# Patient Record
Sex: MALE | Race: WHITE | Employment: UNEMPLOYED | URBAN - METROPOLITAN AREA
[De-identification: names, ages, dates, MRNs, and addresses within clinical notes are randomized per-mention and may not be internally consistent; named-entity substitution may affect disease eponyms.]

---

## 2017-01-18 ENCOUNTER — HOSPITAL ENCOUNTER (OUTPATIENT)
Dept: LAB | Age: 57
Discharge: HOME | End: 2017-01-18
Payer: MEDICARE

## 2017-01-18 DIAGNOSIS — I82.90 THROMBUS: ICD-10-CM

## 2017-01-18 LAB
INR PPP: 2.6 (ref 0.9–1.2)
PROTHROMBIN TIME: 28.1 SEC (ref 9.6–12)

## 2017-01-18 PROCEDURE — 85610 PROTHROMBIN TIME: CPT | Performed by: INTERNAL MEDICINE

## 2017-01-18 PROCEDURE — 36415 COLL VENOUS BLD VENIPUNCTURE: CPT | Performed by: INTERNAL MEDICINE

## 2017-03-22 ENCOUNTER — HOSPITAL ENCOUNTER (OUTPATIENT)
Dept: LAB | Age: 57
Discharge: HOME | End: 2017-03-22
Payer: MEDICARE

## 2017-03-22 DIAGNOSIS — I82.90 THROMBUS: ICD-10-CM

## 2017-03-22 LAB
INR PPP: 7.6 (ref 0.9–1.2)
PROTHROMBIN TIME: >82 SEC (ref 9.6–12)

## 2017-03-22 PROCEDURE — 85610 PROTHROMBIN TIME: CPT | Performed by: INTERNAL MEDICINE

## 2017-03-22 PROCEDURE — 36415 COLL VENOUS BLD VENIPUNCTURE: CPT | Performed by: INTERNAL MEDICINE

## 2017-03-28 LAB
INR PPP: 1.9 (ref 0.9–1.2)
PROTHROMBIN TIME: 20.5 SEC (ref 9.6–12)

## 2017-03-28 PROCEDURE — 85610 PROTHROMBIN TIME: CPT | Performed by: INTERNAL MEDICINE

## 2017-03-28 PROCEDURE — 36415 COLL VENOUS BLD VENIPUNCTURE: CPT | Performed by: INTERNAL MEDICINE

## 2017-04-20 ENCOUNTER — HOSPITAL ENCOUNTER (OUTPATIENT)
Dept: LAB | Age: 57
Discharge: HOME | End: 2017-04-20
Payer: MEDICARE

## 2017-04-20 DIAGNOSIS — E78.2 MIXED HYPERLIPIDEMIA: ICD-10-CM

## 2017-04-20 LAB
CHOLEST SERPL-MCNC: 300 MG/DL
HDLC SERPL-MCNC: 27 MG/DL (ref 40–60)
HDLC SERPL: 11.1 {RATIO}
INR PPP: 4.9 (ref 0.9–1.2)
LDLC SERPL CALC-MCNC: 208.4 MG/DL
LIPID PROFILE,FLP: ABNORMAL
PROTHROMBIN TIME: 54.3 SEC (ref 9.6–12)
TRIGL SERPL-MCNC: 323 MG/DL (ref 35–150)
VLDLC SERPL CALC-MCNC: 64.6 MG/DL (ref 6–23)

## 2017-04-20 PROCEDURE — 85610 PROTHROMBIN TIME: CPT | Performed by: INTERNAL MEDICINE

## 2017-04-20 PROCEDURE — 80061 LIPID PANEL: CPT | Performed by: INTERNAL MEDICINE

## 2017-04-20 PROCEDURE — 36415 COLL VENOUS BLD VENIPUNCTURE: CPT | Performed by: INTERNAL MEDICINE

## 2017-04-26 ENCOUNTER — HOSPITAL ENCOUNTER (OUTPATIENT)
Dept: LAB | Age: 57
Discharge: HOME | End: 2017-04-26
Payer: MEDICARE

## 2017-05-15 LAB
INR PPP: 4.1 (ref 0.9–1.2)
PROTHROMBIN TIME: 45.2 SEC (ref 9.6–12)

## 2017-05-15 PROCEDURE — 85610 PROTHROMBIN TIME: CPT | Performed by: INTERNAL MEDICINE

## 2017-05-15 PROCEDURE — 36415 COLL VENOUS BLD VENIPUNCTURE: CPT | Performed by: INTERNAL MEDICINE

## 2017-05-24 ENCOUNTER — HOSPITAL ENCOUNTER (OUTPATIENT)
Dept: LAB | Age: 57
Discharge: HOME | End: 2017-05-24
Payer: MEDICARE

## 2017-06-07 PROBLEM — F51.01 PRIMARY INSOMNIA: Status: ACTIVE | Noted: 2017-06-07

## 2017-06-07 PROBLEM — J30.1 SEASONAL ALLERGIC RHINITIS DUE TO POLLEN: Status: ACTIVE | Noted: 2017-06-07

## 2017-06-08 PROBLEM — E66.9 OBESITY (BMI 30.0-34.9): Status: ACTIVE | Noted: 2017-06-08

## 2017-06-08 PROBLEM — Z12.5 SCREENING PSA (PROSTATE SPECIFIC ANTIGEN): Status: ACTIVE | Noted: 2017-06-08

## 2017-06-13 LAB
INR PPP: 2.4 (ref 0.9–1.2)
PROTHROMBIN TIME: 26 SEC (ref 9.6–12)

## 2017-06-13 PROCEDURE — 36415 COLL VENOUS BLD VENIPUNCTURE: CPT | Performed by: INTERNAL MEDICINE

## 2017-06-13 PROCEDURE — 85610 PROTHROMBIN TIME: CPT | Performed by: INTERNAL MEDICINE

## 2017-06-28 ENCOUNTER — HOSPITAL ENCOUNTER (OUTPATIENT)
Dept: LAB | Age: 57
Discharge: HOME | End: 2017-06-28
Payer: MEDICARE

## 2017-06-28 DIAGNOSIS — I82.90 THROMBUS: ICD-10-CM

## 2017-08-18 LAB
INR PPP: 3.1 (ref 0.9–1.2)
PROTHROMBIN TIME: 33.5 SEC (ref 9.6–12)

## 2017-08-18 PROCEDURE — 36415 COLL VENOUS BLD VENIPUNCTURE: CPT | Performed by: INTERNAL MEDICINE

## 2017-08-18 PROCEDURE — 85610 PROTHROMBIN TIME: CPT | Performed by: INTERNAL MEDICINE

## 2017-08-23 ENCOUNTER — HOSPITAL ENCOUNTER (OUTPATIENT)
Dept: LAB | Age: 57
Discharge: HOME | End: 2017-08-23
Payer: MEDICARE

## 2017-10-17 ENCOUNTER — HOSPITAL ENCOUNTER (OUTPATIENT)
Dept: LAB | Age: 57
Discharge: HOME | End: 2017-10-17
Payer: MEDICARE

## 2017-10-17 DIAGNOSIS — I82.90 THROMBUS: ICD-10-CM

## 2017-10-17 LAB
INR PPP: 3.1 (ref 0.9–1.2)
PROTHROMBIN TIME: 34.1 SEC (ref 9.6–12)

## 2017-10-17 PROCEDURE — 85610 PROTHROMBIN TIME: CPT | Performed by: INTERNAL MEDICINE

## 2017-10-17 PROCEDURE — 36415 COLL VENOUS BLD VENIPUNCTURE: CPT | Performed by: INTERNAL MEDICINE

## 2017-12-01 LAB
INR PPP: 2 (ref 0.9–1.2)
PROTHROMBIN TIME: 21.4 SEC (ref 9.6–12)

## 2017-12-01 PROCEDURE — 36415 COLL VENOUS BLD VENIPUNCTURE: CPT | Performed by: INTERNAL MEDICINE

## 2017-12-01 PROCEDURE — 85610 PROTHROMBIN TIME: CPT | Performed by: INTERNAL MEDICINE

## 2017-12-03 PROBLEM — R73.02 IGT (IMPAIRED GLUCOSE TOLERANCE): Status: ACTIVE | Noted: 2017-12-03

## 2017-12-05 ENCOUNTER — HOSPITAL ENCOUNTER (OUTPATIENT)
Dept: LAB | Age: 57
Discharge: HOME | End: 2017-12-05
Payer: MEDICARE

## 2017-12-05 DIAGNOSIS — R73.02 IGT (IMPAIRED GLUCOSE TOLERANCE): ICD-10-CM

## 2017-12-05 DIAGNOSIS — I50.22 CHRONIC SYSTOLIC CONGESTIVE HEART FAILURE (HCC): ICD-10-CM

## 2017-12-05 LAB
ANION GAP SERPL CALC-SCNC: 10 MMOL/L (ref 7–16)
BUN SERPL-MCNC: 14 MG/DL (ref 6–23)
CALCIUM SERPL-MCNC: 8.5 MG/DL (ref 8.3–10.4)
CHLORIDE SERPL-SCNC: 108 MMOL/L (ref 98–107)
CO2 SERPL-SCNC: 27 MMOL/L (ref 21–32)
CREAT SERPL-MCNC: 1.07 MG/DL (ref 0.8–1.5)
EST. AVERAGE GLUCOSE BLD GHB EST-MCNC: 111 MG/DL
GLUCOSE SERPL-MCNC: 138 MG/DL (ref 65–100)
HBA1C MFR BLD: 5.5 % (ref 4.8–6)
POTASSIUM SERPL-SCNC: 3.9 MMOL/L (ref 3.5–5.1)
SODIUM SERPL-SCNC: 145 MMOL/L (ref 136–145)

## 2017-12-05 PROCEDURE — 80048 BASIC METABOLIC PNL TOTAL CA: CPT | Performed by: FAMILY MEDICINE

## 2017-12-05 PROCEDURE — 83036 HEMOGLOBIN GLYCOSYLATED A1C: CPT | Performed by: FAMILY MEDICINE

## 2017-12-05 PROCEDURE — 36415 COLL VENOUS BLD VENIPUNCTURE: CPT | Performed by: FAMILY MEDICINE

## 2017-12-10 PROBLEM — Z78.9 STATIN INTOLERANCE: Status: ACTIVE | Noted: 2017-12-10

## 2017-12-12 PROBLEM — R73.02 IGT (IMPAIRED GLUCOSE TOLERANCE): Status: RESOLVED | Noted: 2017-12-03 | Resolved: 2017-12-12

## 2017-12-19 ENCOUNTER — HOSPITAL ENCOUNTER (OUTPATIENT)
Dept: LAB | Age: 57
Discharge: HOME | End: 2017-12-19
Payer: MEDICARE

## 2017-12-19 DIAGNOSIS — I82.90 THROMBUS: ICD-10-CM

## 2018-01-26 ENCOUNTER — HOSPITAL ENCOUNTER (OUTPATIENT)
Dept: LAB | Age: 58
Discharge: HOME | End: 2018-01-26
Payer: MEDICARE

## 2018-01-26 LAB
INR PPP: 2.5
PROTHROMBIN TIME: 26.1 SEC (ref 11.5–14.5)

## 2018-01-26 PROCEDURE — 36415 COLL VENOUS BLD VENIPUNCTURE: CPT | Performed by: INTERNAL MEDICINE

## 2018-01-26 PROCEDURE — 85610 PROTHROMBIN TIME: CPT | Performed by: INTERNAL MEDICINE

## 2018-03-07 ENCOUNTER — HOSPITAL ENCOUNTER (OUTPATIENT)
Dept: LAB | Age: 58
Discharge: HOME | End: 2018-03-07
Payer: MEDICARE

## 2018-03-07 DIAGNOSIS — I82.90 THROMBUS: ICD-10-CM

## 2018-03-07 LAB
INR PPP: 3.3
PROTHROMBIN TIME: 33.1 SEC (ref 11.5–14.5)

## 2018-03-07 PROCEDURE — 85610 PROTHROMBIN TIME: CPT | Performed by: INTERNAL MEDICINE

## 2018-03-07 PROCEDURE — 36415 COLL VENOUS BLD VENIPUNCTURE: CPT | Performed by: INTERNAL MEDICINE

## 2018-04-06 ENCOUNTER — HOSPITAL ENCOUNTER (OUTPATIENT)
Dept: LAB | Age: 58
Discharge: HOME | End: 2018-04-06
Payer: MEDICARE

## 2018-04-06 DIAGNOSIS — I82.90 THROMBUS: ICD-10-CM

## 2018-04-06 LAB
INR PPP: 3.1
PROTHROMBIN TIME: 31.2 SEC (ref 11.5–14.5)

## 2018-04-06 PROCEDURE — 36415 COLL VENOUS BLD VENIPUNCTURE: CPT | Performed by: INTERNAL MEDICINE

## 2018-04-06 PROCEDURE — 85610 PROTHROMBIN TIME: CPT | Performed by: INTERNAL MEDICINE

## 2018-05-18 ENCOUNTER — HOSPITAL ENCOUNTER (OUTPATIENT)
Dept: LAB | Age: 58
Discharge: HOME | End: 2018-05-18
Payer: MEDICARE

## 2018-05-18 DIAGNOSIS — I82.90 THROMBUS: ICD-10-CM

## 2018-05-18 LAB
INR PPP: 2.5
PROTHROMBIN TIME: 26.2 SEC (ref 11.5–14.5)

## 2018-05-18 PROCEDURE — 85610 PROTHROMBIN TIME: CPT | Performed by: INTERNAL MEDICINE

## 2018-05-18 PROCEDURE — 36415 COLL VENOUS BLD VENIPUNCTURE: CPT | Performed by: INTERNAL MEDICINE

## 2018-06-12 PROBLEM — R20.0 NUMBNESS OF LEFT FOOT: Status: ACTIVE | Noted: 2018-06-12

## 2018-07-09 ENCOUNTER — HOSPITAL ENCOUNTER (OUTPATIENT)
Dept: LAB | Age: 58
Discharge: HOME | End: 2018-07-09
Payer: MEDICARE

## 2018-07-09 DIAGNOSIS — I82.90 THROMBUS: ICD-10-CM

## 2018-07-09 LAB
INR PPP: 2.8
PROTHROMBIN TIME: 29.2 SEC (ref 11.5–14.5)

## 2018-07-09 PROCEDURE — 85610 PROTHROMBIN TIME: CPT | Performed by: INTERNAL MEDICINE

## 2018-07-09 PROCEDURE — 36415 COLL VENOUS BLD VENIPUNCTURE: CPT | Performed by: INTERNAL MEDICINE

## 2018-08-24 ENCOUNTER — HOSPITAL ENCOUNTER (OUTPATIENT)
Dept: LAB | Age: 58
Discharge: HOME | End: 2018-08-24
Payer: MEDICARE

## 2018-08-24 DIAGNOSIS — I82.90 THROMBUS: ICD-10-CM

## 2018-08-24 LAB
INR PPP: 2.7
PROTHROMBIN TIME: 28.2 SEC (ref 11.5–14.5)

## 2018-08-24 PROCEDURE — 36415 COLL VENOUS BLD VENIPUNCTURE: CPT

## 2018-08-24 PROCEDURE — 85610 PROTHROMBIN TIME: CPT

## 2018-10-05 ENCOUNTER — HOSPITAL ENCOUNTER (OUTPATIENT)
Dept: LAB | Age: 58
Discharge: HOME | End: 2018-10-05
Payer: MEDICARE

## 2018-10-05 DIAGNOSIS — I82.90 THROMBUS: ICD-10-CM

## 2018-10-05 LAB
INR PPP: 3.1
PROTHROMBIN TIME: 30.1 SEC (ref 11.5–14.5)

## 2018-10-05 PROCEDURE — 36415 COLL VENOUS BLD VENIPUNCTURE: CPT

## 2018-10-05 PROCEDURE — 85610 PROTHROMBIN TIME: CPT

## 2018-11-27 PROBLEM — Z79.01 LONG TERM (CURRENT) USE OF ANTICOAGULANTS: Status: ACTIVE | Noted: 2018-11-27

## 2018-12-11 PROBLEM — Z23 NEED FOR SHINGLES VACCINE: Status: ACTIVE | Noted: 2018-12-11

## 2019-01-09 ENCOUNTER — HOSPITAL ENCOUNTER (OUTPATIENT)
Dept: LAB | Age: 59
Discharge: HOME | End: 2019-01-09
Payer: MEDICARE

## 2019-01-09 DIAGNOSIS — I25.10 ATHEROSCLEROSIS OF NATIVE CORONARY ARTERY OF NATIVE HEART WITHOUT ANGINA PECTORIS: ICD-10-CM

## 2019-01-09 DIAGNOSIS — I50.22 CHRONIC SYSTOLIC CONGESTIVE HEART FAILURE (HCC): ICD-10-CM

## 2019-01-09 LAB
ANION GAP SERPL CALC-SCNC: 5 MMOL/L
BASOPHILS # BLD: 0.1 K/UL (ref 0–0.2)
BASOPHILS NFR BLD: 1 % (ref 0–2)
BUN SERPL-MCNC: 14 MG/DL (ref 6–23)
CALCIUM SERPL-MCNC: 8.1 MG/DL (ref 8.3–10.4)
CHLORIDE SERPL-SCNC: 111 MMOL/L (ref 98–107)
CO2 SERPL-SCNC: 29 MMOL/L (ref 21–32)
CREAT SERPL-MCNC: 1 MG/DL (ref 0.8–1.5)
DIFFERENTIAL METHOD BLD: ABNORMAL
EOSINOPHIL # BLD: 0.5 K/UL (ref 0–0.8)
EOSINOPHIL NFR BLD: 5 % (ref 0.5–7.8)
ERYTHROCYTE [DISTWIDTH] IN BLOOD BY AUTOMATED COUNT: 14.7 % (ref 11.9–14.6)
GLUCOSE SERPL-MCNC: 90 MG/DL (ref 65–100)
HCT VFR BLD AUTO: 43.1 % (ref 41.1–50.3)
HGB BLD-MCNC: 14.3 G/DL (ref 13.6–17.2)
IMM GRANULOCYTES # BLD AUTO: 0 K/UL (ref 0–0.5)
IMM GRANULOCYTES NFR BLD AUTO: 0 % (ref 0–5)
LYMPHOCYTES # BLD: 2.9 K/UL (ref 0.5–4.6)
LYMPHOCYTES NFR BLD: 26 % (ref 13–44)
MCH RBC QN AUTO: 30.6 PG (ref 26.1–32.9)
MCHC RBC AUTO-ENTMCNC: 33.2 G/DL (ref 31.4–35)
MCV RBC AUTO: 92.3 FL (ref 79.6–97.8)
MONOCYTES # BLD: 1.2 K/UL (ref 0.1–1.3)
MONOCYTES NFR BLD: 11 % (ref 4–12)
NEUTS SEG # BLD: 6.3 K/UL (ref 1.7–8.2)
NEUTS SEG NFR BLD: 57 % (ref 43–78)
NRBC # BLD: 0 K/UL (ref 0–0.2)
PLATELET # BLD AUTO: 242 K/UL (ref 150–450)
PMV BLD AUTO: 10.6 FL (ref 9.4–12.3)
POTASSIUM SERPL-SCNC: 4.3 MMOL/L (ref 3.5–5.1)
RBC # BLD AUTO: 4.67 M/UL (ref 4.23–5.6)
SODIUM SERPL-SCNC: 145 MMOL/L (ref 136–145)
WBC # BLD AUTO: 10.9 K/UL (ref 4.3–11.1)

## 2019-01-09 PROCEDURE — 80048 BASIC METABOLIC PNL TOTAL CA: CPT

## 2019-01-09 PROCEDURE — 36415 COLL VENOUS BLD VENIPUNCTURE: CPT

## 2019-01-09 PROCEDURE — 85025 COMPLETE CBC W/AUTO DIFF WBC: CPT

## 2019-06-24 PROBLEM — Z72.0 TOBACCO ABUSE: Status: ACTIVE | Noted: 2019-06-24

## 2019-06-24 PROBLEM — Z12.5 SCREENING PSA (PROSTATE SPECIFIC ANTIGEN): Status: ACTIVE | Noted: 2019-06-24

## 2019-09-23 PROBLEM — Z23 NEED FOR SHINGLES VACCINE: Status: RESOLVED | Noted: 2018-12-11 | Resolved: 2019-09-23

## 2019-12-29 PROBLEM — R53.82 CHRONIC FATIGUE: Status: ACTIVE | Noted: 2019-12-29

## 2019-12-29 PROBLEM — B35.6 TINEA CRURIS: Status: ACTIVE | Noted: 2019-12-29

## 2019-12-29 PROBLEM — N40.1 BENIGN PROSTATIC HYPERPLASIA WITH WEAK URINARY STREAM: Status: ACTIVE | Noted: 2019-12-29

## 2019-12-29 PROBLEM — R39.12 BENIGN PROSTATIC HYPERPLASIA WITH WEAK URINARY STREAM: Status: ACTIVE | Noted: 2019-12-29

## 2020-05-16 ENCOUNTER — TELEPHONE (OUTPATIENT)
Dept: CARDIOLOGY | Age: 60
End: 2020-05-16

## 2020-05-16 RX ORDER — WARFARIN SODIUM 5 MG/1
TABLET ORAL
Qty: 100 TAB | Refills: 3 | Status: SHIPPED | OUTPATIENT
Start: 2020-05-16 | End: 2021-01-13 | Stop reason: SDUPTHER

## 2020-05-16 RX ORDER — CARVEDILOL 12.5 MG/1
12.5 TABLET ORAL DAILY
Qty: 90 TAB | Refills: 3 | Status: SHIPPED | OUTPATIENT
Start: 2020-05-16 | End: 2021-05-10 | Stop reason: SDUPTHER

## 2020-06-18 PROBLEM — I87.2 EDEMA OF BOTH LOWER EXTREMITIES DUE TO PERIPHERAL VENOUS INSUFFICIENCY: Status: ACTIVE | Noted: 2020-06-18

## 2020-06-18 PROBLEM — E66.09 CLASS 1 OBESITY DUE TO EXCESS CALORIES WITH SERIOUS COMORBIDITY AND BODY MASS INDEX (BMI) OF 31.0 TO 31.9 IN ADULT: Status: ACTIVE | Noted: 2017-06-08

## 2020-06-18 PROBLEM — H60.8X3 CHRONIC ECZEMATOUS OTITIS EXTERNA OF BOTH EARS: Status: ACTIVE | Noted: 2020-06-18

## 2020-06-19 PROBLEM — Z79.899 HIGH RISK MEDICATION USE: Status: ACTIVE | Noted: 2020-06-19

## 2020-06-30 PROBLEM — I47.20 VENTRICULAR TACHYCARDIA: Status: ACTIVE | Noted: 2020-06-30

## 2020-07-18 PROBLEM — Z23 NEED FOR SHINGLES VACCINE: Status: RESOLVED | Noted: 2018-12-11 | Resolved: 2020-07-18

## 2020-08-05 ENCOUNTER — HOSPITAL ENCOUNTER (OUTPATIENT)
Dept: LAB | Age: 60
Discharge: HOME | End: 2020-08-05
Payer: MEDICARE

## 2020-08-05 DIAGNOSIS — I47.20 VENTRICULAR TACHYCARDIA: ICD-10-CM

## 2020-08-05 DIAGNOSIS — I50.22 CHRONIC SYSTOLIC CONGESTIVE HEART FAILURE (HCC): ICD-10-CM

## 2020-08-05 LAB
ANION GAP SERPL CALC-SCNC: 4 MMOL/L (ref 7–16)
BUN SERPL-MCNC: 12 MG/DL (ref 8–23)
CALCIUM SERPL-MCNC: 8.5 MG/DL (ref 8.3–10.4)
CHLORIDE SERPL-SCNC: 112 MMOL/L (ref 98–107)
CO2 SERPL-SCNC: 27 MMOL/L (ref 21–32)
CREAT SERPL-MCNC: 1.17 MG/DL (ref 0.8–1.5)
GLUCOSE SERPL-MCNC: 107 MG/DL (ref 65–100)
MAGNESIUM SERPL-MCNC: 2.5 MG/DL (ref 1.8–2.4)
POTASSIUM SERPL-SCNC: 4.1 MMOL/L (ref 3.5–5.1)
SODIUM SERPL-SCNC: 143 MMOL/L (ref 136–145)

## 2020-08-05 PROCEDURE — 36415 COLL VENOUS BLD VENIPUNCTURE: CPT

## 2020-08-05 PROCEDURE — 80048 BASIC METABOLIC PNL TOTAL CA: CPT

## 2020-08-05 PROCEDURE — 83735 ASSAY OF MAGNESIUM: CPT

## 2021-10-03 PROBLEM — Z28.21 COVID-19 VACCINATION REFUSED: Status: ACTIVE | Noted: 2021-06-18

## 2021-10-03 PROBLEM — Z28.21 COVID-19 VACCINATION REFUSED: Status: ACTIVE | Noted: 2021-10-03

## 2021-10-09 ENCOUNTER — APPOINTMENT (OUTPATIENT)
Dept: GENERAL RADIOLOGY | Age: 61
DRG: 177 | End: 2021-10-09
Attending: EMERGENCY MEDICINE
Payer: MEDICARE

## 2021-10-09 ENCOUNTER — HOSPITAL ENCOUNTER (INPATIENT)
Age: 61
LOS: 6 days | Discharge: HOME | DRG: 177 | End: 2021-10-15
Attending: EMERGENCY MEDICINE | Admitting: FAMILY MEDICINE
Payer: MEDICARE

## 2021-10-09 DIAGNOSIS — U07.1 COVID-19: Primary | ICD-10-CM

## 2021-10-09 PROBLEM — R09.02 HYPOXEMIA REQUIRING SUPPLEMENTAL OXYGEN: Status: ACTIVE | Noted: 2021-10-06

## 2021-10-09 PROBLEM — Z86.16 HISTORY OF 2019 NOVEL CORONAVIRUS DISEASE (COVID-19): Status: ACTIVE | Noted: 2021-10-03

## 2021-10-09 PROBLEM — Z99.81 HYPOXEMIA REQUIRING SUPPLEMENTAL OXYGEN: Status: ACTIVE | Noted: 2021-10-06

## 2021-10-09 PROBLEM — J96.01 ACUTE HYPOXEMIC RESPIRATORY FAILURE DUE TO COVID-19 (HCC): Status: ACTIVE | Noted: 2021-10-09

## 2021-10-09 LAB
ALBUMIN SERPL-MCNC: 2.4 G/DL (ref 3.2–4.6)
ALBUMIN/GLOB SERPL: 0.6 {RATIO} (ref 1.2–3.5)
ALP SERPL-CCNC: 58 U/L (ref 50–136)
ALT SERPL-CCNC: 50 U/L (ref 12–65)
ANION GAP SERPL CALC-SCNC: 6 MMOL/L (ref 7–16)
APTT PPP: 104.4 SEC (ref 24.1–35.1)
APTT PPP: 115.3 SEC (ref 24.1–35.1)
APTT PPP: NORMAL SEC (ref 24.1–35.1)
AST SERPL-CCNC: 87 U/L (ref 15–37)
ATRIAL RATE: 85 BPM
BASOPHILS # BLD: 0 K/UL (ref 0–0.2)
BASOPHILS NFR BLD: 0 % (ref 0–2)
BILIRUB SERPL-MCNC: 0.9 MG/DL (ref 0.2–1.1)
BUN SERPL-MCNC: 21 MG/DL (ref 8–23)
CALCIUM SERPL-MCNC: 8.3 MG/DL (ref 8.3–10.4)
CALCULATED P AXIS, ECG09: 41 DEGREES
CALCULATED R AXIS, ECG10: 51 DEGREES
CALCULATED T AXIS, ECG11: 93 DEGREES
CHLORIDE SERPL-SCNC: 105 MMOL/L (ref 98–107)
CO2 SERPL-SCNC: 27 MMOL/L (ref 21–32)
CREAT SERPL-MCNC: 1.42 MG/DL (ref 0.8–1.5)
CRP SERPL-MCNC: 21.5 MG/DL (ref 0–0.9)
DIAGNOSIS, 93000: NORMAL
DIFFERENTIAL METHOD BLD: ABNORMAL
EOSINOPHIL # BLD: 0 K/UL (ref 0–0.8)
EOSINOPHIL NFR BLD: 0 % (ref 0.5–7.8)
ERYTHROCYTE [DISTWIDTH] IN BLOOD BY AUTOMATED COUNT: 15.1 % (ref 11.9–14.6)
GLOBULIN SER CALC-MCNC: 4.1 G/DL (ref 2.3–3.5)
GLUCOSE SERPL-MCNC: 140 MG/DL (ref 65–100)
HCT VFR BLD AUTO: 40.9 % (ref 41.1–50.3)
HGB BLD-MCNC: 13.1 G/DL (ref 13.6–17.2)
IMM GRANULOCYTES # BLD AUTO: 0.2 K/UL (ref 0–0.5)
IMM GRANULOCYTES NFR BLD AUTO: 1 % (ref 0–5)
INR PPP: >9
INR PPP: >9
LYMPHOCYTES # BLD: 1.3 K/UL (ref 0.5–4.6)
LYMPHOCYTES NFR BLD: 12 % (ref 13–44)
MCH RBC QN AUTO: 29.2 PG (ref 26.1–32.9)
MCHC RBC AUTO-ENTMCNC: 32 G/DL (ref 31.4–35)
MCV RBC AUTO: 91.1 FL (ref 79.6–97.8)
MONOCYTES # BLD: 0.6 K/UL (ref 0.1–1.3)
MONOCYTES NFR BLD: 5 % (ref 4–12)
NEUTS SEG # BLD: 9.4 K/UL (ref 1.7–8.2)
NEUTS SEG NFR BLD: 82 % (ref 43–78)
NRBC # BLD: 0 K/UL (ref 0–0.2)
P-R INTERVAL, ECG05: 154 MS
PLATELET # BLD AUTO: 301 K/UL (ref 150–450)
PMV BLD AUTO: 10 FL (ref 9.4–12.3)
POTASSIUM SERPL-SCNC: 3.9 MMOL/L (ref 3.5–5.1)
PROT SERPL-MCNC: 6.5 G/DL (ref 6.3–8.2)
PROTHROMBIN TIME: 77.7 SEC (ref 12.6–14.5)
PROTHROMBIN TIME: 82.6 SEC (ref 12.6–14.5)
Q-T INTERVAL, ECG07: 344 MS
QRS DURATION, ECG06: 90 MS
QTC CALCULATION (BEZET), ECG08: 409 MS
RBC # BLD AUTO: 4.49 M/UL (ref 4.23–5.6)
SODIUM SERPL-SCNC: 138 MMOL/L (ref 138–145)
TROPONIN-HIGH SENSITIVITY: 18.2 PG/ML (ref 0–14)
VENTRICULAR RATE, ECG03: 85 BPM
WBC # BLD AUTO: 11.5 K/UL (ref 4.3–11.1)

## 2021-10-09 PROCEDURE — 93005 ELECTROCARDIOGRAM TRACING: CPT | Performed by: EMERGENCY MEDICINE

## 2021-10-09 PROCEDURE — 96374 THER/PROPH/DIAG INJ IV PUSH: CPT

## 2021-10-09 PROCEDURE — 80053 COMPREHEN METABOLIC PANEL: CPT

## 2021-10-09 PROCEDURE — 99285 EMERGENCY DEPT VISIT HI MDM: CPT

## 2021-10-09 PROCEDURE — 84484 ASSAY OF TROPONIN QUANT: CPT

## 2021-10-09 PROCEDURE — 86140 C-REACTIVE PROTEIN: CPT

## 2021-10-09 PROCEDURE — 85610 PROTHROMBIN TIME: CPT

## 2021-10-09 PROCEDURE — 74011250636 HC RX REV CODE- 250/636: Performed by: FAMILY MEDICINE

## 2021-10-09 PROCEDURE — 77010033711 HC HIGH FLOW OXYGEN

## 2021-10-09 PROCEDURE — 74011250637 HC RX REV CODE- 250/637: Performed by: FAMILY MEDICINE

## 2021-10-09 PROCEDURE — 74011250636 HC RX REV CODE- 250/636: Performed by: EMERGENCY MEDICINE

## 2021-10-09 PROCEDURE — 65660000000 HC RM CCU STEPDOWN

## 2021-10-09 PROCEDURE — 85025 COMPLETE CBC W/AUTO DIFF WBC: CPT

## 2021-10-09 PROCEDURE — 94640 AIRWAY INHALATION TREATMENT: CPT

## 2021-10-09 PROCEDURE — 71045 X-RAY EXAM CHEST 1 VIEW: CPT

## 2021-10-09 PROCEDURE — 94664 DEMO&/EVAL PT USE INHALER: CPT

## 2021-10-09 PROCEDURE — 36415 COLL VENOUS BLD VENIPUNCTURE: CPT

## 2021-10-09 PROCEDURE — 85730 THROMBOPLASTIN TIME PARTIAL: CPT

## 2021-10-09 PROCEDURE — 94762 N-INVAS EAR/PLS OXIMTRY CONT: CPT

## 2021-10-09 RX ORDER — ACETAMINOPHEN 650 MG/1
650 SUPPOSITORY RECTAL
Status: DISCONTINUED | OUTPATIENT
Start: 2021-10-09 | End: 2021-10-15 | Stop reason: HOSPADM

## 2021-10-09 RX ORDER — CARVEDILOL 12.5 MG/1
12.5 TABLET ORAL DAILY
Status: DISCONTINUED | OUTPATIENT
Start: 2021-10-10 | End: 2021-10-15 | Stop reason: HOSPADM

## 2021-10-09 RX ORDER — ONDANSETRON 4 MG/1
4 TABLET, ORALLY DISINTEGRATING ORAL
Status: DISCONTINUED | OUTPATIENT
Start: 2021-10-09 | End: 2021-10-15 | Stop reason: HOSPADM

## 2021-10-09 RX ORDER — DEXAMETHASONE SODIUM PHOSPHATE 100 MG/10ML
10 INJECTION INTRAMUSCULAR; INTRAVENOUS EVERY 12 HOURS
Status: COMPLETED | OUTPATIENT
Start: 2021-10-09 | End: 2021-10-14

## 2021-10-09 RX ORDER — PANTOPRAZOLE SODIUM 40 MG/1
40 TABLET, DELAYED RELEASE ORAL
Status: DISCONTINUED | OUTPATIENT
Start: 2021-10-09 | End: 2021-10-15 | Stop reason: HOSPADM

## 2021-10-09 RX ORDER — ONDANSETRON 2 MG/ML
4 INJECTION INTRAMUSCULAR; INTRAVENOUS
Status: DISCONTINUED | OUTPATIENT
Start: 2021-10-09 | End: 2021-10-15 | Stop reason: HOSPADM

## 2021-10-09 RX ORDER — BENZONATATE 100 MG/1
100 CAPSULE ORAL
Status: DISCONTINUED | OUTPATIENT
Start: 2021-10-09 | End: 2021-10-15 | Stop reason: HOSPADM

## 2021-10-09 RX ORDER — POLYETHYLENE GLYCOL 3350 17 G/17G
17 POWDER, FOR SOLUTION ORAL DAILY PRN
Status: DISCONTINUED | OUTPATIENT
Start: 2021-10-09 | End: 2021-10-15 | Stop reason: HOSPADM

## 2021-10-09 RX ORDER — PHYTONADIONE 5 MG/1
2.5 TABLET ORAL
Status: COMPLETED | OUTPATIENT
Start: 2021-10-09 | End: 2021-10-09

## 2021-10-09 RX ORDER — DEXAMETHASONE SODIUM PHOSPHATE 100 MG/10ML
5 INJECTION INTRAMUSCULAR; INTRAVENOUS EVERY 12 HOURS
Status: DISCONTINUED | OUTPATIENT
Start: 2021-10-14 | End: 2021-10-14

## 2021-10-09 RX ORDER — SODIUM CHLORIDE 0.9 % (FLUSH) 0.9 %
5-40 SYRINGE (ML) INJECTION EVERY 8 HOURS
Status: DISCONTINUED | OUTPATIENT
Start: 2021-10-09 | End: 2021-10-15 | Stop reason: HOSPADM

## 2021-10-09 RX ORDER — DOXYCYCLINE 100 MG/1
100 CAPSULE ORAL EVERY 12 HOURS
Status: DISCONTINUED | OUTPATIENT
Start: 2021-10-09 | End: 2021-10-15 | Stop reason: HOSPADM

## 2021-10-09 RX ORDER — QUETIAPINE FUMARATE 100 MG/1
100 TABLET, FILM COATED ORAL
Status: DISCONTINUED | OUTPATIENT
Start: 2021-10-09 | End: 2021-10-15 | Stop reason: HOSPADM

## 2021-10-09 RX ORDER — BUDESONIDE AND FORMOTEROL FUMARATE DIHYDRATE 160; 4.5 UG/1; UG/1
2 AEROSOL RESPIRATORY (INHALATION)
Status: DISCONTINUED | OUTPATIENT
Start: 2021-10-09 | End: 2021-10-15 | Stop reason: HOSPADM

## 2021-10-09 RX ORDER — DEXAMETHASONE SODIUM PHOSPHATE 100 MG/10ML
6 INJECTION INTRAMUSCULAR; INTRAVENOUS
Status: COMPLETED | OUTPATIENT
Start: 2021-10-09 | End: 2021-10-09

## 2021-10-09 RX ORDER — SODIUM CHLORIDE 0.9 % (FLUSH) 0.9 %
5-40 SYRINGE (ML) INJECTION AS NEEDED
Status: DISCONTINUED | OUTPATIENT
Start: 2021-10-09 | End: 2021-10-15 | Stop reason: HOSPADM

## 2021-10-09 RX ORDER — SODIUM CHLORIDE 9 MG/ML
75 INJECTION, SOLUTION INTRAVENOUS CONTINUOUS
Status: DISPENSED | OUTPATIENT
Start: 2021-10-09 | End: 2021-10-09

## 2021-10-09 RX ORDER — DEXAMETHASONE SODIUM PHOSPHATE 4 MG/ML
4 INJECTION, SOLUTION INTRA-ARTICULAR; INTRALESIONAL; INTRAMUSCULAR; INTRAVENOUS; SOFT TISSUE ONCE
Status: COMPLETED | OUTPATIENT
Start: 2021-10-09 | End: 2021-10-09

## 2021-10-09 RX ORDER — GUAIFENESIN 600 MG/1
1200 TABLET, EXTENDED RELEASE ORAL 2 TIMES DAILY
Status: DISCONTINUED | OUTPATIENT
Start: 2021-10-09 | End: 2021-10-15 | Stop reason: HOSPADM

## 2021-10-09 RX ORDER — ALBUTEROL SULFATE 90 UG/1
2 AEROSOL, METERED RESPIRATORY (INHALATION)
Status: DISCONTINUED | OUTPATIENT
Start: 2021-10-09 | End: 2021-10-15 | Stop reason: HOSPADM

## 2021-10-09 RX ORDER — ACETAMINOPHEN 325 MG/1
650 TABLET ORAL
Status: DISCONTINUED | OUTPATIENT
Start: 2021-10-09 | End: 2021-10-15 | Stop reason: HOSPADM

## 2021-10-09 RX ADMIN — DOXYCYCLINE HYCLATE 100 MG: 100 CAPSULE ORAL at 13:34

## 2021-10-09 RX ADMIN — DEXAMETHASONE SODIUM PHOSPHATE 6 MG: 10 INJECTION INTRAMUSCULAR; INTRAVENOUS at 10:08

## 2021-10-09 RX ADMIN — Medication 10 ML: at 13:34

## 2021-10-09 RX ADMIN — BARICITINIB 4 MG: 2 TABLET, FILM COATED ORAL at 13:33

## 2021-10-09 RX ADMIN — QUETIAPINE FUMARATE 100 MG: 100 TABLET ORAL at 21:57

## 2021-10-09 RX ADMIN — PHYTONADIONE 2.5 MG: 5 TABLET ORAL at 14:37

## 2021-10-09 RX ADMIN — GUAIFENESIN 1200 MG: 600 TABLET ORAL at 21:57

## 2021-10-09 RX ADMIN — DOXYCYCLINE HYCLATE 100 MG: 100 CAPSULE ORAL at 21:57

## 2021-10-09 RX ADMIN — Medication 10 ML: at 21:57

## 2021-10-09 RX ADMIN — PANTOPRAZOLE SODIUM 40 MG: 40 TABLET, DELAYED RELEASE ORAL at 12:37

## 2021-10-09 RX ADMIN — DEXAMETHASONE SODIUM PHOSPHATE 4 MG: 4 INJECTION, SOLUTION INTRAMUSCULAR; INTRAVENOUS at 13:33

## 2021-10-09 RX ADMIN — DEXAMETHASONE SODIUM PHOSPHATE 10 MG: 10 INJECTION INTRAMUSCULAR; INTRAVENOUS at 21:57

## 2021-10-09 RX ADMIN — BUDESONIDE AND FORMOTEROL FUMARATE DIHYDRATE 2 PUFF: 160; 4.5 AEROSOL RESPIRATORY (INHALATION) at 20:00

## 2021-10-09 RX ADMIN — SODIUM CHLORIDE 75 ML/HR: 900 INJECTION, SOLUTION INTRAVENOUS at 13:37

## 2021-10-09 RX ADMIN — GUAIFENESIN 1200 MG: 600 TABLET ORAL at 13:34

## 2021-10-09 NOTE — PROGRESS NOTES
INITIAL SPIRITUAL ASSESSMENT     NOTED:      PATIENT IS BEING ADMITTED TO FLOOR FROM ER      GEORGE    SON -  [address]    DAUGHTER -  Munira Lyman [address]        LIVES LOCALLY    CHAPLAINS HAVE VISITED SINCE 2011      WILL ASSESS HOW WE CAN BEST SERVE THIS FAMILY

## 2021-10-09 NOTE — PROGRESS NOTES
TRANSFER - IN REPORT:    Verbal report received from Matthew Brown on Blaine Riedel  being received from ED for COVID 19      Report consisted of patients Situation, Background, Assessment and   Recommendations(SBAR). Information from the following reportwas reviewed with the receiving nurse. Opportunity for questions and clarification was provided. Assessment completed upon patients arrival to unit and care assumed. Patient arrived on unit with two [address] IV access.

## 2021-10-09 NOTE — ED NOTES
TRANSFER - OUT REPORT:    Verbal report given to Linda Reyna RN on Tonia Blood  being transferred to  for routine progression of care       Report consisted of patients Situation, Background, Assessment and   Recommendations(SBAR). Information from the following report(s) SBAR was reviewed with the receiving nurse. Lines:   Peripheral IV 10/09/21 Left Hand (Active)       Peripheral IV 10/09/21 Right Antecubital (Active)        Opportunity for questions and clarification was provided.       Patient transported with:   [employer]

## 2021-10-09 NOTE — ED PROVIDER NOTES
Patient is a 43-year-old male with history of heart disease and prior pacemaker who comes to the emergency department today from home via EMS. Patient was diagnosed Covid positive at [facility] on October 3. He was not vaccinated this year. He was discharged home on home oxygen. He states he has been feeling more short of breath and fatigue for several days. Paramedics found him with a sat of 81% on nasal cannula oxygen. He denies any fever. States he just feels weak. The history is provided by the patient and the EMS personnel.    Positive For Covid-19  Associated symptoms: cough and myalgias    Associated symptoms: no chest pain, no chills, no congestion, no diarrhea, no dysuria, no nausea, no rash, no rhinorrhea, no sore throat and no vomiting         Past Medical History:   Diagnosis Date    CAD (coronary artery disease)     heart attack 2005 stentS HEART    CHF (congestive heart failure) ([facility] 9/27/2011    Chronic systolic heart failure ([facility] 10/2/2015    Coronary atherosclerosis of native coronary vessel 10/2/2015    Hyperlipidemia other unsp dyslipidemia 10/2/2015    IGT (impaired glucose tolerance) 12/3/2017    Other ill-defined conditions(799.89)      blind left eye    Other ill-defined conditions(799.89)     cardiomyopathy    Primary insomnia 6/7/2017    Psychiatric disorder     depression     Sepsis 4/5/2011    Thromboembolus ([facility]     thrombus in heart     Thrombus 10/2/2015       Past Surgical History:   Procedure Laterality Date    HX COLONOSCOPY  12/2010    HX HEART CATHETERIZATION      5 stents total    HX HEENT      oral surgery    HX PACEMAKER      RI CARDIAC SURG PROCEDURE UNLIST       1 stent 2005         Family History:   Problem Relation Age of Onset    Heart Disease Mother     Heart Attack Mother 67        mi    Diabetes Mother     Bleeding Prob Father     Diabetes Maternal Grandmother     Heart Disease Brother     Cancer Paternal Grandmother     Heart Disease Paternal Grandfather     Diabetes Paternal Grandfather        Social History     Socioeconomic History    Marital status:      Spouse name: Not on file    Number of children: Not on file    Years of education: Not on file    Highest education level: Not on file   Occupational History    Not on file   Tobacco Use    Smoking status: Current Every Day Smoker     Packs/day: 1.00     Years: 22.00     Pack years: 22.00     Types: Cigarettes     Start date: 6/7/1995    Smokeless tobacco: Never Used   Substance and Sexual Activity    Alcohol use: No    Drug use: No     Types: Prescription    Sexual activity: Not on file   Other Topics Concern    Not on file   Social History Narrative    Not on file     Social Determinants of Health     Financial Resource Strain:     Difficulty of Paying Living Expenses:    Food Insecurity:     Worried About Running Out of Food in the Last Year:     Ran Out of Food in the Last Year:    Transportation Needs:     Lack of Transportation (Medical):  Lack of Transportation (Non-Medical):    Physical Activity:     Days of Exercise per Week:     Minutes of Exercise per Session:    Stress:     Feeling of Stress :    Social Connections:     Frequency of Communication with Friends and Family:     Frequency of Social Gatherings with Friends and Family:     Attends Buddhist Services:     Active Member of Clubs or Organizations:     Attends Club or Organization Meetings:     Marital Status:    Intimate Partner Violence:     Fear of Current or Ex-Partner:     Emotionally Abused:     Physically Abused:     Sexually Abused: ALLERGIES: Pcn [penicillins], Crestor [rosuvastatin], Lipitor [atorvastatin], Lisinopril, and Repatha pushtronex [evolocumab]    Review of Systems   Constitutional: Positive for fatigue. Negative for chills and fever. HENT: Negative for congestion, rhinorrhea and sore throat.     Eyes: Negative for pain, discharge and visual disturbance. Respiratory: Positive for cough and shortness of breath. Cardiovascular: Negative for chest pain and palpitations. Gastrointestinal: Negative for abdominal pain, diarrhea, nausea and vomiting. Endocrine: Negative for polydipsia and polyuria. Genitourinary: Negative for dysuria, frequency and urgency. Musculoskeletal: Positive for myalgias. Negative for back pain and neck pain. Skin: Negative for rash. Neurological: Negative for seizures, syncope and weakness. Hematological: Negative. Vitals:    10/09/21 0953 10/09/21 0957 10/09/21 1007 10/09/21 1013   BP: (!) 105/59      Pulse: 80      Resp: 16      Temp: 98.6 °F (37 °C)      SpO2: 95% (!) 86% 91% 90%   Weight: 98.9 kg (218 lb)      Height: 5' 9\" (1.753 m)               Physical Exam  Vitals and nursing note reviewed. Constitutional:       Appearance: Normal appearance. He is well-developed. He is ill-appearing. HENT:      Head: Normocephalic and atraumatic. Nose: Nose normal.   Eyes:      Extraocular Movements: Extraocular movements intact. Conjunctiva/sclera: Conjunctivae normal.      Pupils: Pupils are equal, round, and reactive to light. Cardiovascular:      Rate and Rhythm: Normal rate and regular rhythm. Heart sounds: Normal heart sounds. Pulmonary:      Breath sounds: Decreased air movement present. Rhonchi present. Abdominal:      Palpations: Abdomen is soft. Tenderness: There is no abdominal tenderness. There is no guarding or rebound. Musculoskeletal:         General: No tenderness. Normal range of motion. Cervical back: Normal range of motion and neck supple. Right lower leg: No edema. Left lower leg: No edema. Lymphadenopathy:      Cervical: No cervical adenopathy. Skin:     General: Skin is warm and dry. Findings: No rash. Neurological:      General: No focal deficit present. Mental Status: He is alert and oriented to person, place, and time.  Mental status is at baseline. GCS: GCS eye subscore is 4. GCS verbal subscore is 5. GCS motor subscore is 6. Cranial Nerves: No cranial nerve deficit. Sensory: No sensory deficit. Motor: Motor function is intact. No weakness. MDM  Number of Diagnoses or Management Options  Diagnosis management comments: I wore appropriate PPE throughout this patient's ED visit. Ralph Burgess MD, 10:20 AM    Patient arrived on a nonrebreather oxygen by EMS. When we turned oxygen off he immediately desaturated to 85. Back on 6 L nasal cannula patient got up to about 88%. Prior records were reviewed through care everywhere and he was positive for COVID-19 at [facility] on October 3. IV Decadron is ordered. Basic labs and x-ray ordered. Patient will need admission to the hospitalist service. CRITICAL CARE Documentation: This patient is critically ill and there is a high probability of of imminent or life threatening deterioration in the patient's condition without immediate management. The nature of the patient's clinical problem is: Hypoxia and COVID-19    I have spent 30 minutes in direct patient care, documentation, review of labs/xrays/old records, discussion with Staff, Colleague, Nursing . The time involved in the performance of separately reportable procedures was not counted toward critical care time. Ralph Burgess MD; 10/9/2021 @10:22 AM    ===============================================  ED EKG Interpretation  EKG was interpreted in the absence of a cardiologist.    EKG rhythm: normal sinus rhythm with PVCs  Rate: 85  ST Segments: Nonspecific ST segments - NO STEMI      Ralph Burgess MD; 10/9/2021 @10:29 AM================    XR CHEST PORT   Final Result    1. Bilateral infiltrates compatible with viral pneumonia. 2. ICD in place. No pneumothorax. Voice dictation software was used during the making of this note.   This software is not perfect and grammatical and other typographical errors may be present. This note has been proofread, but may still contain errors.   Lauren Stanford MD; 10/9/2021 @11:02 AM   ===================================================================                   Amount and/or Complexity of Data Reviewed  Clinical lab tests: ordered and reviewed  Tests in the radiology section of CPT®: ordered and reviewed  Tests in the medicine section of CPT®: ordered and reviewed  Review and summarize past medical records: yes  Discuss the patient with other providers: yes  Independent visualization of images, tracings, or specimens: yes    Risk of Complications, Morbidity, and/or Mortality  Presenting problems: moderate  Diagnostic procedures: moderate  Management options: low    Patient Progress  Patient progress: improved         Procedures

## 2021-10-09 NOTE — ED TRIAGE NOTES
Pt arrived via EMS from home with c/o hypoxia. Pt was dx with COVID last Sunday. Initial SpO 81% on 4L Pt has no complaints but fatigue and decreased appetite.  /68 HR 90 SpO 98% NRB

## 2021-10-09 NOTE — PROGRESS NOTES
Patient has slept since arriving on unit. Alert and oriented x 4. Desaturation with turning on his side to use urinal, despite oxygen at 10LPM via NC. Dyspnea at rest.     Nonproductive cough. IV fluids in [address] at 75ml/hr until 2100 tonight. Pharmacy and home meds confirmed with patient. Patient reports taking QDay medications at bedtime. Patient lives alone with no close caregiver at this time, as children are out of state. Patient does not use any assistive device at home. INR Continues to be greater than 9, as per ED labs. Patient is bedrest with [facility] privileges at this time. Patient denies any s/s of distress or discomfort at this time.

## 2021-10-09 NOTE — H&P
[facility] Hospitalist   History and Physical       Name:  Blaine Riedel  Age:61 y.o. Sex:male   :  1960    MRN:  [id number]   PCP:  Ruthann Calderon DO      Admit Date:  10/9/2021  9:50 AM   Chief Complaint: Cough, shortness of breath, generalized weakness, hypoxia    Reason for Admission:   Acute hypoxemic respiratory failure due to COVID[facility]19 [facility] [U07.1, J96.01]    Assessment & Plan:   -Acute hypoxic respiratory failure secondary to Covid pneumonia  Not a candidate for remdesivir as symptoms are greater than a week  Recently seen at [facility] on October 3, 2021, guarded patient was given a short course of steroid, Z-Xiang and Tessalon Perles  Patient presently on 10 L of oxygen nasal cannula. C-reactive protein 21.5. We will start on high-dose of dexamethasone with 10 mg twice daily for 5 days and then 5 mg twice daily for 5 more days. Discussed about tocilizumab and the baricitinib. Explained pros and cons, other side effect. Patient is willing to have those medications. We will start patient on baricitinib 4 mg p.o. daily for 14 days. Mucinex twice daily, incentive spirometry every 2 hourly, prone positioning 6/24 hrs  Also start on doxycycline.    -Mild MARBELLA creatinine of 1.42, GFR of 54  Gentle hydration normal saline at 75 cc/h, give a total of 500 cc. Held losartan    -History of apical thrombus on Coumadin  Presently supratherapeutic INR greater than 9  Gave a dose of vitamin K 2.5 mg p.o. x1    -Coronary disease  Continue aspirin    -Hypertension  Blood pressure low normal in the 83U systolic  Placed parameters for blood pressure medication    -Chronic systolic congestive heart failure with a EF of 30 to 35%  -History of status post pacemaker/defibrillator    -Psychiatric disorder  Continue Seroquel 100 mg at bedtime.         Diet: DIET ADULT  VTE ppx: Presently supratherapeutic INR  GI ppx: Protonix  CODE STATUS: Full Code        History of Presenting Illness:     Blaine Riedel is a 64 y.o. male with medical history of chronic systolic congestive heart failure with a EF of 30 to 35% follows with Dr. Vela Holstein, history of s/p ICD, coronary disease, hypertension, history of apical thrombus on Coumadin, statin intolerant, based on echo done in 2018 also had ascending aortic diameter of 3.68 cm. Patient was complaining of generalized weakness and fatigue 2 weeks ago, was seen at [facility] on October 3, 2021, found to be Covid positive hypoxic, was sent home on oxygen 2 L/min, short course of steroid, Z-Xiang and as needed Tessalon Perles. Patient says he has been noting at times his oxygen saturation dropping to 88 past 4 to 5 days, since past 2 days started having cough harsher with shortness of breath-mild to moderate, this morning noticed his oxygen saturation was low of 80%. Brought to emergency room for further evaluation. Patient otherwise does not complain of any headache or dizziness or chest pain or nausea vomiting abdominal pain or any unilateral weakness. GFR of 54, C-reactive protein of 21.5, ALT of 50, AST of 87, INR greater than 9. Chest x-ray-  IMPRESSION  1. Bilateral infiltrates compatible with viral pneumonia. 2. ICD in place. No pneumothorax. Blood pressure is in 24X systolic. Presently on 10 L of oxygen nasal cannula. Patient will be admitted for acute hypoxic respiratory failure secondary to Covid pneumonia, mild MARBELLA and low normal blood pressures. Review of Systems:  A 14 point review of systems was taken and pertinent positive as per HPI.       Past Medical History:   Diagnosis Date    CAD (coronary artery disease)     heart attack 2005 stentS HEART    CHF (congestive heart failure) ([facility] 9/27/2011    Chronic systolic heart failure ([facility] 10/2/2015    Coronary atherosclerosis of native coronary vessel 10/2/2015    Hyperlipidemia other unsp dyslipidemia 10/2/2015    IGT (impaired glucose tolerance) 12/3/2017    Other ill-defined conditions(799.89) blind left eye    Other ill-defined conditions(799.89)     cardiomyopathy    Primary insomnia 6/7/2017    Psychiatric disorder     depression     Sepsis 4/5/2011    Thromboembolus ([address]     thrombus in heart     Thrombus 10/2/2015       Past Surgical History:   Procedure Laterality Date    HX COLONOSCOPY  12/2010    HX HEART CATHETERIZATION      5 stents total    HX HEENT      oral surgery    HX PACEMAKER      ND CARDIAC SURG PROCEDURE UNLIST       1 stent 2005       Family History : reviewed  Family History   Problem Relation Age of Onset    Heart Disease Mother     Heart Attack Mother 67        mi    Diabetes Mother     Bleeding Prob Father     Diabetes Maternal Grandmother     Heart Disease Brother     Cancer Paternal Grandmother     Heart Disease Paternal Grandfather     Diabetes Paternal Grandfather         Social History     Tobacco Use    Smoking status: Current Every Day Smoker     Packs/day: 1.00     Years: 22.00     Pack years: 22.00     Types: Cigarettes     Start date: 6/7/1995    Smokeless tobacco: Never Used   Substance Use Topics    Alcohol use: No       Allergies   Allergen Reactions    Pcn [Penicillins] Swelling     Face swelling    Crestor [Rosuvastatin] Myalgia     itching    Lipitor [Atorvastatin] Myalgia     itching    Lisinopril Unknown (comments)    Repatha Pushtronex [Evolocumab] Myalgia     Itching       Immunization History   Administered Date(s) Administered    Pneumococcal Polysaccharide (PPSV-23) 06/12/2018    Tdap 06/07/2017         PTA Medications:  Current Outpatient Medications   Medication Instructions    albuterol (PROVENTIL HFA, VENTOLIN HFA, PROAIR HFA) 90 mcg/actuation inhaler 2 Puffs, Inhalation, EVERY 4 HOURS AS NEEDED    aspirin 81 mg tablet Take  by mouth.  azithromycin (ZITHROMAX) 250 mg tablet No dose, route, or frequency recorded.     benzonatate (TESSALON) 100 mg, Oral, 3 TIMES DAILY AS NEEDED    carvediloL (COREG) 12.5 mg, Oral, DAILY  Cetirizine (ZYRTEC) 10 mg cap Oral, AS NEEDED    cyanocobalamin 1,000 mcg, Oral, AS NEEDED    losartan (COZAAR) 25 mg tablet TAKE 1 TABLET BY MOUTH DAILY    multivitamin (ONE A DAY) tablet 1 Tablet, Oral, DAILY    Oxygen Use as instructed. Use as instructed. 2L via NC. Dx code U5.11    QUEtiapine (SEROQUEL) 100 mg, Oral, EVERY BEDTIME    warfarin (COUMADIN) 5 mg tablet Take 1 to 1 1/2 tablet daily or as directed       Objective:     Patient Vitals for the past 24 hrs:   Temp Pulse Resp BP SpO2   10/09/21 1202  76 25 (!) 95/53 91 %   10/09/21 1116  73 26 (!) 94/59 95 %   10/09/21 1102  79 30 (!) 87/57 92 %   10/09/21 1046  78 29 (!) 94/54 (!) 89 %   10/09/21 1031  74 29 (!) 97/54 93 %   10/09/21 1016  81  (!) 95/54 90 %   10/09/21 1013     90 %   10/09/21 1007     91 %   10/09/21 1002  84 27 (!) 102/56 (!) 88 %   10/09/21 0957     (!) 86 %   10/09/21 0953 98.6 °F (37 °C) 80 16 (!) 105/59 95 %   10/09/21 0952  81  (!) 105/59 94 %       Oxygen Therapy  O2 Sat (%): 91 % (10/09/21 1202)  Pulse via Oximetry: 76 beats per minute (10/09/21 1202)  O2 Device: Hi flow nasal cannula (10/09/21 1013)  O2 Flow Rate (L/min): 10 l/min (10/09/21 1013)    Body mass index is 32.19 kg/m². Physical Exam:    General: Awake alert oriented x3, mild respiratory distress, presently on 10 L of oxygen nasal cannula  HEENT: Pupils equal and reactive to light and accommodation, oropharynx is clear   Neck: Supple, no lymphadenopathy, no JVD   Lungs: Bilateral coarse breath sounds   cardiovascular: Regular rate and rhythm with normal S1 and S2   Abdomen: Soft, nontender, nondistended, normoactive bowel sounds   Extremities: No cyanosis clubbing or edema   Neuro: Nonfocal, A&O x3   Psych: Normal mood and affect       Data Reviewed: I have reviewed all labs, meds, and studies.       Recent Results (from the past 24 hour(s))   CBC WITH AUTOMATED DIFF    Collection Time: 10/09/21 10:00 AM   Result Value Ref Range WBC 11.5 (H) 4.3 - 11.1 K/uL    RBC 4.49 4.23 - 5.6 M/uL    HGB 13.1 (L) 13.6 - 17.2 g/dL    HCT 40.9 (L) 41.1 - 50.3 %    MCV 91.1 79.6 - 97.8 FL    MCH 29.2 26.1 - 32.9 PG    MCHC 32.0 31.4 - 35.0 g/dL    RDW 15.1 (H) 11.9 - 14.6 %    PLATELET 772 939 - 443 K/uL    MPV 10.0 9.4 - 12.3 FL    ABSOLUTE NRBC 0.00 0.0 - 0.2 K/uL    DF AUTOMATED      NEUTROPHILS 82 (H) 43 - 78 %    LYMPHOCYTES 12 (L) 13 - 44 %    MONOCYTES 5 4.0 - 12.0 %    EOSINOPHILS 0 (L) 0.5 - 7.8 %    BASOPHILS 0 0.0 - 2.0 %    IMMATURE GRANULOCYTES 1 0.0 - 5.0 %    ABS. NEUTROPHILS 9.4 (H) 1.7 - 8.2 K/UL    ABS. LYMPHOCYTES 1.3 0.5 - 4.6 K/UL    ABS. MONOCYTES 0.6 0.1 - 1.3 K/UL    ABS. EOSINOPHILS 0.0 0.0 - 0.8 K/UL    ABS. BASOPHILS 0.0 0.0 - 0.2 K/UL    ABS. IMM. GRANS. 0.2 0.0 - 0.5 K/UL   METABOLIC PANEL, COMPREHENSIVE    Collection Time: 10/09/21 10:00 AM   Result Value Ref Range    Sodium 138 138 - 145 mmol/L    Potassium 3.9 3.5 - 5.1 mmol/L    Chloride 105 98 - 107 mmol/L    CO2 27 21 - 32 mmol/L    Anion gap 6 (L) 7 - 16 mmol/L    Glucose 140 (H) 65 - 100 mg/dL    BUN 21 8 - 23 MG/DL    Creatinine 1.42 0.8 - 1.5 MG/DL    GFR est AA >60 >60 ml/min/1.73m2    GFR est non-AA 54 (L) >60 ml/min/1.73m2    Calcium 8.3 8.3 - 10.4 MG/DL    Bilirubin, total 0.9 0.2 - 1.1 MG/DL    ALT (SGPT) 50 12 - 65 U/L    AST (SGOT) 87 (H) 15 - 37 U/L    Alk.  phosphatase 58 50 - 136 U/L    Protein, total 6.5 6.3 - 8.2 g/dL    Albumin 2.4 (L) 3.2 - 4.6 g/dL    Globulin 4.1 (H) 2.3 - 3.5 g/dL    A-G Ratio 0.6 (L) 1.2 - 3.5     PROTHROMBIN TIME + INR    Collection Time: 10/09/21 10:00 AM   Result Value Ref Range    Prothrombin time 77.7 (H) 12.6 - 14.5 sec    INR >9.0 (HH)    PTT    Collection Time: 10/09/21 10:00 AM   Result Value Ref Range    aPTT 104.4 (H) 24.1 - 35.1 SEC   C REACTIVE PROTEIN, QT    Collection Time: 10/09/21 10:00 AM   Result Value Ref Range    C-Reactive protein 21.5 (H) 0.0 - 0.9 mg/dL   TROPONIN-HIGH SENSITIVITY    Collection Time: 10/09/21 10:00 AM   Result Value Ref Range    Troponin-High Sensitivity 18.2 (H) 0 - 14 pg/mL   EKG, 12 LEAD, INITIAL    Collection Time: 10/09/21 10:05 AM   Result Value Ref Range    Ventricular Rate 85 BPM    Atrial Rate 85 BPM    P-R Interval 154 ms    QRS Duration 90 ms    Q-T Interval 344 ms    QTC Calculation (Bezet) 409 ms    Calculated P Axis 41 degrees    Calculated R Axis 51 degrees    Calculated T Axis 93 degrees    Diagnosis       !! AGE AND GENDER SPECIFIC ECG ANALYSIS !! Sinus rhythm with sinus arrhythmia with occasional and consecutive Premature   ventricular complexes  Low voltage QRS  Anteroseptal infarct (cited on or before 04-APR-2011)  Abnormal ECG  When compared with ECG of 09-OCT-2021 10:00,  No significant change was found  Confirmed by [facility]  MD ()SONYA ([id number]) on 10/9/2021 10:33:44 AM         EKG Results     Procedure 720 Value Units Date/Time    EKG, 12 LEAD, INITIAL [[id number]] Collected: 10/09/21 1005    Order Status: Completed Updated: 10/09/21 1033     Ventricular Rate 85 BPM      Atrial Rate 85 BPM      P-R Interval 154 ms      QRS Duration 90 ms      Q-T Interval 344 ms      QTC Calculation (Bezet) 409 ms      Calculated P Axis 41 degrees      Calculated R Axis 51 degrees      Calculated T Axis 93 degrees      Diagnosis --     !! AGE AND GENDER SPECIFIC ECG ANALYSIS !!   Sinus rhythm with sinus arrhythmia with occasional and consecutive Premature   ventricular complexes  Low voltage QRS  Anteroseptal infarct (cited on or before 04-APR-2011)  Abnormal ECG  When compared with ECG of 09-OCT-2021 10:00,  No significant change was found  Confirmed by [facility]  MD ()SONYA ([id number]) on 10/9/2021 10:33:44 AM            All Micro Results     None          Other Studies:  XR CHEST PORT    Result Date: 10/9/2021  Chest portable CLINICAL INDICATION: Acute hypoxia, shortness of breath, Covid-19 positive COMPARISON: 9/29/2011 TECHNIQUE: single AP portable view chest at 10:20 AM upright FINDINGS: Stable mediastinal and hilar contours given technique, positioning. Left side pacemaker/ICD remains in place. Some artifact due to external monitoring wires. Shallow lung volumes leading to some crowding. No pneumothorax, definite pleural effusion, or dense consolidation. Mild to moderate reticular nodular infiltrates bilaterally, mostly in the perihilar to lower lungs, left worse than right. 1. Bilateral infiltrates compatible with viral pneumonia. 2. ICD in place. No pneumothorax.          Medications:  Medications Administered     dexamethasone (DECADRON) 10 mg/mL injection 6 mg     Admin Date  10/09/2021 Action  Given Dose  6 mg Route  IntraVENous Administered By  Emanuel Guerrero RN                    Problem List:     Hospital Problems as of 10/9/2021 Date Reviewed: 7/22/2021        Codes Class Noted - Resolved POA    Acute hypoxemic respiratory failure due to COVID-19 [facility]) ICD-10-CM: U07.1, J96.01  ICD-9-CM: 518.81, 079.89, 799.02  10/9/2021 - Present Unknown                   Signed By: Joe Cottrell MD   [employer] [facility]    October 9, 2021  12:40 PM

## 2021-10-10 LAB
ALBUMIN SERPL-MCNC: 2.3 G/DL (ref 3.2–4.6)
ALBUMIN/GLOB SERPL: 0.5 {RATIO} (ref 1.2–3.5)
ALP SERPL-CCNC: 53 U/L (ref 50–136)
ALT SERPL-CCNC: 38 U/L (ref 12–65)
ANION GAP SERPL CALC-SCNC: 9 MMOL/L (ref 7–16)
AST SERPL-CCNC: 57 U/L (ref 15–37)
BASOPHILS # BLD: 0 K/UL (ref 0–0.2)
BASOPHILS NFR BLD: 0 % (ref 0–2)
BILIRUB SERPL-MCNC: 0.6 MG/DL (ref 0.2–1.1)
BUN SERPL-MCNC: 21 MG/DL (ref 8–23)
CALCIUM SERPL-MCNC: 8.6 MG/DL (ref 8.3–10.4)
CHLORIDE SERPL-SCNC: 109 MMOL/L (ref 98–107)
CO2 SERPL-SCNC: 21 MMOL/L (ref 21–32)
CREAT SERPL-MCNC: 1.12 MG/DL (ref 0.8–1.5)
DIFFERENTIAL METHOD BLD: ABNORMAL
EOSINOPHIL # BLD: 0 K/UL (ref 0–0.8)
EOSINOPHIL NFR BLD: 0 % (ref 0.5–7.8)
ERYTHROCYTE [DISTWIDTH] IN BLOOD BY AUTOMATED COUNT: 15.1 % (ref 11.9–14.6)
GLOBULIN SER CALC-MCNC: 4.2 G/DL (ref 2.3–3.5)
GLUCOSE SERPL-MCNC: 134 MG/DL (ref 65–100)
HCT VFR BLD AUTO: 41 % (ref 41.1–50.3)
HGB BLD-MCNC: 12.9 G/DL (ref 13.6–17.2)
IMM GRANULOCYTES # BLD AUTO: 0.2 K/UL (ref 0–0.5)
IMM GRANULOCYTES NFR BLD AUTO: 1 % (ref 0–5)
INR PPP: 8.2
LYMPHOCYTES # BLD: 1.7 K/UL (ref 0.5–4.6)
LYMPHOCYTES NFR BLD: 14 % (ref 13–44)
MCH RBC QN AUTO: 29 PG (ref 26.1–32.9)
MCHC RBC AUTO-ENTMCNC: 31.5 G/DL (ref 31.4–35)
MCV RBC AUTO: 92.1 FL (ref 79.6–97.8)
MONOCYTES # BLD: 0.6 K/UL (ref 0.1–1.3)
MONOCYTES NFR BLD: 6 % (ref 4–12)
NEUTS SEG # BLD: 9.2 K/UL (ref 1.7–8.2)
NEUTS SEG NFR BLD: 79 % (ref 43–78)
NRBC # BLD: 0 K/UL (ref 0–0.2)
PLATELET # BLD AUTO: 346 K/UL (ref 150–450)
PMV BLD AUTO: 10.2 FL (ref 9.4–12.3)
POTASSIUM SERPL-SCNC: 4 MMOL/L (ref 3.5–5.1)
PROT SERPL-MCNC: 6.5 G/DL (ref 6.3–8.2)
PROTHROMBIN TIME: 66.9 SEC (ref 12.6–14.5)
RBC # BLD AUTO: 4.45 M/UL (ref 4.23–5.6)
SODIUM SERPL-SCNC: 139 MMOL/L (ref 136–145)
WBC # BLD AUTO: 11.6 K/UL (ref 4.3–11.1)

## 2021-10-10 PROCEDURE — 94760 N-INVAS EAR/PLS OXIMETRY 1: CPT

## 2021-10-10 PROCEDURE — 77010033711 HC HIGH FLOW OXYGEN

## 2021-10-10 PROCEDURE — 94640 AIRWAY INHALATION TREATMENT: CPT

## 2021-10-10 PROCEDURE — 85610 PROTHROMBIN TIME: CPT

## 2021-10-10 PROCEDURE — 94762 N-INVAS EAR/PLS OXIMTRY CONT: CPT

## 2021-10-10 PROCEDURE — 74011250636 HC RX REV CODE- 250/636: Performed by: FAMILY MEDICINE

## 2021-10-10 PROCEDURE — 85025 COMPLETE CBC W/AUTO DIFF WBC: CPT

## 2021-10-10 PROCEDURE — 65660000000 HC RM CCU STEPDOWN

## 2021-10-10 PROCEDURE — 80053 COMPREHEN METABOLIC PANEL: CPT

## 2021-10-10 PROCEDURE — 36415 COLL VENOUS BLD VENIPUNCTURE: CPT

## 2021-10-10 PROCEDURE — 77030021668 HC NEB PREFIL KT VYRM -A

## 2021-10-10 PROCEDURE — 74011250637 HC RX REV CODE- 250/637: Performed by: FAMILY MEDICINE

## 2021-10-10 RX ORDER — PHYTONADIONE 5 MG/1
2.5 TABLET ORAL
Status: COMPLETED | OUTPATIENT
Start: 2021-10-10 | End: 2021-10-10

## 2021-10-10 RX ADMIN — GUAIFENESIN 1200 MG: 600 TABLET ORAL at 17:08

## 2021-10-10 RX ADMIN — Medication 10 ML: at 21:18

## 2021-10-10 RX ADMIN — DOXYCYCLINE HYCLATE 100 MG: 100 CAPSULE ORAL at 21:17

## 2021-10-10 RX ADMIN — CARVEDILOL 12.5 MG: 12.5 TABLET, FILM COATED ORAL at 08:52

## 2021-10-10 RX ADMIN — Medication 10 ML: at 22:48

## 2021-10-10 RX ADMIN — Medication 10 ML: at 08:52

## 2021-10-10 RX ADMIN — GUAIFENESIN 1200 MG: 600 TABLET ORAL at 08:52

## 2021-10-10 RX ADMIN — Medication 10 ML: at 06:30

## 2021-10-10 RX ADMIN — PANTOPRAZOLE SODIUM 40 MG: 40 TABLET, DELAYED RELEASE ORAL at 06:29

## 2021-10-10 RX ADMIN — BUDESONIDE AND FORMOTEROL FUMARATE DIHYDRATE 2 PUFF: 160; 4.5 AEROSOL RESPIRATORY (INHALATION) at 20:20

## 2021-10-10 RX ADMIN — DEXAMETHASONE SODIUM PHOSPHATE 10 MG: 10 INJECTION INTRAMUSCULAR; INTRAVENOUS at 22:48

## 2021-10-10 RX ADMIN — QUETIAPINE FUMARATE 100 MG: 100 TABLET ORAL at 21:17

## 2021-10-10 RX ADMIN — DEXAMETHASONE SODIUM PHOSPHATE 10 MG: 10 INJECTION INTRAMUSCULAR; INTRAVENOUS at 08:52

## 2021-10-10 RX ADMIN — BUDESONIDE AND FORMOTEROL FUMARATE DIHYDRATE 2 PUFF: 160; 4.5 AEROSOL RESPIRATORY (INHALATION) at 07:20

## 2021-10-10 RX ADMIN — BARICITINIB 4 MG: 2 TABLET, FILM COATED ORAL at 08:52

## 2021-10-10 RX ADMIN — PHYTONADIONE 2.5 MG: 5 TABLET ORAL at 10:47

## 2021-10-10 RX ADMIN — DOXYCYCLINE HYCLATE 100 MG: 100 CAPSULE ORAL at 08:52

## 2021-10-10 NOTE — PROGRESS NOTES
Monitor room called to say patient had a 4 beat run of vtach. Dr Randal Sandoval informed no new orders.

## 2021-10-10 NOTE — PROGRESS NOTES
Patient on 15LNCHF, continuous oximetry 88-90% BODØ with RT notified for Shorty Amaro. Patient placed on Airvo 60L/100% O2 93-94%. Dr Mark Schuler notified in regards. Will continue to monitor.

## 2021-10-10 NOTE — PROGRESS NOTES
Assumed care of patient. Assessment completed and documented, see docflow. Patient awake in bed. Alert and oriented x 4. NAD noted at present. Respirations present on 10LNCHF. Encouraged to call for needs. Call light within reach. Will continue to monitor.

## 2021-10-10 NOTE — PROGRESS NOTES
[facility] Hospitalist Progress Note     Name:  Gume Ho  Age:61 y.o. Sex:male   :  1960    MRN:  [id number]     Admit Date:  10/9/2021    Reason for Admission:  Acute hypoxemic respiratory failure due to COVID-19 [facility]) [U07.1, J96.01]    Assessment & Plan     -Acute hypoxic respiratory failure secondary to Covid pneumonia  Not a candidate for remdesivir as symptoms are greater than a week  Recently seen at [facility] on October 3, 2021, guarded patient was given a short course of steroid, Z-Xiang and Tessalon Perles  Patient presently on 10 L of oxygen nasal cannula. C-reactive protein 21.5. We will start on high-dose of dexamethasone with 10 mg twice daily for 5 days and then 5 mg twice daily for 5 more days. Discussed about tocilizumab and the baricitinib. Explained pros and cons, other side effect. Patient is willing to have those medications. We will start patient on baricitinib 4 mg p.o. daily for 14 days. Mucinex twice daily, incentive spirometry every 2 hourly, prone positioning 6/24 hrs  Also start on doxycycline. 10/10/2021  Initially on 15 L, later on changed to Airvo at 100% FiO2, flow at 60 L      -Mild MARBELLA creatinine of 1.42, GFR of 54  Gentle hydration normal saline at 75 cc/h, give a total of 500 cc. Held losartan  10/10/2021  MARBELLA resolved     -History of apical thrombus on Coumadin  Presently supratherapeutic INR greater than 9  Gave a dose of vitamin K 2.5 mg p.o. x1  10/10/2021  INR still supratherapeutic at 8.2.   No signs of bleeding  We will give a dose of vitamin K 2.5 mg p.o. x1.     -Coronary disease  Continue aspirin     -Hypertension  Blood pressure low normal in the 43O systolic  Placed parameters for blood pressure medication  10/10/2021  Improving blood pressure     -Chronic systolic congestive heart failure with a EF of 30 to 35%  -History of status post pacemaker/defibrillator     -Psychiatric disorder  Continue Seroquel 100 mg at bedtime.           Diet: DIET ADULT  VTE ppx: Presently supratherapeutic INR  GI ppx: Protonix  CODE STATUS: Full Code        Diet:  DIET ADULT  DIET ADULT ORAL NUTRITION SUPPLEMENT  DVT PPx: Coumadin  GI Ppx: Protonix  Code: Full Code        Hospital Course/Subjective:   Edy Hernández is a 64 y.o. male with medical history of chronic systolic congestive heart failure with a EF of 30 to 35% follows with Dr. Darrin Galeazzi, history of s/p ICD, coronary disease, hypertension, history of apical thrombus on Coumadin, statin intolerant, based on echo done in 2018 also had ascending aortic diameter of 3.68 cm  Patient will be admitted for acute hypoxic respiratory failure secondary to Covid pneumonia, mild MARBELLA and low normal blood pressures. Subjective/24 hr Events (10/10/21):  10/10/2021  Patient awake alert, says breathing okay, presently on 15 L of oxygen nasal cannula, later on today RT changed patient to Airvo at a flow at 60 L and FiO2 of 100%. Review of Systems: 14 point review of systems is otherwise negative with the exception of the elements mentioned above. Objective:     Patient Vitals for the past 24 hrs:   Temp Pulse Resp BP SpO2   10/10/21 1145 98.3 °F (36.8 °C) 68 18 104/68 98 %   10/10/21 0957     93 %   10/10/21 0851 97.7 °F (36.5 °C) 70 18 96/60 92 %   10/10/21 0750     90 %   10/10/21 0427 97.6 °F (36.4 °C) 65 18 98/61 91 %   10/10/21 0400  66      10/10/21 0000  62      10/09/21 2333 97.6 °F (36.4 °C) 62 22 (!) 91/57 91 %   10/09/21 2028  60      10/09/21 2025 97.6 °F (36.4 °C) 63 20 94/66 92 %   10/09/21 2000  60      10/09/21 1935     94 %   10/09/21 1604  63      10/09/21 1554     92 %   10/09/21 1515 98.6 °F (37 °C) 71 22 (!) 94/58 91 %   10/09/21 1359 99.1 °F (37.3 °C) 75 22 91/60 (!) 89 %     Oxygen Therapy  O2 Sat (%): 98 % (10/10/21 1145)  Pulse via Oximetry: 72 beats per minute (10/10/21 0957)  O2 Device: Heated; Hi flow nasal cannula (10/10/21 0957)  O2 Flow Rate (L/min): 60 l/min (10/10/21 0957)  O2 Temperature: 87.8 °F (31 °C) (10/10/21 0957)  FIO2 (%): 100 % (10/10/21 0957)    Body mass index is 32.19 kg/m². Physical Exam:   General:     alert, awake, mild respite distress, on 15 L, later on changed to Airvo at 100% FiO2   HENT:   normocephalic, atraumatic. Oropharynx clear with moist mucous membranes and normal hard/soft palate  Eyes:   anicteric sclerae, moist conjunctiva, PERRL, EOMI  Neck:    supple, non-tender. Trachea midline. Lungs:   Bilateral coarse breath sounds   cardiac:   RRR, Normal S1 and S2. No S3, S4 or murmurs. Abdomen:   Soft, non distended, nontender, +BS, no guarding/rebound  Extremities:   Warm, dry. No edema , pedal pulses present, no digital cyanosis  Skin:   Warn, dry, normnal turgor and texture; no rash, ulcers or nodules appreciated  Neuro:   AAOx3. No gross focal neurological deficit,  Cranial nerves II-XII grossly intact  Psychiatric:  No anxiety, calm, cooperative      Data Review:  I have reviewed all labs, meds, and studies from the last 24 hours:    Labs:  Recent Results (from the past 24 hour(s))   PROTHROMBIN TIME + INR    Collection Time: 10/09/21  1:55 PM   Result Value Ref Range    Prothrombin time 82.6 (H) 12.6 - 14.5 sec    INR >9.0 (HH)    PTT    Collection Time: 10/09/21  1:55 PM   Result Value Ref Range    aPTT 115.3 (H) 24.1 - 56.8 SEC   METABOLIC PANEL, COMPREHENSIVE    Collection Time: 10/10/21  7:48 AM   Result Value Ref Range    Sodium 139 136 - 145 mmol/L    Potassium 4.0 3.5 - 5.1 mmol/L    Chloride 109 (H) 98 - 107 mmol/L    CO2 21 21 - 32 mmol/L    Anion gap 9 7 - 16 mmol/L    Glucose 134 (H) 65 - 100 mg/dL    BUN 21 8 - 23 MG/DL    Creatinine 1.12 0.8 - 1.5 MG/DL    GFR est AA >60 >60 ml/min/1.73m2    GFR est non-AA >60 >60 ml/min/1.73m2    Calcium 8.6 8.3 - 10.4 MG/DL    Bilirubin, total 0.6 0.2 - 1.1 MG/DL    ALT (SGPT) 38 12 - 65 U/L    AST (SGOT) 57 (H) 15 - 37 U/L    Alk.  phosphatase 53 50 - 136 U/L    Protein, total 6.5 6.3 - 8.2 g/dL    Albumin 2.3 (L) 3.2 - 4.6 g/dL    Globulin 4.2 (H) 2.3 - 3.5 g/dL    A-G Ratio 0.5 (L) 1.2 - 3.5     CBC WITH AUTOMATED DIFF    Collection Time: 10/10/21  7:48 AM   Result Value Ref Range    WBC 11.6 (H) 4.3 - 11.1 K/uL    RBC 4.45 4.23 - 5.6 M/uL    HGB 12.9 (L) 13.6 - 17.2 g/dL    HCT 41.0 (L) 41.1 - 50.3 %    MCV 92.1 79.6 - 97.8 FL    MCH 29.0 26.1 - 32.9 PG    MCHC 31.5 31.4 - 35.0 g/dL    RDW 15.1 (H) 11.9 - 14.6 %    PLATELET 813 693 - 291 K/uL    MPV 10.2 9.4 - 12.3 FL    ABSOLUTE NRBC 0.00 0.0 - 0.2 K/uL    DF AUTOMATED      NEUTROPHILS 79 (H) 43 - 78 %    LYMPHOCYTES 14 13 - 44 %    MONOCYTES 6 4.0 - 12.0 %    EOSINOPHILS 0 (L) 0.5 - 7.8 %    BASOPHILS 0 0.0 - 2.0 %    IMMATURE GRANULOCYTES 1 0.0 - 5.0 %    ABS. NEUTROPHILS 9.2 (H) 1.7 - 8.2 K/UL    ABS. LYMPHOCYTES 1.7 0.5 - 4.6 K/UL    ABS. MONOCYTES 0.6 0.1 - 1.3 K/UL    ABS. EOSINOPHILS 0.0 0.0 - 0.8 K/UL    ABS. BASOPHILS 0.0 0.0 - 0.2 K/UL    ABS. IMM. GRANS. 0.2 0.0 - 0.5 K/UL   PROTHROMBIN TIME + INR    Collection Time: 10/10/21  7:48 AM   Result Value Ref Range    Prothrombin time 66.9 (H) 12.6 - 14.5 sec    INR 8.2 (HH)         All Micro Results     None          EKG Results     Procedure 720 Value Units Date/Time    EKG, 12 LEAD, INITIAL [[id number]] Collected: 10/09/21 1005    Order Status: Completed Updated: 10/09/21 1033     Ventricular Rate 85 BPM      Atrial Rate 85 BPM      P-R Interval 154 ms      QRS Duration 90 ms      Q-T Interval 344 ms      QTC Calculation (Bezet) 409 ms      Calculated P Axis 41 degrees      Calculated R Axis 51 degrees      Calculated T Axis 93 degrees      Diagnosis --     !! AGE AND GENDER SPECIFIC ECG ANALYSIS !!   Sinus rhythm with sinus arrhythmia with occasional and consecutive Premature   ventricular complexes  Low voltage QRS  Anteroseptal infarct (cited on or before 04-APR-2011)  Abnormal ECG  When compared with ECG of 09-OCT-2021 10:00,  No significant change was found  Confirmed by Kahlil Duarte MD (), Eduarda Hudson ([id number]) on 10/9/2021 10:33:44 AM            Other Studies:  No results found. Current Meds:   Current Facility-Administered Medications   Medication Dose Route Frequency    albuterol (PROVENTIL HFA, VENTOLIN HFA, PROAIR HFA) inhaler 2 Puff  2 Puff Inhalation Q4H PRN    benzonatate (TESSALON) capsule 100 mg  100 mg Oral TID PRN    carvediloL (COREG) tablet 12.5 mg  12.5 mg Oral DAILY    QUEtiapine (SEROquel) tablet 100 mg  100 mg Oral QHS    sodium chloride (NS) flush 5-40 mL  5-40 mL IntraVENous Q8H    sodium chloride (NS) flush 5-40 mL  5-40 mL IntraVENous PRN    acetaminophen (TYLENOL) tablet 650 mg  650 mg Oral Q6H PRN    Or    acetaminophen (TYLENOL) suppository 650 mg  650 mg Rectal Q6H PRN    polyethylene glycol (MIRALAX) packet 17 g  17 g Oral DAILY PRN    ondansetron (ZOFRAN ODT) tablet 4 mg  4 mg Oral Q8H PRN    Or    ondansetron (ZOFRAN) injection 4 mg  4 mg IntraVENous Q6H PRN    pantoprazole (PROTONIX) tablet 40 mg  40 mg Oral ACB    dexamethasone (DECADRON) 10 mg/mL injection 10 mg  10 mg IntraVENous Q12H    [START ON 10/14/2021] dexamethasone (DECADRON) 10 mg/mL injection 5 mg  5 mg IntraVENous Q12H    baricitinib (OLUMIANT) tablet 4 mg  4 mg Oral DAILY    doxycycline (VIBRAMYCIN) capsule 100 mg  100 mg Oral Q12H    guaiFENesin ER (MUCINEX) tablet 1,200 mg  1,200 mg Oral BID    budesonide-formoteroL (SYMBICORT) 160-4.5 mcg/actuation HFA inhaler 2 Puff  2 Puff Inhalation BID RT       Problem List:  Hospital Problems as of 10/10/2021 Date Reviewed: 7/22/2021        Codes Class Noted - Resolved POA    S/P ICD (internal cardiac defibrillator) procedure ICD-10-CM: Z95.810  ICD-9-CM: V45.02  9/27/2011 - Present Yes    Overview Signed 8/2/2016  2:43 PM by Elena York     1. Dual chamber BSC ICD 9/26/11  2. ICD discharges 9/7/12 for sinus tachycardia. ICD adjusted.                 Chronic systolic congestive heart failure ([address] ICD-10-CM: I50.22  ICD-9-CM: 428.22, 428.0  9/27/2011 - Present Yes    Overview Addendum 4/1/2018  9:40 PM by Zully Hassan MD     Unable to tolerate lisinopril due to hypotension. 1.  Echo (1/26/05):  EF 40% with anterior and apical severe hypokinesis to akinesis. 2.  Echo (4/5/11):  EF 25-30%. Mid anterior/anteroseptal/apical akinesis. Large protruding apical thrombus. Mild LA dilatation. No valvular regurgitation/stenosis. 3.  Echo (8/2/11):  EF 30-35%. Anterior/apical/anteroseptal akinesis. Resolution of apical thrombus. 4.  Echo (3/28/18):  EF 30-35%. Anteroapical akinesis. No significant valve disease. * (Principal) Acute hypoxemic respiratory failure due to COVID-19 [facility]) ICD-10-CM: U07.1, J96.01  ICD-9-CM: 518.81, 079.89, 799.02  10/9/2021 - Present Unknown        Class 1 obesity due to excess calories without serious comorbidity with body mass index (BMI) of 32.0 to 32.9 in adult ICD-10-CM: E66.09, Z68.32  ICD-9-CM: 278.00, V85.32  6/8/2017 - Present Yes    Overview Signed 6/8/2017  8:33 AM by Letitia Warner, DO     Low carb diet discussed             Intracardiac thrombus ICD-10-CM: I51.3  ICD-9-CM: 410.90  10/2/2015 - Present Yes    Overview Addendum 10/12/2016  9:27 PM by Zully Hassan MD     Echo 4/5/11:  Large protruding apical thrombus. Resolved with anticoagulation. Indefinite anticoagulation planned. Coronary atherosclerosis of native coronary vessel ICD-10-CM: I25.10  ICD-9-CM: 414.01  10/2/2015 - Present Yes    Overview Addendum 8/19/2020  8:42 PM by Zully Hassan MD     1. Anterior STEMI 1/25/05 (Late presentation)  a. Cath: LAD: 50% prox. 100% mid. D1: 95% prox. Ramus: 95% prox. Circ: Luminal irregularity. RCA: Luminal irregularity. b.  PCI:  T-stent of LAD/D1 with 2.25 X 18 Minivision in D1 and 2.25 X 23 Cypher in LAD. PCI of ramus with 3.0 X 18 mm Cypher.   2.  Exercise Cardiolite (8/18/11):  Large fixed anterior, septal and apical defect. No ischemia. EF 33%. 3.  Lexiscan Cardiolite (8/17/20):  Large fixed defect in mid/distal anterior and distal inferior wall. EF 27%. No reversible ischemia. Dyslipidemia ICD-10-CM: E78.5  ICD-9-CM: 272.4  10/2/2015 - Present Yes    Overview Addendum 10/18/2017  3:47 PM by Adair Lemus MD     STATIN INTOLERANT. Unable to tolerate Zetia or Repatha.                        Signed By: Joann Lester MD   [facility]    October 10, 2021  12:19 PM

## 2021-10-10 NOTE — PROGRESS NOTES
Patient received from day shift breathing with ease on 02 via highflo nasal canula at 10L no acute distress noted. Iv canula intact no redness or swelling noted at site. Patient a&o x3. Patient denies any pain or n/v. Bed locked on lowest position and call bell within reach.

## 2021-10-10 NOTE — PROGRESS NOTES
[address], PCT to remove patient's clothes and put gown on. PCT returned stating patient informed her of loaded concealed firearm. Security notified in regards. This RN and Benny Townsend, with security itemized patient's belongings. Patient had firearm loaded with 4 bullets, pocket knife and lighter. Patient did want wallet locked up with firearm.

## 2021-10-10 NOTE — PROGRESS NOTES
Comprehensive Nutrition Assessment  This assessment was completed remotely from within the hospital. Patient consent was obtained for remote assessment. Type and Reason for Visit: Initial, Positive nutrition screen  Best Practice Alert for Malnutrition Screening Tool: Recently Lost Weight Without Trying: Yes, If Yes, How Much Weight Loss: Unsure, Eating Poorly Due to Decreased Appetite: Yes    Nutrition Recommendations/Plan:    Continue with current diet   Add Magic cup with all meals (pt request)     Nutrition Assessment:   Nutrition History: Spoke with patient who reports he has had no appetite for several years, but feels like he has been eating even less more recently. He thinks he has lost weight due to fact his clothes fit different. Nutrition Background: PMH: CHF, HTN. Patient with acute respiratory failure due to covid. Daily Update:  Spoke with patient over the phone. He reports having poor intake for a long time as it has been several years since he ate large meals. He reports he noticed further decline recently. Patient also reports normal weight at 218lbs but thinks he has lost weight since clothes are too big know. However patient has not weighed self and no new weight taken at this time. Patient reports he still does well with liquids to remain hydrated but does not like most nutritional supplement drinks. Patient willing to try magic cup at this time. Nutrition Related Findings:   NFPE deferred due to covid isolation.       Current Nutrition Therapies:  ADULT DIET Easy to Chew; Low Fat/Low Chol/High Fiber/ARLEN    Current Intake:   Average Meal Intake: Unable to assess Average Supplement Intake: None ordered      Anthropometric Measures:  Height: 5' 9\" (175.3 cm)  Current Body Wt: 98.9 kg (218 lb), Weight source: Stated  BMI: 32.2, Obese class 1 (BMI 30.0-34.9)     Ideal Body Weight (lbs) (Calculated): 160 lbs (73 kg), 136.3 %  Usual Body Wt: 98.9 kg (218 lb) (per EMR), Percent weight change: 0          Edema: No data recorded   Estimated Daily Nutrient Needs:  Energy (kcal/day): 5025-6433 (Kcal/kg (18-22), Weight Used: Current (98.9 kg))  Protein (g/day): 89-109g (20% kcals) Weight Used: ( )  Fluid (ml/day):   (1 ml/kcal)    Nutrition Diagnosis:   · Predicted inadequate energy intake related to impaired respiratory function (lack of appetite) as evidenced by poor intake prior to admission, weight loss    Nutrition Interventions:   Food and/or Nutrient Delivery: Continue current diet, Start oral nutrition supplement     Coordination of Nutrition Care: Continue to monitor while inpatient    Goals: Active Goal: Intake >75% of nutritional needs by follow up    Nutrition Monitoring and Evaluation:      Food/Nutrient Intake Outcomes: Food and nutrient intake, Supplement intake       Discharge Planning:     Too soon to determine    Electronically signed by Bashir Frey MS, RD, LD on 10/10/2021 at 11:18 AM.    Disaster Mode Active

## 2021-10-11 LAB
ALBUMIN SERPL-MCNC: 2.2 G/DL (ref 3.2–4.6)
ALBUMIN/GLOB SERPL: 0.6 {RATIO} (ref 1.2–3.5)
ALP SERPL-CCNC: 51 U/L (ref 50–136)
ALT SERPL-CCNC: 31 U/L (ref 12–65)
ANION GAP SERPL CALC-SCNC: 5 MMOL/L (ref 7–16)
AST SERPL-CCNC: 40 U/L (ref 15–37)
BASOPHILS # BLD: 0 K/UL (ref 0–0.2)
BASOPHILS NFR BLD: 0 % (ref 0–2)
BILIRUB SERPL-MCNC: 0.6 MG/DL (ref 0.2–1.1)
BUN SERPL-MCNC: 24 MG/DL (ref 8–23)
CALCIUM SERPL-MCNC: 8.2 MG/DL (ref 8.3–10.4)
CHLORIDE SERPL-SCNC: 108 MMOL/L (ref 98–107)
CO2 SERPL-SCNC: 24 MMOL/L (ref 21–32)
CREAT SERPL-MCNC: 0.97 MG/DL (ref 0.8–1.5)
DIFFERENTIAL METHOD BLD: ABNORMAL
EOSINOPHIL # BLD: 0 K/UL (ref 0–0.8)
EOSINOPHIL NFR BLD: 0 % (ref 0.5–7.8)
ERYTHROCYTE [DISTWIDTH] IN BLOOD BY AUTOMATED COUNT: 14.7 % (ref 11.9–14.6)
GLOBULIN SER CALC-MCNC: 3.7 G/DL (ref 2.3–3.5)
GLUCOSE SERPL-MCNC: 130 MG/DL (ref 65–100)
HCT VFR BLD AUTO: 37 % (ref 41.1–50.3)
HGB BLD-MCNC: 12.1 G/DL (ref 13.6–17.2)
IMM GRANULOCYTES # BLD AUTO: 0.3 K/UL (ref 0–0.5)
IMM GRANULOCYTES NFR BLD AUTO: 2 % (ref 0–5)
INR PPP: 3.9
LYMPHOCYTES # BLD: 1.4 K/UL (ref 0.5–4.6)
LYMPHOCYTES NFR BLD: 8 % (ref 13–44)
MCH RBC QN AUTO: 28.7 PG (ref 26.1–32.9)
MCHC RBC AUTO-ENTMCNC: 32.7 G/DL (ref 31.4–35)
MCV RBC AUTO: 87.7 FL (ref 79.6–97.8)
MONOCYTES # BLD: 1 K/UL (ref 0.1–1.3)
MONOCYTES NFR BLD: 6 % (ref 4–12)
NEUTS SEG # BLD: 14.4 K/UL (ref 1.7–8.2)
NEUTS SEG NFR BLD: 84 % (ref 43–78)
NRBC # BLD: 0 K/UL (ref 0–0.2)
PLATELET # BLD AUTO: 374 K/UL (ref 150–450)
PMV BLD AUTO: 9.8 FL (ref 9.4–12.3)
POTASSIUM SERPL-SCNC: 4.2 MMOL/L (ref 3.5–5.1)
PROT SERPL-MCNC: 5.9 G/DL (ref 6.3–8.2)
PROTHROMBIN TIME: 38.2 SEC (ref 12.6–14.5)
RBC # BLD AUTO: 4.22 M/UL (ref 4.23–5.6)
SODIUM SERPL-SCNC: 137 MMOL/L (ref 136–145)
WBC # BLD AUTO: 17.1 K/UL (ref 4.3–11.1)

## 2021-10-11 PROCEDURE — 36415 COLL VENOUS BLD VENIPUNCTURE: CPT

## 2021-10-11 PROCEDURE — 77010033711 HC HIGH FLOW OXYGEN

## 2021-10-11 PROCEDURE — 97161 PT EVAL LOW COMPLEX 20 MIN: CPT

## 2021-10-11 PROCEDURE — 74011250636 HC RX REV CODE- 250/636: Performed by: FAMILY MEDICINE

## 2021-10-11 PROCEDURE — 74011250637 HC RX REV CODE- 250/637: Performed by: FAMILY MEDICINE

## 2021-10-11 PROCEDURE — 85025 COMPLETE CBC W/AUTO DIFF WBC: CPT

## 2021-10-11 PROCEDURE — 97535 SELF CARE MNGMENT TRAINING: CPT

## 2021-10-11 PROCEDURE — 80053 COMPREHEN METABOLIC PANEL: CPT

## 2021-10-11 PROCEDURE — 85610 PROTHROMBIN TIME: CPT

## 2021-10-11 PROCEDURE — 94640 AIRWAY INHALATION TREATMENT: CPT

## 2021-10-11 PROCEDURE — 97165 OT EVAL LOW COMPLEX 30 MIN: CPT

## 2021-10-11 PROCEDURE — 94762 N-INVAS EAR/PLS OXIMTRY CONT: CPT

## 2021-10-11 PROCEDURE — 97530 THERAPEUTIC ACTIVITIES: CPT

## 2021-10-11 PROCEDURE — 65660000000 HC RM CCU STEPDOWN

## 2021-10-11 RX ADMIN — DOXYCYCLINE HYCLATE 100 MG: 100 CAPSULE ORAL at 08:10

## 2021-10-11 RX ADMIN — Medication 10 ML: at 05:21

## 2021-10-11 RX ADMIN — GUAIFENESIN 1200 MG: 600 TABLET ORAL at 17:11

## 2021-10-11 RX ADMIN — BUDESONIDE AND FORMOTEROL FUMARATE DIHYDRATE 2 PUFF: 160; 4.5 AEROSOL RESPIRATORY (INHALATION) at 20:46

## 2021-10-11 RX ADMIN — QUETIAPINE FUMARATE 100 MG: 100 TABLET ORAL at 21:12

## 2021-10-11 RX ADMIN — BARICITINIB 4 MG: 2 TABLET, FILM COATED ORAL at 08:11

## 2021-10-11 RX ADMIN — PANTOPRAZOLE SODIUM 40 MG: 40 TABLET, DELAYED RELEASE ORAL at 05:21

## 2021-10-11 RX ADMIN — DEXAMETHASONE SODIUM PHOSPHATE 10 MG: 10 INJECTION INTRAMUSCULAR; INTRAVENOUS at 21:12

## 2021-10-11 RX ADMIN — Medication 10 ML: at 12:25

## 2021-10-11 RX ADMIN — Medication 10 ML: at 21:13

## 2021-10-11 RX ADMIN — DOXYCYCLINE HYCLATE 100 MG: 100 CAPSULE ORAL at 21:12

## 2021-10-11 RX ADMIN — GUAIFENESIN 1200 MG: 600 TABLET ORAL at 08:11

## 2021-10-11 RX ADMIN — DEXAMETHASONE SODIUM PHOSPHATE 10 MG: 10 INJECTION INTRAMUSCULAR; INTRAVENOUS at 08:11

## 2021-10-11 RX ADMIN — BUDESONIDE AND FORMOTEROL FUMARATE DIHYDRATE 2 PUFF: 160; 4.5 AEROSOL RESPIRATORY (INHALATION) at 08:00

## 2021-10-11 NOTE — PROGRESS NOTES
ACUTE PHYSICAL THERAPY GOALS:  (Developed with and agreed upon by patient and/or caregiver. )      LTG:  (1.)Mr. Charles Grewal will move from supine to sit and sit to supine , scoot up and down and roll side to side in bed with INDEPENDENCE within 7 treatment day(s). (2.)Mr. Charles Grewal will transfer from bed to chair and chair to bed with MODIFIED INDEPENDENCE using the least restrictive device within 7 treatment day(s). (3.)Mr. Charles Grewal will ambulate with MODIFIED INDEPENDENCE for 100 feet with the least restrictive device within 7 treatment day(s). ________________________________________________________________________________________________      PHYSICAL THERAPY ASSESSMENT: Initial Assessment and PM PT Treatment Day # 1      Bobby Krause is a 64 y.o. male   PRIMARY DIAGNOSIS: Acute hypoxemic respiratory failure due to COVID-19 [facility]  Acute hypoxemic respiratory failure due to COVID-19 ([facility] [U07.1, J96.01]       Reason for Referral:    ICD-10: Treatment Diagnosis: Difficulty in walking, Not elsewhere classified (R26.2)  INPATIENT: Payor: [facility] MEDICARE / Plan: [address] / Product Type: Managed Care Medicare /     ASSESSMENT:     REHAB RECOMMENDATIONS:   Recommendation to date pending progress:  Setting:   No further skilled therapy   Equipment:    None     PRIOR LEVEL OF FUNCTION:  (Prior to Hospitalization) INITIAL/CURRENT LEVEL OF FUNCTION:  (Most Recently Demonstrated)   Bed Mobility:   Independent  Sit to Stand:   Independent  Transfers:   Independent  Gait/Mobility:   Independent Bed Mobility:   Supervision  Sit to Stand:  [place] Assistance  Transfers:   Standby Assistance  Gait/Mobility:   Standby Assistance     ASSESSMENT:  Mr. Charles Grewal presents to PT with [facility] AROM and WFL strength in B LEs. Pt performed transfers and ambulation today with SBA-supervision. Pt on Airvo today with no signs of desaturation and good standing balance.   Will see one more time and then likely DC with no needs. Mr. Alejandra Xiong could benefit from skilled PT as he is currently functioning below his baseline. SUBJECTIVE:   Mr. Alejandra Xiong states, \"I went to [facility]. \"    SOCIAL HISTORY/LIVING ENVIRONMENT: Lives with wife and independent with ambulation PTA.   Home Environment: Private residence  # Steps to Enter: 3  One/Two Story Residence: One story  Living Alone: Yes  Support Systems: Parent(s), Other Family Member(s)  OBJECTIVE:     PAIN: VITAL SIGNS: LINES/DRAINS:   Pre Treatment: Pain Screen  Pain Scale 1: Numeric (0 - 10)  Pain Intensity 1: 0  Post Treatment: 0   none  O2 Device: Heated, Hi flow nasal cannula     GROSS EVALUATION:  B LE Within Functional Limits Abnormal/ Functional Abnormal/ Non-Functional (see comments) Not Tested Comments:   AROM [x] [] [] []    PROM [] [] [] []    Strength [x] [] [] []    Balance [x] [] [] []    Posture [] [] [] []    Sensation [] [] [] []    Coordination [] [] [] []    Tone [] [] [] []    Edema [] [] [] []    Activity Tolerance [] [x] [] [] On Airvo    [] [] [] []      COGNITION/  PERCEPTION: Intact Impaired   (see comments) Comments:   Orientation [x] []    Vision [] []    Hearing [] []    Command Following [x] []    Safety Awareness [x] []     [] []      MOBILITY: I Mod I S SBA CGA Min Mod Max Total  NT x2 Comments:   Bed Mobility    Rolling [] [] [] [] [] [] [] [] [] [] []    Supine to Sit [] [] [] [] [] [] [] [] [] [] []    Scooting [] [] [] [] [] [] [] [] [] [] []    Sit to Supine [] [] [] [] [] [] [] [] [] [] []    Transfers    Sit to Stand [] [] [] [x] [] [] [] [] [] [] []    Bed to Chair [] [] [] [x] [] [] [] [] [] [] []    Stand to Sit [] [] [] [x] [] [] [] [] [] [] []    I=Independent, Mod I=Modified Independent, S=Supervision, SBA=Standby Assistance, CGA=Contact Guard Assistance,   Min=Minimal Assistance, Mod=Moderate Assistance, Max=Maximal Assistance, Total=Total Assistance, NT=Not Tested  GAIT: I Mod I S SBA CGA Min Mod Max Total  NT x2 Comments:   Level of Assistance [] [] [x] [x] [] [] [] [] [] [] []    Distance 3 ft    DME None    Gait Quality WFL    Weightbearing Status N/A     I=Independent, Mod I=Modified Independent, S=Supervision, SBA=Standby Assistance, CGA=Contact Guard Assistance,   Min=Minimal Assistance, Mod=Moderate Assistance, Max=Maximal Assistance, Total=Total Assistance, NT=Not Tested    [facility] [employer] AM-PAC [facility] Form       How much difficulty does the patient currently have. .. Unable A Lot A Little None   1. Turning over in bed (including adjusting bedclothes, sheets and blankets)? [] 1   [] 2   [] 3   [x] 4   2. Sitting down on and standing up from a chair with arms ( e.g., wheelchair, bedside commode, etc.)   [] 1   [] 2   [] 3   [x] 4   3. Moving from lying on back to sitting on the side of the bed? [] 1   [] 2   [] 3   [x] 4   How much help from another person does the patient currently need. .. Total A Lot A Little None   4. Moving to and from a bed to a chair (including a wheelchair)? [] 1   [] 2   [] 3   [x] 4   5. Need to walk in hospital room? [] 1   [] 2   [] 3   [x] 4   6. Climbing 3-5 steps with a railing? [] 1   [] 2   [x] 3   [] 4   © 2007, Trustees of [facility] [employer], under license to [employer]. All rights reserved     Score:  Initial: 23 Most Recent: X (Date: -- )    Interpretation of Tool:  Represents activities that are increasingly more difficult (i.e. Bed mobility, Transfers, Gait). PLAN:   FREQUENCY/DURATION: PT Plan of Care: 3 times/week for duration of hospital stay or until stated goals are met, whichever comes first.    PROBLEM LIST:   (Skilled intervention is medically necessary to address:)  1. Decreased Activity Tolerance   INTERVENTIONS PLANNED:   (Benefits and precautions of physical therapy have been discussed with the patient.)  1. Therapeutic Activity  2.  Therapeutic Exercise/HEP  3. Education     TREATMENT: EVALUATION: Low Complexity : (Untimed Charge)    TREATMENT:   ($$ Therapeutic Activity: 8-22 mins    )  Therapeutic Activity (10 Minutes): Therapeutic activity included Ambulation on level ground and Standing balance to improve functional Mobility and Activity tolerance.     TREATMENT GRID:  N/A    AFTER TREATMENT POSITION/PRECAUTIONS:  Chair, Needs within reach and RN notified    INTERDISCIPLINARY COLLABORATION:  RN/PCT, PT/PTA and OT/WYATT    TOTAL TREATMENT DURATION:  PT Patient Time In/Time Out  Time In: 1334  Time Out: 1515 Wilfrid Payne, PT, DPT

## 2021-10-11 NOTE — ACP (ADVANCE CARE PLANNING)
Advance Care Planning     General Advance Care Planning (ACP) Conversation      Date of Conversation: 10/9/2021  Conducted with: Patient with Decision Making Capacity    Healthcare Decision Maker:   No healthcare decision makers have been documented. Click here to complete [address] including selection of the Healthcare Decision Maker Relationship (ie \"Primary\")    Today we documented Decision Maker(s) consistent with Legal Next of Kin hierarchy.     Content/Action Overview:   Has NO ACP documents/care preferences - requested patient complete ACP documents  Reviewed DNR/DNI and patient elects Full Code (Attempt Resuscitation)  Topics discussed: ventilation preferences and resuscitation preferences       Length of Voluntary ACP Conversation in minutes:  <16 minutes (Non-Billable)    David Owusu RN

## 2021-10-11 NOTE — PROGRESS NOTES
[facility] Hospitalist Progress Note     Name:  Mandie Whiteside  Age:61 y.o. Sex:male   :  1960    MRN:  [id number]     Admit Date:  10/9/2021    Reason for Admission:  Acute hypoxemic respiratory failure due to COVID-19 [facility]) [U07.1, J96.01]    Assessment & Plan     -Acute hypoxic respiratory failure secondary to Covid pneumonia  Not a candidate for remdesivir as symptoms are greater than a week  Recently seen at [facility] on October 3, 2021, guarded patient was given a short course of steroid, Z-Xiang and Tessalon Perles  Patient presently on 10 L of oxygen nasal cannula. C-reactive protein 21.5. We will start on high-dose of dexamethasone with 10 mg twice daily for 5 days and then 5 mg twice daily for 5 more days. Discussed about tocilizumab and the baricitinib. Explained pros and cons, other side effect. Patient is willing to have those medications. We will start patient on baricitinib 4 mg p.o. daily for 14 days. Mucinex twice daily, incentive spirometry every 2 hourly, prone positioning 6/24 hrs  Also start on doxycycline. 10/10/2021  Initially on 15 L, later on changed to Airvo at 100% FiO2, flow at 60 L  10/11/2021  On 85% FiO2 Airvo      -Mild MARBELLA creatinine of 1.42, GFR of 54  Gentle hydration normal saline at 75 cc/h, give a total of 500 cc. Held losartan  10/10/2021  MARBELLA resolved     -History of apical thrombus on Coumadin  Presently supratherapeutic INR greater than 9  Gave a dose of vitamin K 2.5 mg p.o. x1  10/10/2021  INR still supratherapeutic at 8.2.   No signs of bleeding  We will give a dose of vitamin K 2.5 mg p.o. x1.  10/11/2021  INR 3.9.     -Coronary disease  Continue aspirin     -Hypertension  Blood pressure low normal in the 08M systolic  Placed parameters for blood pressure medication  10/10/2021  Improving blood pressure     -Chronic systolic congestive heart failure with a EF of 30 to 35%  -History of status post pacemaker/defibrillator     -Psychiatric disorder  Continue Seroquel 100 mg at bedtime.           Diet: DIET ADULT  VTE ppx: Presently supratherapeutic INR  GI ppx: Protonix  CODE STATUS: Full Code        Diet:  DIET ADULT  DIET ADULT ORAL NUTRITION SUPPLEMENT  DVT PPx: Coumadin  GI Ppx: Protonix  Code: Full Code        Hospital Course/Subjective:   Sheyla Stafford is a 64 y.o. male with medical history of chronic systolic congestive heart failure with a EF of 30 to 35% follows with Dr. Demetrice Garcia, history of s/p ICD, coronary disease, hypertension, history of apical thrombus on Coumadin, statin intolerant, based on echo done in 2018 also had ascending aortic diameter of 3.68 cm  Patient will be admitted for acute hypoxic respiratory failure secondary to Covid pneumonia, mild MARBELLA and low normal blood pressures. Subjective/24 hr Events (10/11/21):  10/10/2021  Patient awake alert, says breathing okay, presently on 15 L of oxygen nasal cannula, later on today RT changed patient to Airvo at a flow at 60 L and FiO2 of 100%. 10/11/2021  Complaining of fatigue  Says breathing okay  Presently on Airvo 85% FiO2      Review of Systems: 14 point review of systems is otherwise negative with the exception of the elements mentioned above. Objective:     Patient Vitals for the past 24 hrs:   Temp Pulse Resp BP SpO2   10/11/21 0800  67      10/11/21 0733 98.8 °F (37.1 °C) 67 18 103/67 99 %   10/11/21 0313 97.7 °F (36.5 °C) 61 20 111/65 100 %   10/10/21 2311 97.8 °F (36.6 °C) 66 20 (!) 106/56 100 %   10/10/21 2020     99 %   10/10/21 1941 98.3 °F (36.8 °C) 64 20 (!) 101/56 98 %   10/10/21 1537 97.9 °F (36.6 °C) 71 22 (!) 100/59 96 %   10/10/21 1145 98.3 °F (36.8 °C) 68 18 104/68 98 %     Oxygen Therapy  O2 Sat (%): 99 % (10/11/21 0733)  Pulse via Oximetry: 79 beats per minute (10/10/21 2020)  O2 Device: Heated; Hi flow nasal cannula (10/11/21 0751)  O2 Flow Rate (L/min): 60 l/min (10/11/21 0751)  O2 Temperature: 87.8 °F (31 °C) (10/10/21 2020)  FIO2 (%): 100 % (10/11/21 5120)    Body mass index is 30.6 kg/m². Physical Exam:   General:     alert, awake, mild respite distress, 85% FiO2 Airvo   HENT:   normocephalic, atraumatic. Oropharynx clear with moist mucous membranes and normal hard/soft palate  Eyes:   anicteric sclerae, moist conjunctiva, PERRL, EOMI  Neck:    supple, non-tender. Trachea midline. Lungs:   Bilateral coarse breath sounds   cardiac:   RRR, Normal S1 and S2. No S3, S4 or murmurs. Abdomen:   Soft, non distended, nontender, +BS, no guarding/rebound  Extremities:   Warm, dry. No edema , pedal pulses present, no digital cyanosis  Skin:   Warn, dry, normnal turgor and texture; no rash, ulcers or nodules appreciated  Neuro:   AAOx3. No gross focal neurological deficit,  Cranial nerves II-XII grossly intact  Psychiatric:  No anxiety, calm, cooperative      Data Review:  I have reviewed all labs, meds, and studies from the last 24 hours:    Labs:  Recent Results (from the past 24 hour(s))   CBC WITH AUTOMATED DIFF    Collection Time: 10/11/21  6:33 AM   Result Value Ref Range    WBC 17.1 (H) 4.3 - 11.1 K/uL    RBC 4.22 (L) 4.23 - 5.6 M/uL    HGB 12.1 (L) 13.6 - 17.2 g/dL    HCT 37.0 (L) 41.1 - 50.3 %    MCV 87.7 79.6 - 97.8 FL    MCH 28.7 26.1 - 32.9 PG    MCHC 32.7 31.4 - 35.0 g/dL    RDW 14.7 (H) 11.9 - 14.6 %    PLATELET 487 931 - 835 K/uL    MPV 9.8 9.4 - 12.3 FL    ABSOLUTE NRBC 0.00 0.0 - 0.2 K/uL    DF AUTOMATED      NEUTROPHILS 84 (H) 43 - 78 %    LYMPHOCYTES 8 (L) 13 - 44 %    MONOCYTES 6 4.0 - 12.0 %    EOSINOPHILS 0 (L) 0.5 - 7.8 %    BASOPHILS 0 0.0 - 2.0 %    IMMATURE GRANULOCYTES 2 0.0 - 5.0 %    ABS. NEUTROPHILS 14.4 (H) 1.7 - 8.2 K/UL    ABS. LYMPHOCYTES 1.4 0.5 - 4.6 K/UL    ABS. MONOCYTES 1.0 0.1 - 1.3 K/UL    ABS. EOSINOPHILS 0.0 0.0 - 0.8 K/UL    ABS. BASOPHILS 0.0 0.0 - 0.2 K/UL    ABS. IMM.  GRANS. 0.3 0.0 - 0.5 K/UL   METABOLIC PANEL, COMPREHENSIVE    Collection Time: 10/11/21  6:33 AM   Result Value Ref Range    Sodium 137 136 - 145 mmol/L Potassium 4.2 3.5 - 5.1 mmol/L    Chloride 108 (H) 98 - 107 mmol/L    CO2 24 21 - 32 mmol/L    Anion gap 5 (L) 7 - 16 mmol/L    Glucose 130 (H) 65 - 100 mg/dL    BUN 24 (H) 8 - 23 MG/DL    Creatinine 0.97 0.8 - 1.5 MG/DL    GFR est AA >60 >60 ml/min/1.73m2    GFR est non-AA >60 >60 ml/min/1.73m2    Calcium 8.2 (L) 8.3 - 10.4 MG/DL    Bilirubin, total 0.6 0.2 - 1.1 MG/DL    ALT (SGPT) 31 12 - 65 U/L    AST (SGOT) 40 (H) 15 - 37 U/L    Alk. phosphatase 51 50 - 136 U/L    Protein, total 5.9 (L) 6.3 - 8.2 g/dL    Albumin 2.2 (L) 3.2 - 4.6 g/dL    Globulin 3.7 (H) 2.3 - 3.5 g/dL    A-G Ratio 0.6 (L) 1.2 - 3.5         All Micro Results     None          EKG Results     Procedure 720 Value Units Date/Time    EKG, 12 LEAD, INITIAL [[id number]] Collected: 10/09/21 1005    Order Status: Completed Updated: 10/09/21 1033     Ventricular Rate 85 BPM      Atrial Rate 85 BPM      P-R Interval 154 ms      QRS Duration 90 ms      Q-T Interval 344 ms      QTC Calculation (Bezet) 409 ms      Calculated P Axis 41 degrees      Calculated R Axis 51 degrees      Calculated T Axis 93 degrees      Diagnosis --     !! AGE AND GENDER SPECIFIC ECG ANALYSIS !! Sinus rhythm with sinus arrhythmia with occasional and consecutive Premature   ventricular complexes  Low voltage QRS  Anteroseptal infarct (cited on or before 04-APR-2011)  Abnormal ECG  When compared with ECG of 09-OCT-2021 10:00,  No significant change was found  Confirmed by Olivia Ramsey MD (), SONYA AGUILAR ([id number]) on 10/9/2021 10:33:44 AM            Other Studies:  No results found.     Current Meds:   Current Facility-Administered Medications   Medication Dose Route Frequency    albuterol (PROVENTIL HFA, VENTOLIN HFA, PROAIR HFA) inhaler 2 Puff  2 Puff Inhalation Q4H PRN    benzonatate (TESSALON) capsule 100 mg  100 mg Oral TID PRN    carvediloL (COREG) tablet 12.5 mg  12.5 mg Oral DAILY    QUEtiapine (SEROquel) tablet 100 mg  100 mg Oral QHS    sodium chloride (NS) flush 5-40 mL 5-40 mL IntraVENous Q8H    sodium chloride (NS) flush 5-40 mL  5-40 mL IntraVENous PRN    acetaminophen (TYLENOL) tablet 650 mg  650 mg Oral Q6H PRN    Or    acetaminophen (TYLENOL) suppository 650 mg  650 mg Rectal Q6H PRN    polyethylene glycol (MIRALAX) packet 17 g  17 g Oral DAILY PRN    ondansetron (ZOFRAN ODT) tablet 4 mg  4 mg Oral Q8H PRN    Or    ondansetron (ZOFRAN) injection 4 mg  4 mg IntraVENous Q6H PRN    pantoprazole (PROTONIX) tablet 40 mg  40 mg Oral ACB    dexamethasone (DECADRON) 10 mg/mL injection 10 mg  10 mg IntraVENous Q12H    [START ON 10/14/2021] dexamethasone (DECADRON) 10 mg/mL injection 5 mg  5 mg IntraVENous Q12H    baricitinib (OLUMIANT) tablet 4 mg  4 mg Oral DAILY    doxycycline (VIBRAMYCIN) capsule 100 mg  100 mg Oral Q12H    guaiFENesin ER (MUCINEX) tablet 1,200 mg  1,200 mg Oral BID    budesonide-formoteroL (SYMBICORT) 160-4.5 mcg/actuation HFA inhaler 2 Puff  2 Puff Inhalation BID RT       Problem List:  Hospital Problems as of 10/11/2021 Date Reviewed: 7/22/2021        Codes Class Noted - Resolved POA    S/P ICD (internal cardiac defibrillator) procedure ICD-10-CM: Z95.810  ICD-9-CM: V45.02  9/27/2011 - Present Yes    Overview Signed 8/2/2016  2:43 PM by Rinku Castañeda     1. Dual chamber C ICD 9/26/11  2. ICD discharges 9/7/12 for sinus tachycardia. ICD adjusted. Chronic systolic congestive heart failure (HCC) ICD-10-CM: I50.22  ICD-9-CM: 428.22, 428.0  9/27/2011 - Present Yes    Overview Addendum 4/1/2018  9:40 PM by Tiago Henderson MD     Unable to tolerate lisinopril due to hypotension. 1.  Echo (1/26/05):  EF 40% with anterior and apical severe hypokinesis to akinesis. 2.  Echo (4/5/11):  EF 25-30%. Mid anterior/anteroseptal/apical akinesis. Large protruding apical thrombus. Mild LA dilatation. No valvular regurgitation/stenosis. 3.  Echo (8/2/11):  EF 30-35%. Anterior/apical/anteroseptal akinesis.   Resolution of apical thrombus. 4.  Echo (3/28/18):  EF 30-35%. Anteroapical akinesis. No significant valve disease. * (Principal) Acute hypoxemic respiratory failure due to COVID-19 [facility]) ICD-10-CM: U07.1, J96.01  ICD-9-CM: 518.81, 079.89, 799.02  10/9/2021 - Present Unknown        Class 1 obesity due to excess calories without serious comorbidity with body mass index (BMI) of 32.0 to 32.9 in adult ICD-10-CM: E66.09, Z68.32  ICD-9-CM: 278.00, V85.32  6/8/2017 - Present Yes    Overview Signed 6/8/2017  8:33 AM by Evelina Drew DO     Low carb diet discussed             Intracardiac thrombus ICD-10-CM: I51.3  ICD-9-CM: 410.90  10/2/2015 - Present Yes    Overview Addendum 10/12/2016  9:27 PM by French Izaguirre MD     Echo 4/5/11:  Large protruding apical thrombus. Resolved with anticoagulation. Indefinite anticoagulation planned. Coronary atherosclerosis of native coronary vessel ICD-10-CM: I25.10  ICD-9-CM: 414.01  10/2/2015 - Present Yes    Overview Addendum 8/19/2020  8:42 PM by French Izaguirre MD     1. Anterior STEMI 1/25/05 (Late presentation)  a. Cath: LAD: 50% prox. 100% mid. D1: 95% prox. Ramus: 95% prox. Circ: Luminal irregularity. RCA: Luminal irregularity. b.  PCI:  T-stent of LAD/D1 with 2.25 X 18 Minivision in D1 and 2.25 X 23 Cypher in LAD. PCI of ramus with 3.0 X 18 mm Cypher. 2.  Exercise Cardiolite (8/18/11):  Large fixed anterior, septal and apical defect. No ischemia. EF 33%. 3.  Lexiscan Cardiolite (8/17/20):  Large fixed defect in mid/distal anterior and distal inferior wall. EF 27%. No reversible ischemia. Dyslipidemia ICD-10-CM: E78.5  ICD-9-CM: 272.4  10/2/2015 - Present Yes    Overview Addendum 10/18/2017  3:47 PM by Dearl MD Dmitriy     STATIN INTOLERANT. Unable to tolerate Zetia or Repatha.                        Signed By: Ita Enriquez MD   [address] [place], [place] [address]ist Service    October 11, 2021

## 2021-10-11 NOTE — PROGRESS NOTES
Patient has been observed during hourly rounding and he has slept at short intervals. He has been watching television during night and denies needs to this writer. He will continue to be assisted and monitored as needed, and will prepare to give oncoming nurse report.

## 2021-10-11 NOTE — PROGRESS NOTES
MSN, CM:  Spoke with patient this AM about discharge planning. Patient lives alone in own house with 3 steps for entrance. Patient's brother \"Cale\" and dad \"Doug\" live locally. Patient has a daughter \"Belen\" which lives in [place] and a son \"Kyle\" which lives in [place]. Patient is independent with all ADL's and requires no equipment for ambulation. Patient denies any home oxygen, HH ore rehab in the past.  Patient confirms PCP is Dr. Berna Rivers and his friend or dad drives him to all appointments. PT consulted for evaluation and recommendations. Case Management will continue to follow for any discharge needs. Care Management Interventions  PCP Verified by CM: Yes (Dr. Berna Rivers)  Mode of Transport at Discharge:  Other (see comment) (family to transport)  Health Maintenance Reviewed: Yes  Physical Therapy Consult: Yes  Support Systems: Parent(s), Other Family Member(s)  Freedom of Choice List was Provided with Basic Dialogue that Supports the Patient's Individualized Plan of Care/Goals, Treatment Preferences and Shares the Quality Data Associated with the Providers?: Yes  Discharge Location  Discharge Placement: Home

## 2021-10-11 NOTE — PROGRESS NOTES
ACUTE OT GOALS:  (Developed with and agreed upon by patient and/or caregiver.)  1) Patient will complete total body bathing and dressing with MOD I and adaptive equipment as needed. 2) Patient will complete toileting with MOD I.   3) Patient will complete functional transfers INDEPENDENTLY  4) Patient will tolerate at least 25 minutes of OT activity with 1-2 rest breaks while maintaining O2 sats >90%. 5) Patient will verbalize at least 3 energy conservation technique to utilize during ADL/IADL. 6) Patient will complete grooming ADL in standing INDEPENDENTLY. Timeframe: 7 visits     OCCUPATIONAL THERAPY ASSESSMENT: Initial Assessment and Daily Note OT Treatment Day # 1    Rafaela Brooks is a 64 y.o. male   PRIMARY DIAGNOSIS: Acute hypoxemic respiratory failure due to COVID-19 [facility])  Acute hypoxemic respiratory failure due to COVID-19 ([facility] [address] [U07.1, J96.01]       Reason for Referral:    ICD-10: Treatment Diagnosis: Generalized Muscle Weakness (M62.81)  INPATIENT: Payor: [facility] MEDICARE / Plan: [address] / Product Type: [employer] Medicare /   ASSESSMENT:     REHAB RECOMMENDATIONS:   Recommendation to date pending progress:  Setting:   No further skilled therapy   Equipment:    None     PRIOR LEVEL OF FUNCTION:  (Prior to Hospitalization)  INITIAL/CURRENT LEVEL OF FUNCTION:  (Based on today's evaluation)   Bathing:   Independent  Dressing:   Independent  Feeding/Grooming:   Independent  Toileting:   Independent  Functional Mobility:   Independent Bathing:   Standby Assistance  Dressing:   Standby Assistance  Feeding/Grooming:   Standby Assistance  Toileting:   Standby Assistance  Functional Mobility:   Standby Assistance     ASSESSMENT:  Mr. Candice Lyle is a 63 y/o male presents with acute respiratory failure due to COVID 19. Today pt presents with decreased activity tolerance and increased O2 needs impacting ADLs.  Pt is currently on 60L 80% and sats 98% at rest. Pt completed functional transfers in prep for ADLs and standing grooming ADLs in grid below. Pt able to tolerate standing ADLs for ~8 minutes with good standing balance and no SOB or fatigue. Pt educated on pursed lip breathing and energy conservation techniques in prep for ADLs. Pt O2 sats ranged 89-99% throughout. Pt is currently functioning close to baseline and is currently limited by O2 needs. Will follow for acute care needs. SUBJECTIVE:   Mr. Landry Nichols states, \"I miss travelling. \"    SOCIAL HISTORY/LIVING ENVIRONMENT: lives alone, one level, 3STE, walk in shower, no DME, drives only short distances, no falls  Home Environment: Private residence  # Steps to Enter: 3  One/Two Story Residence: One story  Living Alone: Yes  Support Systems: Parent(s), Other Family Member(s)    OBJECTIVE:     PAIN: VITAL SIGNS: LINES/DRAINS:   Pre Treatment: Pain Screen  Pain Scale 1: Numeric (0 - 10)  Pain Intensity 1: 0  Post Treatment: 0 Vital Signs  O2 Device: Heated; Hi flow nasal cannula  O2 Flow Rate (L/min): 60 l/min  FIO2 (%): 80 % Continuous Pulse Oximetry  O2 Device: Heated, Hi flow nasal cannula     GROSS EVALUATION:  BUE Within Functional Limits Abnormal/ Functional Abnormal/ Non-Functional (see comments) Not Tested Comments:   AROM [x] [] [] []    PROM [] [] [] [x]    Strength [x] [] [] []    Balance [x] [] [] []    Posture [x] [] [] []    Sensation [x] [] [] []    Coordination [x] [] [] []    Tone [] [] [] [x]    Edema [] [] [] [x]    Activity Tolerance [] [x] [] [] 60L 80% airvo    [] [] [] []      COGNITION/  PERCEPTION: Intact Impaired   (see comments) Comments:   Orientation [x] []    Vision [] [x] Wears glasses   Hearing [x] []    Judgment/ Insight [x] []    Attention [x] []    Memory [x] []    Command Following [x] []    Emotional Regulation [x] []     [] []      ACTIVITIES OF DAILY LIVING: I Mod I S SBA CGA Min Mod Max Total NT Comments   BASIC ADLs:              Bathing/ Showering [] [] [] [] [] [] [] [] [] [x]    Toileting [] [] [] [] [] [] [] [] [] [x]    Dressing [] [] [] [] [] [] [] [] [] [x]    Feeding [] [] [] [] [] [] [] [] [] [x]    Grooming [] [] [] [x] [] [] [] [] [] [] Brushing teeth, washing face and combing hair standing at edge of chair   Personal Device Care [] [] [] [] [] [] [] [] [] [x]    Functional Mobility [] [] [] [x] [] [] [] [] [] [] No AD   I=Independent, Mod I=Modified Independent, S=Supervision, SBA=Standby Assistance, CGA=Contact Guard Assistance,   Min=Minimal Assistance, Mod=Moderate Assistance, Max=Maximal Assistance, Total=Total Assistance, NT=Not Tested    MOBILITY: I Mod I S SBA CGA Min Mod Max Total  NT x2 Comments:   Supine to sit [] [] [x] [] [] [] [] [] [] [] []    Sit to supine [] [] [] [] [] [] [] [] [] [x] []    Sit to stand [] [] [] [x] [] [] [] [] [] [] [] No AD   Bed to chair [] [] [] [x] [] [] [] [] [] [] [] No AD   I=Independent, Mod I=Modified Independent, S=Supervision, SBA=Standby Assistance, CGA=Contact Guard Assistance,   Min=Minimal Assistance, Mod=Moderate Assistance, Max=Maximal Assistance, Total=Total Assistance, NT=Not Tested    [facility] [address] [address] AM-PAC 6 Clicks   Daily Activity Inpatient Short Form        How much help from another person does the patient currently need. .. Total A Lot A Little None   1. Putting on and taking off regular lower body clothing? [] 1   [] 2   [] 3   [x] 4   2. Bathing (including washing, rinsing, drying)? [] 1   [] 2   [x] 3   [] 4   3. Toileting, which includes using toilet, bedpan or urinal?   [] 1   [] 2   [x] 3   [] 4   4. Putting on and taking off regular upper body clothing? [] 1   [] 2   [] 3   [x] 4   5. Taking care of personal grooming such as brushing teeth? [] 1   [] 2   [] 3   [x] 4   6. Eating meals? [] 1   [] 2   [] 3   [x] 4   © 2007, Trustees of [address] [place] [address] [address] [id number], under license to [employer].  All rights reserved     Score:  Initial: 22 Most Recent: X (Date: -- )   Interpretation of Tool: Represents activities that are increasingly more difficult (i.e. Bed mobility, Transfers, Gait). PLAN:   FREQUENCY/DURATION: OT Plan of Care: 3 times/week for duration of hospital stay or until stated goals are met, whichever comes first.    PROBLEM LIST:   (Skilled intervention is medically necessary to address:)  1. Decreased ADL/Functional Activities  2. Decreased Activity Tolerance   INTERVENTIONS PLANNED:   (Benefits and precautions of occupational therapy have been discussed with the patient.)  1. Self Care Training  2. Therapeutic Activity  3. Therapeutic Exercise/HEP  4. Neuromuscular Re-education  5. Education     TREATMENT:     EVALUATION: Low Complexity : (Untimed Charge)    TREATMENT:   ($$ Self Care/Home Management: 8-22 mins    )  Self Care (12 Minutes): Self care including Grooming, Energy Conservation Training and functional transfers in prep for ADLs to increase independence and decrease level of assistance required.     TREATMENT GRID:  N/A    AFTER TREATMENT POSITION/PRECAUTIONS:  Chair, Needs within reach and RN notified    INTERDISCIPLINARY COLLABORATION:  RN/PCT, PT/PTA and OT/WYATT    TOTAL TREATMENT DURATION:  OT Patient Time In/Time Out  Time In: 4684  Time Out: [address] [address], OT

## 2021-10-11 NOTE — PROGRESS NOTES
Pt sitting up in bed. Pt alert oriented times 3 at this time. Pt on airvo at 60L 90% with sat 100% at this time. Continuous sat monitor in place. Pt denies pain or distress at this time. Pt encouraged to call for assistance if needed call light in reach, will monitor.

## 2021-10-11 NOTE — PROGRESS NOTES
Pt sitting up in bed. Pt on airvo at 60L 80% with sat 100% at this time. Call light in reach, will monitor.

## 2021-10-12 LAB
ALBUMIN SERPL-MCNC: 2.2 G/DL (ref 3.2–4.6)
ALBUMIN/GLOB SERPL: 0.6 {RATIO} (ref 1.2–3.5)
ALP SERPL-CCNC: 50 U/L (ref 50–136)
ALT SERPL-CCNC: 27 U/L (ref 12–65)
ANION GAP SERPL CALC-SCNC: 5 MMOL/L (ref 7–16)
AST SERPL-CCNC: 27 U/L (ref 15–37)
BASOPHILS # BLD: 0 K/UL (ref 0–0.2)
BASOPHILS NFR BLD: 0 % (ref 0–2)
BILIRUB SERPL-MCNC: 0.5 MG/DL (ref 0.2–1.1)
BUN SERPL-MCNC: 22 MG/DL (ref 8–23)
CALCIUM SERPL-MCNC: 8.5 MG/DL (ref 8.3–10.4)
CHLORIDE SERPL-SCNC: 109 MMOL/L (ref 98–107)
CO2 SERPL-SCNC: 27 MMOL/L (ref 21–32)
CREAT SERPL-MCNC: 0.9 MG/DL (ref 0.8–1.5)
DIFFERENTIAL METHOD BLD: ABNORMAL
EOSINOPHIL # BLD: 0 K/UL (ref 0–0.8)
EOSINOPHIL NFR BLD: 0 % (ref 0.5–7.8)
ERYTHROCYTE [DISTWIDTH] IN BLOOD BY AUTOMATED COUNT: 14.8 % (ref 11.9–14.6)
GLOBULIN SER CALC-MCNC: 3.9 G/DL (ref 2.3–3.5)
GLUCOSE SERPL-MCNC: 113 MG/DL (ref 65–100)
HCT VFR BLD AUTO: 38.1 % (ref 41.1–50.3)
HGB BLD-MCNC: 12.1 G/DL (ref 13.6–17.2)
IMM GRANULOCYTES # BLD AUTO: 0.3 K/UL (ref 0–0.5)
IMM GRANULOCYTES NFR BLD AUTO: 2 % (ref 0–5)
INR PPP: 4.5
LYMPHOCYTES # BLD: 1.3 K/UL (ref 0.5–4.6)
LYMPHOCYTES NFR BLD: 8 % (ref 13–44)
MAGNESIUM SERPL-MCNC: 2.6 MG/DL (ref 1.8–2.4)
MCH RBC QN AUTO: 29.2 PG (ref 26.1–32.9)
MCHC RBC AUTO-ENTMCNC: 31.8 G/DL (ref 31.4–35)
MCV RBC AUTO: 91.8 FL (ref 79.6–97.8)
MONOCYTES # BLD: 1 K/UL (ref 0.1–1.3)
MONOCYTES NFR BLD: 6 % (ref 4–12)
NEUTS SEG # BLD: 14.8 K/UL (ref 1.7–8.2)
NEUTS SEG NFR BLD: 85 % (ref 43–78)
NRBC # BLD: 0 K/UL (ref 0–0.2)
PLATELET # BLD AUTO: 382 K/UL (ref 150–450)
PMV BLD AUTO: 10.5 FL (ref 9.4–12.3)
POTASSIUM SERPL-SCNC: 4.5 MMOL/L (ref 3.5–5.1)
PROT SERPL-MCNC: 6.1 G/DL (ref 6.3–8.2)
PROTHROMBIN TIME: 42.5 SEC (ref 12.6–14.5)
RBC # BLD AUTO: 4.15 M/UL (ref 4.23–5.6)
SODIUM SERPL-SCNC: 141 MMOL/L (ref 136–145)
WBC # BLD AUTO: 17.5 K/UL (ref 4.3–11.1)

## 2021-10-12 PROCEDURE — 85610 PROTHROMBIN TIME: CPT

## 2021-10-12 PROCEDURE — 74011250636 HC RX REV CODE- 250/636: Performed by: FAMILY MEDICINE

## 2021-10-12 PROCEDURE — 94762 N-INVAS EAR/PLS OXIMTRY CONT: CPT

## 2021-10-12 PROCEDURE — 83735 ASSAY OF MAGNESIUM: CPT

## 2021-10-12 PROCEDURE — 65660000000 HC RM CCU STEPDOWN

## 2021-10-12 PROCEDURE — 85025 COMPLETE CBC W/AUTO DIFF WBC: CPT

## 2021-10-12 PROCEDURE — 77010033711 HC HIGH FLOW OXYGEN

## 2021-10-12 PROCEDURE — 94640 AIRWAY INHALATION TREATMENT: CPT

## 2021-10-12 PROCEDURE — 80053 COMPREHEN METABOLIC PANEL: CPT

## 2021-10-12 PROCEDURE — 74011250637 HC RX REV CODE- 250/637: Performed by: FAMILY MEDICINE

## 2021-10-12 PROCEDURE — 36415 COLL VENOUS BLD VENIPUNCTURE: CPT

## 2021-10-12 RX ADMIN — BARICITINIB 4 MG: 2 TABLET, FILM COATED ORAL at 08:34

## 2021-10-12 RX ADMIN — GUAIFENESIN 1200 MG: 600 TABLET ORAL at 17:19

## 2021-10-12 RX ADMIN — DEXAMETHASONE SODIUM PHOSPHATE 10 MG: 10 INJECTION INTRAMUSCULAR; INTRAVENOUS at 08:34

## 2021-10-12 RX ADMIN — BUDESONIDE AND FORMOTEROL FUMARATE DIHYDRATE 2 PUFF: 160; 4.5 AEROSOL RESPIRATORY (INHALATION) at 20:00

## 2021-10-12 RX ADMIN — BUDESONIDE AND FORMOTEROL FUMARATE DIHYDRATE 2 PUFF: 160; 4.5 AEROSOL RESPIRATORY (INHALATION) at 07:53

## 2021-10-12 RX ADMIN — DOXYCYCLINE HYCLATE 100 MG: 100 CAPSULE ORAL at 21:23

## 2021-10-12 RX ADMIN — GUAIFENESIN 1200 MG: 600 TABLET ORAL at 08:34

## 2021-10-12 RX ADMIN — DOXYCYCLINE HYCLATE 100 MG: 100 CAPSULE ORAL at 08:34

## 2021-10-12 RX ADMIN — QUETIAPINE FUMARATE 100 MG: 100 TABLET ORAL at 21:22

## 2021-10-12 RX ADMIN — PANTOPRAZOLE SODIUM 40 MG: 40 TABLET, DELAYED RELEASE ORAL at 06:25

## 2021-10-12 RX ADMIN — Medication 10 ML: at 11:34

## 2021-10-12 RX ADMIN — Medication 10 ML: at 06:25

## 2021-10-12 RX ADMIN — DEXAMETHASONE SODIUM PHOSPHATE 10 MG: 10 INJECTION INTRAMUSCULAR; INTRAVENOUS at 21:22

## 2021-10-12 RX ADMIN — Medication 10 ML: at 21:23

## 2021-10-12 NOTE — PROGRESS NOTES
Pt had 9 beat run of a-tach. Pt sitting up in chair. No distress noted at this time. Dr Juan White made aware via perfect serve. New orders obtained.

## 2021-10-12 NOTE — PROGRESS NOTES
Pt in bed resting on 55L 80%, in no apparent distress, call light in reach, SBAR given to [facility], [facility].

## 2021-10-12 NOTE — PROGRESS NOTES
Pt sitting up in bed. Pt on airvo at 55L 70% with sat 97% at this time. Call light in reach, will monitor.

## 2021-10-12 NOTE — PROGRESS NOTES
Pt sitting up in bed. Pt alert oriented times 3 at this time. Pt on airvo at 55L 80% with sat 98% at this time. Continuous sat monitor in place. Pt denies pain or distress at this time. Pt encouraged to call for assistance if needed call light in reach, will monitor.

## 2021-10-12 NOTE — PROGRESS NOTES
[facility] Hospitalist Progress Note     Name:  Susana Harrison  Age:61 y.o. Sex:male   :  1960    MRN:  [id number]     Admit Date:  10/9/2021    Reason for Admission:  Acute hypoxemic respiratory failure due to COVID-19 [facility]) [U07.1, J96.01]    Assessment & Plan     -Acute hypoxic respiratory failure secondary to Covid pneumonia  Not a candidate for remdesivir as symptoms are greater than a week  Recently seen at [facility] on October 3, 2021, guarded patient was given a short course of steroid, Z-Xiang and Tessalon Perles  Patient presently on 10 L of oxygen nasal cannula. C-reactive protein 21.5. We will start on high-dose of dexamethasone with 10 mg twice daily for 5 days and then 5 mg twice daily for 5 more days. Discussed about tocilizumab and the baricitinib. Explained pros and cons, other side effect. Patient is willing to have those medications. We will start patient on baricitinib 4 mg p.o. daily for 14 days. Mucinex twice daily, incentive spirometry every 2 hourly, prone positioning 6/24 hrs  Also start on doxycycline. 10/10/2021  Initially on 15 L, later on changed to Airvo at 100% FiO2, flow at 60 L  10/11/2021  On 85% FiO2 Airvo  10/12/2021  On 80% FiO2      -Mild MARBELLA creatinine of 1.42, GFR of 54  Gentle hydration normal saline at 75 cc/h, give a total of 500 cc. Held losartan  10/10/2021  MARBELLA resolved     -History of apical thrombus on Coumadin  Presently supratherapeutic INR greater than 9  Gave a dose of vitamin K 2.5 mg p.o. x1  10/10/2021  INR still supratherapeutic at 8.2.   No signs of bleeding  We will give a dose of vitamin K 2.5 mg p.o. x1.  10/11/2021  INR 3.9.     -Coronary disease  Continue aspirin     -Hypertension  Blood pressure low normal in the 25F systolic  Placed parameters for blood pressure medication  10/10/2021  Improving blood pressure     -Chronic systolic congestive heart failure with a EF of 30 to 35%  -History of status post pacemaker/defibrillator     -Psychiatric disorder  Continue Seroquel 100 mg at bedtime.           Diet: DIET ADULT  VTE ppx: Presently supratherapeutic INR  GI ppx: Protonix  CODE STATUS: Full Code        Diet:  DIET ADULT  DIET ADULT ORAL NUTRITION SUPPLEMENT  DVT PPx: Coumadin  GI Ppx: Protonix  Code: Full Code        Hospital Course/Subjective:   Mandie Whiteside is a 64 y.o. male with medical history of chronic systolic congestive heart failure with a EF of 30 to 35% follows with Dr. Asha Owusu, history of s/p ICD, coronary disease, hypertension, history of apical thrombus on Coumadin, statin intolerant, based on echo done in 2018 also had ascending aortic diameter of 3.68 cm  Patient will be admitted for acute hypoxic respiratory failure secondary to Covid pneumonia, mild MARBELLA and low normal blood pressures. Subjective/24 hr Events (10/12/21):  10/10/2021  Patient awake alert, says breathing okay, presently on 15 L of oxygen nasal cannula, later on today RT changed patient to Airvo at a flow at 60 L and FiO2 of 100%. 10/11/2021  Complaining of fatigue  Says breathing okay  Presently on Airvo 85% FiO2    10/12/2021  Says doing okay, breathing okay, presently on Airvo at 80%. Later on today patient as per nursing staff had a 9 beat of V. tach as per telemetry. Asymptomatic  Potassium and mag levels normal.      Review of Systems: 14 point review of systems is otherwise negative with the exception of the elements mentioned above.     Objective:     Patient Vitals for the past 24 hrs:   Temp Pulse Resp BP SpO2   10/12/21 0753     99 %   10/12/21 0748 98.3 °F (36.8 °C) 60 20 105/74 98 %   10/12/21 0400  60      10/12/21 0322 97.3 °F (36.3 °C) 64 20 111/67 97 %   10/12/21 0000  63      10/11/21 2329 98 °F (36.7 °C) 60 20 105/67 98 %   10/11/21 2046     97 %   10/11/21 2000  60      10/11/21 1953 98.6 °F (37 °C) 61 22 114/67 99 %   10/11/21 1600  64      10/11/21 1526 98.8 °F (37.1 °C) 64 22 107/62 100 %   10/11/21 1200  61      10/11/21 1138 98.7 °F (37.1 °C) 61 18 (!) 101/55 100 %     Oxygen Therapy  O2 Sat (%): 99 % (10/12/21 0753)  Pulse via Oximetry: 81 beats per minute (10/12/21 0753)  O2 Device: Heated; Hi flow nasal cannula (cap #62) (10/12/21 0753)  O2 Flow Rate (L/min): 55 l/min (10/12/21 0753)  O2 Temperature: 87.8 °F (31 °C) (10/12/21 0753)  FIO2 (%): 70 % (10/12/21 0753)    Body mass index is 30.64 kg/m². Physical Exam:   General:     alert, awake, mild respite distress, 80% FiO2 Airvo   HENT:   normocephalic, atraumatic. Oropharynx clear with moist mucous membranes and normal hard/soft palate  Eyes:   anicteric sclerae, moist conjunctiva, PERRL, EOMI  Neck:    supple, non-tender. Trachea midline. Lungs:   Bilateral coarse breath sounds   cardiac:   RRR, Normal S1 and S2. No S3, S4 or murmurs. Abdomen:   Soft, non distended, nontender, +BS, no guarding/rebound  Extremities:   Warm, dry. No edema , pedal pulses present, no digital cyanosis  Skin:   Warn, dry, normnal turgor and texture; no rash, ulcers or nodules appreciated  Neuro:   AAOx3.  No gross focal neurological deficit,  Cranial nerves II-XII grossly intact  Psychiatric:  No anxiety, calm, cooperative      Data Review:  I have reviewed all labs, meds, and studies from the last 24 hours:    Labs:  Recent Results (from the past 24 hour(s))   PROTHROMBIN TIME + INR    Collection Time: 10/11/21 12:36 PM   Result Value Ref Range    Prothrombin time 38.2 (H) 12.6 - 14.5 sec    INR 3.9     CBC WITH AUTOMATED DIFF    Collection Time: 10/12/21  6:59 AM   Result Value Ref Range    WBC 17.5 (H) 4.3 - 11.1 K/uL    RBC 4.15 (L) 4.23 - 5.6 M/uL    HGB 12.1 (L) 13.6 - 17.2 g/dL    HCT 38.1 (L) 41.1 - 50.3 %    MCV 91.8 79.6 - 97.8 FL    MCH 29.2 26.1 - 32.9 PG    MCHC 31.8 31.4 - 35.0 g/dL    RDW 14.8 (H) 11.9 - 14.6 %    PLATELET 985 265 - 878 K/uL    MPV 10.5 9.4 - 12.3 FL    ABSOLUTE NRBC 0.00 0.0 - 0.2 K/uL    DF AUTOMATED NEUTROPHILS 85 (H) 43 - 78 %    LYMPHOCYTES 8 (L) 13 - 44 %    MONOCYTES 6 4.0 - 12.0 %    EOSINOPHILS 0 (L) 0.5 - 7.8 %    BASOPHILS 0 0.0 - 2.0 %    IMMATURE GRANULOCYTES 2 0.0 - 5.0 %    ABS. NEUTROPHILS 14.8 (H) 1.7 - 8.2 K/UL    ABS. LYMPHOCYTES 1.3 0.5 - 4.6 K/UL    ABS. MONOCYTES 1.0 0.1 - 1.3 K/UL    ABS. EOSINOPHILS 0.0 0.0 - 0.8 K/UL    ABS. BASOPHILS 0.0 0.0 - 0.2 K/UL    ABS. IMM. GRANS. 0.3 0.0 - 0.5 K/UL   METABOLIC PANEL, COMPREHENSIVE    Collection Time: 10/12/21  6:59 AM   Result Value Ref Range    Sodium 141 136 - 145 mmol/L    Potassium 4.5 3.5 - 5.1 mmol/L    Chloride 109 (H) 98 - 107 mmol/L    CO2 27 21 - 32 mmol/L    Anion gap 5 (L) 7 - 16 mmol/L    Glucose 113 (H) 65 - 100 mg/dL    BUN 22 8 - 23 MG/DL    Creatinine 0.90 0.8 - 1.5 MG/DL    GFR est AA >60 >60 ml/min/1.73m2    GFR est non-AA >60 >60 ml/min/1.73m2    Calcium 8.5 8.3 - 10.4 MG/DL    Bilirubin, total 0.5 0.2 - 1.1 MG/DL    ALT (SGPT) 27 12 - 65 U/L    AST (SGOT) 27 15 - 37 U/L    Alk. phosphatase 50 50 - 136 U/L    Protein, total 6.1 (L) 6.3 - 8.2 g/dL    Albumin 2.2 (L) 3.2 - 4.6 g/dL    Globulin 3.9 (H) 2.3 - 3.5 g/dL    A-G Ratio 0.6 (L) 1.2 - 3.5     PROTHROMBIN TIME + INR    Collection Time: 10/12/21  6:59 AM   Result Value Ref Range    Prothrombin time 42.5 (H) 12.6 - 14.5 sec    INR 4.5         All Micro Results     None          EKG Results     Procedure 720 Value Units Date/Time    EKG, 12 LEAD, INITIAL [[id number]] Collected: 10/09/21 1005    Order Status: Completed Updated: 10/09/21 1033     Ventricular Rate 85 BPM      Atrial Rate 85 BPM      P-R Interval 154 ms      QRS Duration 90 ms      Q-T Interval 344 ms      QTC Calculation (Bezet) 409 ms      Calculated P Axis 41 degrees      Calculated R Axis 51 degrees      Calculated T Axis 93 degrees      Diagnosis --     !! AGE AND GENDER SPECIFIC ECG ANALYSIS !!   Sinus rhythm with sinus arrhythmia with occasional and consecutive Premature   ventricular complexes  Low voltage QRS  Anteroseptal infarct (cited on or before 04-APR-2011)  Abnormal ECG  When compared with ECG of 09-OCT-2021 10:00,  No significant change was found  Confirmed by Kahlil Duarte MD (), SONYA AGUILAR ([id number]) on 10/9/2021 10:33:44 AM            Other Studies:  No results found. Current Meds:   Current Facility-Administered Medications   Medication Dose Route Frequency    albuterol (PROVENTIL HFA, VENTOLIN HFA, PROAIR HFA) inhaler 2 Puff  2 Puff Inhalation Q4H PRN    benzonatate (TESSALON) capsule 100 mg  100 mg Oral TID PRN    carvediloL (COREG) tablet 12.5 mg  12.5 mg Oral DAILY    QUEtiapine (SEROquel) tablet 100 mg  100 mg Oral QHS    sodium chloride (NS) flush 5-40 mL  5-40 mL IntraVENous Q8H    sodium chloride (NS) flush 5-40 mL  5-40 mL IntraVENous PRN    acetaminophen (TYLENOL) tablet 650 mg  650 mg Oral Q6H PRN    Or    acetaminophen (TYLENOL) suppository 650 mg  650 mg Rectal Q6H PRN    polyethylene glycol (MIRALAX) packet 17 g  17 g Oral DAILY PRN    ondansetron (ZOFRAN ODT) tablet 4 mg  4 mg Oral Q8H PRN    Or    ondansetron (ZOFRAN) injection 4 mg  4 mg IntraVENous Q6H PRN    pantoprazole (PROTONIX) tablet 40 mg  40 mg Oral ACB    dexamethasone (DECADRON) 10 mg/mL injection 10 mg  10 mg IntraVENous Q12H    [START ON 10/14/2021] dexamethasone (DECADRON) 10 mg/mL injection 5 mg  5 mg IntraVENous Q12H    baricitinib (OLUMIANT) tablet 4 mg  4 mg Oral DAILY    doxycycline (VIBRAMYCIN) capsule 100 mg  100 mg Oral Q12H    guaiFENesin ER (MUCINEX) tablet 1,200 mg  1,200 mg Oral BID    budesonide-formoteroL (SYMBICORT) 160-4.5 mcg/actuation HFA inhaler 2 Puff  2 Puff Inhalation BID RT       Problem List:  Hospital Problems as of 10/12/2021 Date Reviewed: 7/22/2021        Codes Class Noted - Resolved POA    S/P ICD (internal cardiac defibrillator) procedure ICD-10-CM: Z95.810  ICD-9-CM: V45.02  9/27/2011 - Present Yes    Overview Signed 8/2/2016  2:43 PM by Elena York     1.   Dual chamber [facility] ICD 9/26/11  2. ICD discharges 9/7/12 for sinus tachycardia. ICD adjusted. Chronic systolic congestive heart failure (HCC) ICD-10-CM: I50.22  ICD-9-CM: 428.22, 428.0  9/27/2011 - Present Yes    Overview Addendum 4/1/2018  9:40 PM by Linda Joseph MD     Unable to tolerate lisinopril due to hypotension. 1.  Echo (1/26/05):  EF 40% with anterior and apical severe hypokinesis to akinesis. 2.  Echo (4/5/11):  EF 25-30%. Mid anterior/anteroseptal/apical akinesis. Large protruding apical thrombus. Mild LA dilatation. No valvular regurgitation/stenosis. 3.  Echo (8/2/11):  EF 30-35%. Anterior/apical/anteroseptal akinesis. Resolution of apical thrombus. 4.  Echo (3/28/18):  EF 30-35%. Anteroapical akinesis. No significant valve disease. * (Principal) Acute hypoxemic respiratory failure due to COVID-19 [facility]) ICD-10-CM: U07.1, J96.01  ICD-9-CM: 518.81, 079.89, 799.02  10/9/2021 - Present Unknown        Class 1 obesity due to excess calories without serious comorbidity with body mass index (BMI) of 32.0 to 32.9 in adult ICD-10-CM: E66.09, Z68.32  ICD-9-CM: 278.00, V85.32  6/8/2017 - Present Yes    Overview Signed 6/8/2017  8:33 AM by Scott Ambrocio DO     Low carb diet discussed             Intracardiac thrombus ICD-10-CM: I51.3  ICD-9-CM: 410.90  10/2/2015 - Present Yes    Overview Addendum 10/12/2016  9:27 PM by Linda Joseph MD     Echo 4/5/11:  Large protruding apical thrombus. Resolved with anticoagulation. Indefinite anticoagulation planned. Coronary atherosclerosis of native coronary vessel ICD-10-CM: I25.10  ICD-9-CM: 414.01  10/2/2015 - Present Yes    Overview Addendum 8/19/2020  8:42 PM by Linda Joseph MD     1. Anterior STEMI 1/25/05 (Late presentation)  a. Cath: LAD: 50% prox. 100% mid. D1: 95% prox. Ramus: 95% prox. Circ: Luminal irregularity. RCA: Luminal irregularity.   b.  PCI:  T-stent of LAD/D1 with 2.25 X 18 Minivision in D1 and 2.25 X 23 Cypher in LAD. PCI of ramus with 3.0 X 18 mm Cypher. 2.  Exercise Cardiolite (8/18/11):  Large fixed anterior, septal and apical defect. No ischemia. EF 33%. 3.  Lexiscan Cardiolite (8/17/20):  Large fixed defect in mid/distal anterior and distal inferior wall. EF 27%. No reversible ischemia. Dyslipidemia ICD-10-CM: E78.5  ICD-9-CM: 272.4  10/2/2015 - Present Yes    Overview Addendum 10/18/2017  3:47 PM by Chris Barron MD     STATIN INTOLERANT. Unable to tolerate Zetia or Repatha.                        Signed By: Garcia Pelaez MD   [facility] Hospitalist Service    October 12, 2021

## 2021-10-13 LAB
ALBUMIN SERPL-MCNC: 2.4 G/DL (ref 3.2–4.6)
ALBUMIN/GLOB SERPL: 0.6 {RATIO} (ref 1.2–3.5)
ALP SERPL-CCNC: 60 U/L (ref 50–136)
ALT SERPL-CCNC: 27 U/L (ref 12–65)
ANION GAP SERPL CALC-SCNC: 6 MMOL/L (ref 7–16)
AST SERPL-CCNC: 28 U/L (ref 15–37)
BASOPHILS # BLD: 0.1 K/UL (ref 0–0.2)
BASOPHILS NFR BLD: 0 % (ref 0–2)
BILIRUB SERPL-MCNC: 0.5 MG/DL (ref 0.2–1.1)
BUN SERPL-MCNC: 21 MG/DL (ref 8–23)
CALCIUM SERPL-MCNC: 8.7 MG/DL (ref 8.3–10.4)
CHLORIDE SERPL-SCNC: 107 MMOL/L (ref 98–107)
CO2 SERPL-SCNC: 26 MMOL/L (ref 21–32)
CREAT SERPL-MCNC: 0.82 MG/DL (ref 0.8–1.5)
DIFFERENTIAL METHOD BLD: ABNORMAL
EOSINOPHIL # BLD: 0 K/UL (ref 0–0.8)
EOSINOPHIL NFR BLD: 0 % (ref 0.5–7.8)
ERYTHROCYTE [DISTWIDTH] IN BLOOD BY AUTOMATED COUNT: 14.6 % (ref 11.9–14.6)
GLOBULIN SER CALC-MCNC: 3.9 G/DL (ref 2.3–3.5)
GLUCOSE SERPL-MCNC: 90 MG/DL (ref 65–100)
HCT VFR BLD AUTO: 38.7 % (ref 41.1–50.3)
HGB BLD-MCNC: 12.6 G/DL (ref 13.6–17.2)
IMM GRANULOCYTES # BLD AUTO: 0.7 K/UL (ref 0–0.5)
IMM GRANULOCYTES NFR BLD AUTO: 3 % (ref 0–5)
INR PPP: 3.8
LYMPHOCYTES # BLD: 1.3 K/UL (ref 0.5–4.6)
LYMPHOCYTES NFR BLD: 6 % (ref 13–44)
MCH RBC QN AUTO: 28.8 PG (ref 26.1–32.9)
MCHC RBC AUTO-ENTMCNC: 32.6 G/DL (ref 31.4–35)
MCV RBC AUTO: 88.4 FL (ref 79.6–97.8)
MONOCYTES # BLD: 1.2 K/UL (ref 0.1–1.3)
MONOCYTES NFR BLD: 6 % (ref 4–12)
NEUTS SEG # BLD: 17.5 K/UL (ref 1.7–8.2)
NEUTS SEG NFR BLD: 84 % (ref 43–78)
NRBC # BLD: 0 K/UL (ref 0–0.2)
PLATELET # BLD AUTO: 405 K/UL (ref 150–450)
PMV BLD AUTO: 10.3 FL (ref 9.4–12.3)
POTASSIUM SERPL-SCNC: 4.5 MMOL/L (ref 3.5–5.1)
PROT SERPL-MCNC: 6.3 G/DL (ref 6.3–8.2)
PROTHROMBIN TIME: 37.2 SEC (ref 12.6–14.5)
RBC # BLD AUTO: 4.38 M/UL (ref 4.23–5.6)
SODIUM SERPL-SCNC: 139 MMOL/L (ref 138–145)
WBC # BLD AUTO: 20.7 K/UL (ref 4.3–11.1)

## 2021-10-13 PROCEDURE — 74011250637 HC RX REV CODE- 250/637: Performed by: FAMILY MEDICINE

## 2021-10-13 PROCEDURE — 74011250636 HC RX REV CODE- 250/636: Performed by: FAMILY MEDICINE

## 2021-10-13 PROCEDURE — 94640 AIRWAY INHALATION TREATMENT: CPT

## 2021-10-13 PROCEDURE — 77010033711 HC HIGH FLOW OXYGEN

## 2021-10-13 PROCEDURE — 97535 SELF CARE MNGMENT TRAINING: CPT

## 2021-10-13 PROCEDURE — 97530 THERAPEUTIC ACTIVITIES: CPT

## 2021-10-13 PROCEDURE — 74011000258 HC RX REV CODE- 258: Performed by: FAMILY MEDICINE

## 2021-10-13 PROCEDURE — 65660000000 HC RM CCU STEPDOWN

## 2021-10-13 PROCEDURE — 85610 PROTHROMBIN TIME: CPT

## 2021-10-13 PROCEDURE — 80053 COMPREHEN METABOLIC PANEL: CPT

## 2021-10-13 PROCEDURE — 36415 COLL VENOUS BLD VENIPUNCTURE: CPT

## 2021-10-13 PROCEDURE — 94762 N-INVAS EAR/PLS OXIMTRY CONT: CPT

## 2021-10-13 PROCEDURE — 85025 COMPLETE CBC W/AUTO DIFF WBC: CPT

## 2021-10-13 RX ORDER — SAME BUTANEDISULFONATE/BETAINE 400-600 MG
250 POWDER IN PACKET (EA) ORAL 2 TIMES DAILY
Status: DISCONTINUED | OUTPATIENT
Start: 2021-10-13 | End: 2021-10-15 | Stop reason: HOSPADM

## 2021-10-13 RX ADMIN — GUAIFENESIN 1200 MG: 600 TABLET ORAL at 08:03

## 2021-10-13 RX ADMIN — QUETIAPINE FUMARATE 100 MG: 100 TABLET ORAL at 22:06

## 2021-10-13 RX ADMIN — CEFTRIAXONE 1 G: 1 INJECTION, POWDER, FOR SOLUTION INTRAMUSCULAR; INTRAVENOUS at 17:00

## 2021-10-13 RX ADMIN — BARICITINIB 4 MG: 2 TABLET, FILM COATED ORAL at 08:03

## 2021-10-13 RX ADMIN — DEXAMETHASONE SODIUM PHOSPHATE 10 MG: 10 INJECTION INTRAMUSCULAR; INTRAVENOUS at 22:06

## 2021-10-13 RX ADMIN — Medication 250 MG: at 17:00

## 2021-10-13 RX ADMIN — BUDESONIDE AND FORMOTEROL FUMARATE DIHYDRATE 2 PUFF: 160; 4.5 AEROSOL RESPIRATORY (INHALATION) at 20:00

## 2021-10-13 RX ADMIN — DEXAMETHASONE SODIUM PHOSPHATE 10 MG: 10 INJECTION INTRAMUSCULAR; INTRAVENOUS at 08:03

## 2021-10-13 RX ADMIN — Medication 10 ML: at 22:06

## 2021-10-13 RX ADMIN — DOXYCYCLINE HYCLATE 100 MG: 100 CAPSULE ORAL at 22:06

## 2021-10-13 RX ADMIN — Medication 10 ML: at 14:36

## 2021-10-13 RX ADMIN — DOXYCYCLINE HYCLATE 100 MG: 100 CAPSULE ORAL at 08:03

## 2021-10-13 RX ADMIN — PANTOPRAZOLE SODIUM 40 MG: 40 TABLET, DELAYED RELEASE ORAL at 06:03

## 2021-10-13 RX ADMIN — BUDESONIDE AND FORMOTEROL FUMARATE DIHYDRATE 2 PUFF: 160; 4.5 AEROSOL RESPIRATORY (INHALATION) at 08:00

## 2021-10-13 RX ADMIN — GUAIFENESIN 1200 MG: 600 TABLET ORAL at 17:00

## 2021-10-13 RX ADMIN — Medication 10 ML: at 06:04

## 2021-10-13 NOTE — PROGRESS NOTES
Pt sitting up in chair. Pt on airvo at 45 L 45% no distress noted at this time. Sat 96% at this time. Call light in reach, will monitor.

## 2021-10-13 NOTE — ACP (ADVANCE CARE PLANNING)
Advance Care Planning   Advance Care Planning Inpatient Note  [facility] Department    Today's Date: 10/13/2021  Unit: SFD 8 MED SURG    Received request from Health Care provider. Upon review of chart and communication with care team, patient's decision making abilities are not in question. Patient was/were present in the room during visit. ( did not enter room, as pt is in [facility].)    Goals of ACP Conversation:  Provide ACP form for pt to review & assist as needed with completing form. Health Care Decision Makers:      Primary Decision Maker: TimazachBelen - Daughter - [phone]    Secondary Decision Maker: Karla Ontiveros - Child - [phone]      Summary:  No Decision Maker named by patient at this time (Pt has 2 children listed on chart, and the names above were pulled from pt's chart.)       Advance Care Planning Documents (Patient Wishes) on file:  None     Assessment:     reviewed pt's chart after receiving consult. Pt has 2 children listed as next of kin. Interventions:  Provided ACP form with assistance of RN. Care Preferences Communicated:  No    Outcomes/Plan:  Follow-up, as needed, to assist pt with questions and/or completing form.     [facility] on 10/13/2021 at 4:23 PM

## 2021-10-13 NOTE — PROGRESS NOTES
Pt in bed resting on 55/70, in no apparent distress, call light in reach, SBAR given to Ramin Pickens, [facility] hospital[facility].

## 2021-10-13 NOTE — PROGRESS NOTES
Advance Care Planning   Advance Care Planning Inpatient Note  [facility]    Today's Date: 10/13/2021  Unit: SFD 8 MED SURG    Received request from Health Care provider. Upon review of chart and communication with care team, patient's decision making abilities are not in question. Patient was/were present in the room during visit. ( did not enter room, as pt is in [facility].)    Goals of ACP Conversation:  Provide ACP form for pt to review & assist as needed with completing form. Health Care Decision Makers:      Primary Decision Maker: KarenBelen - Daughter - [phone]    Secondary Decision Maker: Eliseo Tawnya - Child - [phone]      Summary:  No Decision Maker named by patient at this time (Pt has 2 children listed on chart, and the names above were pulled from pt's chart.)       Advance Care Planning Documents (Patient Wishes) on file:  None     Assessment:     reviewed pt's chart after receiving consult. Pt has 2 children listed as next of kin. Interventions:  Provided ACP form with assistance of RN. Care Preferences Communicated:  No    Outcomes/Plan:  Follow-up, as needed, to assist pt with questions and/or completing form.     [facility] on 10/13/2021 at 4:23 PM

## 2021-10-13 NOTE — PROGRESS NOTES
MSN, CM:  Patient receiving 55/70 Airvo. Patient has received Baricitinib and Decadron. Patient has no discharge needs at this time per therapy. Case Management will continue to follow.

## 2021-10-13 NOTE — PROGRESS NOTES
Pt sitting up in bed. Pt alert oriented times 3 at this time. Pt on airvo at 55L 70% with sat 97% at this time. Continuous sat monitor in place. Pt denies pain or distress at this time. Pt encouraged to call for assistance if needed call light in reach, will monitor.

## 2021-10-13 NOTE — PROGRESS NOTES
ACUTE OT GOALS:  (Developed with and agreed upon by patient and/or caregiver.)  1) Patient will complete total body bathing and dressing with MOD I and adaptive equipment as needed. 2) Patient will complete toileting with MOD I.   3) Patient will complete functional transfers INDEPENDENTLY  4) Patient will tolerate at least 25 minutes of OT activity with 1-2 rest breaks while maintaining O2 sats >90%. GOAL MET 10/13/21  5) Patient will verbalize at least 3 energy conservation technique to utilize during ADL/IADL. 6) Patient will complete grooming ADL in standing INDEPENDENTLY. Timeframe: 7 visits     OCCUPATIONAL THERAPY: Daily Note OT Treatment Day # 2    Susana Harrison is a 64 y.o. male   PRIMARY DIAGNOSIS: Acute hypoxemic respiratory failure due to COVID-19 [facility])  Acute hypoxemic respiratory failure due to COVID-19 ([facility] [place] [address] [U07.1, J96.01]       Payor: [employer] MEDICARE / Plan: Smartbill - [address] / Product Type: [employer] Care Medicare /   ASSESSMENT:     REHAB RECOMMENDATIONS: CURRENT LEVEL OF FUNCTION:  (Most Recently Demonstrated)   Recommendation to date pending progress:  Setting:   No further skilled therapy   Equipment:    None Bathing:   Not tested  Dressing:   Not tested  Feeding/Grooming:   Supervision  Toileting:   Not tested  Functional Mobility:   Supervision     ASSESSMENT:  Mr. Eduardo Harris  is a 61 y/o male presents with acute respiratory failure due to COVID 19. Today pt presents with decreased activity tolerance and increased O2 needs impacting ADLs. Pt is currently on 50L 70% and sats 98% at rest. Pt completed functional mobility of household distances, grooming ADLs and BUE exercises in grid below. Pt completed entire treatment session in standing and did not require any seated RB. Pt O2 sats never went below 95%, RN notified. Pt is currently limited by amount of O2 needed, but is otherwise moving very well. Pt is progressing toward goals, goals updated. Continue POC. SUBJECTIVE:   Mr. Dov Roque states, \"I feel good. \"    SOCIAL HISTORY/LIVING ENVIRONMENT: lives alone, one level, 3STE, walk in shower, no DME, drives only short distances, no falls  Home Environment: Private residence  # Steps to Enter: 3  One/Two Story Residence: One story  Living Alone: Yes  Support Systems: Parent(s), Other Family Member(s)    OBJECTIVE:     PAIN: VITAL SIGNS: LINES/DRAINS:   Pre Treatment: Pain Screen  Pain Scale 1: Numeric (0 - 10)  Pain Intensity 1: 0  Post Treatment: 0 Vital Signs  O2 Sat (%): 99 %  O2 Device: Heated; Hi flow nasal cannula  O2 Flow Rate (L/min): 50 l/min  FIO2 (%): 70 % Continuous Pulse Oximetry  O2 Device: Heated, Hi flow nasal cannula     ACTIVITIES OF DAILY LIVING: I Mod I S SBA CGA Min Mod Max Total NT Comments   BASIC ADLs:              Bathing/ Showering [] [] [] [] [] [] [] [] [] [x]    Toileting [] [] [] [] [] [] [] [] [] [x]    Dressing [] [] [] [] [] [] [] [] [] [x]    Feeding [] [] [] [] [] [] [] [] [] [x]    Grooming [] [] [x] [] [] [] [] [] [] [] Supervision brushing teeth and combing hair standing at sink   Personal Device Care [] [] [] [] [] [] [] [] [] [x]    Functional Mobility [] [] [x] [] [] [] [] [] [] [] Monitoring O2 sats   I=Independent, Mod I=Modified Independent, S=Supervision, SBA=Standby Assistance, CGA=Contact Guard Assistance,   Min=Minimal Assistance, Mod=Moderate Assistance, Max=Maximal Assistance, Total=Total Assistance, NT=Not Tested    MOBILITY: I Mod I S SBA CGA Min Mod Max Total  NT x2 Comments: pt received sitting in chair   Supine to sit [] [] [] [] [] [] [] [] [] [x] []    Sit to supine [] [] [] [] [] [] [] [] [] [x] []    Sit to stand [] [] [x] [] [] [] [] [] [] [] [] Monitoring O2 sats   Bed to chair [] [] [x] [] [] [] [] [] [] [] [] Monitoring O2 sats   I=Independent, Mod I=Modified Independent, S=Supervision, SBA=Standby Assistance, CGA=Contact Guard Assistance,   Min=Minimal Assistance, Mod=Moderate Assistance, Max=Maximal Assistance, Total=Total Assistance, NT=Not Tested    BALANCE: Good Fair+ Fair Fair- Poor NT Comments   Sitting Static [x] [] [] [] [] [] In chair   Sitting Dynamic [] [] [] [] [] [x]              Standing Static [x] [] [] [] [] [] At edge of chair   Standing Dynamic [x] [] [] [] [] [] Completing exercises     PLAN:   FREQUENCY/DURATION: OT Plan of Care: 3 times/week for duration of hospital stay or until stated goals are met, whichever comes first.    TREATMENT:   TREATMENT:   ($$ Self Care/Home Management: 8-22 mins    )  Self Care (20 Minutes): Self care including Grooming, Energy Conservation Training and functional transfers in prep for ADLs and ambulating household distances to increase independence and decrease level of assistance required.     TREATMENT GRID:   Date:  10/13/21 Date:   Date:     Activity/Exercise Parameters Parameters Parameters   Straight punches 20 reps     Cross body punches 20 reps     Shoulder Flexion 20 reps     BLE marches 20 reps     Wall push ups 10 reps                   AFTER TREATMENT POSITION/PRECAUTIONS:  Chair, Needs within reach and RN notified    INTERDISCIPLINARY COLLABORATION:  RN/PCT and OT/WYATT    TOTAL TREATMENT DURATION:  OT Patient Time In/Time Out  Time In: 1123  Time Out: [address] OT

## 2021-10-13 NOTE — PROGRESS NOTES
[facility]ist Progress Note     Name:  Gume Ho  Age:61 y.o. Sex:male   :  1960    MRN:  [id number]     Admit Date:  10/9/2021    Reason for Admission:  Acute hypoxemic respiratory failure due to COVID-19 [facility]) [U07.1, J96.01]    Assessment & Plan     -Acute hypoxic respiratory failure secondary to Covid pneumonia  Not a candidate for remdesivir as symptoms are greater than a week  Recently seen at [facility] on October 3, 2021, guarded patient was given a short course of steroid, Z-Xiang and Tessalon Perles  Patient presently on 10 L of oxygen nasal cannula. C-reactive protein 21.5. We will start on high-dose of dexamethasone with 10 mg twice daily for 5 days and then 5 mg twice daily for 5 more days. Discussed about tocilizumab and the baricitinib. Explained pros and cons, other side effect. Patient is willing to have those medications. We will start patient on baricitinib 4 mg p.o. daily for 14 days. Mucinex twice daily, incentive spirometry every 2 hourly, prone positioning 6/24 hrs  Also start on doxycycline. 10/10/2021  Initially on 15 L, later on changed to Airvo at 100% FiO2, flow at 60 L  10/11/2021  On 85% FiO2 Airvo  10/12/2021  On 80% FiO2  10/13/2021  On 70% FiO2      -Mild MARBELLA creatinine of 1.42, GFR of 54  Gentle hydration normal saline at 75 cc/h, give a total of 500 cc. Held losartan  10/10/2021  MARBELLA resolved     -History of apical thrombus on Coumadin  Presently supratherapeutic INR greater than 9  Gave a dose of vitamin K 2.5 mg p.o. x1  10/10/2021  INR still supratherapeutic at 8.2.   No signs of bleeding  We will give a dose of vitamin K 2.5 mg p.o. x1.  10/11/2021  INR 3.9.     -Coronary disease  Continue aspirin     -Hypertension  Blood pressure low normal in the 30L systolic  Placed parameters for blood pressure medication  10/10/2021  Improving blood pressure     -Chronic systolic congestive heart failure with a EF of 30 to 35%  -History of status post pacemaker/defibrillator     -Psychiatric disorder  Continue Seroquel 100 mg at bedtime.           Diet: DIET ADULT  VTE ppx: Presently supratherapeutic INR  GI ppx: Protonix  CODE STATUS: Full Code        Diet:  DIET ADULT  DIET ADULT ORAL NUTRITION SUPPLEMENT  DVT PPx: Coumadin  GI Ppx: Protonix  Code: Full Code        Hospital Course/Subjective:   Danay Humphreys is a 64 y.o. male with medical history of chronic systolic congestive heart failure with a EF of 30 to 35% follows with Dr. Mary Swift, history of s/p ICD, coronary disease, hypertension, history of apical thrombus on Coumadin, statin intolerant, based on echo done in 2018 also had ascending aortic diameter of 3.68 cm  Patient will be admitted for acute hypoxic respiratory failure secondary to Covid pneumonia, mild MARBELLA and low normal blood pressures. Subjective/24 hr Events (10/13/21):  10/10/2021  Patient awake alert, says breathing okay, presently on 15 L of oxygen nasal cannula, later on today RT changed patient to Airvo at a flow at 60 L and FiO2 of 100%. 10/11/2021  Complaining of fatigue  Says breathing okay  Presently on Airvo 85% FiO2    10/12/2021  Says doing okay, breathing okay, presently on Airvo at 80%. Later on today patient as per nursing staff had a 9 beat of V. tach as per telemetry. Asymptomatic  Potassium and mag levels normal.    10/13/2021  Generalized weakness improving  On airvo 70% FiO2   Mild elevated WBC  Discussed about patient penicillin allergy, says he only had itching, no swelling. Review of Systems: 14 point review of systems is otherwise negative with the exception of the elements mentioned above.     Objective:     Patient Vitals for the past 24 hrs:   Temp Pulse Resp BP SpO2   10/13/21 1516 98.3 °F (36.8 °C) 67 20 108/69 99 %   10/13/21 1200  65      10/13/21 1123     99 %   10/13/21 1109 98 °F (36.7 °C) 65 20 117/71 99 %   10/13/21 0800  62   97 %   10/13/21 0742 98.2 °F (36.8 °C) 60 20 121/80 97 % 10/13/21 0400  69      10/13/21 0327 98.1 °F (36.7 °C) 68 20 120/75 99 %   10/13/21 0102     99 %   10/13/21 0000  60      10/12/21 2228 98.3 °F (36.8 °C) 64 20 117/74 98 %   10/12/21 2000  65      10/12/21 1958     95 %   10/12/21 1954 98.4 °F (36.9 °C) (!) 55 19 125/77 99 %   10/12/21 1600  75        Oxygen Therapy  O2 Sat (%): 99 % (10/13/21 1516)  Pulse via Oximetry: 84 beats per minute (10/13/21 0800)  O2 Device: Heated; Hi flow nasal cannula (10/13/21 1123)  O2 Flow Rate (L/min): 50 l/min (10/13/21 1123)  O2 Temperature: 87.8 °F (31 °C) (10/13/21 0800)  FIO2 (%): 70 % (10/13/21 1123)    Body mass index is 30.52 kg/m². Physical Exam:   General:     alert, awake, mild respite distress, 70% FiO2 Airvo   HENT:   normocephalic, atraumatic. Oropharynx clear with moist mucous membranes and normal hard/soft palate  Eyes:   anicteric sclerae, moist conjunctiva, PERRL, EOMI  Neck:    supple, non-tender. Trachea midline. Lungs:   Bilateral coarse breath sounds   cardiac:   RRR, Normal S1 and S2. No S3, S4 or murmurs. Abdomen:   Soft, non distended, nontender, +BS, no guarding/rebound  Extremities:   Warm, dry. No edema , pedal pulses present, no digital cyanosis  Skin:   Warn, dry, normnal turgor and texture; no rash, ulcers or nodules appreciated  Neuro:   AAOx3.  No gross focal neurological deficit,  Cranial nerves II-XII grossly intact  Psychiatric:  No anxiety, calm, cooperative      Data Review:  I have reviewed all labs, meds, and studies from the last 24 hours:    Labs:  Recent Results (from the past 24 hour(s))   CBC WITH AUTOMATED DIFF    Collection Time: 10/13/21  8:07 AM   Result Value Ref Range    WBC 20.7 (H) 4.3 - 11.1 K/uL    RBC 4.38 4.23 - 5.6 M/uL    HGB 12.6 (L) 13.6 - 17.2 g/dL    HCT 38.7 (L) 41.1 - 50.3 %    MCV 88.4 79.6 - 97.8 FL    MCH 28.8 26.1 - 32.9 PG    MCHC 32.6 31.4 - 35.0 g/dL    RDW 14.6 11.9 - 14.6 %    PLATELET 902 728 - 796 K/uL    MPV 10.3 9.4 - 12.3 FL ABSOLUTE NRBC 0.00 0.0 - 0.2 K/uL    DF AUTOMATED      NEUTROPHILS 84 (H) 43 - 78 %    LYMPHOCYTES 6 (L) 13 - 44 %    MONOCYTES 6 4.0 - 12.0 %    EOSINOPHILS 0 (L) 0.5 - 7.8 %    BASOPHILS 0 0.0 - 2.0 %    IMMATURE GRANULOCYTES 3 0.0 - 5.0 %    ABS. NEUTROPHILS 17.5 (H) 1.7 - 8.2 K/UL    ABS. LYMPHOCYTES 1.3 0.5 - 4.6 K/UL    ABS. MONOCYTES 1.2 0.1 - 1.3 K/UL    ABS. EOSINOPHILS 0.0 0.0 - 0.8 K/UL    ABS. BASOPHILS 0.1 0.0 - 0.2 K/UL    ABS. IMM. GRANS. 0.7 (H) 0.0 - 0.5 K/UL   METABOLIC PANEL, COMPREHENSIVE    Collection Time: 10/13/21  8:07 AM   Result Value Ref Range    Sodium 139 138 - 145 mmol/L    Potassium 4.5 3.5 - 5.1 mmol/L    Chloride 107 98 - 107 mmol/L    CO2 26 21 - 32 mmol/L    Anion gap 6 (L) 7 - 16 mmol/L    Glucose 90 65 - 100 mg/dL    BUN 21 8 - 23 MG/DL    Creatinine 0.82 0.8 - 1.5 MG/DL    GFR est AA >60 >60 ml/min/1.73m2    GFR est non-AA >60 >60 ml/min/1.73m2    Calcium 8.7 8.3 - 10.4 MG/DL    Bilirubin, total 0.5 0.2 - 1.1 MG/DL    ALT (SGPT) 27 12 - 65 U/L    AST (SGOT) 28 15 - 37 U/L    Alk. phosphatase 60 50 - 136 U/L    Protein, total 6.3 6.3 - 8.2 g/dL    Albumin 2.4 (L) 3.2 - 4.6 g/dL    Globulin 3.9 (H) 2.3 - 3.5 g/dL    A-G Ratio 0.6 (L) 1.2 - 3.5     PROTHROMBIN TIME + INR    Collection Time: 10/13/21  8:07 AM   Result Value Ref Range    Prothrombin time 37.2 (H) 12.6 - 14.5 sec    INR 3.8         All Micro Results     None          EKG Results     Procedure 720 Value Units Date/Time    EKG, 12 LEAD, INITIAL [[id number]] Collected: 10/09/21 1005    Order Status: Completed Updated: 10/09/21 1033     Ventricular Rate 85 BPM      Atrial Rate 85 BPM      P-R Interval 154 ms      QRS Duration 90 ms      Q-T Interval 344 ms      QTC Calculation (Bezet) 409 ms      Calculated P Axis 41 degrees      Calculated R Axis 51 degrees      Calculated T Axis 93 degrees      Diagnosis --     !! AGE AND GENDER SPECIFIC ECG ANALYSIS !!   Sinus rhythm with sinus arrhythmia with occasional and consecutive Premature   ventricular complexes  Low voltage QRS  Anteroseptal infarct (cited on or before 04-APR-2011)  Abnormal ECG  When compared with ECG of 09-OCT-2021 10:00,  No significant change was found  Confirmed by Rajni Villar MD (), SONYA AGUILAR ([id number]) on 10/9/2021 10:33:44 AM            Other Studies:  No results found.     Current Meds:   Current Facility-Administered Medications   Medication Dose Route Frequency    cefTRIAXone (ROCEPHIN) 1 g in 0.9% sodium chloride (MBP/ADV) 50 mL MBP  1 g IntraVENous Q24H    Saccharomyces boulardii (FLORASTOR) capsule 250 mg  250 mg Oral BID    albuterol (PROVENTIL HFA, VENTOLIN HFA, PROAIR HFA) inhaler 2 Puff  2 Puff Inhalation Q4H PRN    benzonatate (TESSALON) capsule 100 mg  100 mg Oral TID PRN    carvediloL (COREG) tablet 12.5 mg  12.5 mg Oral DAILY    QUEtiapine (SEROquel) tablet 100 mg  100 mg Oral QHS    sodium chloride (NS) flush 5-40 mL  5-40 mL IntraVENous Q8H    sodium chloride (NS) flush 5-40 mL  5-40 mL IntraVENous PRN    acetaminophen (TYLENOL) tablet 650 mg  650 mg Oral Q6H PRN    Or    acetaminophen (TYLENOL) suppository 650 mg  650 mg Rectal Q6H PRN    polyethylene glycol (MIRALAX) packet 17 g  17 g Oral DAILY PRN    ondansetron (ZOFRAN ODT) tablet 4 mg  4 mg Oral Q8H PRN    Or    ondansetron (ZOFRAN) injection 4 mg  4 mg IntraVENous Q6H PRN    pantoprazole (PROTONIX) tablet 40 mg  40 mg Oral ACB    dexamethasone (DECADRON) 10 mg/mL injection 10 mg  10 mg IntraVENous Q12H    [START ON 10/14/2021] dexamethasone (DECADRON) 10 mg/mL injection 5 mg  5 mg IntraVENous Q12H    baricitinib (OLUMIANT) tablet 4 mg  4 mg Oral DAILY    doxycycline (VIBRAMYCIN) capsule 100 mg  100 mg Oral Q12H    guaiFENesin ER (MUCINEX) tablet 1,200 mg  1,200 mg Oral BID    budesonide-formoteroL (SYMBICORT) 160-4.5 mcg/actuation HFA inhaler 2 Puff  2 Puff Inhalation BID RT       Problem List:  Hospital Problems as of 10/13/2021 Date Reviewed: 7/22/2021 Codes Class Noted - Resolved POA    S/P ICD (internal cardiac defibrillator) procedure ICD-10-CM: Z95.810  ICD-9-CM: V45.02  9/27/2011 - Present Yes    Overview Signed 8/2/2016  2:43 PM by Ezra Chambers     1. Dual chamber BSC ICD 9/26/11  2. ICD discharges 9/7/12 for sinus tachycardia. ICD adjusted. Chronic systolic congestive heart failure (HCC) ICD-10-CM: I50.22  ICD-9-CM: 428.22, 428.0  9/27/2011 - Present Yes    Overview Addendum 4/1/2018  9:40 PM by Melissa Baez MD     Unable to tolerate lisinopril due to hypotension. 1.  Echo (1/26/05):  EF 40% with anterior and apical severe hypokinesis to akinesis. 2.  Echo (4/5/11):  EF 25-30%. Mid anterior/anteroseptal/apical akinesis. Large protruding apical thrombus. Mild LA dilatation. No valvular regurgitation/stenosis. 3.  Echo (8/2/11):  EF 30-35%. Anterior/apical/anteroseptal akinesis. Resolution of apical thrombus. 4.  Echo (3/28/18):  EF 30-35%. Anteroapical akinesis. No significant valve disease. * (Principal) Acute hypoxemic respiratory failure due to COVID-19 [facility]) ICD-10-CM: U07.1, J96.01  ICD-9-CM: 518.81, 079.89, 799.02  10/9/2021 - Present Unknown        Class 1 obesity due to excess calories without serious comorbidity with body mass index (BMI) of 32.0 to 32.9 in adult ICD-10-CM: E66.09, Z68.32  ICD-9-CM: 278.00, V85.32  6/8/2017 - Present Yes    Overview Signed 6/8/2017  8:33 AM by Yani Presley, DO     Low carb diet discussed             Intracardiac thrombus ICD-10-CM: I51.3  ICD-9-CM: 410.90  10/2/2015 - Present Yes    Overview Addendum 10/12/2016  9:27 PM by Melissa Baez MD     Echo 4/5/11:  Large protruding apical thrombus. Resolved with anticoagulation. Indefinite anticoagulation planned.               Coronary atherosclerosis of native coronary vessel ICD-10-CM: I25.10  ICD-9-CM: 414.01  10/2/2015 - Present Yes    Overview Addendum 8/19/2020  8:42 PM by Melissa Baez, MD     1.   Anterior STEMI 1/25/05 (Late presentation)  a. Cath: LAD: 50% prox. 100% mid. D1: 95% prox. Ramus: 95% prox. Circ: Luminal irregularity. RCA: Luminal irregularity. b.  PCI:  T-stent of LAD/D1 with 2.25 X 18 Minivision in D1 and 2.25 X 23 Cypher in LAD. PCI of ramus with 3.0 X 18 mm Cypher. 2.  Exercise Cardiolite (8/18/11):  Large fixed anterior, septal and apical defect. No ischemia. EF 33%. 3.  Lexiscan Cardiolite (8/17/20):  Large fixed defect in mid/distal anterior and distal inferior wall. EF 27%. No reversible ischemia. Dyslipidemia ICD-10-CM: E78.5  ICD-9-CM: 272.4  10/2/2015 - Present Yes    Overview Addendum 10/18/2017  3:47 PM by Lawson Betancur MD     STATIN INTOLERANT. Unable to tolerate Zetia or Repatha.                        Signed By: Zay Armendariz MD   [facility]    October 13, 2021

## 2021-10-13 NOTE — PROGRESS NOTES
ACUTE PHYSICAL THERAPY GOALS:  (Developed with and agreed upon by patient and/or caregiver. )  LTG:  (1.)Mr. No Louie will move from supine to sit and sit to supine , scoot up and down and roll side to side in bed with INDEPENDENCE within 7 treatment day(s). (2.)Mr. No Louie will transfer from bed to chair and chair to bed with MODIFIED INDEPENDENCE using the least restrictive device within 7 treatment day(s). (3.)Mr. No Louie will ambulate with MODIFIED INDEPENDENCE for 100 feet with the least restrictive device within 7 treatment day(s).       PHYSICAL THERAPY: Daily Note and PM Treatment Day # 2    Tonia Lopez is a 64 y.o. male   PRIMARY DIAGNOSIS: Acute hypoxemic respiratory failure due to COVID-19 [facility])  Acute hypoxemic respiratory failure due to COVID-19 ([facility] [U07.1, J96.01]         ASSESSMENT:     REHAB RECOMMENDATIONS: CURRENT LEVEL OF FUNCTION:  (Most Recently Demonstrated)   Recommendation to date pending progress:  Setting:   No further skilled therapy   Equipment:    None Bed Mobility:   Not tested  Sit to Stand:   Supervision  Transfers:   Supervision  Gait/Mobility:   Supervision     ASSESSMENT:  Mr. No Louie is making great progress towards PT goals as noted by increased gait distance, activity tolerance and overall mobility. Patient is on O2 sats with sats maintaining above 90% throughout treatment. Will continue efforts. SUBJECTIVE:   Mr. No Louie states, \"I've lived there for 16 years. \"    SOCIAL HISTORY/ LIVING ENVIRONMENT: See initial evaluation  Home Environment: Private residence  # Steps to Enter: 3  One/Two Story Residence: One story  Living Alone: Yes  Support Systems: Parent(s), Other Family Member(s)  OBJECTIVE:     PAIN: VITAL SIGNS: LINES/DRAINS:   Pre Treatment: Pain Screen  Pain Scale 1: FLACC  Pain Intensity 1: 0  Post Treatment: 0   Continuous Pulse Oximetry  O2 Device: Heated, Hi flow nasal cannula     MOBILITY: I Mod I S SBA CGA Min Mod Max Total  NT x2 Comments: Bed Mobility    Rolling [] [] [] [] [] [] [] [] [] [] []    Supine to Sit [] [] [] [] [] [] [] [] [] [] []    Scooting [] [] [] [] [] [] [] [] [] [] []    Sit to Supine [] [] [] [] [] [] [] [] [] [] []    Transfers    Sit to Stand [] [] [x] [] [] [] [] [] [] [] []    Bed to Chair [] [] [x] [] [] [] [] [] [] [] []    Stand to Sit [] [] [x] [] [] [] [] [] [] [] []    I=Independent, Mod I=Modified Independent, S=Supervision, SBA=Standby Assistance, CGA=Contact Guard Assistance,   Min=Minimal Assistance, Mod=Moderate Assistance, Max=Maximal Assistance, Total=Total Assistance, NT=Not Tested    BALANCE: Good Fair+ Fair Fair- Poor NT Comments   Sitting Static [x] [] [] [] [] []    Sitting Dynamic [x] [] [] [] [] []              Standing Static [x] [] [] [] [] []    Standing Dynamic [x] [] [] [] [] []      GAIT: I Mod I S SBA CGA Min Mod Max Total  NT x2 Comments:   Level of Assistance [] [] [x] [] [] [] [] [] [] [] []    Distance Functional mobility around the room x 2 bouts    DME None    Gait Quality     Weightbearing  Status N/A     I=Independent, Mod I=Modified Independent, S=Supervision, SBA=Standby Assistance, CGA=Contact Guard Assistance,   Min=Minimal Assistance, Mod=Moderate Assistance, Max=Maximal Assistance, Total=Total Assistance, NT=Not Tested    PLAN:   FREQUENCY/DURATION: PT Plan of Care: 3 times/week for duration of hospital stay or until stated goals are met, whichever comes first.  TREATMENT:     TREATMENT:   ($$ Therapeutic Activity: 23-37 mins    )  Therapeutic Activity (23 Minutes): Therapeutic activity included Transfer Training, Ambulation on level ground, Sitting balance , Standing balance and LE exercises to improve functional Mobility, Strength and Activity tolerance.     TREATMENT GRID:  N/A    AFTER TREATMENT POSITION/PRECAUTIONS:  Chair, Needs within reach and RN notified    INTERDISCIPLINARY COLLABORATION:  RN/PCT and PT/PTA    TOTAL TREATMENT DURATION:  PT Patient Time In/Time Out  Time In: 1323  [facility]: [facility] [place], [place]

## 2021-10-14 LAB
ALBUMIN SERPL-MCNC: 2.5 G/DL (ref 3.2–4.6)
ALBUMIN/GLOB SERPL: 0.7 {RATIO} (ref 1.2–3.5)
ALP SERPL-CCNC: 56 U/L (ref 50–136)
ALT SERPL-CCNC: 32 U/L (ref 12–65)
ANION GAP SERPL CALC-SCNC: 7 MMOL/L (ref 7–16)
AST SERPL-CCNC: 31 U/L (ref 15–37)
BASOPHILS # BLD: 0.1 K/UL (ref 0–0.2)
BASOPHILS NFR BLD: 0 % (ref 0–2)
BILIRUB SERPL-MCNC: 0.5 MG/DL (ref 0.2–1.1)
BUN SERPL-MCNC: 23 MG/DL (ref 8–23)
CALCIUM SERPL-MCNC: 8.7 MG/DL (ref 8.3–10.4)
CHLORIDE SERPL-SCNC: 106 MMOL/L (ref 98–107)
CO2 SERPL-SCNC: 26 MMOL/L (ref 21–32)
CREAT SERPL-MCNC: 0.95 MG/DL (ref 0.8–1.5)
DIFFERENTIAL METHOD BLD: ABNORMAL
EOSINOPHIL # BLD: 0 K/UL (ref 0–0.8)
EOSINOPHIL NFR BLD: 0 % (ref 0.5–7.8)
ERYTHROCYTE [DISTWIDTH] IN BLOOD BY AUTOMATED COUNT: 14.7 % (ref 11.9–14.6)
GLOBULIN SER CALC-MCNC: 3.8 G/DL (ref 2.3–3.5)
GLUCOSE SERPL-MCNC: 94 MG/DL (ref 65–100)
HCT VFR BLD AUTO: 39.8 % (ref 41.1–50.3)
HGB BLD-MCNC: 12.7 G/DL (ref 13.6–17.2)
IMM GRANULOCYTES # BLD AUTO: 0.5 K/UL (ref 0–0.5)
IMM GRANULOCYTES NFR BLD AUTO: 2 % (ref 0–5)
INR PPP: 2.7
LYMPHOCYTES # BLD: 1.4 K/UL (ref 0.5–4.6)
LYMPHOCYTES NFR BLD: 6 % (ref 13–44)
MCH RBC QN AUTO: 28.9 PG (ref 26.1–32.9)
MCHC RBC AUTO-ENTMCNC: 31.9 G/DL (ref 31.4–35)
MCV RBC AUTO: 90.7 FL (ref 79.6–97.8)
MONOCYTES # BLD: 1.1 K/UL (ref 0.1–1.3)
MONOCYTES NFR BLD: 5 % (ref 4–12)
NEUTS SEG # BLD: 19.2 K/UL (ref 1.7–8.2)
NEUTS SEG NFR BLD: 86 % (ref 43–78)
NRBC # BLD: 0 K/UL (ref 0–0.2)
PLATELET # BLD AUTO: 382 K/UL (ref 150–450)
PMV BLD AUTO: 10.4 FL (ref 9.4–12.3)
POTASSIUM SERPL-SCNC: 4.5 MMOL/L (ref 3.5–5.1)
PROT SERPL-MCNC: 6.3 G/DL (ref 6.3–8.2)
PROTHROMBIN TIME: 28.9 SEC (ref 12.6–14.5)
RBC # BLD AUTO: 4.39 M/UL (ref 4.23–5.6)
SODIUM SERPL-SCNC: 139 MMOL/L (ref 136–145)
WBC # BLD AUTO: 22.3 K/UL (ref 4.3–11.1)

## 2021-10-14 PROCEDURE — 74011250636 HC RX REV CODE- 250/636: Performed by: FAMILY MEDICINE

## 2021-10-14 PROCEDURE — 36415 COLL VENOUS BLD VENIPUNCTURE: CPT

## 2021-10-14 PROCEDURE — 94762 N-INVAS EAR/PLS OXIMTRY CONT: CPT

## 2021-10-14 PROCEDURE — 74011250637 HC RX REV CODE- 250/637: Performed by: FAMILY MEDICINE

## 2021-10-14 PROCEDURE — 85610 PROTHROMBIN TIME: CPT

## 2021-10-14 PROCEDURE — 85025 COMPLETE CBC W/AUTO DIFF WBC: CPT

## 2021-10-14 PROCEDURE — 80053 COMPREHEN METABOLIC PANEL: CPT

## 2021-10-14 PROCEDURE — 77010033678 HC OXYGEN DAILY

## 2021-10-14 PROCEDURE — 74011000258 HC RX REV CODE- 258: Performed by: FAMILY MEDICINE

## 2021-10-14 PROCEDURE — 94640 AIRWAY INHALATION TREATMENT: CPT

## 2021-10-14 PROCEDURE — 65660000000 HC RM CCU STEPDOWN

## 2021-10-14 PROCEDURE — 97530 THERAPEUTIC ACTIVITIES: CPT

## 2021-10-14 RX ORDER — DEXAMETHASONE SODIUM PHOSPHATE 100 MG/10ML
6 INJECTION INTRAMUSCULAR; INTRAVENOUS EVERY 24 HOURS
Status: DISCONTINUED | OUTPATIENT
Start: 2021-10-15 | End: 2021-10-15 | Stop reason: HOSPADM

## 2021-10-14 RX ADMIN — BUDESONIDE AND FORMOTEROL FUMARATE DIHYDRATE 2 PUFF: 160; 4.5 AEROSOL RESPIRATORY (INHALATION) at 20:00

## 2021-10-14 RX ADMIN — Medication 10 ML: at 06:18

## 2021-10-14 RX ADMIN — BUDESONIDE AND FORMOTEROL FUMARATE DIHYDRATE 2 PUFF: 160; 4.5 AEROSOL RESPIRATORY (INHALATION) at 08:00

## 2021-10-14 RX ADMIN — DOXYCYCLINE HYCLATE 100 MG: 100 CAPSULE ORAL at 08:58

## 2021-10-14 RX ADMIN — QUETIAPINE FUMARATE 100 MG: 100 TABLET ORAL at 21:47

## 2021-10-14 RX ADMIN — GUAIFENESIN 1200 MG: 600 TABLET ORAL at 08:58

## 2021-10-14 RX ADMIN — DOXYCYCLINE HYCLATE 100 MG: 100 CAPSULE ORAL at 21:47

## 2021-10-14 RX ADMIN — Medication 10 ML: at 21:47

## 2021-10-14 RX ADMIN — Medication 10 ML: at 13:29

## 2021-10-14 RX ADMIN — BARICITINIB 4 MG: 2 TABLET, FILM COATED ORAL at 08:58

## 2021-10-14 RX ADMIN — GUAIFENESIN 1200 MG: 600 TABLET ORAL at 16:51

## 2021-10-14 RX ADMIN — Medication 250 MG: at 08:58

## 2021-10-14 RX ADMIN — PANTOPRAZOLE SODIUM 40 MG: 40 TABLET, DELAYED RELEASE ORAL at 06:18

## 2021-10-14 RX ADMIN — DEXAMETHASONE SODIUM PHOSPHATE 10 MG: 10 INJECTION INTRAMUSCULAR; INTRAVENOUS at 08:59

## 2021-10-14 RX ADMIN — CEFTRIAXONE 1 G: 1 INJECTION, POWDER, FOR SOLUTION INTRAMUSCULAR; INTRAVENOUS at 16:52

## 2021-10-14 RX ADMIN — CARVEDILOL 12.5 MG: 12.5 TABLET, FILM COATED ORAL at 08:58

## 2021-10-14 RX ADMIN — Medication 250 MG: at 16:51

## 2021-10-14 NOTE — PROGRESS NOTES
[facility] Hospitalist Progress Note     Name:  Andre Jaimes  Age:61 y.o. Sex:male   :  1960    MRN:  [id number]     Admit Date:  10/9/2021    Reason for Admission:  Acute hypoxemic respiratory failure due to COVID-19 [facility]) [U07.1, J96.01]    Assessment & Plan     -Acute hypoxic respiratory failure secondary to Covid pneumonia  Not a candidate for remdesivir as symptoms are greater than a week  Recently seen at [facility] on October 3, 2021, guarded patient was given a short course of steroid, Z-Xiang and Tessalon Perles  Patient presently on 10 L of oxygen nasal cannula. C-reactive protein 21.5. We will start on high-dose of dexamethasone with 10 mg twice daily for 5 days and then 5 mg twice daily for 5 more days. Discussed about tocilizumab and the baricitinib. Explained pros and cons, other side effect. Patient is willing to have those medications. We will start patient on baricitinib 4 mg p.o. daily for 14 days. Mucinex twice daily, incentive spirometry every 2 hourly, prone positioning 6/24 hrs  Also start on doxycycline. 10/10/2021  Initially on 15 L, later on changed to Airvo at 100% FiO2, flow at 60 L  10/11/2021  On 85% FiO2 Airvo  10/12/2021  On 80% FiO2  10/13/2021  On 70% FiO2  10/14/2021  Was on 45% FiO2 Airvo, later on today was weaned up to 3 L/min nasal cannula    -Mild MARBELLA creatinine of 1.42, GFR of 54  Gentle hydration normal saline at 75 cc/h, give a total of 500 cc. Held losartan  10/10/2021  MARBELLA resolved     -History of apical thrombus on Coumadin  Presently supratherapeutic INR greater than 9  Gave a dose of vitamin K 2.5 mg p.o. x1  10/10/2021  INR still supratherapeutic at 8.2.   No signs of bleeding  We will give a dose of vitamin K 2.5 mg p.o. x1.  10/11/2021  INR 3.9.     -Coronary disease  Continue aspirin     -Hypertension  Blood pressure low normal in the 79E systolic  Placed parameters for blood pressure medication  10/10/2021  Improving blood pressure     -Chronic systolic congestive heart failure with a EF of 30 to 35%  -History of status post pacemaker/defibrillator     -Psychiatric disorder  Continue Seroquel 100 mg at bedtime.           Diet: DIET ADULT  VTE ppx: Presently supratherapeutic INR  GI ppx: Protonix  CODE STATUS: Full Code        Diet:  DIET ADULT  DIET ADULT ORAL NUTRITION SUPPLEMENT  DVT PPx: Coumadin  GI Ppx: Protonix  Code: Full Code        Hospital Course/Subjective:   Marquis Ochoa is a 64 y.o. male with medical history of chronic systolic congestive heart failure with a EF of 30 to 35% follows with Dr. Ivelisse German, history of s/p ICD, coronary disease, hypertension, history of apical thrombus on Coumadin, statin intolerant, based on echo done in 2018 also had ascending aortic diameter of 3.68 cm  Patient will be admitted for acute hypoxic respiratory failure secondary to Covid pneumonia, mild MARBELLA and low normal blood pressures. Subjective/24 hr Events (10/14/21):  10/10/2021  Patient awake alert, says breathing okay, presently on 15 L of oxygen nasal cannula, later on today RT changed patient to Airvo at a flow at 60 L and FiO2 of 100%. 10/11/2021  Complaining of fatigue  Says breathing okay  Presently on Airvo 85% FiO2    10/12/2021  Says doing okay, breathing okay, presently on Airvo at 80%. Later on today patient as per nursing staff had a 9 beat of V. tach as per telemetry. Asymptomatic  Potassium and mag levels normal.    10/13/2021  Generalized weakness improving  On airvo 70% FiO2   Mild elevated WBC  Discussed about patient penicillin allergy, says he only had itching, no swelling. 10/14/2021  Says doing okay, improving generalized weakness  Breathing better  Was on 45% FiO2 Airvo, later on today was weaned up to 3 L/min nasal cannula    Review of Systems: 14 point review of systems is otherwise negative with the exception of the elements mentioned above.     Objective:     Patient Vitals for the past 24 hrs:   Temp Pulse Resp BP SpO2 10/14/21 1220     98 %   10/14/21 1159 97.6 °F (36.4 °C) 60 20 106/71 96 %   10/14/21 0800     94 %   10/14/21 0759 98 °F (36.7 °C) 69 18 111/76 93 %   10/14/21 0400  60      10/14/21 0308 98.1 °F (36.7 °C) 64 20 117/71 94 %   10/14/21 0000  67      10/13/21 2232 98.1 °F (36.7 °C) 67 20 117/78 95 %   10/13/21 2025     97 %   10/13/21 2011 98 °F (36.7 °C) 71 20 125/76 97 %   10/13/21 2000  60      10/13/21 1739     95 %   10/13/21 1600  67      10/13/21 1516 98.3 °F (36.8 °C) 67 20 108/69 99 %     Oxygen Therapy  O2 Sat (%): 98 % (10/14/21 1220)  Pulse via Oximetry: 71 beats per minute (10/14/21 1220)  O2 Device: Hi flow nasal cannula (10/14/21 1220)  O2 Flow Rate (L/min): 3 l/min (weaned 02) (10/14/21 1220)  O2 Temperature: 87.8 °F (31 °C) (10/13/21 1739)  FIO2 (%): 45 % (10/14/21 0634)    Body mass index is 30.63 kg/m². Physical Exam:   General:     alert, awake, mild respite distress, 45% FiO2 Airvo, later on today was weaned off to 3 L/min nasal cannula  HENT:   normocephalic, atraumatic. Oropharynx clear with moist mucous membranes and normal hard/soft palate  Eyes:   anicteric sclerae, moist conjunctiva, PERRL, EOMI  Neck:    supple, non-tender. Trachea midline. Lungs:   Bilateral coarse breath sounds   cardiac:   RRR, Normal S1 and S2. No S3, S4 or murmurs. Abdomen:   Soft, non distended, nontender, +BS, no guarding/rebound  Extremities:   Warm, dry. No edema , pedal pulses present, no digital cyanosis  Skin:   Warn, dry, normnal turgor and texture; no rash, ulcers or nodules appreciated  Neuro:   AAOx3.  No gross focal neurological deficit,  Cranial nerves II-XII grossly intact  Psychiatric:  No anxiety, calm, cooperative      Data Review:  I have reviewed all labs, meds, and studies from the last 24 hours:    Labs:  Recent Results (from the past 24 hour(s))   PROTHROMBIN TIME + INR    Collection Time: 10/14/21  7:47 AM   Result Value Ref Range    Prothrombin time 28.9 (H) 12.6 - 14.5 sec    INR 2.7     CBC WITH AUTOMATED DIFF    Collection Time: 10/14/21  7:47 AM   Result Value Ref Range    WBC 22.3 (H) 4.3 - 11.1 K/uL    RBC 4.39 4.23 - 5.6 M/uL    HGB 12.7 (L) 13.6 - 17.2 g/dL    HCT 39.8 (L) 41.1 - 50.3 %    MCV 90.7 79.6 - 97.8 FL    MCH 28.9 26.1 - 32.9 PG    MCHC 31.9 31.4 - 35.0 g/dL    RDW 14.7 (H) 11.9 - 14.6 %    PLATELET 917 029 - 192 K/uL    MPV 10.4 9.4 - 12.3 FL    ABSOLUTE NRBC 0.00 0.0 - 0.2 K/uL    DF AUTOMATED      NEUTROPHILS 86 (H) 43 - 78 %    LYMPHOCYTES 6 (L) 13 - 44 %    MONOCYTES 5 4.0 - 12.0 %    EOSINOPHILS 0 (L) 0.5 - 7.8 %    BASOPHILS 0 0.0 - 2.0 %    IMMATURE GRANULOCYTES 2 0.0 - 5.0 %    ABS. NEUTROPHILS 19.2 (H) 1.7 - 8.2 K/UL    ABS. LYMPHOCYTES 1.4 0.5 - 4.6 K/UL    ABS. MONOCYTES 1.1 0.1 - 1.3 K/UL    ABS. EOSINOPHILS 0.0 0.0 - 0.8 K/UL    ABS. BASOPHILS 0.1 0.0 - 0.2 K/UL    ABS. IMM. GRANS. 0.5 0.0 - 0.5 K/UL   METABOLIC PANEL, COMPREHENSIVE    Collection Time: 10/14/21  7:47 AM   Result Value Ref Range    Sodium 139 136 - 145 mmol/L    Potassium 4.5 3.5 - 5.1 mmol/L    Chloride 106 98 - 107 mmol/L    CO2 26 21 - 32 mmol/L    Anion gap 7 7 - 16 mmol/L    Glucose 94 65 - 100 mg/dL    BUN 23 8 - 23 MG/DL    Creatinine 0.95 0.8 - 1.5 MG/DL    GFR est AA >60 >60 ml/min/1.73m2    GFR est non-AA >60 >60 ml/min/1.73m2    Calcium 8.7 8.3 - 10.4 MG/DL    Bilirubin, total 0.5 0.2 - 1.1 MG/DL    ALT (SGPT) 32 12 - 65 U/L    AST (SGOT) 31 15 - 37 U/L    Alk.  phosphatase 56 50 - 136 U/L    Protein, total 6.3 6.3 - 8.2 g/dL    Albumin 2.5 (L) 3.2 - 4.6 g/dL    Globulin 3.8 (H) 2.3 - 3.5 g/dL    A-G Ratio 0.7 (L) 1.2 - 3.5         All Micro Results     None          EKG Results     Procedure 720 Value Units Date/Time    EKG, 12 LEAD, INITIAL [[id number]] Collected: 10/09/21 1005    Order Status: Completed Updated: 10/09/21 1033     Ventricular Rate 85 BPM      Atrial Rate 85 BPM      P-R Interval 154 ms      QRS Duration 90 ms      Q-T Interval 344 ms QTC Calculation (Bezet) 409 ms      Calculated P Axis 41 degrees      Calculated R Axis 51 degrees      Calculated T Axis 93 degrees      Diagnosis --     !! AGE AND GENDER SPECIFIC ECG ANALYSIS !! Sinus rhythm with sinus arrhythmia with occasional and consecutive Premature   ventricular complexes  Low voltage QRS  Anteroseptal infarct (cited on or before 04-APR-2011)  Abnormal ECG  When compared with ECG of 09-OCT-2021 10:00,  No significant change was found  Confirmed by Daniel Anders MD (), SONYA AGUILAR ([id number]) on 10/9/2021 10:33:44 AM            Other Studies:  No results found.     Current Meds:   Current Facility-Administered Medications   Medication Dose Route Frequency    cefTRIAXone (ROCEPHIN) 1 g in 0.9% sodium chloride (MBP/ADV) 50 mL MBP  1 g IntraVENous Q24H    Saccharomyces boulardii (FLORASTOR) capsule 250 mg  250 mg Oral BID    albuterol (PROVENTIL HFA, VENTOLIN HFA, PROAIR HFA) inhaler 2 Puff  2 Puff Inhalation Q4H PRN    benzonatate (TESSALON) capsule 100 mg  100 mg Oral TID PRN    carvediloL (COREG) tablet 12.5 mg  12.5 mg Oral DAILY    QUEtiapine (SEROquel) tablet 100 mg  100 mg Oral QHS    sodium chloride (NS) flush 5-40 mL  5-40 mL IntraVENous Q8H    sodium chloride (NS) flush 5-40 mL  5-40 mL IntraVENous PRN    acetaminophen (TYLENOL) tablet 650 mg  650 mg Oral Q6H PRN    Or    acetaminophen (TYLENOL) suppository 650 mg  650 mg Rectal Q6H PRN    polyethylene glycol (MIRALAX) packet 17 g  17 g Oral DAILY PRN    ondansetron (ZOFRAN ODT) tablet 4 mg  4 mg Oral Q8H PRN    Or    ondansetron (ZOFRAN) injection 4 mg  4 mg IntraVENous Q6H PRN    pantoprazole (PROTONIX) tablet 40 mg  40 mg Oral ACB    dexamethasone (DECADRON) 10 mg/mL injection 5 mg  5 mg IntraVENous Q12H    baricitinib (OLUMIANT) tablet 4 mg  4 mg Oral DAILY    doxycycline (VIBRAMYCIN) capsule 100 mg  100 mg Oral Q12H    guaiFENesin ER (MUCINEX) tablet 1,200 mg  1,200 mg Oral BID    budesonide-formoteroL (SYMBICORT) 160-4.5 mcg/actuation HFA inhaler 2 Puff  2 Puff Inhalation BID RT       Problem List:  Hospital Problems as of 10/14/2021 Date Reviewed: 7/22/2021        Codes Class Noted - Resolved POA    S/P ICD (internal cardiac defibrillator) procedure ICD-10-CM: Z95.810  ICD-9-CM: V45.02  9/27/2011 - Present Yes    Overview Signed 8/2/2016  2:43 PM by Mar Mendez     1. Dual chamber BSC ICD 9/26/11  2. ICD discharges 9/7/12 for sinus tachycardia. ICD adjusted. Chronic systolic congestive heart failure (HCC) ICD-10-CM: I50.22  ICD-9-CM: 428.22, 428.0  9/27/2011 - Present Yes    Overview Addendum 4/1/2018  9:40 PM by Clinton Goodman MD     Unable to tolerate lisinopril due to hypotension. 1.  Echo (1/26/05):  EF 40% with anterior and apical severe hypokinesis to akinesis. 2.  Echo (4/5/11):  EF 25-30%. Mid anterior/anteroseptal/apical akinesis. Large protruding apical thrombus. Mild LA dilatation. No valvular regurgitation/stenosis. 3.  Echo (8/2/11):  EF 30-35%. Anterior/apical/anteroseptal akinesis. Resolution of apical thrombus. 4.  Echo (3/28/18):  EF 30-35%. Anteroapical akinesis. No significant valve disease. * (Principal) Acute hypoxemic respiratory failure due to COVID-19 [facility]) ICD-10-CM: U07.1, J96.01  ICD-9-CM: 518.81, 079.89, 799.02  10/9/2021 - Present Unknown        Class 1 obesity due to excess calories without serious comorbidity with body mass index (BMI) of 32.0 to 32.9 in adult ICD-10-CM: E66.09, Z68.32  ICD-9-CM: 278.00, V85.32  6/8/2017 - Present Yes    Overview Signed 6/8/2017  8:33 AM by Arielle Gregorio, DO     Low carb diet discussed             Intracardiac thrombus ICD-10-CM: I51.3  ICD-9-CM: 410.90  10/2/2015 - Present Yes    Overview Addendum 10/12/2016  9:27 PM by Clinton Goodman MD     Echo 4/5/11:  Large protruding apical thrombus. Resolved with anticoagulation. Indefinite anticoagulation planned.               Coronary atherosclerosis of native coronary vessel ICD-10-CM: I25.10  ICD-9-CM: 414.01  10/2/2015 - Present Yes    Overview Addendum 8/19/2020  8:42 PM by Gucci Reeves MD     1. Anterior STEMI 1/25/05 (Late presentation)  a. Cath: LAD: 50% prox. 100% mid. D1: 95% prox. Ramus: 95% prox. Circ: Luminal irregularity. RCA: Luminal irregularity. b.  PCI:  T-stent of LAD/D1 with 2.25 X 18 Minivision in D1 and 2.25 X 23 Cypher in LAD. PCI of ramus with 3.0 X 18 mm Cypher. 2.  Exercise Cardiolite (8/18/11):  Large fixed anterior, septal and apical defect. No ischemia. EF 33%. 3.  Lexiscan Cardiolite (8/17/20):  Large fixed defect in mid/distal anterior and distal inferior wall. EF 27%. No reversible ischemia. Dyslipidemia ICD-10-CM: E78.5  ICD-9-CM: 272.4  10/2/2015 - Present Yes    Overview Addendum 10/18/2017  3:47 PM by Gucci Reeves MD     STATIN INTOLERANT. Unable to tolerate Zetia or Repatha.                        Signed By: Jair López MD   [facility] Hospitalist Service    October 14, 2021

## 2021-10-14 NOTE — ACP (ADVANCE CARE PLANNING)
Advance Care Planning   Advance Care Planning Inpatient Note  [facility] Care Department    Today's Date: 10/14/2021  Unit: SFD 8 MED SURG    Received request from IDT member. Upon review of chart and communication with care team, patient's decision making abilities are not in question. Patient was present in the room while I spoke to him on the phone to assist him with completing the [address]. Goals of ACP Conversation:  Discuss Advance Care planning documents    Health Care Decision Makers:      Primary Decision Maker: Stephen Huntley - Daughter - [phone]    Secondary Decision Maker: Bill Buck - Child - [phone]      Summary:  Completed New Documents    Advance Care Planning Documents (Patient Wishes) on file:  Healthcare Power of /Advance Directive appointment of Health care agent     Assessment:    Mr. Maire Harada named his daughter as the primary agent and his son as the first alternate.      Interventions:  Provided education on documents for clarity and greater understanding  Assisted in the completion of documents according to patient's wishes at this time    Care Preferences Communicated:  No    Outcomes/Plan:  New Advance Directive completed  Returned original document(s) to patient, as well as copies for distribution to appointed agents  Copy of Advance Directive given to staff to scan into medical record    Ana María Webster, [address] on 10/14/2021 at 11:56 AM

## 2021-10-14 NOTE — PROGRESS NOTES
Pt in bed resting on Airvo 45/45, in no apparent distress, call light  In reach, SBAR given to [facility].

## 2021-10-14 NOTE — PROGRESS NOTES
Patient alert and oriented, lying in bed with HFNC. No s/s of distress noted. No questions or concerns at this time.

## 2021-10-14 NOTE — PROGRESS NOTES
ACUTE PHYSICAL THERAPY GOALS:  (Developed with and agreed upon by patient and/or caregiver. )  LTG:  (1.)Mr. Dov Roque will move from supine to sit and sit to supine , scoot up and down and roll side to side in bed with INDEPENDENCE within 7 treatment day(s). (2.)Mr. Dov Roque will transfer from bed to chair and chair to bed with MODIFIED INDEPENDENCE using the least restrictive device within 7 treatment day(s). (3.)Mr. Dov Roque will ambulate with MODIFIED INDEPENDENCE for 100 feet with the least restrictive device within 7 treatment day(s).       PHYSICAL THERAPY: Daily Note and PM Treatment Day # 3    Edgard Ramires is a 64 y.o. male   PRIMARY DIAGNOSIS: Acute hypoxemic respiratory failure due to COVID-19 [facility])  Acute hypoxemic respiratory failure due to COVID-19 [facility] [U07.1, J96.01]         ASSESSMENT:     REHAB RECOMMENDATIONS: CURRENT LEVEL OF FUNCTION:  (Most Recently Demonstrated)   Recommendation to date pending progress:  Setting:   No further skilled therapy   Equipment:    None Bed Mobility:   Not tested  Sit to Stand:   Independent  Transfers:   Independent  Gait/Mobility:   Independent     ASSESSMENT:  Mr. Dov Roque is making great progress towards PT goals as noted by increased gait distance, activity tolerance and overall mobility. Patient is on 3L O2 with sats maintaining above 90% throughout treatment. Patient is up in his room ad miguel without difficulties. Will discharge patient from PT at this time as he is independent. Patient agrees to plan. Please reorder PT in the future if patient would benefit from our services.      SUBJECTIVE:   Mr. Dov Roque states, \"I have a side by side\"    SOCIAL HISTORY/ LIVING ENVIRONMENT: See initial evaluation  Home Environment: Private residence  # Steps to Enter: 3  One/Two Story Residence: One story  Living Alone: Yes  Support Systems: Parent(s), Other Family Member(s)  OBJECTIVE:     PAIN: VITAL SIGNS: LINES/DRAINS:   Pre Treatment: Pain Screen  Pain Scale 1: FLACC  Pain Intensity 1: 0  Post Treatment: 0   Continuous Pulse Oximetry  O2 Device: Hi flow nasal cannula     MOBILITY: I Mod I S SBA CGA Min Mod Max Total  NT x2 Comments:   Bed Mobility    Rolling [] [] [] [] [] [] [] [] [] [] []    Supine to Sit [] [] [] [] [] [] [] [] [] [] []    Scooting [] [] [] [] [] [] [] [] [] [] []    Sit to Supine [] [] [] [] [] [] [] [] [] [] []    Transfers    Sit to Stand [x] [] [] [] [] [] [] [] [] [] []    Bed to Chair [] [] [] [] [] [] [] [] [] [] []    Stand to Sit [x] [] [] [] [] [] [] [] [] [] []    I=Independent, Mod I=Modified Independent, S=Supervision, SBA=Standby Assistance, CGA=Contact Guard Assistance,   Min=Minimal Assistance, Mod=Moderate Assistance, Max=Maximal Assistance, Total=Total Assistance, NT=Not Tested    BALANCE: Good Fair+ Fair Fair- Poor NT Comments   Sitting Static [x] [] [] [] [] []    Sitting Dynamic [x] [] [] [] [] []              Standing Static [x] [] [] [] [] []    Standing Dynamic [x] [] [] [] [] []      GAIT: I Mod I S SBA CGA Min Mod Max Total  NT x2 Comments:   Level of Assistance [x] [] [] [] [] [] [] [] [] [] []    Distance Amb in room x 20 minutes    DME None    Gait Quality     Weightbearing  Status N/A     I=Independent, Mod I=Modified Independent, S=Supervision, SBA=Standby Assistance, CGA=Contact Guard Assistance,   Min=Minimal Assistance, Mod=Moderate Assistance, Max=Maximal Assistance, Total=Total Assistance, NT=Not Tested    PLAN:   FREQUENCY/DURATION: PT Plan of Care: 3 times/week for duration of hospital stay or until stated goals are met, whichever comes first.  TREATMENT:     TREATMENT:   ($$ Therapeutic Activity: 23-37 mins    )  Therapeutic Activity (24 Minutes):  Therapeutic activity included Transfer Training, Ambulation on level ground, Sitting balance , Standing balance and review of pursed lipped breathing, activity pacing, energy conservation techniques to improve functional Mobility, Strength and Activity tolerance.     TREATMENT GRID:  N/A    AFTER TREATMENT POSITION/PRECAUTIONS:  Chair, Needs within reach and RN notified    INTERDISCIPLINARY COLLABORATION:  RN/PCT and PT/PTA    TOTAL TREATMENT DURATION:  PT Patient Time In/Time Out  Time In: 1311  Time Out: 178 Norma Mejia PTA

## 2021-10-14 NOTE — PROGRESS NOTES
Advance Care Planning   Advance Care Planning Inpatient Note  [facility] Care Department    Today's Date: 10/14/2021  Unit: SFD 8 MED SURG    Received request from IDT member. Upon review of chart and communication with care team, patient's decision making abilities are not in question. Patient was present in the room while I spoke to him on the phone to assist him with completing the [address]. Goals of ACP Conversation:  Discuss Advance Care planning documents    Health Care Decision Makers:      Primary Decision Maker: Maryann Rouse - Daughter - [phone]    Secondary Decision Maker: Salud Hicks - Child - [phone]      Summary:  Completed New Documents    Advance Care Planning Documents (Patient Wishes) on file:  Healthcare Power of /Advance Directive appointment of Health care agent     Assessment:    Mr. Candice Lyle named his daughter as the primary agent and his son as the first alternate.      Interventions:  Provided education on documents for clarity and greater understanding  Assisted in the completion of documents according to patient's wishes at this time    Care Preferences Communicated:  No    Outcomes/Plan:  New Advance Directive completed  Returned original document(s) to patient, as well as copies for distribution to appointed agents  Copy of Advance Directive given to staff to scan into medical record    [facility] on 10/14/2021 at 11:56 AM

## 2021-10-15 VITALS
OXYGEN SATURATION: 94 % | HEART RATE: 70 BPM | WEIGHT: 209.44 LBS | TEMPERATURE: 98.1 F | HEIGHT: 69 IN | BODY MASS INDEX: 31.02 KG/M2 | SYSTOLIC BLOOD PRESSURE: 115 MMHG | RESPIRATION RATE: 20 BRPM | DIASTOLIC BLOOD PRESSURE: 81 MMHG

## 2021-10-15 LAB
ALBUMIN SERPL-MCNC: 2.3 G/DL (ref 3.2–4.6)
ALBUMIN/GLOB SERPL: 0.6 {RATIO} (ref 1.2–3.5)
ALP SERPL-CCNC: 52 U/L (ref 50–136)
ALT SERPL-CCNC: 35 U/L (ref 12–65)
ANION GAP SERPL CALC-SCNC: 7 MMOL/L (ref 7–16)
AST SERPL-CCNC: 28 U/L (ref 15–37)
BASOPHILS # BLD: 0 K/UL (ref 0–0.2)
BASOPHILS NFR BLD: 0 % (ref 0–2)
BILIRUB SERPL-MCNC: 0.6 MG/DL (ref 0.2–1.1)
BUN SERPL-MCNC: 25 MG/DL (ref 8–23)
CALCIUM SERPL-MCNC: 8.8 MG/DL (ref 8.3–10.4)
CHLORIDE SERPL-SCNC: 108 MMOL/L (ref 98–107)
CO2 SERPL-SCNC: 25 MMOL/L (ref 21–32)
CREAT SERPL-MCNC: 0.9 MG/DL (ref 0.8–1.5)
DIFFERENTIAL METHOD BLD: ABNORMAL
EOSINOPHIL # BLD: 0 K/UL (ref 0–0.8)
EOSINOPHIL NFR BLD: 0 % (ref 0.5–7.8)
ERYTHROCYTE [DISTWIDTH] IN BLOOD BY AUTOMATED COUNT: 14.6 % (ref 11.9–14.6)
GLOBULIN SER CALC-MCNC: 3.6 G/DL (ref 2.3–3.5)
GLUCOSE SERPL-MCNC: 82 MG/DL (ref 65–100)
HCT VFR BLD AUTO: 38.4 % (ref 41.1–50.3)
HGB BLD-MCNC: 12.5 G/DL (ref 13.6–17.2)
IMM GRANULOCYTES # BLD AUTO: 0.6 K/UL (ref 0–0.5)
IMM GRANULOCYTES NFR BLD AUTO: 3 % (ref 0–5)
INR PPP: 2
LYMPHOCYTES # BLD: 1.7 K/UL (ref 0.5–4.6)
LYMPHOCYTES NFR BLD: 8 % (ref 13–44)
MCH RBC QN AUTO: 28.7 PG (ref 26.1–32.9)
MCHC RBC AUTO-ENTMCNC: 32.6 G/DL (ref 31.4–35)
MCV RBC AUTO: 88.3 FL (ref 79.6–97.8)
MONOCYTES # BLD: 1.2 K/UL (ref 0.1–1.3)
MONOCYTES NFR BLD: 6 % (ref 4–12)
NEUTS SEG # BLD: 16.6 K/UL (ref 1.7–8.2)
NEUTS SEG NFR BLD: 83 % (ref 43–78)
NRBC # BLD: 0 K/UL (ref 0–0.2)
PLATELET # BLD AUTO: 377 K/UL (ref 150–450)
PMV BLD AUTO: 10.2 FL (ref 9.4–12.3)
POTASSIUM SERPL-SCNC: 4.2 MMOL/L (ref 3.5–5.1)
PROT SERPL-MCNC: 5.9 G/DL (ref 6.3–8.2)
PROTHROMBIN TIME: 23.2 SEC (ref 12.6–14.5)
RBC # BLD AUTO: 4.35 M/UL (ref 4.23–5.6)
SODIUM SERPL-SCNC: 140 MMOL/L (ref 136–145)
WBC # BLD AUTO: 20.1 K/UL (ref 4.3–11.1)

## 2021-10-15 PROCEDURE — 94762 N-INVAS EAR/PLS OXIMTRY CONT: CPT

## 2021-10-15 PROCEDURE — 77010033678 HC OXYGEN DAILY

## 2021-10-15 PROCEDURE — 94761 N-INVAS EAR/PLS OXIMETRY MLT: CPT

## 2021-10-15 PROCEDURE — 74011250637 HC RX REV CODE- 250/637: Performed by: FAMILY MEDICINE

## 2021-10-15 PROCEDURE — 36415 COLL VENOUS BLD VENIPUNCTURE: CPT

## 2021-10-15 PROCEDURE — 97535 SELF CARE MNGMENT TRAINING: CPT

## 2021-10-15 PROCEDURE — 74011250636 HC RX REV CODE- 250/636: Performed by: FAMILY MEDICINE

## 2021-10-15 PROCEDURE — 85025 COMPLETE CBC W/AUTO DIFF WBC: CPT

## 2021-10-15 PROCEDURE — 80053 COMPREHEN METABOLIC PANEL: CPT

## 2021-10-15 PROCEDURE — 94640 AIRWAY INHALATION TREATMENT: CPT

## 2021-10-15 PROCEDURE — 85610 PROTHROMBIN TIME: CPT

## 2021-10-15 RX ORDER — PANTOPRAZOLE SODIUM 40 MG/1
40 TABLET, DELAYED RELEASE ORAL
Qty: 4 TABLET | Refills: 0 | Status: SHIPPED | OUTPATIENT
Start: 2021-10-16 | End: 2021-10-20

## 2021-10-15 RX ORDER — ALBUTEROL SULFATE 90 UG/1
2 AEROSOL, METERED RESPIRATORY (INHALATION)
Qty: 18 G | Refills: 0 | Status: SHIPPED | OUTPATIENT
Start: 2021-10-15

## 2021-10-15 RX ORDER — DEXAMETHASONE 2 MG/1
6 TABLET ORAL
Qty: 12 TABLET | Refills: 0 | Status: SHIPPED | OUTPATIENT
Start: 2021-10-15 | End: 2021-10-19

## 2021-10-15 RX ADMIN — BUDESONIDE AND FORMOTEROL FUMARATE DIHYDRATE 2 PUFF: 160; 4.5 AEROSOL RESPIRATORY (INHALATION) at 07:26

## 2021-10-15 RX ADMIN — DEXAMETHASONE SODIUM PHOSPHATE 6 MG: 10 INJECTION INTRAMUSCULAR; INTRAVENOUS at 06:00

## 2021-10-15 RX ADMIN — CARVEDILOL 12.5 MG: 12.5 TABLET, FILM COATED ORAL at 09:51

## 2021-10-15 RX ADMIN — Medication 10 ML: at 05:59

## 2021-10-15 RX ADMIN — PANTOPRAZOLE SODIUM 40 MG: 40 TABLET, DELAYED RELEASE ORAL at 06:00

## 2021-10-15 RX ADMIN — DOXYCYCLINE HYCLATE 100 MG: 100 CAPSULE ORAL at 09:51

## 2021-10-15 RX ADMIN — Medication 250 MG: at 09:51

## 2021-10-15 RX ADMIN — GUAIFENESIN 1200 MG: 600 TABLET ORAL at 09:51

## 2021-10-15 RX ADMIN — BARICITINIB 4 MG: 2 TABLET, FILM COATED ORAL at 09:51

## 2021-10-15 NOTE — PROGRESS NOTES
Oxygen Qualifier       Room air: SpO2 with O2 and liter flow   Resting SpO2  94%     Ambulating SpO2  93%        At rest 92% on room air. Completed by:     Royce Nieto

## 2021-10-15 NOTE — PROGRESS NOTES
Removed peripheral IV. Removed heart monitor and continuous pulse oximetry. Reviewed discharge summary. Provided opportunity for questions and discussed answers. Instructed to call the nurses station when his ride arrived.

## 2021-10-15 NOTE — DISCHARGE SUMMARY
Hospitalist Discharge Summary     Admit Date:  10/9/2021  9:50 AM   Name:  Danay Humphreys   Age:  64 y.o.  :  1960   MRN:  [id number]   PCP:  Isha Clement DO  Treatment Team: Attending Provider: Cedric Mcgregor MD; Utilization Review: Jana Barreto; Care Manager: Marilyn Sears RN; Occupational Therapist: Columba Mendes OT    Problem List for this Hospitalization:  Hospital Problems as of 10/15/2021 Date Reviewed: 2021        Codes Class Noted - Resolved POA    S/P ICD (internal cardiac defibrillator) procedure ICD-10-CM: Z95.810  ICD-9-CM: V45.02  2011 - Present Yes    Overview Signed 2016  2:43 PM by Mary Mcnally     1. Dual chamber BSC ICD 11  2. ICD discharges 12 for sinus tachycardia. ICD adjusted. Chronic systolic congestive heart failure (HCC) ICD-10-CM: I50.22  ICD-9-CM: 428.22, 428.0  2011 - Present Yes    Overview Addendum 2018  9:40 PM by Lennox Morse MD     Unable to tolerate lisinopril due to hypotension. 1.  Echo (05):  EF 40% with anterior and apical severe hypokinesis to akinesis. 2.  Echo (11):  EF 25-30%. Mid anterior/anteroseptal/apical akinesis. Large protruding apical thrombus. Mild LA dilatation. No valvular regurgitation/stenosis. 3.  Echo (11):  EF 30-35%. Anterior/apical/anteroseptal akinesis. Resolution of apical thrombus. 4.  Echo (3/28/18):  EF 30-35%. Anteroapical akinesis. No significant valve disease.               * (Principal) Acute hypoxemic respiratory failure due to COVID-19 [facility]) ICD-10-CM: U07.1, J96.01  ICD-9-CM: 518.81, 079.89, 799.02  10/9/2021 - Present Unknown        Class 1 obesity due to excess calories without serious comorbidity with body mass index (BMI) of 32.0 to 32.9 in adult ICD-10-CM: E66.09, Z68.32  ICD-9-CM: 278.00, V85.32  2017 - Present Yes    Overview Signed 2017  8:33 AM by Isha Clement DO     Low carb diet discussed             Intracardiac thrombus ICD-10-CM: I51.3  ICD-9-CM: 410.90  10/2/2015 - Present Yes    Overview Addendum 10/12/2016  9:27 PM by Belvie Goldberg, MD     Echo 4/5/11:  Large protruding apical thrombus. Resolved with anticoagulation. Indefinite anticoagulation planned. Coronary atherosclerosis of native coronary vessel ICD-10-CM: I25.10  ICD-9-CM: 414.01  10/2/2015 - Present Yes    Overview Addendum 8/19/2020  8:42 PM by Belvie Goldberg, MD     1. Anterior STEMI 1/25/05 (Late presentation)  a. Cath: LAD: 50% prox. 100% mid. D1: 95% prox. Ramus: 95% prox. Circ: Luminal irregularity. RCA: Luminal irregularity. b.  PCI:  T-stent of LAD/D1 with 2.25 X 18 Minivision in D1 and 2.25 X 23 Cypher in LAD. PCI of ramus with 3.0 X 18 mm Cypher. 2.  Exercise Cardiolite (8/18/11):  Large fixed anterior, septal and apical defect. No ischemia. EF 33%. 3.  Lexiscan Cardiolite (8/17/20):  Large fixed defect in mid/distal anterior and distal inferior wall. EF 27%. No reversible ischemia. Dyslipidemia ICD-10-CM: E78.5  ICD-9-CM: 272.4  10/2/2015 - Present Yes    Overview Addendum 10/18/2017  3:47 PM by Belvie Goldberg, MD     STATIN INTOLERANT. Unable to tolerate Zetia or Repatha.                       Admission HPI from 10/9/2021:    Sarah Vazquez is a 64 y.o. male with medical history of chronic systolic congestive heart failure with a EF of 30 to 35% follows with Dr. Mckenzie Coppola, history of s/p ICD, coronary disease, hypertension, history of apical thrombus on Coumadin, statin intolerant, based on echo done in 2018 also had ascending aortic diameter of 3.68 cm.     Patient was complaining of generalized weakness and fatigue 2 weeks ago, was seen at [facility] on October 3, 2021, found to be Covid positive hypoxic, was sent home on oxygen 2 L/min, short course of steroid, Z-Xiang and as needed Tessalon Perles.     Patient says he has been noting at times his oxygen saturation dropping to 88 past 4 to 5 days, since past 2 days started having cough harsher with shortness of breath-mild to moderate, this morning noticed his oxygen saturation was low of 80%. Brought to emergency room for further evaluation.     Patient otherwise does not complain of any headache or dizziness or chest pain or nausea vomiting abdominal pain or any unilateral weakness.     GFR of 54, C-reactive protein of 21.5, ALT of 50, AST of 87, INR greater than 9. Chest x-ray-  IMPRESSION  1. Bilateral infiltrates compatible with viral pneumonia. 2. ICD in place. No pneumothorax.     Blood pressure is in 89R systolic. Presently on 10 L of oxygen nasal cannula.        Patient will be admitted for acute hypoxic respiratory failure secondary to Covid pneumonia, mild MARBELLA and low normal blood pressures. Hospital Course:    Patient admitted for acute hypoxic respiratory failure secondary to Covid pneumonia. Initial presentation required 10 L/min in ER. Patient was continued on high-dose steroids initially started on Decadron 10 mg twice daily, discussed about baricitinib, was okay to be on baricitinib, continued on baricitinib. During the stay pt at one point required Airvo FiO2 of 100%. On 10/15/2021 patient was weaned off of oxygen. Oxygen Qualifier          Room air: SpO2 with O2 and liter flow   Resting SpO2  94%     Ambulating SpO2  93%       Patient MARBELLA resolved. Blood pressure stable, patient losartan held during the stay. Patient does have leukocytosis, discharge WBC was 20.1. Leukocytosis improving on decreasing steroid dose,  leukocytosis secondary to high-dose steroids. Patient did get Rocephin and doxycycline during the stay. Patient also came with supratherapeutic INR, during the stay did get in total 5 mg of vitamin K. Discharge INR of 2. Patient is clinically stable for discharge . Discharge instructions was discussed with the patient. Follow up instructions below.   Plan was discussed with pt and daughter. All questions answered. Patient was stable at time of discharge and was instructed to call or return if there are any concerns or recurrence of symptoms. Diagnostic Imaging/Tests:   XR CHEST PORT    Result Date: 10/9/2021  Chest portable CLINICAL INDICATION: Acute hypoxia, shortness of breath, Covid-19 positive COMPARISON: 9/29/2011 TECHNIQUE: single AP portable view chest at 10:20 AM upright FINDINGS: Stable mediastinal and hilar contours given technique, positioning. Left side pacemaker/ICD remains in place. Some artifact due to external monitoring wires. Shallow lung volumes leading to some crowding. No pneumothorax, definite pleural effusion, or dense consolidation. Mild to moderate reticular nodular infiltrates bilaterally, mostly in the perihilar to lower lungs, left worse than right. 1. Bilateral infiltrates compatible with viral pneumonia. 2. ICD in place. No pneumothorax. Echocardiogram results:  No results found for this visit on 10/09/21.       All Micro Results     None          Labs: Results:       BMP, Mg, Phos Recent Labs     10/15/21  0737 10/14/21  0747 10/13/21  0807    139 139   K 4.2 4.5 4.5   * 106 107   CO2 25 26 26   AGAP 7 7 6*   BUN 25* 23 21   CREA 0.90 0.95 0.82   CA 8.8 8.7 8.7   GLU 82 94 90      CBC Recent Labs     10/15/21  0737 10/14/21  0747 10/13/21  0807   WBC 20.1* 22.3* 20.7*   RBC 4.35 4.39 4.38   HGB 12.5* 12.7* 12.6*   HCT 38.4* 39.8* 38.7*    382 405   GRANS 83* 86* 84*   LYMPH 8* 6* 6*   EOS 0* 0* 0*   MONOS 6 5 6   BASOS 0 0 0   IG 3 2 3   ANEU 16.6* 19.2* 17.5*   ABL 1.7 1.4 1.3   DAVID 0.0 0.0 0.0   ABM 1.2 1.1 1.2   ABB 0.0 0.1 0.1   AIG 0.6* 0.5 0.7*      LFT Recent Labs     10/15/21  0737 10/14/21  0747 10/13/21  0807   ALT 35 32 27   AP 52 56 60   TP 5.9* 6.3 6.3   ALB 2.3* 2.5* 2.4*   GLOB 3.6* 3.8* 3.9*   AGRAT 0.6* 0.7* 0.6*      Cardiac Testing No results found for: BNPP, BNP, CPK, RCK1, RCK2, RCK3, RCK4, CKMB, CKNDX, CKND1, TROPT, TROIQ   Coagulation Tests Lab Results   Component Value Date/Time    Prothrombin time 23.2 (H) 10/15/2021 07:37 AM    Prothrombin time 28.9 (H) 10/14/2021 07:47 AM    Prothrombin time 37.2 (H) 10/13/2021 08:07 AM    INR 2.0 10/15/2021 07:37 AM    INR 2.7 10/14/2021 07:47 AM    INR 3.8 10/13/2021 08:07 AM    aPTT 115.3 (H) 10/09/2021 01:55 PM    aPTT  10/09/2021 11:51 AM     CORRECTION TO MEDICAL RECORD-DISREGARD THESE TEST RESULTS    aPTT 104.4 (H) 10/09/2021 10:00 AM      A1c Lab Results   Component Value Date/Time    Hemoglobin A1c 5.8 (H) 06/14/2021 10:23 AM    Hemoglobin A1c 5.7 (H) 06/18/2020 12:42 PM    Hemoglobin A1c 5.7 (H) 06/12/2018 12:29 PM      Lipid Panel Lab Results   Component Value Date/Time    Cholesterol, total 241 (H) 06/14/2021 10:23 AM    HDL Cholesterol 27 (L) 06/14/2021 10:23 AM    LDL, calculated 169 (H) 06/14/2021 10:23 AM    LDL, calculated 151 (H) 06/18/2020 12:42 PM    VLDL, calculated 45 (H) 06/14/2021 10:23 AM    VLDL, calculated 47 (H) 06/18/2020 12:42 PM    Triglyceride 239 (H) 06/14/2021 10:23 AM    CHOL/HDL Ratio 11.1 04/20/2017 10:15 AM      Thyroid Panel Lab Results   Component Value Date/Time    TSH 2.820 06/18/2020 12:42 PM    TSH 1.840 08/14/2014 10:45 AM        Most Recent UA No results found for: COLOR, APPRN, REFSG, JAMILAH, PROTU, GLUCU, KETU, BILU, BLDU, UROU, KEYON, LEUKU     Allergies   Allergen Reactions    Pcn [Penicillins] Swelling     Face swelling    Crestor [Rosuvastatin] Myalgia     itching    Lipitor [Atorvastatin] Myalgia     itching    Lisinopril Unknown (comments)    Repatha Pushtronex [Evolocumab] Myalgia     Itching     Immunization History   Administered Date(s) Administered    Pneumococcal Polysaccharide (PPSV-23) 06/12/2018    Tdap 06/07/2017       All Labs from Last 24 Hrs:  Recent Results (from the past 24 hour(s))   PROTHROMBIN TIME + INR    Collection Time: 10/15/21  7:37 AM   Result Value Ref Range    Prothrombin time 23.2 (H) 12.6 - 14.5 sec    INR 2.0     CBC WITH AUTOMATED DIFF    Collection Time: 10/15/21  7:37 AM   Result Value Ref Range    WBC 20.1 (H) 4.3 - 11.1 K/uL    RBC 4.35 4.23 - 5.6 M/uL    HGB 12.5 (L) 13.6 - 17.2 g/dL    HCT 38.4 (L) 41.1 - 50.3 %    MCV 88.3 79.6 - 97.8 FL    MCH 28.7 26.1 - 32.9 PG    MCHC 32.6 31.4 - 35.0 g/dL    RDW 14.6 11.9 - 14.6 %    PLATELET 061 452 - 248 K/uL    MPV 10.2 9.4 - 12.3 FL    ABSOLUTE NRBC 0.00 0.0 - 0.2 K/uL    DF AUTOMATED      NEUTROPHILS 83 (H) 43 - 78 %    LYMPHOCYTES 8 (L) 13 - 44 %    MONOCYTES 6 4.0 - 12.0 %    EOSINOPHILS 0 (L) 0.5 - 7.8 %    BASOPHILS 0 0.0 - 2.0 %    IMMATURE GRANULOCYTES 3 0.0 - 5.0 %    ABS. NEUTROPHILS 16.6 (H) 1.7 - 8.2 K/UL    ABS. LYMPHOCYTES 1.7 0.5 - 4.6 K/UL    ABS. MONOCYTES 1.2 0.1 - 1.3 K/UL    ABS. EOSINOPHILS 0.0 0.0 - 0.8 K/UL    ABS. BASOPHILS 0.0 0.0 - 0.2 K/UL    ABS. IMM. GRANS. 0.6 (H) 0.0 - 0.5 K/UL   METABOLIC PANEL, COMPREHENSIVE    Collection Time: 10/15/21  7:37 AM   Result Value Ref Range    Sodium 140 136 - 145 mmol/L    Potassium 4.2 3.5 - 5.1 mmol/L    Chloride 108 (H) 98 - 107 mmol/L    CO2 25 21 - 32 mmol/L    Anion gap 7 7 - 16 mmol/L    Glucose 82 65 - 100 mg/dL    BUN 25 (H) 8 - 23 MG/DL    Creatinine 0.90 0.8 - 1.5 MG/DL    GFR est AA >60 >60 ml/min/1.73m2    GFR est non-AA >60 >60 ml/min/1.73m2    Calcium 8.8 8.3 - 10.4 MG/DL    Bilirubin, total 0.6 0.2 - 1.1 MG/DL    ALT (SGPT) 35 12 - 65 U/L    AST (SGOT) 28 15 - 37 U/L    Alk.  phosphatase 52 50 - 136 U/L    Protein, total 5.9 (L) 6.3 - 8.2 g/dL    Albumin 2.3 (L) 3.2 - 4.6 g/dL    Globulin 3.6 (H) 2.3 - 3.5 g/dL    A-G Ratio 0.6 (L) 1.2 - 3.5         Discharge Exam:  Patient Vitals for the past 24 hrs:   Temp Pulse Resp BP SpO2   10/15/21 1026     94 %   10/15/21 0726 97.3 °F (36.3 °C) 64 18 107/68 91 %   10/15/21 0316 98.4 °F (36.9 °C) 68 18 127/72 91 %   10/14/21 2342 97.7 °F (36.5 °C) 65 18 127/73 91 %   10/14/21 1839 98.2 °F (36.8 °C) 65 16 120/75 98 %   10/14/21 1600  75      10/14/21 1514 97.5 °F (36.4 °C) 66 18 110/65 96 %     Oxygen Therapy  O2 Sat (%): 94 % (10/15/21 1026)  Pulse via Oximetry: 67 beats per minute (10/15/21 0726)  O2 Device: Hi flow nasal cannula (10/15/21 1026)  O2 Flow Rate (L/min): 2 l/min (10/15/21 1026)  O2 Temperature: 87.8 °F (31 °C) (10/13/21 1739)  FIO2 (%): 45 % (10/14/21 0634)    Intake/Output Summary (Last 24 hours) at 10/15/2021 1220  Last data filed at 10/15/2021 0949  Gross per 24 hour   Intake 840 ml   Output    Net 840 ml       General:    Well nourished. Alert. No distress. off oxygen  Eyes:   Normal sclera. Extraocular movements intact. ENT:  Normocephalic, atraumatic. Moist mucous membranes  CV:   Regular rate and rhythm. No murmur, rub, or gallop. Lungs:  Bilateral coarse breath sounds  Abdomen: Soft, nontender, nondistended. Bowel sounds normal.   Extremities: Warm and dry. No cyanosis or edema. Neurologic: CN II-XII grossly intact. Sensation intact. Skin:     No rashes or jaundice. Psych:  Normal mood and affect. Discharge Info:   Current Discharge Medication List      START taking these medications    Details   pantoprazole (PROTONIX) 40 mg tablet Take 1 Tablet by mouth Daily (before breakfast) for 4 days. Qty: 4 Tablet, Refills: 0  Start date: 10/16/2021, End date: 10/20/2021      dexAMETHasone (DECADRON) 2 mg tablet Take 3 Tablets by mouth Daily (before breakfast) for 4 days. Qty: 12 Tablet, Refills: 0  Start date: 10/15/2021, End date: 10/19/2021         CONTINUE these medications which have CHANGED    Details   albuterol (PROVENTIL HFA, VENTOLIN HFA, PROAIR HFA) 90 mcg/actuation inhaler Take 2 Puffs by inhalation every four (4) hours as needed for Shortness of Breath. Qty: 18 g, Refills: 0  Start date: 10/15/2021         CONTINUE these medications which have NOT CHANGED    Details   QUEtiapine (SEROquel) 100 mg tablet Take 1 Tablet by mouth nightly.   Qty: 90 Tablet, Refills: 3 Associated Diagnoses: Primary insomnia      carvediloL (COREG) 12.5 mg tablet Take 1 Tab by mouth daily. Qty: 90 Tab, Refills: 3      warfarin (COUMADIN) 5 mg tablet Take 1 to 1 1/2 tablet daily or as directed  Qty: 135 Tab, Refills: 2      Cetirizine (ZYRTEC) 10 mg cap Take  by mouth as needed. cyanocobalamin 1,000 mcg tablet Take 1,000 mcg by mouth as needed. multivitamin (ONE A DAY) tablet Take 1 Tab by mouth daily. aspirin 81 mg tablet Take  by mouth. STOP taking these medications       benzonatate (TESSALON) 100 mg capsule Comments:   Reason for Stopping:         azithromycin (ZITHROMAX) 250 mg tablet Comments:   Reason for Stopping:         Oxygen Comments:   Reason for Stopping:         losartan (COZAAR) 25 mg tablet Comments:   Reason for Stopping:                 Disposition: Home  Activity: No strenuous activity. Diet: ADULT DIET Easy to Chew; Low Fat/Low Chol/High Fiber/ARLEN  ADULT ORAL NUTRITION SUPPLEMENT Dinner, Lunch, Breakfast; Frozen Supplement    Follow-up Appointments   Procedures    FOLLOW UP VISIT Appointment in: 3 - 5 Days Follow with primary care physician within 3 days with CBC ,CMP,INR. No isolation required. Advised to still follow Covid precautions -mass, 6 feet apart and frequent handwashing. Advised to buy pulse oxi. .. Follow with primary care physician within 3 days with CBC ,CMP,INR. No isolation required. Advised to still follow Covid precautions -mass, 6 feet apart and frequent handwashing. Advised to buy pulse oximetry,keep checking oxygen saturation, come back to the hospital if oxygen saturation below 90%. Also Come back to ER if any increasing shortness of breath or chest pain or persistent fever. No strenuous activity.      Standing Status:   Standing     Number of Occurrences:   1     Order Specific Question:   Appointment in     Answer:   3 - 5 Days         Follow-up Information     Follow up With Specialties Details Why Contact Info Robert Youssef DO Family Medicine   [address] [facility] [id number]  [phone]            Time spent in patient discharge planning and coordination 35 minutes.     Signed:  Zhang Guzmán MD

## 2021-10-15 NOTE — PROGRESS NOTES
MSN, CM:  Patient to be discharged home today with no services ordered or requested. Patient agrees with this discharge plan and has met all milestones for this admission. Family to transport patient home. Care Management Interventions  PCP Verified by CM: Yes (Dr. Mary Rose)  Mode of Transport at Discharge:  Other (see comment) (family to transport)  Health Maintenance Reviewed: Yes  Physical Therapy Consult: Yes  Support Systems: Parent(s), Other Family Member(s)  Freedom of Choice List was Provided with Basic Dialogue that Supports the Patient's Individualized Plan of Care/Goals, Treatment Preferences and Shares the Quality Data Associated with the Providers?: Yes  Discharge Location  Discharge Placement: Home

## 2021-10-15 NOTE — PROGRESS NOTES
Patient had a run of 7 beats Vtach. Md paged and notified. Md ordered to check morning labs bmp and mag. Charge nurse aware.

## 2021-10-15 NOTE — PROGRESS NOTES
MSN, cM:  Patient to be discharged home today with no services ordered or requested. Patient agrees with this discharge plan and has met all milestones for this admission. Family to transport patient home. Care Management Interventions  PCP Verified by CM: Yes (Dr. Stephanie Crum)  Mode of Transport at Discharge:  Other (see comment) (family to transport)  Health Maintenance Reviewed: Yes  Physical Therapy Consult: Yes  Support Systems: Parent(s), Other Family Member(s)  Freedom of Choice List was Provided with Basic Dialogue that Supports the Patient's Individualized Plan of Care/Goals, Treatment Preferences and Shares the Quality Data Associated with the Providers?: Yes  Discharge Location  Discharge Placement: Home

## 2021-10-15 NOTE — DISCHARGE INSTRUCTIONS
Patient Education        Learning About How Hospitals and Clinics Keep You Safe From COVID-19  Overview     Many hospitals and clinics now are treating people who are infected with COVID-19. So if you're in the hospital or clinic for any other reason, this may be an unsettling time. It's common to be concerned about becoming infected with the virus. But hospitals and clinics have policies to prevent the spread of infections. For example, doctors and nurses are trained to wash their hands before they treat you. Health care centers have stepped up these policies now. They are taking further steps to protect their patients. As long as [id number] remains a public health problem, things are going to be different when you go to a health care facility. They may have new rules for your safety. These could include having you wear a cloth face cover, meeting you outside the clinic, and having you sit away from others in the waiting room. What are hospitals and clinics doing to keep you safe? Your care team is very aware of the threat of COVID-19. They are doing everything they can to keep you safe. Hospitals and clinics may have different policies. But in general, you may expect many of these measures:  · You will be screened for COVID-19. When you come to the hospital, you may have your temperature taken. You'll be asked about any symptoms, such as a fever, a cough, or shortness of breath. You may be asked if you've had contact with anyone who's been diagnosed with the virus. And you may be asked if you've traveled to any place that has had an outbreak. · The staff may try to do as much as possible outside the facility. For example, you may be asked to fill out paperwork online or in your car before you come inside. The person giving you a ride home may be asked to wait outside. These new rules to help protect you may make routine tasks take longer than usual.  · People who have COVID-19 are treated in a separate area. Many hospitals and clinics have staff members who treat only these patients. This helps limit the spread of the infection. · Visitors may be limited. In some cases, no visitors are allowed. In others, only one healthy visitor is allowed. Children may be limited to having only one adult with them. You can connect with family and friends using your phone or computer. If you need something brought from home, such as glasses or a phone , find out where the item can be dropped off. · The hospital or clinic follows guidelines to prevent infection. These include:  ? Washing hands often. ? Disinfecting high-touch surfaces. ? Wearing face masks or other protective equipment. ? Making extra space for social distancing. What can you do to stay safe? We all have a role to play in keeping ourselves safe and preventing the spread of COVID-19. Here are some things you can do while you're receiving care. If you're in a hospital, stay in your room. This will limit your exposure to the virus. It may be boring, but it's the safest place for you. Wash your hands often. Use soap and water, and scrub for 20 seconds. Then rinse and dry them well. Always wash them after you use the bathroom, before you eat, and after you cough, sneeze, or blow your nose. If you can't wash your hands, use hand . Speak up if you have safety concerns. Don't be shy to correct health care workers if they aren't washing their hands properly, wearing face masks, or taking other precautions. These actions are vital to prevent the spread of infection. Try to be understanding. This is a stressful time for everyone, including your care team. They are doing their best to keep you safe and provide you with good care. Where can you learn more? Go to http://www.gray.com/  Enter A134 in the search box to learn more about \"Learning About How Hospitals and [facility] Safe From COVID-19. \"  Current as of: March 26, 2021               Content Version: 13.0  © 2006-2021 [employer]. Care instructions adapted under license by "[employer] (which disclaims liability or warranty for this information). If you have questions about a medical condition or this instruction, always ask your healthcare professional. [address] any warranty or liability for your use of this information. Patient Education        Learning About Coronary Artery Disease (CAD)  What is coronary artery disease? Coronary artery disease is a condition that occurs when plaque builds up in the arteries that bring oxygen-rich blood to your heart. Plaque is a fatty substance made of cholesterol, calcium, and other substances in the blood. This process is called hardening of the arteries, or atherosclerosis. What happens when you have coronary artery disease? · Plaque may narrow the coronary arteries. Narrowed arteries cause poor blood flow. This can lead to angina symptoms such as chest pain or discomfort. If blood flow is completely blocked, you could have a heart attack. · You can slow and reduce the risk of future problems by making changes in your lifestyle. These include quitting smoking and eating heart-healthy foods. · Treatment, along with changes in your lifestyle, can help you live a longer and healthier life. How can you prevent coronary artery disease? · Do not smoke. It may be the best thing you can do to prevent coronary artery disease. If you need help quitting, talk to your doctor about stop-smoking programs and medicines. These can increase your chances of quitting for good. · Be active. Try to do moderate activity at least 2½ hours a week. Or try to do vigorous activity at least 1¼ hours a week. You may want to walk or try other activities, such as running, swimming, cycling, or playing tennis or team sports. · Eat heart-healthy foods.  Eat more fruits and vegetables and less food that contains saturated and trans fats. Limit alcohol, sodium, and sweets. · Stay at a healthy weight. Lose weight if you need to. · Manage other health problems such as diabetes, high blood pressure, and high cholesterol. How is coronary artery disease treated? · Your doctor will suggest that you make lifestyle changes. For example, your doctor may ask you to eat healthy foods, quit smoking, lose extra weight, and be more active. · You will take medicines that help prevent a heart attack. · Your doctor may suggest a procedure to open narrowed or blocked arteries. This is called angioplasty. Or your doctor may suggest using healthy blood vessels to create detours around narrowed or blocked arteries. This is called bypass surgery. Follow-up care is a key part of your treatment and safety. Be sure to make and go to all appointments, and call your doctor if you are having problems. It's also a good idea to know your test results and keep a list of the medicines you take. Where can you learn more? Go to http://www.gray.com/  Enter C643 in the search box to learn more about \"Learning About Coronary Artery Disease (CAD). \"  Current as of: April 29, 2021               Content Version: 13.0  © 1673-3708 [employer]. Care instructions adapted under license by [employer] (which disclaims liability or warranty for this information). If you have questions about a medical condition or this instruction, always ask your healthcare professional. [place] [address] any warranty or liability for your use of this information. Patient Education        10 Things to Do When You Have COVID-19    Stay home. Don't go to school, work, or public areas. And don't use public transportation, ride-shares, or taxis unless you have no choice. Leave your home only if you need to get medical care.  But call the doctor's office first so they know you're coming. And wear a mask. Ask before leaving isolation. Follow your doctor's advice about when it is safe for you to leave isolation. Wear a mask when you are around other people. It can help stop the spread of the virus. Limit contact with people in your home. If possible, stay in a separate bedroom and use a separate bathroom. Avoid contact with pets and other animals. If possible, have a friend or family member care for them while you're sick. Cover your mouth and nose with a tissue when you cough or sneeze. Then throw the tissue in the trash right away. Wash your hands often, especially after you cough or sneeze. Use soap and water, and scrub for at least 20 seconds. If soap and water aren't available, use an alcohol-based hand . Don't share personal household items. These include bedding, towels, cups and glasses, and eating utensils. Clean and disinfect your home every day. Use household  or disinfectant wipes or sprays. If needed, take acetaminophen (Tylenol) or ibuprofen (Advil, Motrin) to relieve fever and body aches. Read and follow all instructions on the label. Current as of: March 26, 2021               Content Version: 13.0  © 2006-2021 [employer]. Care instructions adapted under license by [employer] (which disclaims liability or warranty for this information). If you have questions about a medical condition or this instruction, always ask your healthcare professional. [place] [address] any warranty or liability for your use of this information. Patient Education        [address] After Treatment for COVID-19: Care Instructions  Overview     You are being sent home from the hospital after being treated for COVID-19. Being in the hospital can be hard, especially if you've been in the intensive care unit (ICU). Even though you're going home, you probably don't feel well yet. Healing from COVID-19 takes time. You may feel very tired for weeks or months afterward, especially if you were on a ventilator. It will take time to get back to your old level of activity. Some people may have long-lasting health problems. But most people can look forward to feeling a little better every day. If you were on a ventilator, your throat may be sore and your voice hoarse or raspy for a while. After leaving the hospital, some people have feelings of anxiety and depression. They may have nightmares. Or in their mind they may relive events that happened in the hospital (flashbacks). Reach out to your doctor if you're having trouble with these symptoms. Your doctor will tell you if you need to isolate yourself at home, and when you can end isolation. How can you self-isolate when you have COVID-19? If you have COVID-19, there are things you can do to help avoid spreading the virus to others. · Limit contact with people in your home. If possible, stay in a separate bedroom and use a separate bathroom. · Wear a mask when you are around other people. · If you have to leave home, avoid crowds and try to stay at least 6 feet away from other people. · Avoid contact with pets and other animals. · Cover your mouth and nose with a tissue when you cough or sneeze. Then throw it in the trash right away. · Wash your hands often, especially after you cough or sneeze. Use soap and water, and scrub for at least 20 seconds. If soap and water aren't available, use an alcohol-based hand . · Don't share personal household items. These include bedding, towels, cups and glasses, and eating utensils. · [address] [facility] in the warmest water allowed for the fabric type, and dry it completely. It's okay to wash other people's laundry with yours. · Clean and disinfect your home. Use household  and disinfectant wipes or sprays. Follow-up care is a key part of your treatment and safety.  Be sure to make and go to all appointments, and call your doctor if you are having problems. It's also a good idea to know your test results and keep a list of the medicines you take. How can you care for yourself at home? · Get plenty of rest. It can help you feel better. · Be kind to yourself if it's taking longer than you expected to feel better. You've been through a stressful time. · Get up and walk around every hour or two while you're resting. Slowly increase your activity as you start to feel better. · Eat healthy foods. · Drink plenty of fluids. If you have kidney, heart, or liver disease and have to limit fluids, talk with your doctor before you increase the amount of fluids you drink. · If needed, take acetaminophen (Tylenol) or ibuprofen (Advil, Motrin) to reduce a fever. It may also help with muscle aches. Read and follow all instructions on the label. When should you call for help? Call 911 anytime you think you may need emergency care. For example, call if you have life-threatening symptoms, such as:    · You have severe trouble breathing. (You can't talk at all.)     · You have constant chest pain or pressure.     · You are severely dizzy or lightheaded.     · You are confused or can't think clearly.     · You have pale, gray, or blue-colored skin or lips.     · You pass out (lose consciousness) or are very hard to wake up. Call your doctor now or seek immediate medical care if:    · You have moderate trouble breathing. (You can't speak a full sentence.)     · You are coughing up blood (more than about 1 teaspoon).     · You have signs of low blood pressure. These include feeling lightheaded; being too weak to stand; and having cold, pale, clammy skin. Watch closely for changes in your health, and be sure to contact your doctor if:    · Your symptoms get worse.     · You are not getting better as expected.     · You have new or worse symptoms of anxiety, depression, nightmares, or flashbacks.    Call before you go to the doctor's office. Follow their instructions. And wear a mask. Current as of: July 1, 2021               Content Version: 13.0  © 2006-2021 [employer]. Care instructions adapted under license by [employer] (which disclaims liability or warranty for this information). If you have questions about a medical condition or this instruction, always ask your healthcare professional. [place] [address] any warranty or liability for your use of this information.

## 2021-10-15 NOTE — PROGRESS NOTES
ACUTE OT GOALS:  (Developed with and agreed upon by patient and/or caregiver.)  1) Patient will complete total body bathing and dressing with MOD I and adaptive equipment as needed. 2) Patient will complete toileting with MOD I.   3) Patient will complete functional transfers INDEPENDENTLY  4) Patient will tolerate at least 25 minutes of OT activity with 1-2 rest breaks while maintaining O2 sats >90%. 5) Patient will verbalize at least 3 energy conservation technique to utilize during ADL/IADL. 6) Patient will complete grooming ADL in standing INDEPENDENTLY. Timeframe: 7 visits     ALL GOALS MET 10/15/21    OCCUPATIONAL THERAPY: Daily Note and Discharge OT Treatment Day # 3    Courtney Ibanez is a 64 y.o. male   PRIMARY DIAGNOSIS: Acute hypoxemic respiratory failure due to COVID-19 [facility]  Acute hypoxemic respiratory [facility] [U07.1, J96.01]       Payor: [employer] MEDICARE / Plan: Loop Survey / Product Type: [employer] Care Medicare /   ASSESSMENT:     REHAB RECOMMENDATIONS: CURRENT LEVEL OF FUNCTION:  (Most Recently Demonstrated)   Recommendation to date pending progress:  Setting:   No further skilled therapy   Equipment:    None Bathing:   Not tested  Dressing:   Not tested  Feeding/Grooming:   Independent  Toileting:   Not tested  Functional Mobility:   Independent     ASSESSMENT:  Mr. Danni Hannah  is a 61 y/o male presents with acute respiratory failure due to COVID 19. Pt is currently on 2L HF NC with sats >94% throughout treatment. Pt able to ambulate household distances and perform grooming ADLs independently today. Ptreported independence with bathing sitting at sink and toileting in bathroom prior to treatment. Pt educated on energy conservation techniques for ADLs in prep for home. Pt has met all goals and no longer needs skilled OT services during acute care stay. Will d/c from caseload.  No needs at d/c.      SUBJECTIVE:   Mr. Danni Hannah states, \"I just got washed up at the sink. \"    SOCIAL HISTORY/LIVING ENVIRONMENT: lives alone, one level, 3STE, walk in shower, no DME, drives only short distances, no falls  Home Environment: Private residence  # Steps to Enter: 3  One/Two Story Residence: One story  Living Alone: Yes  Support Systems: Parent(s), Other Family Member(s)    OBJECTIVE:     PAIN: VITAL SIGNS: LINES/DRAINS:   Pre Treatment: Pain Screen  Pain Scale 1: Numeric (0 - 10)  Pain Intensity 1: 0  Post Treatment: 0 Vital Signs  O2 Sat (%): 94 %  O2 Device: Hi flow nasal cannula  O2 Flow Rate (L/min): 2 l/min Continuous Pulse Oximetry  O2 Device: Hi flow nasal cannula     ACTIVITIES OF DAILY LIVING: I Mod I S SBA CGA Min Mod Max Total NT Comments   BASIC ADLs:              Bathing/ Showering [x] [] [] [] [] [] [] [] [] [] Reported independence prior to tx   Toileting [x] [] [] [] [] [] [] [] [] [] Reported independence prior to tx   Dressing [] [] [] [] [] [] [] [] [] [x]    Feeding [] [] [] [] [] [] [] [] [] [x]    Grooming [x] [] [] [] [] [] [] [] [] [] Brushing teeth and combing hair standing at sink   Personal Device Care [] [] [] [] [] [] [] [] [] [x]    Functional Mobility [x] [] [] [] [] [] [] [] [] [] No AD   I=Independent, Mod I=Modified Independent, S=Supervision, SBA=Standby Assistance, CGA=Contact Guard Assistance,   Min=Minimal Assistance, Mod=Moderate Assistance, Max=Maximal Assistance, Total=Total Assistance, NT=Not Tested    MOBILITY: I Mod I S SBA CGA Min Mod Max Total  NT x2 Comments: pt received sitting in chair   Supine to sit [] [] [] [] [] [] [] [] [] [x] []    Sit to supine [] [] [] [] [] [] [] [] [] [x] []    Sit to stand [x] [] [] [] [] [] [] [] [] [] [] No AD   Bed to chair [x] [] [] [] [] [] [] [] [] [] [] No AD   I=Independent, Mod I=Modified Independent, S=Supervision, SBA=Standby Assistance, CGA=Contact Guard Assistance,   Min=Minimal Assistance, Mod=Moderate Assistance, Max=Maximal Assistance, Total=Total Assistance, NT=Not Tested    BALANCE: Good Fair+ Fair Fair- Poor NT Comments   Sitting Static [x] [] [] [] [] [] In chair   Sitting Dynamic [] [] [] [] [] [x]              Standing Static [x] [] [] [] [] [] At edge of chair   Standing Dynamic [x] [] [] [] [] [] Grooming ADLs at sink     PLAN:   FREQUENCY/DURATION: OT Plan of Care: 3 times/week for duration of hospital stay or until stated goals are met, whichever comes first.    TREATMENT:   TREATMENT:   ($$ Self Care/Home Management: 8-22 mins    )  Self Care (12 Minutes): Self care including Grooming, Energy Conservation Training and functional transfers in prep for ADLs and ambulating household distances to increase independence and decrease level of assistance required.     TREATMENT GRID:   Date:  10/13/21 Date:   Date:     Activity/Exercise Parameters Parameters Parameters   Straight punches 20 reps     Cross body punches 20 reps     Shoulder Flexion 20 reps     BLE marches 20 reps     Wall push ups 10 reps                   AFTER TREATMENT POSITION/PRECAUTIONS:  Chair, Needs within reach and RN notified    INTERDISCIPLINARY COLLABORATION:  RN/PCT and OT/WYATT    TOTAL TREATMENT DURATION:  OT Patient Time In/Time Out  Time In: 1026  Time Out: [address], OT

## 2021-10-18 ENCOUNTER — PATIENT OUTREACH (OUTPATIENT)
Dept: CASE MANAGEMENT | Age: 61
End: 2021-10-18

## 2021-10-18 NOTE — PROGRESS NOTES
Care Transitions Initial Call    Call within 2 business days of discharge: Yes     Patient: Bobby Krause Patient : 1960 MRN: [id number]    Last Discharge [address]       Complaint Diagnosis Description Type Department Provider    10/9/21 Positive For Covid-19 COVID-19 ED to Hosp-Admission (Discharged) (ADMIT) SUGAR Quinonez MD; [place], [place]... Was this an external facility discharge? No     Challenges to be reviewed by the provider   Additional needs identified to be addressed with provider: no  none         Method of communication with provider : none    Discussed COVID-19 related testing which was available at this time. Test results were positive. Patient informed of results, if available? yes     Advance Care Planning:   Does patient have an Advance Directive:  yes; reviewed and current  and health care decision makers updated    Inpatient Readmission Risk score: Unplanned Readmit Risk Score: 16    Was this a readmission? no       Patients top risk factors for readmission: s/p covid pneumonia   Interventions to address risk factors: Obtained and reviewed discharge summary and/or continuity of care documents    Care Transition Nurse (CTN) contacted the patient by telephone to perform post hospital discharge assessment. Verified name and  with patient as identifiers. Provided introduction to self, and explanation of the CTN role. CTN reviewed discharge instructions, medical action plan and red flags with patient who verbalized understanding. Were discharge instructions available to patient? yes. Reviewed appropriate site of care based on symptoms and resources available to patient including: When to call 911. Patient given an opportunity to ask questions and does not have any further questions or concerns at this time. The patient agrees to contact the PCP office for questions related to their healthcare.      Medication reconciliation was performed with patient, who verbalizes understanding of administration of home medications. Advised obtaining a 90-day supply of all daily and as-needed medications. Referral to Pharm D needed: no     Home Health/Outpatient orders at discharge: [address]:   Date of initial visit:     [address] ordered at discharge: None  [address] [facility] [address]:   Durable Medical Equipment received:     Covid Risk Education    Educated patient about risk for severe COVID-19 due to risk factors according to CDC guidelines. LPN CC reviewed discharge instructions, medical action plan and red flag symptoms with the patient who verbalized understanding. Discussed COVID vaccination status: yes. Education provided on COVID-19 vaccination as appropriate. Discussed exposure protocols and quarantine with CDC Guidelines. Patient was given an opportunity to verbalize any questions and concerns and agrees to contact LPN CC or health care provider for questions related to their healthcare. Was patient discharged with a pulse oximeter? No, however, patient reports purchasing pulse ox for home use. Discussed and confirmed pulse oximeter discharge instructions and when to notify provider or seek emergency care. Discussed follow-up appointments. If no appointment was previously scheduled, appointment scheduling offered: Patient states he will contact PCP office to schedule f/u today. Is follow up appointment scheduled within 7 days of discharge? no.   [facility] follow up appointment(s):   Future Appointments   Date Time Provider Linda Snow   11/11/2021  3:00 PM DEVICE 39 [facility]   2/16/2022  1:30 PM Calli Donis MD [facility]   6/21/2022 10:00 AM G[facility] LAB SALMA [facility]   6/28/2022 10:20 AM DO SALMA Espino [facility] GFM         Plan for follow-up call in 10-14 days based on severity of symptoms and risk factors.   Plan for next call: f/u appointments, current needs  CTN provided contact information for future needs. Goals Addressed                 This Visit's Progress     Attends follow-up appointments as directed.    On track

## 2021-11-02 ENCOUNTER — PATIENT OUTREACH (OUTPATIENT)
Dept: CASE MANAGEMENT | Age: 61
End: 2021-11-02

## 2021-11-02 PROBLEM — E66.3 OVERWEIGHT (BMI 25.0-29.9): Status: ACTIVE | Noted: 2017-06-08

## 2021-11-02 PROBLEM — Z86.16 HISTORY OF 2019 NOVEL CORONAVIRUS DISEASE (COVID-19): Status: ACTIVE | Noted: 2021-09-21

## 2021-11-02 NOTE — PROGRESS NOTES
Care Transitions Follow Up Call    Challenges to be reviewed by the provider   Additional needs identified to be addressed with provider: no  none           Method of communication with provider : none    Care Transition Nurse (CTN) contacted the patient by telephone to follow up after admission on 10/9/21. Verified name and  with patient as identifiers. Addressed changes since last contact: none. Follow up appointment completed? Patient reports feeling much better and is currently at PCP f/u appointment. Was follow up appointment scheduled within 7 days of discharge? no.     Advance Care Planning:   Does patient have an Advance Directive:  yes; reviewed and current     CTN reviewed discharge instructions, medical action plan and red flags with patient and discussed any barriers to care and/or understanding of plan of care after discharge. Discussed appropriate site of care based on symptoms and resources available to patient including: When to call 911. The patient agrees to contact the PCP office for questions related to their healthcare. Patients top risk factors for readmission: Covid 19   Interventions to address risk factors: Obtained and reviewed discharge summary and/or continuity of care documents    [facility] follow up appointment(s):   Future Appointments   Date Time Provider Linda Snow   2021  3:00 PM DEVICE 39 GVL [facility] UCD   20[facility]30 [facility]   2022 10:00 AM GFM LAB [facility] GF GFM   2022 10:20 AM Dahiana Valladares DO SSA GFM GFM         CTN provided contact information for future needs. Plan for follow-up call in 10-14 days based on severity of symptoms and risk factors. Plan for next call: current needs, f/u 21     Goals Addressed                 This Visit's Progress     Attends follow-up appointments as directed.    On track

## 2021-11-13 PROBLEM — U07.1 PNEUMONIA DUE TO COVID-19 VIRUS: Status: ACTIVE | Noted: 2021-11-13

## 2021-11-13 PROBLEM — F17.210 SMOKING GREATER THAN 20 PACK YEARS: Status: ACTIVE | Noted: 2021-11-13

## 2021-11-13 PROBLEM — B35.6 TINEA CRURIS: Status: RESOLVED | Noted: 2019-12-29 | Resolved: 2021-11-13

## 2021-11-13 PROBLEM — D64.9 ANEMIA: Status: ACTIVE | Noted: 2021-11-13

## 2021-11-13 PROBLEM — Z28.21 REFUSED INFLUENZA VACCINE: Status: ACTIVE | Noted: 2021-11-13

## 2021-11-13 PROBLEM — Z99.81 HYPOXEMIA REQUIRING SUPPLEMENTAL OXYGEN: Status: RESOLVED | Noted: 2021-10-06 | Resolved: 2021-11-13

## 2021-11-13 PROBLEM — J12.82 PNEUMONIA DUE TO COVID-19 VIRUS: Status: RESOLVED | Noted: 2021-11-13 | Resolved: 2021-11-13

## 2021-11-13 PROBLEM — U07.1 PNEUMONIA DUE TO COVID-19 VIRUS: Status: RESOLVED | Noted: 2021-11-13 | Resolved: 2021-11-13

## 2021-11-13 PROBLEM — U07.1 ACUTE HYPOXEMIC RESPIRATORY FAILURE DUE TO COVID-19 (HCC): Status: RESOLVED | Noted: 2021-10-09 | Resolved: 2021-11-13

## 2021-11-13 PROBLEM — J96.01 ACUTE HYPOXEMIC RESPIRATORY FAILURE DUE TO COVID-19 (HCC): Status: RESOLVED | Noted: 2021-10-09 | Resolved: 2021-11-13

## 2021-11-13 PROBLEM — R09.02 HYPOXEMIA REQUIRING SUPPLEMENTAL OXYGEN: Status: RESOLVED | Noted: 2021-10-06 | Resolved: 2021-11-13

## 2021-11-13 PROBLEM — J12.82 PNEUMONIA DUE TO COVID-19 VIRUS: Status: ACTIVE | Noted: 2021-11-13

## 2021-11-18 ENCOUNTER — PATIENT OUTREACH (OUTPATIENT)
Dept: CASE MANAGEMENT | Age: 61
End: 2021-11-18

## 2021-11-18 NOTE — PROGRESS NOTES
Care Transitions Follow Up Call    Challenges to be reviewed by the provider   Additional needs identified to be addressed with provider: no  none           Method of communication with provider : none    Care Transition Nurse (CTN) contacted the patient by telephone to follow up after admission on 10/9/21. Verified name and  with patient as identifiers. Addressed changes since last contact: none  Follow up appointment completed? yes. Was follow up appointment scheduled within 7 days of discharge? yes. Advance Care Planning:   Does patient have an Advance Directive:  yes; reviewed and current     CTN reviewed discharge instructions, medical action plan and red flags with patient and discussed any barriers to care and/or understanding of plan of care after discharge. Discussed appropriate site of care based on symptoms and resources available to patient including: When to call 911. The patient agrees to contact the PCP office for questions related to their healthcare. Patients top risk factors for readmission: Acute hypoxemic respiratory failure due to Covid 19-resolved   Interventions to address risk factors: Obtained and reviewed discharge summary and/or continuity of care documents    [address] follow up appointment(s):   Future Appointments   Date Time Provider Linda Snow   2021  2:30 PM DEVICE 39 GVL [facility]   2022  1:30 PM Trey Donis MD [facility]   2022 10:00 AM GFM LAB [facility] GF GF   2022 10:20 AM Ruthann Calderon DO [facility] GFM GFM         CTN provided contact information for future needs. No further follow-up call indicated based on severity of symptoms and risk factors. Goals Addressed                 This Visit's Progress     Attends follow-up appointments as directed.    On track

## 2022-03-18 PROBLEM — E66.3 OVERWEIGHT (BMI 25.0-29.9): Status: ACTIVE | Noted: 2017-06-08

## 2022-03-18 PROBLEM — R20.0 NUMBNESS OF LEFT FOOT: Status: ACTIVE | Noted: 2018-06-12

## 2022-03-18 PROBLEM — N40.1 BENIGN PROSTATIC HYPERPLASIA WITH WEAK URINARY STREAM: Status: ACTIVE | Noted: 2019-12-29

## 2022-03-18 PROBLEM — Z79.01 LONG TERM (CURRENT) USE OF ANTICOAGULANTS: Status: ACTIVE | Noted: 2018-11-27

## 2022-03-18 PROBLEM — R39.12 BENIGN PROSTATIC HYPERPLASIA WITH WEAK URINARY STREAM: Status: ACTIVE | Noted: 2019-12-29

## 2022-03-18 PROBLEM — Z86.16 HISTORY OF 2019 NOVEL CORONAVIRUS DISEASE (COVID-19): Status: ACTIVE | Noted: 2021-10-03

## 2022-03-19 PROBLEM — Z28.21 REFUSED INFLUENZA VACCINE: Status: ACTIVE | Noted: 2021-11-13

## 2022-03-19 PROBLEM — F17.210 SMOKING GREATER THAN 20 PACK YEARS: Status: ACTIVE | Noted: 2021-11-13

## 2022-03-19 PROBLEM — I87.2 EDEMA OF BOTH LOWER EXTREMITIES DUE TO PERIPHERAL VENOUS INSUFFICIENCY: Status: ACTIVE | Noted: 2020-06-18

## 2022-03-19 PROBLEM — Z79.899 HIGH RISK MEDICATION USE: Status: ACTIVE | Noted: 2020-06-19

## 2022-03-19 PROBLEM — F51.01 PRIMARY INSOMNIA: Status: ACTIVE | Noted: 2017-06-07

## 2022-03-19 PROBLEM — Z28.21 COVID-19 VACCINATION REFUSED: Status: ACTIVE | Noted: 2021-06-18

## 2022-03-19 PROBLEM — R73.02 IGT (IMPAIRED GLUCOSE TOLERANCE): Status: ACTIVE | Noted: 2017-12-03

## 2022-03-19 PROBLEM — I47.20 VENTRICULAR TACHYCARDIA: Status: ACTIVE | Noted: 2020-06-30

## 2022-03-19 PROBLEM — H60.8X3 CHRONIC ECZEMATOUS OTITIS EXTERNA OF BOTH EARS: Status: ACTIVE | Noted: 2020-06-18

## 2022-03-19 PROBLEM — R53.82 CHRONIC FATIGUE: Status: ACTIVE | Noted: 2019-12-29

## 2022-03-19 PROBLEM — D64.9 ANEMIA: Status: ACTIVE | Noted: 2021-11-13

## 2022-03-19 PROBLEM — J30.1 SEASONAL ALLERGIC RHINITIS DUE TO POLLEN: Status: ACTIVE | Noted: 2017-06-07

## 2022-03-19 PROBLEM — Z72.0 TOBACCO ABUSE: Status: ACTIVE | Noted: 2019-06-24

## 2022-03-20 PROBLEM — Z78.9 STATIN INTOLERANCE: Status: ACTIVE | Noted: 2017-12-10

## 2022-05-24 ENCOUNTER — ANTI-COAG VISIT (OUTPATIENT)
Dept: CARDIOLOGY CLINIC | Age: 62
End: 2022-05-24

## 2022-05-24 DIAGNOSIS — I51.3 INTRACARDIAC THROMBUS: ICD-10-CM

## 2022-05-24 DIAGNOSIS — Z79.01 LONG TERM (CURRENT) USE OF ANTICOAGULANTS: Primary | ICD-10-CM

## 2022-05-24 LAB — INR BLD: 2.6

## 2022-05-24 NOTE — PROGRESS NOTES
Anticoagulation Summary  As of 2022    INR goal:  2.0-3.0   TTR:  --   INR used for dosin.60 (2022)   Warfarin maintenance plan:  7.5 mg (5 mg x 1.5) every Mon, Wed, Fri; 5 mg (5 mg x 1) all other days   Weekly warfarin total:  42.5 mg   Plan last modified:  [address] [facility] [place] (2022)   Next INR check:  2022   Target end date: Indefinite    Indications    Long term (current) use of anticoagulants [Z79.01]  Intracardiac thrombus [I51.3]             Anticoagulation Episode Summary     INR check location:  Home Draw    Preferred lab:      Send INR reminders to:  [id number]    Comments:  [facility]      Anticoagulation Care Providers     Provider Role Specialty Phone number    Winston Dowell MD Responsible Cardiology [phone]      Home INR monitor result reported via fax, no dose adjustments made.

## 2022-05-26 ENCOUNTER — HOSPITAL ENCOUNTER (INPATIENT)
Age: 62
LOS: 9 days | Discharge: HOME HEALTH | DRG: 234 | End: 2022-06-06
Attending: EMERGENCY MEDICINE | Admitting: INTERNAL MEDICINE
Payer: MEDICARE

## 2022-05-26 ENCOUNTER — HOSPITAL ENCOUNTER (EMERGENCY)
Dept: GENERAL RADIOLOGY | Age: 62
Discharge: HOME | DRG: 234 | End: 2022-05-29
Payer: MEDICARE

## 2022-05-26 ENCOUNTER — APPOINTMENT (OUTPATIENT)
Dept: NON INVASIVE DIAGNOSTICS | Age: 62
DRG: 234 | End: 2022-05-26
Payer: MEDICARE

## 2022-05-26 DIAGNOSIS — Z95.1 S/P CABG X 3: ICD-10-CM

## 2022-05-26 DIAGNOSIS — Z45.02 ICD (IMPLANTABLE CARDIOVERTER-DEFIBRILLATOR) DISCHARGE: Primary | ICD-10-CM

## 2022-05-26 DIAGNOSIS — Z72.0 TOBACCO ABUSE: ICD-10-CM

## 2022-05-26 DIAGNOSIS — I25.10 CAD, MULTIPLE VESSEL: ICD-10-CM

## 2022-05-26 DIAGNOSIS — R07.9 CHEST PAIN: ICD-10-CM

## 2022-05-26 PROBLEM — I47.20 VENTRICULAR TACHYCARDIA: Status: ACTIVE | Noted: 2020-06-30

## 2022-05-26 PROBLEM — Z79.01 LONG TERM (CURRENT) USE OF ANTICOAGULANTS: Status: ACTIVE | Noted: 2018-11-27

## 2022-05-26 PROBLEM — I87.2 EDEMA OF BOTH LOWER EXTREMITIES DUE TO PERIPHERAL VENOUS INSUFFICIENCY: Status: RESOLVED | Noted: 2020-06-18 | Resolved: 2022-05-26

## 2022-05-26 PROBLEM — N40.1 BENIGN PROSTATIC HYPERPLASIA WITH WEAK URINARY STREAM: Status: ACTIVE | Noted: 2019-12-29

## 2022-05-26 PROBLEM — H60.8X3 CHRONIC ECZEMATOUS OTITIS EXTERNA OF BOTH EARS: Status: ACTIVE | Noted: 2020-06-18

## 2022-05-26 PROBLEM — R39.12 BENIGN PROSTATIC HYPERPLASIA WITH WEAK URINARY STREAM: Status: ACTIVE | Noted: 2019-12-29

## 2022-05-26 PROBLEM — U07.1 COVID-19: Status: ACTIVE | Noted: 2021-10-06

## 2022-05-26 LAB
ALBUMIN SERPL-MCNC: 3.6 G/DL (ref 3.2–4.6)
ALBUMIN/GLOB SERPL: 1 {RATIO} (ref 1.2–3.5)
ALP SERPL-CCNC: 77 U/L (ref 50–136)
ALT SERPL-CCNC: 16 U/L (ref 12–65)
ANION GAP SERPL CALC-SCNC: 6 MMOL/L (ref 7–16)
AST SERPL-CCNC: 34 U/L (ref 15–37)
BILIRUB SERPL-MCNC: 0.4 MG/DL (ref 0.2–1.1)
BUN SERPL-MCNC: 14 MG/DL (ref 8–23)
CALCIUM SERPL-MCNC: 9 MG/DL (ref 8.3–10.4)
CHLORIDE SERPL-SCNC: 107 MMOL/L (ref 98–107)
CO2 SERPL-SCNC: 27 MMOL/L (ref 21–32)
CREAT SERPL-MCNC: 1.1 MG/DL (ref 0.8–1.5)
ECHO AO ASC DIAM: 3.4 CM
ECHO AO ASCENDING AORTA INDEX: 1.58 CM/M2
ECHO AO ROOT DIAM: 4.1 CM
ECHO AO ROOT INDEX: 1.91 CM/M2
ECHO AV AREA PEAK VELOCITY: 3.8 CM2
ECHO AV AREA VTI: 3.9 CM2
ECHO AV AREA/BSA PEAK VELOCITY: 1.8 CM2/M2
ECHO AV AREA/BSA VTI: 1.8 CM2/M2
ECHO AV MEAN GRADIENT: 2 MMHG
ECHO AV MEAN VELOCITY: 0.7 M/S
ECHO AV PEAK GRADIENT: 4 MMHG
ECHO AV PEAK VELOCITY: 1 M/S
ECHO AV VELOCITY RATIO: 0.9
ECHO AV VTI: 19 CM
ECHO BSA: 2.2 M2
ECHO EST RA PRESSURE: 3 MMHG
ECHO IVC PROX: 1.7 CM
ECHO LA AREA 2C: 14.3 CM2
ECHO LA AREA 4C: 16.4 CM2
ECHO LA DIAMETER INDEX: 1.67 CM/M2
ECHO LA DIAMETER: 3.6 CM
ECHO LA MAJOR AXIS: 5.1 CM
ECHO LA MINOR AXIS: 4.7 CM
ECHO LA TO AORTIC ROOT RATIO: 0.88
ECHO LA VOL BP: 39 ML (ref 18–58)
ECHO LA VOL/BSA BIPLANE: 18 ML/M2 (ref 16–34)
ECHO LV E' LATERAL VELOCITY: 8 CM/S
ECHO LV E' SEPTAL VELOCITY: 6 CM/S
ECHO LV EDV A2C: 119 ML
ECHO LV EDV A4C: 210 ML
ECHO LV EDV INDEX A4C: 98 ML/M2
ECHO LV EDV NDEX A2C: 55 ML/M2
ECHO LV EJECTION FRACTION A2C: 33 %
ECHO LV EJECTION FRACTION A4C: 27 %
ECHO LV EJECTION FRACTION BIPLANE: 29 % (ref 55–100)
ECHO LV ESV A2C: 80 ML
ECHO LV ESV A4C: 155 ML
ECHO LV ESV INDEX A2C: 37 ML/M2
ECHO LV ESV INDEX A4C: 72 ML/M2
ECHO LV FRACTIONAL SHORTENING: 18 % (ref 28–44)
ECHO LV INTERNAL DIMENSION DIASTOLE INDEX: 2.6 CM/M2
ECHO LV INTERNAL DIMENSION DIASTOLIC: 5.6 CM (ref 4.2–5.9)
ECHO LV INTERNAL DIMENSION SYSTOLIC INDEX: 2.14 CM/M2
ECHO LV INTERNAL DIMENSION SYSTOLIC: 4.6 CM
ECHO LV IVSD: 1 CM (ref 0.6–1)
ECHO LV MASS 2D: 219.7 G (ref 88–224)
ECHO LV MASS INDEX 2D: 102.2 G/M2 (ref 49–115)
ECHO LV POSTERIOR WALL DIASTOLIC: 1 CM (ref 0.6–1)
ECHO LV RELATIVE WALL THICKNESS RATIO: 0.36
ECHO LVOT AREA: 4.2 CM2
ECHO LVOT AV VTI INDEX: 0.95
ECHO LVOT DIAM: 2.3 CM
ECHO LVOT MEAN GRADIENT: 1 MMHG
ECHO LVOT PEAK GRADIENT: 3 MMHG
ECHO LVOT PEAK VELOCITY: 0.9 M/S
ECHO LVOT STROKE VOLUME INDEX: 34.8 ML/M2
ECHO LVOT SV: 74.7 ML
ECHO LVOT VTI: 18 CM
ECHO MV A VELOCITY: 0.76 M/S
ECHO MV AREA VTI: 4.1 CM2
ECHO MV E DECELERATION TIME (DT): 269 MS
ECHO MV E VELOCITY: 0.45 M/S
ECHO MV E/A RATIO: 0.59
ECHO MV E/E' LATERAL: 5.63
ECHO MV E/E' RATIO (AVERAGED): 6.56
ECHO MV E/E' SEPTAL: 7.5
ECHO MV LVOT VTI INDEX: 1.01
ECHO MV MAX VELOCITY: 0.8 M/S
ECHO MV MEAN GRADIENT: 1 MMHG
ECHO MV MEAN VELOCITY: 0.5 M/S
ECHO MV PEAK GRADIENT: 3 MMHG
ECHO MV VTI: 18.2 CM
ECHO PV ACCELERATION TIME (AT): 135 MS
ECHO PV MAX VELOCITY: 1 M/S
ECHO PV PEAK GRADIENT: 4 MMHG
ECHO RIGHT VENTRICULAR SYSTOLIC PRESSURE (RVSP): 19 MMHG
ECHO RV BASAL DIMENSION: 4 CM
ECHO RV INTERNAL DIMENSION: 2.8 CM
ECHO RV TAPSE: 2.2 CM (ref 1.7–?)
ECHO TV REGURGITANT MAX VELOCITY: 1.99 M/S
ECHO TV REGURGITANT PEAK GRADIENT: 16 MMHG
ERYTHROCYTE [DISTWIDTH] IN BLOOD BY AUTOMATED COUNT: 15.1 % (ref 11.9–14.6)
GLOBULIN SER CALC-MCNC: 3.5 G/DL (ref 2.3–3.5)
GLUCOSE SERPL-MCNC: 125 MG/DL (ref 65–100)
HCT VFR BLD AUTO: 45.5 % (ref 41.1–50.3)
HGB BLD-MCNC: 15 G/DL (ref 13.6–17.2)
INR PPP: 2.5
MAGNESIUM SERPL-MCNC: 2.5 MG/DL (ref 1.8–2.4)
MCH RBC QN AUTO: 30.1 PG (ref 26.1–32.9)
MCHC RBC AUTO-ENTMCNC: 33 G/DL (ref 31.4–35)
MCV RBC AUTO: 91.4 FL (ref 79.6–97.8)
NRBC # BLD: 0 K/UL (ref 0–0.2)
PLATELET # BLD AUTO: 263 K/UL (ref 150–450)
PMV BLD AUTO: 10.5 FL (ref 9.4–12.3)
POTASSIUM SERPL-SCNC: 4.2 MMOL/L (ref 3.5–5.1)
PROT SERPL-MCNC: 7.1 G/DL (ref 6.3–8.2)
PROTHROMBIN TIME: 27.6 SEC (ref 12.6–14.5)
RBC # BLD AUTO: 4.98 M/UL (ref 4.23–5.6)
SODIUM SERPL-SCNC: 140 MMOL/L (ref 138–145)
TROPONIN I SERPL HS-MCNC: 41.2 PG/ML (ref 0–14)
TROPONIN I SERPL HS-MCNC: 43.7 PG/ML (ref 0–14)
TSH, 3RD GENERATION: 3.41 UIU/ML (ref 0.36–3.74)
WBC # BLD AUTO: 11.8 K/UL (ref 4.3–11.1)

## 2022-05-26 PROCEDURE — 71046 X-RAY EXAM CHEST 2 VIEWS: CPT

## 2022-05-26 PROCEDURE — 2580000003 HC RX 258: Performed by: NURSE PRACTITIONER

## 2022-05-26 PROCEDURE — 80053 COMPREHEN METABOLIC PANEL: CPT

## 2022-05-26 PROCEDURE — 6370000000 HC RX 637 (ALT 250 FOR IP): Performed by: INTERNAL MEDICINE

## 2022-05-26 PROCEDURE — 84443 ASSAY THYROID STIM HORMONE: CPT

## 2022-05-26 PROCEDURE — G0378 HOSPITAL OBSERVATION PER HR: HCPCS

## 2022-05-26 PROCEDURE — 6370000000 HC RX 637 (ALT 250 FOR IP): Performed by: NURSE PRACTITIONER

## 2022-05-26 PROCEDURE — 6360000004 HC RX CONTRAST MEDICATION: Performed by: INTERNAL MEDICINE

## 2022-05-26 PROCEDURE — 99223 1ST HOSP IP/OBS HIGH 75: CPT | Performed by: INTERNAL MEDICINE

## 2022-05-26 PROCEDURE — 85610 PROTHROMBIN TIME: CPT

## 2022-05-26 PROCEDURE — 93306 TTE W/DOPPLER COMPLETE: CPT | Performed by: INTERNAL MEDICINE

## 2022-05-26 PROCEDURE — 2580000003 HC RX 258: Performed by: EMERGENCY MEDICINE

## 2022-05-26 PROCEDURE — C8929 TTE W OR WO FOL WCON,DOPPLER: HCPCS

## 2022-05-26 PROCEDURE — 84484 ASSAY OF TROPONIN QUANT: CPT

## 2022-05-26 PROCEDURE — 99285 EMERGENCY DEPT VISIT HI MDM: CPT

## 2022-05-26 PROCEDURE — 85027 COMPLETE CBC AUTOMATED: CPT

## 2022-05-26 PROCEDURE — 83735 ASSAY OF MAGNESIUM: CPT

## 2022-05-26 PROCEDURE — 93005 ELECTROCARDIOGRAM TRACING: CPT | Performed by: EMERGENCY MEDICINE

## 2022-05-26 RX ORDER — WARFARIN SODIUM 5 MG/1
5 TABLET ORAL
Status: DISCONTINUED | OUTPATIENT
Start: 2022-05-26 | End: 2022-05-27

## 2022-05-26 RX ORDER — POTASSIUM CHLORIDE 20 MEQ/1
40 TABLET, EXTENDED RELEASE ORAL PRN
Status: DISCONTINUED | OUTPATIENT
Start: 2022-05-26 | End: 2022-05-31

## 2022-05-26 RX ORDER — ACETAMINOPHEN 650 MG/1
650 SUPPOSITORY RECTAL EVERY 6 HOURS PRN
Status: DISCONTINUED | OUTPATIENT
Start: 2022-05-26 | End: 2022-06-01

## 2022-05-26 RX ORDER — LANOLIN ALCOHOL/MO/W.PET/CERES
1000 CREAM (GRAM) TOPICAL DAILY
Status: DISCONTINUED | OUTPATIENT
Start: 2022-05-26 | End: 2022-05-31

## 2022-05-26 RX ORDER — ACETAMINOPHEN 325 MG/1
650 TABLET ORAL EVERY 6 HOURS PRN
Status: DISCONTINUED | OUTPATIENT
Start: 2022-05-26 | End: 2022-06-01 | Stop reason: SDUPTHER

## 2022-05-26 RX ORDER — SODIUM CHLORIDE 9 MG/ML
INJECTION, SOLUTION INTRAVENOUS PRN
Status: DISCONTINUED | OUTPATIENT
Start: 2022-05-26 | End: 2022-05-31

## 2022-05-26 RX ORDER — MAGNESIUM SULFATE IN WATER 40 MG/ML
2000 INJECTION, SOLUTION INTRAVENOUS PRN
Status: DISCONTINUED | OUTPATIENT
Start: 2022-05-26 | End: 2022-05-31

## 2022-05-26 RX ORDER — SODIUM CHLORIDE 0.9 % (FLUSH) 0.9 %
3 SYRINGE (ML) INJECTION EVERY 8 HOURS
Status: DISCONTINUED | OUTPATIENT
Start: 2022-05-26 | End: 2022-05-31

## 2022-05-26 RX ORDER — ASPIRIN 81 MG/1
81 TABLET, CHEWABLE ORAL DAILY
Status: DISCONTINUED | OUTPATIENT
Start: 2022-05-27 | End: 2022-06-06 | Stop reason: HOSPADM

## 2022-05-26 RX ORDER — SODIUM CHLORIDE 0.9 % (FLUSH) 0.9 %
5-40 SYRINGE (ML) INJECTION EVERY 12 HOURS SCHEDULED
Status: DISCONTINUED | OUTPATIENT
Start: 2022-05-26 | End: 2022-05-31

## 2022-05-26 RX ORDER — ALBUTEROL SULFATE 90 UG/1
2 AEROSOL, METERED RESPIRATORY (INHALATION) EVERY 6 HOURS PRN
Status: DISCONTINUED | OUTPATIENT
Start: 2022-05-26 | End: 2022-05-31

## 2022-05-26 RX ORDER — SODIUM CHLORIDE 0.9 % (FLUSH) 0.9 %
5-40 SYRINGE (ML) INJECTION PRN
Status: DISCONTINUED | OUTPATIENT
Start: 2022-05-26 | End: 2022-05-31

## 2022-05-26 RX ORDER — QUETIAPINE FUMARATE 25 MG/1
100 TABLET, FILM COATED ORAL NIGHTLY
Status: DISCONTINUED | OUTPATIENT
Start: 2022-05-26 | End: 2022-05-31

## 2022-05-26 RX ORDER — NITROGLYCERIN 0.4 MG/1
0.4 TABLET SUBLINGUAL EVERY 5 MIN PRN
Status: DISCONTINUED | OUTPATIENT
Start: 2022-05-26 | End: 2022-05-31

## 2022-05-26 RX ORDER — CARVEDILOL 12.5 MG/1
12.5 TABLET ORAL DAILY
Status: DISCONTINUED | OUTPATIENT
Start: 2022-05-26 | End: 2022-05-27

## 2022-05-26 RX ORDER — POLYETHYLENE GLYCOL 3350 17 G/17G
17 POWDER, FOR SOLUTION ORAL DAILY PRN
Status: DISCONTINUED | OUTPATIENT
Start: 2022-05-26 | End: 2022-05-31

## 2022-05-26 RX ORDER — AMIODARONE HYDROCHLORIDE 200 MG/1
400 TABLET ORAL 2 TIMES DAILY
Status: DISCONTINUED | OUTPATIENT
Start: 2022-05-26 | End: 2022-05-31

## 2022-05-26 RX ORDER — POTASSIUM CHLORIDE 7.45 MG/ML
10 INJECTION INTRAVENOUS PRN
Status: DISCONTINUED | OUTPATIENT
Start: 2022-05-26 | End: 2022-05-31

## 2022-05-26 RX ORDER — LOSARTAN POTASSIUM 50 MG/1
25 TABLET ORAL DAILY
Status: DISCONTINUED | OUTPATIENT
Start: 2022-05-26 | End: 2022-05-27

## 2022-05-26 RX ADMIN — LOSARTAN POTASSIUM 25 MG: 50 TABLET, FILM COATED ORAL at 20:15

## 2022-05-26 RX ADMIN — QUETIAPINE FUMARATE 100 MG: 25 TABLET ORAL at 20:14

## 2022-05-26 RX ADMIN — SODIUM CHLORIDE, PRESERVATIVE FREE 3 ML: 5 INJECTION INTRAVENOUS at 09:00

## 2022-05-26 RX ADMIN — SODIUM CHLORIDE, PRESERVATIVE FREE 3 ML: 5 INJECTION INTRAVENOUS at 19:48

## 2022-05-26 RX ADMIN — CARVEDILOL 12.5 MG: 12.5 TABLET, FILM COATED ORAL at 20:20

## 2022-05-26 RX ADMIN — PERFLUTREN 5 ML: 6.52 INJECTION, SUSPENSION INTRAVENOUS at 14:31

## 2022-05-26 RX ADMIN — SODIUM CHLORIDE, PRESERVATIVE FREE 5 ML: 5 INJECTION INTRAVENOUS at 20:20

## 2022-05-26 RX ADMIN — AMIODARONE HYDROCHLORIDE 400 MG: 200 TABLET ORAL at 20:14

## 2022-05-26 RX ADMIN — SODIUM CHLORIDE, PRESERVATIVE FREE 10 ML: 5 INJECTION INTRAVENOUS at 13:10

## 2022-05-26 ASSESSMENT — PAIN - FUNCTIONAL ASSESSMENT
PAIN_FUNCTIONAL_ASSESSMENT: NONE - DENIES PAIN
PAIN_FUNCTIONAL_ASSESSMENT: NONE - DENIES PAIN

## 2022-05-26 ASSESSMENT — ENCOUNTER SYMPTOMS
ORTHOPNEA: 0
HEARTBURN: 0
SHORTNESS OF BREATH: 1
RESPIRATORY NEGATIVE: 1
NAUSEA: 0
GASTROINTESTINAL NEGATIVE: 1
EYES NEGATIVE: 1
VOMITING: 0

## 2022-05-26 NOTE — ED NOTES
TRANSFER - OUT REPORT:    Verbal report given to 3rd floor RN on Krystina Coughlin  being transferred to 3rd floor telemetry for routine progression of patient care       Report consisted of patient's Situation, Background, Assessment and   Recommendations(SBAR). Information from the following report(s) Nurse Handoff Report was reviewed with the receiving nurse. Lines:   Peripheral IV 05/26/22 Right Antecubital (Active)   Site Assessment Clean, dry & intact 05/26/22 0353   Phlebitis Assessment No symptoms 05/26/22 0353   Infiltration Assessment 0 05/26/22 0353        Opportunity for questions and clarification was provided.       Patient transported with:  Monitor and Registered Nurse       Raul Larose RN  05/26/22 [phone]

## 2022-05-26 NOTE — PROGRESS NOTES
TRANSFER - IN REPORT:    Verbal report received from CYNTHIA Matute on Josefa Lao being received from ER for routine progression of patient care      Report consisted of patients Situation, Background, Assessment and Recommendations(SBAR). Information from the following report(s) SBAR, ED Summary, MAR, Recent Results and Cardiac Rhythm NSR @78 was reviewed with the receiving nurse. Opportunity for questions and clarification was provided. Assessment completed upon patients arrival to unit and care assumed.

## 2022-05-26 NOTE — ED TRIAGE NOTES
Pt presents to ED for c/o ACID discharge x 4 PTA. Pt reports feelings of dizziness prior to discharge. Pt has [facility] placed in 2011. Pt denies CP, SOB, N/V, fever, chills.

## 2022-05-26 NOTE — ED PROVIDER NOTES
[employer] Emergency Department Provider Note                   PCP:                Robinson Barton MD               Age: 58 y.o. Sex: male       ICD-10-CM    1. ICD (implantable cardioverter-defibrillator) discharge  Z45.02 Transthoracic echocardiogram (TTE) complete with contrast, bubble, strain, and 3D PRN     Transthoracic echocardiogram (TTE) complete with contrast, bubble, strain, and 3D PRN       DISPOSITION Admitted 2022 10:18:39 AM       Current Discharge Medication List          Orders Placed This Encounter   Procedures    XR CHEST (2 VW)    Troponin    CBC    Comprehensive Metabolic Panel    TSH    Magnesium    Basic Metabolic Panel w/ Reflex to MG    CBC    Diet NPO    Cardiac Monitor    Pulse Oximetry    Vital signs per unit routine    Telemetry monitoring - 72 hour duration    Notify physician    Notify physician    Strict Bedrest    Daily weights    Intake and output    Place intermittent pneumatic compression device    Full code    Pharmacy to Dose: Warfarin; 2.0 - 3.0 Anticoagulation Summary As of 2022 INR goal: 2.0-3.0 TTR: 99.2 % (3.4 y) INR used for dosin.5 (2022) Warfarin maintenance plan: 7.5 mg (5 mg x 1.5) every Mon, Wed, Fri; 5 mg (5 mg x 1) all other . ..     Initiate Oxygen Therapy Protocol    EKG 12 Lead    EKG 12 lead    Saline lock IV    Place in Observation Service        MDM  Number of Diagnoses or Management Options  Diagnosis management comments: Automatic defibrillator firing  [employer] interrogation - V Fib - 5 episodes of shock and multiple episodes of pacing out  Now in sinus rhythm rate 75 on monitor in room  No chest pain  CXR clear lungs, pacemaker / defib in place - I viewed images  Consult Cardiology for admission       Amount and/or Complexity of Data Reviewed  Clinical lab tests: ordered and reviewed  Tests in the radiology section of CPT®: ordered and reviewed  Discuss the patient with other providers: yes (Cardiology)  Independent visualization of images, tracings, or specimens: yes         Rosmery Noriega is a 58 y.o. male who presents to the Emergency Department with chief complaint of    Chief Complaint   Patient presents with    AICD Problem    Dizziness      Patient complains of his defibrillator firing 4 times during the night. No chest pain. He just feels tired from no sleep. No SOB. No URI symptoms. All other systems reviewed and are negative. Review of Systems   Constitutional: Negative. HENT: Negative. Respiratory: Negative. Cardiovascular: Negative. Gastrointestinal: Negative. Genitourinary: Negative. Musculoskeletal: Negative. Skin: Negative. Neurological: Negative. Hematological: Negative. Psychiatric/Behavioral: Negative.         Past Medical History:   Diagnosis Date    Acute hypoxemic respiratory failure due to COVID-19 [facility]) 10/9/2021    CAD (coronary artery disease)     heart attack 2005 stentS HEART    CHF (congestive heart failure) ([facility] [address] 9/27/2011    Chronic systolic heart failure ([facility] [address] 10/2/2015    Coronary atherosclerosis of native coronary vessel 10/2/2015    Hyperlipidemia 10/2/2015    Hypoxemia requiring supplemental oxygen 10/6/2021    IGT (impaired glucose tolerance) 12/3/2017    Other ill-defined conditions(799.89)      blind left eye    Other ill-defined conditions(799.89)     cardiomyopathy    Pneumonia due to COVID-19 virus 11/13/2021    Resolved    Primary insomnia 6/7/2017    Psychiatric disorder     depression     Sepsis, unspecified 4/5/2011    Thromboembolus ([facility] [address]     thrombus in heart     Thrombus 10/2/2015        Past Surgical History:   Procedure Laterality Date    CARDIAC CATHETERIZATION      5 stents total    COLONOSCOPY  12/2010    HEENT      oral surgery    PACEMAKER      SC CARDIAC SURG PROCEDURE UNLIST       1 stent 2005        Family History   Problem Relation Age of Onset    Heart Disease Mother    Sandro Outaaron Diabetes Paternal Grandfather     Cancer Paternal Grandmother     Heart Disease Brother     Diabetes Maternal Grandmother     Bleeding Prob Father     Diabetes Mother     Heart Disease Paternal Grandfather     Heart Attack Mother 67        mi           Social Connections:     Frequency of Communication with Friends and Family: Not on file    Frequency of Social Gatherings with Friends and Family: Not on file    Attends Anabaptism Services: Not on file    Active Member of Clubs or Organizations: Not on file    Attends Club or Organization Meetings: Not on file    Marital Status: Not on file        Allergies   Allergen Reactions    Penicillins Swelling     Face swelling    Atorvastatin Other (See Comments)     itching    Evolocumab Other (See Comments)     Itching    Lisinopril Other (See Comments)    Rosuvastatin Other (See Comments)     itching        Vitals signs and nursing note reviewed. Patient Vitals for the past 4 hrs:   Temp Pulse Resp BP SpO2   05/26/22 1125 98.9 °F (37.2 °C) 75 19 125/81 97 %   05/26/22 1115 -- 76 -- -- --   05/26/22 1015 -- 74 19 (!) 145/81 96 %   05/26/22 1000 -- 77 17 108/76 96 %   05/26/22 0945 -- 78 20 116/81 97 %   05/26/22 0930 -- 79 18 109/77 95 %   05/26/22 0900 -- 73 20 107/71 94 %   05/26/22 0845 -- 82 20 112/75 95 %   05/26/22 0830 -- 75 18 115/75 93 %          Physical Exam  Vitals and nursing note reviewed. Constitutional:       Appearance: Normal appearance. HENT:      Head: Normocephalic and atraumatic. Nose: Nose normal.      Mouth/Throat:      Mouth: Mucous membranes are moist.   Eyes:      Extraocular Movements: Extraocular movements intact. Pupils: Pupils are equal, round, and reactive to light. Cardiovascular:      Rate and Rhythm: Normal rate and regular rhythm. Pulses: Normal pulses. Heart sounds: Normal heart sounds. Pulmonary:      Effort: Pulmonary effort is normal.      Breath sounds: Normal breath sounds. Musculoskeletal:      Right lower leg: No edema. Left lower leg: No edema. Skin:     General: Skin is warm and dry. Neurological:      General: No focal deficit present. Mental Status: He is alert and oriented to person, place, and time. Psychiatric:         Mood and Affect: Mood normal.          EKG 12 Lead    Date/Time: 5/26/2022 11:30 AM  Performed by: Earnestine Castaneda MD  Authorized by: Earnestine Castaneda MD     ECG reviewed by ED Physician in the absence of a cardiologist: yes    Interpretation:     Interpretation: non-specific    Rate:     ECG rate:  110    ECG rate assessment: tachycardic    Rhythm:     Rhythm: sinus rhythm    Ectopy:     Ectopy: none          Labs Reviewed   TROPONIN - Abnormal; Notable for the following components:       Result Value    Troponin, High Sensitivity 41.2 (*)     All other components within normal limits   TROPONIN - Abnormal; Notable for the following components:    Troponin, High Sensitivity 43.7 (*)     All other components within normal limits   CBC - Abnormal; Notable for the following components:    WBC 11.8 (*)     RDW 15.1 (*)     All other components within normal limits   COMPREHENSIVE METABOLIC PANEL - Abnormal; Notable for the following components:    Anion Gap 6 (*)     Glucose 125 (*)     Albumin/Globulin Ratio 1.0 (*)     All other components within normal limits   MAGNESIUM - Abnormal; Notable for the following components:    Magnesium 2.5 (*)     All other components within normal limits   TSH        XR CHEST (2 VW)   Final Result      1. No evidence of acute pulmonary disease. [place] Coma Scale  Eye Opening: Spontaneous  Best Verbal Response: Oriented  Best Motor Response: Obeys commands  [place] Coma Scale Score: 15                     Voice dictation software was used during the making of this note. This software is not perfect and grammatical and other typographical errors may be present.   This note has not been completely proofread for errors.      Emmy Nevarez MD  05/26/22 1132

## 2022-05-26 NOTE — H&P
[facility] CARDIOLOGY History &Physical                 Primary Cardiologist: Dr Ashanti Avilez    Primary Care Physician: Theresa Ortega MD    Admitting Physician: Dr Baca Roots:     Patient is a 58 y.o. male with a hx of CAD, HFrEF, IGT, HLD, hx of cardiac wall mural thrombus, and depression. The patient has been doing well in the last defibrillator interrogation showed episodes of ATP but no ICD shocks with no medications changes at home. Patient about 1 AM experienced for episodes of ICD discharge which confirmed by permanent ICD interrogation with Babak Bender sent to Dr. Juvenal Salinas. Per the patient he did feel palpitations at that time and very fast heart rate with some diaphoresis. He denied any chest pain, weakness, dizziness, headache, falls, or any other new symptoms. Patient was seen in the emergency department with grossly normal labs except for leukocytosis seen and no signs symptoms of infection. He has had no other complaints and denies any recent intake in caffeine or stimulant medications. At home the patient has been compliant with his home medication regiment and review of EKG shows QTC of 409 while on QT prolonging agents. Review of telemetry in the emergency department shows no ectopy at this time. Earlier patient feels normal and would like to go home. Recent Cardiac Synopsis w/ Labs  NST: 8/2020 CONCLUSION:   1. Stress EKG: Non diagnostic due to pharmacologic infusion. 2. SPECT Perfusion Imaging: large anterior infarction extending into mid   to distal anterior wall and distal inferior wall   3. LV Systolic Function is severely abnormal.   4. Risk Assessment: no reversible ischemia, large infarction with known    ischemic cardiomyopathy. Echo: 3/28/2018 EF 30-35%  EKG: NSR with out elevation  PPM Interrogation:  Preliminary Impression: Normal dc icd function. VT episode(s) back on   09/30/21 successfully ATP terminated. Several NSVT since then but no   further therapies. Pt asymptomatic to these events. PVC burden:  354K   single pvc's in past 5 months. AP 2%   3%.   No programming changes   warranted.        Past Medical History:   Diagnosis Date    Acute hypoxemic respiratory failure due to COVID-19 [facility]) 10/9/2021    CAD (coronary artery disease)     heart attack 2005 stentS HEART    CH[facility]congestive [facility] failure) [facility] 9/27/2011    Chronic systolic heart failure ([facility] [address] 10/2/2015    Coronary atherosclerosis of native coronary vessel 10/2/2015    Hyperlipidemia 10/2/2015    Hypoxemia requiring supplemental oxygen 10/6/2021    IGT (impaired glucose tolerance) 12/3/2017    Other ill-defined conditions(799.89)      blind left eye    Other ill-defined conditions(799.89)     cardiomyopathy    Pneumonia due to COVID-19 virus 11/13/2021    Resolved    Primary insomnia 6/7/2017    Psychiatric disorder     depression     Sepsis, unspecified 4/5/2011    Thromboembolus ([facility] [address]     thrombus in heart     Thrombus 10/2/2015      Past Surgical History:   Procedure Laterality Date    CARDIAC CATHETERIZATION      5 stents total    COLONOSCOPY  12/2010    HEENT      oral surgery    PACEMAKER      [place] CARDIAC SURG PROCEDURE UNLIST       1 stent 2005      Allergies   Allergen Reactions    Penicillins Swelling     Face swelling    Atorvastatin Other (See Comments)     itching    Evolocumab Other (See Comments)     Itching    Lisinopril Other (See Comments)    Rosuvastatin Other (See Comments)     itching     Social History     Tobacco History     Smoking Status  Current Every Day Smoker Smoking Start Date  6/7/1995 Smoking Frequency  1 pack/day Smoking Tobacco Type  Cigarettes    Smokeless Tobacco Use  Never Used          Alcohol History     Alcohol Use Status  No          Drug Use     Drug Use Status  No          Sexual Activity     Sexually Active  Not Currently              FH:   Family History   Problem Relation Age of Onset    Heart Disease Mother     Diabetes Paternal Grandfather     Cancer Paternal Grandmother     Heart Disease Brother     Diabetes Maternal Grandmother     Bleeding Prob Father     Diabetes Mother     Heart Disease Paternal Grandfather     Heart Attack Mother 67        mi        Review of Systems   Constitutional: Positive for diaphoresis. Negative for chills, fever, malaise/fatigue, night sweats, weight gain and weight loss. HENT: Negative. Eyes: Negative. Cardiovascular: Positive for chest pain, dyspnea on exertion and palpitations. Negative for leg swelling and orthopnea. Respiratory: Positive for shortness of breath. Endocrine: Negative. Hematologic/Lymphatic: Negative. Skin: Negative. Musculoskeletal: Negative. Gastrointestinal: Negative for heartburn, nausea and vomiting. Genitourinary: Negative. Neurological: Positive for dizziness. Negative for weakness. Psychiatric/Behavioral: Negative. Objective:       /74   Pulse 89   Temp 99.5 °F (37.5 °C) (Oral)   Resp 18   Ht 5' 9\" (1.753 m)   Wt 220 lb (99.8 kg)   SpO2 97%   BMI 32.49 kg/m²     Patient Vitals for the past 96 hrs (Last 3 readings):   Weight   05/26/22 0329 220 lb (99.8 kg)       No intake/output data recorded. No intake/output data recorded.       Physical Exam:  General: Well Developed, overwight, No Acute Distress  HEENT: pupils equal and round, no abnormalities noted  Neck: supple, no JVD, no carotid bruits  Heart: S1S2 with RRR without murmurs or gallops  Lungs: Clear throughout auscultation bilaterally without adventitious sounds  Abd: soft, nontender, nondistended, with good bowel sounds  Ext: warm, no edema, calves supple/nontender, pulses 2+ bilaterally  Skin: warm and dry  Psychiatric: Normal mood and affect  Neurologic: Alert and oriented X 3        Data Review:     Recent Labs     05/26/22  0350   WBC 11.8*   HGB 15.0   HCT 45.5   MCV 91.4          Recent Labs     05/26/22  0350      K 4.2      CO2 27   BUN 14 CREATININE 1.10   GLUCOSE 125*   CALCIUM 9.0   PROT 7.1   LABALBU 3.6   BILITOT 0.4   ALKPHOS 77   AST 34   ALT 16   LABGLOM >60   GFRAA >60   GLOB 3.5       No results for input(s): CKTOTAL, CKMB, CKMBINDEX, DDIMER, TROPONINI in the last 720 hours. Assessment/Plan:     Principal Problem:      ICD (implantable cardioverter-defibrillator) discharge: Patient had episodes of ATP and ICD discharge x4. Results were obtained by [employer]. Patient's labs are grossly normal except for leukocytosis. We will check magnesium level, TSH level, and observe overnight for possible further arrhythmias. Can consider left heart cath with history of ischemic cardiomyopathy. Currently no signs of infection. Further recommendation pending clinical course and team recommendations. Active Problems:       Long term (current) use of anticoagulants: We will have pharmacy manage oral anticoagulation this hospital admission with a history of LV thrombus with goal INR 2-3      Coronary atherosclerosis of native coronary vessel: Patient is intolerant to statin uses, continue beta-blocker, start 81 mg aspirin daily. We will keep patient n.p.o. for possible left heart cath if deemed necessary by attending. Further recommendations pending clinical course and team recommendations. Ventricular tachycardia ([address] we will admit for overnight observation, replace electrolytes as needed, start amiodarone 400 mg twice daily, consider left heart cath this hospital admission, update echocardiogram, check TSH. Further recommendation pending clinical course and team recommendations. Above. LORENZA Miller - CNP  5/26/2022  9:20 AM    [facility] CARDIOLOGY     5/26/2022     10:36 AM    I have personally seen and examined [place] Ivonne .     am  5/26/2022 10:36 AM    Admit Date: 5/26/2022    Admit Diagnosis: ICD (implantable cardioverter-defibrillator) discharge [Z45.02]      Subjective:    Patient patient presents with multiple ICD shocks from ventricular fibrillation. He has a history of an ischemic cardiomyopathy. He endorses that his last heart cath was in 2011.   He denies having CABG but has had prior history of stents    Objective:    BP (!) 145/81   Pulse 74   Temp 99.5 °F (37.5 °C) (Oral)   Resp 19   Ht 5' 9\" (1.753 m)   Wt 220 lb (99.8 kg)   SpO2 96%   BMI 32.49 kg/m²     ROS:  reviewed and verified  Physical Exam:    Physical Examination: General appearance - alert, well appearing, and in no distress  Chest/CV - clear to auscultation, no wheezes, rales or rhonchi, symmetric air entry  Heart - normal rate, regular rhythm, normal S1, S2, no murmurs, rubs, clicks or gallops  Abdomen/GI - soft, nontender, nondistended, no masses or organomegaly  Extremities - peripheral pulses normal, no pedal edema, no clubbing or cyanosis      Current Facility-Administered Medications   Medication Dose Route Frequency    sodium chloride flush 0.9 % injection 3 mL  3 mL IntraVENous Q8H     Current Outpatient Medications   Medication Sig    albuterol sulfate  (90 Base) MCG/ACT inhaler Inhale 2 puffs into the lungs every 4 hours as needed    carvedilol (COREG) 12.5 MG tablet Take 12.5 mg by mouth daily    Cetirizine HCl 10 MG CAPS Take by mouth as needed    cyanocobalamin 1000 MCG tablet Take 1,000 mcg by mouth as needed    QUEtiapine (SEROQUEL) 100 MG tablet Take 100 mg by mouth    warfarin (COUMADIN) 5 MG tablet Take 1 to 1 1/2 tablet daily or as directed       Data Review:   @LABRCNT(Na,K,BUN,CREA,WBC,HGB,HCT,PLT,INR,TRP,TCHOL*,Triglyceride*,LDL*,LDLCPOC HDL*,HDL])@    TELEMETRY: Normal sinus rhythm    Assessment/Plan:     Principal Problem:    ICD (implantable cardioverter-defibrillator) discharge  Active Problems:    S/P ICD (internal cardiac defibrillator) procedure    Long term (current) use of anticoagulants    Coronary atherosclerosis of native coronary vessel    Ventricular tachycardia (HCC)  Resolved Problems:    * No resolved hospital problems. *    With history of ischemic cardiomyopathy and escalating episodes of VF with ICD shocks patient needs to be admitted electrolytes monitored started on amiodarone and undergo heart cath for ischemic work-up    Pt set up for procedure. Risks benefits and alternatives discussed. Pt agrees to proceed. Risks of bleeding infection emergent surgical procedure loss of life or limb renal failure and other known risks discussed. Pt agrees to proceed and agrees to sign consent form. Current consent form has been signed and visualized and is completed in its entirety by all involved parties. Actual scanning of the paper consent into the chart takes place at a later date and is a process that I am not involved in nor can be held responsible for. Oz Sauer MD    I have reviewed this note in its entirety and edited where appropriate. I have performed an independent exam . All portions of the E/M have been edited and reviewed prior to signing note.       Oz Sauer MD

## 2022-05-26 NOTE — PROGRESS NOTES
Warfarin dosing per pharmacist    Marianne Smith is a 58 y.o. male. Height: 5' 9\" (175.3 cm)  Weight: 220 lb (99.8 kg)    Indication:  Hx cardiac wall mural thrombus    Goal INR:  2-3    Home dose:  7.5 mg (5 mg x 1.5) every Mon, Wed, Fri; 5 mg (5 mg x 1) all other days. Risk factors or significant drug interactions:  amiodarone    Other anticoagulants:  none    Daily Monitoring  Date  INR     Warfarin dose HGB              Notes  5/26  2.5  5 mg  15        Pharmacy consulted to manage warfarin while Mr. Aggie Ocampo is inpatient. INR is therapeutic today at 2.5. Will give 5mg this evening to reflect home regimen. Daily INR. Pharmacy will continue to monitor.      Thank you,  Oleg Sifuentes, PharmD  PGY1 Pharmacy Resident

## 2022-05-27 ENCOUNTER — APPOINTMENT (OUTPATIENT)
Dept: ULTRASOUND IMAGING | Age: 62
DRG: 234 | End: 2022-05-27
Payer: MEDICARE

## 2022-05-27 ENCOUNTER — PREP FOR PROCEDURE (OUTPATIENT)
Dept: CARDIOTHORACIC SURGERY | Age: 62
End: 2022-05-27

## 2022-05-27 LAB
ANION GAP SERPL CALC-SCNC: 6 MMOL/L (ref 7–16)
APPEARANCE UR: CLEAR
BACTERIA SPEC CULT: NORMAL
BACTERIA URNS QL MICRO: 0 /HPF
BILIRUB UR QL: NEGATIVE
BUN SERPL-MCNC: 15 MG/DL (ref 8–23)
CALCIUM SERPL-MCNC: 8.9 MG/DL (ref 8.3–10.4)
CHLORIDE SERPL-SCNC: 109 MMOL/L (ref 98–107)
CO2 SERPL-SCNC: 26 MMOL/L (ref 21–32)
COLOR UR: YELLOW
CREAT SERPL-MCNC: 0.9 MG/DL (ref 0.8–1.5)
ECHO BSA: 2.2 M2
EPI CELLS #/AREA URNS HPF: ABNORMAL /HPF
ERYTHROCYTE [DISTWIDTH] IN BLOOD BY AUTOMATED COUNT: 15.3 % (ref 11.9–14.6)
GLUCOSE SERPL-MCNC: 122 MG/DL (ref 65–100)
GLUCOSE UR STRIP.AUTO-MCNC: NEGATIVE MG/DL
HCT VFR BLD AUTO: 45.1 % (ref 41.1–50.3)
HGB BLD-MCNC: 14.5 G/DL (ref 13.6–17.2)
HGB UR QL STRIP: ABNORMAL
INR PPP: 2.5
KETONES UR QL STRIP.AUTO: NEGATIVE MG/DL
LEUKOCYTE ESTERASE UR QL STRIP.AUTO: NEGATIVE
MCH RBC QN AUTO: 29.6 PG (ref 26.1–32.9)
MCHC RBC AUTO-ENTMCNC: 32.2 G/DL (ref 31.4–35)
MCV RBC AUTO: 92 FL (ref 79.6–97.8)
MUCOUS THREADS URNS QL MICRO: ABNORMAL /LPF
NITRITE UR QL STRIP.AUTO: NEGATIVE
NRBC # BLD: 0 K/UL (ref 0–0.2)
OTHER OBSERVATIONS: ABNORMAL
PH UR STRIP: 6.5 [PH] (ref 5–9)
PLATELET # BLD AUTO: 255 K/UL (ref 150–450)
PMV BLD AUTO: 10.9 FL (ref 9.4–12.3)
POTASSIUM SERPL-SCNC: 4.3 MMOL/L (ref 3.5–5.1)
PROT UR STRIP-MCNC: NEGATIVE MG/DL
PROTHROMBIN TIME: 27.6 SEC (ref 12.6–14.5)
RBC # BLD AUTO: 4.9 M/UL (ref 4.23–5.6)
RBC #/AREA URNS HPF: ABNORMAL /HPF
SERVICE CMNT-IMP: NORMAL
SODIUM SERPL-SCNC: 141 MMOL/L (ref 138–145)
SP GR UR REFRACTOMETRY: 1.02 (ref 1–1.02)
UROBILINOGEN UR QL STRIP.AUTO: 0.2 EU/DL (ref 0.2–1)
WBC # BLD AUTO: 10.2 K/UL (ref 4.3–11.1)
WBC URNS QL MICRO: ABNORMAL /HPF

## 2022-05-27 PROCEDURE — C1894 INTRO/SHEATH, NON-LASER: HCPCS | Performed by: INTERNAL MEDICINE

## 2022-05-27 PROCEDURE — C1769 GUIDE WIRE: HCPCS | Performed by: INTERNAL MEDICINE

## 2022-05-27 PROCEDURE — 99152 MOD SED SAME PHYS/QHP 5/>YRS: CPT | Performed by: INTERNAL MEDICINE

## 2022-05-27 PROCEDURE — 6370000000 HC RX 637 (ALT 250 FOR IP): Performed by: INTERNAL MEDICINE

## 2022-05-27 PROCEDURE — 93880 EXTRACRANIAL BILAT STUDY: CPT

## 2022-05-27 PROCEDURE — G0378 HOSPITAL OBSERVATION PER HR: HCPCS

## 2022-05-27 PROCEDURE — 93458 L HRT ARTERY/VENTRICLE ANGIO: CPT | Performed by: INTERNAL MEDICINE

## 2022-05-27 PROCEDURE — 85610 PROTHROMBIN TIME: CPT

## 2022-05-27 PROCEDURE — 36415 COLL VENOUS BLD VENIPUNCTURE: CPT

## 2022-05-27 PROCEDURE — 4A023N7 MEASUREMENT OF CARDIAC SAMPLING AND PRESSURE, LEFT HEART, PERCUTANEOUS APPROACH: ICD-10-PCS | Performed by: INTERNAL MEDICINE

## 2022-05-27 PROCEDURE — 93005 ELECTROCARDIOGRAM TRACING: CPT | Performed by: NURSE PRACTITIONER

## 2022-05-27 PROCEDURE — 6360000004 HC RX CONTRAST MEDICATION: Performed by: INTERNAL MEDICINE

## 2022-05-27 PROCEDURE — 2709999900 HC NON-CHARGEABLE SUPPLY: Performed by: INTERNAL MEDICINE

## 2022-05-27 PROCEDURE — 96372 THER/PROPH/DIAG INJ SC/IM: CPT

## 2022-05-27 PROCEDURE — 80048 BASIC METABOLIC PNL TOTAL CA: CPT

## 2022-05-27 PROCEDURE — 6360000002 HC RX W HCPCS: Performed by: INTERNAL MEDICINE

## 2022-05-27 PROCEDURE — 96375 TX/PRO/DX INJ NEW DRUG ADDON: CPT

## 2022-05-27 PROCEDURE — 6360000002 HC RX W HCPCS: Performed by: PHYSICIAN ASSISTANT

## 2022-05-27 PROCEDURE — 87641 MR-STAPH DNA AMP PROBE: CPT

## 2022-05-27 PROCEDURE — 81003 URINALYSIS AUTO W/O SCOPE: CPT

## 2022-05-27 PROCEDURE — 85027 COMPLETE CBC AUTOMATED: CPT

## 2022-05-27 PROCEDURE — 96374 THER/PROPH/DIAG INJ IV PUSH: CPT

## 2022-05-27 PROCEDURE — 2500000003 HC RX 250 WO HCPCS: Performed by: INTERNAL MEDICINE

## 2022-05-27 PROCEDURE — B2111ZZ FLUOROSCOPY OF MULTIPLE CORONARY ARTERIES USING LOW OSMOLAR CONTRAST: ICD-10-PCS | Performed by: INTERNAL MEDICINE

## 2022-05-27 PROCEDURE — B2151ZZ FLUOROSCOPY OF LEFT HEART USING LOW OSMOLAR CONTRAST: ICD-10-PCS | Performed by: INTERNAL MEDICINE

## 2022-05-27 PROCEDURE — 2580000003 HC RX 258: Performed by: NURSE PRACTITIONER

## 2022-05-27 PROCEDURE — 6370000000 HC RX 637 (ALT 250 FOR IP): Performed by: NURSE PRACTITIONER

## 2022-05-27 PROCEDURE — 99232 SBSQ HOSP IP/OBS MODERATE 35: CPT | Performed by: INTERNAL MEDICINE

## 2022-05-27 PROCEDURE — 6360000002 HC RX W HCPCS

## 2022-05-27 PROCEDURE — C1887 CATHETER, GUIDING: HCPCS | Performed by: INTERNAL MEDICINE

## 2022-05-27 RX ORDER — ENOXAPARIN SODIUM 100 MG/ML
1 INJECTION SUBCUTANEOUS 2 TIMES DAILY
Status: DISCONTINUED | OUTPATIENT
Start: 2022-05-27 | End: 2022-05-31

## 2022-05-27 RX ORDER — FAMOTIDINE 20 MG/1
20 TABLET, FILM COATED ORAL 2 TIMES DAILY
Status: DISCONTINUED | OUTPATIENT
Start: 2022-05-28 | End: 2022-05-31 | Stop reason: HOSPADM

## 2022-05-27 RX ORDER — CARVEDILOL 12.5 MG/1
12.5 TABLET ORAL NIGHTLY
Status: DISCONTINUED | OUTPATIENT
Start: 2022-05-27 | End: 2022-05-31

## 2022-05-27 RX ORDER — LOSARTAN POTASSIUM 25 MG/1
25 TABLET ORAL NIGHTLY
Status: DISCONTINUED | OUTPATIENT
Start: 2022-05-27 | End: 2022-05-31

## 2022-05-27 RX ORDER — NITROGLYCERIN 20 MG/100ML
INJECTION INTRAVENOUS CONTINUOUS PRN
Status: COMPLETED | OUTPATIENT
Start: 2022-05-27 | End: 2022-05-27

## 2022-05-27 RX ORDER — HEPARIN SODIUM 200 [USP'U]/100ML
INJECTION, SOLUTION INTRAVENOUS CONTINUOUS PRN
Status: DISCONTINUED | OUTPATIENT
Start: 2022-05-27 | End: 2022-05-27 | Stop reason: HOSPADM

## 2022-05-27 RX ORDER — LIDOCAINE HYDROCHLORIDE 10 MG/ML
INJECTION, SOLUTION INFILTRATION; PERINEURAL PRN
Status: DISCONTINUED | OUTPATIENT
Start: 2022-05-27 | End: 2022-05-27 | Stop reason: HOSPADM

## 2022-05-27 RX ORDER — AMIODARONE HYDROCHLORIDE 200 MG/1
600 TABLET ORAL 2 TIMES DAILY
Status: DISCONTINUED | OUTPATIENT
Start: 2022-05-28 | End: 2022-05-27

## 2022-05-27 RX ADMIN — QUETIAPINE FUMARATE 100 MG: 25 TABLET ORAL at 20:16

## 2022-05-27 RX ADMIN — AMIODARONE HYDROCHLORIDE 400 MG: 200 TABLET ORAL at 08:01

## 2022-05-27 RX ADMIN — SODIUM CHLORIDE, PRESERVATIVE FREE 5 ML: 5 INJECTION INTRAVENOUS at 20:20

## 2022-05-27 RX ADMIN — LOSARTAN POTASSIUM 25 MG: 25 TABLET, FILM COATED ORAL at 20:16

## 2022-05-27 RX ADMIN — SODIUM CHLORIDE, PRESERVATIVE FREE 3 ML: 5 INJECTION INTRAVENOUS at 20:19

## 2022-05-27 RX ADMIN — ENOXAPARIN SODIUM 100 MG: 100 INJECTION SUBCUTANEOUS at 18:06

## 2022-05-27 RX ADMIN — ASPIRIN 81 MG: 81 TABLET, CHEWABLE ORAL at 08:01

## 2022-05-27 RX ADMIN — AMIODARONE HYDROCHLORIDE 400 MG: 200 TABLET ORAL at 20:14

## 2022-05-27 RX ADMIN — CYANOCOBALAMIN TAB 1000 MCG 1000 MCG: 1000 TAB at 08:01

## 2022-05-27 RX ADMIN — SODIUM CHLORIDE, PRESERVATIVE FREE 3 ML: 5 INJECTION INTRAVENOUS at 02:49

## 2022-05-27 RX ADMIN — CARVEDILOL 12.5 MG: 12.5 TABLET, FILM COATED ORAL at 20:15

## 2022-05-27 RX ADMIN — SODIUM CHLORIDE, PRESERVATIVE FREE 10 ML: 5 INJECTION INTRAVENOUS at 08:03

## 2022-05-27 ASSESSMENT — PAIN SCALES - GENERAL
PAINLEVEL_OUTOF10: 0

## 2022-05-27 NOTE — PROGRESS NOTES
TRANSFER - OUT REPORT:    Verbal report given to RN on Starlyn Mon  being transferred to [address] [place] [address] for routine progression of patient care       Report consisted of patient's Situation, Background, Assessment and   Recommendations(SBAR). Information from the following report(s) Nurse Handoff Report was reviewed with the receiving nurse.     [facility] w Rhina  CV surgery consult  RRA - 12 mls    2 mg Versed  5000 units Heparin

## 2022-05-27 NOTE — PROGRESS NOTES
TRANSFER - IN REPORT:    Verbal report received from CYNTHIA Arshad on Tan Urdu being received from [facility] for routine post-op      Report consisted of patients Situation, Background, Assessment and Recommendations(SBAR). Information from the following report(s) Procedure Summary was reviewed with the receiving nurse. Opportunity for questions and clarification was provided. Assessment completed upon patients arrival to unit and care assumed.

## 2022-05-27 NOTE — CONSULTS
[address] [facility]  Orval Sutter. MD Myrna Hartman. MD Rich Vargas, S, PA-C        Nahomi Ross       [id number]  5/26/2022 1960        DATE OF CONSULTATION:  5/27/2022    Chief Complaint   Patient presents with    AICD Problem    Dizziness   CAD  HISTORY OF PRESENT ILLNESS:  The patient is a 58 y.o. male who is seen for evaluation of CAD. Pt had anterior wall MI 2005 and received AICD soon after. Placed on coumadin for akinetic anterior wall. In last several days the AICD has delivered several shocks. Pt then admitted for cath showing severe triple vessel CAD. Risk factors for CAD include age, previous smoking, dyslipidemia, and family history.   n    Past Medical History:   Diagnosis Date    Acute hypoxemic respiratory failure due to COVID-19 [facility]) 10/9/2021    CAD (coronary artery disease)     heart attack 2005 stentS HEART    [facility]congest[facility] failure) ([facility]) 9/27/2011    Chronic systolic heart failure ([facility] [address] 10/2/2015    Coronary atherosclerosis of native coronary vessel 10/2/2015    Hyperlipidemia 10/2/2015    Hypoxemia requiring supplemental oxygen 10/6/2021    IGT (impaired glucose tolerance) 12/3/2017    Other ill-defined conditions(799.89)      blind left eye    Other ill-defined conditions(799.89)     cardiomyopathy    Pneumonia due to COVID-19 virus 11/13/2021    Resolved    Primary insomnia 6/7/2017    Psychiatric disorder     depression     Sepsis, unspecified 4/5/2011    Thromboembolus ([facility] [address]     thrombus in heart     Thrombus 10/2/2015       Past Surgical History:   Procedure Laterality Date    CARDIAC CATHETERIZATION      5 stents total    COLONOSCOPY  12/2010    HEENT      oral surgery    PACEMAKER      [place] CARDIAC SURG PROCEDURE UNLIST       1 stent 2005       Family History   Problem Relation Age of Onset    Heart Disease Mother     Diabetes Paternal Grandfather     Cancer Paternal Grandmother     Heart Disease Brother     Diabetes Maternal Grandmother     Bleeding Prob Father     Diabetes Mother     Heart Disease Paternal Grandfather     Heart Attack Mother 67        mi       Social History     Socioeconomic History    Marital status:      Spouse name: Not on file    Number of children: Not on file    Years of education: Not on file    Highest education level: Not on file   Occupational History    Not on file   Tobacco Use    Smoking status: Current Every Day Smoker     Packs/day: 1.00     Types: Cigarettes     Start date: 6/7/1995    Smokeless tobacco: Never Used   Vaping Use    Vaping Use: Never used   Substance and Sexual Activity    Alcohol use: No    Drug use: No     Types: Prescription    Sexual activity: Not Currently   Other Topics Concern    Not on file   Social History Narrative    Not on file     Social Determinants of Health     Financial Resource Strain:     Difficulty of Paying Living Expenses: Not on file   Food Insecurity:     Worried About Running Out of Food in the Last Year: Not on file    Sadaf of Food in the Last Year: Not on file   Transportation Needs:     Lack of Transportation (Medical): Not on file    Lack of Transportation (Non-Medical):  Not on file   Physical Activity:     Days of Exercise per Week: Not on file    Minutes of Exercise per Session: Not on file   Stress:     Feeling of Stress : Not on file   Social Connections:     Frequency of Communication with Friends and Family: Not on file    Frequency of Social Gatherings with Friends and Family: Not on file    Attends [employer] Services: Not on file    Active Member of Clubs or Organizations: Not on file    Attends Club or Organization Meetings: Not on file    Marital Status: Not on file   Intimate Partner Violence:     Fear of Current or Ex-Partner: Not on file    Emotionally Abused: Not on file    Physically Abused: Not on file    Sexually Abused: Not on file   Housing Stability:  Unable to Pay for Housing in the Last Year: Not on file    Number of Places Lived in the Last Year: Not on file    Unstable Housing in the Last Year: Not on file       Allergies   Allergen Reactions    Penicillins Swelling     Face swelling    Atorvastatin Other (See Comments)     itching    Evolocumab Other (See Comments)     Itching    Lisinopril Other (See Comments)    Rosuvastatin Other (See Comments)     itching       No current facility-administered medications on file prior to encounter. Current Outpatient Medications on File Prior to Encounter   Medication Sig Dispense Refill    albuterol sulfate  (90 Base) MCG/ACT inhaler Inhale 2 puffs into the lungs every 4 hours as needed      carvedilol (COREG) 12.5 MG tablet Take 12.5 mg by mouth daily      Cetirizine HCl 10 MG CAPS Take by mouth as needed      cyanocobalamin 1000 MCG tablet Take 1,000 mcg by mouth as needed      QUEtiapine (SEROQUEL) 100 MG tablet Take 100 mg by mouth      warfarin (COUMADIN) 5 MG tablet Take 1 to 1 1/2 tablet daily or as directed         REVIEW OF SYSTEMS:  GENERAL:  No weight loss. No fever. EYES:  No diplopia. No vision loss. ENTM:  No hearing loss. No trouble swallowing. No hoarseness. CARDIAC:  See present illness. PULMONARY:  Denies asthma or chronic pulmonary disease. GI:  No change in bowel habits. No bleeding. :  No dysuria. No history of renal stones or renal insufficiency. MS:  Denies osteoarthritis. NEURO:  No stroke or seizure disorder  HEME/LYMPHATIC:  No history of bleeding tendencies. PSYCHIATRIC:  No history of depression. Full review of organ systems shows negative findings except for those described above. PHYSICAL EXAMINATION:  GENERAL APPEARANCE:  Well developed. Well nourished. Alert, cooperative and oriented times three. HEENT:  Normocephalic. Extraocular muscles are intact. No scleral icterus. NECK/LYMPHATIC:  Supple with no carotid bruits.   No jugular venous distention. LUNGS: Lungs are clear to auscultation. HEART:  Heart is regular rate and rhythm without a murmur. ABDOMEN:  Soft and non-tender. SKIN:  No rashes, cyanosis, jaundice, ecchymoses or evidence of skin breakdown present. EXTREMITIES:  Full range of motion. No deformity. No peripheral edema. VASCULAR:  Pulses are full and equal at the radial arteries and the popliteal arteries bilaterally. There is no venous stasis disease. NEURO:  Grossly intact. IMPRESSION:  @DX@    PLAN:  Cardiac catheterization has shown CAD. I have recommended CABG. The risk of surgery should be 2%. Possible complications might include a stroke, myocardial infarction, bleeding, or infection. There could also be renal or respiratory insufficiency. Any complication may require return to the operating room. Pt wants to proceed with CABG. No name on file.

## 2022-05-27 NOTE — ACP (ADVANCE CARE PLANNING)
Advance Care Planning     General Advance Care Planning (ACP) Conversation    Date of Conversation: 5/27/2022      Healthcare Decision Maker:    Primary Decision Maker: Artemio Rosales - Child - [phone]    Secondary Decision Maker: Sharon Garcia - Child - [phone]  Click here to complete Healthcare Decision Makers including selection of the Healthcare Decision Maker Relationship (ie \"Primary\"). Today we documented Decision Maker(s) consistent with ACP documents on file. Content/Action Overview:  LW/HCPOA on file. Listed agents correct. Full code per MD orders.    Length of Voluntary ACP Conversation in minutes:  <16 minutes (Non-Billable)    NIDA MORALES RN

## 2022-05-27 NOTE — PROGRESS NOTES
TRANSFER - IN REPORT:    Verbal report received from Lupe Reddy RN on Amarilis Mitchell being received from [address] [place] [address] for routine progression of patient care. Report consisted of patients Situation, Background, Assessment and Recommendations(SBAR). Information from the following report(s) SBAR, Kardex, Procedure Summary, Intake/Output, MAR and Recent Results was reviewed with the receiving nurse. Opportunity for questions and clarification was provided. Assessment completed upon patients arrival to unit and care assumed.

## 2022-05-27 NOTE — PROGRESS NOTES
Bedside and Verbal shift change report given to Melisa Jacobs RN (oncoming nurse) by self (offgoing nurse). Report included the following information: Nurse Handoff Report.

## 2022-05-27 NOTE — PROGRESS NOTES
am  5/27/2022 7:16 AM    Admit Date: 5/26/2022    Admit Diagnosis: ICD (implantable cardioverter-defibrillator) discharge [Z45.02]      Subjective:    Patient : Patient presented with multiple ICD shocks for true VF. He has a remote history of coronary disease and a stent. Upon admission he was started on amiodarone and has had no further VT or VF events. He does need a heart cath today    Objective:    /69   Pulse 67   Temp 97.7 °F (36.5 °C) (Oral)   Resp 16   Ht 5' 9\" (1.753 m)   Wt 209 lb 14.4 oz (95.2 kg)   SpO2 97%   BMI 31.00 kg/m²     ROS:  General ROS: negative for - chills  Hematological and Lymphatic ROS: negative for - blood clots or jaundice  Respiratory ROS: no cough, shortness of breath, or wheezing  Cardiovascular ROS: no chest pain or dyspnea on exertion  Gastrointestinal ROS: no abdominal pain, change in bowel habits, or black or bloody stools  Neurological ROS: no TIA or stroke symptoms    Physical Exam:      Physical Examination: General appearance - Appearance: alert, well appearing, and in no distress.    Neck/lymph - supple, no significant adenopathy  Chest/CV - clear to auscultation, no wheezes, rales or rhonchi, symmetric air entry  Heart - normal rate, regular rhythm, normal S1, S2, no murmurs, rubs, clicks or gallops  Abdomen/GI - soft, nontender, nondistended, no masses or organomegaly   Musculoskeletal - no joint tenderness, deformity or swelling  Extremities - peripheral pulses normal, no pedal edema, no clubbing or cyanosis  Skin - normal coloration and turgor, no rashes, no suspicious skin lesions noted    Current Facility-Administered Medications   Medication Dose Route Frequency    carvedilol (COREG) tablet 12.5 mg  12.5 mg Oral Nightly    losartan (COZAAR) tablet 25 mg  25 mg Oral Nightly    sodium chloride flush 0.9 % injection 3 mL  3 mL IntraVENous Q8H    QUEtiapine (SEROQUEL) tablet 100 mg  100 mg Oral Nightly    albuterol sulfate  (90 Base) MCG/ACT inhaler 2 puff  2 puff Inhalation Q6H PRN    vitamin B-12 (CYANOCOBALAMIN) tablet 1,000 mcg  1,000 mcg Oral Daily    sodium chloride flush 0.9 % injection 5-40 mL  5-40 mL IntraVENous 2 times per day    sodium chloride flush 0.9 % injection 5-40 mL  5-40 mL IntraVENous PRN    0.9 % sodium chloride infusion   IntraVENous PRN    acetaminophen (TYLENOL) tablet 650 mg  650 mg Oral Q6H PRN    Or    acetaminophen (TYLENOL) suppository 650 mg  650 mg Rectal Q6H PRN    polyethylene glycol (GLYCOLAX) packet 17 g  17 g Oral Daily PRN    aspirin chewable tablet 81 mg  81 mg Oral Daily    nitroGLYCERIN (NITROSTAT) SL tablet 0.4 mg  0.4 mg SubLINGual Q5 Min PRN    magnesium sulfate 2000 mg in 50 mL IVPB premix  2,000 mg IntraVENous PRN    potassium chloride (KLOR-CON M) extended release tablet 40 mEq  40 mEq Oral PRN    Or    potassium bicarb-citric acid (EFFER-K) effervescent tablet 40 mEq  40 mEq Oral PRN    Or    potassium chloride 10 mEq/100 mL IVPB (Peripheral Line)  10 mEq IntraVENous PRN    amiodarone (CORDARONE) tablet 400 mg  400 mg Oral BID    warfarin (COUMADIN) tablet 7.5 mg  7.5 mg Oral Once per day on Mon Wed Fri    warfarin (COUMADIN) tablet 5 mg  5 mg Oral Once per day on Sun Tue Thu Sat       Data Review: data included in this note has been independently reviewed by the author     TELEMETRY: Normal sinus rhythm    Assessment/Plan:     Patient Active Problem List   Diagnosis    Long term (current) use of anticoagulants    Overweight (BMI 25.0-29. 9)    Coronary atherosclerosis of native coronary vessel    Benign prostatic hyperplasia with weak urinary stream    History of 2019 novel coronavirus disease (COVID-19)    Numbness of left foot    Primary insomnia    Chronic systolic congestive heart failure (HCC)    S/P ICD (internal cardiac defibrillator) procedure    Seasonal allergic rhinitis due to pollen    Smoking greater than 20 pack years    Refused influenza vaccine    Chronic eczematous otitis externa of both ears    High risk medication use    Dyslipidemia    Ventricular tachycardia ([address]    COVID-19 vaccination refused    Anemia    Chronic fatigue    IGT (impaired glucose tolerance)    Tobacco abuse    Statin intolerance    ICD (implantable cardioverter-defibrillator) discharge    COVID-19     PLAN  Continue p.o. amiodarone in addition to his other medications. Plan for heart cath today to rule out ischemic etiology of his VF.   Inform the patient of [place] DMV guidelines concerning driving and the restrictions on driving after ICD shocks        Domenic Gagnon MD

## 2022-05-27 NOTE — PROGRESS NOTES
TRANSFER - OUT REPORT:    Verbal report given to CYNTHIA Sanchez on Renee Byrne  being transferred to [phone] for routine post-op       Report consisted of patients Situation, Background, Assessment and Recommendations(SBAR). Information from the following report(s) Procedure Summary was reviewed with the receiving nurse. Opportunity for questions and clarification was provided.

## 2022-05-27 NOTE — PROGRESS NOTES
RN showed patient CABG video and provided CABG booklet to patient after MD explained CABG surgery. RN, MD, and patient signed the consent and placed it in the chart.

## 2022-05-27 NOTE — PROGRESS NOTES
Patient is awake and alert, vital signs cycling every 15 minutes. Meal tray and beverage provided. No family present at bedside. Daughter phone number provided to [address] [place] [address]. No other needs at this time.

## 2022-05-27 NOTE — PROGRESS NOTES
TRANSFER - IN REPORT:    Verbal report received from rosita cedeño rn on Nimo Pagan being received from cath lab recovery  for routine progression of care. Report consisted of patients Situation, Background, Assessment and Recommendations(SBAR). Information from the following report(s) Procedure Summary was reviewed. Opportunity for questions and clarification was provided. Assessment completed upon patients arrival to unit and care assumed. Patient received to room 335. Patient connected to monitor and assessment completed. Plan of care reviewed. Patient oriented to room and call light. Patient aware to use call light to communicate any chest pain or needs. R radial site with TR band placed. It is c/d/i. Will continue to monitor patient.

## 2022-05-27 NOTE — PROGRESS NOTES
LOS 1D  LOS 1D    Patient admitted for ICD discharge. Patient underwent LHC which revealed severe MV disease. CTS consulted. CABG scheduled for Tue, May 31st according to patient. Assessment completed. CM will follow patient's status to assist with discharge needs.

## 2022-05-27 NOTE — PROGRESS NOTES
Bedside and verbal shift change report received from [facility].

## 2022-05-28 PROBLEM — I25.10 CAD, MULTIPLE VESSEL: Status: ACTIVE | Noted: 2022-05-28

## 2022-05-28 LAB
ANION GAP SERPL CALC-SCNC: 5 MMOL/L (ref 7–16)
BUN SERPL-MCNC: 17 MG/DL (ref 8–23)
CALCIUM SERPL-MCNC: 9.1 MG/DL (ref 8.3–10.4)
CHLORIDE SERPL-SCNC: 109 MMOL/L (ref 98–107)
CO2 SERPL-SCNC: 26 MMOL/L (ref 21–32)
CREAT SERPL-MCNC: 1 MG/DL (ref 0.8–1.5)
ERYTHROCYTE [DISTWIDTH] IN BLOOD BY AUTOMATED COUNT: 15.1 % (ref 11.9–14.6)
EST. AVERAGE GLUCOSE BLD GHB EST-MCNC: 111 MG/DL
GLUCOSE SERPL-MCNC: 101 MG/DL (ref 65–100)
HBA1C MFR BLD: 5.5 % (ref 4.2–6.3)
HCT VFR BLD AUTO: 43.7 % (ref 41.1–50.3)
HGB BLD-MCNC: 14.2 G/DL (ref 13.6–17.2)
INR PPP: 2.2
MAGNESIUM SERPL-MCNC: 2.4 MG/DL (ref 1.8–2.4)
MCH RBC QN AUTO: 30 PG (ref 26.1–32.9)
MCHC RBC AUTO-ENTMCNC: 32.5 G/DL (ref 31.4–35)
MCV RBC AUTO: 92.4 FL (ref 79.6–97.8)
NRBC # BLD: 0 K/UL (ref 0–0.2)
PLATELET # BLD AUTO: 251 K/UL (ref 150–450)
PMV BLD AUTO: 10.8 FL (ref 9.4–12.3)
POTASSIUM SERPL-SCNC: 3.8 MMOL/L (ref 3.5–5.1)
PROTHROMBIN TIME: 24.9 SEC (ref 12.6–14.5)
RBC # BLD AUTO: 4.73 M/UL (ref 4.23–5.6)
SODIUM SERPL-SCNC: 140 MMOL/L (ref 136–145)
WBC # BLD AUTO: 10.2 K/UL (ref 4.3–11.1)

## 2022-05-28 PROCEDURE — 85027 COMPLETE CBC AUTOMATED: CPT

## 2022-05-28 PROCEDURE — G0378 HOSPITAL OBSERVATION PER HR: HCPCS

## 2022-05-28 PROCEDURE — 2580000003 HC RX 258: Performed by: NURSE PRACTITIONER

## 2022-05-28 PROCEDURE — 6370000000 HC RX 637 (ALT 250 FOR IP): Performed by: PHYSICIAN ASSISTANT

## 2022-05-28 PROCEDURE — 99232 SBSQ HOSP IP/OBS MODERATE 35: CPT | Performed by: INTERNAL MEDICINE

## 2022-05-28 PROCEDURE — 83735 ASSAY OF MAGNESIUM: CPT

## 2022-05-28 PROCEDURE — 1100000000 HC RM PRIVATE

## 2022-05-28 PROCEDURE — 6360000002 HC RX W HCPCS: Performed by: PHYSICIAN ASSISTANT

## 2022-05-28 PROCEDURE — 83036 HEMOGLOBIN GLYCOSYLATED A1C: CPT

## 2022-05-28 PROCEDURE — 36415 COLL VENOUS BLD VENIPUNCTURE: CPT

## 2022-05-28 PROCEDURE — 80048 BASIC METABOLIC PNL TOTAL CA: CPT

## 2022-05-28 PROCEDURE — 6370000000 HC RX 637 (ALT 250 FOR IP): Performed by: INTERNAL MEDICINE

## 2022-05-28 PROCEDURE — 85610 PROTHROMBIN TIME: CPT

## 2022-05-28 PROCEDURE — 96372 THER/PROPH/DIAG INJ SC/IM: CPT

## 2022-05-28 PROCEDURE — 6370000000 HC RX 637 (ALT 250 FOR IP): Performed by: NURSE PRACTITIONER

## 2022-05-28 RX ADMIN — ASPIRIN 81 MG: 81 TABLET, CHEWABLE ORAL at 08:30

## 2022-05-28 RX ADMIN — AMIODARONE HYDROCHLORIDE 400 MG: 200 TABLET ORAL at 21:04

## 2022-05-28 RX ADMIN — LOSARTAN POTASSIUM 25 MG: 25 TABLET, FILM COATED ORAL at 21:04

## 2022-05-28 RX ADMIN — FAMOTIDINE 20 MG: 20 TABLET, FILM COATED ORAL at 08:30

## 2022-05-28 RX ADMIN — ENOXAPARIN SODIUM 100 MG: 100 INJECTION SUBCUTANEOUS at 08:30

## 2022-05-28 RX ADMIN — SODIUM CHLORIDE, PRESERVATIVE FREE 3 ML: 5 INJECTION INTRAVENOUS at 05:27

## 2022-05-28 RX ADMIN — ENOXAPARIN SODIUM 100 MG: 100 INJECTION SUBCUTANEOUS at 21:11

## 2022-05-28 RX ADMIN — SODIUM CHLORIDE, PRESERVATIVE FREE 10 ML: 5 INJECTION INTRAVENOUS at 21:12

## 2022-05-28 RX ADMIN — SODIUM CHLORIDE, PRESERVATIVE FREE 3 ML: 5 INJECTION INTRAVENOUS at 21:12

## 2022-05-28 RX ADMIN — QUETIAPINE FUMARATE 100 MG: 25 TABLET ORAL at 21:04

## 2022-05-28 RX ADMIN — CYANOCOBALAMIN TAB 1000 MCG 1000 MCG: 1000 TAB at 08:29

## 2022-05-28 RX ADMIN — CARVEDILOL 12.5 MG: 12.5 TABLET, FILM COATED ORAL at 21:04

## 2022-05-28 RX ADMIN — SODIUM CHLORIDE, PRESERVATIVE FREE 5 ML: 5 INJECTION INTRAVENOUS at 08:33

## 2022-05-28 RX ADMIN — SODIUM CHLORIDE, PRESERVATIVE FREE 3 ML: 5 INJECTION INTRAVENOUS at 12:29

## 2022-05-28 RX ADMIN — FAMOTIDINE 20 MG: 20 TABLET, FILM COATED ORAL at 21:04

## 2022-05-28 RX ADMIN — AMIODARONE HYDROCHLORIDE 400 MG: 200 TABLET ORAL at 08:30

## 2022-05-28 ASSESSMENT — PAIN SCALES - GENERAL
PAINLEVEL_OUTOF10: 0

## 2022-05-28 NOTE — PROGRESS NOTES
am  5/28/2022 9:15 AM    Admit Date: 5/26/2022    Admit Diagnosis: ICD (implantable cardioverter-defibrillator) discharge [Z45.02]      Subjective:   Mr. Tomi Nichole is a 58year old  male with a past history of CAD, anterior wall MI 2005 s/p 5 stent, chronic systolic congestive HF and ICD who presented after receiving several shocks. Patient had an echo that revealed EF of 25-30%, Catheterization showed severe multivessel coronary artery, anterior apical wall motion abnormality consistent with severe CAD. Patient is being prepared for CABG 5.31.22. Patient denies chest pain, shortness of breath, and swelling in lower extremity     Objective:    /68   Pulse 67   Temp 98.1 °F (36.7 °C) (Oral)   Resp 18   Ht 5' 9\" (1.753 m)   Wt 218 lb 14.4 oz (99.3 kg)   SpO2 97%   BMI 32.33 kg/m²     ROS:  General ROS: negative for - chills  Hematological and Lymphatic ROS: negative for - blood clots or jaundice  Respiratory ROS: positive for - shortness of breath  Cardiovascular ROS: no chest pain or dyspnea on exertion  Gastrointestinal ROS: no abdominal pain, change in bowel habits, or black or bloody stools  Neurological ROS: no TIA or stroke symptoms    Physical Exam:      Physical Examination: General appearance - Appearance: alert, well appearing, and in no distress.    Neck/lymph - supple, no significant adenopathy  Chest/CV - clear to auscultation, no wheezes, rales or rhonchi, symmetric air entry  Heart - S1 and S2 normal, no murmurs noted  Abdomen/GI - soft, nontender, nondistended, no masses or organomegaly   Musculoskeletal - no joint tenderness, deformity or swelling  Extremities - peripheral pulses normal, no pedal edema, no clubbing or cyanosis  Skin - normal coloration and turgor, no rashes, no suspicious skin lesions noted    Current Facility-Administered Medications   Medication Dose Route Frequency    carvedilol (COREG) tablet 12.5 mg  12.5 mg Oral Nightly    losartan (COZAAR) tablet 25 mg  25 mg Oral Nightly    famotidine (PEPCID) tablet 20 mg  20 mg Oral BID    enoxaparin (LOVENOX) injection 100 mg  1 mg/kg SubCUTAneous BID    sodium chloride flush 0.9 % injection 3 mL  3 mL IntraVENous Q8H    QUEtiapine (SEROQUEL) tablet 100 mg  100 mg Oral Nightly    albuterol sulfate  (90 Base) MCG/ACT inhaler 2 puff  2 puff Inhalation Q6H PRN    vitamin B-12 (CYANOCOBALAMIN) tablet 1,000 mcg  1,000 mcg Oral Daily    sodium chloride flush 0.9 % injection 5-40 mL  5-40 mL IntraVENous 2 times per day    sodium chloride flush 0.9 % injection 5-40 mL  5-40 mL IntraVENous PRN    0.9 % sodium chloride infusion   IntraVENous PRN    acetaminophen (TYLENOL) tablet 650 mg  650 mg Oral Q6H PRN    Or    acetaminophen (TYLENOL) suppository 650 mg  650 mg Rectal Q6H PRN    polyethylene glycol (GLYCOLAX) packet 17 g  17 g Oral Daily PRN    aspirin chewable tablet 81 mg  81 mg Oral Daily    nitroGLYCERIN (NITROSTAT) SL tablet 0.4 mg  0.4 mg SubLINGual Q5 Min PRN    magnesium sulfate 2000 mg in 50 mL IVPB premix  2,000 mg IntraVENous PRN    potassium chloride (KLOR-CON M) extended release tablet 40 mEq  40 mEq Oral PRN    Or    potassium bicarb-citric acid (EFFER-K) effervescent tablet 40 mEq  40 mEq Oral PRN    Or    potassium chloride 10 mEq/100 mL IVPB (Peripheral Line)  10 mEq IntraVENous PRN    amiodarone (CORDARONE) tablet 400 mg  400 mg Oral BID       Data Review: data included in this note has been independently reviewed by the author     TELEMETRY: sinus rhythm    Assessment/Plan:     Patient Active Problem List   Diagnosis    Long term (current) use of anticoagulants    Overweight (BMI 25.0-29. 9)    Coronary atherosclerosis of native coronary vessel    Benign prostatic hyperplasia with weak urinary stream    History of 2019 novel coronavirus disease (COVID-19)    Numbness of left foot    Primary insomnia    Chronic systolic congestive heart failure (HCC)    S/P ICD (internal cardiac defibrillator) procedure    Seasonal allergic rhinitis due to pollen    Smoking greater than 20 pack years    Refused influenza vaccine    Chronic eczematous otitis externa of both ears    High risk medication use    Dyslipidemia    Ventricular tachycardia ([address]    COVID-19 vaccination refused    Anemia    Chronic fatigue    IGT (impaired glucose tolerance)    Tobacco abuse    Statin intolerance    ICD (implantable cardioverter-defibrillator) discharge    COVID-19     PLAN   Chronic systolic congestive HF  - Patient will remain at [facility] for continual monitoring of symptoms  - Continue on medical therapy  - Discontinue warfarin and begin Lovenox treatment dose of 1mg/ mg subcutaneous  q 12 hrs           Karol Barnes -S

## 2022-05-28 NOTE — PLAN OF CARE
Problem: Discharge Planning  Goal: Discharge to home or other facility with appropriate resources  Outcome: Progressing  Flowsheets (Taken 5/28/2022 6312)  Discharge to home or other facility with appropriate resources: Identify barriers to discharge with patient and caregiver     Problem: Pain  Goal: Verbalizes/displays adequate comfort level or baseline comfort level  Outcome: Progressing     Problem: Safety - Adult  Goal: Free from fall injury  Outcome: Progressing     Problem: Respiratory - Adult  Goal: Achieves optimal ventilation and oxygenation  Outcome: Progressing  Flowsheets (Taken 5/28/2022 0833)  Achieves optimal ventilation and oxygenation:   Assess for changes in respiratory status   Assess for changes in mentation and behavior   Assess and instruct to report shortness of breath or any respiratory difficulty     Problem: Cardiovascular - Adult  Goal: Maintains optimal cardiac output and hemodynamic stability  Outcome: Progressing  Flowsheets (Taken 5/28/2022 0833)  Maintains optimal cardiac output and hemodynamic stability:   Monitor blood pressure and heart rate   Monitor urine output and notify Licensed Independent Practitioner for values outside of normal range   Assess for signs of decreased cardiac output  Goal: Absence of cardiac dysrhythmias or at baseline  Outcome: Progressing  Flowsheets (Taken 5/28/2022 0833)  Absence of cardiac dysrhythmias or at baseline:   Monitor cardiac rate and rhythm   Assess for signs of decreased cardiac output     Problem: Infection - Adult  Goal: Absence of infection at discharge  Outcome: Progressing  Flowsheets (Taken 5/28/2022 4239)  Absence of infection at discharge:   Assess and monitor for signs and symptoms of infection   Administer medications as ordered  Goal: Absence of infection during hospitalization  Outcome: Progressing  Flowsheets (Taken 5/28/2022 9669)  Absence of infection during hospitalization:   Assess and monitor for signs and symptoms of infection   Administer medications as ordered  Goal: Absence of fever/infection during anticipated neutropenic period  Outcome: Progressing  Flowsheets (Taken 5/28/2022 0833)  Absence of fever/infection during anticipated neutropenic period: Monitor white blood cell count     Problem: Hematologic - Adult  Goal: Maintains hematologic stability  Outcome: Progressing  Flowsheets (Taken 5/28/2022 9675)  Maintains hematologic stability: Assess for signs and symptoms of bleeding or hemorrhage     Problem: Chronic Conditions and Co-morbidities  Goal: Patient's chronic conditions and co-morbidity symptoms are monitored and maintained or improved  Outcome: Progressing  Flowsheets (Taken 5/28/2022 0833)  Care Plan - Patient's Chronic Conditions and Co-Morbidity Symptoms are Monitored and Maintained or Improved:   Monitor and assess patient's chronic conditions and comorbid symptoms for stability, deterioration, or improvement   Collaborate with multidisciplinary team to address chronic and comorbid conditions and prevent exacerbation or deterioration

## 2022-05-29 LAB
ANION GAP SERPL CALC-SCNC: 5 MMOL/L (ref 7–16)
BUN SERPL-MCNC: 15 MG/DL (ref 8–23)
CALCIUM SERPL-MCNC: 9.2 MG/DL (ref 8.3–10.4)
CHLORIDE SERPL-SCNC: 110 MMOL/L (ref 98–107)
CO2 SERPL-SCNC: 26 MMOL/L (ref 21–32)
CREAT SERPL-MCNC: 1 MG/DL (ref 0.8–1.5)
ERYTHROCYTE [DISTWIDTH] IN BLOOD BY AUTOMATED COUNT: 14.8 % (ref 11.9–14.6)
GLUCOSE SERPL-MCNC: 88 MG/DL (ref 65–100)
HCT VFR BLD AUTO: 45.7 % (ref 41.1–50.3)
HGB BLD-MCNC: 15 G/DL (ref 13.6–17.2)
INR PPP: 1.6
MCH RBC QN AUTO: 30.1 PG (ref 26.1–32.9)
MCHC RBC AUTO-ENTMCNC: 32.8 G/DL (ref 31.4–35)
MCV RBC AUTO: 91.6 FL (ref 79.6–97.8)
NRBC # BLD: 0 K/UL (ref 0–0.2)
PLATELET # BLD AUTO: 231 K/UL (ref 150–450)
PMV BLD AUTO: 10.9 FL (ref 9.4–12.3)
POTASSIUM SERPL-SCNC: 3.9 MMOL/L (ref 3.5–5.1)
PROTHROMBIN TIME: 19.4 SEC (ref 12.6–14.5)
RBC # BLD AUTO: 4.99 M/UL (ref 4.23–5.6)
SODIUM SERPL-SCNC: 141 MMOL/L (ref 136–145)
WBC # BLD AUTO: 9.3 K/UL (ref 4.3–11.1)

## 2022-05-29 PROCEDURE — 99233 SBSQ HOSP IP/OBS HIGH 50: CPT | Performed by: INTERNAL MEDICINE

## 2022-05-29 PROCEDURE — 6370000000 HC RX 637 (ALT 250 FOR IP): Performed by: INTERNAL MEDICINE

## 2022-05-29 PROCEDURE — 36415 COLL VENOUS BLD VENIPUNCTURE: CPT

## 2022-05-29 PROCEDURE — 6360000002 HC RX W HCPCS: Performed by: PHYSICIAN ASSISTANT

## 2022-05-29 PROCEDURE — 2580000003 HC RX 258: Performed by: NURSE PRACTITIONER

## 2022-05-29 PROCEDURE — 6370000000 HC RX 637 (ALT 250 FOR IP): Performed by: PHYSICIAN ASSISTANT

## 2022-05-29 PROCEDURE — 85610 PROTHROMBIN TIME: CPT

## 2022-05-29 PROCEDURE — 6370000000 HC RX 637 (ALT 250 FOR IP): Performed by: NURSE PRACTITIONER

## 2022-05-29 PROCEDURE — 85027 COMPLETE CBC AUTOMATED: CPT

## 2022-05-29 PROCEDURE — 1100000000 HC RM PRIVATE

## 2022-05-29 PROCEDURE — 80048 BASIC METABOLIC PNL TOTAL CA: CPT

## 2022-05-29 RX ADMIN — ENOXAPARIN SODIUM 100 MG: 100 INJECTION SUBCUTANEOUS at 09:21

## 2022-05-29 RX ADMIN — ASPIRIN 81 MG: 81 TABLET, CHEWABLE ORAL at 09:21

## 2022-05-29 RX ADMIN — SODIUM CHLORIDE, PRESERVATIVE FREE 3 ML: 5 INJECTION INTRAVENOUS at 20:59

## 2022-05-29 RX ADMIN — FAMOTIDINE 20 MG: 20 TABLET, FILM COATED ORAL at 20:52

## 2022-05-29 RX ADMIN — CYANOCOBALAMIN TAB 1000 MCG 1000 MCG: 1000 TAB at 09:21

## 2022-05-29 RX ADMIN — SODIUM CHLORIDE, PRESERVATIVE FREE 5 ML: 5 INJECTION INTRAVENOUS at 09:23

## 2022-05-29 RX ADMIN — CARVEDILOL 12.5 MG: 12.5 TABLET, FILM COATED ORAL at 20:51

## 2022-05-29 RX ADMIN — SODIUM CHLORIDE, PRESERVATIVE FREE 10 ML: 5 INJECTION INTRAVENOUS at 20:59

## 2022-05-29 RX ADMIN — FAMOTIDINE 20 MG: 20 TABLET, FILM COATED ORAL at 09:21

## 2022-05-29 RX ADMIN — SODIUM CHLORIDE, PRESERVATIVE FREE 3 ML: 5 INJECTION INTRAVENOUS at 03:08

## 2022-05-29 RX ADMIN — LOSARTAN POTASSIUM 25 MG: 25 TABLET, FILM COATED ORAL at 20:51

## 2022-05-29 RX ADMIN — QUETIAPINE FUMARATE 100 MG: 25 TABLET ORAL at 20:51

## 2022-05-29 RX ADMIN — AMIODARONE HYDROCHLORIDE 400 MG: 200 TABLET ORAL at 09:21

## 2022-05-29 RX ADMIN — ENOXAPARIN SODIUM 100 MG: 100 INJECTION SUBCUTANEOUS at 20:49

## 2022-05-29 RX ADMIN — AMIODARONE HYDROCHLORIDE 400 MG: 200 TABLET ORAL at 20:50

## 2022-05-29 ASSESSMENT — PAIN SCALES - GENERAL
PAINLEVEL_OUTOF10: 0

## 2022-05-29 NOTE — PROGRESS NOTES
am  5/29/2022 11:53 AM    Admit Date: 5/26/2022    Admit Diagnosis: ICD (implantable cardioverter-defibrillator) discharge [Z45.02]  CAD, multiple vessel [I25.10]      Subjective:   Mr. Sanjay Mcleod is a 58year old  male with a past history of CAD, anterior wall MI 2005 s/p 5 stent, chronic systolic congestive HF and ICD who presented after receiving several shocks. Patient had an echo that revealed EF of 25-30%, Catheterization showed severe multivessel coronary artery, anterior apical wall motion abnormality consistent with severe CAD. Patient is being prepared for CABG 5.31.22.     05/29/22     Well appearing male, in no acute distress sitting in bed. Patient denies chest pain, shortness of breath, and swelling in lower extremity. Objective:    /70   Pulse 62   Temp 97.8 °F (36.6 °C) (Axillary)   Resp 18   Ht 5' 9\" (1.753 m)   Wt 208 lb 6.4 oz (94.5 kg)   SpO2 97%   BMI 30.78 kg/m²     ROS:  General ROS: negative for - chills  Hematological and Lymphatic ROS: negative for - blood clots or jaundice  Respiratory ROS: positive for - shortness of breath  Cardiovascular ROS: no chest pain or dyspnea on exertion  Gastrointestinal ROS: no abdominal pain, change in bowel habits, or black or bloody stools  Neurological ROS: no TIA or stroke symptoms    Physical Exam:  Physical Examination: General appearance - Appearance: alert, well appearing, and in no distress.    Neck/lymph - supple, no significant adenopathy  Chest/CV - clear to auscultation, no wheezes, rales or rhonchi, symmetric air entry  Heart - S1 and S2 normal, no murmurs noted  Abdomen/GI - soft, nontender, nondistended, no masses or organomegaly   Musculoskeletal - no joint tenderness, deformity or swelling  Extremities - peripheral pulses normal, no pedal edema, no clubbing or cyanosis  Skin - normal coloration and turgor, no rashes, no suspicious skin lesions noted    Current Facility-Administered Medications   Medication Dose Route Frequency    carvedilol (COREG) tablet 12.5 mg  12.5 mg Oral Nightly    losartan (COZAAR) tablet 25 mg  25 mg Oral Nightly    famotidine (PEPCID) tablet 20 mg  20 mg Oral BID    enoxaparin (LOVENOX) injection 100 mg  1 mg/kg SubCUTAneous BID    sodium chloride flush 0.9 % injection 3 mL  3 mL IntraVENous Q8H    QUEtiapine (SEROQUEL) tablet 100 mg  100 mg Oral Nightly    albuterol sulfate  (90 Base) MCG/ACT inhaler 2 puff  2 puff Inhalation Q6H PRN    vitamin B-12 (CYANOCOBALAMIN) tablet 1,000 mcg  1,000 mcg Oral Daily    sodium chloride flush 0.9 % injection 5-40 mL  5-40 mL IntraVENous 2 times per day    sodium chloride flush 0.9 % injection 5-40 mL  5-40 mL IntraVENous PRN    0.9 % sodium chloride infusion   IntraVENous PRN    acetaminophen (TYLENOL) tablet 650 mg  650 mg Oral Q6H PRN    Or    acetaminophen (TYLENOL) suppository 650 mg  650 mg Rectal Q6H PRN    polyethylene glycol (GLYCOLAX) packet 17 g  17 g Oral Daily PRN    aspirin chewable tablet 81 mg  81 mg Oral Daily    nitroGLYCERIN (NITROSTAT) SL tablet 0.4 mg  0.4 mg SubLINGual Q5 Min PRN    magnesium sulfate 2000 mg in 50 mL IVPB premix  2,000 mg IntraVENous PRN    potassium chloride (KLOR-CON M) extended release tablet 40 mEq  40 mEq Oral PRN    Or    potassium bicarb-citric acid (EFFER-K) effervescent tablet 40 mEq  40 mEq Oral PRN    Or    potassium chloride 10 mEq/100 mL IVPB (Peripheral Line)  10 mEq IntraVENous PRN    amiodarone (CORDARONE) tablet 400 mg  400 mg Oral BID       Data Review: data included in this note has been independently reviewed by the author     TELEMETRY: sinus rhythm    Assessment/Plan:     Patient Active Problem List   Diagnosis    Long term (current) use of anticoagulants    Overweight (BMI 25.0-29. 9)    Coronary atherosclerosis of native coronary vessel    Benign prostatic hyperplasia with weak urinary stream    History of 2019 novel coronavirus disease (COVID-19)    Numbness of left foot    Primary insomnia    Chronic systolic congestive heart failure (HCC)    S/P ICD (internal cardiac defibrillator) procedure    Seasonal allergic rhinitis due to pollen    Smoking greater than 20 pack years    Refused influenza vaccine    Chronic eczematous otitis externa of both ears    High risk medication use    Dyslipidemia    Ventricular tachycardia ([address]    COVID-19 vaccination refused    Anemia    Chronic fatigue    IGT (impaired glucose tolerance)    Tobacco abuse    Statin intolerance    ICD (implantable cardioverter-defibrillator) discharge    COVID-19    CAD, multiple vessel     PLAN   Chronic systolic congestive HF  - Patient will remain at [facility] for continual monitoring of symptoms  - Continue on medical therapy  - Continue on Lovenox treatment dose of 1mg/ mg subcutaneous  q 12 hrs     Karol Lopez -S

## 2022-05-29 NOTE — PLAN OF CARE
Problem: Safety - Adult  Goal: Free from fall injury  5/29/2022 0104 by Rosales Gómez RN  Outcome: Progressing  5/28/2022 1524 by Leland Gray RN  Outcome: Progressing     Problem: Cardiovascular - Adult  Goal: Maintains optimal cardiac output and hemodynamic stability  5/29/2022 0104 by Rosales Gómez RN  Outcome: Progressing  5/28/2022 1524 by Leland Gray RN  Outcome: Progressing  Flowsheets (Taken 5/28/2022 9578)  Maintains optimal cardiac output and hemodynamic stability:   Monitor blood pressure and heart rate   Monitor urine output and notify Licensed Independent Practitioner for values outside of normal range   Assess for signs of decreased cardiac output

## 2022-05-29 NOTE — PLAN OF CARE
Problem: Discharge Planning  Goal: Discharge to home or other facility with appropriate resources  Outcome: Progressing  Flowsheets (Taken 5/29/2022 0830)  Discharge to home or other facility with appropriate resources: Identify barriers to discharge with patient and caregiver     Problem: Pain  Goal: Verbalizes/displays adequate comfort level or baseline comfort level  Outcome: Progressing     Problem: Safety - Adult  Goal: Free from fall injury  Outcome: Progressing     Problem: Respiratory - Adult  Goal: Achieves optimal ventilation and oxygenation  Outcome: Progressing  Flowsheets (Taken 5/29/2022 0830)  Achieves optimal ventilation and oxygenation:   Assess for changes in respiratory status   Assess for changes in mentation and behavior     Problem: Cardiovascular - Adult  Goal: Maintains optimal cardiac output and hemodynamic stability  Outcome: Progressing  Flowsheets (Taken 5/29/2022 0830)  Maintains optimal cardiac output and hemodynamic stability:   Monitor blood pressure and heart rate   Monitor urine output and notify Licensed Independent Practitioner for values outside of normal range   Assess for signs of decreased cardiac output  Goal: Absence of cardiac dysrhythmias or at baseline  Outcome: Progressing  Flowsheets (Taken 5/29/2022 0830)  Absence of cardiac dysrhythmias or at baseline:   Monitor cardiac rate and rhythm   Assess for signs of decreased cardiac output   Administer antiarrhythmia medication and electrolyte replacement as ordered     Problem: Infection - Adult  Goal: Absence of infection at discharge  Outcome: Progressing  Flowsheets (Taken 5/29/2022 0830)  Absence of infection at discharge:   Assess and monitor for signs and symptoms of infection   Monitor lab/diagnostic results   Monitor all insertion sites i.e., indwelling lines, tubes and drains  Goal: Absence of infection during hospitalization  Outcome: Progressing  Flowsheets (Taken 5/29/2022 0830)  Absence of infection during hospitalization:   Assess and monitor for signs and symptoms of infection   Monitor all insertion sites i.e., indwelling lines, tubes and drains   Monitor lab/diagnostic results  Goal: Absence of fever/infection during anticipated neutropenic period  Outcome: Progressing  Flowsheets (Taken 5/29/2022 0830)  Absence of fever/infection during anticipated neutropenic period: Monitor white blood cell count     Problem: Hematologic - Adult  Goal: Maintains hematologic stability  Outcome: Progressing  Flowsheets (Taken 5/29/2022 0830)  Maintains hematologic stability: Assess for signs and symptoms of bleeding or hemorrhage     Problem: Chronic Conditions and Co-morbidities  Goal: Patient's chronic conditions and co-morbidity symptoms are monitored and maintained or improved  Outcome: Progressing  Flowsheets (Taken 5/29/2022 0830)  Care Plan - Patient's Chronic Conditions and Co-Morbidity Symptoms are Monitored and Maintained or Improved:   Monitor and assess patient's chronic conditions and comorbid symptoms for stability, deterioration, or improvement   Collaborate with multidisciplinary team to address chronic and comorbid conditions and prevent exacerbation or deterioration     Problem: ABCDS Injury Assessment  Goal: Absence of physical injury  Outcome: Progressing  Flowsheets (Taken 5/29/2022 0830)  Absence of Physical Injury: Implement safety measures based on patient assessment

## 2022-05-30 ENCOUNTER — ANESTHESIA EVENT (OUTPATIENT)
Dept: SURGERY | Age: 62
DRG: 234 | End: 2022-05-30
Payer: MEDICARE

## 2022-05-30 LAB
ANION GAP SERPL CALC-SCNC: 6 MMOL/L (ref 7–16)
BUN SERPL-MCNC: 19 MG/DL (ref 8–23)
CALCIUM SERPL-MCNC: 8.7 MG/DL (ref 8.3–10.4)
CHLORIDE SERPL-SCNC: 108 MMOL/L (ref 98–107)
CO2 SERPL-SCNC: 26 MMOL/L (ref 21–32)
CREAT SERPL-MCNC: 1.1 MG/DL (ref 0.8–1.5)
ERYTHROCYTE [DISTWIDTH] IN BLOOD BY AUTOMATED COUNT: 14.6 % (ref 11.9–14.6)
GLUCOSE SERPL-MCNC: 94 MG/DL (ref 65–100)
HCT VFR BLD AUTO: 44.9 % (ref 41.1–50.3)
HGB BLD-MCNC: 14.4 G/DL (ref 13.6–17.2)
HISTORY CHECK: NORMAL
INR PPP: 1.2
MCH RBC QN AUTO: 29.4 PG (ref 26.1–32.9)
MCHC RBC AUTO-ENTMCNC: 32.1 G/DL (ref 31.4–35)
MCV RBC AUTO: 91.6 FL (ref 79.6–97.8)
NRBC # BLD: 0 K/UL (ref 0–0.2)
PLATELET # BLD AUTO: 236 K/UL (ref 150–450)
PMV BLD AUTO: 10.8 FL (ref 9.4–12.3)
POTASSIUM SERPL-SCNC: 3.8 MMOL/L (ref 3.5–5.1)
PROTHROMBIN TIME: 15.7 SEC (ref 12.6–14.5)
RBC # BLD AUTO: 4.9 M/UL (ref 4.23–5.6)
SODIUM SERPL-SCNC: 140 MMOL/L (ref 138–145)
WBC # BLD AUTO: 9.2 K/UL (ref 4.3–11.1)

## 2022-05-30 PROCEDURE — 85610 PROTHROMBIN TIME: CPT

## 2022-05-30 PROCEDURE — 2580000003 HC RX 258: Performed by: NURSE PRACTITIONER

## 2022-05-30 PROCEDURE — 1100000000 HC RM PRIVATE

## 2022-05-30 PROCEDURE — 85027 COMPLETE CBC AUTOMATED: CPT

## 2022-05-30 PROCEDURE — 86923 COMPATIBILITY TEST ELECTRIC: CPT

## 2022-05-30 PROCEDURE — 6360000002 HC RX W HCPCS: Performed by: PHYSICIAN ASSISTANT

## 2022-05-30 PROCEDURE — 86901 BLOOD TYPING SEROLOGIC RH(D): CPT

## 2022-05-30 PROCEDURE — 6370000000 HC RX 637 (ALT 250 FOR IP): Performed by: PHYSICIAN ASSISTANT

## 2022-05-30 PROCEDURE — 80048 BASIC METABOLIC PNL TOTAL CA: CPT

## 2022-05-30 PROCEDURE — 6370000000 HC RX 637 (ALT 250 FOR IP): Performed by: NURSE PRACTITIONER

## 2022-05-30 PROCEDURE — 36415 COLL VENOUS BLD VENIPUNCTURE: CPT

## 2022-05-30 PROCEDURE — 99233 SBSQ HOSP IP/OBS HIGH 50: CPT | Performed by: INTERNAL MEDICINE

## 2022-05-30 PROCEDURE — 6370000000 HC RX 637 (ALT 250 FOR IP): Performed by: INTERNAL MEDICINE

## 2022-05-30 RX ADMIN — FAMOTIDINE 20 MG: 20 TABLET, FILM COATED ORAL at 20:50

## 2022-05-30 RX ADMIN — ASPIRIN 81 MG: 81 TABLET, CHEWABLE ORAL at 09:21

## 2022-05-30 RX ADMIN — CARVEDILOL 12.5 MG: 12.5 TABLET, FILM COATED ORAL at 20:51

## 2022-05-30 RX ADMIN — LOSARTAN POTASSIUM 25 MG: 25 TABLET, FILM COATED ORAL at 20:52

## 2022-05-30 RX ADMIN — AMIODARONE HYDROCHLORIDE 400 MG: 200 TABLET ORAL at 20:51

## 2022-05-30 RX ADMIN — SODIUM CHLORIDE, PRESERVATIVE FREE 3 ML: 5 INJECTION INTRAVENOUS at 19:45

## 2022-05-30 RX ADMIN — SODIUM CHLORIDE, PRESERVATIVE FREE 5 ML: 5 INJECTION INTRAVENOUS at 09:22

## 2022-05-30 RX ADMIN — QUETIAPINE FUMARATE 100 MG: 25 TABLET ORAL at 22:59

## 2022-05-30 RX ADMIN — CYANOCOBALAMIN TAB 1000 MCG 1000 MCG: 1000 TAB at 09:21

## 2022-05-30 RX ADMIN — SODIUM CHLORIDE, PRESERVATIVE FREE 3 ML: 5 INJECTION INTRAVENOUS at 06:01

## 2022-05-30 RX ADMIN — ENOXAPARIN SODIUM 100 MG: 100 INJECTION SUBCUTANEOUS at 09:25

## 2022-05-30 RX ADMIN — FAMOTIDINE 20 MG: 20 TABLET, FILM COATED ORAL at 09:21

## 2022-05-30 RX ADMIN — AMIODARONE HYDROCHLORIDE 400 MG: 200 TABLET ORAL at 09:21

## 2022-05-30 RX ADMIN — SODIUM CHLORIDE, PRESERVATIVE FREE 10 ML: 5 INJECTION INTRAVENOUS at 20:53

## 2022-05-30 ASSESSMENT — PAIN SCALES - WONG BAKER
WONGBAKER_NUMERICALRESPONSE: 0

## 2022-05-30 ASSESSMENT — PAIN SCALES - GENERAL
PAINLEVEL_OUTOF10: 0

## 2022-05-30 ASSESSMENT — LIFESTYLE VARIABLES: SMOKING_STATUS: 1

## 2022-05-30 NOTE — ANESTHESIA PRE PROCEDURE
Department of Anesthesiology  Preprocedure Note       Name:  Flakita Gutierrez   Age:  58 y.o.  :  1960                                          MRN:  [id number]         Date:  2022      Surgeon: Alice Field):  Geovany Rodriguez MD    Procedure: Procedure(s):  CABG CORONARY ARTERY BYPASS ROOM 335    Medications prior to admission:   Prior to Admission medications    Medication Sig Start Date End Date Taking?  Authorizing Provider   albuterol sulfate  (90 Base) MCG/ACT inhaler Inhale 2 puffs into the lungs every 4 hours as needed 10/15/21   Ar Automatic Reconciliation   carvedilol (COREG) 12.5 MG tablet Take 12.5 mg by mouth daily 5/10/21   Ar Automatic Reconciliation   Cetirizine HCl 10 MG CAPS Take by mouth as needed    Ar Automatic Reconciliation   cyanocobalamin 1000 MCG tablet Take 1,000 mcg by mouth as needed    Ar Automatic Reconciliation   QUEtiapine (SEROQUEL) 100 MG tablet Take 100 mg by mouth 21   Ar Automatic Reconciliation   warfarin (COUMADIN) 5 MG tablet Take 1 to 1 1/2 tablet daily or as directed 12/3/21   Ar Automatic Reconciliation       Current medications:    Current Facility-Administered Medications   Medication Dose Route Frequency Provider Last Rate Last Admin    [START ON 2022] alcohol 62% (NOZIN) nasal  3 ampule  3 ampule Topical Once Lorimor GiveSurance, PA        carvedilol (COREG) tablet 12.5 mg  12.5 mg Oral Nightly Betty Xavier MD   12.5 mg at 22    losartan (COZAAR) tablet 25 mg  25 mg Oral Nightly Betty Xavier MD   25 mg at 22    famotidine (PEPCID) tablet 20 mg  20 mg Oral BID LEISA Coley   20 mg at 22 4820    enoxaparin (LOVENOX) injection 100 mg  1 mg/kg SubCUTAneous BID LEISA Carranza   100 mg at 22 4755    sodium chloride flush 0.9 % injection 3 mL  3 mL IntraVENous Q8H LORENZA Alvarez - CNP   3 mL at 22 0601    QUEtiapine (SEROQUEL) tablet 100 mg  100 mg Oral Nightly Elvis Pickens Tommas Habermann, APRN - CNP   100 mg at 05/29/22 2051    albuterol sulfate  (90 Base) MCG/ACT inhaler 2 puff  2 puff Inhalation Q6H PRN LORENZA Leon - CNP        vitamin B-12 (CYANOCOBALAMIN) tablet 1,000 mcg  1,000 mcg Oral Daily Og Rob APRN - CNP   1,000 mcg at 05/30/22 3917    sodium chloride flush 0.9 % injection 5-40 mL  5-40 mL IntraVENous 2 times per day Og Rob APRN - CNP   5 mL at 05/30/22 2216    sodium chloride flush 0.9 % injection 5-40 mL  5-40 mL IntraVENous PRN Og Rob APRN - CNP   5 mL at 05/27/22 2020    0.9 % sodium chloride infusion   IntraVENous PRN Og Rob APRN - CNP        acetaminophen (TYLENOL) tablet 650 mg  650 mg Oral Q6H PRN Og Rob APRN - CNP        Or    acetaminophen (TYLENOL) suppository 650 mg  650 mg Rectal Q6H PRN Og Rob APRN - CNP        polyethylene glycol (GLYCOLAX) packet 17 g  17 g Oral Daily PRN LORENZA Leon - CNP        aspirin chewable tablet 81 mg  81 mg Oral Daily Og Rob APRN - CNP   81 mg at 05/30/22 5852    nitroGLYCERIN (NITROSTAT) SL tablet 0.4 mg  0.4 mg SubLINGual Q5 Min PRN LORENZA Leon - CNP        magnesium sulfate 2000 mg in 50 mL IVPB premix  2,000 mg IntraVENous PRN LORENZA eLon - CNP        potassium chloride (KLOR-CON M) extended release tablet 40 mEq  40 mEq Oral PRN Og Rob APRN - CNP        Or    potassium bicarb-citric acid (EFFER-K) effervescent tablet 40 mEq  40 mEq Oral PRN LORENZA Leon - CNP        Or    potassium chloride 10 mEq/100 mL IVPB (Peripheral Line)  10 mEq IntraVENous PRN LORENZA Leon - CNP        amiodarone (CORDARONE) tablet 400 mg  400 mg Oral BID Og Rob APRN - CNP   400 mg at 05/30/22 4060       Allergies:     Allergies   Allergen Reactions    Penicillins Swelling     Face swelling    Atorvastatin Other (See Comments)     itching    Evolocumab Other (See Comments)     Itching    Lisinopril Other (See Comments)  Rosuvastatin Other (See Comments)     itching       Problem List:    Patient Active Problem List   Diagnosis Code    Long term (current) use of anticoagulants Z79.01    Overweight (BMI 25.0-29. 9) E66.3    Coronary atherosclerosis of native coronary vessel I25.10    Benign prostatic hyperplasia with weak urinary stream N40.1, R39.12    History of 2019 novel coronavirus disease (COVID-19) Z86.16    Numbness of left foot R20.8    Primary insomnia F51.01    Chronic systolic congestive heart failure (HCC) I50.22    S/P ICD (internal cardiac defibrillator) procedure Z95.810    Seasonal allergic rhinitis due to pollen J30.1    Smoking greater than 20 pack years F17.210    Refused influenza vaccine Z28.21    Chronic eczematous otitis externa of both ears H60.8X3    High risk medication use Z79.899    Dyslipidemia E78.5    Ventricular tachycardia (HCC) I47.2    COVID-19 vaccination refused Z28.21    Anemia D64.9    Chronic fatigue R53.82    IGT (impaired glucose tolerance) R73.02    Tobacco abuse Z72.0    Statin intolerance Z78.9    ICD (implantable cardioverter-defibrillator) discharge Z45.02    COVID-19 U07.1    CAD, multiple vessel I25.10       Past Medical History:        Diagnosis Date    Acute hypoxemic respiratory failure due to COVID-19 ([facility] [address] 10/9/2021    CAD (coronary artery disease)     heart attack 2005 stentS HEART    CHF (congestive heart failure) ([facility] [address] 9/27/2011    Chronic systolic heart failure ([facility] [address] 10/2/2015    Coronary atherosclerosis of native coronary vessel 10/2/2015    Hyperlipidemia 10/2/2015    Hypoxemia requiring supplemental oxygen 10/6/2021    IGT (impaired glucose tolerance) 12/3/2017    Other ill-defined conditions(799.89)      blind left eye    Other ill-defined conditions(799.89)     cardiomyopathy    Pneumonia due to COVID-19 virus 11/13/2021    Resolved    Primary insomnia 6/7/2017    Psychiatric disorder     depression     Sepsis, unspecified 05/30/2022    PROT 7.1 05/26/2022    CALCIUM 8.7 05/30/2022    BILITOT 0.4 05/26/2022    ALKPHOS 77 05/26/2022    ALKPHOS 52 10/15/2021    AST 34 05/26/2022    ALT 16 05/26/2022       POC Tests: No results for input(s): POCGLU, POCNA, POCK, POCCL, POCBUN, POCHEMO, POCHCT in the last 72 hours. Coags:   Lab Results   Component Value Date    PROTIME 15.7 05/30/2022    INR 1.2 05/30/2022    APTT 115.3 10/09/2021       HCG (If Applicable): No results found for: PREGTESTUR, PREGSERUM, HCG, HCGQUANT     ABGs: No results found for: PHART, PO2ART, IUJ4CTH, XZC4ZIW, BEART, M4HEVJWQ     Type & Screen (If Applicable):  No results found for: LABABO, LABRH    Drug/Infectious Status (If Applicable):  No results found for: HIV, HEPCAB    COVID-19 Screening (If Applicable): No results found for: COVID19        Anesthesia Evaluation  Patient summary reviewed  Airway: Mallampati: II  TM distance: >3 FB   Neck ROM: full  Mouth opening: > = 3 FB   Dental: normal exam   (+) edentulous      Pulmonary:normal exam    (+) decreased breath sounds: bilateral current smoker    Pneumonia: H/o Covid PNA Oct/Nov 2021. Hospitalized for 1 week. Patient denies residual respiratory problems. Cardiovascular:  Exercise tolerance: poor (<4 METS),   (+) pacemaker (Presented to hospital this admission after receiving multiple shocks from his ICD.):, past MI (MI in 2005):, CAD:, CABG/stent (Has had 5 stents):, dysrhythmias: SVT and ventricular tachycardia, CHF (HFrEF): systolic,         Rhythm: regular  Rate: normal                 ROS comment: Admitted with ischemic cardiomyopathy and escalating episodes of VF with ICD shocks. Echo showed LVEF 25-30%. Cath showed multivessel CAD and LV gram showed EF 35-40%. Is on Coumadin for h/o LV thrombus. Bridged with Lovenox perioperatively. Neuro/Psych:   (+) depression/anxiety             GI/Hepatic/Renal:            ROS comment: Obesity.    Endo/Other:        Diabetes: Impaired fasting glucose. Abdominal:              PE comment: Deferred   Vascular: Other Findings:           Anesthesia Plan      general     ASA 4       Induction: intravenous. arterial line, central line, PA catheter and VALERY    Anesthetic plan and risks discussed with patient. Use of blood products discussed with patient whom consented to blood products.                      Laura Urrutia MD   5/30/2022

## 2022-05-30 NOTE — PLAN OF CARE
Problem: Discharge Planning  Goal: Discharge to home or other facility with appropriate resources  5/29/2022 2256 by Umberto Evans RN  Outcome: Progressing  Flowsheets (Taken 5/29/2022 2000)  Discharge to home or other facility with appropriate resources:   Identify barriers to discharge with patient and caregiver   Arrange for needed discharge resources and transportation as appropriate   Identify discharge learning needs (meds, wound care, etc)  5/29/2022 1724 by Griselda Turcios RN  Outcome: Progressing  Flowsheets (Taken 5/29/2022 0830)  Discharge to home or other facility with appropriate resources: Identify barriers to discharge with patient and caregiver     Problem: Pain  Goal: Verbalizes/displays adequate comfort level or baseline comfort level  5/29/2022 2256 by Umberto Evans RN  Outcome: Progressing  Flowsheets (Taken 5/29/2022 2000)  Verbalizes/displays adequate comfort level or baseline comfort level: Encourage patient to monitor pain and request assistance  5/29/2022 1724 by Griselda Turcios RN  Outcome: Progressing     Problem: Safety - Adult  Goal: Free from fall injury  5/29/2022 2256 by Umberto Evans RN  Outcome: Progressing  5/29/2022 1724 by Griselda Turcios RN  Outcome: Progressing     Problem: Respiratory - Adult  Goal: Achieves optimal ventilation and oxygenation  5/29/2022 2256 by Umberto Evans RN  Outcome: Progressing  Flowsheets (Taken 5/29/2022 2000)  Achieves optimal ventilation and oxygenation:   Assess for changes in respiratory status   Assess for changes in mentation and behavior   Oxygen supplementation based on oxygen saturation or arterial blood gases  5/29/2022 1724 by Griselda Turcios RN  Outcome: Progressing  Flowsheets (Taken 5/29/2022 0830)  Achieves optimal ventilation and oxygenation:   Assess for changes in respiratory status   Assess for changes in mentation and behavior     Problem: Cardiovascular - Adult  Goal: Maintains optimal cardiac output and hemodynamic stability  5/29/2022 2256 by Ivon Hunt RN  Outcome: Progressing  Flowsheets (Taken 5/29/2022 2000)  Maintains optimal cardiac output and hemodynamic stability:   Monitor blood pressure and heart rate   Monitor urine output and notify Licensed Independent Practitioner for values outside of normal range  5/29/2022 1724 by Krystle Rausch RN  Outcome: Progressing  Flowsheets (Taken 5/29/2022 0830)  Maintains optimal cardiac output and hemodynamic stability:   Monitor blood pressure and heart rate   Monitor urine output and notify Licensed Independent Practitioner for values outside of normal range   Assess for signs of decreased cardiac output  Goal: Absence of cardiac dysrhythmias or at baseline  5/29/2022 2256 by Ivon Hunt RN  Outcome: Progressing  [address] (Taken 5/29/2022 2000)  Absence of cardiac dysrhythmias or at baseline:   Monitor cardiac rate and rhythm   Administer antiarrhythmia medication and electrolyte replacement as ordered  5/29/2022 1724 by Krystle Rausch RN  Outcome: Progressing  Flowsheets (Taken 5/29/2022 0830)  Absence of cardiac dysrhythmias or at baseline:   Monitor cardiac rate and rhythm   Assess for signs of decreased cardiac output   Administer antiarrhythmia medication and electrolyte replacement as ordered     Problem: Infection - Adult  Goal: Absence of infection at discharge  5/29/2022 2256 by Ivon Hunt RN  Outcome: Progressing  Flowsheets (Taken 5/29/2022 2000)  Absence of infection at discharge: Assess and monitor for signs and symptoms of infection  5/29/2022 1724 by Krystle Rausch RN  Outcome: Progressing  Flowsheets (Taken 5/29/2022 0830)  Absence of infection at discharge:   Assess and monitor for signs and symptoms of infection   Monitor lab/diagnostic results   Monitor all insertion sites i.e., indwelling lines, tubes and drains  Goal: Absence of infection during hospitalization  5/29/2022 2256 by Ivon Hunt RN  Outcome: Progressing  Flowsheets (Taken 5/29/2022 2000)  Absence of infection during hospitalization:   Assess and monitor for signs and symptoms of infection   Monitor lab/diagnostic results  5/29/2022 1724 by Cheko Maradiaga RN  Outcome: Progressing  Flowsheets (Taken 5/29/2022 0830)  Absence of infection during hospitalization:   Assess and monitor for signs and symptoms of infection   Monitor all insertion sites i.e., indwelling lines, tubes and drains   Monitor lab/diagnostic results  Goal: Absence of fever/infection during anticipated neutropenic period  5/29/2022 2256 by Lucero Weaver RN  Outcome: Progressing  5/29/2022 1724 by Cheko Maradiaga RN  Outcome: Progressing  Flowsheets (Taken 5/29/2022 0830)  Absence of fever/infection during anticipated neutropenic period: Monitor white blood cell count     Problem: Hematologic - Adult  Goal: Maintains hematologic stability  5/29/2022 2256 by Lucero Weaver RN  Outcome: Progressing  Flowsheets (Taken 5/29/2022 2000)  Maintains hematologic stability:   Assess for signs and symptoms of bleeding or hemorrhage   Monitor labs for bleeding or clotting disorders  5/29/2022 1724 by Cheko Maradiaga RN  Outcome: Progressing  Flowsheets (Taken 5/29/2022 0830)  Maintains hematologic stability: Assess for signs and symptoms of bleeding or hemorrhage     Problem: Chronic Conditions and Co-morbidities  Goal: Patient's chronic conditions and co-morbidity symptoms are monitored and maintained or improved  5/29/2022 2256 by Lucero Weaver RN  Outcome: Progressing  Flowsheets (Taken 5/29/2022 2000)  Care Plan - Patient's Chronic Conditions and Co-Morbidity Symptoms are Monitored and Maintained or Improved: Monitor and assess patient's chronic conditions and comorbid symptoms for stability, deterioration, or improvement  5/29/2022 1724 by Cheko Maradiaga RN  Outcome: Progressing  Flowsheets (Taken 5/29/2022 0830)  Care Plan - Patient's Chronic Conditions and Co-Morbidity Symptoms are Monitored and Maintained or Improved:   Monitor and assess patient's chronic conditions and comorbid symptoms for stability, deterioration, or improvement   Collaborate with multidisciplinary team to address chronic and comorbid conditions and prevent exacerbation or deterioration     Problem: ABCDS Injury Assessment  Goal: Absence of physical injury  5/29/2022 6256 by Lucero Weaver RN  Outcome: Progressing  5/29/2022 1724 by Cheko Maradiaga RN  Outcome: Progressing  Flowsheets (Taken 5/29/2022 0830)  Absence of Physical Injury: Implement safety measures based on patient assessment

## 2022-05-30 NOTE — PROGRESS NOTES
am  5/30/2022 10:00 AM    Admit Date: 5/26/2022    Admit Diagnosis: ICD (implantable cardioverter-defibrillator) discharge [Z45.02]  CAD, multiple vessel [I25.10]      Subjective:   Mr. Linette Henley is a 58year old  male with a past history of CAD, anterior wall MI 2005 s/p 5 stent, chronic systolic congestive HF and ICD who presented after receiving several shocks. Patient had an echo that revealed EF of 25-30%, Catheterization showed severe multivessel coronary artery, anterior apical wall motion abnormality consistent with severe CAD. Patient is being prepared for CABG 5.31.22.     05/30/22   Patient is scheduled for CABG 5.31.22. Well appearing male, in no acute distress sitting in bed. Patient denies chest pain, shortness of breath, and swelling in lower extremity. Objective:    /65   Pulse 68   Temp 97.6 °F (36.4 °C) (Temporal)   Resp 18   Ht 5' 9\" (1.753 m)   Wt 207 lb 14.4 oz (94.3 kg)   SpO2 96%   BMI 30.70 kg/m²     ROS:  General ROS: negative for - chills  Hematological and Lymphatic ROS: negative for - blood clots or jaundice  Respiratory ROS: positive for - shortness of breath  Cardiovascular ROS: no chest pain or dyspnea on exertion  Gastrointestinal ROS: no abdominal pain, change in bowel habits, or black or bloody stools  Neurological ROS: no TIA or stroke symptoms    Physical Exam:  Physical Examination: General appearance - Appearance: alert, well appearing, and in no distress.    Neck/lymph - supple, no significant adenopathy  Chest/CV - clear to auscultation, no wheezes, rales or rhonchi, symmetric air entry  Heart - S1 and S2 normal, no murmurs noted  Abdomen/GI - soft, nontender, nondistended, no masses or organomegaly   Musculoskeletal - no joint tenderness, deformity or swelling  Extremities - peripheral pulses normal, no pedal edema, no clubbing or cyanosis  Skin - normal coloration and turgor, no rashes, no suspicious skin lesions noted    Current Facility-Administered Medications   Medication Dose Route Frequency    carvedilol (COREG) tablet 12.5 mg  12.5 mg Oral Nightly    losartan (COZAAR) tablet 25 mg  25 mg Oral Nightly    famotidine (PEPCID) tablet 20 mg  20 mg Oral BID    enoxaparin (LOVENOX) injection 100 mg  1 mg/kg SubCUTAneous BID    sodium chloride flush 0.9 % injection 3 mL  3 mL IntraVENous Q8H    QUEtiapine (SEROQUEL) tablet 100 mg  100 mg Oral Nightly    albuterol sulfate  (90 Base) MCG/ACT inhaler 2 puff  2 puff Inhalation Q6H PRN    vitamin B-12 (CYANOCOBALAMIN) tablet 1,000 mcg  1,000 mcg Oral Daily    sodium chloride flush 0.9 % injection 5-40 mL  5-40 mL IntraVENous 2 times per day    sodium chloride flush 0.9 % injection 5-40 mL  5-40 mL IntraVENous PRN    0.9 % sodium chloride infusion   IntraVENous PRN    acetaminophen (TYLENOL) tablet 650 mg  650 mg Oral Q6H PRN    Or    acetaminophen (TYLENOL) suppository 650 mg  650 mg Rectal Q6H PRN    polyethylene glycol (GLYCOLAX) packet 17 g  17 g Oral Daily PRN    aspirin chewable tablet 81 mg  81 mg Oral Daily    nitroGLYCERIN (NITROSTAT) SL tablet 0.4 mg  0.4 mg SubLINGual Q5 Min PRN    magnesium sulfate 2000 mg in 50 mL IVPB premix  2,000 mg IntraVENous PRN    potassium chloride (KLOR-CON M) extended release tablet 40 mEq  40 mEq Oral PRN    Or    potassium bicarb-citric acid (EFFER-K) effervescent tablet 40 mEq  40 mEq Oral PRN    Or    potassium chloride 10 mEq/100 mL IVPB (Peripheral Line)  10 mEq IntraVENous PRN    amiodarone (CORDARONE) tablet 400 mg  400 mg Oral BID       Data Review: data included in this note has been independently reviewed by the author     TELEMETRY: sinus rhythm    Assessment/Plan:     Patient Active Problem List   Diagnosis    Long term (current) use of anticoagulants    Overweight (BMI 25.0-29. 9)    Coronary atherosclerosis of native coronary vessel    Benign prostatic hyperplasia with weak urinary stream    History of 2019 novel coronavirus disease (COVID-19)    Numbness of left foot    Primary insomnia    Chronic systolic congestive heart failure (HCC)    S/P ICD (internal cardiac defibrillator) procedure    Seasonal allergic rhinitis due to pollen    Smoking greater than 20 pack years    Refused influenza vaccine    Chronic eczematous otitis externa of both ears    High risk medication use    Dyslipidemia    Ventricular tachycardia ([facility] [address]    COVID-19 vaccination refused    Anemia    Chronic fatigue    IGT (impaired glucose tolerance)    Tobacco abuse    Statin intolerance    ICD (implantable cardioverter-defibrillator) discharge    COVID-19    CAD, multiple vessel     PLAN   Chronic systolic congestive HF  - Scheduled for CABG 5.31.22  - Patient will remain at [facility] for continual monitoring of symptoms  - Continue on medical therapy  - Continue on Lovenox treatment dose of 1mg/ mg subcutaneous  q 12 hrs       Gerhardt Jaffe, [place] -S

## 2022-05-31 ENCOUNTER — ANESTHESIA (OUTPATIENT)
Dept: SURGERY | Age: 62
DRG: 234 | End: 2022-05-31
Payer: MEDICARE

## 2022-05-31 ENCOUNTER — APPOINTMENT (OUTPATIENT)
Dept: GENERAL RADIOLOGY | Age: 62
DRG: 234 | End: 2022-05-31
Payer: MEDICARE

## 2022-05-31 PROBLEM — J98.11 ATELECTASIS: Status: ACTIVE | Noted: 2022-05-31

## 2022-05-31 PROBLEM — Z99.11 ENCOUNTER FOR WEANING FROM VENTILATOR (HCC): Status: ACTIVE | Noted: 2022-05-31

## 2022-05-31 PROBLEM — R09.02 HYPOXEMIA: Status: ACTIVE | Noted: 2022-05-31

## 2022-05-31 PROBLEM — Z95.1 S/P CABG X 3: Status: ACTIVE | Noted: 2022-05-31

## 2022-05-31 LAB
ACT AVERAGE - AAVG: 120 SEC
ACT AVERAGE - AAVG: 499 SEC
ACT AVERAGE - AAVG: 584 SEC
ACT AVERAGE - AAVG: 648 SEC
ANION GAP SERPL CALC-SCNC: 5 MMOL/L (ref 7–16)
ANION GAP SERPL CALC-SCNC: 6 MMOL/L (ref 7–16)
APTT PPP: 35.3 SEC (ref 24.1–35.1)
ARTERIAL PATENCY WRIST A: ABNORMAL
BASE DEFICIT BLD-SCNC: 1.9 MMOL/L
BASE DEFICIT BLD-SCNC: 2.2 MMOL/L
BASE DEFICIT BLD-SCNC: 2.3 MMOL/L
BASE DEFICIT BLD-SCNC: 2.3 MMOL/L
BASE DEFICIT BLD-SCNC: 2.5 MMOL/L
BASE DEFICIT BLD-SCNC: 3.8 MMOL/L
BASE DEFICIT BLD-SCNC: 4.3 MMOL/L
BASELINE ACT: 127 SEC
BDY SITE: ABNORMAL
BUN SERPL-MCNC: 15 MG/DL (ref 8–23)
BUN SERPL-MCNC: 17 MG/DL (ref 8–23)
CA-I BLD-MCNC: 1.04 MMOL/L (ref 1.12–1.32)
CA-I BLD-MCNC: 1.05 MMOL/L (ref 1.12–1.32)
CA-I BLD-MCNC: 1.05 MMOL/L (ref 1.12–1.32)
CA-I BLD-MCNC: 1.06 MMOL/L (ref 1.12–1.32)
CA-I BLD-MCNC: 1.07 MMOL/L (ref 1.12–1.32)
CA-I BLD-MCNC: 1.26 MMOL/L (ref 1.12–1.32)
CALCIUM SERPL-MCNC: 7.7 MG/DL (ref 8.3–10.4)
CALCIUM SERPL-MCNC: 8 MG/DL (ref 8.3–10.4)
CHLORIDE SERPL-SCNC: 113 MMOL/L (ref 98–107)
CHLORIDE SERPL-SCNC: 114 MMOL/L (ref 98–107)
CO2 BLD-SCNC: 21 MMOL/L (ref 13–23)
CO2 BLD-SCNC: 22 MMOL/L (ref 13–23)
CO2 BLD-SCNC: 23 MMOL/L (ref 13–23)
CO2 BLD-SCNC: 24 MMOL/L (ref 13–23)
CO2 SERPL-SCNC: 23 MMOL/L (ref 21–32)
CO2 SERPL-SCNC: 24 MMOL/L (ref 21–32)
CREAT SERPL-MCNC: 1 MG/DL (ref 0.8–1.5)
CREAT SERPL-MCNC: 1 MG/DL (ref 0.8–1.5)
EKG ATRIAL RATE: 66 BPM
EKG DIAGNOSIS: NORMAL
EKG P AXIS: 57 DEGREES
EKG P-R INTERVAL: 216 MS
EKG Q-T INTERVAL: 490 MS
EKG QRS DURATION: 96 MS
EKG QTC CALCULATION (BAZETT): 513 MS
EKG R AXIS: 32 DEGREES
EKG T AXIS: 96 DEGREES
EKG VENTRICULAR RATE: 66 BPM
ERYTHROCYTE [DISTWIDTH] IN BLOOD BY AUTOMATED COUNT: 14.6 % (ref 11.9–14.6)
ERYTHROCYTE [DISTWIDTH] IN BLOOD BY AUTOMATED COUNT: 14.6 % (ref 11.9–14.6)
FIBRINOGEN PPP-MCNC: 224 MG/DL (ref 190–501)
GAS FLOW.O2 O2 DELIVERY SYS: ABNORMAL L/MIN
GLUCOSE BLD STRIP.AUTO-MCNC: 103 MG/DL (ref 65–100)
GLUCOSE BLD STRIP.AUTO-MCNC: 104 MG/DL (ref 65–100)
GLUCOSE BLD STRIP.AUTO-MCNC: 104 MG/DL (ref 65–100)
GLUCOSE BLD STRIP.AUTO-MCNC: 106 MG/DL (ref 65–100)
GLUCOSE BLD STRIP.AUTO-MCNC: 106 MG/DL (ref 65–100)
GLUCOSE BLD STRIP.AUTO-MCNC: 107 MG/DL (ref 65–100)
GLUCOSE BLD STRIP.AUTO-MCNC: 108 MG/DL (ref 65–100)
GLUCOSE BLD STRIP.AUTO-MCNC: 109 MG/DL (ref 65–100)
GLUCOSE BLD STRIP.AUTO-MCNC: 111 MG/DL (ref 65–100)
GLUCOSE BLD STRIP.AUTO-MCNC: 111 MG/DL (ref 65–100)
GLUCOSE BLD STRIP.AUTO-MCNC: 112 MG/DL (ref 65–100)
GLUCOSE BLD STRIP.AUTO-MCNC: 117 MG/DL (ref 65–100)
GLUCOSE BLD STRIP.AUTO-MCNC: 120 MG/DL (ref 65–100)
GLUCOSE BLD STRIP.AUTO-MCNC: 124 MG/DL (ref 65–100)
GLUCOSE BLD STRIP.AUTO-MCNC: 89 MG/DL (ref 65–100)
GLUCOSE BLD STRIP.AUTO-MCNC: 91 MG/DL (ref 65–100)
GLUCOSE BLD STRIP.AUTO-MCNC: 92 MG/DL (ref 65–100)
GLUCOSE BLD STRIP.AUTO-MCNC: 95 MG/DL (ref 65–100)
GLUCOSE BLD STRIP.AUTO-MCNC: 95 MG/DL (ref 65–100)
GLUCOSE SERPL-MCNC: 109 MG/DL (ref 65–100)
GLUCOSE SERPL-MCNC: 98 MG/DL (ref 65–100)
HCO3 BLD-SCNC: 21.1 MMOL/L (ref 22–26)
HCO3 BLD-SCNC: 21.3 MMOL/L (ref 22–26)
HCO3 BLD-SCNC: 21.8 MMOL/L (ref 22–26)
HCO3 BLD-SCNC: 22.5 MMOL/L (ref 22–26)
HCO3 BLD-SCNC: 23.4 MMOL/L (ref 22–26)
HCO3 BLD-SCNC: 23.7 MMOL/L (ref 22–26)
HCO3 BLD-SCNC: 24.1 MMOL/L (ref 22–26)
HCT VFR BLD AUTO: 34.2 % (ref 41.1–50.3)
HCT VFR BLD AUTO: 35.2 % (ref 41.1–50.3)
HCT VFR BLD AUTO: 37.4 % (ref 41.1–50.3)
HEPARIN BOLUS-PATIENT: NORMAL UNITS
HEPARIN BOLUS-PUMP: 0 UNITS
HEPARIN BOLUS-TOTAL: NORMAL UNITS
HEPARIN REQUIRED-PATIENT: 5298
HEPARIN REQUIRED-PATIENT: 919
HEPARIN REQUIRED-TOTAL: 1062 UNITS
HEPARIN REQUIRED-TOTAL: 6121 UNITS
HEPARIN TEST CONCENTRATE: 3 MG/KG
HEPARIN TEST CONCENTRATE: 3.5 MG/KG
HGB BLD-MCNC: 10.9 G/DL (ref 13.6–17.2)
HGB BLD-MCNC: 11.5 G/DL (ref 13.6–17.2)
HGB BLD-MCNC: 12.2 G/DL (ref 13.6–17.2)
INR PPP: 1.4
MAGNESIUM SERPL-MCNC: 2.6 MG/DL (ref 1.8–2.4)
MAGNESIUM SERPL-MCNC: 2.9 MG/DL (ref 1.8–2.4)
MAGNESIUM SERPL-MCNC: 4 MG/DL (ref 1.8–2.4)
MCH RBC QN AUTO: 29.9 PG (ref 26.1–32.9)
MCH RBC QN AUTO: 30.4 PG (ref 26.1–32.9)
MCHC RBC AUTO-ENTMCNC: 31.9 G/DL (ref 31.4–35)
MCHC RBC AUTO-ENTMCNC: 32.6 G/DL (ref 31.4–35)
MCV RBC AUTO: 93.3 FL (ref 79.6–97.8)
MCV RBC AUTO: 93.7 FL (ref 79.6–97.8)
NRBC # BLD: 0 K/UL (ref 0–0.2)
NRBC # BLD: 0 K/UL (ref 0–0.2)
O2/TOTAL GAS SETTING VFR VENT: 60 %
PCO2 BLD: 31.9 MMHG (ref 35–45)
PCO2 BLD: 38 MMHG (ref 35–45)
PCO2 BLD: 40.3 MMHG (ref 35–45)
PCO2 BLD: 40.4 MMHG (ref 35–45)
PCO2 BLD: 43 MMHG (ref 35–45)
PCO2 BLD: 44.3 MMHG (ref 35–45)
PCO2 BLD: 46.5 MMHG (ref 35–45)
PEEP RESPIRATORY: 8 CMH2O
PH BLD: 7.32 [PH] (ref 7.35–7.45)
PH BLD: 7.33 [PH] (ref 7.35–7.45)
PH BLD: 7.33 [PH] (ref 7.35–7.45)
PH BLD: 7.34 [PH] (ref 7.35–7.45)
PH BLD: 7.35 [PH] (ref 7.35–7.45)
PH BLD: 7.38 [PH] (ref 7.35–7.45)
PH BLD: 7.43 [PH] (ref 7.35–7.45)
PLATELET # BLD AUTO: 158 K/UL (ref 150–450)
PLATELET # BLD AUTO: 165 K/UL (ref 150–450)
PMV BLD AUTO: 10.8 FL (ref 9.4–12.3)
PMV BLD AUTO: 11.1 FL (ref 9.4–12.3)
PO2 BLD: 128 MMHG (ref 75–100)
PO2 BLD: 166 MMHG (ref 75–100)
PO2 BLD: 203 MMHG (ref 75–100)
PO2 BLD: 257 MMHG (ref 75–100)
PO2 BLD: 328 MMHG (ref 75–100)
PO2 BLD: 90 MMHG (ref 75–100)
PO2 BLD: 92 MMHG (ref 75–100)
POTASSIUM BLD-SCNC: 4.1 MMOL/L (ref 3.5–5.1)
POTASSIUM BLD-SCNC: 4.5 MMOL/L (ref 3.5–5.1)
POTASSIUM BLD-SCNC: 4.5 MMOL/L (ref 3.5–5.1)
POTASSIUM BLD-SCNC: 4.6 MMOL/L (ref 3.5–5.1)
POTASSIUM BLD-SCNC: 5.2 MMOL/L (ref 3.5–5.1)
POTASSIUM BLD-SCNC: 5.4 MMOL/L (ref 3.5–5.1)
POTASSIUM SERPL-SCNC: 4.4 MMOL/L (ref 3.5–5.1)
POTASSIUM SERPL-SCNC: 4.4 MMOL/L (ref 3.5–5.1)
POTASSIUM SERPL-SCNC: 4.6 MMOL/L (ref 3.5–5.1)
PRESSURE SUPPORT SETTING VENT: 14 CMH2O
PROJECTED HEPARIN CONCENTATION: 3.6 MG/KG
PROTAMINE DOSE-PUMP: 41 MG
PROTAMINE DOSE-PUMP: 48 MG
PROTAMINE DOSE-TOTAL: 304 MG
PROTAMINE DOSE-TOTAL: 355 MG
PROTHROMBIN TIME: 17.5 SEC (ref 12.6–14.5)
RBC # BLD AUTO: 3.65 M/UL (ref 4.23–5.6)
RBC # BLD AUTO: 4.01 M/UL (ref 4.23–5.6)
RESPIRATORY RATE, POC: 18 (ref 5–40)
SAO2 % BLD: 100 %
SAO2 % BLD: 96.5 % (ref 95–98)
SAO2 % BLD: 97 %
SAO2 % BLD: 99 %
SERVICE CMNT-IMP: ABNORMAL
SERVICE CMNT-IMP: NORMAL
SLOPE: 72
SODIUM BLD-SCNC: 141 MMOL/L (ref 136–145)
SODIUM BLD-SCNC: 141 MMOL/L (ref 136–145)
SODIUM BLD-SCNC: 142 MMOL/L (ref 136–145)
SODIUM SERPL-SCNC: 142 MMOL/L (ref 138–145)
SODIUM SERPL-SCNC: 143 MMOL/L (ref 136–145)
SPECIMEN SITE: ABNORMAL
SPECIMEN TYPE: ABNORMAL
VENTILATION MODE VENT: ABNORMAL
VT SETTING VENT: 500 ML
WBC # BLD AUTO: 13.7 K/UL (ref 4.3–11.1)
WBC # BLD AUTO: 16.4 K/UL (ref 4.3–11.1)

## 2022-05-31 PROCEDURE — 2500000003 HC RX 250 WO HCPCS: Performed by: THORACIC SURGERY (CARDIOTHORACIC VASCULAR SURGERY)

## 2022-05-31 PROCEDURE — P9045 ALBUMIN (HUMAN), 5%, 250 ML: HCPCS | Performed by: NURSE ANESTHETIST, CERTIFIED REGISTERED

## 2022-05-31 PROCEDURE — A4217 STERILE WATER/SALINE, 500 ML: HCPCS | Performed by: THORACIC SURGERY (CARDIOTHORACIC VASCULAR SURGERY)

## 2022-05-31 PROCEDURE — 6370000000 HC RX 637 (ALT 250 FOR IP): Performed by: ANESTHESIOLOGY

## 2022-05-31 PROCEDURE — 85530 HEPARIN-PROTAMINE TOLERANCE: CPT

## 2022-05-31 PROCEDURE — 99223 1ST HOSP IP/OBS HIGH 75: CPT | Performed by: INTERNAL MEDICINE

## 2022-05-31 PROCEDURE — 3700000000 HC ANESTHESIA ATTENDED CARE: Performed by: THORACIC SURGERY (CARDIOTHORACIC VASCULAR SURGERY)

## 2022-05-31 PROCEDURE — 2100000000 HC CCU R&B

## 2022-05-31 PROCEDURE — 85027 COMPLETE CBC AUTOMATED: CPT

## 2022-05-31 PROCEDURE — 2720000010 HC SURG SUPPLY STERILE: Performed by: THORACIC SURGERY (CARDIOTHORACIC VASCULAR SURGERY)

## 2022-05-31 PROCEDURE — 2580000003 HC RX 258: Performed by: THORACIC SURGERY (CARDIOTHORACIC VASCULAR SURGERY)

## 2022-05-31 PROCEDURE — 6360000002 HC RX W HCPCS: Performed by: THORACIC SURGERY (CARDIOTHORACIC VASCULAR SURGERY)

## 2022-05-31 PROCEDURE — 2500000003 HC RX 250 WO HCPCS

## 2022-05-31 PROCEDURE — 2580000003 HC RX 258: Performed by: NURSE ANESTHETIST, CERTIFIED REGISTERED

## 2022-05-31 PROCEDURE — 5A1221Z PERFORMANCE OF CARDIAC OUTPUT, CONTINUOUS: ICD-10-PCS | Performed by: THORACIC SURGERY (CARDIOTHORACIC VASCULAR SURGERY)

## 2022-05-31 PROCEDURE — 93005 ELECTROCARDIOGRAM TRACING: CPT | Performed by: THORACIC SURGERY (CARDIOTHORACIC VASCULAR SURGERY)

## 2022-05-31 PROCEDURE — 6370000000 HC RX 637 (ALT 250 FOR IP): Performed by: INTERNAL MEDICINE

## 2022-05-31 PROCEDURE — 2580000003 HC RX 258: Performed by: ANESTHESIOLOGY

## 2022-05-31 PROCEDURE — 85610 PROTHROMBIN TIME: CPT

## 2022-05-31 PROCEDURE — 83735 ASSAY OF MAGNESIUM: CPT

## 2022-05-31 PROCEDURE — 2580000003 HC RX 258: Performed by: NURSE PRACTITIONER

## 2022-05-31 PROCEDURE — C1769 GUIDE WIRE: HCPCS | Performed by: THORACIC SURGERY (CARDIOTHORACIC VASCULAR SURGERY)

## 2022-05-31 PROCEDURE — 2500000003 HC RX 250 WO HCPCS: Performed by: PHYSICIAN ASSISTANT

## 2022-05-31 PROCEDURE — 021109W BYPASS CORONARY ARTERY, TWO ARTERIES FROM AORTA WITH AUTOLOGOUS VENOUS TISSUE, OPEN APPROACH: ICD-10-PCS | Performed by: THORACIC SURGERY (CARDIOTHORACIC VASCULAR SURGERY)

## 2022-05-31 PROCEDURE — 85384 FIBRINOGEN ACTIVITY: CPT

## 2022-05-31 PROCEDURE — 2580000003 HC RX 258

## 2022-05-31 PROCEDURE — 84295 ASSAY OF SERUM SODIUM: CPT

## 2022-05-31 PROCEDURE — 2500000003 HC RX 250 WO HCPCS: Performed by: NURSE ANESTHETIST, CERTIFIED REGISTERED

## 2022-05-31 PROCEDURE — 2709999900 HC NON-CHARGEABLE SUPPLY: Performed by: THORACIC SURGERY (CARDIOTHORACIC VASCULAR SURGERY)

## 2022-05-31 PROCEDURE — 85520 HEPARIN ASSAY: CPT

## 2022-05-31 PROCEDURE — 85347 COAGULATION TIME ACTIVATED: CPT

## 2022-05-31 PROCEDURE — 71045 X-RAY EXAM CHEST 1 VIEW: CPT

## 2022-05-31 PROCEDURE — 84132 ASSAY OF SERUM POTASSIUM: CPT

## 2022-05-31 PROCEDURE — 6370000000 HC RX 637 (ALT 250 FOR IP): Performed by: PHYSICIAN ASSISTANT

## 2022-05-31 PROCEDURE — 6360000002 HC RX W HCPCS: Performed by: PHYSICIAN ASSISTANT

## 2022-05-31 PROCEDURE — 02100A9 BYPASS CORONARY ARTERY, ONE ARTERY FROM LEFT INTERNAL MAMMARY WITH AUTOLOGOUS ARTERIAL TISSUE, OPEN APPROACH: ICD-10-PCS | Performed by: THORACIC SURGERY (CARDIOTHORACIC VASCULAR SURGERY)

## 2022-05-31 PROCEDURE — 94002 VENT MGMT INPAT INIT DAY: CPT

## 2022-05-31 PROCEDURE — 6370000000 HC RX 637 (ALT 250 FOR IP): Performed by: NURSE PRACTITIONER

## 2022-05-31 PROCEDURE — 3700000001 HC ADD 15 MINUTES (ANESTHESIA): Performed by: THORACIC SURGERY (CARDIOTHORACIC VASCULAR SURGERY)

## 2022-05-31 PROCEDURE — 6360000002 HC RX W HCPCS: Performed by: NURSE ANESTHETIST, CERTIFIED REGISTERED

## 2022-05-31 PROCEDURE — 3600000018 HC SURGERY OHS ADDTL 15MIN: Performed by: THORACIC SURGERY (CARDIOTHORACIC VASCULAR SURGERY)

## 2022-05-31 PROCEDURE — 06BQ4ZZ EXCISION OF LEFT SAPHENOUS VEIN, PERCUTANEOUS ENDOSCOPIC APPROACH: ICD-10-PCS | Performed by: THORACIC SURGERY (CARDIOTHORACIC VASCULAR SURGERY)

## 2022-05-31 PROCEDURE — 85018 HEMOGLOBIN: CPT

## 2022-05-31 PROCEDURE — 82962 GLUCOSE BLOOD TEST: CPT

## 2022-05-31 PROCEDURE — P9041 ALBUMIN (HUMAN),5%, 50ML: HCPCS | Performed by: THORACIC SURGERY (CARDIOTHORACIC VASCULAR SURGERY)

## 2022-05-31 PROCEDURE — 3600000008 HC SURGERY OHS BASE: Performed by: THORACIC SURGERY (CARDIOTHORACIC VASCULAR SURGERY)

## 2022-05-31 PROCEDURE — 80048 BASIC METABOLIC PNL TOTAL CA: CPT

## 2022-05-31 PROCEDURE — 6360000002 HC RX W HCPCS

## 2022-05-31 PROCEDURE — C1729 CATH, DRAINAGE: HCPCS | Performed by: THORACIC SURGERY (CARDIOTHORACIC VASCULAR SURGERY)

## 2022-05-31 PROCEDURE — 82803 BLOOD GASES ANY COMBINATION: CPT

## 2022-05-31 PROCEDURE — 37799 UNLISTED PX VASCULAR SURGERY: CPT

## 2022-05-31 PROCEDURE — 6370000000 HC RX 637 (ALT 250 FOR IP): Performed by: THORACIC SURGERY (CARDIOTHORACIC VASCULAR SURGERY)

## 2022-05-31 PROCEDURE — 85730 THROMBOPLASTIN TIME PARTIAL: CPT

## 2022-05-31 PROCEDURE — P9047 ALBUMIN (HUMAN), 25%, 50ML: HCPCS

## 2022-05-31 RX ORDER — PHENYLEPHRINE HCL IN 0.9% NACL 50MG/250ML
10-300 PLASTIC BAG, INJECTION (ML) INTRAVENOUS CONTINUOUS PRN
Status: DISCONTINUED | OUTPATIENT
Start: 2022-05-31 | End: 2022-06-01

## 2022-05-31 RX ORDER — MORPHINE SULFATE 2 MG/ML
2 INJECTION, SOLUTION INTRAMUSCULAR; INTRAVENOUS
Status: DISCONTINUED | OUTPATIENT
Start: 2022-05-31 | End: 2022-05-31

## 2022-05-31 RX ORDER — FAMOTIDINE 20 MG/1
20 TABLET, FILM COATED ORAL 2 TIMES DAILY
Status: DISCONTINUED | OUTPATIENT
Start: 2022-05-31 | End: 2022-06-06 | Stop reason: HOSPADM

## 2022-05-31 RX ORDER — SODIUM CHLORIDE 0.9 % (FLUSH) 0.9 %
5-40 SYRINGE (ML) INJECTION PRN
Status: DISCONTINUED | OUTPATIENT
Start: 2022-05-31 | End: 2022-06-01

## 2022-05-31 RX ORDER — INSULIN LISPRO 100 [IU]/ML
0-6 INJECTION, SOLUTION INTRAVENOUS; SUBCUTANEOUS NIGHTLY
Status: DISCONTINUED | OUTPATIENT
Start: 2022-05-31 | End: 2022-06-01

## 2022-05-31 RX ORDER — LIDOCAINE HYDROCHLORIDE 10 MG/ML
1 INJECTION, SOLUTION EPIDURAL; INFILTRATION; INTRACAUDAL; PERINEURAL
Status: DISCONTINUED | OUTPATIENT
Start: 2022-05-31 | End: 2022-05-31 | Stop reason: HOSPADM

## 2022-05-31 RX ORDER — MIDAZOLAM HYDROCHLORIDE 1 MG/ML
INJECTION INTRAMUSCULAR; INTRAVENOUS PRN
Status: DISCONTINUED | OUTPATIENT
Start: 2022-05-31 | End: 2022-05-31 | Stop reason: SDUPTHER

## 2022-05-31 RX ORDER — SODIUM CHLORIDE 0.9 % (FLUSH) 0.9 %
5-40 SYRINGE (ML) INJECTION EVERY 12 HOURS SCHEDULED
Status: DISCONTINUED | OUTPATIENT
Start: 2022-05-31 | End: 2022-05-31

## 2022-05-31 RX ORDER — SODIUM CHLORIDE 0.9 % (FLUSH) 0.9 %
5-40 SYRINGE (ML) INJECTION PRN
Status: DISCONTINUED | OUTPATIENT
Start: 2022-05-31 | End: 2022-05-31 | Stop reason: HOSPADM

## 2022-05-31 RX ORDER — DEXMEDETOMIDINE HYDROCHLORIDE 4 UG/ML
INJECTION, SOLUTION INTRAVENOUS CONTINUOUS PRN
Status: DISCONTINUED | OUTPATIENT
Start: 2022-05-31 | End: 2022-05-31 | Stop reason: SDUPTHER

## 2022-05-31 RX ORDER — GLYCOPYRROLATE 0.2 MG/ML
INJECTION INTRAMUSCULAR; INTRAVENOUS PRN
Status: DISCONTINUED | OUTPATIENT
Start: 2022-05-31 | End: 2022-05-31 | Stop reason: SDUPTHER

## 2022-05-31 RX ORDER — KETOROLAC TROMETHAMINE 15 MG/ML
15 INJECTION, SOLUTION INTRAMUSCULAR; INTRAVENOUS EVERY 6 HOURS PRN
Status: DISCONTINUED | OUTPATIENT
Start: 2022-05-31 | End: 2022-06-01

## 2022-05-31 RX ORDER — SODIUM CHLORIDE 9 MG/ML
INJECTION, SOLUTION INTRAVENOUS PRN
Status: DISCONTINUED | OUTPATIENT
Start: 2022-05-31 | End: 2022-06-01

## 2022-05-31 RX ORDER — PROTAMINE SULFATE 10 MG/ML
INJECTION, SOLUTION INTRAVENOUS PRN
Status: DISCONTINUED | OUTPATIENT
Start: 2022-05-31 | End: 2022-05-31 | Stop reason: SDUPTHER

## 2022-05-31 RX ORDER — ALBUMIN, HUMAN INJ 5% 5 %
25 SOLUTION INTRAVENOUS ONCE
Status: COMPLETED | OUTPATIENT
Start: 2022-05-31 | End: 2022-05-31

## 2022-05-31 RX ORDER — TRAMADOL HYDROCHLORIDE 50 MG/1
100 TABLET ORAL EVERY 6 HOURS PRN
Status: DISCONTINUED | OUTPATIENT
Start: 2022-05-31 | End: 2022-06-01

## 2022-05-31 RX ORDER — CARVEDILOL 6.25 MG/1
3.12 TABLET ORAL 2 TIMES DAILY
Status: DISCONTINUED | OUTPATIENT
Start: 2022-05-31 | End: 2022-06-02

## 2022-05-31 RX ORDER — CEFAZOLIN SODIUM 1 G/3ML
2000 INJECTION, POWDER, FOR SOLUTION INTRAMUSCULAR; INTRAVENOUS EVERY 8 HOURS
Status: DISCONTINUED | OUTPATIENT
Start: 2022-05-31 | End: 2022-05-31 | Stop reason: SDUPTHER

## 2022-05-31 RX ORDER — KETOROLAC TROMETHAMINE 15 MG/ML
15 INJECTION, SOLUTION INTRAMUSCULAR; INTRAVENOUS ONCE
Status: COMPLETED | OUTPATIENT
Start: 2022-05-31 | End: 2022-05-31

## 2022-05-31 RX ORDER — SODIUM CHLORIDE, SODIUM LACTATE, POTASSIUM CHLORIDE, CALCIUM CHLORIDE 600; 310; 30; 20 MG/100ML; MG/100ML; MG/100ML; MG/100ML
INJECTION, SOLUTION INTRAVENOUS CONTINUOUS
Status: DISCONTINUED | OUTPATIENT
Start: 2022-05-31 | End: 2022-05-31 | Stop reason: HOSPADM

## 2022-05-31 RX ORDER — DEXTROSE, SODIUM CHLORIDE, AND POTASSIUM CHLORIDE 5; .45; .15 G/100ML; G/100ML; G/100ML
INJECTION INTRAVENOUS CONTINUOUS
Status: DISCONTINUED | OUTPATIENT
Start: 2022-05-31 | End: 2022-06-01

## 2022-05-31 RX ORDER — OXYCODONE HYDROCHLORIDE AND ACETAMINOPHEN 5; 325 MG/1; MG/1
1 TABLET ORAL EVERY 6 HOURS PRN
Status: DISCONTINUED | OUTPATIENT
Start: 2022-05-31 | End: 2022-05-31

## 2022-05-31 RX ORDER — OXYCODONE AND ACETAMINOPHEN 10; 325 MG/1; MG/1
1 TABLET ORAL EVERY 4 HOURS PRN
Status: DISCONTINUED | OUTPATIENT
Start: 2022-05-31 | End: 2022-06-01

## 2022-05-31 RX ORDER — INSULIN LISPRO 100 [IU]/ML
0-12 INJECTION, SOLUTION INTRAVENOUS; SUBCUTANEOUS
Status: DISCONTINUED | OUTPATIENT
Start: 2022-05-31 | End: 2022-05-31

## 2022-05-31 RX ORDER — MIDAZOLAM HYDROCHLORIDE 2 MG/2ML
2 INJECTION, SOLUTION INTRAMUSCULAR; INTRAVENOUS
Status: DISCONTINUED | OUTPATIENT
Start: 2022-05-31 | End: 2022-05-31 | Stop reason: HOSPADM

## 2022-05-31 RX ORDER — EPHEDRINE SULFATE/0.9% NACL/PF 50 MG/5 ML
SYRINGE (ML) INTRAVENOUS PRN
Status: DISCONTINUED | OUTPATIENT
Start: 2022-05-31 | End: 2022-05-31 | Stop reason: SDUPTHER

## 2022-05-31 RX ORDER — MIDAZOLAM HYDROCHLORIDE 1 MG/ML
1 INJECTION, SOLUTION INTRAMUSCULAR; INTRAVENOUS
Status: DISCONTINUED | OUTPATIENT
Start: 2022-05-31 | End: 2022-05-31

## 2022-05-31 RX ORDER — DEXTROSE MONOHYDRATE 50 MG/ML
100 INJECTION, SOLUTION INTRAVENOUS PRN
Status: DISCONTINUED | OUTPATIENT
Start: 2022-05-31 | End: 2022-06-01

## 2022-05-31 RX ORDER — FUROSEMIDE 40 MG/1
20 TABLET ORAL DAILY
Status: DISCONTINUED | OUTPATIENT
Start: 2022-06-02 | End: 2022-05-31

## 2022-05-31 RX ORDER — ONDANSETRON 4 MG/1
4 TABLET, ORALLY DISINTEGRATING ORAL EVERY 8 HOURS PRN
Status: DISCONTINUED | OUTPATIENT
Start: 2022-05-31 | End: 2022-05-31 | Stop reason: SDUPTHER

## 2022-05-31 RX ORDER — ONDANSETRON 4 MG/1
4 TABLET, ORALLY DISINTEGRATING ORAL EVERY 8 HOURS PRN
Status: DISCONTINUED | OUTPATIENT
Start: 2022-05-31 | End: 2022-06-06 | Stop reason: HOSPADM

## 2022-05-31 RX ORDER — SODIUM CHLORIDE, SODIUM LACTATE, POTASSIUM CHLORIDE, CALCIUM CHLORIDE 600; 310; 30; 20 MG/100ML; MG/100ML; MG/100ML; MG/100ML
INJECTION, SOLUTION INTRAVENOUS CONTINUOUS PRN
Status: DISCONTINUED | OUTPATIENT
Start: 2022-05-31 | End: 2022-05-31 | Stop reason: SDUPTHER

## 2022-05-31 RX ORDER — PHENYLEPHRINE HCL IN 0.9% NACL 50MG/250ML
PLASTIC BAG, INJECTION (ML) INTRAVENOUS PRN
Status: DISCONTINUED | OUTPATIENT
Start: 2022-05-31 | End: 2022-05-31 | Stop reason: SDUPTHER

## 2022-05-31 RX ORDER — ESMOLOL HYDROCHLORIDE 10 MG/ML
INJECTION INTRAVENOUS PRN
Status: DISCONTINUED | OUTPATIENT
Start: 2022-05-31 | End: 2022-05-31 | Stop reason: SDUPTHER

## 2022-05-31 RX ORDER — VECURONIUM BROMIDE 1 MG/ML
INJECTION, POWDER, LYOPHILIZED, FOR SOLUTION INTRAVENOUS PRN
Status: DISCONTINUED | OUTPATIENT
Start: 2022-05-31 | End: 2022-05-31 | Stop reason: SDUPTHER

## 2022-05-31 RX ORDER — SODIUM CHLORIDE 0.9 % (FLUSH) 0.9 %
5-40 SYRINGE (ML) INJECTION EVERY 12 HOURS SCHEDULED
Status: DISCONTINUED | OUTPATIENT
Start: 2022-05-31 | End: 2022-06-06 | Stop reason: HOSPADM

## 2022-05-31 RX ORDER — MAGNESIUM SULFATE IN WATER 40 MG/ML
2000 INJECTION, SOLUTION INTRAVENOUS PRN
Status: DISCONTINUED | OUTPATIENT
Start: 2022-05-31 | End: 2022-06-01

## 2022-05-31 RX ORDER — SUFENTANIL CITRATE 50 UG/ML
INJECTION EPIDURAL; INTRAVENOUS PRN
Status: DISCONTINUED | OUTPATIENT
Start: 2022-05-31 | End: 2022-05-31 | Stop reason: SDUPTHER

## 2022-05-31 RX ORDER — PAPAVERINE HYDROCHLORIDE 30 MG/ML
INJECTION INTRAMUSCULAR; INTRAVENOUS PRN
Status: DISCONTINUED | OUTPATIENT
Start: 2022-05-31 | End: 2022-05-31 | Stop reason: ALTCHOICE

## 2022-05-31 RX ORDER — ROCURONIUM BROMIDE 10 MG/ML
INJECTION, SOLUTION INTRAVENOUS PRN
Status: DISCONTINUED | OUTPATIENT
Start: 2022-05-31 | End: 2022-05-31 | Stop reason: SDUPTHER

## 2022-05-31 RX ORDER — NOREPINEPHRINE BITARTRATE 1 MG/ML
INJECTION, SOLUTION INTRAVENOUS PRN
Status: DISCONTINUED | OUTPATIENT
Start: 2022-05-31 | End: 2022-05-31 | Stop reason: SDUPTHER

## 2022-05-31 RX ORDER — SODIUM CHLORIDE 9 MG/ML
INJECTION, SOLUTION INTRAVENOUS CONTINUOUS PRN
Status: DISCONTINUED | OUTPATIENT
Start: 2022-05-31 | End: 2022-05-31 | Stop reason: SDUPTHER

## 2022-05-31 RX ORDER — ACETAMINOPHEN 325 MG/1
650 TABLET ORAL EVERY 4 HOURS PRN
Status: DISCONTINUED | OUTPATIENT
Start: 2022-05-31 | End: 2022-06-06 | Stop reason: HOSPADM

## 2022-05-31 RX ORDER — HEPARIN SODIUM 1000 [USP'U]/ML
INJECTION, SOLUTION INTRAVENOUS; SUBCUTANEOUS PRN
Status: DISCONTINUED | OUTPATIENT
Start: 2022-05-31 | End: 2022-05-31 | Stop reason: SDUPTHER

## 2022-05-31 RX ORDER — PROTAMINE SULFATE 10 MG/ML
50 INJECTION, SOLUTION INTRAVENOUS
Status: DISPENSED | OUTPATIENT
Start: 2022-05-31 | End: 2022-05-31

## 2022-05-31 RX ORDER — NITROGLYCERIN 20 MG/100ML
INJECTION INTRAVENOUS CONTINUOUS PRN
Status: DISCONTINUED | OUTPATIENT
Start: 2022-05-31 | End: 2022-05-31 | Stop reason: SDUPTHER

## 2022-05-31 RX ORDER — NITROGLYCERIN 20 MG/100ML
5-300 INJECTION INTRAVENOUS CONTINUOUS PRN
Status: DISCONTINUED | OUTPATIENT
Start: 2022-05-31 | End: 2022-06-01

## 2022-05-31 RX ORDER — SODIUM CHLORIDE 0.9 % (FLUSH) 0.9 %
5-40 SYRINGE (ML) INJECTION EVERY 12 HOURS SCHEDULED
Status: DISCONTINUED | OUTPATIENT
Start: 2022-05-31 | End: 2022-05-31 | Stop reason: HOSPADM

## 2022-05-31 RX ORDER — FAMOTIDINE 20 MG/1
20 TABLET, FILM COATED ORAL 2 TIMES DAILY
Status: DISCONTINUED | OUTPATIENT
Start: 2022-05-31 | End: 2022-05-31

## 2022-05-31 RX ORDER — ONDANSETRON 2 MG/ML
4 INJECTION INTRAMUSCULAR; INTRAVENOUS EVERY 6 HOURS PRN
Status: DISCONTINUED | OUTPATIENT
Start: 2022-05-31 | End: 2022-05-31 | Stop reason: SDUPTHER

## 2022-05-31 RX ORDER — SODIUM CHLORIDE 9 MG/ML
INJECTION, SOLUTION INTRAVENOUS PRN
Status: DISCONTINUED | OUTPATIENT
Start: 2022-05-31 | End: 2022-05-31 | Stop reason: HOSPADM

## 2022-05-31 RX ORDER — INSULIN GLARGINE 100 [IU]/ML
0.15 INJECTION, SOLUTION SUBCUTANEOUS NIGHTLY
Status: DISCONTINUED | OUTPATIENT
Start: 2022-06-01 | End: 2022-06-02

## 2022-05-31 RX ORDER — AMIODARONE HYDROCHLORIDE 200 MG/1
200 TABLET ORAL 2 TIMES DAILY
Status: DISCONTINUED | OUTPATIENT
Start: 2022-05-31 | End: 2022-06-01

## 2022-05-31 RX ORDER — SODIUM CHLORIDE 9 MG/ML
INJECTION, SOLUTION INTRAVENOUS CONTINUOUS
Status: DISCONTINUED | OUTPATIENT
Start: 2022-05-31 | End: 2022-06-01

## 2022-05-31 RX ORDER — ACETAMINOPHEN 500 MG
1000 TABLET ORAL ONCE
Status: COMPLETED | OUTPATIENT
Start: 2022-05-31 | End: 2022-05-31

## 2022-05-31 RX ORDER — GLUCAGON 1 MG/ML
1 KIT INJECTION PRN
Status: DISCONTINUED | OUTPATIENT
Start: 2022-05-31 | End: 2022-06-01

## 2022-05-31 RX ORDER — ONDANSETRON 2 MG/ML
4 INJECTION INTRAMUSCULAR; INTRAVENOUS EVERY 6 HOURS PRN
Status: DISCONTINUED | OUTPATIENT
Start: 2022-05-31 | End: 2022-06-06 | Stop reason: HOSPADM

## 2022-05-31 RX ORDER — NOREPINEPHRINE BIT/0.9 % NACL 16MG/250ML
.01-3.3 INFUSION BOTTLE (ML) INTRAVENOUS CONTINUOUS PRN
Status: DISCONTINUED | OUTPATIENT
Start: 2022-05-31 | End: 2022-06-01

## 2022-05-31 RX ORDER — POTASSIUM CHLORIDE 29.8 MG/ML
20 INJECTION INTRAVENOUS PRN
Status: DISCONTINUED | OUTPATIENT
Start: 2022-05-31 | End: 2022-06-01

## 2022-05-31 RX ORDER — OXYCODONE HYDROCHLORIDE AND ACETAMINOPHEN 5; 325 MG/1; MG/1
1 TABLET ORAL EVERY 4 HOURS PRN
Status: DISCONTINUED | OUTPATIENT
Start: 2022-05-31 | End: 2022-05-31

## 2022-05-31 RX ORDER — MORPHINE SULFATE 4 MG/ML
4 INJECTION INTRAVENOUS
Status: DISCONTINUED | OUTPATIENT
Start: 2022-05-31 | End: 2022-05-31

## 2022-05-31 RX ORDER — ETOMIDATE 2 MG/ML
INJECTION INTRAVENOUS PRN
Status: DISCONTINUED | OUTPATIENT
Start: 2022-05-31 | End: 2022-05-31 | Stop reason: SDUPTHER

## 2022-05-31 RX ORDER — NOREPINEPHRINE BIT/0.9 % NACL 16MG/250ML
INFUSION BOTTLE (ML) INTRAVENOUS CONTINUOUS PRN
Status: DISCONTINUED | OUTPATIENT
Start: 2022-05-31 | End: 2022-05-31 | Stop reason: SDUPTHER

## 2022-05-31 RX ORDER — DIMETHICONE, CAMPHOR (SYNTHETIC), MENTHOL, AND PHENOL 1.1; .5; .625; .5 G/100G; G/100G; G/100G; G/100G
OINTMENT TOPICAL PRN
Status: DISCONTINUED | OUTPATIENT
Start: 2022-05-31 | End: 2022-06-06 | Stop reason: HOSPADM

## 2022-05-31 RX ORDER — ALBUMIN, HUMAN INJ 5% 5 %
SOLUTION INTRAVENOUS PRN
Status: DISCONTINUED | OUTPATIENT
Start: 2022-05-31 | End: 2022-05-31 | Stop reason: SDUPTHER

## 2022-05-31 RX ORDER — POTASSIUM CHLORIDE 750 MG/1
10 TABLET, EXTENDED RELEASE ORAL DAILY
Status: DISCONTINUED | OUTPATIENT
Start: 2022-06-01 | End: 2022-05-31

## 2022-05-31 RX ADMIN — SODIUM CHLORIDE, PRESERVATIVE FREE 3 ML: 5 INJECTION INTRAVENOUS at 05:13

## 2022-05-31 RX ADMIN — NOREPINEPHRINE BITARTRATE 16 MCG: 1 SOLUTION INTRAVENOUS at 09:49

## 2022-05-31 RX ADMIN — FAMOTIDINE 20 MG: 20 TABLET, FILM COATED ORAL at 05:18

## 2022-05-31 RX ADMIN — INSULIN GLARGINE 14 UNITS: 100 INJECTION, SOLUTION SUBCUTANEOUS at 19:56

## 2022-05-31 RX ADMIN — NOREPINEPHRINE BITARTRATE 16 MCG: 1 SOLUTION INTRAVENOUS at 11:26

## 2022-05-31 RX ADMIN — SODIUM CHLORIDE, POTASSIUM CHLORIDE, SODIUM LACTATE AND CALCIUM CHLORIDE: 600; 310; 30; 20 INJECTION, SOLUTION INTRAVENOUS at 07:09

## 2022-05-31 RX ADMIN — Medication 5 MCG: at 11:24

## 2022-05-31 RX ADMIN — SODIUM CHLORIDE: 9 INJECTION, SOLUTION INTRAVENOUS at 13:10

## 2022-05-31 RX ADMIN — SUFENTANIL CITRATE 50 MCG: 50 INJECTION EPIDURAL; INTRAVENOUS at 08:21

## 2022-05-31 RX ADMIN — PHENYLEPHRINE HYDROCHLORIDE 100 MCG: 10 INJECTION INTRAVENOUS at 07:33

## 2022-05-31 RX ADMIN — Medication 10 MG: at 07:36

## 2022-05-31 RX ADMIN — NOREPINEPHRINE BITARTRATE 8 MCG: 1 SOLUTION INTRAVENOUS at 09:43

## 2022-05-31 RX ADMIN — SODIUM CHLORIDE, PRESERVATIVE FREE 10 ML: 5 INJECTION INTRAVENOUS at 20:11

## 2022-05-31 RX ADMIN — ESMOLOL HYDROCHLORIDE 30 MG: 100 INJECTION, SOLUTION INTRAVENOUS at 11:15

## 2022-05-31 RX ADMIN — NITROGLYCERIN 10 MCG/MIN: 20 INJECTION INTRAVENOUS at 07:51

## 2022-05-31 RX ADMIN — PROTAMINE SULFATE 300 MG: 10 INJECTION, SOLUTION INTRAVENOUS at 11:22

## 2022-05-31 RX ADMIN — Medication 0.03 MCG/KG/MIN: at 07:57

## 2022-05-31 RX ADMIN — LIDOCAINE HYDROCHLORIDE 100 MG: 20 INJECTION INTRAVENOUS at 07:25

## 2022-05-31 RX ADMIN — CEFAZOLIN SODIUM 2000 MG: 100 INJECTION, POWDER, LYOPHILIZED, FOR SOLUTION INTRAVENOUS at 22:03

## 2022-05-31 RX ADMIN — Medication: at 20:18

## 2022-05-31 RX ADMIN — KETOROLAC TROMETHAMINE 15 MG: 15 INJECTION, SOLUTION INTRAMUSCULAR; INTRAVENOUS at 15:32

## 2022-05-31 RX ADMIN — VECURONIUM BROMIDE 3 MG: 1 INJECTION, POWDER, LYOPHILIZED, FOR SOLUTION INTRAVENOUS at 10:48

## 2022-05-31 RX ADMIN — AMINOCAPROIC ACID 1 G/HR: 250 INJECTION, SOLUTION INTRAVENOUS at 08:38

## 2022-05-31 RX ADMIN — GLYCOPYRROLATE 0.2 MG: 0.2 INJECTION INTRAMUSCULAR; INTRAVENOUS at 09:52

## 2022-05-31 RX ADMIN — NOREPINEPHRINE BITARTRATE 16 MCG: 1 SOLUTION INTRAVENOUS at 07:53

## 2022-05-31 RX ADMIN — Medication 0.02 MCG/KG/MIN: at 11:11

## 2022-05-31 RX ADMIN — HEPARIN SODIUM 40000 UNITS: 1000 INJECTION INTRAVENOUS; SUBCUTANEOUS at 09:21

## 2022-05-31 RX ADMIN — OXYCODONE AND ACETAMINOPHEN 1 TABLET: 5; 325 TABLET ORAL at 20:17

## 2022-05-31 RX ADMIN — POTASSIUM CHLORIDE, DEXTROSE MONOHYDRATE AND SODIUM CHLORIDE: 150; 5; 450 INJECTION, SOLUTION INTRAVENOUS at 14:04

## 2022-05-31 RX ADMIN — MIDAZOLAM 3 MG: 1 INJECTION INTRAMUSCULAR; INTRAVENOUS at 07:25

## 2022-05-31 RX ADMIN — ACETAMINOPHEN 1000 MG: 500 TABLET ORAL at 06:24

## 2022-05-31 RX ADMIN — VECURONIUM BROMIDE 3 MG: 1 INJECTION, POWDER, LYOPHILIZED, FOR SOLUTION INTRAVENOUS at 08:09

## 2022-05-31 RX ADMIN — NOREPINEPHRINE BITARTRATE 16 MCG: 1 SOLUTION INTRAVENOUS at 08:21

## 2022-05-31 RX ADMIN — VECURONIUM BROMIDE 3 MG: 1 INJECTION, POWDER, LYOPHILIZED, FOR SOLUTION INTRAVENOUS at 09:16

## 2022-05-31 RX ADMIN — CEFAZOLIN SODIUM 2000 MG: 100 INJECTION, POWDER, LYOPHILIZED, FOR SOLUTION INTRAVENOUS at 11:30

## 2022-05-31 RX ADMIN — ALBUMIN (HUMAN) 250 ML: 12.5 INJECTION, SOLUTION INTRAVENOUS at 11:40

## 2022-05-31 RX ADMIN — NOREPINEPHRINE BITARTRATE 16 MCG: 1 SOLUTION INTRAVENOUS at 07:56

## 2022-05-31 RX ADMIN — Medication 5 MCG: at 11:26

## 2022-05-31 RX ADMIN — TRAMADOL HYDROCHLORIDE 100 MG: 50 TABLET, COATED ORAL at 21:12

## 2022-05-31 RX ADMIN — ALBUMIN (HUMAN) 25 G: 12.5 INJECTION, SOLUTION INTRAVENOUS at 20:21

## 2022-05-31 RX ADMIN — SUFENTANIL CITRATE 50 MCG: 50 INJECTION EPIDURAL; INTRAVENOUS at 07:25

## 2022-05-31 RX ADMIN — SODIUM CHLORIDE, PRESERVATIVE FREE 5 ML: 5 INJECTION INTRAVENOUS at 05:14

## 2022-05-31 RX ADMIN — MIDAZOLAM 2 MG: 1 INJECTION INTRAMUSCULAR; INTRAVENOUS at 07:09

## 2022-05-31 RX ADMIN — ETOMIDATE 8 MG: 2 INJECTION, SOLUTION INTRAVENOUS at 07:25

## 2022-05-31 RX ADMIN — SODIUM CHLORIDE, SODIUM LACTATE, POTASSIUM CHLORIDE, AND CALCIUM CHLORIDE: 600; 310; 30; 20 INJECTION, SOLUTION INTRAVENOUS at 06:24

## 2022-05-31 RX ADMIN — SODIUM CHLORIDE: 9 INJECTION, SOLUTION INTRAVENOUS at 07:50

## 2022-05-31 RX ADMIN — AMIODARONE HYDROCHLORIDE 200 MG: 200 TABLET ORAL at 22:02

## 2022-05-31 RX ADMIN — SUFENTANIL CITRATE 25 MCG: 50 INJECTION EPIDURAL; INTRAVENOUS at 11:20

## 2022-05-31 RX ADMIN — DEXMEDETOMIDINE HYDROCHLORIDE 0.5 MCG/KG/HR: 100 INJECTION, SOLUTION INTRAVENOUS at 09:56

## 2022-05-31 RX ADMIN — AMIODARONE HYDROCHLORIDE 400 MG: 200 TABLET ORAL at 05:05

## 2022-05-31 RX ADMIN — CYANOCOBALAMIN TAB 1000 MCG 1000 MCG: 1000 TAB at 05:16

## 2022-05-31 RX ADMIN — NOREPINEPHRINE BITARTRATE 16 MCG: 1 SOLUTION INTRAVENOUS at 09:48

## 2022-05-31 RX ADMIN — ROCURONIUM BROMIDE 50 MG: 50 INJECTION, SOLUTION INTRAVENOUS at 07:25

## 2022-05-31 RX ADMIN — NOREPINEPHRINE BITARTRATE 16 MCG: 1 SOLUTION INTRAVENOUS at 07:47

## 2022-05-31 RX ADMIN — FAMOTIDINE 20 MG: 20 TABLET, FILM COATED ORAL at 20:18

## 2022-05-31 RX ADMIN — CEFAZOLIN SODIUM 2000 MG: 100 INJECTION, POWDER, LYOPHILIZED, FOR SOLUTION INTRAVENOUS at 08:05

## 2022-05-31 RX ADMIN — Medication 3 AMPULE: at 05:02

## 2022-05-31 RX ADMIN — LOSARTAN POTASSIUM 25 MG: 25 TABLET, FILM COATED ORAL at 05:04

## 2022-05-31 RX ADMIN — ALBUMIN (HUMAN) 25 G: 12.5 INJECTION, SOLUTION INTRAVENOUS at 15:03

## 2022-05-31 RX ADMIN — Medication 20 MG: at 07:25

## 2022-05-31 RX ADMIN — KETOROLAC TROMETHAMINE 15 MG: 15 INJECTION, SOLUTION INTRAMUSCULAR; INTRAVENOUS at 23:05

## 2022-05-31 RX ADMIN — NITROGLYCERIN 10 MCG/MIN: 20 INJECTION INTRAVENOUS at 12:20

## 2022-05-31 RX ADMIN — TUBERCULIN PURIFIED PROTEIN DERIVATIVE 5 UNITS: 5 INJECTION, SOLUTION INTRADERMAL at 20:00

## 2022-05-31 RX ADMIN — Medication 1 AMPULE: at 20:18

## 2022-05-31 RX ADMIN — ASPIRIN 81 MG: 81 TABLET, CHEWABLE ORAL at 05:04

## 2022-05-31 RX ADMIN — SODIUM CHLORIDE, PRESERVATIVE FREE 3 ML: 5 INJECTION INTRAVENOUS at 19:02

## 2022-05-31 ASSESSMENT — PAIN DESCRIPTION - DESCRIPTORS
DESCRIPTORS: ACHING

## 2022-05-31 ASSESSMENT — PULMONARY FUNCTION TESTS
PIF_VALUE: 18.3
PIF_VALUE: 24.5
PIF_VALUE: 24.7
PIF_VALUE: 24.7
PIF_VALUE: 24.6
PIF_VALUE: 24.6
PIF_VALUE: 24.2
PIF_VALUE: 25.2
PIF_VALUE: 24.1
PIF_VALUE: 23.7
PIF_VALUE: 18.4
PIF_VALUE: 22.6
PIF_VALUE: 24.4
PIF_VALUE: 18.5
PIF_VALUE: 23.3
PIF_VALUE: 24.5
PIF_VALUE: 24.7
PIF_VALUE: 25
PIF_VALUE: 22.4
PIF_VALUE: 24.9
PIF_VALUE: 24
PIF_VALUE: 24.2
PIF_VALUE: 24.7
PIF_VALUE: 24.2
PIF_VALUE: 24.9
PIF_VALUE: 24.3
PIF_VALUE: 22.5
PIF_VALUE: 25.2
PIF_VALUE: 18
PIF_VALUE: 23.7
PIF_VALUE: 24.3

## 2022-05-31 ASSESSMENT — PAIN SCALES - GENERAL
PAINLEVEL_OUTOF10: 0
PAINLEVEL_OUTOF10: 2
PAINLEVEL_OUTOF10: 0
PAINLEVEL_OUTOF10: 5
PAINLEVEL_OUTOF10: 8
PAINLEVEL_OUTOF10: 2
PAINLEVEL_OUTOF10: 0
PAINLEVEL_OUTOF10: 5
PAINLEVEL_OUTOF10: 0

## 2022-05-31 ASSESSMENT — PAIN DESCRIPTION - ORIENTATION
ORIENTATION: MID
ORIENTATION: ANTERIOR;MID
ORIENTATION: ANTERIOR

## 2022-05-31 ASSESSMENT — PAIN DESCRIPTION - LOCATION
LOCATION: CHEST;INCISION
LOCATION: STERNUM
LOCATION: CHEST;INCISION
LOCATION: INCISION;CHEST

## 2022-05-31 ASSESSMENT — PAIN DESCRIPTION - FREQUENCY: FREQUENCY: CONTINUOUS

## 2022-05-31 ASSESSMENT — PAIN - FUNCTIONAL ASSESSMENT
PAIN_FUNCTIONAL_ASSESSMENT: 0-10
PAIN_FUNCTIONAL_ASSESSMENT: PREVENTS OR INTERFERES WITH MANY ACTIVE NOT PASSIVE ACTIVITIES

## 2022-05-31 ASSESSMENT — PAIN DESCRIPTION - ONSET: ONSET: PROGRESSIVE

## 2022-05-31 ASSESSMENT — PAIN SCALES - WONG BAKER: WONGBAKER_NUMERICALRESPONSE: 0

## 2022-05-31 ASSESSMENT — PAIN DESCRIPTION - PAIN TYPE: TYPE: SURGICAL PAIN;ACUTE PAIN

## 2022-05-31 NOTE — ANESTHESIA PROCEDURE NOTES
Central Venous Line:    A central venous line was placed using ultrasound guidance, in the OR for the following indication(s): central venous access and CVP monitoring.5/31/2022 7:35 AM5/31/2022 7:49 AM    Sterility preparation included the following: hand hygiene performed prior to procedure, maximum sterile barriers used and sterile technique used to drape from head to toe. The patient was placed in Trendelenburg position. The right internal jugular vein was prepped. The site was prepped with Chloraprep. A 8.5 Fr (size), 12 (length), introducer double lumen (MAC introducer) was placed. During the procedure, the following specific steps were taken: target vein identified, needle advanced into vein and blood aspirated and guidewire advanced into vein. Intravenous verification was obtained by ultrasound and manometry. Post insertion care included: all ports aspirated, all ports flushed easily, guidewire removed intact, line sutured in place and dressing applied. During the procedure the patient experienced: patient tolerated procedure well with no complications and EBL < 5mL. Insertion site scrubbed per usage guidelines?: YesnoOutcomes: uncomplicated and patient tolerated procedure wellpermanent images obtained  Anesthesia type: general.. No    Additional notes: With ultrasound I identified the location and patency of the right IJ and cannulated it with an 18g angiocath using active ultrasound. The wire was placed but would only advance approximately 14cm. Because the line was only 12cm and because I did not plan to float a PAC, I placed the MAC introducer using seldinger technique and sutured the line in place. I placed a SLIC through the introducer port, and all ports draw and flush.   Staffing  Performed: Anesthesiologist   Anesthesiologist: Jarrett Modi MD  Preanesthetic Checklist  Completed: patient identified, IV checked, site marked, risks and benefits discussed, surgical/procedural consents, equipment checked, pre-op evaluation, timeout performed, anesthesia consent given, oxygen available, monitors applied/VS acknowledged, fire risk safety assessment completed and verbalized and blood product R/B/A discussed and consented

## 2022-05-31 NOTE — PROGRESS NOTES
All questions answered regarding surgery. Hibiclens bath completed last night and this AM.  Bactroban to nares. NPO p MN. Blood Consent signed and placed on chart. Procedure consent to be signed in Pre-op. Pt has viewed pre-op video. Blood verified with blood bank. Pre-op checklist completed. VSS with BP assessed in both arms. Height and weight updated on chart. AM SQBS 91.    Pt demonstrated proper use of Incentive Spirometry and encouraged to use hourly. Pre-op meds administered per MAR. Ancef sent to pre-op with chart. Allevyn placed on sacrum for prevention only per protocol    Pt to be transported on 3L NC to OR.     Personal belongings including Clothing, shoes, eye-glasses, Pocket knife, Cell phone, cell phone  and Wallet to be give to  Family, Father Willy upon arrival. .  .

## 2022-05-31 NOTE — ANESTHESIA POSTPROCEDURE EVALUATION
Department of Anesthesiology  Postprocedure Note    Patient: Rachelle Brock  MRN: [id number]  YOB: 1960  Date of evaluation: 5/31/2022  Time:  5:38 PM     Procedure Summary     Date: 05/31/22 Room / Location: [facility] MAIN OR  CARDIAC / [facility] MAIN OR    Anesthesia Start: 0709 Anesthesia Stop: 7836    Procedures:       CORONARY ARTERY BYPASS GRAFT (CABG X 3), LIMA ; ENDOSCOPIC VEIN HARVEST, LEFT GREATER SAPHENOUS VEIN (N/A Chest)      TRANSESOPHAGEAL ECHOCARDIOGRAM (N/A Esophagus) Diagnosis:       Atherosclerosis of coronary artery of native heart with unstable angina pectoris ([address]      (Atherosclerosis of coronary artery of native heart with unstable angina pectoris ([facility] [address] [I25.110])    Providers: Ofelia Garcia MD Responsible Provider: Alexis Howard MD    Anesthesia Type: general ASA Status: 4          Anesthesia Type: No value filed. Susie Phase I: Susie Score: 9    Susie Phase II:      Last vitals: Reviewed and per EMR flowsheets.        Anesthesia Post Evaluation    Patient location during evaluation: ICU  Patient participation: complete - patient participated  Level of consciousness: awake  Airway patency: patent  Nausea & Vomiting: no nausea and no vomiting  Complications: no  Cardiovascular status: vasoactive/inotropes and hemodynamically stable  Respiratory status: acceptable  Hydration status: euvolemic

## 2022-05-31 NOTE — ANESTHESIA PROCEDURE NOTES
Arterial Line:    An arterial line was placed using ultrasound guidance, in the OR for the following indication(s): continuous blood pressure monitoring and blood sampling needed. A 20 gauge (size), (length), Arrow (type) catheter was placed, Seldinger technique used, into the left radial artery, secured by Tegaderm and tape. Anesthesia type: Local  Local infiltration: Injection    Events:  patient tolerated procedure well with no complications and EBL 0mL. Additional notes:  Left arm prepped with ChloraPrep, 0.8ml of 1% lidocaine infiltrated at skin, ultrasound guided Seldinger technique, good blood return, good waveform. Potential access sites were examined with ultrasound and acceptable patent access site selected as noted above. Needle path and artery access visualized in real time using ultrasound, an image of wire in vessel recorded for permanent record.     5/31/2022 7:15 AM5/31/2022 7:17 AM  Resident/CRNA: LORENZA Johnson CRNA  Performed: Resident/CRNA   Preanesthetic Checklist  Completed: patient identified, IV checked, site marked, risks and benefits discussed, surgical/procedural consents, equipment checked, pre-op evaluation, timeout performed, anesthesia consent given, oxygen available, monitors applied/VS acknowledged, fire risk safety assessment completed and verbalized and blood product R/B/A discussed and consented

## 2022-05-31 NOTE — PROGRESS NOTES
Perfect served [facility]. [facility], Asha Sherman ([phone]) regarding interrogation of internal pacer and turning back on.  Will be here shortly

## 2022-05-31 NOTE — CONSULTS
Cardiovascular ICU Consult Note: 5/31/2022  Mariangel Batista                                                     Admission Date: 5/26/2022     The patient's chart is reviewed and the patient is discussed with the staff. Subjective:     Patient is seen at the request of Dr. Rolla Dakin for respiratory management status post cardiac surgery. Patient had CABG x 3. Currently is sedated in CV-ICU and orally intubated receiving  mechanical ventilation. He received This SmartLink must be used in the context of an imaging procedure during surgery. We have been asked to see in the CV-ICU for mechanical ventilation management and weaning.     Current Outpatient Medications   Medication Instructions    albuterol sulfate  (90 Base) MCG/ACT inhaler 2 puffs, Inhalation, EVERY 4 HOURS PRN    carvedilol (COREG) 12.5 mg, Oral, DAILY    Cetirizine HCl 10 MG CAPS Oral, PRN    cyanocobalamin 1,000 mcg, Oral, PRN    QUEtiapine (SEROQUEL) 100 mg, Oral    warfarin (COUMADIN) 5 MG tablet Take 1 to 1 1/2 tablet daily or as directed      Review of Systems: unable to determine due to intubated status  Past Medical History:   Diagnosis Date    Acute hypoxemic respiratory failure due to COVID-19 ([address] 10/9/2021    CAD (coronary artery disease)     heart attack 2005 stentS HEART    CHF [facility]congestive heart failure) ([facility]) 9/27/2011    Chronic systolic heart failure ([address] 10/2/2015    Coronary atherosclerosis of native coronary vessel 10/2/2015    Hyperlipidemia 10/2/2015    Hypoxemia requiring supplemental oxygen 10/6/2021    IGT (impaired glucose tolerance) 12/3/2017    Other ill-defined conditions(799.89)      blind left eye    Other ill-defined conditions(799.89)     cardiomyopathy    Pneumonia due to COVID-19 virus 11/13/2021    Resolved    Primary insomnia 6/7/2017    Psychiatric disorder     depression     Sepsis, unspecified 4/5/2011    Thromboembolus ([address]     thrombus in heart     Thrombus 10/2/2015     Past Surgical History:   Procedure Laterality Date    CARDIAC CATHETERIZATION      5 stents total    CARDIAC DEFIBRILLATOR PLACEMENT  2011    [facility]    CARDIAC PROCEDURE N/A 5/27/2022    LEFT HEART CATH / CORONARY ANGIOGRAPHY performed by Sarah Pineda MD at [facility] CATH LAB    COLONOSCOPY  12/2010    HEENT      oral surgery    PACEMAKER      NM CARDIAC SURG PROCEDURE UNLIST       1 stent 2005     Social History     Socioeconomic History    Marital status:      Spouse name: Not on file    Number of children: Not on file    Years of education: Not on file    Highest education level: Not on file   Occupational History    Not on file   Tobacco Use    Smoking status: Current Every Day Smoker     Packs/day: 1.00     Types: Cigarettes     Start date: 6/7/1995    Smokeless tobacco: Never Used   Vaping Use    Vaping Use: Never used   Substance and Sexual Activity    Alcohol use: No    Drug use: No     Types: Prescription    Sexual activity: Not Currently   Other Topics Concern    Not on file   Social History Narrative    Not on file     Social Determinants of Health     Financial Resource Strain:     Difficulty of Paying Living Expenses: Not on file   Food Insecurity:     Worried About Running Out of Food in the Last Year: Not on file    Sadaf of Food in the Last Year: Not on file   Transportation Needs:     Lack of Transportation (Medical): Not on file    Lack of Transportation (Non-Medical):  Not on file   Physical Activity:     Days of Exercise per Week: Not on file    Minutes of Exercise per Session: Not on file   Stress:     Feeling of Stress : Not on file   Social Connections:     Frequency of Communication with Friends and Family: Not on file    Frequency of Social Gatherings with Friends and Family: Not on file    Attends [employer] Services: Not on file    Active Member of Clubs or Organizations: Not on file    Attends Club or Organization Meetings: Not on file    Marital Status: Not on file   Intimate Partner Violence:     Fear of Current or Ex-Partner: Not on file    Emotionally Abused: Not on file    Physically Abused: Not on file    Sexually Abused: Not on file   Housing Stability:     Unable to Pay for Housing in the Last Year: Not on file    Number of Places Lived in the Last Year: Not on file    Unstable Housing in the Last Year: Not on file     Family History   Problem Relation Age of Onset    Heart Disease Mother     Diabetes Paternal Grandfather     Cancer Paternal Grandmother     Heart Disease Brother     Diabetes Maternal Grandmother     Bleeding Prob Father     Diabetes Mother     Heart Disease Paternal Grandfather     Heart Attack Mother 67        mi     Allergies   Allergen Reactions    Penicillins Swelling     Face swelling    Atorvastatin Other (See Comments)     itching    Evolocumab Other (See Comments)     Itching    Lisinopril Other (See Comments)    Rosuvastatin Other (See Comments)     itching     Objective:   Blood pressure 108/70, pulse 66, temperature 97.5 °F (36.4 °C), temperature source Bladder, resp. rate (!) 31, height 5' 9\" (1.753 m), weight 207 lb 8 oz (94.1 kg), SpO2 99 %. Intake/Output Summary (Last 24 hours) at 5/31/2022 1438  Last data filed at 5/31/2022 1400  Gross per 24 hour   Intake 2900 ml   Output 3356 ml   Net -456 ml     Physical Exam:          Constitutional:  Sedated, orally intubated and mechanically ventilated. EENMT:  Sclera clear, pupils equal, oral mucosa moist and orally intubated  Respiratory: clear  Cardiovascular:  RRR with no M,G,R;  Gastrointestinal:  soft; hypoactive bowel sounds present  Musculoskeletal:  warm with no cyanosis, no lower extremity edema. Wharton site L leg with ace wrap. Sedated with no movements.   SKIN:  no jaundice or ecchymosis   Neurologic:  sedated but no gross neuro deficits  Psychiatric:  sedated and unable to assess at this time    CXR: Appropriate post op appearance      LINES:  ETT, duque, swan phan, arterial line, chest tubes times 3 in epigastric area without air leak. DRIPS:   Dose (mcg/kg/hr) Dexmedetomidine: 0.5 mcg/kg/hr  Dose (mcg/min) Epinephrine: *5 mcg  Dose (mcg/min) Norepinephrine: 5.646 mcg/min  Dose (mcg/kg/min) Phenylephrine : *100 mcg  Dose (units/hr) Heparin: 6 Units/hr  Dose (units/hr) Insulin : 1.3 Units/hr     CI:  CO (l/min): 4.6 l/min  CI (l/min/m2): 2.2 l/min/m2    Ventilator Settings:  Ideal body weight: 70.7 kg (155 lb 13.8 oz)  Adjusted ideal body weight: 80.1 kg (176 lb 8.3 oz)  Mode FIO2 Rate Tidal Volume Pressure   PS/CPAP (pt weaned to Pressure Support)    40 %            8     Peak airway pressure:     Minute ventilation: Minute Ventilation (l/min): 8.1 l/min  Recent Labs     05/31/22  1102 05/31/22  1136 05/31/22  1233   PHAPOC 7.35 7.33* 7.34*   GVL6GHUR 43.0 40.4 40.3   TM5MGOV 257* 92 90   OPG9OGM 23.7 21.3* 21.8*     Recent Labs     05/29/22  0541 05/30/22  0542 05/31/22  1249   WBC 9.3 9.2 16.4*   HGB 15.0 14.4 12.2*   HCT 45.7 44.9 37.4*    236 158   INR 1.6 1.2 1.4     Recent Labs     05/29/22  0541 05/30/22  0542 05/31/22  1249    140 142   K 3.9 3.8 4.4   * 108* 114*   CO2 26 26 23   BUN 15 19 17   CREATININE 1.00 1.10 1.00   MG  --   --  4.0*     No results for input(s): PROCAL, LACTATE in the last 72 hours. 05/26/22    TRANSTHORACIC ECHOCARDIOGRAM (TTE) COMPLETE (CONTRAST/BUBBLE/3D PRN) 05/26/2022  3:20 PM (Final)    Interpretation Summary    Left Ventricle: Severely reduced left ventricular systolic function with a visually estimated EF of 25 - 30%. Left ventricle is mildly dilated. Normal wall thickness. Akinetic and aneurysmal apex with severe hypokinesis of the mid anteroseptal and mid anterolateral walls with best preserved function of the inferior and inferior -lateral walls. Grade I diastolic dysfunction with normal LAP.   Left Atrium: Left atrium is mildly dilated.    Contrast used: Definity. Signed by: Yvonne Lutz MD on 5/26/2022  3:20 PM    Assessment and Plan :  (Medical Decision Making)   Impression: 58 y.o. y/o male s/p CV surgery for S/P CABG x 3    Encounter for weaning from Ventilator:  Post op ABG and CXR reviewed. Continue with weaning per protocol. He appears ready for weaning now and will proceed with extubation. Hypoxemia:   Once weaned from ventilator will work on IS, mobility and weaning O2. Bronchodilators per protocol. Atelectasis:   IS, mobility after extubated. S/P Cardiovascular surgery:  Monitor chest tube output, removal per surgery. More than 50% of the time documented was spent in face-to-face contact with the patient and in the care of the patient on the floor/unit where the patient is located. Thank you for this referral.  We appreciate the opportunity to participate in this patient's care. Will follow along with you.     Hussain Edmondson MD

## 2022-05-31 NOTE — PROGRESS NOTES
TIMEOUT performed prior to extubation on Nimo Pagan with RT, primary RN, and charge RN present on 5/31/2022 at 2:40 PM.     Per anesthesia team on admission, patient's intubation was no acute fracture or dislocation    ABG results as follows:    Lab Results   Component Value Date    IBD 3.8 05/31/2022         The patient is hemodynamically stable and can demonstrate the following on a consistent basis:  follow commands , elevate head off the pillow, nods appropriately to questions. Dr. Bob Croft notified of weaning parameters and order to extubate obtained. Patient extubated by (RT name) Radha Philip to (Type of O2 Device) AirVo at (Amount of of O2) 80/19 without complication.

## 2022-05-31 NOTE — PROGRESS NOTES
Patient out from operating room and placed on the ventilator on documented settings. Patient is orally intubated with a # 8.0 ET Tube secured at the 24 cm singh at the lip. Breath sounds are coarse. Trachea is midline. negative for subcutaneous air, chest excursion is symmetric.  negative for pitting edema. Patient is also negative for cyanosis. Patient has a Left Radial arterial line. Patient has 3 Chest tubes. All alarms are set and audible. Resuscitation bag is at the head of the bed. Ventilator Settings  Mode FIO2 Rate Tidal Volume Pressure PEEP I:E Ratio                           Peak airway pressure:     Minute ventilation:       ABG: No results for input(s): PH, PCO2, PO2, HCO3 in the last 72 hours.       Wanda Ramirez RCP

## 2022-05-31 NOTE — PROGRESS NOTES
TRANSFER - OUT REPORT:    Verbal report given to Kaur Cohn RN on Madie Shirts  being transferred to pre-op for routine progression of patient care       Report consisted of patient's Situation, Background, Assessment and   Recommendations(SBAR). Information from the following report(s) Nurse Handoff Report, Intake/Output, MAR and Cardiac Rhythm NSR was reviewed with the receiving nurse. Lines:   Peripheral IV 05/26/22 Right Antecubital (Active)   Site Assessment Clean, dry & intact 05/31/22 0440   Line Status Blood return noted; Flushed;Normal saline locked 05/31/22 0440   Line Care Cap changed 05/31/22 0440   Phlebitis Assessment No symptoms 05/31/22 0440   Infiltration Assessment 0 05/31/22 0440   Alcohol Cap Used Yes 05/31/22 0440   Dressing Status Dry; Intact 05/31/22 0440   Dressing Type Transparent 05/31/22 0440   Dressing Intervention Dressing changed 05/31/22 0440        Opportunity for questions and clarification was provided.       Patient transported with:  Monitor and O2 @ 3lpm   RN and PCT  All personal belongings bag, which are to be given to FatherWilly per request.

## 2022-05-31 NOTE — PROGRESS NOTES
TRANSFER - IN REPORT:    Verbal report received from Zack Tripathi CRNA(name) on Brooke Lechuga  being received from OR(unit) for routine post-op      Report consisted of patients Situation, Background, Assessment and   Recommendations(SBAR). Information from the following report(s) SBAR, OR Summary, Intake/Output, MAR, Recent Results and Cardiac Rhythm SR was reviewed with the receiving nurse. Per anesthesia on admission patient intubation Normal    Collaborative team agrees of surgeon, anesthesia, RT, and RN agrees to proceed with CV weaning protocol        Opportunity for questions and clarification was provided. Assessment completed upon patients arrival to unit and care assumed.     Received to Mohsen Pollack RN and [employer]  LOFTY

## 2022-05-31 NOTE — PERIOP NOTE
TRANSFER - OUT REPORT:    Verbal report given to Caydenantonio Alvarenga on Marianne Smith  being transferred to CVICU for routine progression of patient care       Report consisted of patient's Situation, Background, Assessment and   Recommendations(SBAR). Information from the following report(s) Nurse Handoff Report was reviewed with the receiving nurse. Lines:   Peripheral IV 05/26/22 Right Antecubital (Active)   Site Assessment Clean, dry & intact 05/31/22 0440   Line Status Blood return noted; Flushed;Normal saline locked 05/31/22 0440   Line Care Cap changed 05/31/22 0440   Phlebitis Assessment No symptoms 05/31/22 0440   Infiltration Assessment 0 05/31/22 0440   Alcohol Cap Used Yes 05/31/22 0440   Dressing Status Dry; Intact 05/31/22 0440   Dressing Type Transparent 05/31/22 0440   Dressing Intervention Dressing changed 05/31/22 0440       Peripheral IV 05/31/22 Right Hand (Active)   Site Assessment Clean, dry & intact 05/31/22 0623   Line Status Infusing 05/31/22 0623   Phlebitis Assessment No symptoms 05/31/22 0623   Infiltration Assessment 0 05/31/22 0623   Dressing Status Clean, dry & intact; New dressing applied 05/31/22 0623   Dressing Type Transparent 05/31/22 0623   Dressing Intervention New 05/31/22 0623       Arterial Line 05/31/22 Radial (Active)        Opportunity for questions and clarification was provided.       Patient transported with:  Monitor, O2 @ 15lpm and Registered Nurse,CRNA, JOSE ANTONIO

## 2022-05-31 NOTE — BRIEF OP NOTE
Brief Postoperative Note      Patient: Jessica Pacheco  YOB: 1960  MRN: [id number]    Date of Procedure: 5/27/2022    Pre-Op Diagnosis: Atherosclerosis of coronary artery of native heart with unstable angina pectoris ([facility] [I25.110]    Post-Op Diagnosis: Same       Procedure(s):  LEFT HEART CATH / CORONARY ANGIOGRAPHY  SVG to the ramus, SVG to the PDA and LIMA to the LAD    Surgeon(s):  Ck Cao MD    Assistant:  * No surgical staff found *    Anesthesia: General    Estimated Blood Loss (mL): Minimal    Complications: None    Specimens:   * No specimens in log *    Implants:  * No implants in log *      Drains:   Chest Tube Left Pleural 1 (Active)       Chest Tube Right Pleural 2 (Active)       Chest Tube Other (Comment) Mediastinal 3 (Active)       Urinary Catheter 2 Way; Crain-Temperature (Active)       Findings:      Electronically signed by Christopher Jones MD on 5/31/2022 at 12:13 PM

## 2022-05-31 NOTE — ANESTHESIA PROCEDURE NOTES
Procedure Performed: VALERY       Start Time:  5/31/2022 7:56 AM       End Time:   5/31/2022 8:06 AM    Preanesthesia Checklist:  Patient identified, IV assessed, risks and benefits discussed, monitors and equipment assessed, procedure being performed at surgeon's request and anesthesia consent obtained. General Procedure Information  Diagnostic Indications for Echo:  hemodynamic monitoring  Physician Requesting Echo: Litzy Vazquez MD  Location performed:  OR  Intubated  Bite block not placed  Heart visualized  Probe Insertion:  Easy  Probe Type:  Biplane  Modalities:  2D only and color flow mapping    Echocardiographic and Doppler Measurements    Ventricles    Left Ventricle:  Cavity size dilated. Hypertrophy present. Global Function moderately impaired. Ejection Fraction 35%. 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  Stenosis not present. Regurgitation none. Leaflets normal.  Leaflet motions normal.      Mitral Valve: Annulus normal.  Stenosis not present. Regurgitation mild. Leaflets normal.  Leaflet motions normal.      Tricuspid Valve:  Regurgitation mild. Pulmonic Valve:  Stenosis not present. Regurgitation none. Aorta    Ascending Aorta:  Size normal.  Dissection not present. Aortic Arch:  Size normal.  Dissection not present. Descending Aorta:  Size normal.  Dissection not present. Atria      Left Atrium:  Spontaneous echo contrast not present. Thrombus not present.                   Anesthesia Information  Performed Personally    Post Intervention Follow-up Study  None

## 2022-05-31 NOTE — PROGRESS NOTES
CM continues to follow for discharge planning and/or CM needs. DCP pending clinical progress s/p CV surgery. No CM needs noted or voiced at this time. Will continue to monitor and update as needed. 05/27/22 1251   Service Assessment   Patient Orientation Person;Place;Situation;Self;Alert and Oriented   Cognition Alert   History Provided By Patient   Primary Caregiver Self   Support Systems Parent; Children  (2 children: Daughter lives in [place]. Son in [place]. Patient's father transports patient to appointments.)   Patient's [address] [place] [address] Rd is: Patient Declined (Legal Next of Kin Remains as Decision Maker)   PCP Verified by CM Yes  [facility]- needs referral at discharge to reestablish.)   Last Visit to PCP [facility] last year   Prior Functional Level Independent in ADLs/IADLs   Current Functional Level Assistance with the following:;Bathing;Dressing; Toileting  (Due to high oxygen demand)   Can patient return to prior living arrangement Yes   Ability to make needs known: Good   Family able to assist with home care needs: Yes   Financial Resources Medicare   Social/Functional History   Lives With Parent;Son;Daughter   Type of [address] One level   Home Access Stairs to enter without rails  (3 MILAN)   Receives Help From Family   Active  No   Patient's  Info Patient does not drive   Mode of Transportation Car   Discharge Planning   Living Arrangements Alone

## 2022-05-31 NOTE — ANESTHESIA PROCEDURE NOTES
Airway  Date/Time: 5/31/2022 7:29 AM  Urgency: elective    Airway not difficult    General Information and Staff    Patient location during procedure: OR  Resident/CRNA: LORENZA Torres - CRNA  Performed: resident/CRNA     Indications and Patient Condition  Indications for airway management: anesthesia, airway protection and cardio/pulmonary arrest  Sedation level: deep  Preoxygenated: yes  Patient position: sniffing  MILS not maintained throughout  Mask difficulty assessment: vent by bag mask + OA or adjuvant +/- NMBA    Final Airway Details  Final airway type: endotracheal airway      Successful airway: ETT  Cuffed: yes   Successful intubation technique: direct laryngoscopy  Endotracheal tube insertion site: oral  Blade: Rosa M  Blade size: #4  ETT size (mm): 8.0  Cormack-Lehane Classification: grade I - full view of glottis  Placement verified by: chest auscultation and capnometry   Measured from: lips  Number of attempts at approach: 1  Number of other approaches attempted: 0    no

## 2022-05-31 NOTE — PROGRESS NOTES
Fred Leal, pt's father cell phone number [phone] called, notified of patient already transferred to Pre-Op, and to obtain belongings bag per Patient's request.  Father stated \"should be there in about 15 minutes. \"   Also, informed father that we do have [address] parking.

## 2022-05-31 NOTE — PROGRESS NOTES
Respiratory Mechanics completed and are as follows:     Patient extubated to a 40l/40% AIRVO. Patient is able to communicate and is negative for stridor. Breath sounds are coarse   No complications with extubation.    RSBI-54/ NIF--40 cm H20  Wanda Ramirez RCP

## 2022-05-31 NOTE — PERIOP NOTE
TRANSFER - IN REPORT:    Verbal report received from 26 on Osman Norman  being received from [facility] for ordered procedure      Report consisted of patient's Situation, Background, Assessment and   Recommendations(SBAR). Information from the following report(s) Nurse Handoff Report and MAR was reviewed with the receiving nurse. Opportunity for questions and clarification was provided. Assessment will be   completed upon patient's arrival to unit and care assumed.

## 2022-06-01 ENCOUNTER — APPOINTMENT (OUTPATIENT)
Dept: GENERAL RADIOLOGY | Age: 62
DRG: 234 | End: 2022-06-01
Payer: MEDICARE

## 2022-06-01 LAB
ANION GAP SERPL CALC-SCNC: 5 MMOL/L (ref 7–16)
BASOPHILS # BLD: 0 K/UL (ref 0–0.2)
BASOPHILS NFR BLD: 0 % (ref 0–2)
BUN SERPL-MCNC: 13 MG/DL (ref 8–23)
CALCIUM SERPL-MCNC: 7.8 MG/DL (ref 8.3–10.4)
CHLORIDE SERPL-SCNC: 110 MMOL/L (ref 98–107)
CO2 SERPL-SCNC: 26 MMOL/L (ref 21–32)
CREAT SERPL-MCNC: 0.9 MG/DL (ref 0.8–1.5)
DIFFERENTIAL METHOD BLD: ABNORMAL
EKG ATRIAL RATE: 71 BPM
EKG DIAGNOSIS: NORMAL
EKG P AXIS: 40 DEGREES
EKG P-R INTERVAL: 170 MS
EKG Q-T INTERVAL: 404 MS
EKG QRS DURATION: 102 MS
EKG QTC CALCULATION (BAZETT): 439 MS
EKG R AXIS: 64 DEGREES
EKG T AXIS: 98 DEGREES
EKG VENTRICULAR RATE: 71 BPM
EOSINOPHIL # BLD: 0 K/UL (ref 0–0.8)
EOSINOPHIL NFR BLD: 0 % (ref 0.5–7.8)
ERYTHROCYTE [DISTWIDTH] IN BLOOD BY AUTOMATED COUNT: 14.6 % (ref 11.9–14.6)
GLUCOSE BLD STRIP.AUTO-MCNC: 101 MG/DL (ref 65–100)
GLUCOSE BLD STRIP.AUTO-MCNC: 111 MG/DL (ref 65–100)
GLUCOSE BLD STRIP.AUTO-MCNC: 120 MG/DL (ref 65–100)
GLUCOSE BLD STRIP.AUTO-MCNC: 80 MG/DL (ref 65–100)
GLUCOSE BLD STRIP.AUTO-MCNC: 87 MG/DL (ref 65–100)
GLUCOSE BLD STRIP.AUTO-MCNC: 94 MG/DL (ref 65–100)
GLUCOSE BLD STRIP.AUTO-MCNC: 94 MG/DL (ref 65–100)
GLUCOSE BLD STRIP.AUTO-MCNC: 97 MG/DL (ref 65–100)
GLUCOSE BLD STRIP.AUTO-MCNC: 98 MG/DL (ref 65–100)
GLUCOSE SERPL-MCNC: 82 MG/DL (ref 65–100)
HCT VFR BLD AUTO: 33.8 % (ref 41.1–50.3)
HGB BLD-MCNC: 10.8 G/DL (ref 13.6–17.2)
IMM GRANULOCYTES # BLD AUTO: 0.1 K/UL (ref 0–0.5)
IMM GRANULOCYTES NFR BLD AUTO: 1 % (ref 0–5)
LYMPHOCYTES # BLD: 1.6 K/UL (ref 0.5–4.6)
LYMPHOCYTES NFR BLD: 12 % (ref 13–44)
MAGNESIUM SERPL-MCNC: 2.5 MG/DL (ref 1.8–2.4)
MCH RBC QN AUTO: 29.7 PG (ref 26.1–32.9)
MCHC RBC AUTO-ENTMCNC: 32 G/DL (ref 31.4–35)
MCV RBC AUTO: 92.9 FL (ref 79.6–97.8)
MM INDURATION, POC: 0 MM (ref 0–5)
MONOCYTES # BLD: 1.6 K/UL (ref 0.1–1.3)
MONOCYTES NFR BLD: 12 % (ref 4–12)
NEUTS SEG # BLD: 10.3 K/UL (ref 1.7–8.2)
NEUTS SEG NFR BLD: 75 % (ref 43–78)
NRBC # BLD: 0 K/UL (ref 0–0.2)
PLATELET # BLD AUTO: 163 K/UL (ref 150–450)
PMV BLD AUTO: 11.1 FL (ref 9.4–12.3)
POTASSIUM SERPL-SCNC: 4.2 MMOL/L (ref 3.5–5.1)
PPD, POC: NEGATIVE
RBC # BLD AUTO: 3.64 M/UL (ref 4.23–5.6)
SERVICE CMNT-IMP: ABNORMAL
SERVICE CMNT-IMP: NORMAL
SODIUM SERPL-SCNC: 141 MMOL/L (ref 136–145)
WBC # BLD AUTO: 13.5 K/UL (ref 4.3–11.1)

## 2022-06-01 PROCEDURE — 97530 THERAPEUTIC ACTIVITIES: CPT

## 2022-06-01 PROCEDURE — 80048 BASIC METABOLIC PNL TOTAL CA: CPT

## 2022-06-01 PROCEDURE — 2140000001 HC CVICU INTERMEDIATE R&B

## 2022-06-01 PROCEDURE — 2580000003 HC RX 258: Performed by: THORACIC SURGERY (CARDIOTHORACIC VASCULAR SURGERY)

## 2022-06-01 PROCEDURE — 6370000000 HC RX 637 (ALT 250 FOR IP): Performed by: THORACIC SURGERY (CARDIOTHORACIC VASCULAR SURGERY)

## 2022-06-01 PROCEDURE — 99232 SBSQ HOSP IP/OBS MODERATE 35: CPT | Performed by: INTERNAL MEDICINE

## 2022-06-01 PROCEDURE — 6360000002 HC RX W HCPCS: Performed by: THORACIC SURGERY (CARDIOTHORACIC VASCULAR SURGERY)

## 2022-06-01 PROCEDURE — 71045 X-RAY EXAM CHEST 1 VIEW: CPT

## 2022-06-01 PROCEDURE — 83735 ASSAY OF MAGNESIUM: CPT

## 2022-06-01 PROCEDURE — 2580000003 HC RX 258: Performed by: PHYSICIAN ASSISTANT

## 2022-06-01 PROCEDURE — 6370000000 HC RX 637 (ALT 250 FOR IP): Performed by: INTERNAL MEDICINE

## 2022-06-01 PROCEDURE — 2500000003 HC RX 250 WO HCPCS: Performed by: THORACIC SURGERY (CARDIOTHORACIC VASCULAR SURGERY)

## 2022-06-01 PROCEDURE — 2700000000 HC OXYGEN THERAPY PER DAY

## 2022-06-01 PROCEDURE — 85025 COMPLETE CBC W/AUTO DIFF WBC: CPT

## 2022-06-01 PROCEDURE — 97161 PT EVAL LOW COMPLEX 20 MIN: CPT

## 2022-06-01 PROCEDURE — 6370000000 HC RX 637 (ALT 250 FOR IP): Performed by: PHYSICIAN ASSISTANT

## 2022-06-01 PROCEDURE — 82962 GLUCOSE BLOOD TEST: CPT

## 2022-06-01 RX ORDER — SODIUM CHLORIDE 0.9 % (FLUSH) 0.9 %
5-40 SYRINGE (ML) INJECTION PRN
Status: DISCONTINUED | OUTPATIENT
Start: 2022-06-01 | End: 2022-06-06 | Stop reason: HOSPADM

## 2022-06-01 RX ORDER — POLYETHYLENE GLYCOL 3350 17 G/17G
17 POWDER, FOR SOLUTION ORAL DAILY PRN
Status: DISCONTINUED | OUTPATIENT
Start: 2022-06-01 | End: 2022-06-06 | Stop reason: HOSPADM

## 2022-06-01 RX ORDER — POTASSIUM CHLORIDE 20 MEQ/1
40 TABLET, EXTENDED RELEASE ORAL 2 TIMES DAILY PRN
Status: DISCONTINUED | OUTPATIENT
Start: 2022-06-01 | End: 2022-06-06 | Stop reason: HOSPADM

## 2022-06-01 RX ORDER — SODIUM CHLORIDE 0.9 % (FLUSH) 0.9 %
5-40 SYRINGE (ML) INJECTION EVERY 12 HOURS SCHEDULED
Status: DISCONTINUED | OUTPATIENT
Start: 2022-06-01 | End: 2022-06-06 | Stop reason: HOSPADM

## 2022-06-01 RX ORDER — POTASSIUM CHLORIDE 750 MG/1
10 TABLET, EXTENDED RELEASE ORAL DAILY
Status: DISCONTINUED | OUTPATIENT
Start: 2022-06-02 | End: 2022-06-02

## 2022-06-01 RX ORDER — FUROSEMIDE 40 MG/1
40 TABLET ORAL DAILY
Status: COMPLETED | OUTPATIENT
Start: 2022-06-02 | End: 2022-06-04

## 2022-06-01 RX ORDER — TRAMADOL HYDROCHLORIDE 50 MG/1
100 TABLET ORAL EVERY 6 HOURS PRN
Status: DISCONTINUED | OUTPATIENT
Start: 2022-06-01 | End: 2022-06-04

## 2022-06-01 RX ORDER — OXYCODONE AND ACETAMINOPHEN 10; 325 MG/1; MG/1
1 TABLET ORAL EVERY 4 HOURS PRN
Status: DISCONTINUED | OUTPATIENT
Start: 2022-06-01 | End: 2022-06-06 | Stop reason: HOSPADM

## 2022-06-01 RX ORDER — INSULIN LISPRO 100 [IU]/ML
0-12 INJECTION, SOLUTION INTRAVENOUS; SUBCUTANEOUS
Status: DISCONTINUED | OUTPATIENT
Start: 2022-06-01 | End: 2022-06-03

## 2022-06-01 RX ORDER — LANOLIN ALCOHOL/MO/W.PET/CERES
400 CREAM (GRAM) TOPICAL 4 TIMES DAILY PRN
Status: DISCONTINUED | OUTPATIENT
Start: 2022-06-01 | End: 2022-06-06 | Stop reason: HOSPADM

## 2022-06-01 RX ORDER — INSULIN LISPRO 100 [IU]/ML
0-6 INJECTION, SOLUTION INTRAVENOUS; SUBCUTANEOUS NIGHTLY
Status: DISCONTINUED | OUTPATIENT
Start: 2022-06-01 | End: 2022-06-03

## 2022-06-01 RX ORDER — SENNA AND DOCUSATE SODIUM 50; 8.6 MG/1; MG/1
1 TABLET, FILM COATED ORAL 2 TIMES DAILY
Status: DISCONTINUED | OUTPATIENT
Start: 2022-06-01 | End: 2022-06-04

## 2022-06-01 RX ORDER — LANOLIN ALCOHOL/MO/W.PET/CERES
400 CREAM (GRAM) TOPICAL 3 TIMES DAILY PRN
Status: DISCONTINUED | OUTPATIENT
Start: 2022-06-01 | End: 2022-06-06 | Stop reason: HOSPADM

## 2022-06-01 RX ORDER — AMIODARONE HYDROCHLORIDE 200 MG/1
400 TABLET ORAL 2 TIMES DAILY
Status: DISCONTINUED | OUTPATIENT
Start: 2022-06-01 | End: 2022-06-05

## 2022-06-01 RX ORDER — POTASSIUM CHLORIDE 20 MEQ/1
20 TABLET, EXTENDED RELEASE ORAL 2 TIMES DAILY PRN
Status: DISCONTINUED | OUTPATIENT
Start: 2022-06-01 | End: 2022-06-06 | Stop reason: HOSPADM

## 2022-06-01 RX ADMIN — ASPIRIN 81 MG: 81 TABLET, CHEWABLE ORAL at 09:59

## 2022-06-01 RX ADMIN — SODIUM CHLORIDE, PRESERVATIVE FREE 10 ML: 5 INJECTION INTRAVENOUS at 22:03

## 2022-06-01 RX ADMIN — TRAMADOL HYDROCHLORIDE 100 MG: 50 TABLET, COATED ORAL at 17:41

## 2022-06-01 RX ADMIN — CEFAZOLIN SODIUM 2000 MG: 100 INJECTION, POWDER, LYOPHILIZED, FOR SOLUTION INTRAVENOUS at 06:37

## 2022-06-01 RX ADMIN — SODIUM CHLORIDE, PRESERVATIVE FREE 10 ML: 5 INJECTION INTRAVENOUS at 10:00

## 2022-06-01 RX ADMIN — CARVEDILOL 3.12 MG: 6.25 TABLET, FILM COATED ORAL at 09:59

## 2022-06-01 RX ADMIN — FAMOTIDINE 20 MG: 20 TABLET, FILM COATED ORAL at 20:52

## 2022-06-01 RX ADMIN — AMIODARONE HYDROCHLORIDE 1 MG/MIN: 50 INJECTION, SOLUTION INTRAVENOUS at 06:43

## 2022-06-01 RX ADMIN — AMIODARONE HYDROCHLORIDE 400 MG: 200 TABLET ORAL at 20:52

## 2022-06-01 RX ADMIN — Medication 1 AMPULE: at 20:52

## 2022-06-01 RX ADMIN — FAMOTIDINE 20 MG: 20 TABLET, FILM COATED ORAL at 09:59

## 2022-06-01 RX ADMIN — TRAMADOL HYDROCHLORIDE 100 MG: 50 TABLET, COATED ORAL at 03:17

## 2022-06-01 RX ADMIN — OXYCODONE AND ACETAMINOPHEN 1 TABLET: 10; 325 TABLET ORAL at 01:03

## 2022-06-01 RX ADMIN — SENNOSIDES AND DOCUSATE SODIUM 1 TABLET: 8.6; 5 TABLET ORAL at 20:52

## 2022-06-01 RX ADMIN — Medication 1 AMPULE: at 09:59

## 2022-06-01 RX ADMIN — OXYCODONE AND ACETAMINOPHEN 1 TABLET: 10; 325 TABLET ORAL at 05:02

## 2022-06-01 RX ADMIN — AMIODARONE HYDROCHLORIDE 400 MG: 200 TABLET ORAL at 10:03

## 2022-06-01 RX ADMIN — OXYCODONE AND ACETAMINOPHEN 1 TABLET: 10; 325 TABLET ORAL at 12:21

## 2022-06-01 RX ADMIN — CARVEDILOL 3.12 MG: 6.25 TABLET, FILM COATED ORAL at 20:52

## 2022-06-01 ASSESSMENT — PAIN SCALES - GENERAL
PAINLEVEL_OUTOF10: 0
PAINLEVEL_OUTOF10: 4
PAINLEVEL_OUTOF10: 1
PAINLEVEL_OUTOF10: 0
PAINLEVEL_OUTOF10: 0
PAINLEVEL_OUTOF10: 5
PAINLEVEL_OUTOF10: 3
PAINLEVEL_OUTOF10: 0
PAINLEVEL_OUTOF10: 0
PAINLEVEL_OUTOF10: 2
PAINLEVEL_OUTOF10: 0
PAINLEVEL_OUTOF10: 0

## 2022-06-01 ASSESSMENT — PAIN DESCRIPTION - LOCATION
LOCATION: CHEST
LOCATION: CHEST;INCISION
LOCATION: INCISION;CHEST
LOCATION: CHEST;INCISION
LOCATION: CHEST;INCISION

## 2022-06-01 ASSESSMENT — PAIN DESCRIPTION - ORIENTATION
ORIENTATION: ANTERIOR
ORIENTATION: MID
ORIENTATION: MID;ANTERIOR
ORIENTATION: ANTERIOR

## 2022-06-01 ASSESSMENT — PAIN DESCRIPTION - DESCRIPTORS
DESCRIPTORS: ACHING;SORE
DESCRIPTORS: ACHING

## 2022-06-01 ASSESSMENT — PAIN DESCRIPTION - FREQUENCY
FREQUENCY: CONTINUOUS
FREQUENCY: CONTINUOUS

## 2022-06-01 ASSESSMENT — PAIN DESCRIPTION - PAIN TYPE
TYPE: SURGICAL PAIN
TYPE: SURGICAL PAIN

## 2022-06-01 ASSESSMENT — PAIN DESCRIPTION - ONSET
ONSET: ON-GOING
ONSET: ON-GOING

## 2022-06-01 ASSESSMENT — PAIN - FUNCTIONAL ASSESSMENT
PAIN_FUNCTIONAL_ASSESSMENT: PREVENTS OR INTERFERES SOME ACTIVE ACTIVITIES AND ADLS
PAIN_FUNCTIONAL_ASSESSMENT: PREVENTS OR INTERFERES SOME ACTIVE ACTIVITIES AND ADLS

## 2022-06-01 NOTE — PROGRESS NOTES
PHYSICAL THERAPY Initial Assessment and AM  (Link to Caseload Tracking: PT Visit Days : 1  Acknowledge Orders  Time In/Out  PT Charge Capture  Rehab Caseload Tracker    Mariangel Batista is a 58 y.o. male   PRIMARY DIAGNOSIS: S/P CABG x 3  ICD (implantable cardioverter-defibrillator) discharge [Z45.02]  CAD, multiple vessel [I25.10]  Procedure(s) (LRB):  CORONARY ARTERY BYPASS GRAFT (CABG X 3), LIMA ; ENDOSCOPIC VEIN HARVEST, LEFT GREATER SAPHENOUS VEIN (N/A)  TRANSESOPHAGEAL ECHOCARDIOGRAM (N/A)  1 Day Post-Op  Reason for Referral: Generalized Muscle Weakness (M62.81)  Difficulty in walking, Not elsewhere classified (R26.2)  Inpatient: Payor: Lew Kemp / Plan: [address] DUAL COMPLETE / Product Type: *No Product type* /     ASSESSMENT:     REHAB RECOMMENDATIONS:   Recommendation to date pending progress:  Setting:   No further skilled therapy after discharge from hospital    Equipment:     To Be Determined     ASSESSMENT:  Mr. Bahman Bearden presents to PT with [facility] and decreased strength in B LEs. Pt educated on post op sternal precautions and verbalized understanding. He was able to stand with Adán and fair standing balance. Pt ambulated using cardiac walker with CGA and decreased gait speed. Pt returned to recliner with Adán and no signs of distress. Mr. Bahman Bearden could benefit from skilled PT as he is currently functioning below his baseline.         [facility] AM-PAC 6 Clicks Basic Mobility Inpatient Short Form  AM-PAC Mobility Inpatient   How much difficulty turning over in bed?: A Little  How much difficulty sitting down on / standing up from a chair with arms?: A Little  How much difficulty moving from lying on back to sitting on side of bed?: A Little  How much help from another person moving to and from a bed to a chair?: A Little  How much help from another person needed to walk in hospital room?: A Little  How much help from another person for climbing 3-5 steps with a railing?: A Cynthia  AM-PAC Inpatient Mobility Raw Score : 18  AM-PAC Inpatient T-Scale Score : 43.63  Mobility Inpatient CMS 0-100% Score: 46.58  Mobility Inpatient CMS G-Code Modifier : CK    SUBJECTIVE:   Mr. Claire Hagen states, \"I went to [employer]"     Social/Functional Lives With: Parent  Type of Home: House  Home Layout: One level  Home Access: Stairs to enter without rails (3 MILAN)  Receives Help From: Family  Ambulation Assistance: Independent  Transfer Assistance: Independent  Active : No  Patient's  Info: Patient does not drive  Mode of Transportation: Car    OBJECTIVE:     PAIN: Martinez Millers / O2: Curvin Calixto / Renetta Appl / Theadora Hook:   Pre Treatment:   Pain Assessment: 0-10  Pain Level: 0      Post Treatment: 0 Vitals        Oxygen      Chest Tube, Continuous Pulse Oximetry, Crain Catheter, IV and Telemetry     RESTRICTIONS/PRECAUTIONS:  Restrictions/Precautions: Cardiac  Required Braces or Orthoses?: No                 GROSS EVALUATION: Intact Impaired (Comments):   AROM [x]     PROM []    Strength []  generalized weakness observed   Balance []  fair standing   Posture [] N/A   Sensation []     Coordination []      Tone []     Edema []    Activity Tolerance []   decreased activity tolerance    []      COGNITION/  PERCEPTION: Intact Impaired (Comments):   Orientation [x]     Vision []     Hearing [x]     Cognition  [x]       MOBILITY: I Mod I S SBA CGA Min Mod Max Total  NT x2 Comments:   Bed Mobility    Rolling [] [] [] [] [] [] [] [] [] [] []    Supine to Sit [] [] [] [] [] [] [] [] [] [] []    Scooting [] [] [] [] [] [] [] [] [] [] []    Sit to Supine [] [] [] [] [] [] [] [] [] [] []    Transfers    Sit to Stand [] [] [] [] [] [x] [] [] [] [] []    Bed to Chair [] [] [] [] [] [] [] [] [] [] []    Stand to Sit [] [] [] [] [] [x] [] [] [] [] []     [] [] [] [] [] [] [] [] [] [] []    I=Independent, Mod I=Modified Independent, S=Supervision, SBA=Standby Assistance, CGA=Contact Guard Assistance,   Min=Minimal Assistance, Mod=Moderate Assistance, Max=Maximal Assistance, Total=Total Assistance, NT=Not Tested    GAIT: I Mod I S SBA CGA Min Mod Max Total  NT x2 Comments:   Level of Assistance [] [] [] [] [x] [] [] [] [] [] []    Distance 160 feet    DME cardiac walker    Gait Quality Decreased sapna     Weightbearing Status Restrictions/Precautions  Restrictions/Precautions: Cardiac  Required Braces or Orthoses?: No    Stairs      I=Independent, Mod I=Modified Independent, S=Supervision, SBA=Standby Assistance, CGA=Contact Guard Assistance,   Min=Minimal Assistance, Mod=Moderate Assistance, Max=Maximal Assistance, Total=Total Assistance, NT=Not Tested    PLAN:   ACUTE PHYSICAL THERAPY GOALS:   (Developed with and agreed upon by patient and/or caregiver. )  LTG:  (1.)Mr. Anca Carbajal will move from supine to sit and sit to supine , scoot up and down and roll side to side in bed with MODIFIED INDEPENDENCE within 7 treatment day(s). (2.)Mr. Anca Carbajal will transfer from bed to chair and chair to bed with MODIFIED INDEPENDENCE using the least restrictive device within 7 treatment day(s). (3.)Mr. Anca Carbajal will ambulate with MODIFIED INDEPENDENCE for 500 feet with the least restrictive device within 7 treatment day(s). (4.)Mr. Anca Carbajal will participate in therapeutic activity/exercises x 25 minutes for increased activity tolerance within 7 treatment days.     ________________________________________________________________________________________________        FREQUENCY AND DURATION: BID for duration of hospital stay or until stated goals are met, whichever comes first.    THERAPY PROGNOSIS: Good    PROBLEM LIST:   (Skilled intervention is medically necessary to address:)  Decreased Activity Tolerance  Decreased Balance  Decreased Gait Ability  Decreased Strength  Decreased Transfer Abilities INTERVENTIONS PLANNED:   (Benefits and precautions of physical therapy have been discussed with the patient.)  Self Care Training  Therapeutic Activity  Therapeutic Exercise/HEP  Neuromuscular Re-education  Gait Training  Education       TREATMENT:   EVALUATION: LOW COMPLEXITY: (Untimed Charge)    TREATMENT:   Therapeutic Activity (14 Minutes): Therapeutic activity included Scooting, Transfer Training, Ambulation on level ground, Sitting balance  and Standing balance to improve functional Activity tolerance, Balance, Mobility and Strength. TREATMENT GRID:  N/A    AFTER TREATMENT PRECAUTIONS: Call light within reach, Chair and RN at bedside    INTERDISCIPLINARY COLLABORATION:  RN/ PCT and PT/ PTA    EDUCATION: Education Given To: Patient  Education Provided: Role of Therapy    TIME IN/OUT:  Time In: 1003  Time Out: 925 Nigel   Minutes: Nigel Mendes.  Charlene Barreto

## 2022-06-01 NOTE — PROGRESS NOTES
CV Progress Note    Admit Date: 5/26/2022    POD 1    Subjective:     Patient present conditions: Awake, Alert and Cooperative. Review of Systems   b0  Cardiac: Vital signs stable. Lines out  Respiratory: Chest x-ray clear. Minimal chest tube drainage. extubated  Neuro: Moves all four extremities. Incision: Dry. GI: Taking liquids. Objective:     Vitals:  Blood pressure (!) 102/55, pulse 76, temperature 100.2 °F (37.9 °C), temperature source Bladder, resp. rate 16, height 5' 9\" (1.753 m), weight 231 lb (104.8 kg), SpO2 96 %. I/O:  No intake/output data recorded. 05/30 1901 - 06/01 0700  In: 8382 [P.O.:860; I.V.:2513]  Out: 5193 [Urine:4575]    Heart: No Murmur. Lung: Working with IS. Neuro: Cooperative. Incisions: Dry. arECG/Telemetry: Unchanged from Pre-Op    Labs:  Recent Results (from the past 12 hour(s))   POCT Glucose    Collection Time: 05/31/22 10:05 PM   Result Value Ref Range    POC Glucose 109 (H) 65 - 100 mg/dL    Performed by: Juan Clay. POCT Glucose    Collection Time: 05/31/22 10:57 PM   Result Value Ref Range    POC Glucose 92 65 - 100 mg/dL    Performed by: Juan Clay. POCT Glucose    Collection Time: 06/01/22 12:08 AM   Result Value Ref Range    POC Glucose 97 65 - 100 mg/dL    Performed by: Juan Clay. POCT Glucose    Collection Time: 06/01/22  1:05 AM   Result Value Ref Range    POC Glucose 87 65 - 100 mg/dL    Performed by: Laura. POCT Glucose    Collection Time: 06/01/22  2:00 AM   Result Value Ref Range    POC Glucose 80 65 - 100 mg/dL    Performed by: Juan Clay. POCT Glucose    Collection Time: 06/01/22  3:23 AM   Result Value Ref Range    POC Glucose 94 65 - 100 mg/dL    Performed by: Juan Clay.     CBC with Auto Differential    Collection Time: 06/01/22  3:30 AM   Result Value Ref Range    WBC 13.5 (H) 4.3 - 11.1 K/uL    RBC 3.64 (L) 4.23 - 5.6 M/uL    Hemoglobin 10.8 (L) 13.6 - 17.2 g/dL    Hematocrit 33.8 (L) 41.1 - 50.3 %    MCV 92.9 79.6 - 97.8 FL    MCH 29.7 26.1 - 32.9 PG    MCHC 32.0 31.4 - 35.0 g/dL    RDW 14.6 11.9 - 14.6 %    Platelets 232 855 - 090 K/uL    MPV 11.1 9.4 - 12.3 FL    nRBC 0.00 0.0 - 0.2 K/uL    Differential Type AUTOMATED      Seg Neutrophils 75 43 - 78 %    Lymphocytes 12 (L) 13 - 44 %    Monocytes 12 4.0 - 12.0 %    Eosinophils % 0 (L) 0.5 - 7.8 %    Basophils 0 0.0 - 2.0 %    Immature Granulocytes 1 0.0 - 5.0 %    Segs Absolute 10.3 (H) 1.7 - 8.2 K/UL    Absolute Lymph # 1.6 0.5 - 4.6 K/UL    Absolute Mono # 1.6 (H) 0.1 - 1.3 K/UL    Absolute Eos # 0.0 0.0 - 0.8 K/UL    Basophils Absolute 0.0 0.0 - 0.2 K/UL    Absolute Immature Granulocyte 0.1 0.0 - 0.5 K/UL   Basic Metabolic Panel    Collection Time: 06/01/22  3:30 AM   Result Value Ref Range    Sodium 141 136 - 145 mmol/L    Potassium 4.2 3.5 - 5.1 mmol/L    Chloride 110 (H) 98 - 107 mmol/L    CO2 26 21 - 32 mmol/L    Anion Gap 5 (L) 7 - 16 mmol/L    Glucose 82 65 - 100 mg/dL    BUN 13 8 - 23 MG/DL    CREATININE 0.90 0.8 - 1.5 MG/DL    GFR African American >60 >60 ml/min/1.73m2    GFR Non- >60 >60 ml/min/1.73m2    Calcium 7.8 (L) 8.3 - 10.4 MG/DL   Magnesium    Collection Time: 06/01/22  3:30 AM   Result Value Ref Range    Magnesium 2.5 (H) 1.8 - 2.4 mg/dL   POCT Glucose    Collection Time: 06/01/22  4:56 AM   Result Value Ref Range    POC Glucose 94 65 - 100 mg/dL    Performed by: Jacqueline POCT Glucose    Collection Time: 06/01/22  6:00 AM   Result Value Ref Range    POC Glucose 101 (H) 65 - 100 mg/dL    Performed by: Prashant Kan. POCT Glucose    Collection Time: 06/01/22  7:13 AM   Result Value Ref Range    POC Glucose 98 65 - 100 mg/dL    Performed by: Estephanie        Assessment:     Stable. Plan transfer tomorrow      Plan/Recommendations/Medical Decision Making:     Continue present treatment    Discontinue: Chest tubes today. See orders    Alvaro Cao.  Keira Vega MD

## 2022-06-01 NOTE — PROGRESS NOTES
Initial visit was made, emotional support and a spiritual presence were provided.  card was left with the patient.         Akua Velásquez, [facility], [facility]

## 2022-06-01 NOTE — PROGRESS NOTES
[facility] Critical Care Note[de-identified] 6/1/2022  Rachelle Brock  Admission Date: 5/26/2022     Length of Stay: 4 days    Background: 58 y.o. y/o male   Patient had CABG x 3. Notable PMH:  has a past medical history of Acute hypoxemic respiratory failure due to COVID-19 [facility]), CAD (coronary artery disease), CHF (congestive heart failure) ([facility] [address] Chronic systolic heart failure ([facility] [address] Coronary atherosclerosis of native coronary vessel, Hyperlipidemia, Hypoxemia requiring supplemental oxygen, IGT (impaired glucose tolerance), Other ill-defined conditions(799.89), Other ill-defined conditions(799.89), Pneumonia due to COVID-19 virus, Primary insomnia, Psychiatric disorder, Sepsis, unspecified, Thromboembolus ([facility] [address] and Thrombus. 24 Hour events: the pt is tolerating extubation, ambulating some in cvicu    ROS: unable to obtain/negative except as listed elsewhere. Lines: (insertion date)    Chest Tube Left Pleural 1 (Active)       Chest Tube Right Pleural 2 (Active)       Chest Tube Other (Comment) Mediastinal 3 (Active)       Urinary Catheter 2 Way; Crain-Temperature (Active)     Introducer 05/31/22 (Active)       CVC Double Lumen 05/31/22 Right Internal jugular (Active)     Drips: current dose (range)  Dose (mcg/kg/hr) Dexmedetomidine: 0 mcg/kg/hr (Anesthesia infusion automatically stopped by extension.)  Dose (mcg/min) Epinephrine: *5 mcg  Dose (mcg/min) Norepinephrine: 0 mcg/min  Dose (mcg/min) Phenylephrine : *100 mcg  Dose (units/hr) Heparin: 6 Units/hr  Dose (units/hr) Insulin : 1.14 Units/hr  Dose (mg/min) Amiodarone: 1 mg/min     Pertinent Exam:         Blood pressure (!) 102/55, pulse 76, temperature 100.2 °F (37.9 °C), temperature source Bladder, resp. rate 16, height 5' 9\" (1.753 m), weight 231 lb (104.8 kg), SpO2 96 %.      Intake/Output Summary (Last 24 hours) at 6/1/2022 1009  Last data filed at 6/1/2022 0700  Gross per 24 hour   Intake 2948.04 ml   Output 3393 ml   Net -444.96 ml Constitutional:  nad  EENMT:  Sclera clear, pupils equal, oral mucosa moist  Respiratory: few wheezes  Cardiovascular:  RRR  Gastrointestinal:  soft with no tenderness; positive bowel sounds present  Musculoskeletal:  warm with no cyanosis, no lower extremity edema  Skin:  no jaundice or ecchymosis  Neurologic:alert  Psychiatric: calm    CXR:     Recent Labs     05/30/22  0542 05/30/22  0542 05/31/22  1249 05/31/22  1249 05/31/22 1608 05/31/22 2058 06/01/22  0330   WBC 9.2   < > 16.4*  --  13.7*  --  13.5*   HGB 14.4   < > 12.2*   < > 10.9* 11.5* 10.8*   HCT 44.9   < > 37.4*   < > 34.2* 35.2* 33.8*      < > 158  --  165  --  163   INR 1.2  --  1.4  --   --   --   --     < > = values in this interval not displayed. Recent Labs     05/31/22 1249 05/31/22 1249 05/31/22 1608 05/31/22 2058 06/01/22  0330     --  143  --  141   K 4.4   < > 4.4 4.6 4.2   *  --  113*  --  110*   CO2 23  --  24  --  26   BUN 17  --  15  --  13   CREATININE 1.00  --  1.00  --  0.90   MG 4.0*   < > 2.9* 2.6* 2.5*    < > = values in this interval not displayed. No results for input(s): TROPHS, NTPROBNP, CRP, ESR in the last 72 hours. Recent Labs     05/31/22 1249 05/31/22 1608 06/01/22  0330   GLUCOSE 109* 98 82      ECHO: 05/26/22    TRANSTHORACIC ECHOCARDIOGRAM (TTE) COMPLETE (CONTRAST/BUBBLE/3D PRN) 05/26/2022  3:20 PM (Final)    Interpretation Summary    Left Ventricle: Severely reduced left ventricular systolic function with a visually estimated EF of 25 - 30%. Left ventricle is mildly dilated. Normal wall thickness. Akinetic and aneurysmal apex with severe hypokinesis of the mid anteroseptal and mid anterolateral walls with best preserved function of the inferior and inferior -lateral walls. Grade I diastolic dysfunction with normal LAP.   Left Atrium: Left atrium is mildly dilated.   Contrast used: Definity. Signed by:  Jacinta Navarro MD on 5/26/2022  3:20 PM    Microbiology:   No results for input(s): CULTURE in the last 72 hours. Ventilator Settings Ideal body weight: 70.7 kg (155 lb 13.8 oz)  Adjusted ideal body weight: 84.3 kg (185 lb 14.7 oz)  Mode FIO2 Rate Tidal Volume Pressure   PS/CPAP (pt weaned to Pressure Support)    40 %            8     Peak airway pressure:     Minute ventilation: Minute Ventilation (l/min): 8.1 l/min  ABG:  Recent Labs     05/31/22  1102 05/31/22  1136 05/31/22  1233   PHAPOC 7.35 7.33* 7.34*   QUI1ACAV 43.0 40.4 40.3   SV6LFOC 257* 92 90   [id number] 23.7 21.3* 21.8*     Assessment and Plan:  (Medical Decision Making)       Impression: 58 y.o. y/o male s/p CV surgery for S/P CABG x 3     Encounter for weaning from Ventilator: Tolerating extubation     Hypoxemia:   Once weaned from ventilator will work on IS, mobility and weaning O2. Bronchodilators per protocol.     Atelectasis:   IS, mobility after extubated.     S/P Cardiovascular surgery:  Monitor chest tube output, removal per surgery. Full Code    The patient is critically ill with respiratory failure, circulatory failure and requires high complexity decision making for assessment and support including frequent ventilator adjustment , frequent evaluation and titration of therapies , application of advanced monitoring technologies and extensive interpretation of multiple databases    Cumulative time devoted to patient care services by me for day of service is 26 mins.     Pacheco Grimaldo MD

## 2022-06-01 NOTE — PROGRESS NOTES
Another 7 beat run, vss. Dr. Helder Shrestha aware with orders received. Called Marisol Wade from [address] [place] [address] to confirm ICD was turned back on yesterday.

## 2022-06-01 NOTE — PROGRESS NOTES
TRANSFER - OUT REPORT:    Verbal report given to Dameon Trejo RN on Tawana Bush  being transferred to [facility] for routine progression of patient care       Report consisted of patient's Situation, Background, Assessment and   Recommendations(SBAR). Information from the following report(s) Nurse Handoff Report, Adult Overview, Intake/Output, MAR and Recent Results was reviewed with the receiving nurse. Lines:   Peripheral IV 05/26/22 Right Antecubital (Active)   Site Assessment Clean, dry & intact 06/01/22 1100   Line Status Capped 06/01/22 1100   Line Care Connections checked and tightened 06/01/22 1100   Phlebitis Assessment No symptoms 06/01/22 1100   Infiltration Assessment 0 06/01/22 1100   Alcohol Cap Used Yes 06/01/22 1100   Dressing Status Clean, dry & intact 06/01/22 1100   Dressing Type Transparent 06/01/22 1100   Dressing Intervention Dressing changed 05/31/22 0440       Peripheral IV 05/31/22 Right Hand (Active)   Site Assessment Clean, dry & intact 06/01/22 1100   Line Status Capped 06/01/22 1100   Line Care Connections checked and tightened 06/01/22 1100   Phlebitis Assessment No symptoms 06/01/22 1100   Infiltration Assessment 0 06/01/22 1100   Alcohol Cap Used Yes 06/01/22 1100   Dressing Status Clean, dry & intact 06/01/22 1100   Dressing Type Transparent 06/01/22 1100   Dressing Intervention New 05/31/22 0623       Introducer 05/31/22 (Active)   Specific Qualities Capped 06/01/22 1100   Dressing Status Clean, dry & intact 06/01/22 1100        Opportunity for questions and clarification was provided.       Patient transported with:  Monitor

## 2022-06-01 NOTE — PROGRESS NOTES
PHYSICAL THERAPY Initial Assessment, Daily Note and PM  (Link to Caseload Tracking: PT Visit Days : 1  Time In/Out PT Charge Capture  Rehab Caseload Tracker  Orders      Lali Aranda is a 58 y.o. male   PRIMARY DIAGNOSIS: S/P CABG x 3  ICD (implantable cardioverter-defibrillator) discharge [Z45.02]  CAD, multiple vessel [I25.10]  Procedure(s) (LRB):  CORONARY ARTERY BYPASS GRAFT (CABG X 3), LIMA ; ENDOSCOPIC VEIN HARVEST, LEFT GREATER SAPHENOUS VEIN (N/A)  TRANSESOPHAGEAL ECHOCARDIOGRAM (N/A)  1 Day Post-Op  Inpatient: Payor: Jin Tee / Plan: [place] COMPLETE / Product Type: *No Product type* /     ASSESSMENT:     REHAB RECOMMENDATIONS:   Recommendation to date pending progress:  [address] [place] [address] Therapy    Equipment:     To Be Determined     ASSESSMENT:  Mr. Monica Amin is sitting in the recliner and agreeable to therapy. Really nice man. Scooting to the edge of the chair with assistance. Sit to stand with min assist using the momentum method. Gait with the cardiac walker x 125 feet with good sapna. Patient is returned to the recliner and participates in therapeutic exercises. Transfers to the w/c for transport to stepdown. Good session and progress demonstrated. Continue PT efforts.       SUBJECTIVE:   Mr. Monica Amin states, \"Hello\"     Social/Functional Lives With: Parent  Type of Home: House  Home Layout: One level  Home Access: Stairs to enter without rails (3 MILAN)  Receives Help From: Family  Ambulation Assistance: Independent  Transfer Assistance: Independent  Active : No  Patient's  Info: Patient does not drive  Mode of Transportation: Car  OBJECTIVE:     PAIN: Cindy Bass / O2: [employer] / Coral Mantis / Michelle Timothy:   Pre Treatment: 0/10         Post Treatment: 0/10 Vitals        Oxygen    Continuous Pulse Oximetry and CVICU monitors    RESTRICTIONS/PRECAUTIONS:  Restrictions/Precautions  Restrictions/Precautions: Cardiac  Required Braces or Orthoses?: No  Restrictions/Precautions: Cardiac  Required Braces or Orthoses?: No     MOBILITY: I Mod I S SBA CGA Min Mod Max Total  NT x2 Comments:   Bed Mobility    Rolling [] [] [] [] [] [] [] [] [] [x] []    Supine to Sit [] [] [] [] [] [] [] [] [] [x] []    Scooting [] [] [] [] [] [] [] [] [] [x] []    Sit to Supine [] [] [] [] [] [] [] [] [] [x] []    Transfers    Sit to Stand [] [] [] [] [] [x] [] [] [] [] []    Bed to Chair [] [] [] [] [] [] [] [] [] [x] []    Stand to Sit [] [] [] [] [] [x] [] [] [] [] []     [] [] [] [] [] [] [] [] [] [] []    I=Independent, Mod I=Modified Independent, S=Supervision, SBA=Standby Assistance, CGA=Contact Guard Assistance,   Min=Minimal Assistance, Mod=Moderate Assistance, Max=Maximal Assistance, Total=Total Assistance, NT=Not Tested    BALANCE: Good Fair+ Fair Fair- Poor NT Comments   Sitting Static [x] [] [] [] [] []    Sitting Dynamic [x] [] [] [] [] []              Standing Static [x] [] [] [] [] []    Standing Dynamic [x] [] [] [] [] []      GAIT: I Mod I S SBA CGA Min Mod Max Total  NT x2 Comments:   Level of Assistance [] [] [] [] [] [] [] [] [] [] []    Distance 125 feet    DME cardiac walker    Gait Quality n/a    Weightbearing Status      Stairs      I=Independent, Mod I=Modified Independent, S=Supervision, SBA=Standby Assistance, CGA=Contact Guard Assistance,   Min=Minimal Assistance, Mod=Moderate Assistance, Max=Maximal Assistance, Total=Total Assistance, NT=Not Tested    PLAN:   ACUTE PHYSICAL THERAPY GOALS:   (Developed with and agreed upon by patient and/or caregiver. )  LTG:  (1.)[facility] will move from supine to sit and sit to supine , scoot up and down and roll side to side in bed with MODIFIED INDEPENDENCE within 7 treatment day(s). (2.)[facility] will transfer from bed to chair and chair to bed with MODIFIED INDEPENDENCE using the least restrictive device within 7 treatment day(s). (3.)[facility] will ambulate with MODIFIED INDEPENDENCE for 500 feet with the least restrictive device within 7 treatment day(s). (4.)Mr. El Manuel will participate in therapeutic activity/exercises x 25 minutes for increased activity tolerance within 7 treatment days.     ________________________________________________________________________________________________      FREQUENCY AND DURATION: BID for duration of hospital stay or until stated goals are met, whichever comes first.    TREATMENT:   TREATMENT:   Therapeutic Activity (24 Minutes): Therapeutic activity included Transfer Training, Ambulation on level ground, Sitting balance , Standing balance and exercises to improve functional Activity tolerance, Balance, Coordination, Mobility and Strength.     TREATMENT GRID:     Date:   Date:   Date:     ACTIVITY/EXERCISE AM PM AM PM AM PM   LAQ         Shoulder shrugs         Gluteal sets         marching         Ankle pumps         abduction                  B = bilateral; AA = active assistive; A = active; P = passive    AFTER TREATMENT PRECAUTIONS: RN at bedside and sitting in the w/c    INTERDISCIPLINARY COLLABORATION:  RN/ PCT and PT/ PTA    EDUCATION:      TIME IN/OUT:  Time In: 1401  Time Out: 1425  Minutes: 24    Willa Payne PTA

## 2022-06-01 NOTE — PROGRESS NOTES
vessel-history of anterior MI 2005 with chronic systolic heart failure and subsequent defibrillator implant. Hypoxemia-incentive spirometer encouraged. Stable and comfortable sitting up in chair      Atelectasis-as above      Encounter for weaning from ventilator (HCC)-doing well. Follow clinically      Long term (current) use of anticoagulants-on warfarin prior to admission. Resume when okay with cardiothoracic surgery. Postop day 1 status post bypass. Ventricular tachycardia (HCC)-continue to replete electrolytes, titrate beta-blockers as tolerated. Amiodarone drip for 24 hours given 3 bursts of VT this morning, short lived but symptomatic with dizziness and head fullness.   Follow-up today          Hollie Carmichael MD

## 2022-06-02 ENCOUNTER — APPOINTMENT (OUTPATIENT)
Dept: GENERAL RADIOLOGY | Age: 62
DRG: 234 | End: 2022-06-02
Payer: MEDICARE

## 2022-06-02 PROBLEM — Z99.11 ENCOUNTER FOR WEANING FROM VENTILATOR (HCC): Status: RESOLVED | Noted: 2022-05-31 | Resolved: 2022-06-02

## 2022-06-02 PROBLEM — Z79.01 LONG TERM (CURRENT) USE OF ANTICOAGULANTS: Status: RESOLVED | Noted: 2018-11-27 | Resolved: 2022-06-02

## 2022-06-02 LAB
ANION GAP SERPL CALC-SCNC: 4 MMOL/L (ref 7–16)
APPEARANCE UR: CLEAR
BACTERIA URNS QL MICRO: ABNORMAL /HPF
BILIRUB UR QL: ABNORMAL
BUN SERPL-MCNC: 13 MG/DL (ref 8–23)
CALCIUM SERPL-MCNC: 8.4 MG/DL (ref 8.3–10.4)
CASTS URNS QL MICRO: ABNORMAL /LPF
CHLORIDE SERPL-SCNC: 103 MMOL/L (ref 98–107)
CO2 SERPL-SCNC: 25 MMOL/L (ref 21–32)
COLOR UR: YELLOW
CREAT SERPL-MCNC: 1 MG/DL (ref 0.8–1.5)
EPI CELLS #/AREA URNS HPF: ABNORMAL /HPF
ERYTHROCYTE [DISTWIDTH] IN BLOOD BY AUTOMATED COUNT: 14.6 % (ref 11.9–14.6)
GLUCOSE BLD STRIP.AUTO-MCNC: 122 MG/DL (ref 65–100)
GLUCOSE BLD STRIP.AUTO-MCNC: 123 MG/DL (ref 65–100)
GLUCOSE BLD STRIP.AUTO-MCNC: 132 MG/DL (ref 65–100)
GLUCOSE BLD STRIP.AUTO-MCNC: 99 MG/DL (ref 65–100)
GLUCOSE SERPL-MCNC: 113 MG/DL (ref 65–100)
GLUCOSE UR STRIP.AUTO-MCNC: NEGATIVE MG/DL
HCT VFR BLD AUTO: 36.1 % (ref 41.1–50.3)
HGB BLD-MCNC: 11.6 G/DL (ref 13.6–17.2)
HGB UR QL STRIP: ABNORMAL
KETONES UR QL STRIP.AUTO: 15 MG/DL
LEUKOCYTE ESTERASE UR QL STRIP.AUTO: NEGATIVE
MAGNESIUM SERPL-MCNC: 2.4 MG/DL (ref 1.8–2.4)
MCH RBC QN AUTO: 29.9 PG (ref 26.1–32.9)
MCHC RBC AUTO-ENTMCNC: 32.1 G/DL (ref 31.4–35)
MCV RBC AUTO: 93 FL (ref 79.6–97.8)
MM INDURATION, POC: 0 MM (ref 0–5)
MUCOUS THREADS URNS QL MICRO: ABNORMAL /LPF
NITRITE UR QL STRIP.AUTO: NEGATIVE
NRBC # BLD: 0 K/UL (ref 0–0.2)
OTHER OBSERVATIONS: ABNORMAL
PH UR STRIP: 5.5 [PH] (ref 5–9)
PLATELET # BLD AUTO: 179 K/UL (ref 150–450)
PMV BLD AUTO: 11.4 FL (ref 9.4–12.3)
POTASSIUM SERPL-SCNC: 4.5 MMOL/L (ref 3.5–5.1)
PPD, POC: NEGATIVE
PROT UR STRIP-MCNC: ABNORMAL MG/DL
RBC # BLD AUTO: 3.88 M/UL (ref 4.23–5.6)
RBC #/AREA URNS HPF: ABNORMAL /HPF
SERVICE CMNT-IMP: ABNORMAL
SERVICE CMNT-IMP: NORMAL
SODIUM SERPL-SCNC: 132 MMOL/L (ref 138–145)
SP GR UR REFRACTOMETRY: >1.03 (ref 1–1.02)
UROBILINOGEN UR QL STRIP.AUTO: 1 EU/DL (ref 0.2–1)
WBC # BLD AUTO: 23.2 K/UL (ref 4.3–11.1)
WBC URNS QL MICRO: ABNORMAL /HPF

## 2022-06-02 PROCEDURE — 80048 BASIC METABOLIC PNL TOTAL CA: CPT

## 2022-06-02 PROCEDURE — 6370000000 HC RX 637 (ALT 250 FOR IP): Performed by: INTERNAL MEDICINE

## 2022-06-02 PROCEDURE — 81003 URINALYSIS AUTO W/O SCOPE: CPT

## 2022-06-02 PROCEDURE — 99232 SBSQ HOSP IP/OBS MODERATE 35: CPT | Performed by: INTERNAL MEDICINE

## 2022-06-02 PROCEDURE — 71046 X-RAY EXAM CHEST 2 VIEWS: CPT

## 2022-06-02 PROCEDURE — 2580000003 HC RX 258: Performed by: THORACIC SURGERY (CARDIOTHORACIC VASCULAR SURGERY)

## 2022-06-02 PROCEDURE — 36415 COLL VENOUS BLD VENIPUNCTURE: CPT

## 2022-06-02 PROCEDURE — 2140000001 HC CVICU INTERMEDIATE R&B

## 2022-06-02 PROCEDURE — 97110 THERAPEUTIC EXERCISES: CPT

## 2022-06-02 PROCEDURE — 2580000003 HC RX 258: Performed by: PHYSICIAN ASSISTANT

## 2022-06-02 PROCEDURE — 97530 THERAPEUTIC ACTIVITIES: CPT

## 2022-06-02 PROCEDURE — 6370000000 HC RX 637 (ALT 250 FOR IP): Performed by: THORACIC SURGERY (CARDIOTHORACIC VASCULAR SURGERY)

## 2022-06-02 PROCEDURE — 83735 ASSAY OF MAGNESIUM: CPT

## 2022-06-02 PROCEDURE — 82962 GLUCOSE BLOOD TEST: CPT

## 2022-06-02 PROCEDURE — 6370000000 HC RX 637 (ALT 250 FOR IP): Performed by: PHYSICIAN ASSISTANT

## 2022-06-02 PROCEDURE — 85027 COMPLETE CBC AUTOMATED: CPT

## 2022-06-02 RX ORDER — SPIRONOLACTONE 25 MG/1
12.5 TABLET ORAL DAILY
Status: DISCONTINUED | OUTPATIENT
Start: 2022-06-02 | End: 2022-06-02

## 2022-06-02 RX ORDER — GUAIFENESIN 600 MG/1
1200 TABLET, EXTENDED RELEASE ORAL 2 TIMES DAILY
Status: DISCONTINUED | OUTPATIENT
Start: 2022-06-02 | End: 2022-06-06 | Stop reason: HOSPADM

## 2022-06-02 RX ORDER — VALSARTAN 40 MG/1
20 TABLET ORAL DAILY
Status: DISCONTINUED | OUTPATIENT
Start: 2022-06-02 | End: 2022-06-06 | Stop reason: HOSPADM

## 2022-06-02 RX ORDER — CARVEDILOL 6.25 MG/1
6.25 TABLET ORAL 2 TIMES DAILY
Status: DISCONTINUED | OUTPATIENT
Start: 2022-06-02 | End: 2022-06-06 | Stop reason: HOSPADM

## 2022-06-02 RX ADMIN — SODIUM CHLORIDE, PRESERVATIVE FREE 10 ML: 5 INJECTION INTRAVENOUS at 09:51

## 2022-06-02 RX ADMIN — SODIUM CHLORIDE, PRESERVATIVE FREE 10 ML: 5 INJECTION INTRAVENOUS at 20:45

## 2022-06-02 RX ADMIN — GUAIFENESIN 1200 MG: 600 TABLET ORAL at 20:38

## 2022-06-02 RX ADMIN — Medication 1 AMPULE: at 20:38

## 2022-06-02 RX ADMIN — FAMOTIDINE 20 MG: 20 TABLET, FILM COATED ORAL at 20:38

## 2022-06-02 RX ADMIN — TRAMADOL HYDROCHLORIDE 100 MG: 50 TABLET, COATED ORAL at 20:38

## 2022-06-02 RX ADMIN — Medication 1 AMPULE: at 09:47

## 2022-06-02 RX ADMIN — GUAIFENESIN 1200 MG: 600 TABLET ORAL at 09:46

## 2022-06-02 RX ADMIN — AMIODARONE HYDROCHLORIDE 400 MG: 200 TABLET ORAL at 09:46

## 2022-06-02 RX ADMIN — FAMOTIDINE 20 MG: 20 TABLET, FILM COATED ORAL at 09:46

## 2022-06-02 RX ADMIN — AMIODARONE HYDROCHLORIDE 400 MG: 200 TABLET ORAL at 20:38

## 2022-06-02 RX ADMIN — FUROSEMIDE 40 MG: 40 TABLET ORAL at 09:46

## 2022-06-02 RX ADMIN — TRAMADOL HYDROCHLORIDE 50 MG: 50 TABLET, COATED ORAL at 11:59

## 2022-06-02 RX ADMIN — ASPIRIN 81 MG: 81 TABLET, CHEWABLE ORAL at 09:46

## 2022-06-02 RX ADMIN — CARVEDILOL 6.25 MG: 6.25 TABLET, FILM COATED ORAL at 20:38

## 2022-06-02 RX ADMIN — VALSARTAN 20 MG: 40 TABLET, FILM COATED ORAL at 09:48

## 2022-06-02 RX ADMIN — SENNOSIDES AND DOCUSATE SODIUM 1 TABLET: 8.6; 5 TABLET ORAL at 09:46

## 2022-06-02 RX ADMIN — CARVEDILOL 6.25 MG: 6.25 TABLET, FILM COATED ORAL at 09:46

## 2022-06-02 RX ADMIN — ACETAMINOPHEN 650 MG: 325 TABLET ORAL at 16:04

## 2022-06-02 RX ADMIN — SENNOSIDES AND DOCUSATE SODIUM 1 TABLET: 8.6; 5 TABLET ORAL at 20:38

## 2022-06-02 RX ADMIN — TRAMADOL HYDROCHLORIDE 100 MG: 50 TABLET, COATED ORAL at 06:35

## 2022-06-02 ASSESSMENT — PAIN - FUNCTIONAL ASSESSMENT
PAIN_FUNCTIONAL_ASSESSMENT: ACTIVITIES ARE NOT PREVENTED

## 2022-06-02 ASSESSMENT — PAIN SCALES - GENERAL
PAINLEVEL_OUTOF10: 0
PAINLEVEL_OUTOF10: 0
PAINLEVEL_OUTOF10: 6
PAINLEVEL_OUTOF10: 2
PAINLEVEL_OUTOF10: 4
PAINLEVEL_OUTOF10: 1
PAINLEVEL_OUTOF10: 6
PAINLEVEL_OUTOF10: 3

## 2022-06-02 ASSESSMENT — PAIN DESCRIPTION - ONSET
ONSET: ON-GOING

## 2022-06-02 ASSESSMENT — PAIN DESCRIPTION - PAIN TYPE
TYPE: SURGICAL PAIN

## 2022-06-02 ASSESSMENT — PAIN DESCRIPTION - DESCRIPTORS
DESCRIPTORS: ACHING;SORE
DESCRIPTORS: ACHING

## 2022-06-02 ASSESSMENT — PAIN DESCRIPTION - FREQUENCY
FREQUENCY: INTERMITTENT
FREQUENCY: CONTINUOUS
FREQUENCY: CONTINUOUS

## 2022-06-02 ASSESSMENT — PAIN DESCRIPTION - LOCATION
LOCATION: CHEST
LOCATION: INCISION;CHEST
LOCATION: CHEST

## 2022-06-02 ASSESSMENT — PAIN DESCRIPTION - ORIENTATION
ORIENTATION: ANTERIOR
ORIENTATION: MID;ANTERIOR

## 2022-06-02 NOTE — PROGRESS NOTES
Hawa Long  Admission Date: 5/26/2022         Daily Progress Note: 6/2/2022    The patient's chart is reviewed and the patient is discussed with the staff.     Background: 58 y.o. y/o male   Patient had CABG x 3    Subjective:     Pt  Moved to step down yesterday, up and walkin on 3L O2    Current Facility-Administered Medications   Medication Dose Route Frequency    carvedilol (COREG) tablet 6.25 mg  6.25 mg Oral BID    valsartan (DIOVAN) tablet 20 mg  20 mg Oral Daily    amiodarone (CORDARONE) tablet 400 mg  400 mg Oral BID    traMADol (ULTRAM) tablet 100 mg  100 mg Oral Q6H PRN    oxyCODONE-acetaminophen (PERCOCET)  MG per tablet 1 tablet  1 tablet Oral Q4H PRN    sodium chloride flush 0.9 % injection 5-40 mL  5-40 mL IntraVENous 2 times per day    sodium chloride flush 0.9 % injection 5-40 mL  5-40 mL IntraVENous PRN    furosemide (LASIX) tablet 40 mg  40 mg Oral Daily    sennosides-docusate sodium (SENOKOT-S) 8.6-50 MG tablet 1 tablet  1 tablet Oral BID    polyethylene glycol (GLYCOLAX) packet 17 g  17 g Oral Daily PRN    magnesium hydroxide (MILK OF MAGNESIA) 400 MG/5ML suspension 30 mL  30 mL Oral Daily PRN    insulin lispro (HUMALOG) injection vial 0-12 Units  0-12 Units SubCUTAneous TID WC    insulin lispro (HUMALOG) injection vial 0-6 Units  0-6 Units SubCUTAneous Nightly    glucose chewable tablet 16 g  4 tablet Oral PRN    dextrose bolus 10% 125 mL  125 mL IntraVENous PRN    Or    dextrose bolus 10% 250 mL  250 mL IntraVENous PRN    magnesium oxide (MAG-OX) tablet 400 mg  400 mg Oral TID PRN    magnesium oxide (MAG-OX) tablet 400 mg  400 mg Oral 4x Daily PRN    potassium chloride (KLOR-CON M) extended release tablet 20 mEq  20 mEq Oral BID PRN    potassium chloride (KLOR-CON M) extended release tablet 40 mEq  40 mEq Oral BID PRN    alcohol 62% (NOZIN) nasal  1 ampule  1 ampule Topical Q12H    acetaminophen (TYLENOL) tablet 650 mg  650 mg Oral Q4H PRN    famotidine (PEPCID) tablet 20 mg  20 mg Oral BID    Or    famotidine (PEPCID) 20 mg in sodium chloride (PF) 10 mL injection  20 mg IntraVENous BID    sodium chloride flush 0.9 % injection 5-40 mL  5-40 mL IntraVENous 2 times per day    ondansetron (ZOFRAN-ODT) disintegrating tablet 4 mg  4 mg Oral Q8H PRN    Or    ondansetron (ZOFRAN) injection 4 mg  4 mg IntraVENous Q6H PRN    medicated lip ointment (BLISTEX)   Topical PRN    aspirin chewable tablet 81 mg  81 mg Oral Daily     Review of Systems    Constitutional: negative for fever, chills, sweats  Cardiovascular: negative for chest pain, palpitations, syncope, edema  Gastrointestinal:  negative for dysphagia, reflux, vomiting, diarrhea, abdominal pain, or melena  Neurologic:  negative for focal weakness, numbness, headache  Objective:     Vitals:    06/02/22 0412 06/02/22 0501 06/02/22 0635 06/02/22 0704   BP: 107/68   127/65   Pulse: 82   87   Resp: 26 20 22   Temp: 99.1 °F (37.3 °C)   97.3 °F (36.3 °C)   TempSrc: Oral   Temporal   SpO2: 96%   94%   Weight:  216 lb 6.4 oz (98.2 kg)     Height:         [unfilled]  Physical Exam:   Constitution:  the patient is well developed and in no acute distress  HEENT:  Sclera clear, pupils equal, oral mucosa moist  Respiratory:  Some basilar crackles  Cardiovascular:  RRR without M,G,R  Gastrointestinal: soft and non-tender; with positive bowel sounds. Musculoskeletal: warm without cyanosis. There is no lower extremity edema.   Skin:  no jaundice or rashes, sternal wounds   Neurologic: no gross neuro deficits     Psychiatric:  alert and oriented x 3    CXR:       LAB:  Recent Labs     05/31/22  1249 05/31/22  1249 05/31/22  1608 05/31/22  1608 05/31/22  2058 06/01/22  0330 06/02/22  0657   WBC 16.4*   < > 13.7*  --   --  13.5* 23.2*   HGB 12.2*   < > 10.9*   < > 11.5* 10.8* 11.6*   HCT 37.4*   < > 34.2*   < > 35.2* 33.8* 36.1*      < > 165  --   --  163 179   INR 1.4  --   --   --   --   -- --     < > = values in this interval not displayed. Recent Labs     05/31/22  1608 05/31/22  1608 05/31/22 2058 06/01/22  0330 06/02/22  0657     --   --  141 132*   K 4.4   < > 4.6 4.2 4.5   *  --   --  110* 103   CO2 24  --   --  26 25   BUN 15  --   --  13 13   MG 2.9*   < > 2.6* 2.5* 2.4    < > = values in this interval not displayed. No results for input(s): TROPHS, BNPNT, CRP, ESR in the last 72 hours. Invalid input(s): LAC  No results for input(s): PH, PCO2, PO2, HCO3 in the last 72 hours. Invalid input(s): PHI, PCO2I, PO2I, HCO3I  No results for input(s): SDES in the last 72 hours. Invalid input(s): CULT  Assessment and Plan:  (Medical Decision Making)   Impression: 62 y. o. y/o male s/p CV surgery for S/P CABG x 3            Hypoxemia:   Once weaned from ventilator will work on IS, mobility and weaning O2. Bronchodilators per protocol.   O2 at 3 L-wean as tolerated     Atelectasis:   IS, mobility after extubated.     S/P Cardiovascular surgery:  Moved to step down    Danyelle Leyva MD

## 2022-06-02 NOTE — PROGRESS NOTES
organomegaly   Musculoskeletal - no joint tenderness, deformity or swelling  Extremities - No edema distally, dressing clean, no excess drainage or soaking of dressing      Current Facility-Administered Medications   Medication Dose Route Frequency    amiodarone (CORDARONE) tablet 400 mg  400 mg Oral BID    traMADol (ULTRAM) tablet 100 mg  100 mg Oral Q6H PRN    oxyCODONE-acetaminophen (PERCOCET)  MG per tablet 1 tablet  1 tablet Oral Q4H PRN    sodium chloride flush 0.9 % injection 5-40 mL  5-40 mL IntraVENous 2 times per day    sodium chloride flush 0.9 % injection 5-40 mL  5-40 mL IntraVENous PRN    furosemide (LASIX) tablet 40 mg  40 mg Oral Daily    potassium chloride (KLOR-CON M) extended release tablet 10 mEq  10 mEq Oral Daily    sennosides-docusate sodium (SENOKOT-S) 8.6-50 MG tablet 1 tablet  1 tablet Oral BID    polyethylene glycol (GLYCOLAX) packet 17 g  17 g Oral Daily PRN    magnesium hydroxide (MILK OF MAGNESIA) 400 MG/5ML suspension 30 mL  30 mL Oral Daily PRN    insulin lispro (HUMALOG) injection vial 0-12 Units  0-12 Units SubCUTAneous TID WC    insulin lispro (HUMALOG) injection vial 0-6 Units  0-6 Units SubCUTAneous Nightly    glucose chewable tablet 16 g  4 tablet Oral PRN    dextrose bolus 10% 125 mL  125 mL IntraVENous PRN    Or    dextrose bolus 10% 250 mL  250 mL IntraVENous PRN    magnesium oxide (MAG-OX) tablet 400 mg  400 mg Oral TID PRN    magnesium oxide (MAG-OX) tablet 400 mg  400 mg Oral 4x Daily PRN    potassium chloride (KLOR-CON M) extended release tablet 20 mEq  20 mEq Oral BID PRN    potassium chloride (KLOR-CON M) extended release tablet 40 mEq  40 mEq Oral BID PRN    alcohol 62% (NOZIN) nasal  1 ampule  1 ampule Topical Q12H    acetaminophen (TYLENOL) tablet 650 mg  650 mg Oral Q4H PRN    famotidine (PEPCID) tablet 20 mg  20 mg Oral BID    Or    famotidine (PEPCID) 20 mg in sodium chloride (PF) 10 mL injection  20 mg IntraVENous BID    carvedilol (COREG) tablet 3.125 mg  3.125 mg Oral BID    sodium chloride flush 0.9 % injection 5-40 mL  5-40 mL IntraVENous 2 times per day    ondansetron (ZOFRAN-ODT) disintegrating tablet 4 mg  4 mg Oral Q8H PRN    Or    ondansetron (ZOFRAN) injection 4 mg  4 mg IntraVENous Q6H PRN    insulin glargine (LANTUS) injection vial 14 Units  0.15 Units/kg SubCUTAneous Nightly    medicated lip ointment (BLISTEX)   Topical PRN    aspirin chewable tablet 81 mg  81 mg Oral Daily       Data Review: data included in this note has been independently reviewed by the author     TELEMETRY: sinus rhythm     Assessment/Plan:     Patient Active Problem List   Diagnosis    Long term (current) use of anticoagulants    Overweight (BMI 25.0-29. 9)    Coronary atherosclerosis of native coronary vessel    Benign prostatic hyperplasia with weak urinary stream    History of 2019 novel coronavirus disease (COVID-19)    Numbness of left foot    Primary insomnia    Chronic systolic congestive heart failure (HCC)    S/P ICD (internal cardiac defibrillator) procedure    Seasonal allergic rhinitis due to pollen    Smoking greater than 20 pack years    Refused influenza vaccine    Chronic eczematous otitis externa of both ears    High risk medication use    Dyslipidemia    Ventricular tachycardia ([address]    COVID-19 vaccination refused    Anemia    Chronic fatigue    IGT (impaired glucose tolerance)    Tobacco abuse    Statin intolerance    ICD (implantable cardioverter-defibrillator) discharge    COVID-19    CAD, multiple vessel    S/P CABG x 3    Hypoxemia    Atelectasis    Encounter for weaning from ventilator ([address]     PLAN    S/P CABG x 3  - POD 2   - continue to monitor K+, Mg  - Continue on Aspirin 81 mg PO daily, carvedilol 3.125 mg BID  - Patient will need to be considered for Chely Nguyen for lipid lowering therapy ( he is intolerant to statin therapies).   - Patient seems to be recovering well  - Cardiology will continue to follow this patient throughout their hospitalization. Active Problems:    S/P ICD (internal cardiac defibrillator)       CAD, multiple vessel-history of anterior MI 2005 with chronic systolic heart failure and subsequent defibrillator implant.       Long term (current) use of anticoagulants  - Resume once cardiothoracic surgery approves.       Plan  Patient has history of HFrEF he is now been revascularized but we should optimize his medical therapy for HFrEF I am going to do the following  1. Increase Coreg to 6.25 twice daily  2. Add Aldactone 12.5 mg p.o. daily  3. Stop potassium supplement  4. Start valsartan 20 mg p.o. daily this will allow for the transition to [facility] if needed as an outpatient  5.   Consider addition of Plavix at discharge for graft patency    Heydi DE LA FUENTE, S

## 2022-06-02 NOTE — PLAN OF CARE
Problem: Discharge Planning  Goal: Discharge to home or other facility with appropriate resources  Outcome: Progressing     Problem: Pain  Goal: Verbalizes/displays adequate comfort level or baseline comfort level  Outcome: Progressing     Problem: Safety - Adult  Goal: Free from fall injury  Outcome: Progressing     Problem: Respiratory - Adult  Goal: Achieves optimal ventilation and oxygenation  Outcome: Progressing     Problem: Cardiovascular - Adult  Goal: Maintains optimal cardiac output and hemodynamic stability  Outcome: Progressing  Goal: Absence of cardiac dysrhythmias or at baseline  Outcome: Progressing     Problem: Infection - Adult  Goal: Absence of infection during hospitalization  Outcome: Progressing     Problem: Hematologic - Adult  Goal: Maintains hematologic stability  Outcome: Progressing     Problem: Chronic Conditions and Co-morbidities  Goal: Patient's chronic conditions and co-morbidity symptoms are monitored and maintained or improved  Outcome: Progressing     Problem: ABCDS Injury Assessment  Goal: Absence of physical injury  Outcome: Progressing     Problem: Skin/Tissue Integrity  Goal: Absence of new skin breakdown  Outcome: Progressing

## 2022-06-02 NOTE — PROGRESS NOTES
PHYSICAL THERAPY Daily Note and PM  (Link to Caseload Tracking: PT Visit Days : 2  Time In/Out PT Charge Capture  Rehab Caseload Tracker  Orders      Patt Jaimes is a 58 y.o. male   PRIMARY DIAGNOSIS: S/P CABG x 3  ICD (implantable cardioverter-defibrillator) discharge [Z45.02]  CAD, multiple vessel [I25.10]  Procedure(s) (LRB):  CORONARY ARTERY BYPASS GRAFT (CABG X 3), LIMA ; ENDOSCOPIC VEIN HARVEST, LEFT GREATER SAPHENOUS VEIN (N/A)  TRANSESOPHAGEAL ECHOCARDIOGRAM (N/A)  2 Days Post-Op  Inpatient: Payor: Po Mitchell / Plan: Shashank Cardoza COMPLETE / Product Type: *No Product type* /     ASSESSMENT:     REHAB RECOMMENDATIONS:   Recommendation to date pending progress:  [address] [place] [address] Therapy    Equipment:     To Be Determined     ASSESSMENT:  Mr. Babita Gonsalez is sitting in the recliner and agreeable to therapy. Really nice man. Scooting to the edge of the chair with assistance. Sit to stand with min assist using the momentum method. Gait with the rolling walker x 200 feet with good sapna. Patient is returned to the recliner and participates in therapeutic exercises. Good session and progress demonstrated. Left in the recliner with needs within reach and alarm intact. Continue PT efforts. PM note:  Patient is agreeable. O2 intact. Patient was able to increase his gait distance this pm.  Exercises continued. Saturations 99%.   Doing well     SUBJECTIVE:   Mr. Babita Gonsalez states, \"Hello\"     Social/Functional Lives With: Parent  Type of Home: House  Home Layout: One level  Home Access: Stairs to enter without rails (3 MILAN)  Receives Help From: Family  Ambulation Assistance: Independent  Transfer Assistance: Independent  Active : No  Patient's  Info: Patient does not drive  Mode of Transportation: Car  OBJECTIVE:     PAIN: VITALS / O2: Rajwinder Malik / Bita Washingtonter / Ryan Feeler:   Pre Treatment: 0/10         Post Treatment: 0/10 Vitals        Oxygen O2    RESTRICTIONS/PRECAUTIONS:  Restrictions/Precautions  Restrictions/Precautions: Cardiac  Required Braces or Orthoses?: No  Restrictions/Precautions: Cardiac  Required Braces or Orthoses?: No     MOBILITY: I Mod I S SBA CGA Min Mod Max Total  NT x2 Comments:   Bed Mobility    Rolling [] [] [] [] [] [] [] [] [] [x] []    Supine to Sit [] [] [] [] [] [] [] [] [] [x] []    Scooting [] [] [] [] [] [] [] [] [] [x] []    Sit to Supine [] [] [] [] [] [] [] [] [] [x] []    Transfers    Sit to Stand [] [] [] [] [] [x] [] [] [] [] []    Bed to Chair [] [] [] [] [] [] [] [] [] [x] []    Stand to Sit [] [] [] [] [] [x] [] [] [] [] []     [] [] [] [] [] [] [] [] [] [] []    I=Independent, Mod I=Modified Independent, S=Supervision, SBA=Standby Assistance, CGA=Contact Guard Assistance,   Min=Minimal Assistance, Mod=Moderate Assistance, Max=Maximal Assistance, Total=Total Assistance, NT=Not Tested    BALANCE: Good Fair+ Fair Fair- Poor NT Comments   Sitting Static [x] [] [] [] [] []    Sitting Dynamic [x] [] [] [] [] []              Standing Static [x] [] [] [] [] []    Standing Dynamic [x] [] [] [] [] []      GAIT: I Mod I S SBA CGA Min Mod Max Total  NT x2 Comments:   Level of Assistance [] [] [] [] [] [] [] [] [] [] []    Distance 220 feet    DME Rolling Walker    Gait Quality n/a    Weightbearing Status      Stairs      I=Independent, Mod I=Modified Independent, S=Supervision, SBA=Standby Assistance, CGA=Contact Guard Assistance,   Min=Minimal Assistance, Mod=Moderate Assistance, Max=Maximal Assistance, Total=Total Assistance, NT=Not Tested    PLAN:   ACUTE PHYSICAL THERAPY GOALS:   (Developed with and agreed upon by patient and/or caregiver. )  LTG:  (1.)Mr. Ally Chisholm will move from supine to sit and sit to supine , scoot up and down and roll side to side in bed with MODIFIED INDEPENDENCE within 7 treatment day(s). (2.)Mr. Ally Chisholm will transfer from bed to chair and chair to bed with MODIFIED INDEPENDENCE using the least restrictive device within 7 treatment day(s). (3.)Mr. DEREK CHANEY will ambulate with MODIFIED INDEPENDENCE for 500 feet with the least restrictive device within 7 treatment day(s). (4.)Mr. DEREK CHANEY will participate in therapeutic activity/exercises x 25 minutes for increased activity tolerance within 7 treatment days.     ________________________________________________________________________________________________      FREQUENCY AND DURATION: BID for duration of hospital stay or until stated goals are met, whichever comes first.    TREATMENT:   TREATMENT:   Therapeutic Activity (13 Minutes): Therapeutic activity included Transfer Training, Ambulation on level ground, Sitting balance  and Standing balance to improve functional Activity tolerance, Balance, Coordination, Mobility and Strength. Therapeutic Exercise (11 Minutes): Therapeutic exercises noted below to improve functional activity tolerance, strength and mobility.      TREATMENT GRID:     Date:  06/02/22   Date:   Date:     ACTIVITY/EXERCISE AM PM AM PM AM PM   LAQ 15 15       Shoulder shrugs 15 15       Gluteal sets 15 15       marching 15 15       Ankle pumps 15 15       abduction 15 15                B = bilateral; AA = active assistive; A = active; P = passive    AFTER TREATMENT PRECAUTIONS: Alarm Activated, Bed/Chair Locked, Call light within reach, Chair, Needs within reach and RN notified    INTERDISCIPLINARY COLLABORATION:  RN/ PCT and PT/ PTA    EDUCATION:      TIME IN/OUT:  Time In: 1456  Time Out: [address]  Minutes: 24    Willa Payne PTA

## 2022-06-02 NOTE — PROGRESS NOTES
PHYSICAL THERAPY Daily Note and AM  (Link to Caseload Tracking: PT Visit Days : 1  Time In/Out PT Charge Capture  Rehab Caseload Tracker  Orders      Ang Hicks is a 58 y.o. male   PRIMARY DIAGNOSIS: S/P CABG x 3  ICD (implantable cardioverter-defibrillator) discharge [Z45.02]  CAD, multiple vessel [I25.10]  Procedure(s) (LRB):  CORONARY ARTERY BYPASS GRAFT (CABG X 3), LIMA ; ENDOSCOPIC VEIN HARVEST, LEFT GREATER SAPHENOUS VEIN (N/A)  TRANSESOPHAGEAL ECHOCARDIOGRAM (N/A)  2 Days Post-Op  Inpatient: Payor: Chong Shirley / Plan: Mariola Ash COMPLETE / Product Type: *No Product type* /     ASSESSMENT:     REHAB RECOMMENDATIONS:   Recommendation to date pending progress:  [address] [place] [address] Therapy    Equipment:     To Be Determined     ASSESSMENT:  Mr. Myrna Stewart is sitting in the recliner and agreeable to therapy. Really nice man. Scooting to the edge of the chair with assistance. Sit to stand with min assist using the momentum method. Gait with the rolling walker x 200 feet with good sapna. Patient is returned to the recliner and participates in therapeutic exercises. Good session and progress demonstrated. Left in the recliner with needs within reach and alarm intact. Continue PT efforts.       SUBJECTIVE:   Mr. Myrna Stewart states, \"Good morning\"     Social/Functional Lives With: Parent  Type of Home: House  Home Layout: One level  Home Access: Stairs to enter without rails (3 MILAN)  Receives Help From: Family  Ambulation Assistance: Independent  Transfer Assistance: Independent  Active : No  Patient's  Info: Patient does not drive  Mode of Transportation: Car  OBJECTIVE:     PAIN: Gale  / O2: PRECAUTION / Leim Locker / DRAINS:   Pre Treatment: 0/10         Post Treatment: 0/10 Vitals        Oxygen    Continuous Pulse Oximetry and O2    RESTRICTIONS/PRECAUTIONS:  Restrictions/Precautions  Restrictions/Precautions: Cardiac  Required Braces or Orthoses?: No  Restrictions/Precautions: Cardiac  Required Braces or Orthoses?: No     MOBILITY: I Mod I S SBA CGA Min Mod Max Total  NT x2 Comments:   Bed Mobility    Rolling [] [] [] [] [] [] [] [] [] [x] []    Supine to Sit [] [] [] [] [] [] [] [] [] [x] []    Scooting [] [] [] [] [] [] [] [] [] [x] []    Sit to Supine [] [] [] [] [] [] [] [] [] [x] []    Transfers    Sit to Stand [] [] [] [] [] [x] [] [] [] [] []    Bed to Chair [] [] [] [] [] [] [] [] [] [x] []    Stand to Sit [] [] [] [] [] [x] [] [] [] [] []     [] [] [] [] [] [] [] [] [] [] []    I=Independent, Mod I=Modified Independent, S=Supervision, SBA=Standby Assistance, CGA=Contact Guard Assistance,   Min=Minimal Assistance, Mod=Moderate Assistance, Max=Maximal Assistance, Total=Total Assistance, NT=Not Tested    BALANCE: Good Fair+ Fair Fair- Poor NT Comments   Sitting Static [x] [] [] [] [] []    Sitting Dynamic [x] [] [] [] [] []              Standing Static [x] [] [] [] [] []    Standing Dynamic [x] [] [] [] [] []      GAIT: I Mod I S SBA CGA Min Mod Max Total  NT x2 Comments:   Level of Assistance [] [] [] [] [] [] [] [] [] [] []    Distance 200 feet    DME Rolling Walker    Gait Quality n/a    Weightbearing Status      Stairs      I=Independent, Mod I=Modified Independent, S=Supervision, SBA=Standby Assistance, CGA=Contact Guard Assistance,   Min=Minimal Assistance, Mod=Moderate Assistance, Max=Maximal Assistance, Total=Total Assistance, NT=Not Tested    PLAN:   ACUTE PHYSICAL THERAPY GOALS:   (Developed with and agreed upon by patient and/or caregiver. )  LTG:  (1.)Mr. Abel Garcia will move from supine to sit and sit to supine , scoot up and down and roll side to side in bed with MODIFIED INDEPENDENCE within 7 treatment day(s). (2.)Mr. Abel Garcia will transfer from bed to chair and chair to bed with MODIFIED INDEPENDENCE using the least restrictive device within 7 treatment day(s). (3.)Mr. Abel Garcia will ambulate with MODIFIED INDEPENDENCE for 500 feet with the least restrictive device within 7 treatment day(s). (4.)Mr. Vandy Merlin will participate in therapeutic activity/exercises x 25 minutes for increased activity tolerance within 7 treatment days.     ________________________________________________________________________________________________      FREQUENCY AND DURATION: BID for duration of hospital stay or until stated goals are met, whichever comes first.    TREATMENT:   TREATMENT:   Therapeutic Activity (15 Minutes): Therapeutic activity included Transfer Training, Ambulation on level ground, Sitting balance  and Standing balance to improve functional Activity tolerance, Balance, Coordination, Mobility and Strength. Therapeutic Exercise (16 Minutes): Therapeutic exercises noted below to improve functional activity tolerance, strength and mobility.      TREATMENT GRID:     Date:  06/02/22   Date:   Date:     ACTIVITY/EXERCISE AM PM AM PM AM PM   LAQ 15        Shoulder shrugs 15        Gluteal sets 15        marching 15        Ankle pumps 15        abduction 15                 B = bilateral; AA = active assistive; A = active; P = passive    AFTER TREATMENT PRECAUTIONS: Alarm Activated, Bed/Chair Locked, Call light within reach, Chair, Needs within reach and RN notified    INTERDISCIPLINARY COLLABORATION:  RN/ PCT and PT/ PTA    EDUCATION:      TIME IN/OUT:  Time In: 3068  Time Out: 5460  Minutes: 32    Willa Payne PTA

## 2022-06-02 NOTE — PROGRESS NOTES
Pacing wires pulled per and by Evomail PA. Patient instructed to one hour bedrest with every 15 minute blood pressure checks for one hour. Pt voices understanding.

## 2022-06-02 NOTE — PROGRESS NOTES
Today's Date: 6/2/2022  Date of Admission: 5/26/2022    Chart Reviewed. Subjective:     Patient feels ok. He has a congested cough. Appetite fair. Medications Reviewed. Objective:     Vitals:    06/02/22 0412 06/02/22 0501 06/02/22 0635 06/02/22 0704   BP: 107/68   127/65   Pulse: 82   87   Resp: 26 20 22   Temp: 99.1 °F (37.3 °C)   97.3 °F (36.3 °C)   TempSrc: Oral   Temporal   SpO2: 96%   94%   Weight:  216 lb 6.4 oz (98.2 kg)     Height:           Intake and Output  Current Shift: 06/02 0701 - 06/02 1900  In: 300 [P.O.:300]  Out: 200 [Urine:200]   Last 3 Shifts: 05/31 1901 - 06/02 0700  In: 989.5 [P.O.:560; I.V.:421.2]  Out: 2495 [Urine:1905]    Physical Exam:  General: Well Developed, Well Nourished, No Acute Distress, Alert & Oriented x 3, Appropriate mood  Neck: supple, no JVD  Heart: S1S2 with RRR without murmurs or gallops  Lungs: Course bilaterally but clears with cough  Abd: soft, nontender, nondistended, with good bowel sounds  Ext: no edema bilaterally  Sternal incision: clean, dry, and intact  Skin: warm and dry    LABS  Data Review:   Recent Labs     05/31/22  1249 05/31/22  1608 06/01/22  0330 06/02/22  0657      < > 141 132*   K 4.4   < > 4.2 4.5   MG 4.0*   < > 2.5* 2.4   BUN 17   < > 13 13   WBC 16.4*   < > 13.5* 23.2*   HGB 12.2*   < > 10.8* 11.6*   HCT 37.4*   < > 33.8* 36.1*      < > 163 179   INR 1.4  --   --   --     < > = values in this interval not displayed. Estimated Creatinine Clearance: 89 mL/min (based on SCr of 1 mg/dL).       Assessment/Plan:      *S/P CABG x 3  On ASA, BB, statin, hold ARB for now with borderline low BP, will see how he tolerates increased BB dose, some recurrent brief runs of NSVT last night, continue amiodarone, pacing wires removed, continue PT     Active Hospital Problems    CAD, multiple vessel  S/p CABG, continue medical therapy       ICD (implantable cardioverter-defibrillator) discharge  Likely ischemic, now s/p CABG

## 2022-06-02 NOTE — PLAN OF CARE
Problem: Discharge Planning  Goal: Discharge to home or other facility with appropriate resources  6/2/2022 1508 by aJson Zurita RN  Outcome: Progressing  Flowsheets (Taken 6/2/2022 0830)  Discharge to home or other facility with appropriate resources: Identify barriers to discharge with patient and caregiver  6/2/2022 0610 by Delfina Linder RN  Outcome: Progressing     Problem: Pain  Goal: Verbalizes/displays adequate comfort level or baseline comfort level  6/2/2022 1508 by Jason Zurita RN  Outcome: Progressing  6/2/2022 0610 by Delfina Linder RN  Outcome: Progressing  Flowsheets (Taken 6/1/2022 2030)  Verbalizes/displays adequate comfort level or baseline comfort level: Encourage patient to monitor pain and request assistance     Problem: Safety - Adult  Goal: Free from fall injury  6/2/2022 1508 by Jason Zurita RN  Outcome: Progressing  Flowsheets (Taken 6/2/2022 0830)  Free From Fall Injury: Based on caregiver fall risk screen, instruct family/caregiver to ask for assistance with transferring infant if caregiver noted to have fall risk factors  6/2/2022 0610 by Delfina Linder RN  Outcome: Progressing     Problem: Respiratory - Adult  Goal: Achieves optimal ventilation and oxygenation  6/2/2022 1508 by Jason Zurita RN  Outcome: Progressing  6/2/2022 0610 by Delfina Linder RN  Outcome: Progressing  Flowsheets (Taken 6/1/2022 2030)  Achieves optimal ventilation and oxygenation:   Assess for changes in respiratory status   Assess for changes in mentation and behavior     Problem: Cardiovascular - Adult  Goal: Maintains optimal cardiac output and hemodynamic stability  6/2/2022 1508 by Jason Zurita RN  Outcome: Progressing  6/2/2022 0610 by Delfina Linder RN  Outcome: Progressing  Flowsheets (Taken 6/1/2022 2030)  Maintains optimal cardiac output and hemodynamic stability: Monitor blood pressure and heart rate  Goal: Absence of cardiac dysrhythmias or at baseline  6/2/2022 1508 by Jojo Zavala Evette Hamman, RN  Outcome: Progressing  6/2/2022 0610 by Alejandra Alfonso RN  Outcome: Progressing  Flowsheets (Taken 6/1/2022 2030)  Absence of cardiac dysrhythmias or at baseline:   Monitor cardiac rate and rhythm   Assess for signs of decreased cardiac output     Problem: Infection - Adult  Goal: Absence of infection at discharge  Outcome: Progressing  Flowsheets (Taken 6/2/2022 0830)  Absence of infection at discharge: Assess and monitor for signs and symptoms of infection  Goal: Absence of infection during hospitalization  6/2/2022 1508 by Concha Franklin RN  Outcome: Progressing  Flowsheets (Taken 6/2/2022 0830)  Absence of infection during hospitalization: Monitor lab/diagnostic results  6/2/2022 0610 by Alejandra Alfonso RN  Outcome: Progressing  Flowsheets (Taken 6/1/2022 2030)  Absence of infection during hospitalization:   Assess and monitor for signs and symptoms of infection   Monitor lab/diagnostic results   Monitor all insertion sites i.e., indwelling lines, tubes and drains  Goal: Absence of fever/infection during anticipated neutropenic period  Outcome: Progressing  Flowsheets (Taken 6/2/2022 0830)  Absence of fever/infection during anticipated neutropenic period: Monitor white blood cell count     Problem: Hematologic - Adult  Goal: Maintains hematologic stability  6/2/2022 1508 by Concha Franklin RN  Outcome: Progressing  Flowsheets (Taken 6/2/2022 0830)  Maintains hematologic stability: Assess for signs and symptoms of bleeding or hemorrhage  6/2/2022 0610 by Alejandra Alfonso RN  Outcome: Progressing     Problem: Chronic Conditions and Co-morbidities  Goal: Patient's chronic conditions and co-morbidity symptoms are monitored and maintained or improved  6/2/2022 1508 by Concha Franklin RN  Outcome: Progressing  Flowsheets (Taken 6/2/2022 0830)  Care Plan - Patient's Chronic Conditions and Co-Morbidity Symptoms are Monitored and Maintained or Improved: Monitor and assess patient's chronic conditions and

## 2022-06-03 LAB
ABO + RH BLD: NORMAL
BLD PROD TYP BPU: NORMAL
BLD PROD TYP BPU: NORMAL
BLOOD BANK DISPENSE STATUS: NORMAL
BLOOD BANK DISPENSE STATUS: NORMAL
BLOOD GROUP ANTIBODIES SERPL: NORMAL
BPU ID: NORMAL
BPU ID: NORMAL
CROSSMATCH RESULT: NORMAL
CROSSMATCH RESULT: NORMAL
GLUCOSE BLD STRIP.AUTO-MCNC: 94 MG/DL (ref 65–100)
MAGNESIUM SERPL-MCNC: 2.3 MG/DL (ref 1.8–2.4)
POTASSIUM SERPL-SCNC: 3.7 MMOL/L (ref 3.5–5.1)
SERVICE CMNT-IMP: NORMAL
SPECIMEN EXP DATE BLD: NORMAL
UNIT DIVISION: 0
UNIT DIVISION: 0

## 2022-06-03 PROCEDURE — 84132 ASSAY OF SERUM POTASSIUM: CPT

## 2022-06-03 PROCEDURE — 2580000003 HC RX 258: Performed by: THORACIC SURGERY (CARDIOTHORACIC VASCULAR SURGERY)

## 2022-06-03 PROCEDURE — 6370000000 HC RX 637 (ALT 250 FOR IP): Performed by: INTERNAL MEDICINE

## 2022-06-03 PROCEDURE — 99232 SBSQ HOSP IP/OBS MODERATE 35: CPT | Performed by: INTERNAL MEDICINE

## 2022-06-03 PROCEDURE — 99223 1ST HOSP IP/OBS HIGH 75: CPT | Performed by: INTERNAL MEDICINE

## 2022-06-03 PROCEDURE — 82962 GLUCOSE BLOOD TEST: CPT

## 2022-06-03 PROCEDURE — 6370000000 HC RX 637 (ALT 250 FOR IP): Performed by: THORACIC SURGERY (CARDIOTHORACIC VASCULAR SURGERY)

## 2022-06-03 PROCEDURE — 97110 THERAPEUTIC EXERCISES: CPT

## 2022-06-03 PROCEDURE — 83735 ASSAY OF MAGNESIUM: CPT

## 2022-06-03 PROCEDURE — 6370000000 HC RX 637 (ALT 250 FOR IP): Performed by: PHYSICIAN ASSISTANT

## 2022-06-03 PROCEDURE — 2580000003 HC RX 258: Performed by: PHYSICIAN ASSISTANT

## 2022-06-03 PROCEDURE — 2140000001 HC CVICU INTERMEDIATE R&B

## 2022-06-03 PROCEDURE — 97530 THERAPEUTIC ACTIVITIES: CPT

## 2022-06-03 PROCEDURE — 36415 COLL VENOUS BLD VENIPUNCTURE: CPT

## 2022-06-03 RX ADMIN — AMIODARONE HYDROCHLORIDE 400 MG: 200 TABLET ORAL at 19:56

## 2022-06-03 RX ADMIN — FUROSEMIDE 40 MG: 40 TABLET ORAL at 09:30

## 2022-06-03 RX ADMIN — CARVEDILOL 6.25 MG: 6.25 TABLET, FILM COATED ORAL at 19:56

## 2022-06-03 RX ADMIN — Medication 1 AMPULE: at 19:55

## 2022-06-03 RX ADMIN — GUAIFENESIN 1200 MG: 600 TABLET ORAL at 19:56

## 2022-06-03 RX ADMIN — TRAMADOL HYDROCHLORIDE 50 MG: 50 TABLET, COATED ORAL at 11:00

## 2022-06-03 RX ADMIN — TRAMADOL HYDROCHLORIDE 50 MG: 50 TABLET, COATED ORAL at 17:35

## 2022-06-03 RX ADMIN — CARVEDILOL 6.25 MG: 6.25 TABLET, FILM COATED ORAL at 09:31

## 2022-06-03 RX ADMIN — SENNOSIDES AND DOCUSATE SODIUM 1 TABLET: 8.6; 5 TABLET ORAL at 19:56

## 2022-06-03 RX ADMIN — SODIUM CHLORIDE, PRESERVATIVE FREE 10 ML: 5 INJECTION INTRAVENOUS at 20:06

## 2022-06-03 RX ADMIN — Medication 1 AMPULE: at 09:29

## 2022-06-03 RX ADMIN — TRAMADOL HYDROCHLORIDE 100 MG: 50 TABLET, COATED ORAL at 03:03

## 2022-06-03 RX ADMIN — FAMOTIDINE 20 MG: 20 TABLET, FILM COATED ORAL at 09:31

## 2022-06-03 RX ADMIN — SODIUM CHLORIDE, PRESERVATIVE FREE 10 ML: 5 INJECTION INTRAVENOUS at 09:31

## 2022-06-03 RX ADMIN — SENNOSIDES AND DOCUSATE SODIUM 1 TABLET: 8.6; 5 TABLET ORAL at 09:30

## 2022-06-03 RX ADMIN — GUAIFENESIN 1200 MG: 600 TABLET ORAL at 09:30

## 2022-06-03 RX ADMIN — AMIODARONE HYDROCHLORIDE 400 MG: 200 TABLET ORAL at 09:30

## 2022-06-03 RX ADMIN — ASPIRIN 81 MG: 81 TABLET, CHEWABLE ORAL at 09:30

## 2022-06-03 RX ADMIN — FAMOTIDINE 20 MG: 20 TABLET, FILM COATED ORAL at 19:56

## 2022-06-03 RX ADMIN — TRAMADOL HYDROCHLORIDE 100 MG: 50 TABLET, COATED ORAL at 23:21

## 2022-06-03 ASSESSMENT — PAIN DESCRIPTION - FREQUENCY
FREQUENCY: INTERMITTENT
FREQUENCY: CONTINUOUS

## 2022-06-03 ASSESSMENT — PAIN DESCRIPTION - LOCATION
LOCATION: CHEST

## 2022-06-03 ASSESSMENT — PAIN SCALES - GENERAL
PAINLEVEL_OUTOF10: 0
PAINLEVEL_OUTOF10: 2
PAINLEVEL_OUTOF10: 0
PAINLEVEL_OUTOF10: 0
PAINLEVEL_OUTOF10: 2
PAINLEVEL_OUTOF10: 5
PAINLEVEL_OUTOF10: 4
PAINLEVEL_OUTOF10: 6
PAINLEVEL_OUTOF10: 5

## 2022-06-03 ASSESSMENT — PAIN DESCRIPTION - DESCRIPTORS
DESCRIPTORS: ACHING

## 2022-06-03 ASSESSMENT — PAIN - FUNCTIONAL ASSESSMENT
PAIN_FUNCTIONAL_ASSESSMENT: ACTIVITIES ARE NOT PREVENTED
PAIN_FUNCTIONAL_ASSESSMENT: ACTIVITIES ARE NOT PREVENTED
PAIN_FUNCTIONAL_ASSESSMENT: PREVENTS OR INTERFERES SOME ACTIVE ACTIVITIES AND ADLS
PAIN_FUNCTIONAL_ASSESSMENT: ACTIVITIES ARE NOT PREVENTED

## 2022-06-03 ASSESSMENT — PAIN DESCRIPTION - ONSET
ONSET: ON-GOING
ONSET: ON-GOING

## 2022-06-03 ASSESSMENT — PAIN DESCRIPTION - PAIN TYPE
TYPE: SURGICAL PAIN

## 2022-06-03 ASSESSMENT — PAIN DESCRIPTION - ORIENTATION
ORIENTATION: MID
ORIENTATION: ANTERIOR

## 2022-06-03 NOTE — PROGRESS NOTES
Treatment: 0/10 Vitals    saturations 90% plus    Oxygen    On RA    RESTRICTIONS/PRECAUTIONS:  Restrictions/Precautions  Restrictions/Precautions: Cardiac  Required Braces or Orthoses?: No  Restrictions/Precautions: Cardiac  Required Braces or Orthoses?: No     MOBILITY: I Mod I S SBA CGA Min Mod Max Total  NT x2 Comments:   Bed Mobility    Rolling [] [] [] [] [] [] [] [] [] [x] []    Supine to Sit [] [] [] [] [] [] [] [] [] [x] []    Scooting [] [] [] [] [] [] [] [] [] [x] []    Sit to Supine [] [] [] [] [] [] [] [] [] [x] []    Transfers    Sit to Stand [] [] [] [x] [] [] [] [] [] [] []    Bed to Chair [] [] [] [] [] [] [] [] [] [x] []    Stand to Sit [] [] [] [x] [] [] [] [] [] [] []     [] [] [] [] [] [] [] [] [] [] []    I=Independent, Mod I=Modified Independent, S=Supervision, SBA=Standby Assistance, CGA=Contact Guard Assistance,   Min=Minimal Assistance, Mod=Moderate Assistance, Max=Maximal Assistance, Total=Total Assistance, NT=Not Tested    BALANCE: Good Fair+ Fair Fair- Poor NT Comments   Sitting Static [x] [] [] [] [] []    Sitting Dynamic [x] [] [] [] [] []              Standing Static [x] [] [] [] [] []    Standing Dynamic [x] [] [] [] [] []      GAIT: I Mod I S SBA CGA Min Mod Max Total  NT x2 Comments:   Level of Assistance [] [] [x] [] [] [] [] [] [] [] []    Distance 225 feet    DME None    Gait Quality n/a    Weightbearing Status      Stairs      I=Independent, Mod I=Modified Independent, S=Supervision, SBA=Standby Assistance, CGA=Contact Guard Assistance,   Min=Minimal Assistance, Mod=Moderate Assistance, Max=Maximal Assistance, Total=Total Assistance, NT=Not Tested    PLAN:   ACUTE PHYSICAL THERAPY GOALS:   (Developed with and agreed upon by patient and/or caregiver. )  LTG:  (1.)Mr. Dave Coffey will move from supine to sit and sit to supine , scoot up and down and roll side to side in bed with MODIFIED INDEPENDENCE within 7 treatment day(s). (2.)Mr. Dave Coffey will transfer from bed to chair and chair to bed with MODIFIED INDEPENDENCE using the least restrictive device within 7 treatment day(s). (3.)Mr. Fidel Humphreys will ambulate with MODIFIED INDEPENDENCE for 500 feet with the least restrictive device within 7 treatment day(s). (4.)Mr. Fidel Humphreys will participate in therapeutic activity/exercises x 25 minutes for increased activity tolerance within 7 treatment days.     ________________________________________________________________________________________________      FREQUENCY AND DURATION: BID for duration of hospital stay or until stated goals are met, whichever comes first.    TREATMENT:   TREATMENT:   Therapeutic Activity (13 Minutes): Therapeutic activity included Transfer Training, Ambulation on level ground, Stair Training, Sitting balance  and Standing balance to improve functional Activity tolerance, Balance, Coordination, Mobility and Strength. Therapeutic Exercise (12 Minutes): Therapeutic exercises noted below to improve functional activity tolerance, strength and mobility.      TREATMENT GRID:     Date:  06/02/22   Date:  06/03/22 Date:     ACTIVITY/EXERCISE AM PM AM PM AM PM   LAQ 15  15 15     Shoulder shrugs 15  15 15     Gluteal sets 15  15 15     marching 15  15 15     Ankle pumps 15  15 15     abduction 15  15 15              B = bilateral; AA = active assistive; A = active; P = passive    AFTER TREATMENT PRECAUTIONS: Alarm Activated, Bed/Chair Locked, Call light within reach, Chair, Needs within reach and RN notified    INTERDISCIPLINARY COLLABORATION:  RN/ PCT and PT/ PTA    EDUCATION:      TIME IN/OUT:  Time In: 1342  Time Out: [address]  Minutes: 25    Willa Payne, PTA

## 2022-06-03 NOTE — PROGRESS NOTES
Per night Shift CYNTHIA Gustafson pt had 9 runs of V tach throughout the night. Patient was also on RA but started dropping down to around 85% oxygen saturation. She placed 2L on patient around 4:15 am this morning. Patient is now around 99% on 2L, took pt back of oxygen now on RA O2 sat 94%. When asked if he ever had BOYD or if he snores he said he doesn't know but did say he does wake himself up sometimes feeling like he needs to take a breath. Will notify the PA [facility] when she get here this morning.

## 2022-06-03 NOTE — CARE COORDINATION
This CM met with pt this day to complete assessment. Pt verified his PCP, insurance, emergency contact, and home address. He reports no difficulty obtaining his medications in the community. He lives at home alone with 1-3 steps to enter and 2 canes. He confirms that at baseline prior to admission he is independent with his ADLs including bathing, dressing, cooking, and driving. We discussed discharge planning this day. Pt plans on returning home with family support - he will have family checking in on him each day throughout the day. We discussed the role and recommendation of home health at discharge - he is agreeable to referral.  Reviewed agencies - referral made to [facility]. No additional CM needs at this time. Will continue to follow and update as needed. 05/27/22 1251   Service Assessment   Patient Orientation Person;Place;Situation;Self;Alert and Oriented   Cognition Alert   History Provided By Patient   Primary Caregiver Self   Support Systems Parent; Children  (2 children: Daughter lives in [place]. Son in [place]. Patient's father transports patient to appointments.)   Patient's [address] is: Patient Declined (Legal Next of Kin Remains as Decision Maker)   PCP Verified by CM Yes  [facility]   Last Visit to PCP Withing last year   Prior Functional Level Independent in ADLs/IADLs   Current Functional Level Assistance with the following:;Bathing;Dressing; Toileting  (Due to high oxygen demand)   Can patient return to prior living arrangement Yes   Ability to make needs known: Good   Family able to assist with home care needs: Yes   Financial Resources Medicare   Social/Functional History   Lives With Parent;Son;Daughter   Type of [address] One level   Home Access Stairs to enter without rails  (3 MILAN)   Receives Help From Family   Active  No   Patient's  Info Patient does not drive   Mode of Transportation Car   Discharge [facility] Discharge   Services [facility]   Mode of Transport at Discharge Other (see comment)  (family)   Confirm Follow Up Transport Family   Condition of Participation: Discharge Planning   The Plan for Transition of Care is related to the following treatment goals: home with home health   The Patient and/or Patient Representative was provided with a Choice of Provider? Patient   The Patient and/Or Patient Representative agree with the Discharge Plan? Yes   Freedom of Choice list was provided with basic dialogue that supports the patient's individualized plan of care/goals, treatment preferences, and shares the quality data associated with the providers?   Yes

## 2022-06-03 NOTE — PROGRESS NOTES
Today's Date: 6/3/2022  Date of Admission: 5/26/2022    Chart Reviewed. Subjective:     Pt feels better today. He has been able to get more sputum up. Medications Reviewed. Objective:     Vitals:    06/03/22 0023 06/03/22 0227 06/03/22 0415 06/03/22 0700   BP: (!) 96/53 (!) 99/57 107/61 (!) 96/53   Pulse: 78 81 72 77   Resp: 18  18 19   Temp: 98.4 °F (36.9 °C)  97.9 °F (36.6 °C) 97.8 °F (36.6 °C)   TempSrc: Oral  Oral Oral   SpO2: 93%  93%    Weight:  212 lb 12.8 oz (96.5 kg)     Height:           Intake and Output  Current Shift: No intake/output data recorded. Last 3 Shifts: 06/01 1901 - 06/03 0700  In: 1100 [P.O.:1100]  Out: 1900 [Urine:1900]    Physical Exam:  General: Well Developed, Well Nourished, No Acute Distress, Alert & Oriented x 3, Appropriate mood  Neck: supple, no JVD  Heart: S1S2 with RRR without murmurs or gallops  Lungs: some course BS bilaterally   Abd: soft, nontender, nondistended, with good bowel sounds  Ext: no edema bilaterally  Sternal incision: clean, dry, and intact  Skin: warm and dry    LABS  Data Review:   Recent Labs     05/31/22  1249 05/31/22  1608 06/01/22  0330 06/02/22  0657      < > 141 132*   K 4.4   < > 4.2 4.5   MG 4.0*   < > 2.5* 2.4   BUN 17   < > 13 13   WBC 16.4*   < > 13.5* 23.2*   HGB 12.2*   < > 10.8* 11.6*   HCT 37.4*   < > 33.8* 36.1*      < > 163 179   INR 1.4  --   --   --     < > = values in this interval not displayed. Estimated Creatinine Clearance: 88 mL/min (based on SCr of 1 mg/dL).       Assessment/Plan:      *S/P CABG x 3  On ASA, BB, statin, hold ARB for now, BP is borderline low on increased BB dose, more VT overnight last night, labs pending, continue amiodarone, pacing wires removed, continue PT      Active Hospital Problems    CAD, multiple vessel  S/p CABG, continue medical therapy        ICD (implantable cardioverter-defibrillator) discharge  Likely ischemic, now s/p CABG        S/P ICD (internal cardiac defibrillator) procedure          Hypoxemia  Currently stable on room air but has desaturations at night, may need PSG        Atelectasis  IS       Encounter for weaning from ventilator [facility])          Ventricular tachycardia ([address]  Longer runs last night and increased frequency        Long term (current) use of anticoagulants  Holding coumadin for now       Coronary atherosclerosis of native coronary vessel  S/p CABG       Sasha Victor PA-C

## 2022-06-03 NOTE — OP NOTE
[facility]  OPERATIVE REPORT    Name:  Radha Bond  MR#:  [id number]  :  1960  ACCOUNT #:  [de-identified]  DATE OF SERVICE:  2022    PREOPERATIVE DIAGNOSES:  Coronary artery occlusive disease, ischemic cardiomyopathy. POSTOPERATIVE DIAGNOSES:  Coronary artery occlusive disease, ischemic cardiomyopathy. PROCEDURE PERFORMED:  Three-vessel coronary artery bypass grafting using reverse saphenous vein graft to the intermediate coronary artery, reverse saphenous vein graft to the posterior descending coronary artery, and left internal mammary artery to the left anterior descending coronary artery; endoscopic vein harvest; (arterial line placed by Anesthesia); temporary right ventricular pacing wire. SURGEON:  Vi Bello MD    ASSISTANT:       ANESTHESIA:  General.    COMPLICATIONS:       SPECIMENS REMOVED:       IMPLANTS:       ESTIMATED BLOOD LOSS:  Minimal.    CARDIOPULMONARY BYPASS TIME:  85 minutes    AORTIC CROSSCLAMP TIME:  66 minutes    PREOPERATIVE HISTORY:  This is a 51-year-old gentleman with a previous anterior wall myocardial infarction. He had a functioning internal cardiac defibrillator which shocked him on several occasions and this was brought to the attention of Cardiology. Subsequent cardiac catheterization showed severe triple-vessel disease with now severe stenosis of the large circumflex vessel. Surgical revascularization was recommended. WHAT WAS FOUND/WHAT WAS DONE:  The heart was exposed through a median sternotomy. The heart was enlarged and there was evidence of an anterior wall scar. Three coronary arteries were grafted placing reverse vein to the intermediate coronary artery and reverse vein to the posterior descending coronary artery. The internal mammary artery was used to bypass the mid left anterior descending coronary artery. The patient came off bypass without trouble.     PROCEDURE:  The patient was premedicated and brought to the operating room. The patient was placed supine on the table and general endotracheal anesthesia was given. The patient's functioning internal cardiac defibrillator was turned off. Appropriate monitoring lines were placed including arterial line with flow tract monitoring. Anterior body and both legs were then prepped with Betadine. The patient was draped into a sterile field. Endoscopic vein harvest was carried out with removal of the vein from the left leg. The vein with prepared. The skin incision on the left leg was closed with subcuticular closure technique. The chest was opened in the midline. A sternotomy was carried out. The left pleural cavity was opened widely. The mammary artery was taken down. Next, pericardium was opened vertically and retracted. Heparin at 300 units/kg was given. The patient was then cannulated, placed on bypass and cooled to 32 degrees Centigrade. Aorta was crossclamped. Blood cardioplegia was given antegrade. The intermediate coronary artery was identified, opened for a 5-mm length and reverse vein was sutured to this vessel with 7-0 Prolene. Next, the posterior descending coronary artery was identified, opened for a 5-mm length and reverse vein was sutured to this vessel with 7-0 Prolene. Finally, the internal mammary artery was sutured to the mid left anterior descending coronary artery with a running 7-0 Prolene. Further cardioplegia was given. Next, two 4-mm buttons of aortic wall were removed. The proximal end of both vein grafts were sutured to the aorta with 6-0 Prolene. Aortic crossclamp was released. The patient was rewarmed to 37 degrees Centigrade. The patient was weaned from bypass without difficulty. All blood was returned to the patient, after which cannulas were removed and the pursestring sutures tied. Protamine was given to reverse the heparin. Temporary right ventricular pacing wires were placed.   The 32-[place] tubes were left to drain the left chest cavity and the mediastinum. Hemostasis was satisfactory. The sternum was reapproximated with interrupted stainless steel wire. Soft tissue was closed with Vicryl and the skin was closed with Vicryl using the subcuticular closure technique. The patient tolerated the procedure well, was moved to the intensive care in stable condition. Sponge, needle and instrument counts were correct.       Vicky Weldon MD      [id number]  D:  06/03/2022 10:44  T:  06/03/2022 11:07  JOB #:  [id number]

## 2022-06-03 NOTE — PROGRESS NOTES
Trey Pettit  Admission Date: 5/26/2022         Daily Progress Note: 6/3/2022    The patient's chart is reviewed and the patient is discussed with the staff. Background: 58 y. o. y/o male   Patient had CABG x 3    Subjective:     Doing well -some cough, off O2    Current Facility-Administered Medications   Medication Dose Route Frequency    carvedilol (COREG) tablet 6.25 mg  6.25 mg Oral BID    [Held by provider] valsartan (DIOVAN) tablet 20 mg  20 mg Oral Daily    guaiFENesin (MUCINEX) extended release tablet 1,200 mg  1,200 mg Oral BID    amiodarone (CORDARONE) tablet 400 mg  400 mg Oral BID    traMADol (ULTRAM) tablet 100 mg  100 mg Oral Q6H PRN    oxyCODONE-acetaminophen (PERCOCET)  MG per tablet 1 tablet  1 tablet Oral Q4H PRN    sodium chloride flush 0.9 % injection 5-40 mL  5-40 mL IntraVENous 2 times per day    sodium chloride flush 0.9 % injection 5-40 mL  5-40 mL IntraVENous PRN    furosemide (LASIX) tablet 40 mg  40 mg Oral Daily    sennosides-docusate sodium (SENOKOT-S) 8.6-50 MG tablet 1 tablet  1 tablet Oral BID    polyethylene glycol (GLYCOLAX) packet 17 g  17 g Oral Daily PRN    magnesium hydroxide (MILK OF MAGNESIA) 400 MG/5ML suspension 30 mL  30 mL Oral Daily PRN    insulin lispro (HUMALOG) injection vial 0-12 Units  0-12 Units SubCUTAneous TID WC    glucose chewable tablet 16 g  4 tablet Oral PRN    dextrose bolus 10% 125 mL  125 mL IntraVENous PRN    Or    dextrose bolus 10% 250 mL  250 mL IntraVENous PRN    magnesium oxide (MAG-OX) tablet 400 mg  400 mg Oral TID PRN    magnesium oxide (MAG-OX) tablet 400 mg  400 mg Oral 4x Daily PRN    potassium chloride (KLOR-CON M) extended release tablet 20 mEq  20 mEq Oral BID PRN    potassium chloride (KLOR-CON M) extended release tablet 40 mEq  40 mEq Oral BID PRN    alcohol 62% (NOZIN) nasal  1 ampule  1 ampule Topical Q12H    acetaminophen (TYLENOL) tablet 650 mg  650 mg Oral Q4H PRN    famotidine (PEPCID) tablet 20 mg  20 mg Oral BID    Or    famotidine (PEPCID) 20 mg in sodium chloride (PF) 10 mL injection  20 mg IntraVENous BID    sodium chloride flush 0.9 % injection 5-40 mL  5-40 mL IntraVENous 2 times per day    ondansetron (ZOFRAN-ODT) disintegrating tablet 4 mg  4 mg Oral Q8H PRN    Or    ondansetron (ZOFRAN) injection 4 mg  4 mg IntraVENous Q6H PRN    medicated lip ointment (BLISTEX)   Topical PRN    aspirin chewable tablet 81 mg  81 mg Oral Daily     Review of Systems    Constitutional: negative for fever, chills, sweats  Cardiovascular: negative for chest pain, palpitations, syncope, edema  Gastrointestinal:  negative for dysphagia, reflux, vomiting, diarrhea, abdominal pain, or melena  Neurologic:  negative for focal weakness, numbness, headache  Objective:     Vitals:    06/03/22 0023 06/03/22 0227 06/03/22 0415 06/03/22 0700   BP: (!) 96/53 (!) 99/57 107/61 (!) 96/53   Pulse: 78 81 72 77   Resp: 18  18 19   Temp: 98.4 °F (36.9 °C)  97.9 °F (36.6 °C) 97.8 °F (36.6 °C)   TempSrc: Oral  Oral Oral   SpO2: 93%  93%    Weight:  212 lb 12.8 oz (96.5 kg)     Height:         [unfilled]  Physical Exam:   Constitution:  the patient is well developed and in no acute distress  HEENT:  Sclera clear, pupils equal, oral mucosa moist  Respiratory: few rhonchi  Cardiovascular:  RRR without M,G,R  Gastrointestinal: soft and non-tender; with positive bowel sounds. Musculoskeletal: warm without cyanosis. There is no lower extremity edema.   Skin:  no jaundice or rashes, sternla wounds   Neurologic: no gross neuro deficits     Psychiatric:  alert and oriented x 3    CXR:     LAB:  Recent Labs     05/31/22  1249 05/31/22  1249 05/31/22  1608 05/31/22  1608 05/31/22  2058 06/01/22  0330 06/02/22  0657   WBC 16.4*   < > 13.7*  --   --  13.5* 23.2*   HGB 12.2*   < > 10.9*   < > 11.5* 10.8* 11.6*   HCT 37.4*   < > 34.2*   < > 35.2* 33.8* 36.1*      < > 165  --   --  163 179   INR 1.4  -- --   --   --   --   --     < > = values in this interval not displayed. Recent Labs     05/31/22  1608 05/31/22 2058 06/01/22  0330 06/02/22  0657 06/03/22  0637     --  141 132*  --    K 4.4   < > 4.2 4.5 3.7   *  --  110* 103  --    CO2 24  --  26 25  --    BUN 15  --  13 13  --    MG 2.9*   < > 2.5* 2.4 2.3    < > = values in this interval not displayed. No results for input(s): TROPHS, BNPNT, CRP, ESR in the last 72 hours. Invalid input(s): LAC  No results for input(s): PH, PCO2, PO2, HCO3 in the last 72 hours. Invalid input(s): PHI, PCO2I, PO2I, HCO3I  No results for input(s): SDES in the last 72 hours. Invalid input(s): CULT  Assessment and Plan:  (Medical Decision Making)   Principal Problem:    S/P CABG x 3  Plan:    Active Problems:    S/P ICD (internal cardiac defibrillator) procedure      CAD, multiple vessel  Plan:s/p cabg    Hypoxemia  Plan: improved- now off O2    Atelectasis  Plan: improved aeration on cxr      nothing to add -will sign off     Octavio Shields MD

## 2022-06-03 NOTE — PROGRESS NOTES
Patient had a 6 beat run of V tach followed by a couple of pacer spikes, TEGAN Monahan notified via perfect serve awaiting reponse

## 2022-06-03 NOTE — PROGRESS NOTES
[facility] CARDIOLOGY PROGRESS NOTE           6/3/2022 7:57 AM    Admit Date: 5/26/2022      Subjective:   Patient states he has more energy today. No dyspnea. Sternotomy pain appears to be controlled. Blood pressure remains low but he denies any dizziness. Recurrent runs of VT.     ROS:  Cardiovascular:  As noted above    Objective:      Vitals:    06/03/22 0023 06/03/22 0227 06/03/22 0415 06/03/22 0700   BP: (!) 96/53 (!) 99/57 107/61 (!) 96/53   Pulse: 78 81 72 77   Resp: 18 18 19   Temp: 98.4 °F (36.9 °C)  97.9 °F (36.6 °C) 97.8 °F (36.6 °C)   TempSrc: Oral  Oral Oral   SpO2: 93%  93%    Weight:  212 lb 12.8 oz (96.5 kg)     Height:           Physical Exam:  General-No Acute Distress  Neck- supple, no JVD  CV- regular rate and rhythm no MRG  Lung- clear bilaterally  Abd- soft, nontender, nondistended  Ext- no edema bilaterally. Skin- warm and dry. Sternotomy incision C/D/I      Data Review:   Recent Labs     06/02/22  0657 06/01/22  0330 05/31/22  2058 05/31/22  1608 05/31/22  1608   WBC 23.2* 13.5*  --   --  13.7*   HGB 11.6* 10.8* 11.5*   < > 10.9*   HCT 36.1* 33.8* 35.2*   < > 34.2*   MCV 93.0 92.9  --   --  93.7    163  --   --  165    < > = values in this interval not displayed. Recent Labs     06/02/22  0657 05/27/22  0655 05/26/22  0350   *   < > 140   K 4.5   < > 4.2      < > 107   CO2 25   < > 27   BUN 13   < > 14   CREATININE 1.00   < > 1.10   GLUCOSE 113*   < > 125*   CALCIUM 8.4   < > 9.0   PROT  --   --  7.1   LABALBU  --   --  3.6   BILITOT  --   --  0.4   ALKPHOS  --   --  77   AST  --   --  34   ALT  --   --  16   LABGLOM >60   < > >60   GFRAA >60   < > >60   GLOB  --   --  3.5    < > = values in this interval not displayed. No results for input(s): CKTOTAL, CKMB, CKMBINDEX, DDIMER, TROPONINI in the last 720 hours. Assessment/Plan:     Active Hospital Problems    CAD, multiple vessel  Multivessel coronary artery disease.   Status post coronary artery bypass grafting. Continue aspirin therapy. ICD (implantable cardioverter-defibrillator) discharge  Device function. On amiodarone. We will continue to monitor on telemetry      *S/P CABG x 3  See above. Hypoxemia  Appreciate pulmonary assistance. Wean oxygen as tolerated. Atelectasis  Encourage incentive spirometry. Ventricular tachycardia (HCC)  Continue amiodarone as noted above. Ask EP to see with recurrent runs. Chronic systolic heart failure  Appears compensated. Continue carvedilol. Holding Diovan given low blood pressure. Titrate medical therapy as tolerated.     Coronary atherosclerosis of native coronary vessel                  Girsih Garza MD

## 2022-06-03 NOTE — PROGRESS NOTES
PHYSICAL THERAPY Daily Note and AM  (Link to Caseload Tracking: PT Visit Days : 2  Time In/Out PT Charge Capture  Rehab Caseload Tracker  Orders      Flakita Gutierrez is a 58 y.o. male   PRIMARY DIAGNOSIS: S/P CABG x 3  ICD (implantable cardioverter-defibrillator) discharge [Z45.02]  CAD, multiple vessel [I25.10]  Procedure(s) (LRB):  CORONARY ARTERY BYPASS GRAFT (CABG X 3), LIMA ; ENDOSCOPIC VEIN HARVEST, LEFT GREATER SAPHENOUS VEIN (N/A)  TRANSESOPHAGEAL ECHOCARDIOGRAM (N/A)  3 Days Post-Op  Inpatient: Payor: Derrick Carmen / Plan: Terrence Grant COMPLETE / Product Type: *No Product type* /     ASSESSMENT:     REHAB RECOMMENDATIONS:   Recommendation to date pending progress:  [address] [place] [address] Therapy    Equipment:     To Be Determined     ASSESSMENT:  Mr. Melanie Cuevas is sitting in the recliner and agreeable to therapy. Really nice man. Scooting to the edge of the chair with supervision. Sit to stand with supervision using the momentum method. Gait x 225 feet with good sapna no AD today. Patient is returned to the recliner and participates in therapeutic exercises. Good session and progress demonstrated. Left in the recliner with needs within reach and alarm intact. Continue PT efforts.       SUBJECTIVE:   Mr. Melanie Cuevas states, \"Good morning\"     Social/Functional Lives With: Parent  Type of Home: House  Home Layout: One level  Home Access: Stairs to enter without rails (3 MILAN)  Receives Help From: Family  Ambulation Assistance: Independent  Transfer Assistance: Independent  Active : No  Patient's  Info: Patient does not drive  Mode of Transportation: Car  OBJECTIVE:     PAIN: [employer] / O2: Hernandez Stair / Meagan Hernandez / Kasandra Swan:   Pre Treatment: 0/10         Post Treatment: 0/10 Vitals    saturations 93-96%    Oxygen    On RA    RESTRICTIONS/PRECAUTIONS:  Restrictions/Precautions  Restrictions/Precautions: Cardiac  Required Braces or Orthoses?: No  Restrictions/Precautions: Cardiac  Required Braces or Orthoses?: No     MOBILITY: I Mod I S SBA CGA Min Mod Max Total  NT x2 Comments:   Bed Mobility    Rolling [] [] [] [] [] [] [] [] [] [x] []    Supine to Sit [] [] [] [] [] [] [] [] [] [x] []    Scooting [] [] [] [] [] [] [] [] [] [x] []    Sit to Supine [] [] [] [] [] [] [] [] [] [x] []    Transfers    Sit to Stand [] [] [] [x] [] [] [] [] [] [] []    Bed to Chair [] [] [] [] [] [] [] [] [] [x] []    Stand to Sit [] [] [] [x] [] [] [] [] [] [] []     [] [] [] [] [] [] [] [] [] [] []    I=Independent, Mod I=Modified Independent, S=Supervision, SBA=Standby Assistance, CGA=Contact Guard Assistance,   Min=Minimal Assistance, Mod=Moderate Assistance, Max=Maximal Assistance, Total=Total Assistance, NT=Not Tested    BALANCE: Good Fair+ Fair Fair- Poor NT Comments   Sitting Static [x] [] [] [] [] []    Sitting Dynamic [x] [] [] [] [] []              Standing Static [x] [] [] [] [] []    Standing Dynamic [x] [] [] [] [] []      GAIT: I Mod I S SBA CGA Min Mod Max Total  NT x2 Comments:   Level of Assistance [] [] [x] [] [] [] [] [] [] [] []    Distance 225 feet    DME None    Gait Quality n/a    Weightbearing Status      Stairs      I=Independent, Mod I=Modified Independent, S=Supervision, SBA=Standby Assistance, CGA=Contact Guard Assistance,   Min=Minimal Assistance, Mod=Moderate Assistance, Max=Maximal Assistance, Total=Total Assistance, NT=Not Tested    PLAN:   ACUTE PHYSICAL THERAPY GOALS:   (Developed with and agreed upon by patient and/or caregiver. )  LTG:  (1.)[facility] will move from supine to sit and sit to supine , scoot up and down and roll side to side in bed with MODIFIED INDEPENDENCE within 7 treatment day(s). (2.)[facility] will transfer from bed to chair and chair to bed with MODIFIED INDEPENDENCE using the least restrictive device within 7 treatment day(s). (3.)[facility] will ambulate with MODIFIED INDEPENDENCE for 500 feet with the least restrictive device within 7 treatment

## 2022-06-03 NOTE — PROGRESS NOTES
Cardiac Rehab: Spoke with patient regarding referral to cardiac rehab. Patient meets admission criteria based on CABG x3 (5/27/22). Written information about Cardiac Rehab given and reviewed with patient. Discussed lifestyle modifications to promote cardiac wellness. Patient indicated that he wants to participate in the cardiac rehab program and his orientation has been scheduled. His Cardiologist is Dr. Princess Hernandez.       Thank you,  MARY JO ArcherN, RN  Cardiopulmonary Rehabilitation Nurse Liaison  [facility]

## 2022-06-03 NOTE — H&P
[facility] History &Physical                 Primary Cardiologist: Dr Heladio Mojica    Primary Care Physician: Robinson Barton MD    Consulting MD: Dr Dasia Joseph    Assessment/Plan:   CAD S/P CABG x 3- ASA, BB, ARB held due to hypotension     ICD (implantable cardioverter-defibrillator) discharge     Hypoxemia- 2L per pulmonary      Ventricular tachycardia ([address]- S/P ICD, continued NSVT despite po amio, further recs per Dr Dasia Joseph      CC: VF    Subjective:     Patient is a 58 y.o. male who presents with ICD shock. Patient is a 58 y.o. male with a hx of CAD, HFrEF, IGT, HLD, hx of cardiac wall mural thrombus, and depression. The patient has been doing well in the last defibrillator interrogation showed episodes of ATP but no ICD shocks with no medications changes at home. Patient about 1 AM experienced for episodes of ICD discharge which confirmed by permanent ICD interrogation with Edin Barry sent to Dr. Elkin Diaz. Per the patient he did feel palpitations at that time and very fast heart rate with some diaphoresis. Admitted for escalating episodes of VF w ICD shock. Started on amio. 5-27-22 pt underwent LHC showing severe multivessel disease w EF 35%. 5-31-22 pt underwent CABG w SVG to ramus, SVG to PDA and LIMA to LAD. Post op 3 short bouts of NSVT which have continued. K 3.7, mag 2.3.  BP 93/53. Pt has been on po amio 400 mg BID x 8 days. Recent Cardiac Synopsis w/ Labs  NST: 8/2020 CONCLUSION:   1. Stress EKG: Non diagnostic due to pharmacologic infusion. 2. SPECT Perfusion Imaging: large anterior infarction extending into mid   to distal anterior wall and distal inferior wall   3.  LV Systolic Function is severely abnormal.   4. Risk Assessment: no reversible ischemia, large infarction with known    ischemic cardiomyopathy.       Echo: 3/28/2018 EF 30-35%  EKG: NSR with out elevation  PPM Interrogation:  Preliminary Impression: Normal dc icd function.  VT episode(s) back on   09/30/21 successfully ATP terminated.  Several NSVT since then but no   further therapies.  Pt asymptomatic to these events.  PVC burden:  354K   single pvc's in past 5 months.  AP 2%   3%.  No programming changes   warranted.        Soc: 1 ppd since 1995  FH:   Mom w MI 67    Past Medical History:   Diagnosis Date    Acute hypoxemic respiratory failure due to COVID-19 [facility]) 10/9/2021    CAD (coronary artery disease)     heart attack 2005 stentS HEART    CHF (congestive heart failure) ([address] 9/27/2011    Chronic systolic heart failure ([address] 10/2/2015    Coronary atherosclerosis of native coronary vessel 10/2/2015    Hyperlipidemia 10/2/2015    Hypoxemia requiring supplemental oxygen 10/6/2021    IGT (impaired glucose tolerance) 12/3/2017    Other ill-defined conditions(799.89)      blind left eye    Other ill-defined conditions(799.89)     cardiomyopathy    Pneumonia due to COVID-19 virus 11/13/2021    Resolved    Primary insomnia 6/7/2017    Psychiatric disorder     depression     Sepsis, unspecified 4/5/2011    Thromboembolus ([address]     thrombus in heart     Thrombus 10/2/2015      Past Surgical History:   Procedure Laterality Date    CARDIAC CATHETERIZATION      5 stents total    CARDIAC DEFIBRILLATOR PLACEMENT  2011    [facility]    CARDIAC PROCEDURE N/A 5/27/2022    LEFT HEART CATH / CORONARY ANGIOGRAPHY performed by Isatu Toney MD at [address] [place] [address] CATH LAB    COLONOSCOPY  12/2010    CORONARY ARTERY BYPASS GRAFT N/A 5/31/2022    CORONARY ARTERY BYPASS GRAFT (CABG X 3), LIMA ; ENDOSCOPIC VEIN HARVEST, LEFT GREATER SAPHENOUS VEIN performed by Vi Bello MD at [facility] MAIN OR    HEENT      oral surgery    PACEMAKER      [place] CARDIAC SURG PROCEDURE UNLIST       1 stent 2005    TRANSESOPHAGEAL ECHOCARDIOGRAM N/A 5/31/2022    TRANSESOPHAGEAL ECHOCARDIOGRAM performed by Vi Bello MD at [facility] MAIN OR      Allergies   Allergen Reactions    Penicillins Swelling     Face swelling    Atorvastatin Other (See Comments)     itching    Evolocumab Other (See Comments)     Itching    Lisinopril Other (See Comments)    Rosuvastatin Other (See Comments)     itching     Social History     Tobacco Use    Smoking status: Current Every Day Smoker     Packs/day: 1.00     Types: Cigarettes     Start date: 6/7/1995    Smokeless tobacco: Never Used   Substance Use Topics    Alcohol use: No      FH:   Family History   Problem Relation Age of Onset    Heart Disease Mother     Diabetes Paternal Grandfather     Cancer Paternal Grandmother     Heart Disease Brother     Diabetes Maternal Grandmother     Bleeding Prob Father     Diabetes Mother     Heart Disease Paternal Grandfather     Heart Attack Mother 67        mi        Review of Systems  General: no weight change, no weakness, fever or chills  Skin: no rashes, lumps, or other skin changes  HEENT: no headache, dizziness, lightheadedness, vision changes, hearing changes, tinnitus, vertigo, sinus pressure/pain, bleeding gums, sore throat, or hoarseness  Neck: no swollen glands, goiter, pain or stiffness  Respiratory: no cough, sputum, hemoptysis, no dyspnea, no wheezing  Cardiovascular: + as per HPI  Gastrointestinal: no reflux, constipation, diarrhea, liver problems, GI bleeding  Urinary: no frequency, urgency , hematuria, burning/pain with urination, recent flank pain, polyuria, nocturia, or difficulty urinating  Peripheral Vascular: no claudication, leg cramps, prior DVTs, swelling of calves, legs, or feet, color change, or swelling with redness or tenderness  Musculoskeletal: no muscle or joint pain/stiffness, joint swelling, erythema of joints, or back pain  Psychiatric: no depression or excessive stress  Neurological: no sensory or motor loss, seizures, syncope, tremors, numbness, tingling, no changes in mood, attention, or speech, no changes in orientation, memory, insight, or judgment.    Hematologic: no anemia, easy bruising or bleeding  Endocrine: no thyroid problems, no heat or cold intolerance, excessive sweating, polyuria, polydipsia, no diabetes. Objective:       BP (!) 96/53   Pulse 77   Temp 97.8 °F (36.6 °C) (Oral)   Resp 19   Ht 5' 9\" (1.753 m)   Wt 212 lb 12.8 oz (96.5 kg)   SpO2 93% Comment: Pt SpO2 mid 80s on room air.  Applied 2L NC, SpO2 now 93%  BMI 31.43 kg/m²     06/03 0701 - 06/03 1900  In: 300 [P.O.:300]  Out: -   06/01 1901 - 06/03 0700  In: 1100 [P.O.:1100]  Out: 1900 [Urine:1900]    Physical Exam:  General: Well Developed, Well Nourished, No Acute Distress  Head: normocephalic atraumatic   ENT: pupils equal and round, no abnormalities noted  Neck: supple, no JVD, no carotid bruits  Heart: S1S2 with RRR without murmurs or gallops  Lungs: Clear throughout auscultation bilaterally without adventitious sounds  Abd: soft, nontender, nondistended, with good bowel sounds  Ext: warm, no edema  Skin: warm and dry  Psychiatric: Normal mood and affect, A&O x 3  Neurologic: Normal muscle tone      Data Review:      Recent Results (from the past 24 hour(s))   POCT Glucose    Collection Time: 06/02/22 11:07 AM   Result Value Ref Range    POC Glucose 123 (H) 65 - 100 mg/dL    Performed by: [employer]    POCT Glucose    Collection Time: 06/02/22  3:23 PM   Result Value Ref Range    POC Glucose 122 (H) 65 - 100 mg/dL    Performed by: [employer]    Urinalysis w rflx microscopic    Collection Time: 06/02/22  4:06 PM   Result Value Ref Range    Color, UA YELLOW      Appearance CLEAR      Specific Gravity, UA >1.030 (H) 1.001 - 1.023    pH, Urine 5.5 5.0 - 9.0      Protein, UA TRACE (A) NEG mg/dL    Glucose, UA Negative NEG mg/dL    Ketones, Urine 15 (A) NEG mg/dL    Bilirubin Urine SMALL (A) NEG      Blood, Urine LARGE (A) NEG      Urobilinogen, Urine 1.0 0.2 - 1.0 EU/dL    Nitrite, Urine Negative NEG      Leukocyte Esterase, Urine Negative NEG      WBC, UA 3-5 0 /hpf    RBC, UA 0-3 0 /hpf    Epithelial Cells UA 0-3 0 /hpf BACTERIA, URINE 2+ (H) 0 /hpf    Casts HYALINE 0 /lpf    Mucus, UA 1+ (H) 0 /lpf    OTHER OBSERVATIONS RESULTS VERIFIED MANUALLY     POCT Glucose    Collection Time: 06/02/22  8:26 PM   Result Value Ref Range    POC Glucose 99 65 - 100 mg/dL    Performed by: Ferd Bosworth    PLEASE READ & DOCUMENT PPD TEST IN 48 HRS    Collection Time: 06/02/22  8:30 PM   Result Value Ref Range    PPD, (POC) Negative Negative    mm Induration 0 0 - 5 mm   POCT Glucose    Collection Time: 06/03/22  6:19 AM   Result Value Ref Range    POC Glucose 94 65 - 100 mg/dL    Performed by: Ferd Bosworth    Magnesium    Collection Time: 06/03/22  6:37 AM   Result Value Ref Range    Magnesium 2.3 1.8 - 2.4 mg/dL   Potassium    Collection Time: 06/03/22  6:37 AM   Result Value Ref Range    Potassium 3.7 3.5 - 5.1 mmol/L       CXR: pending      LEISA Jones PA-C  6/3/2022  10:11 AM

## 2022-06-04 LAB — POTASSIUM SERPL-SCNC: 4 MMOL/L (ref 3.5–5.1)

## 2022-06-04 PROCEDURE — 36415 COLL VENOUS BLD VENIPUNCTURE: CPT

## 2022-06-04 PROCEDURE — 6370000000 HC RX 637 (ALT 250 FOR IP): Performed by: INTERNAL MEDICINE

## 2022-06-04 PROCEDURE — 6370000000 HC RX 637 (ALT 250 FOR IP): Performed by: THORACIC SURGERY (CARDIOTHORACIC VASCULAR SURGERY)

## 2022-06-04 PROCEDURE — 99233 SBSQ HOSP IP/OBS HIGH 50: CPT | Performed by: INTERNAL MEDICINE

## 2022-06-04 PROCEDURE — 97110 THERAPEUTIC EXERCISES: CPT

## 2022-06-04 PROCEDURE — 6370000000 HC RX 637 (ALT 250 FOR IP): Performed by: PHYSICIAN ASSISTANT

## 2022-06-04 PROCEDURE — 2580000003 HC RX 258: Performed by: THORACIC SURGERY (CARDIOTHORACIC VASCULAR SURGERY)

## 2022-06-04 PROCEDURE — 97530 THERAPEUTIC ACTIVITIES: CPT

## 2022-06-04 PROCEDURE — 84132 ASSAY OF SERUM POTASSIUM: CPT

## 2022-06-04 PROCEDURE — 2580000003 HC RX 258: Performed by: PHYSICIAN ASSISTANT

## 2022-06-04 PROCEDURE — 2140000001 HC CVICU INTERMEDIATE R&B

## 2022-06-04 RX ORDER — TRAMADOL HYDROCHLORIDE 50 MG/1
50 TABLET ORAL EVERY 6 HOURS PRN
Status: DISCONTINUED | OUTPATIENT
Start: 2022-06-04 | End: 2022-06-06 | Stop reason: HOSPADM

## 2022-06-04 RX ORDER — SENNA AND DOCUSATE SODIUM 50; 8.6 MG/1; MG/1
1 TABLET, FILM COATED ORAL 2 TIMES DAILY PRN
Status: DISCONTINUED | OUTPATIENT
Start: 2022-06-04 | End: 2022-06-06 | Stop reason: HOSPADM

## 2022-06-04 RX ADMIN — ASPIRIN 81 MG: 81 TABLET, CHEWABLE ORAL at 08:41

## 2022-06-04 RX ADMIN — TRAMADOL HYDROCHLORIDE 50 MG: 50 TABLET, COATED ORAL at 15:19

## 2022-06-04 RX ADMIN — POTASSIUM CHLORIDE 40 MEQ: 20 TABLET, EXTENDED RELEASE ORAL at 00:35

## 2022-06-04 RX ADMIN — TRAMADOL HYDROCHLORIDE 50 MG: 50 TABLET, COATED ORAL at 21:08

## 2022-06-04 RX ADMIN — GUAIFENESIN 1200 MG: 600 TABLET ORAL at 08:41

## 2022-06-04 RX ADMIN — FAMOTIDINE 20 MG: 20 TABLET, FILM COATED ORAL at 08:42

## 2022-06-04 RX ADMIN — GUAIFENESIN 1200 MG: 600 TABLET ORAL at 21:08

## 2022-06-04 RX ADMIN — SODIUM CHLORIDE, PRESERVATIVE FREE 10 ML: 5 INJECTION INTRAVENOUS at 21:09

## 2022-06-04 RX ADMIN — POTASSIUM CHLORIDE 20 MEQ: 20 TABLET, EXTENDED RELEASE ORAL at 17:42

## 2022-06-04 RX ADMIN — Medication 1 AMPULE: at 08:41

## 2022-06-04 RX ADMIN — SODIUM CHLORIDE, PRESERVATIVE FREE 10 ML: 5 INJECTION INTRAVENOUS at 08:46

## 2022-06-04 RX ADMIN — AMIODARONE HYDROCHLORIDE 400 MG: 200 TABLET ORAL at 21:07

## 2022-06-04 RX ADMIN — CARVEDILOL 6.25 MG: 6.25 TABLET, FILM COATED ORAL at 21:08

## 2022-06-04 RX ADMIN — TRAMADOL HYDROCHLORIDE 50 MG: 50 TABLET, COATED ORAL at 08:42

## 2022-06-04 RX ADMIN — FAMOTIDINE 20 MG: 20 TABLET, FILM COATED ORAL at 21:08

## 2022-06-04 RX ADMIN — FUROSEMIDE 40 MG: 40 TABLET ORAL at 08:42

## 2022-06-04 RX ADMIN — CARVEDILOL 6.25 MG: 6.25 TABLET, FILM COATED ORAL at 08:42

## 2022-06-04 RX ADMIN — AMIODARONE HYDROCHLORIDE 400 MG: 200 TABLET ORAL at 08:42

## 2022-06-04 RX ADMIN — Medication 1 AMPULE: at 21:07

## 2022-06-04 RX ADMIN — POTASSIUM CHLORIDE 20 MEQ: 20 TABLET, EXTENDED RELEASE ORAL at 08:45

## 2022-06-04 ASSESSMENT — PAIN DESCRIPTION - LOCATION
LOCATION: CHEST

## 2022-06-04 ASSESSMENT — PAIN DESCRIPTION - ORIENTATION
ORIENTATION: ANTERIOR
ORIENTATION: MID
ORIENTATION: MID

## 2022-06-04 ASSESSMENT — PAIN DESCRIPTION - FREQUENCY
FREQUENCY: INTERMITTENT

## 2022-06-04 ASSESSMENT — PAIN - FUNCTIONAL ASSESSMENT
PAIN_FUNCTIONAL_ASSESSMENT: ACTIVITIES ARE NOT PREVENTED
PAIN_FUNCTIONAL_ASSESSMENT: ACTIVITIES ARE NOT PREVENTED

## 2022-06-04 ASSESSMENT — PAIN SCALES - GENERAL
PAINLEVEL_OUTOF10: 5
PAINLEVEL_OUTOF10: 0
PAINLEVEL_OUTOF10: 0

## 2022-06-04 ASSESSMENT — PAIN DESCRIPTION - DESCRIPTORS
DESCRIPTORS: ACHING;SORE
DESCRIPTORS: ACHING
DESCRIPTORS: ACHING

## 2022-06-04 ASSESSMENT — PAIN DESCRIPTION - PAIN TYPE
TYPE: SURGICAL PAIN

## 2022-06-04 ASSESSMENT — PAIN DESCRIPTION - ONSET
ONSET: ON-GOING

## 2022-06-04 NOTE — PROGRESS NOTES
PHYSICAL THERAPY Daily Note and AM  (Link to Caseload Tracking: PT Visit Days : 4  Time In/Out PT Charge Capture  Rehab Caseload Tracker  Orders      Flakita Gutierrez is a 58 y.o. male   PRIMARY DIAGNOSIS: S/P CABG x 3  ICD (implantable cardioverter-defibrillator) discharge [Z45.02]  CAD, multiple vessel [I25.10]  Procedure(s) (LRB):  CORONARY ARTERY BYPASS GRAFT (CABG X 3), LIMA ; ENDOSCOPIC VEIN HARVEST, LEFT GREATER SAPHENOUS VEIN (N/A)  TRANSESOPHAGEAL ECHOCARDIOGRAM (N/A)  4 Days Post-Op  Inpatient: Payor: [facility] MEDICARE / Plan: Terrence rGant COMPLETE / Product Type: *No Product type* /     ASSESSMENT:     REHAB RECOMMENDATIONS:   Recommendation to date pending progress:  [address] [place] [address] Therapy    Equipment:     None     ASSESSMENT:  Mr. Melanie Cuevas is sitting in the recliner and agreeable to therapy. He walked the entire floor without AD and no real issues. After a short seated rest he performed below standing exercises well. He is doing great and will likely discharge to ambulating independently tomorrow.      SUBJECTIVE:   Mr. Melanie Cuevas states, \"ok\"     Social/Functional Lives With: Parent  Type of Home: House  Home Layout: One level  Home Access: Stairs to enter without rails (3 MILAN)  Receives Help From: Family  Ambulation Assistance: Independent  Transfer Assistance: Independent  Active : No  Patient's  Info: Patient does not drive  Mode of Transportation: Car  OBJECTIVE:     PAIN: [employer] / O2: Zelpha Stair / Megaan Hernandez / Kasandra Swan:   Pre Treatment: 0/10         Post Treatment: 0/10 Vitals    saturations 90% plus    Oxygen    On RA    RESTRICTIONS/PRECAUTIONS:  Restrictions/Precautions  Restrictions/Precautions: Cardiac  Required Braces or Orthoses?: No  Restrictions/Precautions: Cardiac  Required Braces or Orthoses?: No     MOBILITY: I Mod I S SBA CGA Min Mod Max Total  NT x2 Comments:   Bed Mobility    Rolling [] [] [] [] [] [] [] [] [] [x] [] Plans to sleep in recliner   Supine to Sit [] [] [] [] [] [] [] [] [] [x] []    Scooting [] [] [] [] [] [] [] [] [] [x] []    Sit to Supine [] [] [] [] [] [] [] [] [] [x] []    Transfers    Sit to Stand [] [] [x] [] [] [] [] [] [] [] []    Bed to Chair [] [] [] [] [] [] [] [] [] [x] []    Stand to Sit [] [] [x] [] [] [] [] [] [] [] []     [] [] [] [] [] [] [] [] [] [] []    I=Independent, Mod I=Modified Independent, S=Supervision, SBA=Standby Assistance, CGA=Contact Guard Assistance,   Min=Minimal Assistance, Mod=Moderate Assistance, Max=Maximal Assistance, Total=Total Assistance, NT=Not Tested    BALANCE: Good Fair+ Fair Fair- Poor NT Comments   Sitting Static [x] [] [] [] [] []    Sitting Dynamic [x] [] [] [] [] []              Standing Static [x] [] [] [] [] []    Standing Dynamic [x] [] [] [] [] []      GAIT: I Mod I S SBA CGA Min Mod Max Total  NT x2 Comments:   Level of Assistance [] [] [x] [] [] [] [] [] [] [] [] Up and over stair unit 2x   Distance 500 feet    DME None    Gait Quality n/a    Weightbearing Status      Stairs      I=Independent, Mod I=Modified Independent, S=Supervision, SBA=Standby Assistance, CGA=Contact Guard Assistance,   Min=Minimal Assistance, Mod=Moderate Assistance, Max=Maximal Assistance, Total=Total Assistance, NT=Not Tested    PLAN:   ACUTE PHYSICAL THERAPY GOALS:   (Developed with and agreed upon by patient and/or caregiver. )  LTG:  (1.)[facility] will move from supine to sit and sit to supine , scoot up and down and roll side to side in bed with MODIFIED INDEPENDENCE within 7 treatment day(s). (2.)[facility] will transfer from bed to chair and chair to bed with MODIFIED INDEPENDENCE using the least restrictive device within 7 treatment day(s). (3.) [facility]IFIED INDEPEND[facility] for 500 feet with the least restrictive device within 7 treatment day(s). (4.)  [facility] participate in therapeutic activity/exercises x 25 minutes for increased activity tolerance within 7 treatment days.     ________________________________________________________________________________________________      FREQUENCY AND DURATION: BID for duration of hospital stay or until stated goals are met, whichever comes first.    TREATMENT:   TREATMENT:   Therapeutic Activity (13 Minutes): Therapeutic activity included Transfer Training, Ambulation on level ground, Stair Training, Sitting balance  and Standing balance to improve functional Activity tolerance, Balance, Coordination, Mobility and Strength. Therapeutic Exercise (12 Minutes): Therapeutic exercises noted below to improve functional activity tolerance, strength and mobility.      TREATMENT GRID:     Date:  06/02/22   Date:  06/03/22 Date:  6/4/22   ACTIVITY/EXERCISE AM PM AM PM AM PM   LAQ 15  15 15     Shoulder shrugs 15  15 15     Gluteal sets 15  15 15     marching 15  15 15 Standing  10x B    Ankle pumps 15  15 15     abduction 15  15 15 standing 10x B    Calf raises     10x B    squats     10x                       B = bilateral; AA = active assistive; A = active; P = passive    AFTER TREATMENT PRECAUTIONS: Bed/Chair Locked, Call light within reach, Chair, Needs within reach and RN notified    INTERDISCIPLINARY COLLABORATION:  RN/ PCT and PT/ PTA    EDUCATION:      TIME IN/OUT:  Time In: 6032  Time Out: 0900  Minutes: 25    Anita Chang PTA

## 2022-06-04 NOTE — PROGRESS NOTES
PHYSICAL THERAPY Daily Note, Discharge and PM  (Link to Caseload Tracking: PT Visit Days : 4  Time In/Out PT Charge Capture  Rehab Caseload Tracker  Orders      Rafael Sloan is a 58 y.o. male   PRIMARY DIAGNOSIS: S/P CABG x 3  ICD (implantable cardioverter-defibrillator) discharge [Z45.02]  CAD, multiple vessel [I25.10]  Procedure(s) (LRB):  CORONARY ARTERY BYPASS GRAFT (CABG X 3), LIMA ; ENDOSCOPIC VEIN HARVEST, LEFT GREATER SAPHENOUS VEIN (N/A)  TRANSESOPHAGEAL ECHOCARDIOGRAM (N/A)  4 Days Post-Op  Inpatient: Payor: [facility] MEDICARE / Plan: Jair Goodman DUAL COMPLETE / Product Type: *No Product type* /     ASSESSMENT:     REHAB RECOMMENDATIONS:   Recommendation to date pending progress:  Setting:   No further skilled therapy after discharge from hospital    Equipment:     None     ASSESSMENT:  Mr. DEREK CHANEY is sitting in the recliner and agreeable to therapy. He walked the entire floor without AD and no real issues. After a short seated rest he performed below standing exercises well. He is doing great and will likely discharge to ambulating independently tomorrow. PM: patient walked over 700 ft this afternoon without difficulty. Will discharge from acute PT and encouraged him to ambulate at least 4 times a day until discharge. RN made aware.      SUBJECTIVE:   Mr. DEREK CHANEY states, \"ok\"     Social/Functional Lives With: Parent  Type of Home: House  Home Layout: One level  Home Access: Stairs to enter without rails (3 MILAN)  Receives Help From: Family  Ambulation Assistance: Independent  Transfer Assistance: Independent  Active : No  Patient's  Info: Patient does not drive  Mode of Transportation: Car  OBJECTIVE:     PAIN: [place] [employer] / O2: Zana Hidalgo / Fly Perezer / DRAINS:   Pre Treatment: 0/10         Post Treatment: 0/10 Vitals    saturations 90% plus    Oxygen    On RA    RESTRICTIONS/PRECAUTIONS:  Restrictions/Precautions  Restrictions/Precautions: Cardiac  Required Braces or Orthoses?: No  Restrictions/Precautions: Cardiac  Required Braces or Orthoses?: No     MOBILITY: I Mod I S SBA CGA Min Mod Max Total  NT x2 Comments:   Bed Mobility    Rolling [] [] [] [] [] [] [] [] [] [x] [] Plans to sleep in recliner   Supine to Sit [] [] [] [] [] [] [] [] [] [x] []    Scooting [] [] [] [] [] [] [] [] [] [x] []    Sit to Supine [] [] [] [] [] [] [] [] [] [x] []    Transfers    Sit to Stand [x] [] [] [] [] [] [] [] [] [] []    Bed to Chair [] [] [] [] [] [] [] [] [] [x] []    Stand to Sit [x] [] [] [] [] [] [] [] [] [] []     [] [] [] [] [] [] [] [] [] [] []    I=Independent, Mod I=Modified Independent, S=Supervision, SBA=Standby Assistance, CGA=Contact Guard Assistance,   Min=Minimal Assistance, Mod=Moderate Assistance, Max=Maximal Assistance, Total=Total Assistance, NT=Not Tested    BALANCE: Good Fair+ Fair Fair- Poor NT Comments   Sitting Static [x] [] [] [] [] []    Sitting Dynamic [x] [] [] [] [] []              Standing Static [x] [] [] [] [] []    Standing Dynamic [x] [] [] [] [] []      GAIT: I Mod I S SBA CGA Min Mod Max Total  NT x2 Comments:   Level of Assistance [x] [] [] [] [] [] [] [] [] [] [] Up and over stair unit 2x   Distance 700 feet    DME None    Gait Quality n/a    Weightbearing Status      Stairs      I=Independent, Mod I=Modified Independent, S=Supervision, SBA=Standby Assistance, CGA=Contact Guard Assistance,   Min=Minimal Assistance, Mod=Moderate Assistance, Max=Maximal Assistance, Total=Total Assistance, NT=Not Tested    PLAN:   ACUTE PHYSICAL THERAPY GOALS:   (Developed with and agreed upon by patient and/or caregiver. )  LTG:  (1.)Mr. Myrna Stewart will move from supine to sit and sit to supine , scoot up and down and roll side to side in bed with MODIFIED INDEPENDENCE within 7 treatment day(s). (2.)Mr. Myrna Stewart will transfer from bed to chair and chair to bed with MODIFIED INDEPENDENCE using the least restrictive device within 7 treatment day(s). GOAL MET 6/4/2022    (3.)Mr. Myrna Stewart will ambulate with MODIFIED INDEPENDENCE for 500 feet with the least restrictive device within 7 treatment day(s). GOAL MET 6/4/2022  (4.)Mr. DEREK CHANEY will participate in therapeutic activity/exercises x 25 minutes for increased activity tolerance within 7 treatment days. GOAL MET 6/4/2022       ________________________________________________________________________________________________      FREQUENCY AND DURATION: BID for duration of hospital stay or until stated goals are met, whichever comes first.    TREATMENT:   TREATMENT:   Therapeutic Activity (10 Minutes): Therapeutic activity included Transfer Training, Ambulation on level ground, Stair Training and Standing balance to improve functional Activity tolerance, Mobility and Strength.     TREATMENT GRID:     Date:  06/02/22   Date:  06/03/22 Date:  6/4/22   ACTIVITY/EXERCISE AM PM AM PM AM PM   LAQ 15  15 15     Shoulder shrugs 15  15 15     Gluteal sets 15  15 15     marching 15  15 15 Standing  10x B    Ankle pumps 15  15 15     abduction 15  15 15 standing 10x B    Calf raises     10x B    squats     10x                       B = bilateral; AA = active assistive; A = active; P = passive    AFTER TREATMENT PRECAUTIONS: Bed/Chair Locked, Call light within reach, Chair, Needs within reach and RN notified    INTERDISCIPLINARY COLLABORATION:  RN/ PCT and PT/ PTA    EDUCATION:      TIME IN/OUT:  Time In: 1300  Time Out: 1310  Minutes: 10    Carlito Showers, PTA

## 2022-06-04 NOTE — PROGRESS NOTES
Today's Date: 6/4/2022  Date of Admission: 5/26/2022    Chart Reviewed. Subjective:   Mr. Aggie Ocampo is comfortable with no complaints. Medications Reviewed. Objective:     Vitals:    06/03/22 2321 06/04/22 0346 06/04/22 0550 06/04/22 0724   BP:  107/71  97/80   Pulse:  77  82   Resp: 18 18  15   Temp:  97.7 °F (36.5 °C)  98.1 °F (36.7 °C)   TempSrc:  Oral  Oral   SpO2:  96%  97%   Weight:   211 lb (95.7 kg)    Height:           Intake and Output  Current Shift: 06/04 0701 - 06/04 1900  In: 360 [P.O.:360]  Out: -    Last 3 Shifts: 06/02 1901 - 06/04 0700  In: 825 [P.O.:825]  Out: 1350 [Urine:1350]    Physical Exam  Gen- the patient is well developed and in no distress. HEENT- PERRL, EOMI, no scleral icterus  Neck- there is no JVD. Lungs- Clear bilaterally  Heart- RRR. There is no murmur. Abd- soft and non-tender, unremarkable bowel sounds. Ext- warm without cyanosis. There is no edema. Skin- no jaundice or rashes. Neuro- alert and oriented. No gross sensorimotor deficits are present. LAB  Recent Labs     06/02/22  0657   WBC 23.2*   HGB 11.6*   HCT 36.1*        Recent Labs     06/02/22  0657 06/03/22  0637 06/04/22  0605   *  --   --    K 4.5 3.7 4.0     --   --    CO2 25  --   --    BUN 13  --   --    MG 2.4 2.3  --      No results for input(s): PH, PCO2, PO2, HCO3 in the last 72 hours. Assessment:     Principal Problem:    S/P CABG x 3  Active Problems:    S/P ICD (internal cardiac defibrillator) procedure    ICD (implantable cardioverter-defibrillator) discharge    CAD, multiple vessel    Hypoxemia    Atelectasis    Coronary atherosclerosis of native coronary vessel    Ventricular tachycardia ([address]  Resolved Problems:    Encounter for weaning from ventilator [facility])    Long term (current) use of anticoagulants      Plan:     Mr. Aggie Ocampo is still having some intermittent ectopy by nursing report. Arrhythmias are being managed by Cardiology and are improving.   We will continue current therapy and when arrhythmia management acceptable consider d/c.      Marc Hutchison MD

## 2022-06-05 LAB — POTASSIUM SERPL-SCNC: 3.7 MMOL/L (ref 3.5–5.1)

## 2022-06-05 PROCEDURE — 99233 SBSQ HOSP IP/OBS HIGH 50: CPT | Performed by: INTERNAL MEDICINE

## 2022-06-05 PROCEDURE — 2580000003 HC RX 258: Performed by: THORACIC SURGERY (CARDIOTHORACIC VASCULAR SURGERY)

## 2022-06-05 PROCEDURE — 2580000003 HC RX 258: Performed by: PHYSICIAN ASSISTANT

## 2022-06-05 PROCEDURE — 6370000000 HC RX 637 (ALT 250 FOR IP): Performed by: PHYSICIAN ASSISTANT

## 2022-06-05 PROCEDURE — 6370000000 HC RX 637 (ALT 250 FOR IP): Performed by: INTERNAL MEDICINE

## 2022-06-05 PROCEDURE — 36415 COLL VENOUS BLD VENIPUNCTURE: CPT

## 2022-06-05 PROCEDURE — 2140000001 HC CVICU INTERMEDIATE R&B

## 2022-06-05 PROCEDURE — 6370000000 HC RX 637 (ALT 250 FOR IP): Performed by: THORACIC SURGERY (CARDIOTHORACIC VASCULAR SURGERY)

## 2022-06-05 PROCEDURE — 84132 ASSAY OF SERUM POTASSIUM: CPT

## 2022-06-05 RX ORDER — AMIODARONE HYDROCHLORIDE 200 MG/1
200 TABLET ORAL 2 TIMES DAILY
Status: DISCONTINUED | OUTPATIENT
Start: 2022-06-05 | End: 2022-06-06 | Stop reason: HOSPADM

## 2022-06-05 RX ADMIN — GUAIFENESIN 1200 MG: 600 TABLET ORAL at 08:39

## 2022-06-05 RX ADMIN — AMIODARONE HYDROCHLORIDE 200 MG: 200 TABLET ORAL at 20:09

## 2022-06-05 RX ADMIN — TRAMADOL HYDROCHLORIDE 50 MG: 50 TABLET, COATED ORAL at 03:24

## 2022-06-05 RX ADMIN — SODIUM CHLORIDE, PRESERVATIVE FREE 10 ML: 5 INJECTION INTRAVENOUS at 08:42

## 2022-06-05 RX ADMIN — AMIODARONE HYDROCHLORIDE 200 MG: 200 TABLET ORAL at 08:45

## 2022-06-05 RX ADMIN — TRAMADOL HYDROCHLORIDE 50 MG: 50 TABLET, COATED ORAL at 20:11

## 2022-06-05 RX ADMIN — CARVEDILOL 6.25 MG: 6.25 TABLET, FILM COATED ORAL at 20:09

## 2022-06-05 RX ADMIN — ASPIRIN 81 MG: 81 TABLET, CHEWABLE ORAL at 08:40

## 2022-06-05 RX ADMIN — GUAIFENESIN 1200 MG: 600 TABLET ORAL at 20:10

## 2022-06-05 RX ADMIN — Medication 1 AMPULE: at 08:39

## 2022-06-05 RX ADMIN — Medication 1 AMPULE: at 20:09

## 2022-06-05 RX ADMIN — POTASSIUM CHLORIDE 20 MEQ: 20 TABLET, EXTENDED RELEASE ORAL at 20:10

## 2022-06-05 RX ADMIN — SODIUM CHLORIDE, PRESERVATIVE FREE 10 ML: 5 INJECTION INTRAVENOUS at 08:41

## 2022-06-05 RX ADMIN — FAMOTIDINE 20 MG: 20 TABLET, FILM COATED ORAL at 08:41

## 2022-06-05 RX ADMIN — SODIUM CHLORIDE, PRESERVATIVE FREE 10 ML: 5 INJECTION INTRAVENOUS at 20:10

## 2022-06-05 RX ADMIN — CARVEDILOL 6.25 MG: 6.25 TABLET, FILM COATED ORAL at 08:41

## 2022-06-05 RX ADMIN — FAMOTIDINE 20 MG: 20 TABLET, FILM COATED ORAL at 20:09

## 2022-06-05 RX ADMIN — TRAMADOL HYDROCHLORIDE 50 MG: 50 TABLET, COATED ORAL at 14:32

## 2022-06-05 RX ADMIN — POTASSIUM CHLORIDE 20 MEQ: 20 TABLET, EXTENDED RELEASE ORAL at 08:40

## 2022-06-05 ASSESSMENT — PAIN DESCRIPTION - FREQUENCY
FREQUENCY: INTERMITTENT
FREQUENCY: INTERMITTENT

## 2022-06-05 ASSESSMENT — PAIN DESCRIPTION - ONSET
ONSET: ON-GOING
ONSET: ON-GOING

## 2022-06-05 ASSESSMENT — PAIN SCALES - GENERAL
PAINLEVEL_OUTOF10: 4
PAINLEVEL_OUTOF10: 5
PAINLEVEL_OUTOF10: 0
PAINLEVEL_OUTOF10: 0
PAINLEVEL_OUTOF10: 4

## 2022-06-05 ASSESSMENT — PAIN DESCRIPTION - LOCATION
LOCATION: CHEST

## 2022-06-05 ASSESSMENT — PAIN DESCRIPTION - PAIN TYPE
TYPE: SURGICAL PAIN

## 2022-06-05 ASSESSMENT — PAIN DESCRIPTION - DESCRIPTORS
DESCRIPTORS: ACHING

## 2022-06-05 ASSESSMENT — PAIN DESCRIPTION - ORIENTATION
ORIENTATION: MID

## 2022-06-05 ASSESSMENT — PAIN - FUNCTIONAL ASSESSMENT: PAIN_FUNCTIONAL_ASSESSMENT: ACTIVITIES ARE NOT PREVENTED

## 2022-06-05 NOTE — PROGRESS NOTES
Today's Date: 6/5/2022  Date of Admission: 5/26/2022    Chart Reviewed. Subjective:     Mr. DEREK CHANEY feels good and looks good and is using IS and walking. Medications Reviewed. Objective:     Vitals:    06/04/22 2245 06/05/22 0324 06/05/22 0615 06/05/22 0723   BP: (!) 105/57 117/64  119/78   Pulse: 63 63  65   Resp: 18 18  20   Temp: 98 °F (36.7 °C) 97.6 °F (36.4 °C)  97.9 °F (36.6 °C)   TempSrc: Temporal Temporal  Oral   SpO2: 96% 96%  94%   Weight:   209 lb (94.8 kg)    Height:           Intake and Output  Current Shift: 06/05 0701 - 06/05 1900  In: 360 [P.O.:360]  Out: 625 [Urine:625]   Last 3 Shifts: 06/03 1901 - 06/05 0700  In: 1240 [P.O.:1240]  Out: 950 [Urine:950]    Physical Exam  Gen- the patient is well developed and in no distress. HEENT- PERRL, EOMI, no scleral icterus  Neck- there is no JVD. Lungs- some rales persist in left base but right is clear   Heart- RRR. There is no murmur. Abd- soft and non-tender, unremarkable bowel sounds. Ext- warm without cyanosis. There is trace edema. Skin- no jaundice or rashes. Neuro- alert and oriented. No gross sensorimotor deficits are present. LAB  No results for input(s): WBC, HGB, HCT, PLT in the last 72 hours. Recent Labs     06/03/22  0637 06/04/22  0605 06/05/22  0609   K 3.7 4.0 3.7   MG 2.3  --   --      No results for input(s): PH, PCO2, PO2, HCO3 in the last 72 hours. Assessment:     Principal Problem:    S/P CABG x 3  Active Problems:    S/P ICD (internal cardiac defibrillator) procedure    ICD (implantable cardioverter-defibrillator) discharge    CAD, multiple vessel    Hypoxemia    Atelectasis    Coronary atherosclerosis of native coronary vessel    Ventricular tachycardia ([address]  Resolved Problems:    Encounter for weaning from ventilator [facility])    Long term (current) use of anticoagulants      Plan:     . Merged with [facility] looks great and feels good.   Unfortunately, he continues to have frequent PVC's and runs of 5-6 beats and occasionally more. There have been no device firings in last 24 hrs by report. EP is seeing him so we will defer arrhythmia management to them. His pulmonary status is improving and he sounds better each day. I have encouraged IS and ambulation. We will continue current care.     Leandro Wadsworth MD

## 2022-06-05 NOTE — PLAN OF CARE
Problem: Discharge Planning  Goal: Discharge to home or other facility with appropriate resources  Outcome: Progressing  Discharge to home or other facility with appropriate resources: Identify barriers to discharge with patient and caregiver     Problem: Pain  Goal: Verbalizes/displays adequate comfort level or baseline comfort level  Outcome: Progressing     Problem: Safety - Adult  Goal: Free from fall injury  Outcome: Progressing  Free From Fall Injury: Instruct family/caregiver on patient safety     Problem: Respiratory - Adult  Goal: Achieves optimal ventilation and oxygenation  Achieves optimal ventilation and oxygenation:   Assess for changes in respiratory status   Assess for changes in mentation and behavior   Position to facilitate oxygenation and minimize respiratory effort   Oxygen supplementation based on oxygen saturation or arterial blood gases   Initiate smoking cessation protocol as indicated   Encourage broncho-pulmonary hygiene including cough, deep breathe, incentive spirometry   Assess the need for suctioning and aspirate as needed   Assess and instruct to report shortness of breath or any respiratory difficulty   Respiratory therapy support as indicated  Outcome: Progressing     Problem: Cardiovascular - Adult  Goal: Maintains optimal cardiac output and hemodynamic stability  Outcome: Progressing  Maintains optimal cardiac output and hemodynamic stability:   Monitor blood pressure and heart rate   Monitor urine output and notify Licensed Independent Practitioner for values outside of normal range   Assess for signs of decreased cardiac output   Administer fluid and/or volume expanders as ordered   Administer vasoactive medications as ordered  Outcome: Progressing  Goal: Absence of cardiac dysrhythmias or at baseline  Outcome: Progressing  Absence of cardiac dysrhythmias or at baseline:   Monitor cardiac rate and rhythm   Assess for signs of decreased cardiac output     Problem: Infection - Adult  Goal: Absence of infection at discharge  Outcome: Progressing  Absence of infection at discharge: Assess and monitor for signs and symptoms of infection  Goal: Absence of infection during hospitalization  Outcome: Progressing  Absence of infection during hospitalization: Assess and monitor for signs and symptoms of infection    Goal: Absence of fever/infection during anticipated neutropenic period  Recent Flowsheet Documentation  Absence of fever/infection during anticipated neutropenic period: Monitor white blood cell count  Outcome: Progressing     Problem: Hematologic - Adult  Goal: Maintains hematologic stability  Outcome: Progressing  Maintains hematologic stability: Assess for signs and symptoms of bleeding or hemorrhage     Problem: Chronic Conditions and Co-morbidities  Goal: Patient's chronic conditions and co-morbidity symptoms are monitored and maintained or improved  Outcome: Progressing  Care Plan - Patient's Chronic Conditions and Co-Morbidity Symptoms are Monitored and Maintained or Improved: Monitor and assess patient's chronic conditions and comorbid symptoms for stability, deterioration, or improvement     Problem: ABCDS Injury Assessment  Goal: Absence of physical injury  Outcome: Progressing  Absence of Physical Injury: Implement safety measures based on patient assessment     Problem: Skin/Tissue Integrity  Goal: Absence of new skin breakdown  Description: 1. Monitor for areas of redness and/or skin breakdown  2. Assess vascular access sites hourly  3. Every 4-6 hours minimum:  Change oxygen saturation probe site  4. Every 4-6 hours:  If on nasal continuous positive airway pressure, respiratory therapy assess nares and determine need for appliance change or resting period.   Outcome: Progressing

## 2022-06-05 NOTE — PLAN OF CARE
Problem: Discharge Planning  Goal: Discharge to home or other facility with appropriate resources  Outcome: Progressing  Flowsheets  Discharge to home or other facility with appropriate resources: Identify barriers to discharge with patient and caregiver     Problem: Pain  Goal: Verbalizes/displays adequate comfort level or baseline comfort level  Outcome: Progressing  Verbalizes/displays adequate comfort level or baseline comfort level: Encourage patient to monitor pain and request assistance     Problem: Safety - Adult  Goal: Free from fall injury  Outcome: Progressing  Flowsheets  Free From Fall Injury: Instruct family/caregiver on patient safety     Problem: Respiratory - Adult  Goal: Achieves optimal ventilation and oxygenation  Outcome: Progressing  Achieves optimal ventilation and oxygenation:   Assess for changes in respiratory status   Assess for changes in mentation and behavior   Oxygen supplementation based on oxygen saturation or arterial blood gases   Encourage broncho-pulmonary hygiene including cough, deep breathe, incentive spirometry   Assess the need for suctioning and aspirate as needed   Assess and instruct to report shortness of breath or any respiratory difficulty   Respiratory therapy support as indicated     Problem: Cardiovascular - Adult  Goal: Maintains optimal cardiac output and hemodynamic stability  Outcome: Progressing  Maintains optimal cardiac output and hemodynamic stability:   Monitor blood pressure and heart rate   Monitor urine output and notify Licensed Independent Practitioner for values outside of normal range   Assess for signs of decreased cardiac output     Problem: Infection - Adult  Goal: Absence of infection during hospitalization  Outcome: Progressing  Flowsheets  Absence of infection during hospitalization: Assess and monitor for signs and symptoms of infection     Problem: Hematologic - Adult  Goal: Maintains hematologic stability  Outcome: Progressing  Flowsheets  Maintains hematologic stability: Assess for signs and symptoms of bleeding or hemorrhage     Problem: Chronic Conditions and Co-morbidities  Goal: Patient's chronic conditions and co-morbidity symptoms are monitored and maintained or improved  Outcome: [id number] [place] - Patient's Chronic Conditions and Co-Morbidity Symptoms are Monitored and Maintained or Improved: Monitor and assess patient's chronic conditions and comorbid symptoms for stability, deterioration, or improvement     Problem: ABCDS Injury Assessment  Goal: Absence of physical injury  Outcome: Progressing  Flowsheets  Absence of Physical Injury: Implement safety measures based on patient assessment     Problem: Skin/Tissue Integrity  Goal: Absence of new skin breakdown  Description: 1. Monitor for areas of redness and/or skin breakdown  2. Assess vascular access sites hourly  3. Every 4-6 hours minimum:  Change oxygen saturation probe site  4. Every 4-6 hours:  If on nasal continuous positive airway pressure, respiratory therapy assess nares and determine need for appliance change or resting period.   Outcome: Progressing

## 2022-06-05 NOTE — PROGRESS NOTES
Advised Dr. Howard Bear of patient's 6 beat run of VTach on 06/04/2022. Provider adjusted medication dosage.

## 2022-06-05 NOTE — PLAN OF CARE
Problem: Discharge Planning  Goal: Discharge to home or other facility with appropriate resources  6/5/2022 0944 by Myla Chaudhry RN  Outcome: Progressing  6/4/2022 2047 by Bharati Silva RN  Outcome: Progressing  Flowsheets  Taken 6/4/2022 1945 by Bharati Silva RN  Discharge to home or other facility with appropriate resources: Identify barriers to discharge with patient and caregiver  Taken 6/4/2022 0745 by Katia Guadarrama RN  Discharge to home or other facility with appropriate resources: Identify barriers to discharge with patient and caregiver     Problem: Pain  Goal: Verbalizes/displays adequate comfort level or baseline comfort level  6/5/2022 0944 by Myla Chaudhry RN  Outcome: Progressing  6/4/2022 2047 by Bharati Silva RN  Outcome: Progressing  Flowsheets (Taken 6/4/2022 0845 by Katia Guadarrama RN)  Verbalizes/displays adequate comfort level or baseline comfort level: Encourage patient to monitor pain and request assistance     Problem: Safety - Adult  Goal: Free from fall injury  6/5/2022 0944 by Myla Chaudhry RN  Outcome: Progressing  6/4/2022 2047 by Bharati Silva RN  Outcome: Progressing  Flowsheets  Taken 6/4/2022 1945 by Bharati Silva RN  Free From Fall Injury: Instruct family/caregiver on patient safety  Taken 6/4/2022 0745 by Katia Guadarrama RN  Free From Fall Injury: Instruct family/caregiver on patient safety     Problem: Respiratory - Adult  Goal: Achieves optimal ventilation and oxygenation  6/5/2022 0944 by Myla Chaudhry RN  Outcome: Progressing  6/4/2022 2047 by Bharati Silva RN  Outcome: Progressing  Flowsheets (Taken 6/4/2022 1945)  Achieves optimal ventilation and oxygenation:   Assess for changes in respiratory status   Assess for changes in mentation and behavior   Oxygen supplementation based on oxygen saturation or arterial blood gases   Encourage broncho-pulmonary hygiene including cough, deep breathe, incentive spirometry   Assess the need for suctioning and aspirate as needed   Assess and instruct to report shortness of breath or any respiratory difficulty   Respiratory therapy support as indicated     Problem: Cardiovascular - Adult  Goal: Maintains optimal cardiac output and hemodynamic stability  6/5/2022 0944 by Imelda Mcgovern RN  Outcome: Progressing  6/4/2022 2047 by Mike Landon RN  Outcome: Progressing  Flowsheets (Taken 6/4/2022 1945)  Maintains optimal cardiac output and hemodynamic stability:   Monitor blood pressure and heart rate   Monitor urine output and notify Licensed Independent Practitioner for values outside of normal range   Assess for signs of decreased cardiac output  Goal: Absence of cardiac dysrhythmias or at baseline  Outcome: Progressing  Flowsheets (Taken 6/4/2022 1945 by Mike Landon RN)  Absence of cardiac dysrhythmias or at baseline:   Monitor cardiac rate and rhythm   Assess for signs of decreased cardiac output     Problem: Infection - Adult  Goal: Absence of infection at discharge  Outcome: Progressing  Flowsheets (Taken 6/4/2022 1945 by Mike Landon RN)  Absence of infection at discharge: Assess and monitor for signs and symptoms of infection  Goal: Absence of infection during hospitalization  6/5/2022 0944 by Imelda Mcgovern RN  Outcome: Progressing  6/4/2022 2047 by Mike Landon RN  Outcome: Progressing  Flowsheets  Taken 6/4/2022 1945 by Mike Landon RN  Absence of infection during hospitalization: Assess and monitor for signs and symptoms of infection  Taken 6/4/2022 0745 by Bri Newton RN  Absence of infection during hospitalization: Assess and monitor for signs and symptoms of infection  Goal: Absence of fever/infection during anticipated neutropenic period  Outcome: Progressing  Flowsheets (Taken 6/4/2022 1945 by Mike Landon RN)  Absence of fever/infection during anticipated neutropenic period: Monitor white blood cell count     Problem: Hematologic - Adult  Goal: Maintains hematologic stability  6/5/2022 0944 by Hali Harding RN  Outcome: Progressing  6/4/2022 2047 by Sree Herman RN  Outcome: Progressing  Flowsheets  Taken 6/4/2022 1945 by Sree Herman RN  Maintains hematologic stability: Assess for signs and symptoms of bleeding or hemorrhage  Taken 6/4/2022 0745 by Aurea Stanley RN  Maintains hematologic stability: Assess for signs and symptoms of bleeding or hemorrhage     Problem: Chronic Conditions and Co-morbidities  Goal: Patient's chronic conditions and co-morbidity symptoms are monitored and maintained or improved  6/5/2022 0944 by Hali Harding RN  Outcome: Progressing  6/4/2022 2047 by Sree Herman RN  Outcome: Progressing  Flowsheets  Taken 6/4/2022 1945 by Maddie Randall 34 - Patient's Chronic Conditions and Co-Morbidity Symptoms are Monitored and Maintained or Improved: Monitor and assess patient's chronic conditions and comorbid symptoms for stability, deterioration, or improvement  Taken 6/4/2022 0745 by Aurea Stanley RN  Care Plan - Patient's Chronic Conditions and Co-Morbidity Symptoms are Monitored and Maintained or Improved: Monitor and assess patient's chronic conditions and comorbid symptoms for stability, deterioration, or improvement     Problem: ABCDS Injury Assessment  Goal: Absence of physical injury  6/5/2022 0944 by Hali Harding RN  Outcome: Progressing  6/4/2022 2047 by Sree Herman RN  Outcome: Progressing  Flowsheets  Taken 6/4/2022 1945 by Sree Herman RN  Absence of Physical Injury: Implement safety measures based on patient assessment  Taken 6/4/2022 0745 by Aurea Stanley RN  Absence of Physical Injury: Implement safety measures based on patient assessment     Problem: Skin/Tissue Integrity  Goal: Absence of new skin breakdown  Description: 1. Monitor for areas of redness and/or skin breakdown  2. Assess vascular access sites hourly  3.   Every 4-6 hours minimum:  Change oxygen saturation probe site  4. Every 4-6 hours:  If on nasal continuous positive airway pressure, respiratory therapy assess nares and determine need for appliance change or resting period.   6/5/2022 0944 by Neri Mcduffie RN  Outcome: Progressing  6/4/2022 2047 by Kendal Gitelman, RN  Outcome: Progressing

## 2022-06-05 NOTE — DISCHARGE SUMMARY
Discharge Summary     Patient ID:  Boom Rivers  [id number]  39 y.o.  1960    Admit date: 5/26/2022    Discharge date:  6/6/2022    Admitting Physician: Yesenia Orellana MD     Discharge Physician: LEISA Auguste/Dr. Wilber Camara    Admission Diagnoses: ICD (implantable cardioverter-defibrillator) discharge [Z45.02]  CAD, multiple vessel [I25.10]    Discharge Diagnoses:   Patient Active Problem List    Diagnosis Date Noted    CAD, multiple vessel 05/28/2022    ICD (implantable cardioverter-defibrillator) discharge 05/26/2022    COVID-19 10/06/2021    Chronic systolic congestive heart failure ([facility] [address] 09/27/2011    S/P ICD (internal cardiac defibrillator) procedure 09/27/2011    S/P CABG x 3 05/31/2022    Hypoxemia 05/31/2022    Atelectasis 05/31/2022    Smoking greater than 20 pack years 11/13/2021    Refused influenza vaccine 11/13/2021    Anemia 11/13/2021    History of 2019 novel coronavirus disease (COVID-19) 10/03/2021    COVID-19 vaccination refused 06/18/2021    Ventricular tachycardia ([facility] [address] 06/30/2020    High risk medication use 06/19/2020    Chronic eczematous otitis externa of both ears 06/18/2020    Benign prostatic hyperplasia with weak urinary stream 12/29/2019    Chronic fatigue 12/29/2019    Tobacco abuse 06/24/2019    Numbness of left foot 06/12/2018    Statin intolerance 12/10/2017    IGT (impaired glucose tolerance) 12/03/2017    Overweight (BMI 25.0-29.9) 06/08/2017    Primary insomnia 06/07/2017    Seasonal allergic rhinitis due to pollen 06/07/2017    Coronary atherosclerosis of native coronary vessel 10/02/2015    Dyslipidemia 10/02/2015       Procedures this admission:  Diagnostic left heart catheterization  EchoCardiogram  Coronary Artery Bypass Graft Surgery  Consults: Cardiac Surgery, Pulmonary/Intensive Care, EP    Hospital Course: Patient presented to the ER after ICD shock. He denied any recent chest pain or dyspnea.  He did note palpitations prior to ICD shock. Prior device interrogation showed VT with successful ATP. He has known CAD. He was admitted with planned LHC. He underwent cardiac catheterization that showed severe multivessel disease. Echocardiogram showed a reduced LV EF at 25-30%. The apex was akinetic. There was severe hypokinesis of the mid anteroseptal and mid anterolateral walls. CABG surgery was planned after coumadin washout. He underwent CABG X 3 with LIMA to the LAD, reverse SVG to the Intermediate coronary, and reverse SVG to the PDA on 5/31/22. He had some NSVT noted POD 1. He was transferred to [facility] on oral amiodarone. He was weaned off of O2. He progressed well with PT. He had more frequent VT noted the morning of 6/3. EP was consulted. Amiodarone was continued. Rhythm improved with less VT noted. The morning of 6/6/22, patient was feeling well and ambulating without any symptoms. Patient was determined stable and ready for discharge. Patient was instructed on the importance of medication compliance and outpatient follow up. For maximized medical therapy for CAD, patient will continue ASA and BB. He is discharged on lower dose BB due to borderline low BP. ARB was not resumed due to BP. He is not on a statin due to intolerance. He will continue amiodarone taper at discharge. The patient will have close transitional care follow up with [facility] Dr. Tova Granados on June 10th at 12:30pm [facility]). The patient will follow up with Dr. Amarilis Okeefe on June 21st at 2:40pm and has been referred to cardiac rehab. DISPOSITION: The patient is being discharged home with home health services in stable condition on a low saturated fat, low cholesterol and low salt diet. The patient is instructed to advance activities as tolerated to the limit of fatigue or shortness of breath. The patient is instructed to avoid all heavy lifting or activities that strain the chest or upper arm muscles.  Strenuous activity should be avoided. The patient is instructed to watch for signs of infection which include: increasing area of redness, fever or purulent drainage at the incision site. The patient is instructed to call the office or return to the ER for immediate evaluation for any shortness of breath or chest pain not relieved by NTG. Discharge Exam: /64   Pulse 64   Temp 97.9 °F (36.6 °C) (Oral)   Resp 16   Ht 5' 9\" (1.753 m)   Wt 208 lb (94.3 kg)   SpO2 97%   BMI 30.72 kg/m²       Physical Exam:  General: Well Developed, Well Nourished, No Acute Distress, Alert & Oriented  Neck: supple, no JVD  Heart: S1S2 with RRR without murmurs or gallops  Lungs: Clear throughout auscultation bilaterally without adventitious sounds  Abd: soft, nontender, nondistended, with good bowel sounds  Ext: warm, no edema  Sternal incision: clean, dry, and intact  Skin: warm and dry      Recent Results (from the past 24 hour(s))   Potassium    Collection Time: 06/05/22  6:09 AM   Result Value Ref Range    Potassium 3.7 3.5 - 5.1 mmol/L         Patient Instructions:      Medication List      START taking these medications    acetaminophen 325 mg tablet  Commonly known as: TYLENOL  Take 2 tablets by mouth every 6 hours as needed for Pain     amiodarone 200 MG tablet  Commonly known as: CORDARONE  Take 1 tablet by mouth 2 times daily for 7 days, THEN 1 tablet daily for 7 days. Start taking on: June 6, 2022     aspirin 81 MG chewable tablet  Take 1 tablet by mouth daily  Start taking on: June 7, 2022     nitroGLYCERIN 0.4 MG SL tablet  Commonly known as: Nitrostat  Place 1 tablet under the tongue every 5 minutes as needed for Chest pain up to max of 3 total doses. If no relief after 1 dose, call 911. traMADol 50 MG tablet  Commonly known as: ULTRAM  Take 1 tablet by mouth every 6 hours as needed for Pain for up to 7 days.         CHANGE how you take these medications    carvedilol 6.25 MG tablet  Commonly known as: COREG  Take 1 tablet by mouth daily  What changed:   · medication strength  · how much to take        CONTINUE taking these medications    albuterol sulfate  (90 Base) MCG/ACT inhaler     Cetirizine HCl 10 MG Caps     cyanocobalamin 1000 MCG tablet     QUEtiapine 100 MG tablet  Commonly known as: SEROQUEL     warfarin 5 MG tablet  Commonly known as: COUMADIN  Take as directed. If you are unsure how to take this medication, talk to your nurse or doctor. Where to Get Your Medications      These medications were sent to [address], [address] [place] [id number]    Phone: [phone]   · amiodarone 200 MG tablet  · carvedilol 6.25 MG tablet  · nitroGLYCERIN 0.4 MG SL tablet  · traMADol 50 MG tablet     Information about where to get these medications is not yet available    Ask your nurse or doctor about these medications  · acetaminophen 325 mg tablet  · aspirin 81 MG chewable tablet         Signed:  Dustin Victor PA-C  6/6/2022  9:32 AM

## 2022-06-05 NOTE — PROGRESS NOTES
[facility] CARDIOLOGY PROGRESS NOTE           6/5/2022 8:19 AM    Admit Date: 5/26/2022      Subjective:   Sitting chair and ambulating in halls. Reports feeling ok. ROS:  GEN:  No fever or chills  Cardiovascular:  As noted above:no angina or palpitations. Pulmonary:  As noted above:SOB improved. Neuro:  No new focal motor or sensory loss    Objective:      Vitals:    06/04/22 2245 06/05/22 0324 06/05/22 0615 06/05/22 0723   BP: (!) 105/57 117/64  119/78   Pulse: 63 63  65   Resp: 18 18  20   Temp: 98 °F (36.7 °C) 97.6 °F (36.4 °C)  97.9 °F (36.6 °C)   TempSrc: Temporal Temporal  Oral   SpO2: 96% 96%  94%   Weight:   209 lb (94.8 kg)    Height:           Physical Exam:  General-no complains. Neck- supple, no JVD  CV- regular rate and rhythm no MRG  Lung- coarse bs  bilaterally  Abd- soft, nontender, nondistended  Ext- no edema bilaterally. Skin- warm and dry  Psychiatric:  Normal mood and affect. Neurologic:  Alert and oriented X 3      Data Review:   Recent Labs     06/03/22  0637 06/03/22  0637 06/04/22  0605 06/05/22  0609   K 3.7   < > 4.0 3.7   MG 2.3  --   --   --     < > = values in this interval not displayed. TELEMETRY:  NSR    Assessment/Plan:     Principal Problem:    S/P CABG x 3:Stable. Appears close to discharge per CV surgery. Outpatient follow up with Dr Ben Churchill. Active Problems:      ICD (implantable cardioverter-defibrillator) discharge:Continue Amiodarone and Coreg. Coronary atherosclerosis of native coronary vessel:Stable. s/p CABG    Ventricular tachycardia :Stable on telemetry. Decrease po Amiodarone. Continue Coreg.               Estephanie Arevalo MD  6/5/2022 8:19 AM

## 2022-06-06 VITALS
BODY MASS INDEX: 30.81 KG/M2 | WEIGHT: 208 LBS | HEIGHT: 69 IN | OXYGEN SATURATION: 99 % | HEART RATE: 63 BPM | RESPIRATION RATE: 18 BRPM | TEMPERATURE: 97.7 F | DIASTOLIC BLOOD PRESSURE: 85 MMHG | SYSTOLIC BLOOD PRESSURE: 124 MMHG

## 2022-06-06 PROCEDURE — 6370000000 HC RX 637 (ALT 250 FOR IP): Performed by: INTERNAL MEDICINE

## 2022-06-06 PROCEDURE — 2580000003 HC RX 258: Performed by: PHYSICIAN ASSISTANT

## 2022-06-06 PROCEDURE — 6370000000 HC RX 637 (ALT 250 FOR IP): Performed by: THORACIC SURGERY (CARDIOTHORACIC VASCULAR SURGERY)

## 2022-06-06 PROCEDURE — 99232 SBSQ HOSP IP/OBS MODERATE 35: CPT | Performed by: INTERNAL MEDICINE

## 2022-06-06 PROCEDURE — 2580000003 HC RX 258: Performed by: THORACIC SURGERY (CARDIOTHORACIC VASCULAR SURGERY)

## 2022-06-06 PROCEDURE — 6370000000 HC RX 637 (ALT 250 FOR IP): Performed by: PHYSICIAN ASSISTANT

## 2022-06-06 RX ORDER — TRAMADOL HYDROCHLORIDE 50 MG/1
50 TABLET ORAL EVERY 6 HOURS PRN
Qty: 20 TABLET | Refills: 0 | Status: SHIPPED | OUTPATIENT
Start: 2022-06-06 | End: 2022-06-13

## 2022-06-06 RX ORDER — CARVEDILOL 6.25 MG/1
6.25 TABLET ORAL DAILY
Qty: 60 TABLET | Refills: 3 | Status: ON HOLD | OUTPATIENT
Start: 2022-06-06 | End: 2022-06-22 | Stop reason: HOSPADM

## 2022-06-06 RX ORDER — NITROGLYCERIN 0.4 MG/1
0.4 TABLET SUBLINGUAL EVERY 5 MIN PRN
Qty: 25 TABLET | Refills: 3 | Status: SHIPPED | OUTPATIENT
Start: 2022-06-06 | End: 2022-06-07 | Stop reason: SDUPTHER

## 2022-06-06 RX ORDER — ACETAMINOPHEN 325 MG/1
650 TABLET ORAL EVERY 6 HOURS PRN
Qty: 120 TABLET | Refills: 3 | COMMUNITY
Start: 2022-06-06

## 2022-06-06 RX ORDER — AMIODARONE HYDROCHLORIDE 200 MG/1
TABLET ORAL
Qty: 21 TABLET | Refills: 0 | Status: ON HOLD | OUTPATIENT
Start: 2022-06-06 | End: 2022-06-22 | Stop reason: HOSPADM

## 2022-06-06 RX ORDER — ASPIRIN 81 MG/1
81 TABLET, CHEWABLE ORAL DAILY
Qty: 30 TABLET | Refills: 3 | COMMUNITY
Start: 2022-06-07 | End: 2022-08-01

## 2022-06-06 RX ADMIN — SODIUM CHLORIDE, PRESERVATIVE FREE 10 ML: 5 INJECTION INTRAVENOUS at 08:22

## 2022-06-06 RX ADMIN — AMIODARONE HYDROCHLORIDE 200 MG: 200 TABLET ORAL at 08:21

## 2022-06-06 RX ADMIN — FAMOTIDINE 20 MG: 20 TABLET, FILM COATED ORAL at 08:21

## 2022-06-06 RX ADMIN — GUAIFENESIN 1200 MG: 600 TABLET ORAL at 08:21

## 2022-06-06 RX ADMIN — Medication 1 AMPULE: at 08:20

## 2022-06-06 RX ADMIN — SODIUM CHLORIDE, PRESERVATIVE FREE 10 ML: 5 INJECTION INTRAVENOUS at 08:26

## 2022-06-06 RX ADMIN — TRAMADOL HYDROCHLORIDE 50 MG: 50 TABLET, COATED ORAL at 01:33

## 2022-06-06 RX ADMIN — CARVEDILOL 6.25 MG: 6.25 TABLET, FILM COATED ORAL at 08:21

## 2022-06-06 RX ADMIN — ASPIRIN 81 MG: 81 TABLET, CHEWABLE ORAL at 08:21

## 2022-06-06 ASSESSMENT — PAIN SCALES - GENERAL
PAINLEVEL_OUTOF10: 0
PAINLEVEL_OUTOF10: 5

## 2022-06-06 ASSESSMENT — PAIN DESCRIPTION - DESCRIPTORS: DESCRIPTORS: ACHING

## 2022-06-06 ASSESSMENT — PAIN DESCRIPTION - ORIENTATION: ORIENTATION: MID

## 2022-06-06 ASSESSMENT — PAIN DESCRIPTION - PAIN TYPE: TYPE: SURGICAL PAIN

## 2022-06-06 ASSESSMENT — PAIN DESCRIPTION - LOCATION: LOCATION: CHEST

## 2022-06-06 NOTE — PROGRESS NOTES
Discharge instructions, follow up appointments and prescriptions reviewed with patient, brohter and father (and Daughter via speaker phone). All verbalize understanding. All personal belongings taken with patient. Family member will drive patient home. Patient escorted to discharge area via wheelchair. Patient is stable at discharge. IV's and sutures removed.

## 2022-06-06 NOTE — PLAN OF CARE
Problem: Discharge Planning  Goal: Discharge to home or other facility with appropriate resources  Outcome: Adequate for Discharge  Flowsheets (Taken 6/6/2022 0725)  Discharge to home or other facility with appropriate resources:   Identify barriers to discharge with patient and caregiver   Arrange for needed discharge resources and transportation as appropriate   Identify discharge learning needs (meds, wound care, etc)   Arrange for interpreters to assist at discharge as needed   Refer to discharge planning if patient needs post-hospital services based on physician order or complex needs related to functional status, cognitive ability or social support system     Problem: Pain  Goal: Verbalizes/displays adequate comfort level or baseline comfort level  Outcome: Adequate for Discharge     Problem: Safety - Adult  Goal: Free from fall injury  Outcome: Adequate for Discharge     Problem: Respiratory - Adult  Goal: Achieves optimal ventilation and oxygenation  Outcome: Adequate for Discharge  Flowsheets (Taken 6/6/2022 0725)  Achieves optimal ventilation and oxygenation: Assess for changes in respiratory status

## 2022-06-06 NOTE — CARE COORDINATION
Pt with discharge orders this day. Pt to return home with support from family. Home health referral already made and updated clinicals faxed this day. No additional CM needs at discharge. ASSESSMENT NOTE    Attending Physician: Rossi Garcia MD  Admit Problem: ICD (implantable cardioverter-defibrillator) discharge [Z45.02]  CAD, multiple vessel [I25.10]  Date/Time of Admission: 5/26/2022  7:57 AM  Problem List:  Patient Active Problem List   Diagnosis    Overweight (BMI 25.0-29. 9)    Coronary atherosclerosis of native coronary vessel    Benign prostatic hyperplasia with weak urinary stream    History of 2019 novel coronavirus disease (COVID-19)    Numbness of left foot    Primary insomnia    Chronic systolic congestive heart failure (HCC)    S/P ICD (internal cardiac defibrillator) procedure    Seasonal allergic rhinitis due to pollen    Smoking greater than 20 pack years    Refused influenza vaccine    Chronic eczematous otitis externa of both ears    High risk medication use    Dyslipidemia    Ventricular tachycardia ([address]    COVID-19 vaccination refused    Anemia    Chronic fatigue    IGT (impaired glucose tolerance)    Tobacco abuse    Statin intolerance    ICD (implantable cardioverter-defibrillator) discharge    COVID-19    CAD, multiple vessel    S/P CABG x 3    Hypoxemia    Atelectasis       Service Assessment  Patient Orientation Person,Place,Situation,Self,Alert and Oriented   Cognition Alert   History Provided By Patient   Primary Caregiver Self   Accompanied By/Relationship     [id number] [facility] (2 children: Daughter lives in [place]. Son in [place].  Patient's father transports patient to appointments.)   Patient's [address] is: Patient Declined (Legal Next of Kin Remains as Decision Maker)   PCP Verified by CM Yes [facility]- needs referral at discharge to reestablish.)   Last Visit to PCP [facility] last year   Prior Functional Level Independent in ADLs/IADLs   Current Functional Level Assistance with the following:,Bathing,Dressing,Toileting (Due to high oxygen demand)   Can patient return to prior living arrangement Yes   Ability to make needs known: Good   Family able to assist with home care needs: Yes   Would you like for me to discuss the discharge plan with any other family members/significant others, and if so, who? Financial Resources [facility] Resources     CM/SW Referral       Social/Functional History  Lives With Parent   Type of [address] One level   Home Access Stairs to enter without rails (3 MILAN)   Entrance Stairs - Number of Steps     Entrance Stairs - Rails     Bathroom Shower/Tub     Bathroom Toilet     Bathroom Equipment     Bathroom Accessibility     Home Equipment     Receives Help From [address] Work     Driving     Shopping          Other (Comment)     [address] Paying/Finance [address] [facility] Management     Other (Comment)     Ambuation Assistance Independent   Transfer Assisstance Independent   Active  No   Patient's  Info Patient does not drive   Mode of Transportation Car   Education     Occupation     Type of Occupation       Discharge Planning   Type of [address] (2 children: Daughter lives in [place]. Son in [place]. Patient's father transports patient to appointments.)   Current Services Prior To Admission None   Potential Assistance Needed N/A   DME     DME     DME Ordered?  No   Potential Assistance Purchasing Medications No   Meds-to-Beds: Does the patient want to have any new prescriptions delivered to bedside prior to discharge? Type of Home Care Services None   Patient expects to be discharged to: House   Follow Up Appointment: Best Day/Time Thursday AM   One/Two Story Residence: One story   # of Interior Steps     Height of Each Step (in)     [employer] Available     History of Falls? No     Services At/After Discharge  Transition of Care Consult (CM Consult): Internal Home Health     Internal Hospice     Reason Outside Agency [address]     Partner SNF     Reason Why Partner SNF Not Chosen     Internal Comfort Care     Reason Outside [address] Discharge [address] Resource Information Provided? Mode of Transport at Discharge Other (see comment) (family)   Hospital Transport Time of Discharge     Confirm Follow Up Transport Family     Condition of Participation: Discharge Planning  The plan for Transition of Care is related to the following treatment goals: home with home health   The Patient and/or Patient Representative was provided with a Choice of Provider? Patient   Name of the Patient Representative who was provided with the Choice of Provider and agrees with the Discharge Plan? The Patient and/or Patient Representative Agree with the Discharge Plan? Yes   Freedom of Choice list was provided with basic dialogue that supports the individualized plan of care/goals, treatment preferences, and shares the quality data associated with the providers?  Yes     Documentation for Discharge Appeal  Discharge Appealed by     Date notified by [employer]IO of appeal request:     Time notified by [employer]IO of appeal request:     Detailed Notice of Discharge given to:     Date Notice of Discharge given:     Time Notice of Discharge given:     Date records sent to [employer]     Time records sent to [email or web address]     Date Notified of Outcome     Time Notified of Outcome     Outcome of appeal           SOCORRO Hammond 06/06/22 10:50 AM

## 2022-06-07 ENCOUNTER — CARE COORDINATION (OUTPATIENT)
Dept: CARE COORDINATION | Facility: CLINIC | Age: 62
End: 2022-06-07

## 2022-06-07 ENCOUNTER — TELEPHONE (OUTPATIENT)
Dept: CARDIOLOGY CLINIC | Age: 62
End: 2022-06-07

## 2022-06-07 LAB — INR BLD: 2.8

## 2022-06-07 RX ORDER — NITROGLYCERIN 0.4 MG/1
0.4 TABLET SUBLINGUAL EVERY 5 MIN PRN
Qty: 25 TABLET | Refills: 3 | Status: SHIPPED | OUTPATIENT
Start: 2022-06-07 | End: 2022-08-01

## 2022-06-07 NOTE — TELEPHONE ENCOUNTER
Brian with [facility] called and stated that his blood thinners were changed during his recent procedure and wanted to make Dr Shi Beard aware. Please call [phone].

## 2022-06-07 NOTE — TELEPHONE ENCOUNTER
Yes.  Can make warfarin more potent and we need to follow Inr closely. Make sure we check this week.

## 2022-06-07 NOTE — TELEPHONE ENCOUNTER
Triage left message for Brian,  - [facility], informing him of all and advising him to call our office back with any further questions.

## 2022-06-07 NOTE — TELEPHONE ENCOUNTER
NISA Torres, reports there is an interaction between warfarin and amiodarone and asking if Dr. Elsa Francisco wants him to continue both medications post CABG.

## 2022-06-07 NOTE — CARE COORDINATION
Wm 45 Transitions Initial Follow Up Call    Call within 2 business days of discharge: Yes    Patient: Krystina Coughlin Patient : 1960   MRN: [id number]  Reason for Admission: CABGx3  Discharge Date: 22 RARS: Readmission Risk Score: 7 ( )      Last Discharge [facility]       Complaint Diagnosis Description Type Department Provider    22 AICD Problem; Dizziness ICD (implantable cardioverter-defibrillator) discharge . .. ED to Hosp-Admission (Discharged) (ADMITTED) [id number] Kina Castrejon MD; Delwyn Osler. .. Transitions of Care Initial Call    Was this an external facility discharge? No Discharge Facility: [facility]    Challenges to be reviewed by the provider   Additional needs identified to be addressed with provider: No  none             Method of communication with provider : none    Advance Care Planning:   Does patient have an Advance Directive:   reviewed and current   Care Transition Nurse contacted the patient by telephone to perform post hospital discharge assessment. Verified name and  with patient as identifiers. Provided introduction to self, and explanation of the CTN role. CTN reviewed discharge instructions, medical action plan and red flags with patient who verbalized understanding. Patient given an opportunity to ask questions and does not have any further questions or concerns at this time. Were discharge instructions available to patient? Yes. Reviewed appropriate site of care based on symptoms and resources available to patient including: Specialist  Home health  CTN. The patient agrees to contact the PCP office for questions related to their healthcare. Medication reconciliation was performed with patient, who verbalizes understanding of administration of home medications. Advised obtaining a 90-day supply of all daily and as-needed medications. Was patient discharged with a pulse oximeter? no    CTN provided contact information.  Plan for follow-up call in 5-7 days based on severity of symptoms and risk factors.   Plan for next call: symptom management-assess for new or worsening s/s to report  follow up appointment-assess for changes after f/u appt and medication changes        Care Transitions 24 Hour Call    Schedule Follow Up Appointment with PCP: Completed  Do you have a copy of your discharge instructions?: Yes  Do you have all of your prescriptions and are they filled?: Yes  Have you been contacted by a [facility] Pharmacist?: No  Have you scheduled your follow up appointment?: Yes  How are you going to get to your appointment?: Car - family or friend to transport  Do you feel like you have everything you need to keep you well at home?: Yes  Care Transitions Interventions    Physical Therapy: Completed    Cardiac Rehab: Completed       Occupational Therapy: Completed            Follow Up  Future Appointments   Date Time Provider Ernestina Marinelliisti   6/10/2022 12:30 PM Ernestine Copeland MD [facility]   6/21/2022 10:00 AM [facility] LAB [facility] AMB   6/21/2022  2:40 PM Satnam Irizarry MD [facility] AMB   6/24/2022 12:30 PM Oscar Harris RN [facility]   6/28/2022 10:15 AM Nupur Tejada MD [facility] AMB   7/14/2022  2:15 PM [address] [place] [address] G[facility] AMB       Zina Sandoval, RN

## 2022-06-07 NOTE — PROGRESS NOTES
Physician Progress Note      PATIENT:               Solomon Leventhal  CSN #:                  [id number]  :                       1960  ADMIT DATE:       2022 7:57 AM  DISCH DATE:        2022 2:25 PM  RESPONDING  PROVIDER #:        Elijah Jaimes MD        QUERY TEXT:    Type of Anemia: Please provide further specificity, if known. Clinical indicators include: clots, bloody stools, vitamin b-12, anemia, 6   units  Options provided:  -- Anemia due to acute blood loss  -- Anemia due to chronic blood loss  -- Anemia due to iron deficiency  -- Anemia due to postoperative blood loss  -- Anemia due to chronic disease  -- Other - I will add my own diagnosis  -- Disagree - Not applicable / Not valid  -- Disagree - Clinically Unable to determine / Unknown        PROVIDER RESPONSE TEXT:    The patient has anemia due to postoperative blood loss.       Electronically signed by:  Elijah Jaimes MD 2022 7:22 AM

## 2022-06-08 ENCOUNTER — ANTI-COAG VISIT (OUTPATIENT)
Dept: CARDIOLOGY CLINIC | Age: 62
End: 2022-06-08

## 2022-06-08 NOTE — PROGRESS NOTES
Anticoagulation Summary  As of 2022    INR goal:  2.0-3.0   TTR:  --   INR used for dosin.80 (2022)   Warfarin maintenance plan:  7.5 mg (5 mg x 1.5) every Mon, Wed, Fri; 5 mg (5 mg x 1) all other days   Weekly warfarin total:  42.5 mg   Plan last modified:  [address] [facility] [place] (2022)   Next INR check:  2022   Target end date: Indefinite    Indications    Long term (current) use of anticoagulants (Resolved) [Z79.01]  Intracardiac thrombus (Resolved) [I51.3]             Anticoagulation Episode Summary     INR check location:  Home Draw    Preferred lab:      Send INR reminders to:  [id number] [place], [place] [address]y. [address]    Comments:  [facility]      Anticoagulation Care Providers     Provider Role Specialty Phone number    Jessica Maurice MD Responsible Cardiology [phone]      Home INR monitor result reported via fax, no dose adjustments made.

## 2022-06-10 ENCOUNTER — OFFICE VISIT (OUTPATIENT)
Dept: CARDIOLOGY CLINIC | Age: 62
End: 2022-06-10
Payer: MEDICARE

## 2022-06-10 VITALS
WEIGHT: 214.8 LBS | BODY MASS INDEX: 31.81 KG/M2 | HEART RATE: 82 BPM | DIASTOLIC BLOOD PRESSURE: 62 MMHG | HEIGHT: 69 IN | SYSTOLIC BLOOD PRESSURE: 110 MMHG

## 2022-06-10 DIAGNOSIS — I51.3 INTRACARDIAC THROMBUS: ICD-10-CM

## 2022-06-10 DIAGNOSIS — I25.110 ATHEROSCLEROSIS OF NATIVE CORONARY ARTERY OF NATIVE HEART WITH UNSTABLE ANGINA PECTORIS (HCC): ICD-10-CM

## 2022-06-10 DIAGNOSIS — Z45.02 ICD (IMPLANTABLE CARDIOVERTER-DEFIBRILLATOR) DISCHARGE: ICD-10-CM

## 2022-06-10 DIAGNOSIS — I47.20 VENTRICULAR TACHYCARDIA: Primary | ICD-10-CM

## 2022-06-10 DIAGNOSIS — Z95.810 S/P ICD (INTERNAL CARDIAC DEFIBRILLATOR) PROCEDURE: ICD-10-CM

## 2022-06-10 DIAGNOSIS — E78.5 DYSLIPIDEMIA: ICD-10-CM

## 2022-06-10 DIAGNOSIS — I50.22 CHRONIC SYSTOLIC CONGESTIVE HEART FAILURE (HCC): ICD-10-CM

## 2022-06-10 PROCEDURE — 99496 TRANSJ CARE MGMT HIGH F2F 7D: CPT | Performed by: INTERNAL MEDICINE

## 2022-06-10 ASSESSMENT — ENCOUNTER SYMPTOMS: SHORTNESS OF BREATH: 0

## 2022-06-10 NOTE — PROGRESS NOTES
[place], [place]  [address], [address]  PHONE: [phone]    Nahomi Ross  1960    SUBJECTIVE:   Nahomi Ross is a 58 y.o. male seen for a follow up visit  regarding the following:     Chief Complaint   Patient presents with    Follow-Up from Hospital     CABG       HPI:  Presents for follow-up. Recently admitted with ICD discharges and recurrent ventricular tachycardia. Underwent evaluation which showed multivessel coronary disease. Ultimately had three-vessel coronary artery bypass grafting. Postoperatively had recurrent runs of nonsustained ventricular tachycardia. Was placed on amiodarone and seen by electrophysiology. He has done well since his discharge. Active and started back walking. Has not been contacted by cardiac rehab. No ICD discharges. Sternotomy pain is improving. Has an appointment with CT surgery on June 22. Currently on amiodarone 200 mg a day. Past Medical History, Past Surgical History, Family history, Social History, and Medications were all reviewed with the patient today and updated as necessary. Allergies   Allergen Reactions    Penicillins Swelling     Face swelling    Atorvastatin Other (See Comments)     itching    Evolocumab Other (See Comments)     Itching    Lisinopril Other (See Comments)    Rosuvastatin Other (See Comments)     itching       Patient Active Problem List    Diagnosis Date Noted    CAD, multiple vessel 05/28/2022     Priority: High    ICD (implantable cardioverter-defibrillator) discharge 05/26/2022     Priority: High    COVID-19 10/06/2021     Priority: High     Last Assessment & Plan:   Formatting of this note might be different from the original.  Continue as needed use of home oxygen. Patient is improving compared to yesterday. Plan to recheck tomorrow via telemedicine. Eventually taper the oxygen and discontinue as his sats improved. Covid discharge teaching.   At this point I am hopeful he is improving, he feels much better. Continue to monitor oxygen saturation. Finish medicines. Continue until complete.  Chronic systolic congestive heart failure ([address] 09/27/2011     Priority: High     Unable to tolerate lisinopril due to hypotension. 1.  Echo (1/26/05):  EF 40% with anterior and apical severe hypokinesis to   akinesis. 2.  Echo (4/5/11):  EF 25-30%. Mid anterior/anteroseptal/apical akinesis. Large protruding apical thrombus. Mild LA dilatation. No valvular   regurgitation/stenosis. 3.  Echo (8/2/11):  EF 30-35%. Anterior/apical/anteroseptal akinesis. Resolution of apical thrombus. 4.  Echo (3/28/18):  EF 30-35%. Anteroapical akinesis. No significant   valve disease. 5. Echo (5/26/22):  EF 25-30%. Apical AK. AS and AL HK. MIld LAE. No significant valve disease.  S/P ICD (internal cardiac defibrillator) procedure 09/27/2011     Priority: High     1. Dual chamber BSC ICD 9/26/11  2. ICD discharges 9/7/12 for sinus tachycardia. ICD adjusted.  S/P CABG x 3 05/31/2022     Priority: Medium    Hypoxemia 05/31/2022     Priority: Medium    Atelectasis 05/31/2022     Priority: Medium    Smoking greater than 20 pack years 11/13/2021     Priority: Low    Refused influenza vaccine 11/13/2021     Priority: Low    Anemia 11/13/2021     Priority: Low     Recheck CBC today        History of 2019 novel coronavirus disease (COVID-19) 10/03/2021     Priority: Low    COVID-19 vaccination refused 06/18/2021     Priority: Low     Had Covid 19 positive test 10/3/21        Ventricular tachycardia ([address] 06/30/2020     Priority: Low     1. ICD discharge for VT on 3/19/20  2. Did 5/22 with ICD discharges. Underwent CABG and started on amiodarone.         High risk medication use 06/19/2020     Priority: Low    Chronic eczematous otitis externa of both ears 06/18/2020     Priority: Low     Last Assessment & Plan:   Formatting of this note might be different from the original.   Patient said this resolved with steroids      Benign prostatic hyperplasia with weak urinary stream 12/29/2019     Priority: Low     Mild. No medication at this time. Formatting of this note might be different from the original.  Mild. No medication at this time. Last Assessment & Plan:   Formatting of this note might be different from the original.   Check PSA annually      Chronic fatigue 12/29/2019     Priority: Low    Tobacco abuse 06/24/2019     Priority: Low     Strongly urged tobacco cessation in light of multiple medical problems   including CAD!  Numbness of left foot 06/12/2018     Priority: Low    Statin intolerance 12/10/2017     Priority: Low    IGT (impaired glucose tolerance) 12/03/2017     Priority: Low    Overweight (BMI 25.0-29.9) 06/08/2017     Priority: Low     Low carb diet discussed        Primary insomnia 06/07/2017     Priority: Low    Seasonal allergic rhinitis due to pollen 06/07/2017     Priority: Low    Coronary atherosclerosis of native coronary vessel 10/02/2015     Priority: Low     1. Anterior STEMI 1/25/05 (Late presentation)  a. Cath: LAD: 50% prox. 100% mid. D1: 95% prox. Ramus: 95% prox. Circ: Luminal irregularity. RCA: Luminal irregularity. b.  PCI:  T-stent of LAD/D1 with 2.25 X 18 Minivision in D1 and 2.25 X 23   Cypher in LAD. PCI of ramus with 3.0 X 18 mm Cypher. 2.  Exercise Cardiolite (8/18/11):  Large fixed anterior, septal and   apical defect. No ischemia. EF 33%. 3.  Lexiscan Cardiolite (8/17/20):  Large fixed defect in mid/distal   anterior and distal inferior wall. EF 27%. No reversible ischemia. 4.  CABG (5/31/2022): LIMA to LAD, SVG to PDA, SVG to intermediate        Dyslipidemia 10/02/2015     Priority: Low     STATIN INTOLERANT. Unable to tolerate Zetia or Repatha.          Intracardiac thrombus 10/02/2015     Priority: Low     Follow INR with home monitor  Echo 4/5/11:  Large protruding apical thrombus. Resolved with   anticoagulation. Indefinite anticoagulation planned. Social History     Tobacco Use    Smoking status: Former Smoker     Packs/day: 1.00     Types: Cigarettes     Start date: 1995     Quit date: 2022     Years since quittin.0    Smokeless tobacco: Never Used   Substance Use Topics    Alcohol use: No       ROS:    Review of Systems   Constitutional: Negative for malaise/fatigue. Cardiovascular: Negative for chest pain. Respiratory: Negative for shortness of breath. Musculoskeletal: Positive for arthritis. Neurological: Negative for focal weakness. Psychiatric/Behavioral: Negative for depression. PHYSICAL EXAM:     Wt Readings from Last 3 Encounters:   06/10/22 214 lb 12.8 oz (97.4 kg)   22 208 lb (94.3 kg)   22 215 lb (97.5 kg)     BP Readings from Last 3 Encounters:   06/10/22 110/62   22 124/85   22 110/62     Pulse Readings from Last 3 Encounters:   06/10/22 82   22 63   22 72       Physical Exam  Constitutional:       General: He is not in acute distress. Neck:      Vascular: No carotid bruit. Cardiovascular:      Rate and Rhythm: Normal rate and regular rhythm. Pulmonary:      Effort: No respiratory distress. Breath sounds: Normal breath sounds. Abdominal:      General: Bowel sounds are normal.      Palpations: Abdomen is soft. Musculoskeletal:         General: No swelling. Skin:     General: Skin is warm and dry. Neurological:      General: No focal deficit present. Psychiatric:         Mood and Affect: Mood normal.         Medical problems and test results were reviewed with the patient today.      Lab Results   Component Value Date    WBC 23.2 2022    HGB 11.6 2022    HCT 36.1 2022     2022    MCV 93.0 2022       Lab Results   Component Value Date     2022    K 3.7 2022     2022    CO2 25 06/02/2022    BUN 13 06/02/2022    GFRAA >60 06/02/2022       Lab Results   Component Value Date    ALT 16 05/26/2022       Lab Results   Component Value Date    CHOL 241 06/14/2021    HDL 27 06/14/2021    VLDL 45 06/14/2021       Data from outside records/labs from outside providers have been reviewed and summarized as noted in the HPI, past history and data review sections of this note       ASSESSMENT and PLAN    1. Chronic systolic congestive heart failure (HCC)  Currently well compensated. On carvedilol. Losartan was held in the hospital due to hypotension. Consider starting Entresto if he can afford this at his next visit. 2. Atherosclerosis of native coronary artery of native heart with unstable angina pectoris ([address]  Recent CABG. Will refer to cardiac rehab    3. Ventricular tachycardia (HCC)  Continue amiodarone. If recurrent arrhythmias will need EP referral for consideration of VT ablation. 4. ICD (implantable cardioverter-defibrillator) discharge  Follow in device clinic. 5. Intracardiac thrombus  Continue Coumadin. Goal INR 2-3. Has a home PT monitor. 6. S/P ICD (internal cardiac defibrillator) procedure  Normal device function. Follow in device clinic. 7. Dyslipidemia  Intolerant to statins, Zetia and Repatha. No follow-up provider specified. Geremias Mcmullen MD  6/10/2022  2:21 PM    This note may have been dictated using speech recognition software.   As a result, error of speech recognition may have occurred

## 2022-06-14 ENCOUNTER — ANTI-COAG VISIT (OUTPATIENT)
Dept: CARDIOLOGY CLINIC | Age: 62
End: 2022-06-14

## 2022-06-14 ENCOUNTER — CARE COORDINATION (OUTPATIENT)
Dept: CARE COORDINATION | Facility: CLINIC | Age: 62
End: 2022-06-14

## 2022-06-14 DIAGNOSIS — I51.3 INTRACARDIAC THROMBUS: Primary | ICD-10-CM

## 2022-06-14 LAB — INR BLD: 2.7

## 2022-06-14 NOTE — CARE COORDINATION
Wm 45 Transitions Follow Up Call    2022    Patient: Flakita Gutierrez  Patient : 1960   MRN: [id number]  Reason for Admission: ICD, CABG  Discharge Date: 22 RARS: Readmission Risk Score: 7 ( )         Spoke with: patient    Care Transitions Follow Up Call    Needs to be reviewed by the provider   Additional needs identified to be addressed with provider: No  none             Method of communication with provider : none      Care Transition Nurse contacted the patient by telephone to follow up after admission on . Verified name and  with patient as identifiers. Addressed changes since last contact: recent follow up appt with cardiology, reviewed visit and no current questions/concerns  Discussed follow-up appointments. If no appointment was previously scheduled, appointment scheduling offered: Yes. Is follow up appointment scheduled within 7 days of discharge? Yes. Advance Care Planning:   Does patient have an Advance Directive: reviewed and current    CTN reviewed discharge instructions, medical action plan and red flags with patient and discussed any barriers to care and/or understanding of plan of care after discharge. Discussed appropriate site of care based on symptoms and resources available to patient including: PCP  Specialist  Home health  CTN, cardiac rehab. The patient agrees to contact the PCP office for questions related to their healthcare. Patients top risk factors for readmission: medical condition-cad, cabg, sHF, ICD  Interventions to address risk factors: Establishment or re-establishment of referrals-cardiac rehab orientation set for  after cardiac surgery       [facility] follow up appointment(s): xochilt    CTN provided contact information for future needs. Plan for follow-up call in 10-14 days based on severity of symptoms and risk factors.   Plan for next call: referrals-follow up after cardiac rehab orientation and cardiac surgery appt          Care Transitions Subsequent and Final Call    Schedule Follow Up Appointment with PCP: Completed  Subsequent and Final Calls  Do you have any ongoing symptoms?: No  Do you have any questions related to your medications?: No  Do you currently have any active services?: Yes  Are you currently active with any services?: Home Health  Do you have any needs or concerns that I can assist you with?: Yes (Comment: Patient stated he has not heard from Cardiac rehab. Patient has orientation set for 6/24 after cardiac surgery appt)  Identified Barriers: None  Care Transitions Interventions    Physical Therapy: Completed    Cardiac Rehab: Completed       Occupational Therapy: Completed     Other Interventions:            Follow Up  Future Appointments   Date Time Provider Ernestina Willis   6/21/2022 10:00 AM [facility] LAB [facility] AMB   6/21/2022  2:40 PM Maria Esther Marrero MD [facility] AMB   6/24/2022 12:30 PM Gloria Gee, CYNTHIA [facility]   6/28/2022 10:15 AM Reji Loo DO [facility] AMB   7/14/2022  2:15 PM [facility] AMB   7/20/2022  9:45 AM Shaka Bueno MD [facility] AMB       Margarita James, RN

## 2022-06-17 ENCOUNTER — HOSPITAL ENCOUNTER (INPATIENT)
Age: 62
LOS: 5 days | Discharge: HOME | DRG: 309 | End: 2022-06-22
Attending: STUDENT IN AN ORGANIZED HEALTH CARE EDUCATION/TRAINING PROGRAM | Admitting: INTERNAL MEDICINE
Payer: MEDICARE

## 2022-06-17 DIAGNOSIS — Z45.02 DEFIBRILLATOR DISCHARGE: ICD-10-CM

## 2022-06-17 DIAGNOSIS — R77.8 ELEVATED TROPONIN: ICD-10-CM

## 2022-06-17 DIAGNOSIS — Z95.1 S/P CABG X 3: ICD-10-CM

## 2022-06-17 DIAGNOSIS — I47.20 VENTRICULAR TACHYCARDIA: Primary | ICD-10-CM

## 2022-06-17 PROBLEM — R79.89 ELEVATED TROPONIN: Status: ACTIVE | Noted: 2022-06-17

## 2022-06-17 LAB
ALBUMIN SERPL-MCNC: 2.5 G/DL (ref 3.2–4.6)
ALBUMIN/GLOB SERPL: 0.6 {RATIO} (ref 1.2–3.5)
ALP SERPL-CCNC: 79 U/L (ref 50–136)
ALT SERPL-CCNC: 18 U/L (ref 12–65)
ANION GAP SERPL CALC-SCNC: 6 MMOL/L (ref 7–16)
APPEARANCE UR: CLEAR
AST SERPL-CCNC: 28 U/L (ref 15–37)
BACTERIA URNS QL MICRO: ABNORMAL /HPF
BILIRUB SERPL-MCNC: 0.2 MG/DL (ref 0.2–1.1)
BILIRUB UR QL: NEGATIVE
BUN SERPL-MCNC: 10 MG/DL (ref 8–23)
CALCIUM SERPL-MCNC: 8.1 MG/DL (ref 8.3–10.4)
CHLORIDE SERPL-SCNC: 105 MMOL/L (ref 98–107)
CO2 SERPL-SCNC: 28 MMOL/L (ref 21–32)
COLOR UR: ABNORMAL
CREAT SERPL-MCNC: 1.1 MG/DL (ref 0.8–1.5)
ERYTHROCYTE [DISTWIDTH] IN BLOOD BY AUTOMATED COUNT: 14.9 % (ref 11.9–14.6)
GLOBULIN SER CALC-MCNC: 4 G/DL (ref 2.3–3.5)
GLUCOSE SERPL-MCNC: 148 MG/DL (ref 65–100)
GLUCOSE UR STRIP.AUTO-MCNC: NEGATIVE MG/DL
HCT VFR BLD AUTO: 29.5 % (ref 41.1–50.3)
HGB BLD-MCNC: 9.3 G/DL (ref 13.6–17.2)
HGB UR QL STRIP: ABNORMAL
INR PPP: 7.5
KETONES UR QL STRIP.AUTO: NEGATIVE MG/DL
LEUKOCYTE ESTERASE UR QL STRIP.AUTO: ABNORMAL
MAGNESIUM SERPL-MCNC: 2.4 MG/DL (ref 1.8–2.4)
MCH RBC QN AUTO: 29.1 PG (ref 26.1–32.9)
MCHC RBC AUTO-ENTMCNC: 31.5 G/DL (ref 31.4–35)
MCV RBC AUTO: 92.2 FL (ref 79.6–97.8)
NITRITE UR QL STRIP.AUTO: NEGATIVE
NRBC # BLD: 0 K/UL (ref 0–0.2)
OTHER OBSERVATIONS: ABNORMAL
PH UR STRIP: 7.5 [PH] (ref 5–9)
PLATELET # BLD AUTO: 660 K/UL (ref 150–450)
PMV BLD AUTO: 9.1 FL (ref 9.4–12.3)
POTASSIUM SERPL-SCNC: 3.8 MMOL/L (ref 3.5–5.1)
PROT SERPL-MCNC: 6.5 G/DL (ref 6.3–8.2)
PROT UR STRIP-MCNC: NEGATIVE MG/DL
PROTHROMBIN TIME: 65.1 SEC (ref 12.6–14.5)
RBC # BLD AUTO: 3.2 M/UL (ref 4.23–5.6)
RBC #/AREA URNS HPF: ABNORMAL /HPF
SODIUM SERPL-SCNC: 139 MMOL/L (ref 138–145)
SP GR UR REFRACTOMETRY: 1.01 (ref 1–1.02)
TROPONIN I SERPL HS-MCNC: 329.9 PG/ML (ref 0–14)
TROPONIN I SERPL HS-MCNC: 608.8 PG/ML (ref 0–14)
TROPONIN I SERPL HS-MCNC: 651.1 PG/ML (ref 0–14)
UROBILINOGEN UR QL STRIP.AUTO: 1 EU/DL (ref 0.2–1)
WBC # BLD AUTO: 12.7 K/UL (ref 4.3–11.1)
WBC URNS QL MICRO: ABNORMAL /HPF

## 2022-06-17 PROCEDURE — 93005 ELECTROCARDIOGRAM TRACING: CPT | Performed by: STUDENT IN AN ORGANIZED HEALTH CARE EDUCATION/TRAINING PROGRAM

## 2022-06-17 PROCEDURE — 80053 COMPREHEN METABOLIC PANEL: CPT

## 2022-06-17 PROCEDURE — 99285 EMERGENCY DEPT VISIT HI MDM: CPT

## 2022-06-17 PROCEDURE — 2580000003 HC RX 258: Performed by: PHYSICIAN ASSISTANT

## 2022-06-17 PROCEDURE — 84484 ASSAY OF TROPONIN QUANT: CPT

## 2022-06-17 PROCEDURE — 96368 THER/DIAG CONCURRENT INF: CPT

## 2022-06-17 PROCEDURE — 6360000002 HC RX W HCPCS: Performed by: STUDENT IN AN ORGANIZED HEALTH CARE EDUCATION/TRAINING PROGRAM

## 2022-06-17 PROCEDURE — 6370000000 HC RX 637 (ALT 250 FOR IP): Performed by: PHYSICIAN ASSISTANT

## 2022-06-17 PROCEDURE — 96365 THER/PROPH/DIAG IV INF INIT: CPT

## 2022-06-17 PROCEDURE — 94761 N-INVAS EAR/PLS OXIMETRY MLT: CPT

## 2022-06-17 PROCEDURE — 6370000000 HC RX 637 (ALT 250 FOR IP): Performed by: INTERNAL MEDICINE

## 2022-06-17 PROCEDURE — 2580000003 HC RX 258: Performed by: STUDENT IN AN ORGANIZED HEALTH CARE EDUCATION/TRAINING PROGRAM

## 2022-06-17 PROCEDURE — 99223 1ST HOSP IP/OBS HIGH 75: CPT | Performed by: INTERNAL MEDICINE

## 2022-06-17 PROCEDURE — 2500000003 HC RX 250 WO HCPCS: Performed by: STUDENT IN AN ORGANIZED HEALTH CARE EDUCATION/TRAINING PROGRAM

## 2022-06-17 PROCEDURE — 2580000003 HC RX 258: Performed by: NURSE PRACTITIONER

## 2022-06-17 PROCEDURE — 87040 BLOOD CULTURE FOR BACTERIA: CPT

## 2022-06-17 PROCEDURE — 2000000000 HC ICU R&B

## 2022-06-17 PROCEDURE — 96375 TX/PRO/DX INJ NEW DRUG ADDON: CPT

## 2022-06-17 PROCEDURE — 83735 ASSAY OF MAGNESIUM: CPT

## 2022-06-17 PROCEDURE — 81001 URINALYSIS AUTO W/SCOPE: CPT

## 2022-06-17 PROCEDURE — 93005 ELECTROCARDIOGRAM TRACING: CPT | Performed by: PHYSICIAN ASSISTANT

## 2022-06-17 PROCEDURE — 96376 TX/PRO/DX INJ SAME DRUG ADON: CPT

## 2022-06-17 PROCEDURE — 2500000003 HC RX 250 WO HCPCS: Performed by: INTERNAL MEDICINE

## 2022-06-17 PROCEDURE — 6360000002 HC RX W HCPCS: Performed by: NURSE PRACTITIONER

## 2022-06-17 PROCEDURE — 96366 THER/PROPH/DIAG IV INF ADDON: CPT

## 2022-06-17 PROCEDURE — 36415 COLL VENOUS BLD VENIPUNCTURE: CPT

## 2022-06-17 PROCEDURE — 85027 COMPLETE CBC AUTOMATED: CPT

## 2022-06-17 PROCEDURE — 85610 PROTHROMBIN TIME: CPT

## 2022-06-17 PROCEDURE — 6360000002 HC RX W HCPCS: Performed by: PHYSICIAN ASSISTANT

## 2022-06-17 RX ORDER — SODIUM CHLORIDE 0.9 % (FLUSH) 0.9 %
5-40 SYRINGE (ML) INJECTION PRN
Status: DISCONTINUED | OUTPATIENT
Start: 2022-06-17 | End: 2022-06-22 | Stop reason: HOSPADM

## 2022-06-17 RX ORDER — SODIUM CHLORIDE 0.9 % (FLUSH) 0.9 %
5-40 SYRINGE (ML) INJECTION EVERY 12 HOURS SCHEDULED
Status: DISCONTINUED | OUTPATIENT
Start: 2022-06-17 | End: 2022-06-22 | Stop reason: HOSPADM

## 2022-06-17 RX ORDER — ONDANSETRON 2 MG/ML
4 INJECTION INTRAMUSCULAR; INTRAVENOUS EVERY 6 HOURS PRN
Status: DISCONTINUED | OUTPATIENT
Start: 2022-06-17 | End: 2022-06-22 | Stop reason: HOSPADM

## 2022-06-17 RX ORDER — SODIUM CHLORIDE 0.9 % (FLUSH) 0.9 %
3 SYRINGE (ML) INJECTION EVERY 8 HOURS
Status: DISCONTINUED | OUTPATIENT
Start: 2022-06-17 | End: 2022-06-22 | Stop reason: HOSPADM

## 2022-06-17 RX ORDER — ACETAMINOPHEN 650 MG/1
650 SUPPOSITORY RECTAL EVERY 6 HOURS PRN
Status: DISCONTINUED | OUTPATIENT
Start: 2022-06-17 | End: 2022-06-22 | Stop reason: HOSPADM

## 2022-06-17 RX ORDER — SODIUM CHLORIDE 9 MG/ML
INJECTION, SOLUTION INTRAVENOUS PRN
Status: DISCONTINUED | OUTPATIENT
Start: 2022-06-17 | End: 2022-06-22 | Stop reason: HOSPADM

## 2022-06-17 RX ORDER — POLYETHYLENE GLYCOL 3350 17 G/17G
17 POWDER, FOR SOLUTION ORAL DAILY PRN
Status: DISCONTINUED | OUTPATIENT
Start: 2022-06-17 | End: 2022-06-22 | Stop reason: HOSPADM

## 2022-06-17 RX ORDER — CARVEDILOL 6.25 MG/1
6.25 TABLET ORAL DAILY
Status: DISCONTINUED | OUTPATIENT
Start: 2022-06-17 | End: 2022-06-20

## 2022-06-17 RX ORDER — METOPROLOL TARTRATE 5 MG/5ML
2.5 INJECTION INTRAVENOUS
Status: COMPLETED | OUTPATIENT
Start: 2022-06-17 | End: 2022-06-17

## 2022-06-17 RX ORDER — ACETAMINOPHEN 325 MG/1
650 TABLET ORAL EVERY 6 HOURS PRN
Status: DISCONTINUED | OUTPATIENT
Start: 2022-06-17 | End: 2022-06-17

## 2022-06-17 RX ORDER — MAGNESIUM SULFATE IN WATER 40 MG/ML
2000 INJECTION, SOLUTION INTRAVENOUS ONCE
Status: COMPLETED | OUTPATIENT
Start: 2022-06-17 | End: 2022-06-17

## 2022-06-17 RX ORDER — ACETAMINOPHEN 325 MG/1
650 TABLET ORAL EVERY 6 HOURS PRN
Status: DISCONTINUED | OUTPATIENT
Start: 2022-06-17 | End: 2022-06-22 | Stop reason: HOSPADM

## 2022-06-17 RX ORDER — METOPROLOL TARTRATE 5 MG/5ML
2.5 INJECTION INTRAVENOUS ONCE
Status: COMPLETED | OUTPATIENT
Start: 2022-06-17 | End: 2022-06-17

## 2022-06-17 RX ORDER — ASPIRIN 81 MG/1
81 TABLET, CHEWABLE ORAL DAILY
Status: DISCONTINUED | OUTPATIENT
Start: 2022-06-17 | End: 2022-06-22 | Stop reason: HOSPADM

## 2022-06-17 RX ORDER — NITROGLYCERIN 0.4 MG/1
0.4 TABLET SUBLINGUAL EVERY 5 MIN PRN
Status: DISCONTINUED | OUTPATIENT
Start: 2022-06-17 | End: 2022-06-22 | Stop reason: HOSPADM

## 2022-06-17 RX ORDER — WARFARIN SODIUM 5 MG/1
5 TABLET ORAL DAILY
Status: DISCONTINUED | OUTPATIENT
Start: 2022-06-17 | End: 2022-06-17

## 2022-06-17 RX ORDER — QUETIAPINE FUMARATE 100 MG/1
100 TABLET, FILM COATED ORAL NIGHTLY
Status: DISCONTINUED | OUTPATIENT
Start: 2022-06-17 | End: 2022-06-22 | Stop reason: HOSPADM

## 2022-06-17 RX ORDER — ONDANSETRON 4 MG/1
4 TABLET, ORALLY DISINTEGRATING ORAL EVERY 8 HOURS PRN
Status: DISCONTINUED | OUTPATIENT
Start: 2022-06-17 | End: 2022-06-22 | Stop reason: HOSPADM

## 2022-06-17 RX ORDER — ALBUTEROL SULFATE 90 UG/1
2 AEROSOL, METERED RESPIRATORY (INHALATION) EVERY 4 HOURS PRN
Status: DISCONTINUED | OUTPATIENT
Start: 2022-06-17 | End: 2022-06-22 | Stop reason: HOSPADM

## 2022-06-17 RX ADMIN — DEXTROSE 150 MG: 50 INJECTION, SOLUTION INTRAVENOUS at 14:42

## 2022-06-17 RX ADMIN — AMIODARONE HYDROCHLORIDE 0.5 MG/MIN: 50 INJECTION, SOLUTION INTRAVENOUS at 22:58

## 2022-06-17 RX ADMIN — MAGNESIUM SULFATE HEPTAHYDRATE 2000 MG: 40 INJECTION, SOLUTION INTRAVENOUS at 14:30

## 2022-06-17 RX ADMIN — ASPIRIN 81 MG: 81 TABLET, CHEWABLE ORAL at 17:51

## 2022-06-17 RX ADMIN — METOPROLOL TARTRATE 2.5 MG: 5 INJECTION INTRAVENOUS at 14:44

## 2022-06-17 RX ADMIN — AMIODARONE HYDROCHLORIDE 1 MG/MIN: 50 INJECTION, SOLUTION INTRAVENOUS at 14:39

## 2022-06-17 RX ADMIN — METOPROLOL TARTRATE 2.5 MG: 5 INJECTION INTRAVENOUS at 14:31

## 2022-06-17 RX ADMIN — CARVEDILOL 6.25 MG: 6.25 TABLET, FILM COATED ORAL at 17:51

## 2022-06-17 RX ADMIN — ACETAMINOPHEN 650 MG: 325 TABLET ORAL at 19:44

## 2022-06-17 RX ADMIN — SODIUM CHLORIDE, PRESERVATIVE FREE 3 ML: 5 INJECTION INTRAVENOUS at 21:39

## 2022-06-17 RX ADMIN — AMIODARONE HYDROCHLORIDE 150 MG: 50 INJECTION, SOLUTION INTRAVENOUS at 14:26

## 2022-06-17 RX ADMIN — SODIUM CHLORIDE, PRESERVATIVE FREE 5 ML: 5 INJECTION INTRAVENOUS at 21:38

## 2022-06-17 RX ADMIN — QUETIAPINE FUMARATE 100 MG: 100 TABLET ORAL at 21:37

## 2022-06-17 ASSESSMENT — PAIN SCALES - GENERAL
PAINLEVEL_OUTOF10: 0
PAINLEVEL_OUTOF10: 0

## 2022-06-17 ASSESSMENT — ENCOUNTER SYMPTOMS
ABDOMINAL PAIN: 0
NAUSEA: 0
BACK PAIN: 0
COLOR CHANGE: 0
CHEST TIGHTNESS: 1
SHORTNESS OF BREATH: 1
COUGH: 0
ABDOMINAL DISTENTION: 0
VOMITING: 0
CONSTIPATION: 0
WHEEZING: 0

## 2022-06-17 NOTE — H&P
[facility] CARDIOLOGY History &Physical                 Primary Cardiologist: Dr Helen Seals    Primary Care Physician: Martita Byrd DO    Admitting Physician: Dr Ammy Bullock:     Patient is a 58 y.o. male who presents with VT. Patient is a 59 y. o. male with a hx of CAD, HFrEF w [employer] ICD w h/o VT/VF despite amio, IGT, HLD, hx of cardiac wall mural thrombus, and depression. Admit 5-26-22 w VF arrest, underwent LHC 5-27-22 showing multivessel disease. 5-26-22 echo w EF 25% w WMA, mild LAE. 5-26-22 pt underwent CABG w LIMA to LAD, SVG to intermediate coronary artery, SVG to PDA. D/C home 6-6-22. Was doing well without CP, walked this AM, but earlier today he started having palpitations with dizziness, subsequent ICD shock x 3, then two more ICD shock, called EMS but on arrival to ER had several runs of VT, was given bolus of IV amio and started on IV amio drip, given IV mag, had been on po amio at home with dose just decreased to 400 mg QD. He continued to have bouts of VT and was given a second bolus of IV amio w less ectopy. In ER pt with recurrent bouts of VT, given second bolus. WBC 12, hgb 9.3 down from 11.6, platelets 258. /71, dropped after CV to 106/69. HS trop 329, , K 3.8, cr 1.1, mag 2.4. EKG after resuscitation NSR w rate 73 w NSST/T wave changes and PVC, QTc 482. Past cardiac eval:  NST: 8/2020 CONCLUSION:   1. Stress EKG: Non diagnostic due to pharmacologic infusion. 2. SPECT Perfusion Imaging: large anterior infarction extending into mid   to distal anterior wall and distal inferior wall   3.  LV Systolic Function is severely abnormal.   4. Risk Assessment: no reversible ischemia, large infarction with known    ischemic cardiomyopathy.       Echo: 3/28/2018 EF 30-35%  EKG: NSR with out elevation  PPM Interrogation:  Preliminary Impression: Normal dc icd function.  VT episode(s) back on   09/30/21 successfully ATP terminated.  Several NSVT since then but no further therapies.  Pt asymptomatic to these events.  PVC burden:  354K   single pvc's in past 5 months.  AP 2%   3%.  No programming changes   warranted. [facility] 5-27-22 showing multivessel disease  5-26-22 echo w EF 25% w WMA, mild LAE  5-26-22 pt underwent CABG w LIMA to LAD, SVG to intermediate coronary artery, SVG to PDA    Soc: 1 ppd since 1995  FH:   Mom w MI 67    Past Medical History:   Diagnosis Date    Acute hypoxemic respiratory failure due to COVID-19 [facility]) 10/9/2021    CAD (coronary artery disease)     heart attack 2005 stentS HEART    CHF (congestive heart failure) ([address] 9/27/2011    Chronic systolic heart failure ([address] 10/2/2015    Coronary atherosclerosis of native coronary vessel 10/2/2015    Hyperlipidemia 10/2/2015    Hypoxemia requiring supplemental oxygen 10/6/2021    IGT (impaired glucose tolerance) 12/3/2017    Other ill-defined conditions(799.89)      blind left eye    Other ill-defined conditions(799.89)     cardiomyopathy    Pneumonia due to COVID-19 virus 11/13/2021    Resolved    Primary insomnia 6/7/2017    Psychiatric disorder     depression     Sepsis, unspecified 4/5/2011    [address]rom[address] [address] [facility]     thrombus in heart     Thrombus 10/2/2015      Past Surgical History:   Procedure Laterality Date    CARDIAC CATHETERIZATION      5 stents total    CARDIAC DEFIBRILLATOR PLACEMENT  2011    [employer]    CARDIAC PROCEDURE N/A 5/27/2022    LEFT HEART CATH / CORONARY ANGIOGRAPHY performed by Chacha Benton MD at [address] [place] [address] CATH LAB    COLONOSCOPY  12/2010    CORONARY ARTERY BYPASS GRAFT N/A 5/31/2022    CORONARY ARTERY BYPASS GRAFT (CABG X 3), LIMA ; ENDOSCOPIC VEIN HARVEST, LEFT GREATER SAPHENOUS VEIN performed by Donna Elmore MD at [facility] MAIN OR    HEENT      oral surgery    PACEMAKER      LA CARDIAC SURG PROCEDURE UNLIST       1 stent 2005    TRANSESOPHAGEAL ECHOCARDIOGRAM N/A 5/31/2022    TRANSESOPHAGEAL ECHOCARDIOGRAM performed by Donna Elmore MD at [facility] MAIN OR      Allergies   Allergen Reactions    Penicillins Swelling     Face swelling    Atorvastatin Other (See Comments)     itching    Evolocumab Other (See Comments)     Itching    Lisinopril Other (See Comments)    Rosuvastatin Other (See Comments)     itching     Social History     Tobacco Use    Smoking status: Former Smoker     Packs/day: 1.00     Types: Cigarettes     Start date: 1995     Quit date: 2022     Years since quittin.0    Smokeless tobacco: Never Used   Substance Use Topics    Alcohol use: No      FH:   Family History   Problem Relation Age of Onset    Heart Disease Mother     Diabetes Paternal Grandfather     Cancer Paternal Grandmother     Heart Disease Brother     Diabetes Maternal Grandmother     Bleeding Prob Father     Diabetes Mother     Heart Disease Paternal Grandfather     Heart Attack Mother 67        mi        Review of Systems  General: no weight change, + weakness, fever or chills  Skin: no rashes, lumps, or other skin changes  HEENT: no headache, + dizziness,  Neck: no swollen glands, goiter, pain or stiffness  Respiratory: no cough, sputum, hemoptysis, no dyspnea, no wheezing  Cardiovascular: + as per HPI  Gastrointestinal: no reflux, constipation, diarrhea, liver problems, GI bleeding  Urinary: no frequency, urgency , hematuria, burning/pain with urination, recent flank pain, polyuria, nocturia, or difficulty urinating  Peripheral Vascular: no claudication, leg cramps, prior DVTs, swelling of calves, legs, or feet, color change, or swelling with redness or tenderness  Musculoskeletal: no muscle or joint pain/stiffness, joint swelling, erythema of joints, or back pain  Psychiatric: no depression or excessive stress  Neurological: no sensory or motor loss, seizures, syncope, tremors, numbness, tingling, no changes in mood, attention, or speech, no changes in orientation, memory, insight, or judgment.    Hematologic: no anemia, easy bruising or bleeding  Endocrine: no thyroid problems, no heat or cold intolerance, excessive sweating, polyuria, polydipsia, no diabetes. Objective:       /69   Pulse 98   Temp 98.6 °F (37 °C) (Oral)   Resp 26   Ht 5' 9\" (1.753 m)   Wt 207 lb (93.9 kg)   SpO2 93%   BMI 30.57 kg/m²     No intake/output data recorded. No intake/output data recorded. Physical Exam:  General: Well Developed, Well Nourished, No Acute Distress  Head: normocephalic atraumatic   ENT: pupils equal and round, no abnormalities noted  Neck: supple, no JVD, no carotid bruits  Heart: S1S2 with RRR, ICD chest wall   Lungs: Clear throughout auscultation bilaterally without adventitious sounds  Abd: soft, nontender, nondistended, with good bowel sounds  Ext: warm, no edema  Skin: warm and dry  Psychiatric: Normal mood and affect, A&O x 3  Neurologic: Normal muscle tone      ECG: pending    Data Review:    Pending     CXR: pending    Assessment/Plan:   Ventricular tachycardia ([address]- s/p [employer] ICD  - admit to ICU  - check K, mag  - cont IV amio    Chronic systolic congestive heart failure (HCC)- EF 25%  - cont BB, check CXR, no ACE/ARB due to hypotension       CAD, multiple vessel- s/p 22 pt underwent CABG w LIMA to LAD, SVG to intermediate coronary artery, SVG to PDA  - ASA, Coreg, intolerant of statin     H/O mural thrombus- coumadin  - check INR (decrease dose coumadin w amio)    Elevated trop- secondary to VT, monitor, no anginal symptoms    LEISA Boswell, PA-C  2022  2:32 PM          [facility] CARDIOLOGY  [address], [address]  PHONE: [phone]         CONSULT        22      NAME:  Calin Coronado  : 1960  MRN: [id number]      SUBJECTIVE:   Calin Coronado is a 58 y.o. male seen for a consultation visit regarding the following:     Chief Complaint   Patient presents with    AICD Problem            HPI:  Patient was in his usual state of health today.   He is 2 weeks status post CABG. He was doing well and exercised on laundry and was putting up dishes when he became lightheaded and experienced ICD shocks x5. There was no loss of consciousness. He has not had any anginal chest pain and no congestive heart failure symptoms. Specifically no orthopnea PND or chest pain. Continued dizziness and ventricular tachycardia spells persist.      Hospital Problems           Last Modified POA    * (Principal) Ventricular tachycardia ([address] 6/17/2022 Yes    Overview Addendum 6/10/2022  2:15 PM by Jordon Rolle MD     1. ICD discharge for VT on 3/19/20  2. Did 5/22 with ICD discharges. Underwent CABG and started on amiodarone. Chronic systolic congestive heart failure ([address] 6/17/2022 Yes    Overview Addendum 6/10/2022  2:12 PM by Jordon Rolle MD     Unable to tolerate lisinopril due to hypotension. 1.  Echo (1/26/05):  EF 40% with anterior and apical severe hypokinesis to   akinesis. 2.  Echo (4/5/11):  EF 25-30%. Mid anterior/anteroseptal/apical akinesis. Large protruding apical thrombus. Mild LA dilatation. No valvular   regurgitation/stenosis. 3.  Echo (8/2/11):  EF 30-35%. Anterior/apical/anteroseptal akinesis. Resolution of apical thrombus. 4.  Echo (3/28/18):  EF 30-35%. Anteroapical akinesis. No significant   valve disease. 5. Echo (5/26/22):  EF 25-30%. Apical AK. AS and AL HK. MIld LAE. No significant valve disease. CAD, multiple vessel 6/17/2022 Yes    Elevated troponin 6/17/2022 Yes    Coronary atherosclerosis of native coronary vessel 6/17/2022 Yes    Overview Addendum 6/10/2022  2:11 PM by Jordon Rolle MD     1. Anterior STEMI 1/25/05 (Late presentation)  a. Cath: LAD: 50% prox. 100% mid. D1: 95% prox. Ramus: 95% prox. Circ: Luminal irregularity. RCA: Luminal irregularity. b.  PCI:  T-stent of LAD/D1 with 2.25 X 18 Minivision in D1 and 2.25 X 23   Cypher in LAD.   PCI of ramus with 3.0 X 18 mm Cypher. 2.  Exercise Cardiolite (8/18/11):  Large fixed anterior, septal and   apical defect. No ischemia. EF 33%. 3.  Lexiscan Cardiolite (8/17/20):  Large fixed defect in mid/distal   anterior and distal inferior wall. EF 27%. No reversible ischemia. 4.  CABG (5/31/2022): LIMA to LAD, SVG to PDA, SVG to intermediate           Intracardiac thrombus 6/17/2022 Yes    Overview Signed 3/19/2022  1:10 PM by Anders, [email or web address]     Follow INR with home monitor  Echo 4/5/11:  Large protruding apical thrombus. Resolved with   anticoagulation. Indefinite anticoagulation planned.                 Allergies   Allergen Reactions    Penicillins Swelling     Face swelling    Atorvastatin Other (See Comments)     itching    Evolocumab Other (See Comments)     Itching    Lisinopril Other (See Comments)    Rosuvastatin Other (See Comments)     itching     Past Medical History:   Diagnosis Date    Acute hypoxemic respiratory failure due to COVID-19 ([address] 10/9/2021    CAD (coronary artery disease)     heart attack 2005 stentS HEART    CHF (congestive heart failure) ([address] 9/27/2011    Chronic systolic heart failure ([address] 10/2/2015    Coronary atherosclerosis of native coronary vessel 10/2/2015    Hyperlipidemia 10/2/2015    Hypoxemia requiring supplemental oxygen 10/6/2021    IGT (impaired glucose tolerance) 12/3/2017    Other ill-defined conditions(799.89)      blind left eye    Other ill-defined conditions(799.89)     cardiomyopathy    Pneumonia due to COVID-19 virus 11/13/2021    Resolved    Primary insomnia 6/7/2017    Psychiatric disorder     depression     Sepsis, unspecified 4/5/2011    Thromboembolus ([address]     thrombus in heart     Thrombus 10/2/2015     Past Surgical History:   Procedure Laterality Date    CARDIAC CATHETERIZATION      5 stents total    CARDIAC DEFIBRILLATOR PLACEMENT  2011    [employer]    CARDIAC PROCEDURE N/A 5/27/2022    LEFT HEART CATH / CORONARY ANGIOGRAPHY performed by Shayla Hull MD at [facility] CARDIAC CATH LAB    COLONOSCOPY  2010    CORONARY ARTERY BYPASS GRAFT N/A 2022    CORONARY ARTERY BYPASS GRAFT (CABG X 3), LIMA ; ENDOSCOPIC VEIN HARVEST, LEFT GREATER SAPHENOUS VEIN performed by Arthur Cruz MD at [facility] MAIN OR    HEENT      oral surgery    PACEMAKER      [place] CARDIAC SURG PROCEDURE UNLIST       1 stent 2005    TRANSESOPHAGEAL ECHOCARDIOGRAM N/A 2022    TRANSESOPHAGEAL ECHOCARDIOGRAM performed by Arthur Cruz MD at [facility] MAIN OR     Family History   Problem Relation Age of Onset    Heart Disease Mother     Diabetes Paternal Grandfather     Cancer Paternal Grandmother     Heart Disease Brother     Diabetes Maternal Grandmother     Bleeding Prob Father     Diabetes Mother     Heart Disease Paternal Grandfather     Heart Attack Mother 67        mi     Social History     Tobacco Use    Smoking status: Former Smoker     Packs/day: 1.00     Types: Cigarettes     Start date: 1995     Quit date: 2022     Years since quittin.0    Smokeless tobacco: Never Used   Substance Use Topics    Alcohol use: No           ROS:    Constitution: Negative for fever. Eyes: Negative for blurred vision. Respiratory: Negative for cough. Endocrine: Negative for cold intolerance and heat intolerance. Skin: Negative for rash. Musculoskeletal: Negative for myalgias. Gastrointestinal: Negative for diarrhea, nausea and vomiting. Genitourinary: Negative for dysuria. Neurological: Negative for headaches and numbness. PHYSICAL EXAM:     /67   Pulse 79   Temp 98.6 °F (37 °C) (Oral)   Resp 22   Ht 5' 9\" (1.753 m)   Wt 207 lb (93.9 kg)   SpO2 95%   BMI 30.57 kg/m²    Constitutional: Oriented to person, place, and time. Appears well-developed and well-nourished. Head: Normocephalic and atraumatic. Neck: Neck supple.    Cardiovascular: Normal rate and regular rhythm with no murmur -No JVP  Pulmonary/Chest: Breath sounds normal. Abdominal: Soft. Musculoskeletal: No edema. Neurological: Alert and oriented to person, place, and time. Skin: Skin is warm and dry. Psychiatric: Normal mood and affect. Vitals reviewed        Medical problems and test results were reviewed with the patient today.      Wt Readings from Last 3 Encounters:   06/17/22 207 lb (93.9 kg)   06/10/22 214 lb 12.8 oz (97.4 kg)   06/06/22 208 lb (94.3 kg)          Recent Results (from the past 672 hour(s))   Protime-INR    Collection Time: 05/24/22 12:00 AM   Result Value Ref Range    INR 2.60    Troponin    Collection Time: 05/26/22  3:50 AM   Result Value Ref Range    Troponin, High Sensitivity 41.2 (H) 0 - 14 pg/mL   CBC    Collection Time: 05/26/22  3:50 AM   Result Value Ref Range    WBC 11.8 (H) 4.3 - 11.1 K/uL    RBC 4.98 4.23 - 5.6 M/uL    Hemoglobin 15.0 13.6 - 17.2 g/dL    Hematocrit 45.5 41.1 - 50.3 %    MCV 91.4 79.6 - 97.8 FL    MCH 30.1 26.1 - 32.9 PG    MCHC 33.0 31.4 - 35.0 g/dL    RDW 15.1 (H) 11.9 - 14.6 %    Platelets 797 963 - 407 K/uL    MPV 10.5 9.4 - 12.3 FL    nRBC 0.00 0.0 - 0.2 K/uL   Comprehensive Metabolic Panel    Collection Time: 05/26/22  3:50 AM   Result Value Ref Range    Sodium 140 138 - 145 mmol/L    Potassium 4.2 3.5 - 5.1 mmol/L    Chloride 107 98 - 107 mmol/L    CO2 27 21 - 32 mmol/L    Anion Gap 6 (L) 7 - 16 mmol/L    Glucose 125 (H) 65 - 100 mg/dL    BUN 14 8 - 23 MG/DL    CREATININE 1.10 0.8 - 1.5 MG/DL    GFR African American >60 >60 ml/min/1.73m2    GFR Non- >60 >60 ml/min/1.73m2    Calcium 9.0 8.3 - 10.4 MG/DL    Total Bilirubin 0.4 0.2 - 1.1 MG/DL    ALT 16 12 - 65 U/L    AST 34 15 - 37 U/L    Alk Phosphatase 77 50 - 136 U/L    Total Protein 7.1 6.3 - 8.2 g/dL    Albumin 3.6 3.2 - 4.6 g/dL    Globulin 3.5 2.3 - 3.5 g/dL    Albumin/Globulin Ratio 1.0 (L) 1.2 - 3.5     Protime-INR    Collection Time: 05/26/22  3:50 AM   Result Value Ref Range    Protime 27.6 (H) 12.6 - 14.5 sec    INR 2.5     Troponin Collection Time: 05/26/22  6:09 AM   Result Value Ref Range    Troponin, High Sensitivity 43.7 (H) 0 - 14 pg/mL   TSH    Collection Time: 05/26/22  6:09 AM   Result Value Ref Range    TSH, 3RD GENERATION 3.410 0.358 - 3.740 uIU/mL   Magnesium    Collection Time: 05/26/22  6:09 AM   Result Value Ref Range    Magnesium 2.5 (H) 1.8 - 2.4 mg/dL   Transthoracic echocardiogram (TTE) complete with contrast, bubble, strain, and 3D PRN    Collection Time: 05/26/22  2:17 PM   Result Value Ref Range    LV EDV A2C 119 mL    LV EDV A4C 210 mL    LV ESV A2C 80 mL    LV ESV A4C 155 mL    IVSd 1.0 0.6 - 1.0 cm    LVIDd 5.6 4.2 - 5.9 cm    LVIDs 4.6 cm    LVOT Diameter 2.3 cm    LVOT Mean Gradient 1 mmHg    LVOT VTI 18.0 cm    LVOT Peak Velocity 0.9 m/s    LVOT Peak Gradient 3 mmHg    LVPWd 1.0 0.6 - 1.0 cm    LV E' Lateral Velocity 8 cm/s    LV E' Septal Velocity 6 cm/s    LV Ejection Fraction A2C 33 %    LV Ejection Fraction A4C 27 %    EF BP 29 (A) 55 - 100 %    LVOT Area 4.2 cm2    LVOT SV 74.7 ml    LA Minor Axis 4.7 cm    LA Major Clyde 5.1 cm    LA Area 2C 14.3 cm2    LA Area 4C 16.4 cm2    LA Volume BP 39 18 - 58 mL    LA Diameter 3.6 cm    AV Mean Velocity 0.7 m/s    AV Mean Gradient 2 mmHg    AV VTI 19.0 cm    AV Peak Velocity 1.0 m/s    AV Peak Gradient 4 mmHg    AV Area by VTI 3.9 cm2    AV Area by Peak Velocity 3.8 cm2    Aortic Root 4.1 cm    Ascending Aorta 3.4 cm    IVC Proxmal 1.7 cm    MV E Wave Deceleration Time 269.0 ms    MV A Velocity 0.76 m/s    MV E Velocity 0.45 m/s    MV Mean Gradient 1 mmHg    MV VTI 18.2 cm    MV Mean Velocity 0.5 m/s    MV Max Velocity 0.8 m/s    MV Peak Gradient 3 mmHg    MV Area by VTI 4.1 cm2    PV .0 ms    PV Max Velocity 1.0 m/s    PV Peak Gradient 4 mmHg    Est. RA Pressure 3 mmHg    RVIDd 2.8 cm    RV Basal Dimension 4.0 cm    TAPSE 2.2 1.7 cm    TR Max Velocity 1.99 m/s    TR Peak Gradient 16 mmHg    Body Surface Area 2.2 m2    Fractional Shortening 2D 18 28 - 44 %    LV ESV Index A4C 72 mL/m2    LV EDV Index A4C 98 mL/m2    LV ESV Index A2C 37 mL/m2    LV EDV Index A2C 55 mL/m2    LVIDd Index 2.60 cm/m2    LVIDs Index 2.14 cm/m2    LV RWT Ratio 0.36     LV Mass 2D 219.7 88 - 224 g    LV Mass 2D Index 102.2 49 - 115 g/m2    MV E/A 0.59     E/E' Ratio (Averaged) 6.56     E/E' Lateral 5.63     E/E' Septal 7.50     LA Volume Index BP 18 16 - 34 ml/m2    LVOT Stroke Volume Index 34.8 mL/m2    LA Size Index 1.67 cm/m2    LA/AO Root Ratio 0.88     Ao Root Index 1.91 cm/m2    Ascending Aorta Index 1.58 cm/m2    AV Velocity Ratio 0.90     LVOT:AV VTI Index 0.95     PATRICIA/BSA VTI 1.8 cm2/m2    PATRICIA/BSA Peak Velocity 1.8 cm2/m2    MV:LVOT VTI Index 1.01     RVSP 19 mmHg   EKG 12 lead    Collection Time: 05/27/22  5:43 AM   Result Value Ref Range    Ventricular Rate 71 BPM    Atrial Rate 71 BPM    P-R Interval 170 ms    QRS Duration 102 ms    Q-T Interval 404 ms    QTc Calculation (Bazett) 439 ms    P Axis 40 degrees    R Axis 64 degrees    T Axis 98 degrees    Diagnosis Normal sinus rhythm    Basic Metabolic Panel w/ Reflex to MG    Collection Time: 05/27/22  6:55 AM   Result Value Ref Range    Sodium 141 138 - 145 mmol/L    Potassium 4.3 3.5 - 5.1 mmol/L    Chloride 109 (H) 98 - 107 mmol/L    CO2 26 21 - 32 mmol/L    Anion Gap 6 (L) 7 - 16 mmol/L    Glucose 122 (H) 65 - 100 mg/dL    BUN 15 8 - 23 MG/DL    CREATININE 0.90 0.8 - 1.5 MG/DL    GFR African American >60 >60 ml/min/1.73m2    GFR Non- >60 >60 ml/min/1.73m2    Calcium 8.9 8.3 - 10.4 MG/DL   CBC    Collection Time: 05/27/22  6:55 AM   Result Value Ref Range    WBC 10.2 4.3 - 11.1 K/uL    RBC 4.90 4.23 - 5.6 M/uL    Hemoglobin 14.5 13.6 - 17.2 g/dL    Hematocrit 45.1 41.1 - 50.3 %    MCV 92.0 79.6 - 97.8 FL    MCH 29.6 26.1 - 32.9 PG    MCHC 32.2 31.4 - 35.0 g/dL    RDW 15.3 (H) 11.9 - 14.6 %    Platelets 733 113 - 341 K/uL    MPV 10.9 9.4 - 12.3 FL    nRBC 0.00 0.0 - 0.2 K/uL   Protime-INR    Collection Time: 05/27/22  6:55 AM   Result Value Ref Range    Protime 27.6 (H) 12.6 - 14.5 sec    INR 2.5     Cardiac procedure    Collection Time: 05/27/22  9:33 AM   Result Value Ref Range    Body Surface Area 2.2 m2   MSSA/MRSA Screen BY PCR    Collection Time: 05/27/22 12:53 PM    Specimen: Nasal Swab   Result Value Ref Range    Special Requests NO SPECIAL REQUESTS      Culture        SA target not detected. A MRSA NEGATIVE, SA NEGATIVE test result does not preclude MRSA or SA nasal colonization.    Urinalysis    Collection Time: 05/27/22  3:36 PM   Result Value Ref Range    Color, UA YELLOW      Appearance CLEAR      Specific Gravity, UA 1.020 1.001 - 1.023      pH, Urine 6.5 5.0 - 9.0      Protein, UA Negative NEG mg/dL    Glucose, UA Negative NEG mg/dL    Ketones, Urine Negative NEG mg/dL    Bilirubin Urine Negative NEG      Blood, Urine MODERATE (A) NEG      Urobilinogen, Urine 0.2 0.2 - 1.0 EU/dL    Nitrite, Urine Negative NEG      Leukocyte Esterase, Urine Negative NEG      WBC, UA 3-5 0 /hpf    RBC, UA 5-10 0 /hpf    Epithelial Cells UA 0-3 0 /hpf    BACTERIA, URINE 0 0 /hpf    Mucus, UA TRACE 0 /lpf    OTHER OBSERVATIONS RESULTS VERIFIED MANUALLY     Basic Metabolic Panel w/ Reflex to MG    Collection Time: 05/28/22  5:14 AM   Result Value Ref Range    Sodium 140 136 - 145 mmol/L    Potassium 3.8 3.5 - 5.1 mmol/L    Chloride 109 (H) 98 - 107 mmol/L    CO2 26 21 - 32 mmol/L    Anion Gap 5 (L) 7 - 16 mmol/L    Glucose 101 (H) 65 - 100 mg/dL    BUN 17 8 - 23 MG/DL    CREATININE 1.00 0.8 - 1.5 MG/DL    GFR African American >60 >60 ml/min/1.73m2    GFR Non- >60 >60 ml/min/1.73m2    Calcium 9.1 8.3 - 10.4 MG/DL   CBC    Collection Time: 05/28/22  5:14 AM   Result Value Ref Range    WBC 10.2 4.3 - 11.1 K/uL    RBC 4.73 4.23 - 5.6 M/uL    Hemoglobin 14.2 13.6 - 17.2 g/dL    Hematocrit 43.7 41.1 - 50.3 %    MCV 92.4 79.6 - 97.8 FL    MCH 30.0 26.1 - 32.9 PG    MCHC 32.5 31.4 - 35.0 g/dL    RDW CBC    Collection Time: 05/30/22  5:42 AM   Result Value Ref Range    WBC 9.2 4.3 - 11.1 K/uL    RBC 4.90 4.23 - 5.6 M/uL    Hemoglobin 14.4 13.6 - 17.2 g/dL    Hematocrit 44.9 41.1 - 50.3 %    MCV 91.6 79.6 - 97.8 FL    MCH 29.4 26.1 - 32.9 PG    MCHC 32.1 31.4 - 35.0 g/dL    RDW 14.6 11.9 - 14.6 %    Platelets 375 653 - 763 K/uL    MPV 10.8 9.4 - 12.3 FL    nRBC 0.00 0.0 - 0.2 K/uL   Protime-INR    Collection Time: 05/30/22  5:42 AM   Result Value Ref Range    Protime 15.7 (H) 12.6 - 14.5 sec    INR 1.2     TYPE AND SCREEN    Collection Time: 05/30/22  5:42 AM   Result Value Ref Range    Crossmatch expiration date 06/02/2022,2359     ABO/Rh O POSITIVE     Antibody Screen NEG     Unit Number [id number]     Product Code Blood Bank  LR     Unit Divison 00     Dispense Status Blood Bank REL FROM [facility]     Crossmatch Result Compatible     Unit Number [id number]     Product Code Blood Bank RC LR     Unit Divison 00     Dispense Status Blood Bank REL FROM [facility]     Crossmatch Result Compatible    PREPARE RBC (CROSSMATCH), 2 Units    Collection Time: 05/30/22  7:00 AM   Result Value Ref Range    History Check Historical check performed    POCT Glucose    Collection Time: 05/31/22  4:15 AM   Result Value Ref Range    POC Glucose 91 65 - 100 mg/dL    Performed by: Scooter    POC Heparin Assay    Collection Time: 05/31/22  8:06 AM   Result Value Ref Range    Love 72      Projected heparin concentration 3.6 mg/kg    Baseline  sec    Heparin Bolus-Patient 36,474 units    Heparin Bolus-Pump 0 units    Heparin Bolus-Total 36,474 units   POCT Blood Gas & Electrolytes    Collection Time: 05/31/22  8:14 AM   Result Value Ref Range    pH, Arterial, POC 7.43 7.35 - 7.45      pCO2, Arterial, POC 31.9 (L) 35 - 45 MMHG    pO2, Arterial,  (H) 75 - 100 MMHG    POC Sodium 141 136 - 145 MMOL/L    POC Potassium 4.1 3.5 - 5.1 MMOL/L    POC Ionized Calcium 1.05 (L) 1.12 - 1.32 mmol/L    POC Glucose 89 65 - 100 MG/DL    BASE DEFICIT (POC) 2.3 mmol/L    HCO3, Mixed 21.1 (L) 22 - 26 MMOL/L    POC TCO2 21 13 - 23 MMOL/L    POC O2  %    Source ARTERIAL      Performed by:  Ronaldo     POC GFR  Cannot be calculated >60 ml/min/1.73m2    Glomerular Filtration Rate, POC Cannot be calculated >60 ml/min/1.73m2   POCT Blood Gas & Electrolytes    Collection Time: 05/31/22  9:29 AM   Result Value Ref Range    pH, Arterial, POC 7.38 7.35 - 7.45      pCO2, Arterial, POC 38.0 35 - 45 MMHG    pO2, Arterial,  (H) 75 - 100 MMHG    POC Sodium 142 136 - 145 MMOL/L    POC Potassium 4.5 3.5 - 5.1 MMOL/L    POC Ionized Calcium 1.07 (L) 1.12 - 1.32 mmol/L    POC Glucose 124 (H) 65 - 100 MG/DL    BASE DEFICIT (POC) 2.3 mmol/L    HCO3, Mixed 22.5 22 - 26 MMOL/L    POC TCO2 23 13 - 23 MMOL/L    POC O2  %    Source ARTERIAL      Performed by: Deana(perfusionist)     POC GFR  Cannot be calculated >60 ml/min/1.73m2    Glomerular Filtration Rate, POC Cannot be calculated >60 ml/min/1.73m2   POC Heparin Protamine Titration    Collection Time: 05/31/22 10:03 AM   Result Value Ref Range    ACT AVERAGE - AAVG 499 sec    Heparin Required-Patient 5,298      Heparin Required-Total 6,121 units    Protamine Dose-Pump 41 mg    Heparin Test Concentrate 3.0 mg/kg    Protamine Dose-Total 304 mg   POCT Blood Gas & Electrolytes    Collection Time: 05/31/22 10:05 AM   Result Value Ref Range    pH, Arterial, POC 7.33 (L) 7.35 - 7.45      pCO2, Arterial, POC 44.3 35 - 45 MMHG    pO2, Arterial,  (H) 75 - 100 MMHG    POC Sodium 142 136 - 145 MMOL/L    POC Potassium 4.5 3.5 - 5.1 MMOL/L    POC Ionized Calcium 1.05 (L) 1.12 - 1.32 mmol/L    POC Glucose 117 (H) 65 - 100 MG/DL    BASE DEFICIT (POC) 2.5 mmol/L    HCO3, Mixed 23.4 22 - 26 MMOL/L    POC TCO2 24 (H) 13 - 23 MMOL/L    POC O2 SAT 99 %    Source ARTERIAL      Performed by: Deana(perfusionist)     POC GFR  Cannot be calculated >60 ml/min/1.73m2    Glomerular Filtration Rate, POC Cannot be calculated >60 ml/min/1.73m2   POC Heparin Protamine Titration    Collection Time: 05/31/22 10:16 AM   Result Value Ref Range    Heparin Required-Patient 919      Heparin Required-Total 1,062 units    Protamine Dose-Pump 48 mg    Heparin Test Concentrate 3.5 mg/kg    Protamine Dose-Total 355 mg   POC Heparin Protamine Titration    Collection Time: 05/31/22 10:31 AM   Result Value Ref Range    ACT AVERAGE - AAVG 584 sec    Heparin Required-Patient 919      Heparin Required-Total 1,062 units    Protamine Dose-Pump 48 mg    Heparin Test Concentrate 3.5 mg/kg    Protamine Dose-Total 355 mg   POCT Blood Gas & Electrolytes    Collection Time: 05/31/22 10:32 AM   Result Value Ref Range    pH, Arterial, POC 7.32 (L) 7.35 - 7.45      pCO2, Arterial, POC 46.5 (H) 35 - 45 MMHG    pO2, Arterial,  (H) 75 - 100 MMHG    POC Sodium 142 136 - 145 MMOL/L    POC Potassium 5.2 (H) 3.5 - 5.1 MMOL/L    POC Ionized Calcium 1.06 (L) 1.12 - 1.32 mmol/L    POC Glucose 112 (H) 65 - 100 MG/DL    BASE DEFICIT (POC) 2.2 mmol/L    HCO3, Mixed 24.1 22 - 26 MMOL/L    POC TCO2 24 (H) 13 - 23 MMOL/L    POC O2  %    Source ARTERIAL      Performed by: Deana(perfusionist)     POC GFR  Cannot be calculated >60 ml/min/1.73m2    Glomerular Filtration Rate, POC Cannot be calculated >60 ml/min/1.73m2   POCT Blood Gas & Electrolytes    Collection Time: 05/31/22 11:02 AM   Result Value Ref Range    pH, Arterial, POC 7.35 7.35 - 7.45      pCO2, Arterial, POC 43.0 35 - 45 MMHG    pO2, Arterial,  (H) 75 - 100 MMHG    POC Sodium 141 136 - 145 MMOL/L    POC Potassium 5.4 (H) 3.5 - 5.1 MMOL/L    POC Ionized Calcium 1.04 (L) 1.12 - 1.32 mmol/L    POC Glucose 104 (H) 65 - 100 MG/DL    BASE DEFICIT (POC) 1.9 mmol/L    HCO3, Mixed 23.7 22 - 26 MMOL/L    POC TCO2 24 (H) 13 - 23 MMOL/L    POC O2  %    Source ARTERIAL      Performed by: Deana(perfusionist)     POC GFR  Cannot be calculated >60 ml/min/1.73m2    Glomerular Filtration Rate, POC Cannot be calculated >60 ml/min/1.73m2   POC Heparin Protamine Titration    Collection Time: 05/31/22 11:03 AM   Result Value Ref Range    ACT AVERAGE - AAVG 648 sec    Heparin Required-Patient 919      Heparin Required-Total 1,062 units    Protamine Dose-Pump 48 mg    Heparin Test Concentrate 3.5 mg/kg    Protamine Dose-Total 355 mg   POCT Blood Gas & Electrolytes    Collection Time: 05/31/22 11:36 AM   Result Value Ref Range    pH, Arterial, POC 7.33 (L) 7.35 - 7.45      pCO2, Arterial, POC 40.4 35 - 45 MMHG    pO2, Arterial, POC 92 75 - 100 MMHG    POC Sodium 142 136 - 145 MMOL/L    POC Potassium 4.6 3.5 - 5.1 MMOL/L    POC Ionized Calcium 1.26 1.12 - 1.32 mmol/L    POC Glucose 103 (H) 65 - 100 MG/DL    BASE DEFICIT (POC) 4.3 mmol/L    HCO3, Mixed 21.3 (L) 22 - 26 MMOL/L    POC TCO2 22 13 - 23 MMOL/L    POC O2 SAT 97 %    Source ARTERIAL      Performed by: Deana(perfusionist)     POC GFR  Cannot be calculated >60 ml/min/1.73m2    Glomerular Filtration Rate, POC Cannot be calculated >60 ml/min/1.73m2   POC Coagulation Time, Activated    Collection Time: 05/31/22 11:39 AM   Result Value Ref Range    ACT AVERAGE - AAVG 120 sec   POC Blood, Cord, Arterial    Collection Time: 05/31/22 12:33 PM   Result Value Ref Range    DEVICE ET TUBE      FIO2 60 %    pH, Arterial, POC 7.34 (L) 7.35 - 7.45      pCO2, Arterial, POC 40.3 35 - 45 MMHG    pO2, Arterial, POC 90 75 - 100 MMHG    HCO3, Mixed 21.8 (L) 22 - 26 MMOL/L    SO2c, Arterial, POC 96.5 95 - 98 %    BASE DEFICIT (POC) 3.8 mmol/L    Mode SIMV      POC TIDAL VOLUME 500 ml    POC PEEP 8 cmH2O    POC Pressure Support 14 cmH2O    POC King's Test NOT APPLICABLE      Respiratory Rate 18      Site DRAWN FROM ARTERIAL LINE      Specimen type: ARTERIAL      Performed by: Natalie Line    POCT Glucose    Collection Time: 05/31/22 12:33 PM   Result Value Ref Range    POC Glucose 111 (H) 65 - 100 mg/dL    Performed by: Fredrick    CBC    Collection Time: 05/31/22 12:49 PM   Result Value Ref Range    WBC 16.4 (H) 4.3 - 11.1 K/uL    RBC 4.01 (L) 4.23 - 5.6 M/uL    Hemoglobin 12.2 (L) 13.6 - 17.2 g/dL    Hematocrit 37.4 (L) 41.1 - 50.3 %    MCV 93.3 79.6 - 97.8 FL    MCH 30.4 26.1 - 32.9 PG    MCHC 32.6 31.4 - 35.0 g/dL    RDW 14.6 11.9 - 14.6 %    Platelets 771 235 - 824 K/uL    MPV 10.8 9.4 - 12.3 FL    nRBC 0.00 0.0 - 0.2 K/uL   Basic Metabolic Panel    Collection Time: 05/31/22 12:49 PM   Result Value Ref Range    Sodium 142 138 - 145 mmol/L    Potassium 4.4 3.5 - 5.1 mmol/L    Chloride 114 (H) 98 - 107 mmol/L    CO2 23 21 - 32 mmol/L    Anion Gap 5 (L) 7 - 16 mmol/L    Glucose 109 (H) 65 - 100 mg/dL    BUN 17 8 - 23 MG/DL    CREATININE 1.00 0.8 - 1.5 MG/DL    GFR African American >60 >60 ml/min/1.73m2    GFR Non- >60 >60 ml/min/1.73m2    Calcium 8.0 (L) 8.3 - 10.4 MG/DL   APTT    Collection Time: 05/31/22 12:49 PM   Result Value Ref Range    PTT 35.3 (H) 24.1 - 35.1 SEC   Protime-INR    Collection Time: 05/31/22 12:49 PM   Result Value Ref Range    Protime 17.5 (H) 12.6 - 14.5 sec    INR 1.4     Fibrinogen    Collection Time: 05/31/22 12:49 PM   Result Value Ref Range    Fibrinogen 224 190 - 501 mg/dL   Magnesium    Collection Time: 05/31/22 12:49 PM   Result Value Ref Range    Magnesium 4.0 (H) 1.8 - 2.4 mg/dL   POCT Glucose    Collection Time: 05/31/22  1:06 PM   Result Value Ref Range    POC Glucose 111 (H) 65 - 100 mg/dL    Performed by: Kitsy Lane    POCT Glucose    Collection Time: 05/31/22  2:02 PM   Result Value Ref Range    POC Glucose 104 (H) 65 - 100 mg/dL    Performed by: Kitsy Lane    EKG 12 Lead    Collection Time: 05/31/22  2:02 PM   Result Value Ref Range    Ventricular Rate 66 BPM    Atrial Rate 66 BPM    P-R Interval 216 ms    QRS Duration 96 ms    Q-T Interval 490 ms    QTc Calculation EnzoJosue) 513 ms    P Axis 57 degrees    R Axis 32 degrees    T Axis 96 degrees    Diagnosis Sinus rhythm with 1st degree A-V block    POCT Glucose    Collection Time: 05/31/22  2:59 PM   Result Value Ref Range    POC Glucose 106 (H) 65 - 100 mg/dL    Performed by: Tawnya Aguilar    POCT Glucose    Collection Time: 05/31/22  3:57 PM   Result Value Ref Range    POC Glucose 106 (H) 65 - 100 mg/dL    Performed by: Tawnya Aguilar    Basic Metabolic Panel    Collection Time: 05/31/22  4:08 PM   Result Value Ref Range    Sodium 143 136 - 145 mmol/L    Potassium 4.4 3.5 - 5.1 mmol/L    Chloride 113 (H) 98 - 107 mmol/L    CO2 24 21 - 32 mmol/L    Anion Gap 6 (L) 7 - 16 mmol/L    Glucose 98 65 - 100 mg/dL    BUN 15 8 - 23 MG/DL    CREATININE 1.00 0.8 - 1.5 MG/DL    GFR African American >60 >60 ml/min/1.73m2    GFR Non- >60 >60 ml/min/1.73m2    Calcium 7.7 (L) 8.3 - 10.4 MG/DL   CBC    Collection Time: 05/31/22  4:08 PM   Result Value Ref Range    WBC 13.7 (H) 4.3 - 11.1 K/uL    RBC 3.65 (L) 4.23 - 5.6 M/uL    Hemoglobin 10.9 (L) 13.6 - 17.2 g/dL    Hematocrit 34.2 (L) 41.1 - 50.3 %    MCV 93.7 79.6 - 97.8 FL    MCH 29.9 26.1 - 32.9 PG    MCHC 31.9 31.4 - 35.0 g/dL    RDW 14.6 11.9 - 14.6 %    Platelets 739 802 - 765 K/uL    MPV 11.1 9.4 - 12.3 FL    nRBC 0.00 0.0 - 0.2 K/uL   Magnesium    Collection Time: 05/31/22  4:08 PM   Result Value Ref Range    Magnesium 2.9 (H) 1.8 - 2.4 mg/dL   POCT Glucose    Collection Time: 05/31/22  5:02 PM   Result Value Ref Range    POC Glucose 107 (H) 65 - 100 mg/dL    Performed by: Tawnya Aguilar    POCT Glucose    Collection Time: 05/31/22  6:00 PM   Result Value Ref Range    POC Glucose 108 (H) 65 - 100 mg/dL    Performed by: Tawnya Aguilar    POCT Glucose    Collection Time: 05/31/22  6:55 PM   Result Value Ref Range    POC Glucose 95 65 - 100 mg/dL    Performed by: Laura.     POCT Glucose    Collection Time: 05/31/22  7:52 PM   Result Value Ref Range    POC Glucose 95 65 - 100 mg/dL    Performed by: Roxanne Nielsen. Hemoglobin and Hematocrit    Collection Time: 05/31/22  8:58 PM   Result Value Ref Range    Hemoglobin 11.5 (L) 13.6 - 17.2 g/dL    Hematocrit 35.2 (L) 41.1 - 50.3 %   Potassium    Collection Time: 05/31/22  8:58 PM   Result Value Ref Range    Potassium 4.6 3.5 - 5.1 mmol/L   Magnesium    Collection Time: 05/31/22  8:58 PM   Result Value Ref Range    Magnesium 2.6 (H) 1.8 - 2.4 mg/dL   POCT Glucose    Collection Time: 05/31/22  9:02 PM   Result Value Ref Range    POC Glucose 120 (H) 65 - 100 mg/dL    Performed by: Roxanne Nielsen. POCT Glucose    Collection Time: 05/31/22 10:05 PM   Result Value Ref Range    POC Glucose 109 (H) 65 - 100 mg/dL    Performed by: Roxanne Nielsen. POCT Glucose    Collection Time: 05/31/22 10:57 PM   Result Value Ref Range    POC Glucose 92 65 - 100 mg/dL    Performed by: Roxanne Nielsen. POCT Glucose    Collection Time: 06/01/22 12:08 AM   Result Value Ref Range    POC Glucose 97 65 - 100 mg/dL    Performed by: Roxanne Nielsen. POCT Glucose    Collection Time: 06/01/22  1:05 AM   Result Value Ref Range    POC Glucose 87 65 - 100 mg/dL    Performed by: Laura. POCT Glucose    Collection Time: 06/01/22  2:00 AM   Result Value Ref Range    POC Glucose 80 65 - 100 mg/dL    Performed by: Roxanne Nielsen. POCT Glucose    Collection Time: 06/01/22  3:23 AM   Result Value Ref Range    POC Glucose 94 65 - 100 mg/dL    Performed by: Roxanne Nielsen.     CBC with Auto Differential    Collection Time: 06/01/22  3:30 AM   Result Value Ref Range    WBC 13.5 (H) 4.3 - 11.1 K/uL    RBC 3.64 (L) 4.23 - 5.6 M/uL    Hemoglobin 10.8 (L) 13.6 - 17.2 g/dL    Hematocrit 33.8 (L) 41.1 - 50.3 %    MCV 92.9 79.6 - 97.8 FL    MCH 29.7 26.1 - 32.9 PG    MCHC 32.0 31.4 - 35.0 g/dL    RDW 14.6 11.9 - 14.6 %    Platelets 016 227 - 375 K/uL    MPV 11.1 9.4 - 12.3 FL    nRBC 0.00 0.0 - 0.2 K/uL    Differential Type AUTOMATED      Seg Neutrophils 75 43 - 78 % Lymphocytes 12 (L) 13 - 44 %    Monocytes 12 4.0 - 12.0 %    Eosinophils % 0 (L) 0.5 - 7.8 %    Basophils 0 0.0 - 2.0 %    Immature Granulocytes 1 0.0 - 5.0 %    Segs Absolute 10.3 (H) 1.7 - 8.2 K/UL    Absolute Lymph # 1.6 0.5 - 4.6 K/UL    Absolute Mono # 1.6 (H) 0.1 - 1.3 K/UL    Absolute Eos # 0.0 0.0 - 0.8 K/UL    Basophils Absolute 0.0 0.0 - 0.2 K/UL    Absolute Immature Granulocyte 0.1 0.0 - 0.5 K/UL   Basic Metabolic Panel    Collection Time: 06/01/22  3:30 AM   Result Value Ref Range    Sodium 141 136 - 145 mmol/L    Potassium 4.2 3.5 - 5.1 mmol/L    Chloride 110 (H) 98 - 107 mmol/L    CO2 26 21 - 32 mmol/L    Anion Gap 5 (L) 7 - 16 mmol/L    Glucose 82 65 - 100 mg/dL    BUN 13 8 - 23 MG/DL    CREATININE 0.90 0.8 - 1.5 MG/DL    GFR African American >60 >60 ml/min/1.73m2    GFR Non- >60 >60 ml/min/1.73m2    Calcium 7.8 (L) 8.3 - 10.4 MG/DL   Magnesium    Collection Time: 06/01/22  3:30 AM   Result Value Ref Range    Magnesium 2.5 (H) 1.8 - 2.4 mg/dL   POCT Glucose    Collection Time: 06/01/22  4:56 AM   Result Value Ref Range    POC Glucose 94 65 - 100 mg/dL    Performed by: Laura. POCT Glucose    Collection Time: 06/01/22  6:00 AM   Result Value Ref Range    POC Glucose 101 (H) 65 - 100 mg/dL    Performed by: Héctor Stratton. POCT Glucose    Collection Time: 06/01/22  7:13 AM   Result Value Ref Range    POC Glucose 98 65 - 100 mg/dL    Performed by: Estephanie    POCT Glucose    Collection Time: 06/01/22  3:55 PM   Result Value Ref Range    POC Glucose 111 (H) 65 - 100 mg/dL    Performed by:  Huseyin    PLEASE READ & DOCUMENT PPD TEST IN 48 HRS    Collection Time: 06/01/22  9:00 PM   Result Value Ref Range    PPD, (POC) Negative Negative    mm Induration 0 0 - 5 mm   POCT Glucose    Collection Time: 06/01/22  9:05 PM   Result Value Ref Range    POC Glucose 120 (H) 65 - 100 mg/dL    Performed by: Marcellus Swartz    POCT Glucose    Collection Time: 06/02/22 6:03 AM   Result Value Ref Range    POC Glucose 132 (H) 65 - 100 mg/dL    Performed by: [facility]    CBC    Collection Time: 06/02/22  6:57 AM   Result Value Ref Range    WBC 23.2 (H) 4.3 - 11.1 K/uL    RBC 3.88 (L) 4.23 - 5.6 M/uL    Hemoglobin 11.6 (L) 13.6 - 17.2 g/dL    Hematocrit 36.1 (L) 41.1 - 50.3 %    MCV 93.0 79.6 - 97.8 FL    MCH 29.9 26.1 - 32.9 PG    MCHC 32.1 31.4 - 35.0 g/dL    RDW 14.6 11.9 - 14.6 %    Platelets 689 741 - 585 K/uL    MPV 11.4 9.4 - 12.3 FL    nRBC 0.00 0.0 - 0.2 K/uL   Basic Metabolic Panel    Collection Time: 06/02/22  6:57 AM   Result Value Ref Range    Sodium 132 (L) 138 - 145 mmol/L    Potassium 4.5 3.5 - 5.1 mmol/L    Chloride 103 98 - 107 mmol/L    CO2 25 21 - 32 mmol/L    Anion Gap 4 (L) 7 - 16 mmol/L    Glucose 113 (H) 65 - 100 mg/dL    BUN 13 8 - 23 MG/DL    CREATININE 1.00 0.8 - 1.5 MG/DL    GFR African American >60 >60 ml/min/1.73m2    GFR Non- >60 >60 ml/min/1.73m2    Calcium 8.4 8.3 - 10.4 MG/DL   Magnesium    Collection Time: 06/02/22  6:57 AM   Result Value Ref Range    Magnesium 2.4 1.8 - 2.4 mg/dL   POCT Glucose    Collection Time: 06/02/22 11:07 AM   Result Value Ref Range    POC Glucose 123 (H) 65 - 100 mg/dL    Performed by: CBA PHARMA    POCT Glucose    Collection Time: 06/02/22  3:23 PM   Result Value Ref Range    POC Glucose 122 (H) 65 - 100 mg/dL    Performed by: CBA PHARMA    Urinalysis w rflx microscopic    Collection Time: 06/02/22  4:06 PM   Result Value Ref Range    Color, UA YELLOW      Appearance CLEAR      Specific Gravity, UA >1.030 (H) 1.001 - 1.023    pH, Urine 5.5 5.0 - 9.0      Protein, UA TRACE (A) NEG mg/dL    Glucose, UA Negative NEG mg/dL    Ketones, Urine 15 (A) NEG mg/dL    Bilirubin Urine SMALL (A) NEG      Blood, Urine LARGE (A) NEG      Urobilinogen, Urine 1.0 0.2 - 1.0 EU/dL    Nitrite, Urine Negative NEG      Leukocyte Esterase, Urine Negative NEG      WBC, UA 3-5 0 /hpf    RBC, UA 0-3 0 /hpf Epithelial Cells UA 0-3 0 /hpf    BACTERIA, URINE 2+ (H) 0 /hpf    Casts HYALINE 0 /lpf    Mucus, UA 1+ (H) 0 /lpf    OTHER OBSERVATIONS RESULTS VERIFIED MANUALLY     POCT Glucose    Collection Time: 06/02/22  8:26 PM   Result Value Ref Range    POC Glucose 99 65 - 100 mg/dL    Performed by: German Goldberg    PLEASE READ & DOCUMENT PPD TEST IN 48 HRS    Collection Time: 06/02/22  8:30 PM   Result Value Ref Range    PPD, (POC) Negative Negative    mm Induration 0 0 - 5 mm   POCT Glucose    Collection Time: 06/03/22  6:19 AM   Result Value Ref Range    POC Glucose 94 65 - 100 mg/dL    Performed by: German Goldberg    Magnesium    Collection Time: 06/03/22  6:37 AM   Result Value Ref Range    Magnesium 2.3 1.8 - 2.4 mg/dL   Potassium    Collection Time: 06/03/22  6:37 AM   Result Value Ref Range    Potassium 3.7 3.5 - 5.1 mmol/L   Potassium    Collection Time: 06/04/22  6:05 AM   Result Value Ref Range    Potassium 4.0 3.5 - 5.1 mmol/L   Potassium    Collection Time: 06/05/22  6:09 AM   Result Value Ref Range    Potassium 3.7 3.5 - 5.1 mmol/L   Protime-INR    Collection Time: 06/07/22 12:00 AM   Result Value Ref Range    INR 2.80    Protime-INR    Collection Time: 06/13/22 12:00 AM   Result Value Ref Range    INR 2.70    EKG 12 Lead    Collection Time: 06/17/22  2:12 PM   Result Value Ref Range    Ventricular Rate 94 BPM    Atrial Rate 94 BPM    P-R Interval 173 ms    QRS Duration 109 ms    Q-T Interval 384 ms    QTc Calculation (Bazett) 481 ms    P Axis 53 degrees    R Axis 92 degrees    T Axis 89 degrees    Diagnosis Sinus tachycardia    Troponin    Collection Time: 06/17/22  2:20 PM   Result Value Ref Range    Troponin, High Sensitivity 329.9 (HH) 0 - 14 pg/mL   CBC    Collection Time: 06/17/22  2:20 PM   Result Value Ref Range    WBC 12.7 (H) 4.3 - 11.1 K/uL    RBC 3.20 (L) 4.23 - 5.6 M/uL    Hemoglobin 9.3 (L) 13.6 - 17.2 g/dL    Hematocrit 29.5 (L) 41.1 - 50.3 %    MCV 92.2 79.6 - 97.8 FL    MCH 29.1 26.1 - 32.9 PG    MCHC 31.5 31.4 - 35.0 g/dL    RDW 14.9 (H) 11.9 - 14.6 %    Platelets 483 (H) 737 - 450 K/uL    MPV 9.1 (L) 9.4 - 12.3 FL    nRBC 0.00 0.0 - 0.2 K/uL   Comprehensive Metabolic Panel    Collection Time: 06/17/22  2:20 PM   Result Value Ref Range    Sodium 139 138 - 145 mmol/L    Potassium 3.8 3.5 - 5.1 mmol/L    Chloride 105 98 - 107 mmol/L    CO2 28 21 - 32 mmol/L    Anion Gap 6 (L) 7 - 16 mmol/L    Glucose 148 (H) 65 - 100 mg/dL    BUN 10 8 - 23 MG/DL    CREATININE 1.10 0.8 - 1.5 MG/DL    GFR African American >60 >60 ml/min/1.73m2    GFR Non- >60 >60 ml/min/1.73m2    Calcium 8.1 (L) 8.3 - 10.4 MG/DL    Total Bilirubin 0.2 0.2 - 1.1 MG/DL    ALT 18 12 - 65 U/L    AST 28 15 - 37 U/L    Alk Phosphatase 79 50 - 136 U/L    Total Protein 6.5 6.3 - 8.2 g/dL    Albumin 2.5 (L) 3.2 - 4.6 g/dL    Globulin 4.0 (H) 2.3 - 3.5 g/dL    Albumin/Globulin Ratio 0.6 (L) 1.2 - 3.5     Magnesium    Collection Time: 06/17/22  2:20 PM   Result Value Ref Range    Magnesium 2.4 1.8 - 2.4 mg/dL     Lab Results   Component Value Date    CHOL 241 06/14/2021    HDL 27 06/14/2021    VLDL 45 06/14/2021         ASSESSMENT and PLAN    Ventricular tachycardia storm. The patient is having multiple runs of VT with ICD shocks. VT runs are shorter but persist.  IV amiodarone was given by bolus twice with 2 g of magnesium given as well as 2 doses of Lopressor. Rhythm has stabilized at this point. We will have EP see. Patient does not have any acute heart failure or anginal symptoms. Patient has severely reduced LV systolic function and this seems to be a primary electrical event. I spent greater than 50% of this patient interaction performed by me representing 55 minutes. Thank you for allowing me to participate in this patient's care. Please call or contact me if there are any questions or concerns regarding the above.       Carie Todd MD  06/17/22  3:36 PM

## 2022-06-17 NOTE — ED PROVIDER NOTES
[employer] Emergency Department Provider Note                   PCP:                Nupur Morrison DO               Age: 58 y.o. Sex: male     No diagnosis found. DISPOSITION         New Prescriptions    No medications on file       No orders of the defined types were placed in this encounter. China Torres DO 2:17 PM      MDM  Number of Diagnoses or Management Options  Defibrillator discharge: new, needed workup  Ventricular tachycardia [facility]): new, needed workup  Diagnosis management comments: Bedside ultrasound shows no significant pericardial effusion that I can appreciate. EKG interpretation: Sinus rhythm, rate of 94, normal axis, no obvious ischemia, occasional PVC noted. Orders placed for amiodarone bolus and drip, pharmacy confirms and will obtain this medication. Call placed to cardiology for discussion. Critical care time: 36 minutes of critical care time was performed in the emergency department. This was separate from any other procedures listed during the patients emergency department course. The failure to initiate these interventions on an urgent basis would likely have resulted in sudden, clinically significant or life-threatening deterioration in the patients condition.          Amount and/or Complexity of Data Reviewed  Clinical lab tests: ordered and reviewed  Tests in the radiology section of CPT®: ordered and reviewed  Tests in the medicine section of CPT®: ordered and reviewed  Independent visualization of images, tracings, or specimens: yes    Risk of Complications, Morbidity, and/or Mortality  Presenting problems: high  Diagnostic procedures: high  Management options: high    Patient Progress  Patient progress: stable       Zachery Grimaldo is a 58 y.o. male who presents to the Emergency Department with chief complaint of    Chief Complaint   Patient presents with    AICD Problem      58-year-old male patient with a long history of cardiac disease, previous pacemaker/defibrillator placement and now bypass surgery presents to this department emergently via an EMS. Patient states he woke up feeling well this morning however noted his heart rate increase, began to feel very dizzy and weak and then experienced 3 separate discharges of his defibrillator back to back. Patient states symptoms improved slightly, he reports taking 1 nitro for chest discomfort. Symptoms recurred followed by 2 more discharges of his defibrillator. EMS was called to scene and transported patient to this department for further evaluation. EMS reports stable vitals in route however just prior to arriving to this facility noted several runs of ventricular tachycardia. Patient underwent bypass surgery with Dr. Telma Ocampo approximately 2 weeks ago. Prior to that he has had multiple MIs, previous stenting. He is followed by Dr. Randi Wheat of [facility] cardiology. Patient reports fatigue and lightheadedness at this time. The history is provided by the patient and the EMS personnel. No  was used. Review of Systems   Constitutional: Negative for chills and fatigue. HENT: Negative for congestion. Eyes: Negative for visual disturbance. Respiratory: Positive for chest tightness and shortness of breath. Negative for cough and wheezing. Cardiovascular: Positive for chest pain. Negative for leg swelling. Gastrointestinal: Negative for abdominal distention, abdominal pain, constipation, nausea and vomiting. Genitourinary: Negative for difficulty urinating, dysuria, flank pain and hematuria. Musculoskeletal: Negative for back pain, neck pain and neck stiffness. Skin: Negative for color change. Neurological: Positive for dizziness, weakness and light-headedness. Negative for numbness and headaches. All other systems reviewed and are negative.       Past Medical History:   Diagnosis Date    Acute hypoxemic respiratory failure due to COVID-19 ([facility] 10/9/2021    CAD (coronary artery disease)     heart attack 2005 stentS HEART    CHF (congestive heart failure) ([facility] [address] 9/27/2011    Chronic systolic heart failure ([address] 10/2/2015    Coronary atherosclerosis of native coronary vessel 10/2/2015    Hyperlipidemia 10/2/2015    Hypoxemia requiring supplemental oxygen 10/6/2021    IGT (impaired glucose tolerance) 12/3/2017    Other ill-defined conditions(799.89)      blind left eye    Other ill-defined conditions(799.89)     cardiomyopathy    Pneumonia due to COVID-19 virus 11/13/2021    Resolved    Primary insomnia 6/7/2017    Psychiatric disorder     depression     Sepsis, unspecified 4/5/2011    Thromboembolus ([facility] [address]     thrombus in heart     Thrombus 10/2/2015        Past Surgical History:   Procedure Laterality Date    CARDIAC CATHETERIZATION      5 stents total    CARDIAC DEFIBRILLATOR PLACEMENT  2011    [facility] N/A 5/27/2022    LEFT HEART CATH / CORONARY ANGIOGRAPHY performed by Tigre Escoto MD at [address] [place] [address] CATH LAB    COLONOSCOPY  12/2010    CORONARY ARTERY BYPASS GRAFT N/A 5/31/2022    CORONARY ARTERY BYPASS GRAFT (CABG X 3), [place] ; ENDOSCOPIC VEIN HARVEST, LEFT GREATER SAPHENOUS VEIN performed by Deisy Henry MD at [facility] MAIN OR    HEENT      oral surgery    PACEMAKER      SC CARDIAC SURG PROCEDURE UNLIST       1 stent 2005    TRANSESOPHAGEAL ECHOCARDIOGRAM N/A 5/31/2022    TRANSESOPHAGEAL ECHOCARDIOGRAM performed by Deisy Henry MD at [facility] MAIN OR        Family History   Problem Relation Age of Onset    Heart Disease Mother     Diabetes Paternal Grandfather     Cancer Paternal Grandmother     Heart Disease Brother     Diabetes Maternal Grandmother     Bleeding Prob Father     Diabetes Mother     Heart Disease Paternal Grandfather     Heart Attack Mother 67        mi           Social Connections:     Frequency of Communication with Friends and Family: Not on file    Frequency of Social Gatherings with Friends and Family: Not on file    Attends Tenriism Services: Not on file    Active Member of Clubs or Organizations: Not on file    Attends Club or Organization Meetings: Not on file    Marital Status: Not on file        Allergies   Allergen Reactions    Penicillins Swelling     Face swelling    Atorvastatin Other (See Comments)     itching    Evolocumab Other (See Comments)     Itching    Lisinopril Other (See Comments)    Rosuvastatin Other (See Comments)     itching        Vitals signs and nursing note reviewed. Patient Vitals for the past 4 hrs:   Temp Pulse Resp BP SpO2   06/17/22 1412 98.6 °F (37 °C) 94 20 120/71 94 %          Physical Exam  Vitals and nursing note reviewed. Constitutional:       General: He is not in acute distress. Appearance: Normal appearance. He is normal weight. He is not ill-appearing or toxic-appearing. Comments: Somewhat pale appearing male patient,  alert and oriented x4. No acute distress, speaks in clear, fluid sentences. HENT:      Head: Normocephalic and atraumatic. Right Ear: External ear normal.      Left Ear: External ear normal.      Nose: Nose normal.      Mouth/Throat:      Mouth: Mucous membranes are moist.   Eyes:      General: No scleral icterus. Right eye: No discharge. Left eye: No discharge. Extraocular Movements: Extraocular movements intact. Cardiovascular:      Rate and Rhythm: Normal rate and regular rhythm. Pulses: Normal pulses. Heart sounds: Normal heart sounds. Pulmonary:      Effort: Pulmonary effort is normal. No respiratory distress. Abdominal:      General: Abdomen is flat. There is no distension. Palpations: There is no mass. Tenderness: There is no abdominal tenderness. There is no right CVA tenderness, left CVA tenderness, guarding or rebound. Negative signs include Robles's sign and McBurney's sign. Hernia: No hernia is present.       Comments: Subacute bruising to the lower abdominal quadrants. No reproducible bilateral discomfort. No rebound guarding or distention. Musculoskeletal:         General: No swelling, tenderness or deformity. Normal range of motion. Cervical back: Normal range of motion. Skin:     General: Skin is warm. Capillary Refill: Capillary refill takes less than 2 seconds. Neurological:      General: No focal deficit present. Mental Status: He is alert. Psychiatric:         Mood and Affect: Mood normal.          Procedures      Labs Reviewed - No data to display     No orders to display                    ED Course as of 06/17/22 1802   Fri Jun 17, 2022   1437 Cardiology at bedside, request IV push of magnesium as well as a small dose of 2.5 mg of Lopressor. This been ordered and administered. Labs are pending, patient has had recurrent but short runs of ventricular tachycardia, no further discharge of device. Will interrogate [employer] device, RN notified. [BR]      ED Course User Index  [BR] [address] Doctor Marquez [facility],         Voice dictation software was used during the making of this note. This software is not perfect and grammatical and other typographical errors may be present. This note has not been completely proofread for errors.      601 Doctor Jimmy [facility], DO  06/17/22 1421       [address] Marquez [facility], DO  06/17/22 88 Rucierra Glass, DO  06/17/22 1803

## 2022-06-17 NOTE — ED TRIAGE NOTES
Pt arrives via [facility] from home for complaint of CP. EMS states that Pt had been shocked by his AICD 5 times prior to their arrival. EMS states they saw the pt had several runs of v-tach en route.

## 2022-06-17 NOTE — PROGRESS NOTES
completed initial visit with patient. Patient expressed that he has had unexpected medical issues, but is coping well and has excellent support from family and Buddhism.  provided pastoral presence, prayer and empathetic listening.   Signed by  Dayron Campos M.Div.

## 2022-06-18 ENCOUNTER — APPOINTMENT (OUTPATIENT)
Dept: NON INVASIVE DIAGNOSTICS | Age: 62
DRG: 309 | End: 2022-06-18
Payer: MEDICARE

## 2022-06-18 LAB
ANION GAP SERPL CALC-SCNC: 7 MMOL/L (ref 7–16)
BUN SERPL-MCNC: 8 MG/DL (ref 8–23)
CALCIUM SERPL-MCNC: 8.4 MG/DL (ref 8.3–10.4)
CHLORIDE SERPL-SCNC: 105 MMOL/L (ref 98–107)
CO2 SERPL-SCNC: 31 MMOL/L (ref 21–32)
CREAT SERPL-MCNC: 0.9 MG/DL (ref 0.8–1.5)
ECHO AO ASC DIAM: 3.3 CM
ECHO AO ASCENDING AORTA INDEX: 1.56 CM/M2
ECHO BSA: 2.17 M2
ECHO LV EDV A4C: 154 ML
ECHO LV EDV INDEX A4C: 73 ML/M2
ECHO LV EJECTION FRACTION A4C: 42 %
ECHO LV ESV A4C: 90 ML
ECHO LV ESV INDEX A4C: 42 ML/M2
EKG ATRIAL RATE: 76 BPM
EKG ATRIAL RATE: 94 BPM
EKG DIAGNOSIS: NORMAL
EKG DIAGNOSIS: NORMAL
EKG P AXIS: 53 DEGREES
EKG P AXIS: 55 DEGREES
EKG P-R INTERVAL: 173 MS
EKG P-R INTERVAL: 186 MS
EKG Q-T INTERVAL: 354 MS
EKG Q-T INTERVAL: 384 MS
EKG QRS DURATION: 100 MS
EKG QRS DURATION: 109 MS
EKG QTC CALCULATION (BAZETT): 398 MS
EKG QTC CALCULATION (BAZETT): 481 MS
EKG R AXIS: -70 DEGREES
EKG R AXIS: 92 DEGREES
EKG T AXIS: 87 DEGREES
EKG T AXIS: 89 DEGREES
EKG VENTRICULAR RATE: 76 BPM
EKG VENTRICULAR RATE: 94 BPM
ERYTHROCYTE [DISTWIDTH] IN BLOOD BY AUTOMATED COUNT: 15 % (ref 11.9–14.6)
GLUCOSE SERPL-MCNC: 85 MG/DL (ref 65–100)
HCT VFR BLD AUTO: 30.1 % (ref 41.1–50.3)
HGB BLD-MCNC: 9.3 G/DL (ref 13.6–17.2)
INR PPP: 7.8
LV EF: 28 %
LVEF MODALITY: NORMAL
MAGNESIUM SERPL-MCNC: 2.6 MG/DL (ref 1.8–2.4)
MCH RBC QN AUTO: 28.5 PG (ref 26.1–32.9)
MCHC RBC AUTO-ENTMCNC: 30.9 G/DL (ref 31.4–35)
MCV RBC AUTO: 92.3 FL (ref 79.6–97.8)
NRBC # BLD: 0 K/UL (ref 0–0.2)
PLATELET # BLD AUTO: 707 K/UL (ref 150–450)
PMV BLD AUTO: 9.4 FL (ref 9.4–12.3)
POTASSIUM SERPL-SCNC: 4 MMOL/L (ref 3.5–5.1)
PROTHROMBIN TIME: 66.5 SEC (ref 12.6–14.5)
RBC # BLD AUTO: 3.26 M/UL (ref 4.23–5.6)
SODIUM SERPL-SCNC: 143 MMOL/L (ref 138–145)
WBC # BLD AUTO: 12.9 K/UL (ref 4.3–11.1)

## 2022-06-18 PROCEDURE — 80048 BASIC METABOLIC PNL TOTAL CA: CPT

## 2022-06-18 PROCEDURE — 6370000000 HC RX 637 (ALT 250 FOR IP): Performed by: INTERNAL MEDICINE

## 2022-06-18 PROCEDURE — 93321 DOPPLER ECHO F-UP/LMTD STD: CPT

## 2022-06-18 PROCEDURE — 6360000002 HC RX W HCPCS: Performed by: PHYSICIAN ASSISTANT

## 2022-06-18 PROCEDURE — 36415 COLL VENOUS BLD VENIPUNCTURE: CPT

## 2022-06-18 PROCEDURE — 99233 SBSQ HOSP IP/OBS HIGH 50: CPT | Performed by: INTERNAL MEDICINE

## 2022-06-18 PROCEDURE — 6360000004 HC RX CONTRAST MEDICATION: Performed by: INTERNAL MEDICINE

## 2022-06-18 PROCEDURE — 6370000000 HC RX 637 (ALT 250 FOR IP): Performed by: PHYSICIAN ASSISTANT

## 2022-06-18 PROCEDURE — 85610 PROTHROMBIN TIME: CPT

## 2022-06-18 PROCEDURE — 2580000003 HC RX 258: Performed by: PHYSICIAN ASSISTANT

## 2022-06-18 PROCEDURE — 85027 COMPLETE CBC AUTOMATED: CPT

## 2022-06-18 PROCEDURE — 83735 ASSAY OF MAGNESIUM: CPT

## 2022-06-18 PROCEDURE — 1100000003 HC PRIVATE W/ TELEMETRY

## 2022-06-18 RX ORDER — CETIRIZINE HYDROCHLORIDE 10 MG/1
10 TABLET ORAL DAILY PRN
Status: DISCONTINUED | OUTPATIENT
Start: 2022-06-18 | End: 2022-06-22 | Stop reason: HOSPADM

## 2022-06-18 RX ADMIN — ACETAMINOPHEN 650 MG: 325 TABLET ORAL at 14:55

## 2022-06-18 RX ADMIN — ASPIRIN 81 MG: 81 TABLET, CHEWABLE ORAL at 09:03

## 2022-06-18 RX ADMIN — SODIUM CHLORIDE, PRESERVATIVE FREE 3 ML: 5 INJECTION INTRAVENOUS at 22:39

## 2022-06-18 RX ADMIN — CETIRIZINE HYDROCHLORIDE 10 MG: 10 TABLET ORAL at 17:23

## 2022-06-18 RX ADMIN — CARVEDILOL 6.25 MG: 6.25 TABLET, FILM COATED ORAL at 09:03

## 2022-06-18 RX ADMIN — PERFLUTREN 2 ML: 6.52 INJECTION, SUSPENSION INTRAVENOUS at 11:15

## 2022-06-18 RX ADMIN — QUETIAPINE FUMARATE 100 MG: 100 TABLET ORAL at 20:20

## 2022-06-18 RX ADMIN — SODIUM CHLORIDE, PRESERVATIVE FREE 3 ML: 5 INJECTION INTRAVENOUS at 06:01

## 2022-06-18 RX ADMIN — SODIUM CHLORIDE, PRESERVATIVE FREE 3 ML: 5 INJECTION INTRAVENOUS at 14:30

## 2022-06-18 RX ADMIN — SODIUM CHLORIDE, PRESERVATIVE FREE 30 ML: 5 INJECTION INTRAVENOUS at 09:05

## 2022-06-18 RX ADMIN — AMIODARONE HYDROCHLORIDE 0.5 MG/MIN: 50 INJECTION, SOLUTION INTRAVENOUS at 16:51

## 2022-06-18 RX ADMIN — SODIUM CHLORIDE, PRESERVATIVE FREE 10 ML: 5 INJECTION INTRAVENOUS at 20:20

## 2022-06-18 ASSESSMENT — PAIN DESCRIPTION - ORIENTATION: ORIENTATION: MID

## 2022-06-18 ASSESSMENT — PAIN DESCRIPTION - LOCATION: LOCATION: CHEST

## 2022-06-18 ASSESSMENT — PAIN SCALES - GENERAL
PAINLEVEL_OUTOF10: 2
PAINLEVEL_OUTOF10: 0

## 2022-06-18 ASSESSMENT — PAIN DESCRIPTION - DESCRIPTORS: DESCRIPTORS: ACHING;SORE

## 2022-06-18 NOTE — PROGRESS NOTES
Warfarin dosing per pharmacist    Vanessa Blake is a 58 y.o. male. Indication:  Intracardiac thrombus     Goal INR:  2-3    Home dose:  7.5mg M/W/F  5mg TTSS    Risk factors or significant drug interactions:  amiodorone    Other anticoagulants:  none noted    Daily Monitoring  Date  INR     Warfarin dose HGB              Notes  6/17  7.5  Hold  9.3   6/18  7.8  Hold  9.3       Pharmacy consulted for in house warfarin dosing. Patient with a h/o apical thrombus on indefinite warfarin. S/p CABG on 5/26/22. Returned through the ED 2/2 Vtach and ICD shock x 3. INR supra-therapeutic at 7.8 currently. Warfarin will remain on hold until INR is within therapeutic range. Pharmacy will continue to follow daily INR for restart.       Thank you,  Shanthi Freeman, PharmD, BCOP  Clinical Pharmacist  Contact via Perfect Serve

## 2022-06-18 NOTE — PROGRESS NOTES
[facility] CARDIOLOGY PROGRESS NOTE           6/18/2022 9:07 AM    Admit Date: 6/17/2022    Subjective: On Amio gtt in ICU, no CP. No more VT since last night. Feeling well. No recent CP or angina. No alcohol and limited caffeine. No change in diet. Has felt well otherwise. Low grade fever. ROS:  GEN:  No fever or chills  Cardiovascular:  As noted above  Pulmonary:  As noted above  Neuro:  No new focal motor or sensory loss    Objective:      Vitals:    06/18/22 0601 06/18/22 0616 06/18/22 0626 06/18/22 0903   BP: (!) 105/59   120/63   Pulse: 78 72  79   Resp: 23 19     Temp:       TempSrc:       SpO2: 97% 97%     Weight:   213 lb 3 oz (96.7 kg)    Height:           Physical Exam:  General-A and O x 3  Neck- supple, no JVD  CV- regular rate and rhythm no MRG  Lung- clear bilaterally  Abd- soft, nontender, nondistended  Ext- no edema bilaterally. Skin- warm and dry  Psychiatric:  Normal mood and affect. Neurologic:  Alert and oriented X 3      Data Review:   Recent Labs     06/17/22  1420 06/17/22  1807 06/18/22  0315     --  143   K 3.8  --  4.0   MG 2.4  --  2.6*   BUN 10  --  8   WBC 12.7*  --  12.9*   HGB 9.3*  --  9.3*   HCT 29.5*  --  30.1*   *  --  707*   INR  --  7.5* 7.8*       TELEMETRY:  sinus    Assessment/Plan:     1. VT Storm:  Remain on on amio gtt, follow K and Mg. Better at this time. Will ask EP to see Monday. Chart reviewed, prior VT arrest, this remains an issue despite. Trop low lying, but no need for LHC at this time. Will follow on tele. Follow AM labs, check TSH. 2. CAD s/p CABG: trop low lying, not a MI. Remain on ASA and BB. Checking echo today. 3. LV thrombus:  Holding coumadin with high INR. Follow, he has a home PT monitor. 4. cSHF/ICM:  No HF signs or sx. Remain on meds, adjust for GDMT. Checking echo, follow. 5.  HPL: Intolerant to statins, Zetia and Repatha. 6. Anemia:  Follow. 7. Fever:   Follow blood cx, follow temp and labs.        PADMAJA HERNANDEZ,   6/18/2022 9:07 AM

## 2022-06-18 NOTE — PLAN OF CARE
Problem: Discharge Planning  Goal: Discharge to home or other facility with appropriate resources  Outcome: Progressing     Problem: ABCDS Injury Assessment  Goal: Absence of physical injury  Outcome: Progressing     Problem: Safety - Adult  Goal: Free from fall injury  Outcome: Progressing

## 2022-06-18 NOTE — PROGRESS NOTES
TRANSFER - OUT REPORT:    Verbal report given to CYNTHIA Hernandez on Vanessa Cedillo  being transferred to [address] [place], [place] [id number] for routine progression of patient care       Report consisted of patient's Situation, Background, Assessment and   Recommendations(SBAR). Information from the following report(s) Nurse Handoff Report, Index, ED Encounter Summary, ED SBAR, Adult Overview, Intake/Output, MAR, Med Rec Status, Cardiac Rhythm Normal Sinus and Quality Measures was reviewed with the receiving nurse. Lines:   Peripheral IV Right Antecubital (Active)   Site Assessment Clean, dry & intact 06/18/22 1725   Line Status Capped 06/18/22 1725   Line Care Connections checked and tightened 06/18/22 1100   Phlebitis Assessment No symptoms 06/18/22 1100   Infiltration Assessment 0 06/18/22 1100   Alcohol Cap Used Yes 06/18/22 1100   Dressing Status Clean, dry & intact 06/18/22 1725   Dressing Type Transparent 06/18/22 1100       Peripheral IV Right Hand (Active)   Site Assessment Clean, dry & intact 06/18/22 1500   Line Status Capped 06/18/22 1500   Line Care Connections checked and tightened 06/18/22 0746   Phlebitis Assessment No symptoms 06/18/22 0746   Infiltration Assessment 0 06/18/22 0746   Alcohol Cap Used Yes 06/18/22 0746   Dressing Status Clean, dry & intact 06/18/22 1500   Dressing Type Transparent 06/18/22 0746       Peripheral IV Left Hand (Active)   Site Assessment Clean, dry & intact 06/18/22 1500   Line Status Capped 06/18/22 1500   Line Care Connections checked and tightened 06/18/22 0746   Phlebitis Assessment No symptoms 06/18/22 0746   Infiltration Assessment 0 06/18/22 0746   Alcohol Cap Used Yes 06/18/22 0746   Dressing Status Clean, dry & intact 06/18/22 1500   Dressing Type Transparent 06/18/22 0746        Opportunity for questions and clarification was provided.       Patient transported with:  Registered Nurse

## 2022-06-18 NOTE — PROGRESS NOTES
Warfarin dosing per pharmacist    Vanessa Blake is a 58 y.o. male. Indication:  Intracardiac thrombus     Goal INR:  2-3    Home dose:  7.5mg M/W/F  5mg TTSS    Risk factors or significant drug interactions:  amiodorone    Other anticoagulants:  none noted    Daily Monitoring  Date  INR     Warfarin dose HGB              Notes  6/17  7.5  held  9.3        Pt admitted with supertherapeutic INR of 7.5. Pharmacy to hold dose and reassess in AM. No signs or symptom of bleeding noted.   Pharmacy to monitor INR daily and adjust dose per protocol    Thank you,  Jeffry Barcenas, PharmD, [facility]

## 2022-06-18 NOTE — PROGRESS NOTES
TRANSFER - IN REPORT:    Verbal report received from Darrin Mchugh on Tabatha Mace  being received from ICU for routine progression of patient care      Report consisted of patient's Situation, Background, Assessment and   Recommendations(SBAR). Information from the following report(s) MAR and Med Rec Status was reviewed with the receiving nurse. Opportunity for questions and clarification was provided. Assessment completed upon patient's arrival to unit and care assumed. Dual skin assessment on arrival. Mid sternal and (L) LE incisions from recent CABG scabbed over and healing. Sacrum and heels intact.  Allevyn in place

## 2022-06-19 LAB
ANION GAP SERPL CALC-SCNC: 7 MMOL/L (ref 7–16)
BASOPHILS # BLD: 0.1 K/UL (ref 0–0.2)
BASOPHILS NFR BLD: 1 % (ref 0–2)
BUN SERPL-MCNC: 10 MG/DL (ref 8–23)
CALCIUM SERPL-MCNC: 8.7 MG/DL (ref 8.3–10.4)
CHLORIDE SERPL-SCNC: 108 MMOL/L (ref 98–107)
CO2 SERPL-SCNC: 28 MMOL/L (ref 21–32)
CREAT SERPL-MCNC: 0.9 MG/DL (ref 0.8–1.5)
DIFFERENTIAL METHOD BLD: ABNORMAL
EOSINOPHIL # BLD: 0.9 K/UL (ref 0–0.8)
EOSINOPHIL NFR BLD: 8 % (ref 0.5–7.8)
ERYTHROCYTE [DISTWIDTH] IN BLOOD BY AUTOMATED COUNT: 15.2 % (ref 11.9–14.6)
GLUCOSE SERPL-MCNC: 82 MG/DL (ref 65–100)
HCT VFR BLD AUTO: 30 % (ref 41.1–50.3)
HGB BLD-MCNC: 9.3 G/DL (ref 13.6–17.2)
IMM GRANULOCYTES # BLD AUTO: 0.1 K/UL (ref 0–0.5)
IMM GRANULOCYTES NFR BLD AUTO: 0 % (ref 0–5)
INR PPP: 5
LYMPHOCYTES # BLD: 2.5 K/UL (ref 0.5–4.6)
LYMPHOCYTES NFR BLD: 21 % (ref 13–44)
MAGNESIUM SERPL-MCNC: 2.5 MG/DL (ref 1.8–2.4)
MCH RBC QN AUTO: 28.4 PG (ref 26.1–32.9)
MCHC RBC AUTO-ENTMCNC: 31 G/DL (ref 31.4–35)
MCV RBC AUTO: 91.5 FL (ref 79.6–97.8)
MONOCYTES # BLD: 1.1 K/UL (ref 0.1–1.3)
MONOCYTES NFR BLD: 9 % (ref 4–12)
NEUTS SEG # BLD: 7.2 K/UL (ref 1.7–8.2)
NEUTS SEG NFR BLD: 61 % (ref 43–78)
NRBC # BLD: 0 K/UL (ref 0–0.2)
PLATELET # BLD AUTO: 691 K/UL (ref 150–450)
PMV BLD AUTO: 9.2 FL (ref 9.4–12.3)
POTASSIUM SERPL-SCNC: 4.1 MMOL/L (ref 3.5–5.1)
PROTHROMBIN TIME: 47.6 SEC (ref 12.6–14.5)
RBC # BLD AUTO: 3.28 M/UL (ref 4.23–5.6)
SODIUM SERPL-SCNC: 143 MMOL/L (ref 136–145)
TSH, 3RD GENERATION: 7.82 UIU/ML (ref 0.36–3.74)
WBC # BLD AUTO: 11.8 K/UL (ref 4.3–11.1)

## 2022-06-19 PROCEDURE — 84443 ASSAY THYROID STIM HORMONE: CPT

## 2022-06-19 PROCEDURE — 1100000003 HC PRIVATE W/ TELEMETRY

## 2022-06-19 PROCEDURE — 6370000000 HC RX 637 (ALT 250 FOR IP): Performed by: PHYSICIAN ASSISTANT

## 2022-06-19 PROCEDURE — 6360000002 HC RX W HCPCS: Performed by: PHYSICIAN ASSISTANT

## 2022-06-19 PROCEDURE — 36415 COLL VENOUS BLD VENIPUNCTURE: CPT

## 2022-06-19 PROCEDURE — 99232 SBSQ HOSP IP/OBS MODERATE 35: CPT | Performed by: INTERNAL MEDICINE

## 2022-06-19 PROCEDURE — 80048 BASIC METABOLIC PNL TOTAL CA: CPT

## 2022-06-19 PROCEDURE — 85610 PROTHROMBIN TIME: CPT

## 2022-06-19 PROCEDURE — 6370000000 HC RX 637 (ALT 250 FOR IP): Performed by: INTERNAL MEDICINE

## 2022-06-19 PROCEDURE — 85025 COMPLETE CBC W/AUTO DIFF WBC: CPT

## 2022-06-19 PROCEDURE — 2580000003 HC RX 258: Performed by: PHYSICIAN ASSISTANT

## 2022-06-19 PROCEDURE — 83735 ASSAY OF MAGNESIUM: CPT

## 2022-06-19 RX ADMIN — SODIUM CHLORIDE, PRESERVATIVE FREE 3 ML: 5 INJECTION INTRAVENOUS at 08:49

## 2022-06-19 RX ADMIN — CARVEDILOL 6.25 MG: 6.25 TABLET, FILM COATED ORAL at 08:48

## 2022-06-19 RX ADMIN — CETIRIZINE HYDROCHLORIDE 10 MG: 10 TABLET ORAL at 08:48

## 2022-06-19 RX ADMIN — SODIUM CHLORIDE, PRESERVATIVE FREE 10 ML: 5 INJECTION INTRAVENOUS at 20:05

## 2022-06-19 RX ADMIN — SODIUM CHLORIDE, PRESERVATIVE FREE 10 ML: 5 INJECTION INTRAVENOUS at 08:48

## 2022-06-19 RX ADMIN — ASPIRIN 81 MG: 81 TABLET, CHEWABLE ORAL at 08:47

## 2022-06-19 RX ADMIN — SODIUM CHLORIDE, PRESERVATIVE FREE 3 ML: 5 INJECTION INTRAVENOUS at 22:23

## 2022-06-19 RX ADMIN — QUETIAPINE FUMARATE 100 MG: 100 TABLET ORAL at 20:04

## 2022-06-19 RX ADMIN — AMIODARONE HYDROCHLORIDE 0.5 MG/MIN: 50 INJECTION, SOLUTION INTRAVENOUS at 08:48

## 2022-06-19 ASSESSMENT — PAIN SCALES - GENERAL
PAINLEVEL_OUTOF10: 0

## 2022-06-19 NOTE — PROGRESS NOTES
Critical Care Outreach Nurse Progress Report:    Subjective: In to assess pt secondary to f/u transfer from ICU  MEWS Score: 2    Vitals:    06/19/22 0715 06/19/22 0745 06/19/22 0848 06/19/22 1233   BP: 106/68 119/69 126/70 100/63   Pulse: 77 74 78 71   Resp:  20  20   Temp:  99.2 °F (37.3 °C)  98.6 °F (37 °C)   TempSrc:  Oral  Oral   SpO2:  92%  94%   Weight:       Height:            Lab Results   Component Value Date    WBC 11.8 (H) 06/19/2022    HGB 9.3 (L) 06/19/2022    HCT 30.0 (L) 06/19/2022    MCV 91.5 06/19/2022     (H) 06/19/2022     Lab Results   Component Value Date     06/19/2022    K 4.1 06/19/2022     06/19/2022    CO2 28 06/19/2022    BUN 10 06/19/2022    CREATININE 0.90 06/19/2022    GLUCOSE 82 06/19/2022    CALCIUM 8.7 06/19/2022      Objective: Pt resting in bed, in NAD. Assessment: A&O.  NSR on telemetry monitor. On amio gtt @ 0.5 mg/min. Discussed with Primary RN. No concerns. Pain Assessment: 0-10  Pain Level: 0  Response to Pain Intervention: Patient satisfied    Plan: Will follow per outreach protocol.

## 2022-06-19 NOTE — PROGRESS NOTES
[facility] CARDIOLOGY PROGRESS NOTE           6/19/2022 9:38 AM    Admit Date: 6/17/2022    Subjective: On Amio gtt, short run of NSVT last night once on floor, no CP. No recent CP or angina. No alcohol and limited caffeine. No change in diet. Has felt well otherwise. Low grade fever. ROS:  GEN:  No fever or chills  Cardiovascular:  As noted above  Pulmonary:  As noted above  Neuro:  No new focal motor or sensory loss    Objective:      Vitals:    06/19/22 0445 06/19/22 0715 06/19/22 0745 06/19/22 0848   BP: 98/67 106/68 119/69 126/70   Pulse: 75 77 74 78   Resp: 20  20    Temp: 99.6 °F (37.6 °C)  99.2 °F (37.3 °C)    TempSrc: Oral  Oral    SpO2: 92%  92%    Weight: 199 lb 12.8 oz (90.6 kg)      Height:           Physical Exam:  General-A and O x 3  Neck- supple, no JVD  CV- regular rate and rhythm no MRG  Lung- clear bilaterally  Abd- soft, nontender, nondistended  Ext- no edema bilaterally. Skin- warm and dry  Psychiatric:  Normal mood and affect. Neurologic:  Alert and oriented X 3      Data Review:   Recent Labs     06/18/22  0315 06/19/22  0437    143   K 4.0 4.1   MG 2.6* 2.5*   BUN 8 10   WBC 12.9* 11.8*   HGB 9.3* 9.3*   HCT 30.1* 30.0*   * 691*   INR 7.8* 5.0       TELEMETRY:  sinus    Assessment/Plan:     1. VT Storm:  Remain on on amio gtt, follow K and Mg. Better at this time. Will ask EP to see Monday. Chart reviewed, prior VT arrest, this remains an issue despite. Trop low lying, but no need for LHC at this time. Will follow on tele. Follow AM labs. 2. CAD s/p CABG: trop low lying, not a MI. Remain on ASA and BB. EF the same on echo yesterday. .     3. LV thrombus:  Holding coumadin with high INR. Follow, he has a home PT monitor. 4. cSHF/ICM:  No HF signs or sx. Remain on meds, adjust for GDMT. EF the same. 5.  HPL: Intolerant to statins, Zetia and Repatha. 6. Anemia:  Follow. 7. Fever:   Follow blood cx, follow temp and labs.        PADMAJA HERNANDEZ DO  6/19/2022 9:38 AM

## 2022-06-19 NOTE — PROGRESS NOTES
[facility] CRITICAL CARE OUTREACH NURSE PROGRESS REPORT      SUBJECTIVE: Called to assess patient secondary to transfer from ICU.      @[facility]([id number])@  Vitals:    06/18/22 1301 06/18/22 1316 06/18/22 1808 06/18/22 2103   BP: (!) 102/57  114/72 107/64   Pulse: 72 69 75 73   Resp: 27 25 22 22   Temp:   97.7 °F (36.5 °C) 100 °F (37.8 °C)   TempSrc:   Oral Oral   SpO2: 96% 97% 93% 93%   Weight:   202 lb 8 oz (91.9 kg)    Height:          EKG: normal EKG, normal sinus rhythm, unchanged from previous tracings. LAB DATA:    Recent Labs     06/17/22  1420 06/18/22  0315    143   K 3.8 4.0    105   CO2 28 31   BUN 10 8   GFRAA >60 >60   MG 2.4 2.6*   GLOB 4.0*  --    ALT 18  --         Recent Labs     06/17/22  1420 06/18/22  0315   WBC 12.7* 12.9*   HGB 9.3* 9.3*   HCT 29.5* 30.1*   * 707*          OBJECTIVE: On arrival to room, I found patient to be watching tv, resting in bed. ASSESSMENT:  Alert and oriented, VSS, NSR, HR 70s. Labs and chart reviewed. Respirations even and unlabored. No c/o pain or SOB. No concerns from primary RN. PLAN:  Follow per outreach protocol.

## 2022-06-19 NOTE — PROGRESS NOTES
Warfarin dosing per pharmacist    Vanessa Cedillo is a 58 y.o. male. Indication:  Intracardiac thrombus     Goal INR:  2-3    Home dose:  7.5mg M/W/F  5mg TTSS    Risk factors or significant drug interactions:  amiodorone    Other anticoagulants:  none noted    Daily Monitoring  Date  INR     Warfarin dose HGB              Notes  6/17  7.5  Hold  9.3   6/18  7.8  Hold  9.3   6/19  5.0  Hold  9.3      Pharmacy consulted for in house warfarin dosing. Patient with a h/o apical thrombus on indefinite warfarin. S/p CABG on 5/26/22. Returned through the ED 2/2 Vtach and ICD shock x 3. INR supra-therapeutic at 5.0 currently. Warfarin will remain on hold until INR is within therapeutic range. Pharmacy will continue to follow daily INR for restart.       Thank you,  Luis M Resendez, PharmD, BCOP  Clinical Pharmacist  Contact via Perfect Serve

## 2022-06-19 NOTE — CARE COORDINATION
Pt presents with VT. Pt started having palpitations with dizziness, subsequent ICD shock 3x, then two more ICD shocks. EMS was called, upon arrival to the ED he had several runs of VT. Has a PMHx of CAD, HFrEF with  [employer] ICD w h/o VT/VF despite amio, IGT, HLD, cardiac wall mural thrombus and depression. Pt was recently discharged on 6/06/22. CM confirmed demographics, no change since then. Pt lives at home alone. At baseline, he is independent with his ADLs including bathing, dressing, cooking, and driving. Uses a can to help with ambulation. On RA. Denies home oxygen use. Was [facility] from [facility] on 6/17/22. Is not currently active with any [facility] agency now. Denies STR. Pt plans on returning home with family support at discharge. He has family that regularly checks on him daily throughout the day. PCP confirmed. Insurance verified. Able to afford his meds. 06/19/22 0908   Service Assessment   Patient Orientation Alert and Oriented   Cognition Alert   History Provided By Patient   Primary Caregiver Self   Support Systems Children;Family Members;[employer];Friends/Neighbors   PCP Verified by CM Yes  Gloria Ramírez)   Last Visit to PCP Within last 3 months   Prior Functional Level Independent in ADLs/IADLs   Current Functional Level Independent in ADLs/IADLs   Can patient return to prior living arrangement Yes   Ability to make needs known: Good   Family able to assist with home care needs: Yes   Financial Resources Medicare   Social/Functional History   Lives With Alone   Type of Home House   ADL Assistance Independent   Active  Yes   Mode of Transportation Car   Occupation Retired   Discharge Planning   Type of [facility]   DME Ordered?  No   Potential Assistance Purchasing Medications No   Type of Home Care Services None   Patient expects to be discharged to: [facility] Discharge   Services [facility] Discharge None   [facility]

## 2022-06-20 LAB
ANION GAP SERPL CALC-SCNC: 6 MMOL/L (ref 7–16)
BASOPHILS # BLD: 0.1 K/UL (ref 0–0.2)
BASOPHILS NFR BLD: 1 % (ref 0–2)
BUN SERPL-MCNC: 14 MG/DL (ref 8–23)
CALCIUM SERPL-MCNC: 8.4 MG/DL (ref 8.3–10.4)
CHLORIDE SERPL-SCNC: 105 MMOL/L (ref 98–107)
CO2 SERPL-SCNC: 29 MMOL/L (ref 21–32)
CREAT SERPL-MCNC: 1 MG/DL (ref 0.8–1.5)
DIFFERENTIAL METHOD BLD: ABNORMAL
EOSINOPHIL # BLD: 0.9 K/UL (ref 0–0.8)
EOSINOPHIL NFR BLD: 7 % (ref 0.5–7.8)
ERYTHROCYTE [DISTWIDTH] IN BLOOD BY AUTOMATED COUNT: 15 % (ref 11.9–14.6)
GLUCOSE SERPL-MCNC: 90 MG/DL (ref 65–100)
HCT VFR BLD AUTO: 30.4 % (ref 41.1–50.3)
HGB BLD-MCNC: 9.3 G/DL (ref 13.6–17.2)
IMM GRANULOCYTES # BLD AUTO: 0.1 K/UL (ref 0–0.5)
IMM GRANULOCYTES NFR BLD AUTO: 1 % (ref 0–5)
INR PPP: 4.1
LYMPHOCYTES # BLD: 2.1 K/UL (ref 0.5–4.6)
LYMPHOCYTES NFR BLD: 18 % (ref 13–44)
MAGNESIUM SERPL-MCNC: 2.2 MG/DL (ref 1.8–2.4)
MCH RBC QN AUTO: 28.3 PG (ref 26.1–32.9)
MCHC RBC AUTO-ENTMCNC: 30.6 G/DL (ref 31.4–35)
MCV RBC AUTO: 92.4 FL (ref 79.6–97.8)
MONOCYTES # BLD: 1.2 K/UL (ref 0.1–1.3)
MONOCYTES NFR BLD: 10 % (ref 4–12)
NEUTS SEG # BLD: 7.5 K/UL (ref 1.7–8.2)
NEUTS SEG NFR BLD: 63 % (ref 43–78)
NRBC # BLD: 0 K/UL (ref 0–0.2)
PLATELET # BLD AUTO: 680 K/UL (ref 150–450)
PMV BLD AUTO: 9.3 FL (ref 9.4–12.3)
POTASSIUM SERPL-SCNC: 4 MMOL/L (ref 3.5–5.1)
PROTHROMBIN TIME: 40.7 SEC (ref 12.6–14.5)
RBC # BLD AUTO: 3.29 M/UL (ref 4.23–5.6)
SODIUM SERPL-SCNC: 140 MMOL/L (ref 138–145)
WBC # BLD AUTO: 11.9 K/UL (ref 4.3–11.1)

## 2022-06-20 PROCEDURE — 2580000003 HC RX 258: Performed by: PHYSICIAN ASSISTANT

## 2022-06-20 PROCEDURE — 1100000003 HC PRIVATE W/ TELEMETRY

## 2022-06-20 PROCEDURE — 36415 COLL VENOUS BLD VENIPUNCTURE: CPT

## 2022-06-20 PROCEDURE — 83735 ASSAY OF MAGNESIUM: CPT

## 2022-06-20 PROCEDURE — 80151 DRUG ASSAY AMIODARONE: CPT

## 2022-06-20 PROCEDURE — 99232 SBSQ HOSP IP/OBS MODERATE 35: CPT | Performed by: INTERNAL MEDICINE

## 2022-06-20 PROCEDURE — 6360000002 HC RX W HCPCS: Performed by: PHYSICIAN ASSISTANT

## 2022-06-20 PROCEDURE — 85025 COMPLETE CBC W/AUTO DIFF WBC: CPT

## 2022-06-20 PROCEDURE — 99223 1ST HOSP IP/OBS HIGH 75: CPT | Performed by: INTERNAL MEDICINE

## 2022-06-20 PROCEDURE — 6370000000 HC RX 637 (ALT 250 FOR IP): Performed by: INTERNAL MEDICINE

## 2022-06-20 PROCEDURE — 80048 BASIC METABOLIC PNL TOTAL CA: CPT

## 2022-06-20 PROCEDURE — 85610 PROTHROMBIN TIME: CPT

## 2022-06-20 PROCEDURE — 6370000000 HC RX 637 (ALT 250 FOR IP): Performed by: PHYSICIAN ASSISTANT

## 2022-06-20 RX ORDER — METOPROLOL SUCCINATE 25 MG/1
25 TABLET, EXTENDED RELEASE ORAL 2 TIMES DAILY
Status: DISCONTINUED | OUTPATIENT
Start: 2022-06-20 | End: 2022-06-22 | Stop reason: HOSPADM

## 2022-06-20 RX ADMIN — CARVEDILOL 6.25 MG: 6.25 TABLET, FILM COATED ORAL at 08:46

## 2022-06-20 RX ADMIN — AMIODARONE HYDROCHLORIDE 0.5 MG/MIN: 50 INJECTION, SOLUTION INTRAVENOUS at 18:25

## 2022-06-20 RX ADMIN — AMIODARONE HYDROCHLORIDE 0.5 MG/MIN: 50 INJECTION, SOLUTION INTRAVENOUS at 02:08

## 2022-06-20 RX ADMIN — POLYETHYLENE GLYCOL 3350 17 G: 17 POWDER, FOR SOLUTION ORAL at 08:47

## 2022-06-20 RX ADMIN — METOPROLOL SUCCINATE 25 MG: 25 TABLET, FILM COATED, EXTENDED RELEASE ORAL at 21:29

## 2022-06-20 RX ADMIN — SODIUM CHLORIDE, PRESERVATIVE FREE 10 ML: 5 INJECTION INTRAVENOUS at 23:30

## 2022-06-20 RX ADMIN — SODIUM CHLORIDE, PRESERVATIVE FREE 3 ML: 5 INJECTION INTRAVENOUS at 13:45

## 2022-06-20 RX ADMIN — SODIUM CHLORIDE, PRESERVATIVE FREE 3 ML: 5 INJECTION INTRAVENOUS at 23:30

## 2022-06-20 RX ADMIN — METOPROLOL SUCCINATE 25 MG: 25 TABLET, FILM COATED, EXTENDED RELEASE ORAL at 12:08

## 2022-06-20 RX ADMIN — ASPIRIN 81 MG: 81 TABLET, CHEWABLE ORAL at 08:45

## 2022-06-20 RX ADMIN — QUETIAPINE FUMARATE 100 MG: 100 TABLET ORAL at 21:29

## 2022-06-20 RX ADMIN — SODIUM CHLORIDE, PRESERVATIVE FREE 10 ML: 5 INJECTION INTRAVENOUS at 08:47

## 2022-06-20 RX ADMIN — ACETAMINOPHEN 650 MG: 325 TABLET ORAL at 21:29

## 2022-06-20 ASSESSMENT — PAIN SCALES - GENERAL
PAINLEVEL_OUTOF10: 0

## 2022-06-20 ASSESSMENT — ENCOUNTER SYMPTOMS
RESPIRATORY NEGATIVE: 1
VOMITING: 0
NAUSEA: 0
HEARTBURN: 0
EYES NEGATIVE: 1

## 2022-06-20 ASSESSMENT — PAIN SCALES - WONG BAKER: WONGBAKER_NUMERICALRESPONSE: 0

## 2022-06-20 NOTE — H&P
[facility] Cardiology Initial Cardiac Evaluation                Date of  Admission: 6/17/2022  2:09 PM     PCP: Sukhwinder De Anda DO  Requested by: Dr Estevan Flores  Primary Cardiologist: Dr Crystal Clifford Attending: Dr Pricilla Price    Reason for Evaluation: VT Storm      Madie Butt is a 58 y.o. male with hx of CAD status post CABG with LIMA to LAD, SVG to PDA and SVG to ramus intermediate, VT, or protrusive ejection fraction status post [place] ICD, hyperlipidemia, cardiac wall mural thrombus, and depression. Recently had a VT arrest on 5/26/2022 and underwent a left heart cath showing multivessel coronary artery disease. At that time patient ejection fraction was 25% with wall motion abnormality underwent a CABG on 5/26/2022 with graft anatomy stated above. Patient did well was discharged on 6/6/2022 with a EP consult during his hospital stay. Recommendations were discharged on amiodarone with replacing electrolytes as needed. Patient could be considered for future VT ablation if recurrent V. tach was found. Patient did well after bypass surgery until the day of admission when the patient started having palpitation with dizziness. Patient then subsequently had ICD shocks x3 and then received 2 more ICD shocks. EMS was called and patient had several runs with VT was given IV amnio bolus and IV amiodarone drip was started at the time as well. He was also given IV magnesium prior to arrival.  Notably the patient's home Imdur dose was just decreased to 40 mg daily at home. In the emergency department patient continued to have multiple runs of VT was given a second dose of IV amiodarone and started to have less ectopy at that time. Patient required 2 IV doses of Lopressor before rhythm was stable and was transferred to floor for further assessment and treatment. At the time of admission patient had no overt problems with heart failure or anginal symptoms.   Review of telemetry shows no recurrent VT since 1 nonsustained VT run on 6/18/2022 in the evening. Patient still has no complaints transmission of shortness of breath, chest pain, weakness, dizziness, falls, nausea, vomiting, or overt bleeding. Today patient's INR is 4.1 from a peak of 7.8 this hospital admission has been off Coumadin for a wall mural thrombus.       Past Medical History:   Diagnosis Date    Acute hypoxemic respiratory failure due to COVID-19 [facility]) 10/9/2021    CAD (coronary artery disease)     heart attack 2005 stentS HEART    CHF (congestive heart failure) ([address] 9/27/2011    Chronic systolic heart failure ([facility] [address] 10/2/2015    Coronary atherosclerosis of native coronary vessel 10/2/2015    Hyperlipidemia 10/2/2015    Hypoxemia requiring supplemental oxygen 10/6/2021    IGT (impaired glucose tolerance) 12/3/2017    Other ill-defined conditions(799.89)      blind left eye    Other ill-defined conditions(799.89)     cardiomyopathy    Pneumonia due to COVID-19 virus 11/13/2021    Resolved    Primary insomnia 6/7/2017    Psychiatric disorder     depression     Sepsis, unspecified 4/5/2011    Thromboembolus ([facility] [address]     thrombus in heart     Thrombus 10/2/2015      Past Surgical History:   Procedure Laterality Date    CARDIAC CATHETERIZATION      5 stents total    CARDIAC DEFIBRILLATOR PLACEMENT  2011    [employer]    CARDIAC PROCEDURE N/A 5/27/2022    LEFT HEART CATH / CORONARY ANGIOGRAPHY performed by Sarah Pineda MD at [address] [place] [address] CATH LAB    COLONOSCOPY  12/2010    CORONARY ARTERY BYPASS GRAFT N/A 5/31/2022    CORONARY ARTERY BYPASS GRAFT (CABG X 3), LIMA ; ENDOSCOPIC VEIN HARVEST, LEFT GREATER SAPHENOUS VEIN performed by Tay Clemons MD at [facility] MAIN OR    HEENT      oral surgery    PACEMAKER      NE CARDIAC SURG PROCEDURE UNLIST       1 stent 2005    TRANSESOPHAGEAL ECHOCARDIOGRAM N/A 5/31/2022    TRANSESOPHAGEAL ECHOCARDIOGRAM performed by Tay Clemons MD at [facility] MAIN OR     Allergies   Allergen Reactions    Penicillins Swelling     Face swelling    Atorvastatin Other (See Comments)     itching    Evolocumab Other (See Comments)     Itching    Lisinopril Other (See Comments)    Rosuvastatin Other (See Comments)     itching      Family History   Problem Relation Age of Onset    Heart Disease Mother     Diabetes Paternal Grandfather     Cancer Paternal Grandmother     Heart Disease Brother     Diabetes Maternal Grandmother     Bleeding Prob Father     Diabetes Mother     Heart Disease Paternal Grandfather     Heart Attack Mother 67        mi      Social History     Tobacco History     Smoking Status  Former Smoker Smoking Start Date  6/7/1995 Quit date  5/26/2022 Smoking Frequency  1 pack/day    Smoking Tobacco Type  Cigarettes    Smokeless Tobacco Use  Never Used          Alcohol History     Alcohol Use Status  No          Drug Use     Drug Use Status  No          Sexual Activity     Sexually Active  Not Currently                 Medications Prior to Admission: nitroGLYCERIN (NITROSTAT) 0.4 MG SL tablet, Place 1 tablet under the tongue every 5 minutes as needed for Chest pain up to max of 3 total doses. If no relief after 1 dose, call 911.  aspirin 81 MG chewable tablet, Take 1 tablet by mouth daily  acetaminophen (TYLENOL) 325 mg tablet, Take 2 tablets by mouth every 6 hours as needed for Pain  carvedilol (COREG) 6.25 MG tablet, Take 1 tablet by mouth daily  amiodarone (CORDARONE) 200 MG tablet, Take 1 tablet by mouth 2 times daily for 7 days, THEN 1 tablet daily for 7 days.   albuterol sulfate  (90 Base) MCG/ACT inhaler, Inhale 2 puffs into the lungs every 4 hours as needed  Cetirizine HCl 10 MG CAPS, Take by mouth as needed  cyanocobalamin 1000 MCG tablet, Take 1,000 mcg by mouth as needed  QUEtiapine (SEROQUEL) 100 MG tablet, Take 100 mg by mouth  warfarin (COUMADIN) 5 MG tablet, Take 1 to 1 1/2 tablet daily or as directed     Current Facility-Administered Medications   Medication Dose Route Frequency    cetirizine (ZYRTEC) tablet 10 mg  10 mg Oral Daily PRN    amiodarone (CORDARONE) 450 mg in dextrose 5 % 250 mL infusion ([id number])  0.5 mg/min IntraVENous Continuous    sodium chloride flush 0.9 % injection 3 mL  3 mL IntraVENous Q8H    albuterol sulfate HFA (PROVENTIL;VENTOLIN;PROAIR) 108 (90 Base) MCG/ACT inhaler 2 puff  2 puff Inhalation Q4H PRN    aspirin chewable tablet 81 mg  81 mg Oral Daily    carvedilol (COREG) tablet 6.25 mg  6.25 mg Oral Daily    QUEtiapine (SEROQUEL) tablet 100 mg  100 mg Oral Nightly    sodium chloride flush 0.9 % injection 5-40 mL  5-40 mL IntraVENous 2 times per day    sodium chloride flush 0.9 % injection 5-40 mL  5-40 mL IntraVENous PRN    0.9 % sodium chloride infusion   IntraVENous PRN    ondansetron (ZOFRAN-ODT) disintegrating tablet 4 mg  4 mg Oral Q8H PRN    Or    ondansetron (ZOFRAN) injection 4 mg  4 mg IntraVENous Q6H PRN    acetaminophen (TYLENOL) suppository 650 mg  650 mg Rectal Q6H PRN    polyethylene glycol (GLYCOLAX) packet 17 g  17 g Oral Daily PRN    nitroGLYCERIN (NITROSTAT) SL tablet 0.4 mg  0.4 mg SubLINGual Q5 Min PRN    acetaminophen (TYLENOL) tablet 650 mg  650 mg Oral Q6H PRN    warfarin placeholder: dosing by pharmacy   Other RX Placeholder       Review of Systems   Constitutional: Negative for chills, diaphoresis, fever, malaise/fatigue, night sweats, weight gain and weight loss. HENT: Negative. Eyes: Negative. Cardiovascular: Negative. Respiratory: Negative. Endocrine: Negative. Hematologic/Lymphatic: Negative. Skin: Negative. Musculoskeletal: Negative. Gastrointestinal: Negative for heartburn, nausea and vomiting. Genitourinary: Negative. Neurological: Negative. Negative for dizziness and weakness. Psychiatric/Behavioral: Negative.          Physical Exam  Vitals:    06/19/22 1622 06/19/22 2052 06/20/22 0114 06/20/22 0533   BP: 115/68 135/73 109/63 (!) 96/59   Pulse: 71 71 71 74   Resp: 20 20 20 20   Temp: 98.1 °F (36.7 °C) 98.7 °F (37.1 °C) 98.6 °F (37 °C) 98.6 °F (37 °C)   TempSrc: Oral      SpO2: 95% 96% 94% 93%   Weight:    201 lb (91.2 kg)   Height:           Patient Vitals for the past 96 hrs (Last 3 readings):   Weight   06/20/22 0533 201 lb (91.2 kg)   06/19/22 0445 199 lb 12.8 oz (90.6 kg)   06/18/22 1808 202 lb 8 oz (91.9 kg)         Intake/Output Summary (Last 24 hours) at 6/20/2022 0815  Last data filed at 6/20/2022 2840  Gross per 24 hour   Intake 380 ml   Output 1280 ml   Net -900 ml       Net IO Since Admission: -4,422.46 mL [06/20/22 0815]      Physical Exam:  General: Well Developed, Well Nourished, No Acute Distress  HEENT: pupils equal and round, no abnormalities noted  Neck: supple, no JVD, no carotid bruits  Heart: S1S2 with RRR without murmurs or gallops, new sternotomy scar still healing   Lungs: Clear throughout auscultation bilaterally without adventitious sounds  Abd: soft, nontender, nondistended, with good bowel sounds  Ext: warm, no edema, calves supple/nontender, pulses 2+ bilaterally  Skin: warm and dry  Psychiatric: Normal mood and affect  Neurologic: Alert and oriented X 3      Recent Results (from the past 24 hour(s))   Basic Metabolic Panel    Collection Time: 06/20/22  5:13 AM   Result Value Ref Range    Sodium 140 138 - 145 mmol/L    Potassium 4.0 3.5 - 5.1 mmol/L    Chloride 105 98 - 107 mmol/L    CO2 29 21 - 32 mmol/L    Anion Gap 6 (L) 7 - 16 mmol/L    Glucose 90 65 - 100 mg/dL    BUN 14 8 - 23 MG/DL    CREATININE 1.00 0.8 - 1.5 MG/DL    GFR African American >60 >60 ml/min/1.73m2    GFR Non- >60 >60 ml/min/1.73m2    Calcium 8.4 8.3 - 10.4 MG/DL   Magnesium    Collection Time: 06/20/22  5:13 AM   Result Value Ref Range    Magnesium 2.2 1.8 - 2.4 mg/dL   Protime-INR    Collection Time: 06/20/22  5:13 AM   Result Value Ref Range    Protime 40.7 (H) 12.6 - 14.5 sec    INR 4.1          XR CHEST (2 VW)    Result Date: 6/2/2022  EXAMINATION: XR CHEST (2 VW) 6/2/2022 7:47 AM ACCESSION NUMBER: [id number] COMPARISON: 6/1/2022 INDICATION: Postop follow-up TECHNIQUE: PA and lateral views of the chest were obtained. FINDINGS: Support Devices: *  Left subclavian approach pacer/AICD leads overlie the right heart. Cardiac Silhouette: Cardiac silhouette is mildly enlarged, stable. Mediastinum: Median sternotomy wires. Lungs: Retrocardiac airspace opacities are mildly improved. A few bandlike opacities in the left lung base. Right lung is relatively clear. No pneumothorax or sizable pleural effusion. Upper Abdomen: Normal Miscellaneous: No fracture or suspicious osseous lesion. Improved retrocardiac airspace opacities, likely atelectasis. XR CHEST (2 VW)    Result Date: 5/26/2022  Frontal and lateral views of the chest COMPARISON: October 9, 2021 INDICATION: Chest pain FINDINGS: There is no focal pulmonary consolidation, pulmonary edema or pleural effusion. No pneumothorax. Cardiac mediastinal silhouette is within normal limits. Surrounding bones are intact. There is stable left-sided cardiac pacer. 1. No evidence of acute pulmonary disease. XR CHEST PORTABLE    Result Date: 6/1/2022  EXAMINATION: One view chest HISTORY: Post op open heart surgery TECHNIQUE: Frontal chest. COMPARISON: 5/31/2022 FINDINGS:  The endotracheal and nasogastric tubes have been removed. Otherwise stable support devices. Persistent left basilar atelectasis or infiltrate. Mild right basilar atelectasis. There is no significant pneumothorax. There may be a small left pleural effusion. The heart is unchanged. No other significant interval changes. 1. Findings as described above. XR CHEST 1 VIEW    Result Date: 5/31/2022  CHEST X-RAY, one view. INDICATION:  Status post CABG surgery, immediate postoperative evaluation TECHNIQUE:  AP Portable semiupright view. COMPARISON: 26 made 2022 FINDINGS: Lungs: Small amount of atelectasis left base. Right lung is clear. No pneumothorax. Costophrenic angles: Blunted on the left. Heart size: is normal. Cardiac pacemaker remains. Pulmonary vasculature: is unremarkable. Aorta: Unremarkable. Included portion of the upper abdomen: is unremarkable. Bones: Sternal wires. Other: ET tube tip: is just above the sara. Withdrawing 1-3  cm to be considered. Right IJ catheter: Tip over the clavicle. Bilateral basilar chest tubes. Mediastinal drain is present. Epicardial pacing wires are present. NG tube projected over the stomach     Small amount of atelectasis left base. ET tube tip just above the sara. Withdrawing 1 to 3 cm to be considered. Other tubes and lines in expected locations. Transthoracic echocardiogram (TTE) complete with contrast, bubble, strain, and 3D PRN    Result Date: 5/26/2022    Left Ventricle: Severely reduced left ventricular systolic function with a visually estimated EF of 25 - 30%. Left ventricle is mildly dilated. Normal wall thickness. Akinetic and aneurysmal apex with severe hypokinesis of the mid anteroseptal and mid anterolateral walls with best preserved function of the inferior and inferior -lateral walls. Grade I diastolic dysfunction with normal LAP.   Left Atrium: Left atrium is mildly dilated.   Contrast used: Definity. Transthoracic echocardiogram (TTE) limited with contrast, bubble, strain, and 3D PRN    Result Date: 6/18/2022    Left Ventricle: Moderately reduced left ventricular systolic function with a visually estimated EF of 25 - 30%. Moderate global hypokinesis present.   Left Atrium: Left atrium is mildly dilated.   Pericardium: Small (<1 cm) localized pericardial effusion present around the left ventricle. Vascular duplex carotid bilateral    Result Date: 5/27/2022  HISTORY: Carotid artery stenosis FINDINGS: Duplex doppler carotid ultrasound exams performed of both the right and left side of the neck. NASCET criteria. Grayscale, color-flow, and spectral analysis exam was performed.  Peak systolic velocity right common carotid artery 73 cm/s, right internal carotid of 148 cm/s with a ratio of 2.0 on the right. Right internal carotid artery end-diastolic velocity of 43 cm/s. Peak systolic velocity left common carotid artery 60 cm/s, internal carotid of 77 cm/s with a ratio of 1.2 on the left. Left internal carotid artery end-diastolic velocity of 19 cm/s. Antegrade flow right vertebral artery and antegrade flow left vertebral artery. Grayscale and color-flow imaging reveal atherosclerosis. Stenosis at the right internal carotid artery in a range of 50-69%. Stenosis of the left internal carotid artery in a range of less than 50%. Cardiac procedure    Result Date: 5/27/2022  · Left heart cath selective coronary angiography left ventriculogram performed via right radial access  1. Severe multivessel coronary artery disease 2. Severely reduced ejection fraction 35% 3. Anterior apical wall motion abnormality consistent with severe CAD           Initial Recommendations:      Cardiac Problems:    VT Storm: ICD interrogated with normal fx, On Amio Drip with no recurrent episodes of VT since the 18th by Tele. Electrolytes and Cr are normal.  EP to evaluate today for consideration of BT ablation. Currently not a grate candidate with INR above 4 at this time. Further recommendations pending clinical course and attending recommendations. CAD s/p CABG: LIMA to LAD, SVG to PDA, SVG to intermediate    HFrEF:  Echo (5/26/22):  EF 25-30%. Apical AK. AS and AL HK. MIld LAE. No significant valve disease. No s/s of active HF this admission. Continue to adjust for GDMT. Currently on Coreg 6.25, not on ACE/ARB due to tolerance with hypotension. Leukocytosis:  Likely 2nd from recent surgery and ICD shocks. Currently trending down. Continue to monitor. Apical Wall Thrombus:  Supratheraputic INR of 7.5 on arrival with a peak of 7.8. Currently 4.1 with warfarin on hold.   Continue to hold at this time time. Will need to continue to check daily and continue to hold for consideration of ablation. Thank you very much for requesting a Cardiology Evaluation. We appreciate the opportunity to participate in this patient's care. We will follow along with above stated plan. This is initial management plan but please see attendings consult note for full plan of care.     Serge Rosas, APRN - CNP AGACNP-BC

## 2022-06-20 NOTE — PROGRESS NOTES
Warfarin dosing per pharmacist    Hardy Vegas is a 58 y.o. male. Indication:  Intracardiac thrombus     Goal INR:  2-3    Home dose:  7.5 mg M/W/F  5 mg all other days    Risk factors or significant drug interactions:  amiodorone    Other anticoagulants:  none noted    Daily Monitoring  Date  INR     Warfarin dose HGB              Notes  6/17  7.5  Hold  9.3   6/18  7.8  Hold  9.3   6/19  5.0  Hold  9.3  6/20  4.1  Hold  9.3    Pharmacy consulted to assist in dosing warfarin for Mr. Merritt Daly while inpatient. Patient has a history of an apical thrombus. Home dosing regimen confirmed with recent outpatient anticoagulation notes. Patient presented with supratherapeutic INR of 7.5. INR remains supratherapeutic at 4.1. Will continue to hold warfarin until INR within therapeutic range. Daily INR to continue. Pharmacy will continue to monitor and make dose adjustments as clinically indicated. Please call with any questions.      Thank you,  Li Hoyt PharmD  Clinical Pharmacy Specialist

## 2022-06-20 NOTE — PROGRESS NOTES
[facility] CRITICAL CARE OUTREACH NURSE PROGRESS REPORT      SUBJECTIVE: Called to assess patient secondary to transfer out of ICU.      @[facility]([id number])@  Vitals:    06/19/22 0745 06/19/22 0848 06/19/22 1233 06/19/22 1622   BP: 119/69 126/70 100/63 115/68   Pulse: 74 78 71 71   Resp: 20  20 20   Temp: 99.2 °F (37.3 °C)  98.6 °F (37 °C) 98.1 °F (36.7 °C)   TempSrc: Oral  Oral Oral   SpO2: 92%  94% 95%   Weight:       Height:            LAB DATA:    Recent Labs     06/17/22  1420 06/18/22  0315 06/19/22  0437    143 143   K 3.8 4.0 4.1    105 108*   CO2 28 31 28   BUN 10 8 10   GFRAA >60 >60 >60   MG 2.4 2.6* 2.5*   GLOB 4.0*  --   --    ALT 18  --   --         Recent Labs     06/17/22  1420 06/18/22 0315 06/19/22  0437   WBC 12.7* 12.9* 11.8*   HGB 9.3* 9.3* 9.3*   HCT 29.5* 30.1* 30.0*   * 707* 691*          OBJECTIVE: On arrival to room, I found patient to be sitting up in chair watching TV. Pain Assessment  @BSIFLOW([id number])@             @BSIFLOW(1180)@             @BSIFLOW([id number])@              ASSESSMENT:  Patient A&0x4, calm and cooperative with no signs of distress noted. Patient denies any complaints at this time. PLAN:  Will discharge from outreach program per protocol.

## 2022-06-20 NOTE — PROGRESS NOTES
[facility] CARDIOLOGY PROGRESS NOTE           6/20/2022 8:56 AM    Admit Date: 6/17/2022      Subjective:   Patient denies complaints. Still on IV amiodarone. No recurrent Vtach. BP stable. ROS:  Cardiovascular:  As noted above    Objective:      Vitals:    06/19/22 2052 06/20/22 0114 06/20/22 0533 06/20/22 0846   BP: 135/73 109/63 (!) 96/59 (!) 94/56   Pulse: 71 71 74 73   Resp: 20 20 20    Temp: 98.7 °F (37.1 °C) 98.6 °F (37 °C) 98.6 °F (37 °C)    TempSrc:       SpO2: 96% 94% 93%    Weight:   201 lb (91.2 kg)    Height:           Physical Exam:  General-No Acute Distress  Neck- supple, no JVD  CV- regular rate and rhythm no MRG  Lung- clear bilaterally  Abd- soft, nontender, nondistended  Ext- no edema bilaterally. Skin- warm and dry      Data Review:   Recent Labs     06/19/22  0437 06/18/22  0315 06/17/22  1420   WBC 11.8* 12.9* 12.7*   HGB 9.3* 9.3* 9.3*   HCT 30.0* 30.1* 29.5*   MCV 91.5 92.3 92.2   * 707* 660*       Recent Labs     06/20/22  0513 06/18/22  0315 06/17/22  1420      < > 139   K 4.0   < > 3.8      < > 105   CO2 29   < > 28   BUN 14   < > 10   CREATININE 1.00   < > 1.10   GLUCOSE 90   < > 148*   CALCIUM 8.4   < > 8.1*   PROT  --   --  6.5   LABALBU  --   --  2.5*   BILITOT  --   --  0.2   ALKPHOS  --   --  79   AST  --   --  28   ALT  --   --  18   LABGLOM >60   < > >60   GFRAA >60   < > >60   GLOB  --   --  4.0*    < > = values in this interval not displayed. No results for input(s): CKTOTAL, CKMB, CKMBINDEX, DDIMER, TROPONINI in the last 720 hours. Assessment/Plan:     Active Hospital Problems    Elevated troponin  Due to arrhythmia and ICD discharge. Recent CABG. No angina. No plans for ischemia evaluation      Defibrillator discharge  EP consult. Chronic systolic congestive heart failure (HCC)  Conitnue Coreg. BP to low for ACE-I/ARB      *Ventricular tachycardia ([address]  EP consult today. IV amiodarone for now.        Coronary atherosclerosis of native coronary vessel  See above      Intracardiac thrombus  INR >4. Coumadin held. Daily INR.              Mariel Gustafson MD

## 2022-06-21 LAB
ANION GAP SERPL CALC-SCNC: 3 MMOL/L (ref 7–16)
BUN SERPL-MCNC: 14 MG/DL (ref 8–23)
CALCIUM SERPL-MCNC: 8.6 MG/DL (ref 8.3–10.4)
CHLORIDE SERPL-SCNC: 106 MMOL/L (ref 98–107)
CO2 SERPL-SCNC: 30 MMOL/L (ref 21–32)
CREAT SERPL-MCNC: 1 MG/DL (ref 0.8–1.5)
GLUCOSE SERPL-MCNC: 90 MG/DL (ref 65–100)
INR PPP: 3.4
MAGNESIUM SERPL-MCNC: 2.4 MG/DL (ref 1.8–2.4)
POTASSIUM SERPL-SCNC: 4.5 MMOL/L (ref 3.5–5.1)
PROTHROMBIN TIME: 35.2 SEC (ref 12.6–14.5)
SODIUM SERPL-SCNC: 139 MMOL/L (ref 138–145)

## 2022-06-21 PROCEDURE — 6370000000 HC RX 637 (ALT 250 FOR IP): Performed by: INTERNAL MEDICINE

## 2022-06-21 PROCEDURE — 80048 BASIC METABOLIC PNL TOTAL CA: CPT

## 2022-06-21 PROCEDURE — 1100000003 HC PRIVATE W/ TELEMETRY

## 2022-06-21 PROCEDURE — 85610 PROTHROMBIN TIME: CPT

## 2022-06-21 PROCEDURE — 6370000000 HC RX 637 (ALT 250 FOR IP): Performed by: PHYSICIAN ASSISTANT

## 2022-06-21 PROCEDURE — 99232 SBSQ HOSP IP/OBS MODERATE 35: CPT | Performed by: INTERNAL MEDICINE

## 2022-06-21 PROCEDURE — 83735 ASSAY OF MAGNESIUM: CPT

## 2022-06-21 PROCEDURE — 36415 COLL VENOUS BLD VENIPUNCTURE: CPT

## 2022-06-21 PROCEDURE — 2580000003 HC RX 258: Performed by: PHYSICIAN ASSISTANT

## 2022-06-21 RX ORDER — AMIODARONE HYDROCHLORIDE 200 MG/1
400 TABLET ORAL 2 TIMES DAILY
Status: DISCONTINUED | OUTPATIENT
Start: 2022-06-21 | End: 2022-06-22 | Stop reason: HOSPADM

## 2022-06-21 RX ORDER — MEXILETINE HYDROCHLORIDE 150 MG/1
150 CAPSULE ORAL 2 TIMES DAILY
Status: DISCONTINUED | OUTPATIENT
Start: 2022-06-21 | End: 2022-06-22 | Stop reason: HOSPADM

## 2022-06-21 RX ADMIN — AMIODARONE HYDROCHLORIDE 400 MG: 200 TABLET ORAL at 08:31

## 2022-06-21 RX ADMIN — CETIRIZINE HYDROCHLORIDE 10 MG: 10 TABLET ORAL at 17:34

## 2022-06-21 RX ADMIN — METOPROLOL SUCCINATE 25 MG: 25 TABLET, FILM COATED, EXTENDED RELEASE ORAL at 08:31

## 2022-06-21 RX ADMIN — SODIUM CHLORIDE, PRESERVATIVE FREE 3 ML: 5 INJECTION INTRAVENOUS at 14:32

## 2022-06-21 RX ADMIN — QUETIAPINE FUMARATE 100 MG: 100 TABLET ORAL at 20:36

## 2022-06-21 RX ADMIN — SODIUM CHLORIDE, PRESERVATIVE FREE 3 ML: 5 INJECTION INTRAVENOUS at 20:57

## 2022-06-21 RX ADMIN — SODIUM CHLORIDE, PRESERVATIVE FREE 10 ML: 5 INJECTION INTRAVENOUS at 08:40

## 2022-06-21 RX ADMIN — AMIODARONE HYDROCHLORIDE 400 MG: 200 TABLET ORAL at 20:36

## 2022-06-21 RX ADMIN — ASPIRIN 81 MG: 81 TABLET, CHEWABLE ORAL at 08:31

## 2022-06-21 RX ADMIN — MEXILETINE HYDROCHLORIDE 150 MG: 150 CAPSULE ORAL at 08:33

## 2022-06-21 RX ADMIN — METOPROLOL SUCCINATE 25 MG: 25 TABLET, FILM COATED, EXTENDED RELEASE ORAL at 20:36

## 2022-06-21 RX ADMIN — SODIUM CHLORIDE, PRESERVATIVE FREE 10 ML: 5 INJECTION INTRAVENOUS at 20:57

## 2022-06-21 RX ADMIN — MEXILETINE HYDROCHLORIDE 150 MG: 150 CAPSULE ORAL at 20:56

## 2022-06-21 RX ADMIN — SODIUM CHLORIDE, PRESERVATIVE FREE 3 ML: 5 INJECTION INTRAVENOUS at 06:20

## 2022-06-21 ASSESSMENT — PAIN SCALES - GENERAL
PAINLEVEL_OUTOF10: 0
PAINLEVEL_OUTOF10: 0

## 2022-06-21 NOTE — PROGRESS NOTES
[facility] CARDIOLOGY PROGRESS NOTE           6/21/2022 2:32 PM    Admit Date: 6/17/2022    Admit Diagnosis: Ventricular tachycardia ([facility] [address] [I47.2]  Defibrillator discharge [Z45.02]    Assessment:   Principal Problem:    Ventricular tachycardia ([address]  Active Problems:    Chronic systolic congestive heart failure [facility])    Defibrillator discharge    Elevated troponin    Coronary atherosclerosis of native coronary vessel    Intracardiac thrombus  Resolved Problems:    * No resolved hospital problems. *      Plan:   VT storm  VT s/p previous ICD discharge  CAD s/p CABG, 5/2022  ICM, EF 25-30%  Hypoxemia  Mural thrombus on warfarin.      58year old male with CAD s/p CABG and recurrent VT storm. I suspect his VT will improve over time. His device was interrogated and reprogrammed to help with his arrhythmias. He should continue on GDMT as tolerated. Will change to metoprolol for more ability to titrate BB.      -VT - Reviewed device IEGMs. Continue BB and amiodarone. His EF is severely depressed. Transition to po amiodarone and add mexiletine for now with plans to slowly titrate off as outpt. If needed, VT ablation is the next step. Check amiodarone level. K/Mg, 4.0/2.0, respectively. -ICM - continue GDMT as tolerated. -CAD s/p CABG - continue OMT. Thank you for allowing me to participate in the electrophysiologic care of this most pleasant patient. Please feel free to contact me if there are any questions or concerns. Juan Manuel Macias MD, MS  Clinical Cardiac Electrophysiology  [facility] Cardiology    Subjective:   No complaints this AM, no chest pain or shortness of breath    Interval History: (History of pertinent interval events obtained from nursing staff)    ROS:  GEN:  No fever or chills  Cardiovascular:  As noted above  Pulmonary:  As noted above  Neuro:  No new focal motor or sensory loss      Objective:     Vitals:    06/21/22 0831 06/21/22 0838 06/21/22 0846 06/21/22 1431   BP: 94/62 94/62  112/69   Pulse: 76 80 75 66   Resp:  18  20   Temp:  (!) 100.9 °F (38.3 °C)  98.4 °F (36.9 °C)   TempSrc:  Oral  Oral   SpO2:  95% 95% 97%   Weight:       Height:           Physical Exam:  General-Well Developed, Well Nourished, No Acute Distress, Alert & Oriented x 3, appropriate mood. Neck- supple, no JVD  CV- regular rate and rhythm no MRG  Lung- clear bilaterally  Abd- soft, nontender, nondistended  Ext- no edema bilaterally.   Skin- warm and dry    Current Facility-Administered Medications   Medication Dose Route Frequency    mexiletine (MEXITIL) capsule 150 mg  150 mg Oral BID    amiodarone (CORDARONE) tablet 400 mg  400 mg Oral BID    metoprolol succinate (TOPROL XL) extended release tablet 25 mg  25 mg Oral BID    cetirizine (ZYRTEC) tablet 10 mg  10 mg Oral Daily PRN    sodium chloride flush 0.9 % injection 3 mL  3 mL IntraVENous Q8H    albuterol sulfate HFA (PROVENTIL;VENTOLIN;PROAIR) 108 (90 Base) MCG/ACT inhaler 2 puff  2 puff Inhalation Q4H PRN    aspirin chewable tablet 81 mg  81 mg Oral Daily    QUEtiapine (SEROQUEL) tablet 100 mg  100 mg Oral Nightly    sodium chloride flush 0.9 % injection 5-40 mL  5-40 mL IntraVENous 2 times per day    sodium chloride flush 0.9 % injection 5-40 mL  5-40 mL IntraVENous PRN    0.9 % sodium chloride infusion   IntraVENous PRN    ondansetron (ZOFRAN-ODT) disintegrating tablet 4 mg  4 mg Oral Q8H PRN    Or    ondansetron (ZOFRAN) injection 4 mg  4 mg IntraVENous Q6H PRN    acetaminophen (TYLENOL) suppository 650 mg  650 mg Rectal Q6H PRN    polyethylene glycol (GLYCOLAX) packet 17 g  17 g Oral Daily PRN    nitroGLYCERIN (NITROSTAT) SL tablet 0.4 mg  0.4 mg SubLINGual Q5 Min PRN    acetaminophen (TYLENOL) tablet 650 mg  650 mg Oral Q6H PRN    warfarin placeholder: dosing by pharmacy   Other RX Placeholder       Data Review:   Recent Results (from the past 24 hour(s))   Basic Metabolic Panel    Collection Time: 06/21/22  7:17 AM   Result Value Ref Range    Sodium 139 138 - 145 mmol/L    Potassium 4.5 3.5 - 5.1 mmol/L    Chloride 106 98 - 107 mmol/L    CO2 30 21 - 32 mmol/L    Anion Gap 3 (L) 7 - 16 mmol/L    Glucose 90 65 - 100 mg/dL    BUN 14 8 - 23 MG/DL    CREATININE 1.00 0.8 - 1.5 MG/DL    GFR African American >60 >60 ml/min/1.73m2    GFR Non- >60 >60 ml/min/1.73m2    Calcium 8.6 8.3 - 10.4 MG/DL   Magnesium    Collection Time: 06/21/22  7:17 AM   Result Value Ref Range    Magnesium 2.4 1.8 - 2.4 mg/dL   Protime-INR    Collection Time: 06/21/22  7:17 AM   Result Value Ref Range    Protime 35.2 (H) 12.6 - 14.5 sec    INR 3.4         EKG:  (EKG has been independently visualized by me with interpretation below): NSR, LAFB, NSST changes.

## 2022-06-21 NOTE — PROGRESS NOTES
[facility] CARDIOLOGY PROGRESS NOTE           6/21/2022 7:48 AM    Admit Date: 6/17/2022      Subjective:   Patient denies complaints. Still on IV amiodarone. No recurrent Vtach. BP stable. ROS:  Cardiovascular:  As noted above    Objective:      Vitals:    06/20/22 2129 06/21/22 0000 06/21/22 0038 06/21/22 0511   BP: 115/69 98/65 97/60 102/61   Pulse: 74  66 67   Resp:   20 20   Temp:   98.7 °F (37.1 °C) 98.1 °F (36.7 °C)   TempSrc:       SpO2:   91% 93%   Weight:    199 lb 11.2 oz (90.6 kg)   Height:           Physical Exam:  General-No Acute Distress  Neck- supple, no JVD  CV- regular rate and rhythm no MRG  Lung- clear bilaterally  Abd- soft, nontender, nondistended  Ext- no edema bilaterally. Skin- warm and dry      Data Review:   Recent Labs     06/20/22  0814 06/19/22  0437 06/18/22  0315   WBC 11.9* 11.8* 12.9*   HGB 9.3* 9.3* 9.3*   HCT 30.4* 30.0* 30.1*   MCV 92.4 91.5 92.3   * 691* 707*       Recent Labs     06/20/22  0513 06/18/22  0315 06/17/22  1420      < > 139   K 4.0   < > 3.8      < > 105   CO2 29   < > 28   BUN 14   < > 10   CREATININE 1.00   < > 1.10   GLUCOSE 90   < > 148*   CALCIUM 8.4   < > 8.1*   PROT  --   --  6.5   LABALBU  --   --  2.5*   BILITOT  --   --  0.2   ALKPHOS  --   --  79   AST  --   --  28   ALT  --   --  18   LABGLOM >60   < > >60   GFRAA >60   < > >60   GLOB  --   --  4.0*    < > = values in this interval not displayed. No results for input(s): CKTOTAL, CKMB, CKMBINDEX, DDIMER, TROPONINI in the last 720 hours. Assessment/Plan:     Active Hospital Problems    Elevated troponin  Due to arrhythmia and ICD discharge. Recent CABG. No angina. No plans for ischemia evaluation      Defibrillator discharge  EP consulted. Discussed with Dr. Lolis Hughes. Change IV amiodarone to PO. Add Mexilitine. Check EKG in AM.  Follow on telemetry. Chronic systolic congestive heart failure (HCC)  Conitnue Coreg.   BP to low for ACE-I/ARB      *Ventricular tachycardia ([address]  See above. Coronary atherosclerosis of native coronary vessel  See above      Intracardiac thrombus  INR >4. Coumadin held. Daily INR.              Tracy Camargo MD

## 2022-06-21 NOTE — PROGRESS NOTES
Warfarin dosing per pharmacist    Hardy Vegas is a 58 y.o. male. Indication:  Intracardiac thrombus     Goal INR:  2-3    Home dose:  7.5 mg M/W/F  5 mg all other days    Risk factors or significant drug interactions:  amiodarone    Other anticoagulants:  none noted    Daily Monitoring  Date  INR     Warfarin dose HGB              Notes  6/17  7.5  Hold  9.3   6/18  7.8  Hold  9.3   6/19  5.0  Hold  9.3  6/20  4.1  Hold  9.3  6/21  3.4  Hold  ---     Pharmacy consulted to assist in dosing warfarin for [facility] while inpatient. Patient has a history of an apical thrombus. Home dosing regimen confirmed with recent outpatient anticoagulation notes. Patient presented with supratherapeutic INR of 7.5. INR remains supratherapeutic at 3.4. Will continue to hold warfarin until INR within therapeutic range. Daily INR to continue. Pharmacy will continue to monitor and make dose adjustments as clinically indicated. Thank you,  Alon Ulloa.  Deniz, PharmD, BCCCP  Contact via [address] [place] [address]

## 2022-06-22 ENCOUNTER — TELEPHONE (OUTPATIENT)
Dept: CARDIOLOGY CLINIC | Age: 62
End: 2022-06-22

## 2022-06-22 VITALS
HEART RATE: 74 BPM | OXYGEN SATURATION: 95 % | SYSTOLIC BLOOD PRESSURE: 96 MMHG | RESPIRATION RATE: 18 BRPM | HEIGHT: 69 IN | TEMPERATURE: 98.4 F | DIASTOLIC BLOOD PRESSURE: 71 MMHG | WEIGHT: 200.1 LBS | BODY MASS INDEX: 29.64 KG/M2

## 2022-06-22 LAB
ANION GAP SERPL CALC-SCNC: 7 MMOL/L (ref 7–16)
BUN SERPL-MCNC: 14 MG/DL (ref 8–23)
CALCIUM SERPL-MCNC: 8.4 MG/DL (ref 8.3–10.4)
CHLORIDE SERPL-SCNC: 104 MMOL/L (ref 98–107)
CO2 SERPL-SCNC: 28 MMOL/L (ref 21–32)
CREAT SERPL-MCNC: 1.1 MG/DL (ref 0.8–1.5)
GLUCOSE SERPL-MCNC: 84 MG/DL (ref 65–100)
INR BLD: 2.7
INR PPP: 2.6
MAGNESIUM SERPL-MCNC: 2.3 MG/DL (ref 1.8–2.4)
POTASSIUM SERPL-SCNC: 4.3 MMOL/L (ref 3.5–5.1)
PROTHROMBIN TIME: 28.4 SEC (ref 12.6–14.5)
SODIUM SERPL-SCNC: 139 MMOL/L (ref 138–145)

## 2022-06-22 PROCEDURE — 6370000000 HC RX 637 (ALT 250 FOR IP): Performed by: INTERNAL MEDICINE

## 2022-06-22 PROCEDURE — 6370000000 HC RX 637 (ALT 250 FOR IP): Performed by: PHYSICIAN ASSISTANT

## 2022-06-22 PROCEDURE — 99238 HOSP IP/OBS DSCHRG MGMT 30/<: CPT | Performed by: INTERNAL MEDICINE

## 2022-06-22 PROCEDURE — 99232 SBSQ HOSP IP/OBS MODERATE 35: CPT | Performed by: INTERNAL MEDICINE

## 2022-06-22 PROCEDURE — 83735 ASSAY OF MAGNESIUM: CPT

## 2022-06-22 PROCEDURE — 80048 BASIC METABOLIC PNL TOTAL CA: CPT

## 2022-06-22 PROCEDURE — 85610 PROTHROMBIN TIME: CPT

## 2022-06-22 PROCEDURE — 36415 COLL VENOUS BLD VENIPUNCTURE: CPT

## 2022-06-22 PROCEDURE — 2580000003 HC RX 258: Performed by: PHYSICIAN ASSISTANT

## 2022-06-22 RX ORDER — AMIODARONE HYDROCHLORIDE 400 MG/1
400 TABLET ORAL 2 TIMES DAILY
Qty: 60 TABLET | Refills: 0 | Status: SHIPPED | OUTPATIENT
Start: 2022-06-22 | End: 2022-07-20 | Stop reason: SDUPTHER

## 2022-06-22 RX ORDER — METOPROLOL SUCCINATE 25 MG/1
25 TABLET, EXTENDED RELEASE ORAL 2 TIMES DAILY
Qty: 60 TABLET | Refills: 5 | Status: SHIPPED | OUTPATIENT
Start: 2022-06-22 | End: 2022-08-01

## 2022-06-22 RX ORDER — MEXILETINE HYDROCHLORIDE 150 MG/1
150 CAPSULE ORAL 2 TIMES DAILY
Qty: 60 CAPSULE | Refills: 5 | Status: SHIPPED | OUTPATIENT
Start: 2022-06-22 | End: 2022-07-20 | Stop reason: SDUPTHER

## 2022-06-22 RX ADMIN — SODIUM CHLORIDE, PRESERVATIVE FREE 3 ML: 5 INJECTION INTRAVENOUS at 05:32

## 2022-06-22 RX ADMIN — SODIUM CHLORIDE, PRESERVATIVE FREE 10 ML: 5 INJECTION INTRAVENOUS at 09:36

## 2022-06-22 RX ADMIN — MEXILETINE HYDROCHLORIDE 150 MG: 150 CAPSULE ORAL at 09:33

## 2022-06-22 RX ADMIN — METOPROLOL SUCCINATE 25 MG: 25 TABLET, FILM COATED, EXTENDED RELEASE ORAL at 09:33

## 2022-06-22 RX ADMIN — AMIODARONE HYDROCHLORIDE 400 MG: 200 TABLET ORAL at 09:33

## 2022-06-22 RX ADMIN — ASPIRIN 81 MG: 81 TABLET, CHEWABLE ORAL at 09:34

## 2022-06-22 RX ADMIN — ACETAMINOPHEN 650 MG: 325 TABLET ORAL at 05:06

## 2022-06-22 ASSESSMENT — PAIN SCALES - GENERAL: PAINLEVEL_OUTOF10: 0

## 2022-06-22 NOTE — PROGRESS NOTES
[facility] CARDIOLOGY PROGRESS NOTE           6/22/2022 7:26 AM    Admit Date: 6/17/2022    Admit Diagnosis: Ventricular tachycardia ([address] [I47.2]  Defibrillator discharge [Z45.02]    Assessment:   Principal Problem:    Ventricular tachycardia ([address]  Active Problems:    Chronic systolic congestive heart failure [facility])    Defibrillator discharge    Elevated troponin    Coronary atherosclerosis of native coronary vessel    Intracardiac thrombus  Resolved Problems:    * No resolved hospital problems. *      Plan:   VT storm  VT s/p previous ICD discharge  CAD s/p CABG, 5/2022  ICM, EF 25-30%  Hypoxemia  Mural thrombus on warfarin.      58year old male with CAD s/p CABG and recurrent VT storm. I suspect his VT will improve over time. His device was interrogated and reprogrammed to help with his arrhythmias. He should continue on GDMT as tolerated. Will change to metoprolol for more ability to titrate BB.      -VT - Reviewed device IEGMs. His EF is severely depressed. Continue BB (titrate as BP tolerates), amiodarone and mexiletine for now with plans to slowly titrate off as outpt. If needed, VT ablation is the next step. Check amiodarone level (in process). K/Mg, 4.0/2.0, respectively. -ICM - continue GDMT as tolerated. -CAD s/p CABG - continue OMT. Thank you for allowing me to participate in the electrophysiologic care of this most pleasant patient. Please feel free to contact me if there are any questions or concerns. Augustina Macias MD, MS  Clinical Cardiac Electrophysiology  [facility] Cardiology    Subjective:   No complaints this AM, no chest pain or shortness of breath    Interval History: (History of pertinent interval events obtained from nursing staff)    ROS:  GEN:  No fever or chills  Cardiovascular:  As noted above  Pulmonary:  As noted above  Neuro:  No new focal motor or sensory loss      Objective:     Vitals:    06/21/22 2036 06/21/22 2042 06/22/22 0050 06/22/22 0451 BP: 124/70 124/70 102/66 103/64   Pulse: 81 77 77 74   Resp:  18 18 18   Temp:  97.8 °F (36.6 °C) 99.9 °F (37.7 °C) (!) 101 °F (38.3 °C)   TempSrc:       SpO2:  97% 92% 92%   Weight:    200 lb 1.6 oz (90.8 kg)   Height:           Physical Exam:  General-Well Developed, Well Nourished, No Acute Distress, Alert & Oriented x 3, appropriate mood. Neck- supple, no JVD  CV- regular rate and rhythm no MRG  Lung- clear bilaterally  Abd- soft, nontender, nondistended  Ext- no edema bilaterally.   Skin- warm and dry    Current Facility-Administered Medications   Medication Dose Route Frequency    mexiletine (MEXITIL) capsule 150 mg  150 mg Oral BID    amiodarone (CORDARONE) tablet 400 mg  400 mg Oral BID    metoprolol succinate (TOPROL XL) extended release tablet 25 mg  25 mg Oral BID    cetirizine (ZYRTEC) tablet 10 mg  10 mg Oral Daily PRN    sodium chloride flush 0.9 % injection 3 mL  3 mL IntraVENous Q8H    albuterol sulfate HFA (PROVENTIL;VENTOLIN;PROAIR) 108 (90 Base) MCG/ACT inhaler 2 puff  2 puff Inhalation Q4H PRN    aspirin chewable tablet 81 mg  81 mg Oral Daily    QUEtiapine (SEROQUEL) tablet 100 mg  100 mg Oral Nightly    sodium chloride flush 0.9 % injection 5-40 mL  5-40 mL IntraVENous 2 times per day    sodium chloride flush 0.9 % injection 5-40 mL  5-40 mL IntraVENous PRN    0.9 % sodium chloride infusion   IntraVENous PRN    ondansetron (ZOFRAN-ODT) disintegrating tablet 4 mg  4 mg Oral Q8H PRN    Or    ondansetron (ZOFRAN) injection 4 mg  4 mg IntraVENous Q6H PRN    acetaminophen (TYLENOL) suppository 650 mg  650 mg Rectal Q6H PRN    polyethylene glycol (GLYCOLAX) packet 17 g  17 g Oral Daily PRN    nitroGLYCERIN (NITROSTAT) SL tablet 0.4 mg  0.4 mg SubLINGual Q5 Min PRN    acetaminophen (TYLENOL) tablet 650 mg  650 mg Oral Q6H PRN    warfarin placeholder: dosing by pharmacy   Other RX Placeholder       Data Review:   No results found for this or any previous visit (from the past 24 hour(s)). EKG:  (EKG has been independently visualized by me with interpretation below): NSR, LAFB, NSST changes.

## 2022-06-22 NOTE — PLAN OF CARE
Problem: Discharge Planning  Goal: Discharge to home or other facility with appropriate resources  Outcome: Completed     Problem: ABCDS Injury Assessment  Goal: Absence of physical injury  Outcome: Completed     Problem: Safety - Adult  Goal: Free from fall injury  Outcome: Completed     Problem: Chronic Conditions and Co-morbidities  Goal: Patient's chronic conditions and co-morbidity symptoms are monitored and maintained or improved  Outcome: Completed

## 2022-06-22 NOTE — TELEPHONE ENCOUNTER
Called Krzysztof, verbal order given to Stephenie Villasenor per Dr. Rosario Chaparro response.    Amiodarone 200 mg take 2 tablets twice a day #120//deniaab

## 2022-06-22 NOTE — CARE COORDINATION
Discharge order is in. Pt is discharging home in stable condition. No discharge needs were identified. Tx goals have been met.       06/22/22 1131   Discharge Planning   Patient expects to be discharged to: [facility]   Services At/After Discharge None   [facility] Information Provided? No   Mode of Transport at Discharge Other (see comment)  (Family)   Confirm Follow Up Transport Family   Condition of Participation: Discharge Planning   The Patient and/or Patient Representative was provided with a Choice of Provider? Patient   The Patient and/Or Patient Representative agree with the Discharge Plan? Yes   Freedom of Choice list was provided with basic dialogue that supports the patient's individualized plan of care/goals, treatment preferences, and shares the quality data associated with the providers?   Yes

## 2022-06-22 NOTE — DISCHARGE SUMMARY
[facility] Cardiology Discharge Summary     Patient ID:  Patt Jaimes  [id number]  67 y.o.  1960    Admit date: 6/17/2022    Discharge date:  06/22/22     Admitting Physician: Corie Barcenas MD     Discharge Physician: Krista Andrews, LORENZA - CNP/Dr. Lilly    Admission Diagnoses: Ventricular tachycardia [facility]) [I47.2]  Defibrillator discharge [Z45.02]    Discharge Diagnoses:   Patient Active Problem List    Diagnosis Date Noted    Elevated troponin 06/17/2022    CAD, multiple vessel 05/28/2022    Defibrillator discharge 05/26/2022    COVID-19 10/06/2021    Chronic systolic congestive heart failure ([facility] [address] 09/27/2011    S/P ICD (internal cardiac defibrillator) procedure 09/27/2011    S/P CABG x 3 05/31/2022    Hypoxemia 05/31/2022    Atelectasis 05/31/2022    Smoking greater than 20 pack years 11/13/2021    Refused influenza vaccine 11/13/2021    Anemia 11/13/2021    History of 2019 novel coronavirus disease (COVID-19) 10/03/2021    COVID-19 vaccination refused 06/18/2021    Ventricular tachycardia ([facility] [address] 06/30/2020    High risk medication use 06/19/2020    Chronic eczematous otitis externa of both ears 06/18/2020    Benign prostatic hyperplasia with weak urinary stream 12/29/2019    Chronic fatigue 12/29/2019    Tobacco abuse 06/24/2019    Numbness of left foot 06/12/2018    Statin intolerance 12/10/2017    IGT (impaired glucose tolerance) 12/03/2017    Overweight (BMI 25.0-29.9) 06/08/2017    Primary insomnia 06/07/2017    Seasonal allergic rhinitis due to pollen 06/07/2017    Coronary atherosclerosis of native coronary vessel 10/02/2015    Dyslipidemia 10/02/2015    Intracardiac thrombus 10/02/2015       Cardiology Procedures this admission:  limited echo  Consults:  cardiology     Hospital Course: Patient with a hx of CAD, HFrEF w [employer] ICD w h/o VT/VF despite amio, IGT, HLD, hx of cardiac wall mural thrombus, and depression.  Admit 5-26-22 w VF arrest, underwent [facility] 5-27-22 showing multivessel disease. 5-26-22 echo w EF 25% w WMA, mild LAE. 5-26-22 pt underwent CABG w LIMA to LAD, SVG to intermediate coronary artery, SVG to PDA. D/C home 6-6-22. Was doing well without CP, walked the day of admission when he started having palpitations with dizziness, subsequent ICD shock x 3, then two more ICD shock, called EMS but on arrival to ER had several runs of VT, was given bolus of IV amio and started on IV amio drip, given IV mag, had been on po amio at home with dose just decreased to 400 mg QD. He continued to have bouts of VT and was given a second bolus of IV amio w less ectopy. In ER pt with recurrent bouts of VT, given second bolus. WBC 12, hgb 9.3 down from 11.6, platelets 634. /71, dropped after CV to 106/69. HS trop 329, , K 3.8, cr 1.1, mag 2.4. He was admitted with VT storm and continued on IV amiodarone. His coumadin was placed on hold d/t high INR. Limited echo showed   Left Ventricle: Moderately reduced left ventricular systolic function with a visually estimated EF of 25 - 30%. Moderate global hypokinesis present.   Left Atrium: Left atrium is mildly dilated.   Pericardium: Small (<1 cm) localized pericardial effusion present around the left ventricle. Dr. Nigel Barragan saw patient and amiodarone was transitioned to po and started on mexilitine. His device was also reprogramed. Amiodarone level pending at d/c. EP felt may need to consider VT ablation outpt. Will need to see EP in 2 weeks. Patient will remain on higher dose amiodarone until f/u appt per Dr. Nigel Barragan. The patient will see Dr. Nigel Barragan on 6/27/2022 at 330 pm in [facility] office. The patient will take 5 mg coumadin with INR check with his home machine on Monday 6/27/2022 and call to our office. The afternoon of 6/22/2022, the patient was feeling much better with no further VT.   The patient was seen and examined by Dr. Watt Lesches and was determined stable and ready for discharge home. The patient was instructed on the importance of medication compliance and outpatient follow up. DISPOSITION: The patient is being discharged home in stable condition on a low saturated fat, low cholesterol and low salt diet. The patient is instructed to advance activities as tolerated to the limit of fatigue or shortness of breath. The patient is instructed to call the office or return to the ER for immediate evaluation for any severe shortness of breath, chest pain, prolonged palpitations, near syncope or syncope. Discharge Exam: BP 96/71   Pulse 74   Temp 98.4 °F (36.9 °C) (Oral)   Resp 18   Ht 5' 9\" (1.753 m)   Wt 200 lb 1.6 oz (90.8 kg)   SpO2 95%   BMI 29.55 kg/m²      Patient has been seen by Dr. Nico Haley and Dr. Jamie Bradley: see his progress note for exam details.     Recent Results (from the past 24 hour(s))   Basic Metabolic Panel    Collection Time: 06/22/22  6:03 AM   Result Value Ref Range    Sodium 139 138 - 145 mmol/L    Potassium 4.3 3.5 - 5.1 mmol/L    Chloride 104 98 - 107 mmol/L    CO2 28 21 - 32 mmol/L    Anion Gap 7 7 - 16 mmol/L    Glucose 84 65 - 100 mg/dL    BUN 14 8 - 23 MG/DL    CREATININE 1.10 0.8 - 1.5 MG/DL    GFR African American >60 >60 ml/min/1.73m2    GFR Non- >60 >60 ml/min/1.73m2    Calcium 8.4 8.3 - 10.4 MG/DL   Magnesium    Collection Time: 06/22/22  6:03 AM   Result Value Ref Range    Magnesium 2.3 1.8 - 2.4 mg/dL   Protime-INR    Collection Time: 06/22/22  6:03 AM   Result Value Ref Range    Protime 28.4 (H) 12.6 - 14.5 sec    INR 2.6           Patient Instructions:   Current Discharge Medication List      START taking these medications    Details   metoprolol succinate (TOPROL XL) 25 MG extended release tablet Take 1 tablet by mouth in the morning and at bedtime  Qty: 60 tablet, Refills: 5      mexiletine (MEXITIL) 150 MG capsule Take 1 capsule by mouth in the morning and at bedtime  Qty: 60 capsule, Refills: 5 CONTINUE these medications which have CHANGED    Details   amiodarone (PACERONE) 400 MG tablet Take 1 tablet by mouth 2 times daily  Qty: 60 tablet, Refills: 0         CONTINUE these medications which have NOT CHANGED    Details   nitroGLYCERIN (NITROSTAT) 0.4 MG SL tablet Place 1 tablet under the tongue every 5 minutes as needed for Chest pain up to max of 3 total doses. If no relief after 1 dose, call 911.   Qty: 25 tablet, Refills: 3      aspirin 81 MG chewable tablet Take 1 tablet by mouth daily  Qty: 30 tablet, Refills: 3      acetaminophen (TYLENOL) 325 mg tablet Take 2 tablets by mouth every 6 hours as needed for Pain  Qty: 120 tablet, Refills: 3      albuterol sulfate  (90 Base) MCG/ACT inhaler Inhale 2 puffs into the lungs every 4 hours as needed      Cetirizine HCl 10 MG CAPS Take by mouth as needed      cyanocobalamin 1000 MCG tablet Take 1,000 mcg by mouth as needed      QUEtiapine (SEROQUEL) 100 MG tablet Take 100 mg by mouth      warfarin (COUMADIN) 5 MG tablet Take 1 to 1 1/2 tablet daily or as directed         STOP taking these medications       carvedilol (COREG) 6.25 MG tablet Comments:   Reason for Stopping:                LORENZA Olsen CNP  6/22/2022  11:34 AM

## 2022-06-22 NOTE — TELEPHONE ENCOUNTER
Insurance will not pay for Amiodarone 400 mg but they will pay for 200mg Can they change RX to Amiodarone 200mg 2 tablets BID ?  Please call back

## 2022-06-23 ENCOUNTER — ANTI-COAG VISIT (OUTPATIENT)
Dept: CARDIOLOGY CLINIC | Age: 62
End: 2022-06-23

## 2022-06-23 ENCOUNTER — CARE COORDINATION (OUTPATIENT)
Dept: OTHER | Facility: CLINIC | Age: 62
End: 2022-06-23

## 2022-06-23 DIAGNOSIS — I51.3 INTRACARDIAC THROMBUS: Primary | ICD-10-CM

## 2022-06-23 LAB
BACTERIA SPEC CULT: NORMAL
BACTERIA SPEC CULT: NORMAL
SERVICE CMNT-IMP: NORMAL
SERVICE CMNT-IMP: NORMAL

## 2022-06-23 NOTE — PATIENT INSTRUCTIONS
Reminder: Please contact the Coumadin Clinic at [phone] when you have medication changes. Examples, new medications, antibiotics, discontinued medications, new supplements, missed doses of warfarin or if you took extra doses of warfarin. This also includes OTC medications. Notifying us helps reduce the possibility of high and low INR's. In addition, if warfarin needs to be held for any procedures, please have surgeon or physician's office contact us before holding anticoagulant. Thanks, [facility] Coumadin [facility].

## 2022-06-23 NOTE — PROGRESS NOTES
Anticoagulation Summary  As of 2022    INR goal:  2.0-3.0   TTR:  100.0 % (6 d)   INR used for dosin.70 (2022)   Warfarin maintenance plan:  7.5 mg (5 mg x 1.5) every Mon, Wed, Fri; 5 mg (5 mg x 1) all other days   Weekly warfarin total:  42.5 mg   Plan last modified:  [address] [facility] [place] (2022)   Next INR check:  2022   Priority:  Maintenance   Target end date: Indefinite    Indications    Long term (current) use of anticoagulants (Resolved) [Z79.01]  Intracardiac thrombus [I51.3]             Anticoagulation Episode Summary     INR check location:  Home Draw    Preferred lab:      Send INR reminders to:  [address]    Comments:  [facility]      Anticoagulation Care Providers     Provider Role Specialty Phone number    Alondra Del Cid MD Responsible Cardiology [phone]      Home INR monitor result reported via fax, therapeutic INR, no dose adjustments made.

## 2022-06-23 NOTE — CARE COORDINATION
[address] Transitions Initial Follow Up Call    Call within 2 business days of discharge: Yes    Patient: Amarilis Mitchell Patient : 1960   MRN: [id number]  Reason for Admission: Ventricular Tachycardia  Discharge Date: 22 RARS: Readmission Risk Score: 18.8 ( )      Last Discharge [facility]       Complaint Diagnosis Description Type Department Provider    22 AICD Problem Ventricular tachycardia ([address] . .. ED to Hosp-Admission (Discharged) (ADMITTED) Harl Lesch, MD; Vilma Vela. .. Transitions of Care Initial Call    Was this an external facility discharge? No Discharge Facility: [facility]    Challenges to be reviewed by the provider   Additional needs identified to be addressed with provider: No  none             Method of communication with provider : none    Advance Care Planning:   Does patient have an Advance Directive: Has ACP docs. Care Transition Nurse contacted the patient by telephone to perform post hospital discharge assessment. Verified name and  with patient as identifiers. Provided introduction to self, and explanation of the CTN role. CTN reviewed discharge instructions, medical action plan and red flags with patient who verbalized understanding. Patient given an opportunity to ask questions and does not have any further questions or concerns at this time. Were discharge instructions available to patient? Yes. Reviewed appropriate site of care based on symptoms and resources available to patient including: PCP  Specialist  When to call [address]  Condition related references. The patient agrees to contact the PCP office for questions related to their healthcare. Medication reconciliation was performed with patient, who verbalizes understanding of administration of home medications. Advised obtaining a 90-day supply of all daily and as-needed medications.      Was patient discharged with a pulse oximeter? no  Non-face-to-face services provided:  Scheduled appointment with PCP-Dr. Lou Mclean on 6/28/22 at 10:15 am  Scheduled appointment with Specialist-Cardiology on 6/27/22 at 3:30 pm  Obtained and reviewed discharge summary and/or continuity of care documents  Education of patient/family/caregiver/guardian to support self-management-of medical conditions. Care Transitions 24 Hour Call    Schedule Follow Up Appointment with PCP: Completed  Do you have a copy of your discharge instructions?: Yes  Do you have all of your prescriptions and are they filled?: Yes  Have you been contacted by a [address]?: No  Have you scheduled your follow up appointment?: Yes  How are you going to get to your appointment?: Car - family or friend to transport  [address]: [address] you have support at home?: Alone  Do you feel like you have everything you need to keep you well at home?: Yes  Are you an active caregiver in your home?: No  Care Transitions Interventions    Physical Therapy: Completed    Cardiac Rehab: Completed       Occupational Therapy: Completed            Follow Up  Future Appointments   Date Time Provider Ernestina Willis   6/24/2022 12:30 PM Nuris Francis RN [facility]   6/27/2022  3:30 PM Mart Velasquez MD [facility] AMB   6/28/2022 10:15 AM DO MAXIMO Plummer GVL AMB   7/14/2022  2:15 PM [facility] DEVICE [facility] GVL AMB   7/20/2022  9:45 AM Chantel Mahan MD [facility] G[facility] AMB     CTN provided contact information. Plan for follow-up call in 5-7 days based on severity of symptoms and risk factors. Plan for next call: self management-of medical conditions and follow up appointments.     Shyam Feliz RN

## 2022-06-24 ENCOUNTER — HOSPITAL ENCOUNTER (OUTPATIENT)
Dept: CARDIAC REHAB | Age: 62
Setting detail: RECURRING SERIES
Discharge: HOME | End: 2022-06-27

## 2022-06-24 RX ORDER — VITAMIN B COMPLEX
1000 TABLET ORAL DAILY
COMMUNITY
End: 2022-10-28

## 2022-06-24 RX ORDER — M-VIT,TX,IRON,MINS/CALC/FOLIC 27MG-0.4MG
1 TABLET ORAL DAILY
COMMUNITY

## 2022-06-24 ASSESSMENT — PATIENT HEALTH QUESTIONNAIRE - PHQ9
SUM OF ALL RESPONSES TO PHQ QUESTIONS 1-9: 7
SUM OF ALL RESPONSES TO PHQ QUESTIONS 1-9: 7
3. TROUBLE FALLING OR STAYING ASLEEP: 0
10. IF YOU CHECKED OFF ANY PROBLEMS, HOW DIFFICULT HAVE THESE PROBLEMS MADE IT FOR YOU TO DO YOUR WORK, TAKE CARE OF THINGS AT HOME, OR GET ALONG WITH OTHER PEOPLE: 0
5. POOR APPETITE OR OVEREATING: 0
SUM OF ALL RESPONSES TO PHQ QUESTIONS 1-9: 7
4. FEELING TIRED OR HAVING LITTLE ENERGY: 3
6. FEELING BAD ABOUT YOURSELF - OR THAT YOU ARE A FAILURE OR HAVE LET YOURSELF OR YOUR FAMILY DOWN: 0
SUM OF ALL RESPONSES TO PHQ9 QUESTIONS 1 & 2: 0
1. LITTLE INTEREST OR PLEASURE IN DOING THINGS: 0
8. MOVING OR SPEAKING SO SLOWLY THAT OTHER PEOPLE COULD HAVE NOTICED. OR THE OPPOSITE, BEING SO FIGETY OR RESTLESS THAT YOU HAVE BEEN MOVING AROUND A LOT MORE THAN USUAL: 2
7. TROUBLE CONCENTRATING ON THINGS, SUCH AS READING THE NEWSPAPER OR WATCHING TELEVISION: 2
9. THOUGHTS THAT YOU WOULD BE BETTER OFF DEAD, OR OF HURTING YOURSELF: 0
SUM OF ALL RESPONSES TO PHQ QUESTIONS 1-9: 7
2. FEELING DOWN, DEPRESSED OR HOPELESS: 0

## 2022-06-24 ASSESSMENT — LIFESTYLE VARIABLES
SMOKELESS_TOBACCO: NO
CIGARETTES_PER_DAY: 1PPD

## 2022-06-24 ASSESSMENT — EJECTION FRACTION: EF_VALUE: 25

## 2022-06-24 NOTE — PROGRESS NOTES
Dear Dr. Nico Haley,    Thank you for referring your patient, Mr. Megha Gonzalez ( 1960), to the Cardiopulmonary Rehabilitation Program at [facility]. He is a good candidate for the Cardiac Rehab Program and should see improvements with regular participation. We will be addressing appropriate interventions for modifiable risk factors with your patient during the next 12 weeks. We will contact you with any issues or concerns that may arise, or you can follow your patient's progress through [address] [place] [address] at any time. A final summary will be sent to you when the program is completed. Again, thank you for the referral. If we can be of further assistance, please feel free to contact the Cardiopulmonary Rehab staff at [phone].     Sincerely,    MARY JO VillanuevaN, RN  Cardiopulmonary Rehabilitation Nurse Liaison  [facility]

## 2022-06-27 ENCOUNTER — OFFICE VISIT (OUTPATIENT)
Dept: CARDIOLOGY CLINIC | Age: 62
End: 2022-06-27
Payer: MEDICARE

## 2022-06-27 ENCOUNTER — ANTI-COAG VISIT (OUTPATIENT)
Dept: CARDIOLOGY CLINIC | Age: 62
End: 2022-06-27

## 2022-06-27 VITALS
DIASTOLIC BLOOD PRESSURE: 78 MMHG | BODY MASS INDEX: 30.07 KG/M2 | HEART RATE: 68 BPM | SYSTOLIC BLOOD PRESSURE: 122 MMHG | HEIGHT: 69 IN | WEIGHT: 203 LBS

## 2022-06-27 DIAGNOSIS — I25.10 CAD, MULTIPLE VESSEL: Primary | ICD-10-CM

## 2022-06-27 DIAGNOSIS — I51.3 INTRACARDIAC THROMBUS: Primary | ICD-10-CM

## 2022-06-27 LAB — INR BLD: 2.8

## 2022-06-27 PROCEDURE — 93000 ELECTROCARDIOGRAM COMPLETE: CPT | Performed by: INTERNAL MEDICINE

## 2022-06-27 PROCEDURE — 1111F DSCHRG MED/CURRENT MED MERGE: CPT | Performed by: INTERNAL MEDICINE

## 2022-06-27 PROCEDURE — 1036F TOBACCO NON-USER: CPT | Performed by: INTERNAL MEDICINE

## 2022-06-27 PROCEDURE — 99214 OFFICE O/P EST MOD 30 MIN: CPT | Performed by: INTERNAL MEDICINE

## 2022-06-27 PROCEDURE — 3017F COLORECTAL CA SCREEN DOC REV: CPT | Performed by: INTERNAL MEDICINE

## 2022-06-27 PROCEDURE — G8428 CUR MEDS NOT DOCUMENT: HCPCS | Performed by: INTERNAL MEDICINE

## 2022-06-27 PROCEDURE — G8417 CALC BMI ABV UP PARAM F/U: HCPCS | Performed by: INTERNAL MEDICINE

## 2022-06-27 ASSESSMENT — ENCOUNTER SYMPTOMS
EYES NEGATIVE: 1
RESPIRATORY NEGATIVE: 1
GASTROINTESTINAL NEGATIVE: 1
ALLERGIC/IMMUNOLOGIC NEGATIVE: 1

## 2022-06-27 NOTE — PATIENT INSTRUCTIONS
Reminder: Please contact the Coumadin Clinic at [phone] when you have medication changes. Examples, new medications, antibiotics, discontinued medications, new supplements, missed doses of warfarin or if you took extra doses of warfarin. This also includes OTC medications. Notifying us helps reduce the possibility of high and low INR's. In addition, if warfarin needs to be held for any procedures, please have surgeon or physician's office contact us before holding anticoagulant. Thanks, [facility] Cou[facility].

## 2022-06-27 NOTE — PROGRESS NOTES
[facility] CARDIOLOGY  [address], [address]  PHONE: [phone]        22      NAME:  Boom Rivers  : 1960  MRN: [id number]     Follow Up    NSVT  VT s/p previous ICD discharge  CAD s/p CABG, 2022  ICM, EF 25-30%  Hypoxemia     58year old male with CAD s/p CABG and recurrent VT. I suspect his VT will improve over time. His device was interrogation and reprogrammed to help with his arrhythmias. He should continue on GDMT as tolerated. If BP low, could switch from coreg to metoprolol.      -VT - continue amiodarone 200 mg po BID and mexiletine for now. Continue BB.   -VT ablation if recurrent VT.   -Biannual TFTs, LFTs, yearly PFTs and eye exams while on amiodarone. -ICM - continue GDMT as tolerated. -CAD s/p CABG - per CTS. -EP follow up in 1-2 months. Patient has been instructed and agrees to call our office with any issues or other concerns related to their cardiac condition(s) and/or complaint(s). No follow-up provider specified. Thank you for allowing me to participate in the electrophysiologic care of Mr. Boom Rivers. Please contact me if any questions or concerns were to arise. Homero Macias MD, MS  Clinical Cardiac Electrophysiology  [facility]  22  3:01 PM    ===================================================================  Chief Complant:    Chief Complaint   Patient presents with    Irregular Heart Beat        Consultation is requested by Jay Jones MD for evaluation of Irregular Heart Beat    History:  Boom Rivers is a most pleasant 58 y.o. male with a past medical and cardiac history significant for CAD s/p CABG (VG to ramus, SVG to PDA and LIMA to LAD) in 2022, HFrEF, IGT, HLD, hx of cardiac wall mural thrombus, and depression.  The patient has been doing well in the last defibrillator interrogation showed episodes of ATP but no ICD shocks with no medications changes at home.  He had ICD shocks soon after his CABG c/b recurrent ICD shocks on a recent admission later in June. He has seen Dr. Shannon Paulson. He comes in for follow up. Overall doing well since discharge. Denies ICD shocks. He reports \"feeling his pacemaker at night. \" He also reports feeling \"swimmy headed\" from his meds but he is tolerating them. The patient otherwise denies chest pain, dyspnea, presyncope, syncope or lateralizing symptoms.      Recent Cardiac Synopsis w/ Labs  NST: 8/2020 CONCLUSION:   1. Stress EKG: Non diagnostic due to pharmacologic infusion. 2. SPECT Perfusion Imaging: large anterior infarction extending into mid   to distal anterior wall and distal inferior wall   3. LV Systolic Function is severely abnormal.   4. Risk Assessment: no reversible ischemia, large infarction with known    ischemic cardiomyopathy.       Echo: 3/28/2018 EF 30-35%  EKG: Reviewed by me, NSR, normal axis, no sichemia. PPM Interrogation:  Preliminary Impression: Normal dc icd function.  VT episode(s) back on   09/30/21 successfully ATP terminated.  Several NSVT since then but no   further therapies.  Pt asymptomatic to these events.  PVC burden:  354K   single pvc's in past 5 months.  AP 2%   3%.  No programming changes   warranted.      Soc: 1 ppd since 1995  FH: Mom w MI 67    Device Interrogation: [address] ICD. Stable lead function. VT events with ICD shocks requiring admission in June 2022. Past Medical History, Past Surgical History, Family history, Social History, and Medications were all reviewed with the patient today and updated as necessary.      Current Outpatient Medications   Medication Sig Dispense Refill    Vitamin D (CHOLECALCIFEROL) 25 MCG (1000 UT) TABS tablet Take 1,000 Units by mouth daily      Multiple Vitamins-Minerals (THERAPEUTIC MULTIVITAMIN-MINERALS) tablet Take 1 tablet by mouth daily      amiodarone (PACERONE) 400 MG tablet Take 1 tablet by mouth 2 times daily 60 tablet 0    metoprolol succinate (TOPROL XL) 25 MG extended release tablet Take 1 tablet by mouth in the morning and at bedtime 60 tablet 5    mexiletine (MEXITIL) 150 MG capsule Take 1 capsule by mouth in the morning and at bedtime 60 capsule 5    nitroGLYCERIN (NITROSTAT) 0.4 MG SL tablet Place 1 tablet under the tongue every 5 minutes as needed for Chest pain up to max of 3 total doses. If no relief after 1 dose, call 911. 25 tablet 3    aspirin 81 MG chewable tablet Take 1 tablet by mouth daily 30 tablet 3    acetaminophen (TYLENOL) 325 mg tablet Take 2 tablets by mouth every 6 hours as needed for Pain 120 tablet 3    albuterol sulfate  (90 Base) MCG/ACT inhaler Inhale 2 puffs into the lungs every 4 hours as needed       Cetirizine HCl 10 MG CAPS Take by mouth as needed      QUEtiapine (SEROQUEL) 100 MG tablet Take 100 mg by mouth nightly       warfarin (COUMADIN) 5 MG tablet Take 1 to 1 1/2 tablet daily or as directed      cyanocobalamin 1000 MCG tablet Take 1,000 mcg by mouth as needed (Patient not taking: Reported on 6/27/2022)       No current facility-administered medications for this visit.      Allergies   Allergen Reactions    Penicillins Swelling     Face swelling    Atorvastatin Other (See Comments)     itching    Evolocumab Other (See Comments)     Itching    Lisinopril Other (See Comments)    Rosuvastatin Other (See Comments)     itching       Past Medical History:   Diagnosis Date    Acute hypoxemic respiratory failure due to COVID-19 [facility] 10/9/2021    CAD (coronary artery disease)     heart attack 2005 stentS HEART    CHF (congestive heart failure) ([facility] 9/27/2011    Chronic systolic heart failure ([facility] 10/2/2015    Coronary atherosclerosis of native coronary vessel 10/2/2015    Hyperlipidemia 10/2/2015    Hypoxemia requiring supplemental oxygen 10/6/2021    IGT (impaired glucose tolerance) 12/3/2017    Other ill-defined conditions(799.89)      blind left eye    Other ill-defined conditions(799.89)     cardiomyopathy    Pneumonia due to COVID-19 virus 2021    Resolved    Primary insomnia 2017    Psychiatric disorder     depression     Sepsis, unspecified 2011    Thromboembolus ([address]     thrombus in heart     Thrombus 10/2/2015     Past Surgical History:   Procedure Laterality Date    CARDIAC CATHETERIZATION      5 stents total    CARDIAC DEFIBRILLATOR PLACEMENT      [facility]    CARDIAC PROCEDURE N/A 2022    LEFT HEART CATH / CORONARY ANGIOGRAPHY performed by Cheyenne Venegas MD at [address] [place] [address] CATH LAB    COLONOSCOPY  2010    CORONARY ARTERY BYPASS GRAFT N/A 2022    CORONARY ARTERY BYPASS GRAFT (CABG X 3), LIMA ; ENDOSCOPIC VEIN HARVEST, LEFT GREATER SAPHENOUS VEIN performed by Nikhil Dennis MD at [facility] MAIN OR    HEENT      oral surgery    PACEMAKER      [place] CARDIAC SURG PROCEDURE UNLIST       1 stent     TRANSESOPHAGEAL ECHOCARDIOGRAM N/A 2022    TRANSESOPHAGEAL ECHOCARDIOGRAM performed by Nikhil Dennis MD at [facility] MAIN OR     Family History   Problem Relation Age of Onset    Heart Disease Mother     Diabetes Paternal Grandfather     Cancer Paternal Grandmother     Heart Disease Brother     Diabetes Maternal Grandmother     Bleeding Prob Father     Diabetes Mother     Heart Disease Paternal Grandfather     Heart Attack Mother 67        mi     Social History     Tobacco Use    Smoking status: Former Smoker     Packs/day: 1.00     Types: Cigarettes     Start date: 1978     Quit date: 2022     Years since quittin.0    Smokeless tobacco: Never Used   Substance Use Topics    Alcohol use: No       ROS:  A comprehensive review of systems was performed with the pertinent positives and negatives as noted in the HPI in addition to:  Review of Systems   Constitutional: Negative. HENT: Negative. Eyes: Negative. Respiratory: Negative. Cardiovascular: Negative. Gastrointestinal: Negative.     Endocrine: Negative. Genitourinary: Negative. Musculoskeletal: Negative. Skin: Negative. Allergic/Immunologic: Negative. Neurological: Negative. Hematological: Negative. Psychiatric/Behavioral: Negative. All other systems reviewed and are negative. PHYSICAL EXAM:   /78   Pulse 68   Ht 5' 9\" (1.753 m)   Wt 203 lb (92.1 kg)   BMI 29.98 kg/m²      Wt Readings from Last 3 Encounters:   06/27/22 203 lb (92.1 kg)   06/22/22 200 lb 1.6 oz (90.8 kg)   06/10/22 214 lb 12.8 oz (97.4 kg)     BP Readings from Last 3 Encounters:   06/27/22 122/78   06/22/22 96/71   06/10/22 110/62       Gen: Well appearing, well developed, no acute distress  Eyes: Pupils equal, round. Extraocular movements are intact  ENT: Oropharynx clear, no oral lesions, normal dentition  CV: S1S2, regular rate and rhythm, no murmurs, rubs or gallops, normal JVD, no carotid bruits, normal distal pulses, no SHIRLEY  Pulm: Clear to auscultation bilaterally, no accessory muscle uses, no wheezes or rales  GI: Soft, NT, ND, +BS  Neuro: Alert and oriented, nonfocal  Psych: Appropriate affect  Skin: Normal color and skin turgor  MSK: Normal muscle bulk and tone    Medical problems and test results were reviewed with the patient today.      Results for orders placed or performed in visit on 06/27/22   Protime-INR   Result Value Ref Range    INR 2.80

## 2022-06-27 NOTE — PROGRESS NOTES
Anticoagulation Summary  As of 2022    INR goal:  2.0-3.0   TTR:  100.0 % (6 d)   INR used for dosin.80 (2022)   Warfarin maintenance plan:  7.5 mg (5 mg x 1.5) every Mon, Wed, Fri; 5 mg (5 mg x 1) all other days   Weekly warfarin total:  42.5 mg   Plan last modified:  [address] [facility] [place] (2022)   Next INR check:  2022   Priority:  Maintenance   Target end date: Indefinite    Indications    Long term (current) use of anticoagulants (Resolved) [Z79.01]  Intracardiac thrombus [I51.3]             Anticoagulation Episode Summary     INR check location:  Home Draw    Preferred lab:      Send INR reminders to:  [address]    Comments:  [facility]      Anticoagulation Care Providers     Provider Role Specialty Phone number    Winston MD Magalys Responsible Cardiology [phone]      Home INR monitor result reported via fax, therapeutic INR, no dose adjustments made.

## 2022-06-28 ENCOUNTER — CARE COORDINATION (OUTPATIENT)
Dept: CARE COORDINATION | Facility: CLINIC | Age: 62
End: 2022-06-28

## 2022-06-28 ENCOUNTER — OFFICE VISIT (OUTPATIENT)
Dept: FAMILY MEDICINE CLINIC | Facility: CLINIC | Age: 62
End: 2022-06-28
Payer: MEDICARE

## 2022-06-28 VITALS
DIASTOLIC BLOOD PRESSURE: 66 MMHG | BODY MASS INDEX: 29.77 KG/M2 | OXYGEN SATURATION: 97 % | HEIGHT: 69 IN | HEART RATE: 76 BPM | WEIGHT: 201 LBS | SYSTOLIC BLOOD PRESSURE: 120 MMHG

## 2022-06-28 DIAGNOSIS — R73.02 IGT (IMPAIRED GLUCOSE TOLERANCE): ICD-10-CM

## 2022-06-28 DIAGNOSIS — R79.89 ABNORMAL TSH: Primary | ICD-10-CM

## 2022-06-28 DIAGNOSIS — E78.5 DYSLIPIDEMIA: ICD-10-CM

## 2022-06-28 DIAGNOSIS — I25.110 ATHEROSCLEROSIS OF NATIVE CORONARY ARTERY OF NATIVE HEART WITH UNSTABLE ANGINA PECTORIS (HCC): ICD-10-CM

## 2022-06-28 DIAGNOSIS — Z79.899 HIGH RISK MEDICATION USE: ICD-10-CM

## 2022-06-28 DIAGNOSIS — R53.82 CHRONIC FATIGUE: ICD-10-CM

## 2022-06-28 DIAGNOSIS — D64.9 ANEMIA, UNSPECIFIED TYPE: ICD-10-CM

## 2022-06-28 DIAGNOSIS — I25.10 CAD, MULTIPLE VESSEL: ICD-10-CM

## 2022-06-28 LAB
BASOPHILS # BLD: 0.2 K/UL (ref 0–0.2)
BASOPHILS NFR BLD: 2 % (ref 0–2)
CHOLEST SERPL-MCNC: 179 MG/DL
DIFFERENTIAL METHOD BLD: ABNORMAL
EOSINOPHIL # BLD: 0.3 K/UL (ref 0–0.8)
EOSINOPHIL NFR BLD: 3 % (ref 0.5–7.8)
ERYTHROCYTE [DISTWIDTH] IN BLOOD BY AUTOMATED COUNT: 15.8 % (ref 11.9–14.6)
HCT VFR BLD AUTO: 36.9 % (ref 41.1–50.3)
HDLC SERPL-MCNC: 29 MG/DL (ref 40–60)
HDLC SERPL: 6.2 {RATIO}
HGB BLD-MCNC: 10.8 G/DL (ref 13.6–17.2)
IMM GRANULOCYTES # BLD AUTO: 0.1 K/UL (ref 0–0.5)
IMM GRANULOCYTES NFR BLD AUTO: 1 % (ref 0–5)
LDLC SERPL CALC-MCNC: 118.8 MG/DL
LYMPHOCYTES # BLD: 2.7 K/UL (ref 0.5–4.6)
LYMPHOCYTES NFR BLD: 26 % (ref 13–44)
MCH RBC QN AUTO: 27.6 PG (ref 26.1–32.9)
MCHC RBC AUTO-ENTMCNC: 29.3 G/DL (ref 31.4–35)
MCV RBC AUTO: 94.4 FL (ref 79.6–97.8)
MONOCYTES # BLD: 1 K/UL (ref 0.1–1.3)
MONOCYTES NFR BLD: 10 % (ref 4–12)
NEUTS SEG # BLD: 6.1 K/UL (ref 1.7–8.2)
NEUTS SEG NFR BLD: 59 % (ref 43–78)
NRBC # BLD: 0 K/UL (ref 0–0.2)
PLATELET # BLD AUTO: 673 K/UL (ref 150–450)
PMV BLD AUTO: 9.6 FL (ref 9.4–12.3)
RBC # BLD AUTO: 3.91 M/UL (ref 4.23–5.6)
T4 FREE SERPL-MCNC: 1.1 NG/DL (ref 0.78–1.46)
TRIGL SERPL-MCNC: 156 MG/DL (ref 35–150)
TSH W FREE THYROID IF ABNORMAL: 6.54 UIU/ML (ref 0.36–3.74)
VLDLC SERPL CALC-MCNC: 31.2 MG/DL (ref 6–23)
WBC # BLD AUTO: 10.3 K/UL (ref 4.3–11.1)

## 2022-06-28 PROCEDURE — G8428 CUR MEDS NOT DOCUMENT: HCPCS | Performed by: FAMILY MEDICINE

## 2022-06-28 PROCEDURE — 3017F COLORECTAL CA SCREEN DOC REV: CPT | Performed by: FAMILY MEDICINE

## 2022-06-28 PROCEDURE — 1036F TOBACCO NON-USER: CPT | Performed by: FAMILY MEDICINE

## 2022-06-28 PROCEDURE — 1111F DSCHRG MED/CURRENT MED MERGE: CPT | Performed by: FAMILY MEDICINE

## 2022-06-28 PROCEDURE — G8417 CALC BMI ABV UP PARAM F/U: HCPCS | Performed by: FAMILY MEDICINE

## 2022-06-28 PROCEDURE — 99214 OFFICE O/P EST MOD 30 MIN: CPT | Performed by: FAMILY MEDICINE

## 2022-06-28 ASSESSMENT — ENCOUNTER SYMPTOMS
ABDOMINAL PAIN: 0
BACK PAIN: 0
EYES NEGATIVE: 1
CHEST TIGHTNESS: 0
NAUSEA: 0
COUGH: 0
VOMITING: 0
WHEEZING: 0
SHORTNESS OF BREATH: 0
DIARRHEA: 0
CONSTIPATION: 0
ALLERGIC/IMMUNOLOGIC NEGATIVE: 1

## 2022-06-28 NOTE — CARE COORDINATION
[facility] Transitions Follow Up Call    2022    Patient: Marianne Smith  Patient : 1960   MRN: [id number]  Reason for Admission: ICD implant-> readmit with VT  Discharge Date: 22 RARS: Readmission Risk Score: 18.8 ( )         Care Transitions Outreach Attempt    Call within 2 business days of discharge: Yes   Attempted to reach patient for transitions of care follow up. Unable to reach patient. Patient: Marianne Smith Patient : 1960 MRN: [id number]    Last Discharge [facility]       Complaint Diagnosis Description Type Department Provider    22 AICD Problem Ventricular tachycardia ([address] . .. ED to Hosp-Admission (Discharged) (ADMITTED) Denilson London MD; John Freitas. .. Was this an external facility discharge? No Discharge Facility: [facility]    Noted following upcoming appointments from discharge chart review:   [facility] follow up appointment(s):   Future Appointments   Date Time Provider Ernestina Willis   2022 10:30 AM [facility]   2022  3:10 PM Satnam Irizarry MD [facility] AMB   2022  2:15 PM [facility] DEVICE [facility] GVL AMB   2022  9:45 AM MD HERBIE RamseyDG GVL AMB   2022  3:00 PM Harley Plaza MD [facility] AMB   2023 10:15 AM Nupur Tejada DO [facility] GVL AMB     Non-[facility] follow up appointment(s): na    Care Transitions Subsequent and Final Call    Subsequent and Final Calls  Are you currently active with any services?: Home Health  Care Transitions Interventions    Physical Therapy: Completed    Cardiac Rehab: Completed       Occupational Therapy: Completed     Other Interventions:            Follow Up  Future Appointments   Date Time Provider Ernestina Willis   2022 10:30 AM Ayad Rousseau RN [facility]   2022  3:10 PM Satnam Irizarry MD [facility] AMB   2022  2:15 PM [facility] DEVICE [facility] GVL AMB   2022  9:45 AM Ernestine Copeland MD [facility] AMB   8/17/2022  3:00 PM Alpesh Sung MD [facility] AMB   6/30/2023 10:15 AM Allegra Crook DO [facility] GVL REBEKAH Gonzalez RN

## 2022-06-28 NOTE — PROGRESS NOTES
Stephanie Pedro DO                Diplomate of the [employer] of [facility] [place]         [phone]    Hardy Vegas is a 58 y.o. male who was seen on 6/28/2022 for   Chief Complaint   Patient presents with    Annual Exam       Assessment & Plan     Diagnosis Orders   1. Abnormal TSH  TSH with Reflex    TSH with Reflex    TSH   2. CAD, multiple vessel  Comprehensive Metabolic Panel   3. Atherosclerosis of native coronary artery of native heart with unstable angina pectoris ([address]     4. IGT (impaired glucose tolerance)     5. Dyslipidemia  Lipid Panel    Lipid Panel    Comprehensive Metabolic Panel    Lipid Panel   6. Anemia, unspecified type  CBC with Auto Differential    CBC with Auto Differential    CBC with Auto Differential   7. Chronic fatigue  TSH with Reflex    TSH with Reflex    Comprehensive Metabolic Panel    TSH   8. High risk medication use  Comprehensive Metabolic Panel       No problem-specific Assessment & Plan notes found for this encounter. Follow-up and Dispositions    · Return in about 1 year (around 6/29/2023) for ROUTINE HAYDER, LABS TODAY. RECENT LABS/TESTS TO REVIEW and DISCUSS    No results found for this visit on 06/28/22. Reviewed labs from recent hospitalization. Hemoglobin and hematocrit are low, will recheck CBC. TSH elevated when checked by Dr. Vita Snell. .  Likely related to amiodarone. Recheck TSH (reflex) today. Apparently cardiology wanted to check lipids.     Admission Diagnoses: Ventricular tachycardia [facility]) [I47.2]  Defibrillator discharge [Z45.02]     Discharge Diagnoses:        Patient Active Problem List     Diagnosis Date Noted    Elevated troponin 06/17/2022    CAD, multiple vessel 05/28/2022    Defibrillator discharge 05/26/2022    COVID-19 10/06/2021    Chronic systolic congestive heart failure ([facility] 09/27/2011    S/P ICD (internal cardiac defibrillator) procedure 09/27/2011    S/P CABG x 3 05/31/2022    Hypoxemia 05/31/2022    Atelectasis 05/31/2022    Smoking greater than 20 pack years 11/13/2021    Refused influenza vaccine 11/13/2021    Anemia 11/13/2021    History of 2019 novel coronavirus disease (COVID-19) 10/03/2021    COVID-19 vaccination refused 06/18/2021    Ventricular tachycardia ([address] 06/30/2020    High risk medication use 06/19/2020    Chronic eczematous otitis externa of both ears 06/18/2020    Benign prostatic hyperplasia with weak urinary stream 12/29/2019    Chronic fatigue 12/29/2019    Tobacco abuse 06/24/2019    Numbness of left foot 06/12/2018    Statin intolerance 12/10/2017    IGT (impaired glucose tolerance) 12/03/2017    Overweight (BMI 25.0-29.9) 06/08/2017    Primary insomnia 06/07/2017    Seasonal allergic rhinitis due to pollen 06/07/2017    Coronary atherosclerosis of native coronary vessel 10/02/2015    Dyslipidemia 10/02/2015    Intracardiac thrombus 10/02/2015         Hospital Course: Patient with a hx of CAD, HFrEF w [employer] ICD w h/o VT/VF despite amio, IGT, HLD, hx of cardiac wall mural thrombus, and depression. Admit 5-26-22 w VF arrest, underwent LHC 5-27-22 showing multivessel disease.  5-26-22 echo w EF 25% w WMA, mild LAE. 5-26-22 pt underwent CABG w LIMA to LAD, SVG to intermediate coronary artery, SVG to PDA. D/C home 6-6-22.  Was doing well without CP, walked the day of admission when he started having palpitations with dizziness, subsequent ICD shock x 3, then two more ICD shock, called EMS but on arrival to ER had several runs of VT, was given bolus of IV amio and started on IV amio drip, given IV mag, had been on po amio at home with dose just decreased to 400 mg QD.  He continued to have bouts of VT and was given a second bolus of IV amio w less ectopy.  In ER pt with recurrent bouts of VT, given second bolus.  WBC 12, hgb 9.3 down from 11.6, platelets 205.  /71, dropped after CV to 106/69.  HS trop 329, , K 3.8, cr 1.1, mag 2.4. He was admitted with VT storm and continued on IV amiodarone. His coumadin was placed on hold d/t high INR.      Limited echo showed   Left Ventricle: Moderately reduced left ventricular systolic function with a visually estimated EF of 25 - 30%. Moderate global hypokinesis present.   Left Atrium: Left atrium is mildly dilated.   Pericardium: Small (<1 cm) localized pericardial effusion present around the left ventricle.        Dr. Promise Martinez saw patient and amiodarone was transitioned to po and started on mexilitine. His device was also reprogramed. Amiodarone level pending at d/c. EP felt may need to consider VT ablation outpt. Will need to see EP in 2 weeks. Patient will remain on higher dose amiodarone until f/u appt per Dr. Promise Martinez. The patient will see Dr. Promise Martinez on 6/27/2022 at 330 pm in [facility] office. The patient will take 5 mg coumadin with INR check with his home machine on Monday 6/27/2022 and call to our office.      The afternoon of 6/22/2022, the patient was feeling much better with no further VT. The patient was seen and examined by Dr. Ashanti Avilez and was determined stable and ready for discharge home. The patient was instructed on the importance of medication compliance and outpatient follow up. Subjective    HPI: Please see my assessment and plan notes (above) for individual problems addressed today. Other important information: This is a 71-year-old male patient who is here today for an annual follow-up visit but he also happened to be hospitalized recently under the care of cardiology. The patient did not have labs in advance of today's visit. Instead we will draw labs today or at a future date. TSH elevated on 6/22/2022. Likely related to amiodarone. Repeat TSH today and consider treatment with levothyroxine at least temporarily.     The patient is due for her annual wellness visit but we are unable to perform that today because of our recent change in electronic medical records. We will schedule follow-up for this. Reviewed and updated this visit by provider:         Review of Systems   Constitutional: Negative for fatigue and fever. HENT: Negative. Eyes: Negative. Respiratory: Negative for cough, chest tightness, shortness of breath and wheezing. Cardiovascular: Negative for chest pain and leg swelling. Gastrointestinal: Negative for abdominal pain, constipation, diarrhea, nausea and vomiting. Endocrine: Negative. Genitourinary: Negative for difficulty urinating, dysuria and frequency. Musculoskeletal: Negative for arthralgias, back pain and neck pain. Skin: Negative for rash. Allergic/Immunologic: Negative. Neurological: Negative for dizziness and light-headedness. Psychiatric/Behavioral: Negative for dysphoric mood. The patient is not nervous/anxious. All other systems reviewed and are negative. Objective    /66 (Site: Left Upper Arm, Position: Sitting, Cuff Size: Medium Adult)   Pulse 76   Ht 5' 9\" (1.753 m)   Wt 201 lb (91.2 kg)   SpO2 97%   BMI 29.68 kg/m²     Physical Exam  Vitals reviewed. Constitutional:       General: He is not in acute distress. Appearance: Normal appearance. HENT:      Head: Normocephalic and atraumatic. Right Ear: Tympanic membrane, ear canal and external ear normal.      Left Ear: Tympanic membrane, ear canal and external ear normal.      Mouth/Throat:      Mouth: Mucous membranes are moist.   Eyes:      Extraocular Movements: Extraocular movements intact. Conjunctiva/sclera: Conjunctivae normal.      Pupils: Pupils are equal, round, and reactive to light. Neck:      Vascular: No carotid bruit. Cardiovascular:      Rate and Rhythm: Normal rate and regular rhythm. Heart sounds: Normal heart sounds.       Comments: Wearing compression hoes  Pulmonary:      Effort: Pulmonary effort is normal.      Breath sounds: Normal breath sounds. Abdominal:      General: Bowel sounds are normal.      Palpations: Abdomen is soft. Musculoskeletal:      Cervical back: Neck supple. No rigidity or tenderness. Right lower le+ Pitting Edema present. Left lower le+ Pitting Edema present. Lymphadenopathy:      Cervical: No cervical adenopathy. Skin:     General: Skin is warm and dry. Neurological:      General: No focal deficit present. Mental Status: He is alert and oriented to person, place, and time. Psychiatric:         Mood and Affect: Mood normal.         Behavior: Behavior normal.         Thought Content: Thought content normal.         Judgment: Judgment normal.       On this date 2022 I have spent 31 minutes reviewing previous notes, lab/imaging results and face to face with the patient discussing the diagnoses and importance of compliance with the treatment plan, as well as documenting on the day of the visit.         DO Jeromy Guevara [facility] [place]

## 2022-06-30 ENCOUNTER — CARE COORDINATION (OUTPATIENT)
Dept: CARE COORDINATION | Facility: CLINIC | Age: 62
End: 2022-06-30

## 2022-06-30 NOTE — CARE COORDINATION
[facility] Transitions Follow Up Call    2022    Patient: Madie Butt  Patient : 1960   MRN: [id number]  Reason for Admission: readmission for VT  Discharge Date: 22 RARS: Readmission Risk Score: 18.8 ( )         Spoke with: patient    Care Transitions Follow Up Call    Needs to be reviewed by the provider   Additional needs identified to be addressed with provider: No  none             Method of communication with provider : none      Care Transition Nurse contacted the patient by telephone to follow up after admission on . Verified name and  with patient as identifiers. Addressed changes since last contact: recent IP admission for VT   Discussed follow-up appointments. If no appointment was previously scheduled, appointment scheduling offered: Yes. Is follow up appointment scheduled within 7 days of discharge? Yes. Advance Care Planning:   Does patient have an Advance Directive: reviewed and current\    CTN reviewed discharge instructions, medical action plan and red flags with patient and discussed any barriers to care and/or understanding of plan of care after discharge. Discussed appropriate site of care based on symptoms and resources available to patient including: PCP  Specialist  CTN. The patient agrees to contact the PCP office for questions related to their healthcare. Patients top risk factors for readmission: medical condition-VT s/p CABG/ICD  Interventions to address risk factors: Scheduled appointment with Specialist-post op surgery, cardiology upcoming after CABG, VT       Non-[facility] follow up appointment(s): xochilt    CTN provided contact information for future needs. Plan for follow-up call in 10-14 days based on severity of symptoms and risk factors.   Plan for next call: symptom management-assess for efficacy of medicine changes and recurrent s/s to report  follow up appointment-assess for changes to regimen  after follow up [facility] Transitions Subsequent and Final Call    Schedule Follow Up Appointment with PCP: Completed  Subsequent and Final Calls  Do you have any ongoing symptoms?: No  Have your medications changed?: Yes  Patient Reports: patient with new medicine added after recenet readmission  Do you have any questions related to your medications?: No  Do you currently have any active services?: No  Are you currently active with any services?: Home Health  Do you have any needs or concerns that I can assist you with?: No  Care Transitions Interventions    Physical Therapy: Completed    Cardiac Rehab: Completed       Occupational Therapy: Completed     Other Interventions:            Follow Up  Future Appointments   Date Time Provider Ernestina Willis   7/5/2022 10:30 AM John Li RN [facility]   7/5/2022  3:10 PM Litzy Vazquez MD [facility] AMB   7/14/2022  2:15 PM [facility]ICE [facility] AM[facility]   7/20/2022  9:45 AM Lexy Colvin MD [facility] AMB   8/17/2022  3:00 PM Eunice Coleman MD [facility] AMB   6/30/2023 10:15 AM Sebastian Resendez DO [address] [place] [address] GVL AMB       Joann Linder, CYNTHIA

## 2022-07-01 ENCOUNTER — HOSPITAL ENCOUNTER (EMERGENCY)
Age: 62
Discharge: HOME | End: 2022-07-02
Attending: EMERGENCY MEDICINE
Payer: MEDICARE

## 2022-07-01 ENCOUNTER — APPOINTMENT (OUTPATIENT)
Dept: GENERAL RADIOLOGY | Age: 62
End: 2022-07-01
Payer: MEDICARE

## 2022-07-01 DIAGNOSIS — I47.20 VENTRICULAR TACHYCARDIA: Primary | ICD-10-CM

## 2022-07-01 LAB
ERYTHROCYTE [DISTWIDTH] IN BLOOD BY AUTOMATED COUNT: 15.9 % (ref 11.9–14.6)
HCT VFR BLD AUTO: 33.1 % (ref 41.1–50.3)
HGB BLD-MCNC: 10.3 G/DL (ref 13.6–17.2)
MCH RBC QN AUTO: 28 PG (ref 26.1–32.9)
MCHC RBC AUTO-ENTMCNC: 31.1 G/DL (ref 31.4–35)
MCV RBC AUTO: 89.9 FL (ref 79.6–97.8)
NRBC # BLD: 0 K/UL (ref 0–0.2)
PLATELET # BLD AUTO: 502 K/UL (ref 150–450)
PMV BLD AUTO: 9.3 FL (ref 9.4–12.3)
RBC # BLD AUTO: 3.68 M/UL (ref 4.23–5.6)
WBC # BLD AUTO: 11.1 K/UL (ref 4.3–11.1)

## 2022-07-01 PROCEDURE — 83735 ASSAY OF MAGNESIUM: CPT

## 2022-07-01 PROCEDURE — 71045 X-RAY EXAM CHEST 1 VIEW: CPT

## 2022-07-01 PROCEDURE — 99285 EMERGENCY DEPT VISIT HI MDM: CPT

## 2022-07-01 PROCEDURE — 85027 COMPLETE CBC AUTOMATED: CPT

## 2022-07-01 PROCEDURE — 84484 ASSAY OF TROPONIN QUANT: CPT

## 2022-07-01 PROCEDURE — 93005 ELECTROCARDIOGRAM TRACING: CPT | Performed by: EMERGENCY MEDICINE

## 2022-07-01 PROCEDURE — 80053 COMPREHEN METABOLIC PANEL: CPT

## 2022-07-01 RX ORDER — SODIUM CHLORIDE 0.9 % (FLUSH) 0.9 %
3 SYRINGE (ML) INJECTION EVERY 8 HOURS
Status: DISCONTINUED | OUTPATIENT
Start: 2022-07-01 | End: 2022-07-02 | Stop reason: HOSPADM

## 2022-07-01 ASSESSMENT — PAIN - FUNCTIONAL ASSESSMENT: PAIN_FUNCTIONAL_ASSESSMENT: NONE - DENIES PAIN

## 2022-07-02 VITALS
HEART RATE: 64 BPM | DIASTOLIC BLOOD PRESSURE: 80 MMHG | HEIGHT: 69 IN | RESPIRATION RATE: 14 BRPM | BODY MASS INDEX: 29.62 KG/M2 | WEIGHT: 200 LBS | OXYGEN SATURATION: 96 % | TEMPERATURE: 98.8 F | SYSTOLIC BLOOD PRESSURE: 135 MMHG

## 2022-07-02 LAB
ALBUMIN SERPL-MCNC: 2.9 G/DL (ref 3.2–4.6)
ALBUMIN/GLOB SERPL: 0.9 {RATIO} (ref 1.2–3.5)
ALP SERPL-CCNC: 94 U/L (ref 50–136)
ALT SERPL-CCNC: 15 U/L (ref 12–65)
AMIODARONE, SERUM: 2359 NG/ML (ref 1000–2500)
ANION GAP SERPL CALC-SCNC: 8 MMOL/L (ref 7–16)
AST SERPL-CCNC: <3 U/L (ref 15–37)
BILIRUB SERPL-MCNC: 0.2 MG/DL (ref 0.2–1.1)
BUN SERPL-MCNC: 14 MG/DL (ref 8–23)
CALCIUM SERPL-MCNC: 8.5 MG/DL (ref 8.3–10.4)
CHLORIDE SERPL-SCNC: 108 MMOL/L (ref 98–107)
CO2 SERPL-SCNC: 25 MMOL/L (ref 21–32)
CREAT SERPL-MCNC: 1.3 MG/DL (ref 0.8–1.5)
GLOBULIN SER CALC-MCNC: 3.4 G/DL (ref 2.3–3.5)
GLUCOSE SERPL-MCNC: 156 MG/DL (ref 65–100)
MAGNESIUM SERPL-MCNC: 2.2 MG/DL (ref 1.8–2.4)
N-DESETHYL-AMIODARONE: 651 NG/ML
POTASSIUM SERPL-SCNC: 3.7 MMOL/L (ref 3.5–5.1)
PROT SERPL-MCNC: 6.3 G/DL (ref 6.3–8.2)
SODIUM SERPL-SCNC: 141 MMOL/L (ref 136–145)
TROPONIN I SERPL HS-MCNC: 12.9 PG/ML (ref 0–14)

## 2022-07-02 PROCEDURE — 6370000000 HC RX 637 (ALT 250 FOR IP): Performed by: EMERGENCY MEDICINE

## 2022-07-02 RX ORDER — POTASSIUM CHLORIDE 20 MEQ/1
40 TABLET, EXTENDED RELEASE ORAL ONCE
Status: COMPLETED | OUTPATIENT
Start: 2022-07-02 | End: 2022-07-02

## 2022-07-02 RX ADMIN — POTASSIUM CHLORIDE 40 MEQ: 20 TABLET, EXTENDED RELEASE ORAL at 02:27

## 2022-07-02 NOTE — ED TRIAGE NOTES
Patient states that he woke up from sleep feeling like his heart was beating fast. States that he had a bypass in may and also has a pacemaker/ defibrillator. States feels like it has settled down now but just wanted to make sure. States defib did not fire.

## 2022-07-02 NOTE — ED PROVIDER NOTES
[employer] Emergency Department Provider Note                   PCP:                Gabriel Smith,                Age: 58 y.o. Sex: male       ICD-10-CM    1. Ventricular tachycardia (HCC)  I47.2        DISPOSITION         New Prescriptions    No medications on file       Orders Placed This Encounter   Procedures    XR CHEST PORTABLE    Troponin    CBC    Comprehensive Metabolic Panel    Magnesium    Cardiac Monitor    Pulse Oximetry    EKG 12 Lead    Saline lock IV        Claudia Tyrell, DO 2:13 AM      MDM  Number of Diagnoses or Management Options  Diagnosis management comments: CHF, COPD, pneumonia, PE,    MI, coronary artery disease, unstable angina, coronary artery disease,    Atrial fibrillation, cardiac arrhythmia, PVC, medication induced palpitations, heart block,  electrolyte induced palpitations,    Aortic dissection, aortic aneurysm,    GERD, musculoskeletal pain, costochondritis, rib fracture, pleurisy,         Amount and/or Complexity of Data Reviewed  Clinical lab tests: reviewed and ordered  Tests in the radiology section of CPT®: ordered and reviewed  Review and summarize past medical records: yes  Independent visualization of images, tracings, or specimens: yes         Tal Lopez is a 58 y.o. male who presents to the Emergency Department with chief complaint of    Chief Complaint   Patient presents with    Palpitations      Patient is a 58-year-old male was woken from his sleep secondary to palpitations. The patient states that he felt like his heart was racing and beating harder than normal.  He put his pulse oximeter on and it said that his heart rate was 99 bpm.  The patient had a heart bypass surgery in May and has a pacemaker defibrillator [employer] placed in his chest as well. The patient denies the defibrillator going off. The patient says everything seemed to settle down now but wanted to make sure it was okay. No other acute complaints.           All other systems reviewed and are negative. Review of Systems   Cardiovascular: Positive for palpitations. All other systems reviewed and are negative.       Past Medical History:   Diagnosis Date    Acute hypoxemic respiratory failure due to COVID-19 [facility]) 10/9/2021    CAD (coronary artery disease)     heart attack 2005 stentS HEART    CHF (congestive heart failure) ([address] 9/27/2011    Chronic systolic heart failure ([address] 10/2/2015    Coronary atherosclerosis of native coronary vessel 10/2/2015    Hyperlipidemia 10/2/2015    Hypoxemia requiring supplemental oxygen 10/6/2021    IGT (impaired glucose tolerance) 12/3/2017    Other ill-defined conditions(799.89)      blind left eye    Other ill-defined conditions(799.89)     cardiomyopathy    Pneumonia due to COVID-19 virus 11/13/2021    Resolved    Primary insomnia 6/7/2017    Psychiatric disorder     depression     Sepsis, unspecified 4/5/2011    Thromboembolus ([address]     thrombus in heart     Thrombus 10/2/2015        Past Surgical History:   Procedure Laterality Date    CARDIAC CATHETERIZATION      5 stents total    CARDIAC DEFIBRILLATOR PLACEMENT  2011    [employer]    CARDIAC PROCEDURE N/A 5/27/2022    LEFT HEART CATH / CORONARY ANGIOGRAPHY performed by Adarsh Carlson MD at [address] [place] [address] CATH LAB    COLONOSCOPY  12/2010    CORONARY ARTERY BYPASS GRAFT N/A 5/31/2022    CORONARY ARTERY BYPASS GRAFT (CABG X 3), LIMA ; ENDOSCOPIC VEIN HARVEST, LEFT GREATER SAPHENOUS VEIN performed by Derek Estrada MD at [facility] MAIN OR    HEENT      oral surgery    PACEMAKER      SC CARDIAC SURG PROCEDURE UNLIST       1 stent 2005    TRANSESOPHAGEAL ECHOCARDIOGRAM N/A 5/31/2022    TRANSESOPHAGEAL ECHOCARDIOGRAM performed by Derek Estrada MD at [facility] MAIN OR        Family History   Problem Relation Age of Onset    Heart Disease Mother     Diabetes Paternal Grandfather     Cancer Paternal Grandmother     Heart Disease Brother     Diabetes Maternal

## 2022-07-02 NOTE — ED NOTES
I have reviewed discharge instructions with the patient. The patient verbalized understanding. Patient left ED via Discharge Method: ambulatory to Home with neighbor. Opportunity for questions and clarification provided. Patient given 0 scripts. To continue your aftercare when you leave the hospital, you may receive an automated call from our care team to check in on how you are doing.  This is a free service and part of our promise to provide the best care and service to meet your aftercare needs. \" If you have questions, or wish to unsubscribe from this service please call [phone].  Thank you for Choosing our [facility] Emergency Department.         Shira Nix RN  07/02/22 [id number]

## 2022-07-05 ENCOUNTER — OFFICE VISIT (OUTPATIENT)
Dept: CARDIOTHORACIC SURGERY | Age: 62
End: 2022-07-05

## 2022-07-05 ENCOUNTER — HOSPITAL ENCOUNTER (OUTPATIENT)
Dept: CARDIAC REHAB | Age: 62
Setting detail: RECURRING SERIES
Discharge: HOME | End: 2022-07-08
Payer: MEDICARE

## 2022-07-05 VITALS
BODY MASS INDEX: 30.04 KG/M2 | SYSTOLIC BLOOD PRESSURE: 110 MMHG | HEIGHT: 69 IN | DIASTOLIC BLOOD PRESSURE: 65 MMHG | HEART RATE: 76 BPM | WEIGHT: 202.8 LBS

## 2022-07-05 VITALS — HEIGHT: 69 IN | HEART RATE: 60 BPM | OXYGEN SATURATION: 98 % | WEIGHT: 199.2 LBS | BODY MASS INDEX: 29.51 KG/M2

## 2022-07-05 DIAGNOSIS — Z95.1 S/P CABG X 3: Primary | ICD-10-CM

## 2022-07-05 PROCEDURE — 93798 PHYS/QHP OP CAR RHAB W/ECG: CPT

## 2022-07-05 PROCEDURE — 99024 POSTOP FOLLOW-UP VISIT: CPT | Performed by: THORACIC SURGERY (CARDIOTHORACIC VASCULAR SURGERY)

## 2022-07-05 ASSESSMENT — LIFESTYLE VARIABLES
CIGARETTES_PER_DAY: 1PPD
SMOKELESS_TOBACCO: NO

## 2022-07-05 ASSESSMENT — EXERCISE STRESS TEST
PEAK_RPE: 11
PEAK_RPE: 10
PEAK_HR: 81
PEAK_BP: 130/70
PEAK_METS: 2
PEAK_HR: 81
PEAK_RPD: 0

## 2022-07-05 ASSESSMENT — EJECTION FRACTION: EF_VALUE: 25

## 2022-07-05 NOTE — PROGRESS NOTES
[address] [facility]  Markus Gustafson. MD Shahid Ojeda. Cosme Lopez MD          Gricelda Lucas       [phone]  7/5/2022 1960      Gricelda Lucas is seen today for follow-up status post CABG X 3 with LIMA to the LAD, reverse SVG to the Intermediate coronary, and reverse SVG to the PDA on 5/31/22. He had some NSVT noted POD 1. He was transferred to [facility] on oral amiodarone. He was weaned off of O2. He progressed well with PT. He had more frequent VT noted the morning of 6/3. EP was consulted. Amiodarone was continued. Rhythm improved with less VT noted. He was discharged home on 6/622. He was readmitted with VT storm and had an additional ER visit as well.     he is active and ambulatory, started cardiac rehab this morning. There is no fever or shortness of breath. Appetite is good. Pain has improved. Pt was sleeping poorly but has been sleeping better the last 3 nights. EXAM:  Vitals:  Blood pressure 110/65, pulse 76, height 5' 9\" (1.753 m), weight 202 lb 12.8 oz (92 kg). Lungs:  Clear to auscultation bilaterally. Heart: Regular rate and rhythm without murmurs. Incision: Sternum stable. Incision healing well. Leg incisions healing well. IMPRESSION and PLAN:  DC from CT surgery. Pt may drive. Pt has started cardiac rehab. Sternal precautions: Pt advised to avoid any heavy lifting for another 4 weeks but can increase activity as tolerated. Pt has cardiology follow-up with Dr. Rupesh Null and Dr Ariane Burgos. No need to schedule follow-up at this point but the patient may call or return anytime if issues or questions arise. Sherleen Cranker.  COLTON Victor  7/5/2022 3:05 PM

## 2022-07-05 NOTE — PATIENT INSTRUCTIONS
Patient Education        Secondhand Smoke: Care Instructions  Your Care Instructions     Secondhand smoke comes from the burning end of a cigarette, cigar, or pipe and the smoke that a smoker exhales. The smoke contains nicotine and many other harmful chemicals. Breathing secondhand smoke can cause or worsen health problems including cancer, asthma, coronary artery disease, and respiratoryinfections. It can make your eyes and nose burn and cause a sore throat. Secondhand smoke is especially bad for babies and young children whose lungs are still developing. Babies whose parents smoke are more likely to have ear infections, pneumonia, and bronchitis in the first few years of their lives. Secondhand smoke can make asthma symptoms worse in children. If you are pregnant, it is important that you not smoke and that you avoid secondhand smoke. You are more likely to give birth to a baby who weighs less than expected (low birth weight) if you smoke. And your baby may have a greater risk for sudden infant death syndrome (SIDS). Babies whose mothers are exposedto secondhand smoke during pregnancy have a higher risk for health problems. Follow-up care is a key part of your treatment and safety. Be sure to make and go to all appointments, and call your doctor if you are having problems. It's also a good idea to know your test results and keep alist of the medicines you take. How can you care for yourself at home?  Do not smoke or let anyone else smoke in your home. If people must smoke, ask them to go outside.  If people do smoke in your home, choose a room where you can open a window or use a fan to get the smoke outside.  Do not let anyone smoke in your car. If someone must smoke, pull over in a safe place and let them smoke away from the car.  Ask your employer to make sure that you have a smoke-free work area.    Make sure that your children aren't exposed to secondhand smoke at day care, school, and after-school programs.  Try to choose nonsmoking bars, restaurants, and other public places when you go out.  Help your family and friends who smoke to quit by encouraging them to try. Tell them about treatment resources. Having support from others often helps.  If you smoke, quit. Quitting is hard, but there are ways to boost your chance of quitting tobacco for good. ? Use nicotine gum, patches, or lozenges. Call a quitline. Ask your doctor about stop-smoking programs and medicines. ? Keep trying. When should you call for help? Watch closely for changes in your health, and be sure to contact your doctor if you have any problems. Where can you learn more? Go to https://[employer].com.[employer]. org and sign in to your sli.do account. Enter L004 in the [employer] box to learn more about \"Secondhand Smoke: Care Instructions. \"     If you do not have an account, please click on the \"Sign Up Now\" link. Current as of: October 28, 2021               Content Version: 13.3  © 2006-2022 Healthwise, Incorporated. Care instructions adapted under license by [facility] ([facility]). If you have questions about a medical condition or this instruction, always ask your healthcare professional. [place] [address] any warranty or liability for your use of this information.

## 2022-07-06 ENCOUNTER — ANTI-COAG VISIT (OUTPATIENT)
Dept: CARDIOLOGY CLINIC | Age: 62
End: 2022-07-06

## 2022-07-06 DIAGNOSIS — I51.3 INTRACARDIAC THROMBUS: Primary | ICD-10-CM

## 2022-07-06 LAB — INR BLD: 2.7

## 2022-07-06 NOTE — PATIENT INSTRUCTIONS
Reminder: Please contact the Coumadin Clinic at [phone] when you have medication changes. Examples, new medications, antibiotics, discontinued medications, new supplements, missed doses of warfarin or if you took extra doses of warfarin. This also includes OTC medications. Notifying us helps reduce the possibility of high and low INR's. In addition, if warfarin needs to be held for any procedures, please have surgeon or physician's office contact us before holding anticoagulant. Thanks, [facility].

## 2022-07-06 NOTE — PROGRESS NOTES
Anticoagulation Summary  As of 2022    INR goal:  2.0-3.0   TTR:  100.0 % (2.1 wk)   INR used for dosin.70 (2022)   Warfarin maintenance plan:  7.5 mg (5 mg x 1.5) every Mon, Wed, Fri; 5 mg (5 mg x 1) all other days   Weekly warfarin total:  42.5 mg   Plan last modified:  [address] [place] (2022)   Next INR check:  2022   Priority:  Maintenance   Target end date: Indefinite    Indications    Long term (current) use of anticoagulants (Resolved) [Z79.01]  Intracardiac thrombus [I51.3]             Anticoagulation Episode Summary     INR check location:  Home Draw    Preferred lab:      Send INR reminders to:  [id number] [facility] [address]    Comments:  [facility]      Anticoagulation Care Providers     Provider Role Specialty Phone number    Thiago Lowery MD Responsible Cardiology [phone]      Home INR monitor result reported via fax, therapeutic INR, no dose adjustments made.

## 2022-07-07 ENCOUNTER — HOSPITAL ENCOUNTER (OUTPATIENT)
Dept: CARDIAC REHAB | Age: 62
Setting detail: RECURRING SERIES
Discharge: HOME | End: 2022-07-10
Payer: MEDICARE

## 2022-07-07 VITALS — BODY MASS INDEX: 29.42 KG/M2 | WEIGHT: 199.2 LBS

## 2022-07-07 PROCEDURE — 93798 PHYS/QHP OP CAR RHAB W/ECG: CPT

## 2022-07-07 ASSESSMENT — EXERCISE STRESS TEST
PEAK_BP: 122/70
PEAK_HR: 86
PEAK_METS: 2

## 2022-07-11 ENCOUNTER — ANTI-COAG VISIT (OUTPATIENT)
Dept: CARDIOLOGY CLINIC | Age: 62
End: 2022-07-11

## 2022-07-11 DIAGNOSIS — I51.3 INTRACARDIAC THROMBUS: Primary | ICD-10-CM

## 2022-07-11 LAB — INR BLD: 2.7

## 2022-07-11 NOTE — PATIENT INSTRUCTIONS
Reminder: Please contact the [facility]madin Clinic at [phone] when you have medication changes. Examples, new medications, antibiotics, discontinued medications, new supplements, missed doses of warfarin or if you took extra doses of warfarin. This also includes OTC medications. Notifying us helps reduce the possibility of high and low INR's. In addition, if warfarin needs to be held for any procedures, please have surgeon or physician's office contact us before holding anticoagulant. Thanks, [facility].

## 2022-07-11 NOTE — PROGRESS NOTES
Anticoagulation Summary  As of 2022    INR goal:  2.0-3.0   TTR:  100.0 % (2.9 wk)   INR used for dosin.70 (2022)   Warfarin maintenance plan:  7.5 mg (5 mg x 1.5) every Mon, Wed, Fri; 5 mg (5 mg x 1) all other days   Weekly warfarin total:  42.5 mg   Plan last modified:  [address] MA (2022)   Next INR check:  2022   Priority:  Maintenance   Target end date: Indefinite    Indications    Long term (current) use of anticoagulants (Resolved) [Z79.01]  Intracardiac thrombus [I51.3]             Anticoagulation Episode Summary     INR check location:  Home Draw    Preferred lab:      Send INR reminders to:  [address] [facility] [address]    Comments:  [facility]      Anticoagulation Care Providers     Provider Role Specialty Phone number    Shaylee Edmondson MD Responsible Cardiology [phone]      Home INR monitor result reported via fax, therapeutic INR, no dose adjustments made.

## 2022-07-12 ENCOUNTER — HOSPITAL ENCOUNTER (OUTPATIENT)
Dept: CARDIAC REHAB | Age: 62
Setting detail: RECURRING SERIES
Discharge: HOME | End: 2022-07-15
Payer: MEDICARE

## 2022-07-12 VITALS — WEIGHT: 199.2 LBS | BODY MASS INDEX: 29.42 KG/M2

## 2022-07-12 PROCEDURE — 93798 PHYS/QHP OP CAR RHAB W/ECG: CPT

## 2022-07-12 ASSESSMENT — EXERCISE STRESS TEST
PEAK_HR: 88
PEAK_METS: 2.1
PEAK_BP: 108/62

## 2022-07-14 ENCOUNTER — CARE COORDINATION (OUTPATIENT)
Dept: CARE COORDINATION | Facility: CLINIC | Age: 62
End: 2022-07-14

## 2022-07-14 ENCOUNTER — HOSPITAL ENCOUNTER (OUTPATIENT)
Dept: CARDIAC REHAB | Age: 62
Setting detail: RECURRING SERIES
Discharge: HOME | End: 2022-07-17
Payer: MEDICARE

## 2022-07-14 VITALS — BODY MASS INDEX: 29.61 KG/M2 | WEIGHT: 200.5 LBS

## 2022-07-14 PROCEDURE — 93798 PHYS/QHP OP CAR RHAB W/ECG: CPT

## 2022-07-14 ASSESSMENT — EXERCISE STRESS TEST
PEAK_METS: 2.5
PEAK_BP: 110/60
PEAK_HR: 89

## 2022-07-14 NOTE — CARE COORDINATION
9/8/2022 10:00 AM Joi Alegre RN [facility]   9/13/2022 10:00 AM Joi Alegre RN [facility]   9/15/2022 10:00 AM [facility]   9/20/2022 10:00 AM Joi Alegre RN [facility]   9/22/2022 10:00 AM Joi Alegre RN [facility]   9/27/2022 10:00 AM Joi Alegre RN [facility]   9/29/2022 10:00 AM Joi Alegre RN [facility]   10/4/2022 10:00 AM Joi Alegre RN [facility]   10/6/2022 10:00 AM Joi Alegre RN [facility]   10/11/2022 10:00 AM Joi Alegre RN [facility]   10/13/2022 10:00 AM Joi Alegre RN [facility]   10/18/2022 10:00 AM Joi Alegre RN [facility]   10/20/2022 10:00 AM Joi Alegre RN [facility]   6/30/2023 10:15 AM Karen Ga DO [facility] REBEKAH Powell RN

## 2022-07-17 ENCOUNTER — APPOINTMENT (OUTPATIENT)
Dept: GENERAL RADIOLOGY | Age: 62
End: 2022-07-17
Payer: MEDICARE

## 2022-07-17 ENCOUNTER — HOSPITAL ENCOUNTER (EMERGENCY)
Age: 62
Discharge: HOME | End: 2022-07-17
Attending: EMERGENCY MEDICINE
Payer: MEDICARE

## 2022-07-17 VITALS
BODY MASS INDEX: 29.62 KG/M2 | HEART RATE: 61 BPM | RESPIRATION RATE: 15 BRPM | HEIGHT: 69 IN | SYSTOLIC BLOOD PRESSURE: 136 MMHG | OXYGEN SATURATION: 97 % | TEMPERATURE: 98.3 F | DIASTOLIC BLOOD PRESSURE: 79 MMHG | WEIGHT: 200 LBS

## 2022-07-17 DIAGNOSIS — R00.2 PALPITATIONS: Primary | ICD-10-CM

## 2022-07-17 DIAGNOSIS — E03.9 HYPOTHYROIDISM, UNSPECIFIED TYPE: ICD-10-CM

## 2022-07-17 PROBLEM — R79.89 ELEVATED TROPONIN: Status: RESOLVED | Noted: 2022-06-17 | Resolved: 2022-07-17

## 2022-07-17 PROBLEM — R77.8 ELEVATED TROPONIN: Status: RESOLVED | Noted: 2022-06-17 | Resolved: 2022-07-17

## 2022-07-17 LAB
ALBUMIN SERPL-MCNC: 3.2 G/DL (ref 3.2–4.6)
ALBUMIN/GLOB SERPL: 0.9 {RATIO} (ref 1.2–3.5)
ALP SERPL-CCNC: 96 U/L (ref 50–136)
ALT SERPL-CCNC: 14 U/L (ref 12–65)
ANION GAP SERPL CALC-SCNC: 5 MMOL/L (ref 7–16)
AST SERPL-CCNC: 20 U/L (ref 15–37)
BILIRUB SERPL-MCNC: 0.3 MG/DL (ref 0.2–1.1)
BUN SERPL-MCNC: 19 MG/DL (ref 8–23)
CALCIUM SERPL-MCNC: 8.6 MG/DL (ref 8.3–10.4)
CHLORIDE SERPL-SCNC: 108 MMOL/L (ref 98–107)
CO2 SERPL-SCNC: 26 MMOL/L (ref 21–32)
CREAT SERPL-MCNC: 1.6 MG/DL (ref 0.8–1.5)
ERYTHROCYTE [DISTWIDTH] IN BLOOD BY AUTOMATED COUNT: 16.6 % (ref 11.9–14.6)
GLOBULIN SER CALC-MCNC: 3.6 G/DL (ref 2.3–3.5)
GLUCOSE SERPL-MCNC: 197 MG/DL (ref 65–100)
HCT VFR BLD AUTO: 37.2 % (ref 41.1–50.3)
HGB BLD-MCNC: 11.2 G/DL (ref 13.6–17.2)
LIPASE SERPL-CCNC: 105 U/L (ref 73–393)
MCH RBC QN AUTO: 27.5 PG (ref 26.1–32.9)
MCHC RBC AUTO-ENTMCNC: 30.1 G/DL (ref 31.4–35)
MCV RBC AUTO: 91.4 FL (ref 79.6–97.8)
NRBC # BLD: 0 K/UL (ref 0–0.2)
PLATELET # BLD AUTO: 332 K/UL (ref 150–450)
PMV BLD AUTO: 9.8 FL (ref 9.4–12.3)
POTASSIUM SERPL-SCNC: 3.5 MMOL/L (ref 3.5–5.1)
PROT SERPL-MCNC: 6.8 G/DL (ref 6.3–8.2)
RBC # BLD AUTO: 4.07 M/UL (ref 4.23–5.6)
SODIUM SERPL-SCNC: 139 MMOL/L (ref 138–145)
TROPONIN I SERPL HS-MCNC: 6.2 PG/ML (ref 0–14)
TSH, 3RD GENERATION: 11 UIU/ML (ref 0.36–3.74)
WBC # BLD AUTO: 10.1 K/UL (ref 4.3–11.1)

## 2022-07-17 PROCEDURE — 83690 ASSAY OF LIPASE: CPT

## 2022-07-17 PROCEDURE — 85027 COMPLETE CBC AUTOMATED: CPT

## 2022-07-17 PROCEDURE — 84443 ASSAY THYROID STIM HORMONE: CPT

## 2022-07-17 PROCEDURE — 80053 COMPREHEN METABOLIC PANEL: CPT

## 2022-07-17 PROCEDURE — 84484 ASSAY OF TROPONIN QUANT: CPT

## 2022-07-17 PROCEDURE — 99285 EMERGENCY DEPT VISIT HI MDM: CPT

## 2022-07-17 PROCEDURE — 2580000003 HC RX 258: Performed by: EMERGENCY MEDICINE

## 2022-07-17 PROCEDURE — 71046 X-RAY EXAM CHEST 2 VIEWS: CPT

## 2022-07-17 PROCEDURE — 94762 N-INVAS EAR/PLS OXIMTRY CONT: CPT

## 2022-07-17 RX ORDER — 0.9 % SODIUM CHLORIDE 0.9 %
1000 INTRAVENOUS SOLUTION INTRAVENOUS
Status: COMPLETED | OUTPATIENT
Start: 2022-07-17 | End: 2022-07-17

## 2022-07-17 RX ADMIN — SODIUM CHLORIDE 1000 ML: 9 INJECTION, SOLUTION INTRAVENOUS at 10:25

## 2022-07-17 ASSESSMENT — ENCOUNTER SYMPTOMS
CONSTIPATION: 0
ABDOMINAL PAIN: 0
BACK PAIN: 0
DIARRHEA: 0
COLOR CHANGE: 0
SORE THROAT: 0
VOMITING: 0
SHORTNESS OF BREATH: 0
NAUSEA: 0
COUGH: 0
RHINORRHEA: 0

## 2022-07-17 ASSESSMENT — PAIN - FUNCTIONAL ASSESSMENT: PAIN_FUNCTIONAL_ASSESSMENT: 0-10

## 2022-07-17 ASSESSMENT — PAIN DESCRIPTION - LOCATION: LOCATION: CHEST

## 2022-07-17 ASSESSMENT — PAIN SCALES - GENERAL: PAINLEVEL_OUTOF10: 2

## 2022-07-17 NOTE — ED TRIAGE NOTES
Patient ambulatory to triage with mask in place. Patient reports palpitations and fatigue that started around an hour ago. Denies chest pain, shob or nausea.

## 2022-07-17 NOTE — ED NOTES

## 2022-07-17 NOTE — ED PROVIDER NOTES
[facility] Emergency Department Provider Note                   PCP:                Nigel Gallagher DO               Age: 58 y.o. Sex: male       ICD-10-CM    1. Palpitations  R00.2       2. Hypothyroidism, unspecified type  E03.9           DISPOSITION Decision To Discharge 07/17/2022 12:17:04 PM       MDM  Number of Diagnoses or Management Options  Hypothyroidism, unspecified type  Palpitations  Diagnosis management comments: Patient with some palpitations this morning. No acute on EKG chest x-ray or blood work. Heart rate controlled here. TSH is slightly elevated with no history of thyroid problems. Will refer to PCP for referral to endocrinology for further work-up and evaluation. Amount and/or Complexity of Data Reviewed  Clinical lab tests: ordered and reviewed  Tests in the radiology section of CPT®: ordered and reviewed  Tests in the medicine section of CPT®: ordered and reviewed    Patient Progress  Patient progress: stable       Orders Placed This Encounter   Procedures    XR CHEST (2 VW)    CBC    Comprehensive Metabolic Panel    Troponin    Lipase    TSH    Cardiac Monitor    Pulse Oximetry    EKG 12 Lead    Saline lock IV        Crystal Doran is a 58 y.o. male who presents to the Emergency Department with chief complaint of    Chief Complaint   Patient presents with    Palpitations      May 31 patient had triple bypass. Since then has had off-and-on palpitations and fatigue. His defibrillator fired for V. tach a week or 2 later. This morning patient felt some palpitations with heart rate getting up to 120. Denies any chest pain or shortness of breath. No nausea numbness or diaphoresis. Comes here for evaluation. The history is provided by the patient. No  was used.    Palpitations  Palpitations quality:  Fast  Duration:  2 hours  Progression:  Waxing and waning  Chronicity:  Recurrent  Relieved by:  Nothing  Worsened by:  Nothing  Associated symptoms: no back pain, no chest pain, no chest pressure, no cough, no diaphoresis, no dizziness, no lower extremity edema, no malaise/fatigue, no nausea, no numbness, no shortness of breath, no vomiting and no weakness      All other systems reviewed and are negative. Review of Systems   Constitutional:  Negative for chills, diaphoresis, fever and malaise/fatigue. HENT:  Negative for rhinorrhea and sore throat. Respiratory:  Negative for cough and shortness of breath. Cardiovascular:  Positive for palpitations. Negative for chest pain. Gastrointestinal:  Negative for abdominal pain, constipation, diarrhea, nausea and vomiting. Genitourinary:  Negative for dysuria and hematuria. Musculoskeletal:  Negative for back pain and neck pain. Skin:  Negative for color change and rash. Neurological:  Negative for dizziness, weakness, numbness and headaches. All other systems reviewed and are negative.     Past Medical History:   Diagnosis Date    Acute hypoxemic respiratory failure due to COVID-19 [facility]) 10/9/2021    CAD (coronary artery disease)     heart attack 2005 stentS HEART    CHF (congestive heart failure) ([facility] [address] 9/27/2011    Chronic systolic heart failure ([address] 10/2/2015    Coronary atherosclerosis of native coronary vessel 10/2/2015    Hyperlipidemia 10/2/2015    Hypoxemia requiring supplemental oxygen 10/6/2021    IGT (impaired glucose tolerance) 12/3/2017    Other ill-defined conditions(799.89)      blind left eye    Other ill-defined conditions(799.89)     cardiomyopathy    Pneumonia due to COVID-19 virus 11/13/2021    Resolved    Primary insomnia 6/7/2017    Psychiatric disorder     depression     Sepsis, unspecified 4/5/2011    Thromboembolus ([facility] [address]     thrombus in heart     Thrombus 10/2/2015        Past Surgical History:   Procedure Laterality Date    CARDIAC CATHETERIZATION      5 stents total    CARDIAC DEFIBRILLATOR PLACEMENT  2011    [address] N/A 5/27/2022    LEFT HEART CATH / CORONARY ANGIOGRAPHY performed by Natalia Vera MD at [address] [place] [address] CATH LAB    COLONOSCOPY  12/2010    CORONARY ARTERY BYPASS GRAFT N/A 5/31/2022    CORONARY ARTERY BYPASS GRAFT (CABG X 3), LIMA ; ENDOSCOPIC VEIN HARVEST, LEFT GREATER SAPHENOUS VEIN performed by Ti Spangler MD at [facility] MAIN OR    HEENT      oral surgery    PACEMAKER      DE CARDIAC SURG PROCEDURE UNLIST       1 stent 2005    TRANSESOPHAGEAL ECHOCARDIOGRAM N/A 5/31/2022    TRANSESOPHAGEAL ECHOCARDIOGRAM performed by Ti Spangler MD at [facility] MAIN OR        Family History   Problem Relation Age of Onset    Heart Disease Mother     Diabetes Paternal Grandfather     Cancer Paternal Grandmother     Heart Disease Brother     Diabetes Maternal Grandmother     Bleeding Prob Father     Diabetes Mother     Heart Disease Paternal Grandfather     Heart Attack Mother 67        mi        Social Connections: Not on file        Allergies   Allergen Reactions    Penicillins Swelling     Face swelling    Atorvastatin Other (See Comments)     itching    Evolocumab Other (See Comments)     Itching    Lisinopril Other (See Comments)    Rosuvastatin Other (See Comments)     itching        Vitals signs and nursing note reviewed. Patient Vitals for the past 4 hrs:   Temp Pulse Resp BP SpO2   07/17/22 1130 -- 60 22 120/77 97 %   07/17/22 1000 -- 60 16 92/65 92 %   07/17/22 0913 -- 77 18 125/77 95 %   07/17/22 0845 98.3 °F (36.8 °C) 85 16 117/87 96 %          Physical Exam  Vitals and nursing note reviewed. Constitutional:       Appearance: Normal appearance. HENT:      Head: Normocephalic and atraumatic. Cardiovascular:      Rate and Rhythm: Normal rate and regular rhythm. Pulmonary:      Effort: Pulmonary effort is normal.      Breath sounds: Normal breath sounds. No wheezing. Abdominal:      General: Bowel sounds are normal.      Palpations: Abdomen is soft. Tenderness: There is no abdominal tenderness.    Musculoskeletal:         General: No swelling. Normal range of motion. Cervical back: Normal range of motion. No tenderness. Skin:     General: Skin is warm and dry. Neurological:      Mental Status: He is alert. Procedures    ED EKG Interpretation  EKG was interpreted in the absence of a cardiologist.    Rate: Rate: Normal  EKG Interpretation: EKG Interpretation: sinus rhythm  ST Segments: Nonspecific ST segments - NO STEMI    Labs Reviewed   CBC - Abnormal; Notable for the following components:       Result Value    RBC 4.07 (*)     Hemoglobin 11.2 (*)     Hematocrit 37.2 (*)     MCHC 30.1 (*)     RDW 16.6 (*)     All other components within normal limits   COMPREHENSIVE METABOLIC PANEL - Abnormal; Notable for the following components:    Chloride 108 (*)     Anion Gap 5 (*)     Glucose 197 (*)     CREATININE 1.60 (*)     GFR  57 (*)     GFR Non- 47 (*)     Globulin 3.6 (*)     Albumin/Globulin Ratio 0.9 (*)     All other components within normal limits   TSH - Abnormal; Notable for the following components:    TSH, 3RD GENERATION 11.000 (*)     All other components within normal limits   TROPONIN   LIPASE   TROPONIN        XR CHEST (2 VW)   Final Result   1. Persisting small bilateral pleural effusions. Mild pulmonary vascular   congestion. 2. No consolidation. 3. Stable postoperative changes of the mediastinum. Voice dictation software was used during the making of this note. This software is not perfect and grammatical and other typographical errors may be present. This note has not been completely proofread for errors.      Ashanti Thornton III, MD  07/17/22 [id number]

## 2022-07-18 ENCOUNTER — CARE COORDINATION (OUTPATIENT)
Dept: CARE COORDINATION | Facility: CLINIC | Age: 62
End: 2022-07-18

## 2022-07-18 ENCOUNTER — TELEPHONE (OUTPATIENT)
Dept: FAMILY MEDICINE CLINIC | Facility: CLINIC | Age: 62
End: 2022-07-18

## 2022-07-18 NOTE — CARE COORDINATION
Educated patient about risk for severe COVID-19 due to risk factors according to CDC guidelines. ACM reviewed discharge instructions, medical action plan and red flag symptoms with the patient who verbalized understanding. Patient was given an opportunity to verbalize any questions and concerns and agrees to contact ACM or health care provider for questions related to their healthcare.

## 2022-07-18 NOTE — TELEPHONE ENCOUNTER
Patient called in requesting for provider to look at notes from most recent hospital visit and see if he needs to come in for labs or office visit.

## 2022-07-19 ENCOUNTER — HOSPITAL ENCOUNTER (OUTPATIENT)
Dept: CARDIAC REHAB | Age: 62
Setting detail: RECURRING SERIES
End: 2022-07-19
Payer: MEDICARE

## 2022-07-19 ENCOUNTER — ANTI-COAG VISIT (OUTPATIENT)
Dept: CARDIOLOGY CLINIC | Age: 62
End: 2022-07-19

## 2022-07-19 DIAGNOSIS — I51.3 INTRACARDIAC THROMBUS: Primary | ICD-10-CM

## 2022-07-19 LAB — INR BLD: 2.8

## 2022-07-19 NOTE — PATIENT INSTRUCTIONS
Reminder: Please contact the Coumadin Clinic at [phone] when you have medication changes. Examples, new medications, antibiotics, discontinued medications, new supplements, missed doses of warfarin or if you took extra doses of warfarin. This also includes OTC medications. Notifying us helps reduce the possibility of high and low INR's. In addition, if warfarin needs to be held for any procedures, please have surgeon or physician's office contact us before holding anticoagulant. Thanks, [facility] Coumadin [facility].

## 2022-07-20 ENCOUNTER — NURSE ONLY (OUTPATIENT)
Dept: CARDIOLOGY CLINIC | Age: 62
End: 2022-07-20
Payer: MEDICARE

## 2022-07-20 ENCOUNTER — OFFICE VISIT (OUTPATIENT)
Dept: CARDIOLOGY CLINIC | Age: 62
End: 2022-07-20
Payer: MEDICARE

## 2022-07-20 VITALS
BODY MASS INDEX: 30.66 KG/M2 | WEIGHT: 207 LBS | SYSTOLIC BLOOD PRESSURE: 118 MMHG | HEIGHT: 69 IN | DIASTOLIC BLOOD PRESSURE: 70 MMHG | HEART RATE: 68 BPM

## 2022-07-20 DIAGNOSIS — I25.10 CORONARY ATHEROSCLEROSIS OF NATIVE CORONARY VESSEL: ICD-10-CM

## 2022-07-20 DIAGNOSIS — I25.10 CAD, MULTIPLE VESSEL: ICD-10-CM

## 2022-07-20 DIAGNOSIS — I50.22 CHRONIC SYSTOLIC CONGESTIVE HEART FAILURE (HCC): Primary | ICD-10-CM

## 2022-07-20 DIAGNOSIS — I47.20 VENTRICULAR TACHYCARDIA: ICD-10-CM

## 2022-07-20 DIAGNOSIS — I51.3 INTRACARDIAC THROMBUS: ICD-10-CM

## 2022-07-20 DIAGNOSIS — Z95.810 ICD (IMPLANTABLE CARDIOVERTER-DEFIBRILLATOR) IN PLACE: ICD-10-CM

## 2022-07-20 DIAGNOSIS — I25.10 ATHEROSCLEROSIS OF NATIVE CORONARY ARTERY OF NATIVE HEART WITHOUT ANGINA PECTORIS: Primary | ICD-10-CM

## 2022-07-20 DIAGNOSIS — I50.22 CHRONIC SYSTOLIC CONGESTIVE HEART FAILURE (HCC): ICD-10-CM

## 2022-07-20 PROCEDURE — 99214 OFFICE O/P EST MOD 30 MIN: CPT | Performed by: INTERNAL MEDICINE

## 2022-07-20 PROCEDURE — 93289 INTERROG DEVICE EVAL HEART: CPT | Performed by: INTERNAL MEDICINE

## 2022-07-20 RX ORDER — MEXILETINE HYDROCHLORIDE 150 MG/1
150 CAPSULE ORAL 3 TIMES DAILY
Qty: 90 CAPSULE | Refills: 3 | Status: ON HOLD | OUTPATIENT
Start: 2022-07-20 | End: 2022-09-20 | Stop reason: HOSPADM

## 2022-07-20 RX ORDER — AMIODARONE HYDROCHLORIDE 400 MG/1
400 TABLET ORAL 2 TIMES DAILY
Qty: 60 TABLET | Refills: 3 | Status: ON HOLD | OUTPATIENT
Start: 2022-07-20 | End: 2022-09-20 | Stop reason: HOSPADM

## 2022-07-20 ASSESSMENT — ENCOUNTER SYMPTOMS: SHORTNESS OF BREATH: 0

## 2022-07-20 NOTE — PROGRESS NOTES
This note will not be viewable in [address] IN OFFICE CHECK    Preliminary Impression: Normal dc icd function. Multiple VT episodes on July 01, 2022 with numerous ATP therapies delivered. Most recent ICD shock back on June 17, 2022. Pt states he had CABG x 3 in May 2022. AP 17%   1%. No programming changes to current parameters. Pt on Amiodarone and Warfarin. Following MD: Dr. Elaina Jara  Implanting MD: Dr. Brittaney Travis presented to the [address] [place] [address] today for a device check. The device was interrogated, and the results were forwarded to the ordering provider for review.      Jefe Guzman  7/20/2022  9:13 AM

## 2022-07-20 NOTE — PROGRESS NOTES
[address] [place]  [address] [facility] [address], [address]  PHONE: [phone]    Daljit Catherine  1960      SUBJECTIVE:   Daljit Catherine is a 58 y.o. male seen for a follow up visit regarding the following:     Chief Complaint   Patient presents with    Tachycardia     4 week fu       HPI:  Patient presents for follow-up. Admitted June 17 through June 22 with recurrent ventricular tachycardia. Seen by electrophysiology. Amiodarone increased and mexiletine added. Was seen back by Dr. Jessica Maldonado on June 27. His device was interrogated today and shows recurrent ventricular tachycardia on July 1 which was successfully treated with antitachycardia pacing. Remains on amiodarone 400 mg twice daily and mexiletine 150 mg 3 times daily. Device interrogation today shows:    Normal dc icd function. Multiple VT episodes on July 01, 2022 with numerous ATP therapies delivered. Most recent ICD shock back on June 17, 2022. Pt states he had CABG x 3 in May 2022. AP 17%   1%. No programming changes to current parameters. Pt on Amiodarone and Warfarin. He was seen in the ER with palpitations on July 17. No arrhythmias were documented. It was noted his TSH was elevated at 11. He has a call into his PCP for discussion about treating hypothyroidism. Past Medical History, Past Surgical History, Family history, Social History, and Medications were all reviewed with the patient today and updated as necessary. Current Outpatient Medications:     amiodarone (PACERONE) 400 MG tablet, Take 1 tablet by mouth in the morning and 1 tablet before bedtime. , Disp: 60 tablet, Rfl: 3    mexiletine (MEXITIL) 150 MG capsule, Take 1 capsule by mouth in the morning and 1 capsule at noon and 1 capsule before bedtime. , Disp: 90 capsule, Rfl: 3    Vitamin D (CHOLECALCIFEROL) 25 MCG (1000 UT) TABS tablet, Take 1,000 Units by mouth daily, Disp: , Rfl:     Multiple Vitamins-Minerals (THERAPEUTIC MULTIVITAMIN-MINERALS) tablet, Take 1 tablet by mouth daily, Disp: , Rfl:     metoprolol succinate (TOPROL XL) 25 MG extended release tablet, Take 1 tablet by mouth in the morning and at bedtime, Disp: 60 tablet, Rfl: 5    nitroGLYCERIN (NITROSTAT) 0.4 MG SL tablet, Place 1 tablet under the tongue every 5 minutes as needed for Chest pain up to max of 3 total doses. If no relief after 1 dose, call 911., Disp: 25 tablet, Rfl: 3    aspirin 81 MG chewable tablet, Take 1 tablet by mouth daily, Disp: 30 tablet, Rfl: 3    acetaminophen (TYLENOL) 325 mg tablet, Take 2 tablets by mouth every 6 hours as needed for Pain, Disp: 120 tablet, Rfl: 3    Cetirizine HCl 10 MG CAPS, Take by mouth as needed, Disp: , Rfl:     QUEtiapine (SEROQUEL) 100 MG tablet, Take 100 mg by mouth nightly , Disp: , Rfl:     warfarin (COUMADIN) 5 MG tablet, Take 1 to 1 1/2 tablet daily or as directed, Disp: , Rfl:      Allergies   Allergen Reactions    Penicillins Swelling     Face swelling    Atorvastatin Other (See Comments)     itching    Evolocumab Other (See Comments)     Itching    Lisinopril Other (See Comments)    Rosuvastatin Other (See Comments)     itching        Patient Active Problem List    Diagnosis Date Noted    CAD, multiple vessel 05/28/2022     Priority: High    Defibrillator discharge 05/26/2022     Priority: High    COVID-19 10/06/2021     Priority: High     Last Assessment & Plan:   Formatting of this note might be different from the original.  Continue as needed use of home oxygen. Patient is improving compared to yesterday. Plan to recheck tomorrow via telemedicine. Eventually taper the oxygen and discontinue as his sats improved. Covid discharge teaching. At this point I am hopeful he is improving, he feels much better. Continue to monitor oxygen saturation. Finish medicines. Continue until complete.       Chronic systolic congestive heart failure ([facility] [address] 09/27/2011     Priority: High     Unable to tolerate lisinopril due to hypotension. 1.  Echo (1/26/05):  EF 40% with anterior and apical severe hypokinesis to   akinesis. 2.  Echo (4/5/11):  EF 25-30%. Mid anterior/anteroseptal/apical akinesis. Large protruding apical thrombus. Mild LA dilatation. No valvular   regurgitation/stenosis. 3.  Echo (8/2/11):  EF 30-35%. Anterior/apical/anteroseptal akinesis. Resolution of apical thrombus. 4.  Echo (3/28/18):  EF 30-35%. Anteroapical akinesis. No significant   valve disease. 5. Echo (5/26/22):  EF 25-30%. Apical AK. AS and AL HK. MIld LAE. No significant valve disease. ICD (implantable cardioverter-defibrillator) in place 09/27/2011     Priority: High     1. Dual chamber C ICD 9/26/11  2. ICD discharges 9/7/12 for sinus tachycardia. ICD adjusted. S/P CABG x 3 05/31/2022     Priority: Medium    Hypoxemia 05/31/2022     Priority: Medium    Atelectasis 05/31/2022     Priority: Medium    Smoking greater than 20 pack years 11/13/2021     Priority: Low    Refused influenza vaccine 11/13/2021     Priority: Low    Anemia 11/13/2021     Priority: Low     Recheck CBC today        History of 2019 novel coronavirus disease (COVID-19) 10/03/2021     Priority: Low    COVID-19 vaccination refused 06/18/2021     Priority: Low     Had Covid 19 positive test 10/3/21        Ventricular tachycardia ([facility] [address] 06/30/2020     Priority: Low     1. ICD discharge for VT on 3/19/20  2. Did 5/22 with ICD discharges. Underwent CABG and started on amiodarone. High risk medication use 06/19/2020     Priority: Low    Chronic eczematous otitis externa of both ears 06/18/2020     Priority: Low     Last Assessment & Plan:   Formatting of this note might be different from the original.   Patient said this resolved with steroids      Benign prostatic hyperplasia with weak urinary stream 12/29/2019     Priority: Low     Mild. No medication at this time. Formatting of this note might be different from the original.  Mild. No medication at this time. Last Assessment & Plan:   Formatting of this note might be different from the original.   Check PSA annually      Chronic fatigue 2019     Priority: Low    Tobacco abuse 2019     Priority: Low     Strongly urged tobacco cessation in light of multiple medical problems   including CAD! Numbness of left foot 2018     Priority: Low    Statin intolerance 12/10/2017     Priority: Low    Overweight (BMI 25.0-29.9) 2017     Priority: Low     Low carb diet discussed        Primary insomnia 2017     Priority: Low    Seasonal allergic rhinitis due to pollen 2017     Priority: Low    Coronary atherosclerosis of native coronary vessel 10/02/2015     Priority: Low     1. Anterior STEMI 05 (Late presentation)  a. Cath: LAD: 50% prox. 100% mid. D1: 95% prox. Ramus: 95% prox. Circ: Luminal irregularity. RCA: Luminal irregularity. b.  PCI:  T-stent of LAD/D1 with 2.25 X 18 Minivision in D1 and 2.25 X 23   Cypher in LAD. PCI of ramus with 3.0 X 18 mm Cypher. 2.  Exercise Cardiolite (11):  Large fixed anterior, septal and   apical defect. No ischemia. EF 33%. 3.  Lexiscan Cardiolite (20):  Large fixed defect in mid/distal   anterior and distal inferior wall. EF 27%. No reversible ischemia. 4.  CABG (2022): LIMA to LAD, SVG to PDA, SVG to intermediate        Dyslipidemia 10/02/2015     Priority: Low     STATIN INTOLERANT. Unable to tolerate Zetia or Repatha. Intracardiac thrombus 10/02/2015     Priority: Low     Follow INR with home monitor  Echo 11:  Large protruding apical thrombus. Resolved with   anticoagulation. Indefinite anticoagulation planned. Social History     Tobacco Use    Smoking status: Former     Packs/day: 1.00     Types: Cigarettes     Start date: 1978     Quit date: 2022     Years since quittin.1    Smokeless tobacco: Never   Substance Use Topics    Alcohol use:  No ROS:    Review of Systems   Constitutional: Positive for malaise/fatigue. Cardiovascular:  Negative for chest pain. Respiratory:  Negative for shortness of breath. Musculoskeletal:  Positive for arthritis. Neurological:  Negative for focal weakness. Psychiatric/Behavioral:  Negative for depression. PHYSICAL EXAM:  Wt Readings from Last 3 Encounters:   07/20/22 207 lb (93.9 kg)   07/14/22 200 lb 8 oz (90.9 kg)   07/17/22 200 lb (90.7 kg)     BP Readings from Last 3 Encounters:   07/20/22 118/70   07/17/22 136/79   07/05/22 110/65     Pulse Readings from Last 3 Encounters:   07/20/22 68   07/17/22 61   07/05/22 60       Physical Exam  Constitutional:       General: He is not in acute distress. Neck:      Vascular: No carotid bruit. Cardiovascular:      Rate and Rhythm: Normal rate and regular rhythm. Pulmonary:      Effort: No respiratory distress. Breath sounds: Normal breath sounds. Abdominal:      General: Bowel sounds are normal.      Palpations: Abdomen is soft. Musculoskeletal:         General: No swelling. Skin:     General: Skin is warm and dry. Neurological:      General: No focal deficit present. Psychiatric:         Mood and Affect: Mood normal.       Medical problems and test results were reviewed with the patient today.        Lab Results   Component Value Date    WBC 10.1 07/17/2022    HGB 11.2 (L) 07/17/2022    HCT 37.2 (L) 07/17/2022    MCV 91.4 07/17/2022     07/17/2022       Lab Results   Component Value Date/Time     07/17/2022 09:06 AM    K 3.5 07/17/2022 09:06 AM     07/17/2022 09:06 AM    CO2 26 07/17/2022 09:06 AM    BUN 19 07/17/2022 09:06 AM    CREATININE 1.60 07/17/2022 09:06 AM    GLUCOSE 197 07/17/2022 09:06 AM    CALCIUM 8.6 07/17/2022 09:06 AM        Lab Results   Component Value Date    CHOL 179 06/28/2022     Lab Results   Component Value Date    TRIG 156 (H) 06/28/2022     Lab Results   Component Value Date    HDL 29 (L) 06/28/2022     Lab Results   Component Value Date    LDLCALC 118.8 (H) 06/28/2022     Lab Results   Component Value Date    LABVLDL 31.2 (H) 06/28/2022    VLDL 45 (H) 06/14/2021     Lab Results   Component Value Date    CHOLHDLRATIO 6.2 06/28/2022        Data from outside records/labs from outside providers have been reviewed and summarized as noted in the HPI, past history and data review sections of this note       ASSESSMENT and PLAN      1. Atherosclerosis of native coronary artery of native heart without angina pectoris  Patient is doing well without any anginal symptoms. Continue Aspirin 81 mg a day and PRN NTG. We discussed the importance of continued risk factor modification. The patient is encouraged to contact my office with any worsening dyspnea or chest discomfort. 2. Chronic systolic congestive heart failure (HCC)  Severe LV dysfunction. On Toprol and blood pressure has not been adequate to tolerate Entresto or Aldactone. 3. Ventricular tachycardia (HCC)  On amiodarone and mexiletine. We will arrange follow-up in 4 weeks with Dr. Bard Colon. Titration of this therapy per electrophysiology    4. Intracardiac thrombus  Continue anticoagulation. 5. ICD (implantable cardioverter-defibrillator) in place  Device function. Follow ventricular tachycardia burden closely. 6.  Hypothyroidism  Await PCP input. Balbina Pisano MD  7/20/2022  9:59 AM    This note may have been dictated using speech recognition software.   As a result, error of speech recognition may have occurred

## 2022-07-21 ENCOUNTER — TELEPHONE (OUTPATIENT)
Dept: CARDIOLOGY CLINIC | Age: 62
End: 2022-07-21

## 2022-07-21 ENCOUNTER — APPOINTMENT (OUTPATIENT)
Dept: CARDIAC REHAB | Age: 62
End: 2022-07-21
Payer: MEDICARE

## 2022-07-21 NOTE — TELEPHONE ENCOUNTER
MEDICATION REFILL REQUEST      Name of Medication:  amiodarone   Dose:  200  Frequency:    Quantity:    Days' supply:        Pharmacy Name/Location:  did not leave on voicemail      Please call in today because he is out

## 2022-07-21 NOTE — TELEPHONE ENCOUNTER
Pharmacy calling stating that insurance will not pay for the 400 mg but will pay for the 200 mg twice a day.  Please call [facility] [phone]

## 2022-07-21 NOTE — TELEPHONE ENCOUNTER
Patient called stating that his insurance will not pay for amiodarone 400 mg but will pay for  amiodarone 200 mg 2 bid. Called pharmacy, [facility], confirmed that insurance will not pay as above. Verbal needed for patient to take Amiodarone 200 mg 2 in am and 2 in pm.   Is this okay? Yes.   Thank you per Dr. Radha Oconnell given verbal order and medication changed on med list/brendab

## 2022-07-23 ENCOUNTER — HOSPITAL ENCOUNTER (EMERGENCY)
Dept: GENERAL RADIOLOGY | Age: 62
Discharge: HOME | DRG: 659 | End: 2022-07-26
Payer: MEDICARE

## 2022-07-23 ENCOUNTER — APPOINTMENT (OUTPATIENT)
Dept: CT IMAGING | Age: 62
DRG: 659 | End: 2022-07-23
Payer: MEDICARE

## 2022-07-23 ENCOUNTER — HOSPITAL ENCOUNTER (INPATIENT)
Age: 62
LOS: 9 days | Discharge: HOME | DRG: 659 | End: 2022-08-01
Attending: EMERGENCY MEDICINE | Admitting: STUDENT IN AN ORGANIZED HEALTH CARE EDUCATION/TRAINING PROGRAM
Payer: MEDICARE

## 2022-07-23 DIAGNOSIS — R31.9 URINARY TRACT INFECTION WITH HEMATURIA, SITE UNSPECIFIED: ICD-10-CM

## 2022-07-23 DIAGNOSIS — R09.02 HYPOXEMIA: ICD-10-CM

## 2022-07-23 DIAGNOSIS — N13.5 URETERAL OBSTRUCTION, RIGHT: ICD-10-CM

## 2022-07-23 DIAGNOSIS — N28.89 RIGHT KIDNEY MASS: ICD-10-CM

## 2022-07-23 DIAGNOSIS — R31.0 GROSS HEMATURIA: ICD-10-CM

## 2022-07-23 DIAGNOSIS — N39.0 URINARY TRACT INFECTION WITH HEMATURIA, SITE UNSPECIFIED: ICD-10-CM

## 2022-07-23 DIAGNOSIS — N17.9 ACUTE KIDNEY INJURY (HCC): Primary | ICD-10-CM

## 2022-07-23 DIAGNOSIS — T45.511A POISONING BY WARFARIN SODIUM, ACCIDENTAL OR UNINTENTIONAL, INITIAL ENCOUNTER: ICD-10-CM

## 2022-07-23 PROBLEM — N17.0 ACUTE KIDNEY INJURY (AKI) WITH ACUTE TUBULAR NECROSIS (ATN) (HCC): Status: ACTIVE | Noted: 2022-07-23

## 2022-07-23 LAB
ALBUMIN SERPL-MCNC: 2.9 G/DL (ref 3.2–4.6)
ALBUMIN/GLOB SERPL: 0.8 {RATIO} (ref 1.2–3.5)
ALP SERPL-CCNC: 80 U/L (ref 50–136)
ALT SERPL-CCNC: 16 U/L (ref 12–65)
ANION GAP SERPL CALC-SCNC: 7 MMOL/L (ref 7–16)
APPEARANCE UR: ABNORMAL
AST SERPL-CCNC: 34 U/L (ref 15–37)
BACTERIA URNS QL MICRO: ABNORMAL /HPF
BASOPHILS # BLD: 0.1 K/UL (ref 0–0.2)
BASOPHILS NFR BLD: 0 % (ref 0–2)
BILIRUB SERPL-MCNC: 0.8 MG/DL (ref 0.2–1.1)
BILIRUB UR QL: ABNORMAL
BILIRUB UR QL: ABNORMAL
BUN SERPL-MCNC: 33 MG/DL (ref 8–23)
CALCIUM SERPL-MCNC: 8.1 MG/DL (ref 8.3–10.4)
CASTS URNS QL MICRO: ABNORMAL /LPF
CHLORIDE SERPL-SCNC: 105 MMOL/L (ref 98–107)
CO2 SERPL-SCNC: 24 MMOL/L (ref 21–32)
COLOR UR: ABNORMAL
CREAT SERPL-MCNC: 2.3 MG/DL (ref 0.8–1.5)
DIFFERENTIAL METHOD BLD: ABNORMAL
EOSINOPHIL # BLD: 0.1 K/UL (ref 0–0.8)
EOSINOPHIL NFR BLD: 1 % (ref 0.5–7.8)
ERYTHROCYTE [DISTWIDTH] IN BLOOD BY AUTOMATED COUNT: 16.4 % (ref 11.9–14.6)
GLOBULIN SER CALC-MCNC: 3.7 G/DL (ref 2.3–3.5)
GLUCOSE SERPL-MCNC: 149 MG/DL (ref 65–100)
GLUCOSE UR QL STRIP.AUTO: NEGATIVE MG/DL
GLUCOSE UR STRIP.AUTO-MCNC: NEGATIVE MG/DL
HCT VFR BLD AUTO: 29.6 % (ref 41.1–50.3)
HGB BLD-MCNC: 9.2 G/DL (ref 13.6–17.2)
HGB UR QL STRIP: ABNORMAL
IMM GRANULOCYTES # BLD AUTO: 0.1 K/UL (ref 0–0.5)
IMM GRANULOCYTES NFR BLD AUTO: 1 % (ref 0–5)
KETONES UR QL STRIP.AUTO: NEGATIVE MG/DL
KETONES UR-MCNC: 15 MG/DL
LACTATE SERPL-SCNC: 1.8 MMOL/L (ref 0.4–2)
LEUKOCYTE ESTERASE UR QL STRIP.AUTO: ABNORMAL
LEUKOCYTE ESTERASE UR QL STRIP: ABNORMAL
LYMPHOCYTES # BLD: 1.6 K/UL (ref 0.5–4.6)
LYMPHOCYTES NFR BLD: 11 % (ref 13–44)
MAGNESIUM SERPL-MCNC: 2.5 MG/DL (ref 1.8–2.4)
MCH RBC QN AUTO: 27.3 PG (ref 26.1–32.9)
MCHC RBC AUTO-ENTMCNC: 31.1 G/DL (ref 31.4–35)
MCV RBC AUTO: 87.8 FL (ref 79.6–97.8)
MONOCYTES # BLD: 1.3 K/UL (ref 0.1–1.3)
MONOCYTES NFR BLD: 9 % (ref 4–12)
NEUTS SEG # BLD: 11.4 K/UL (ref 1.7–8.2)
NEUTS SEG NFR BLD: 78 % (ref 43–78)
NITRITE UR QL STRIP.AUTO: POSITIVE
NITRITE UR QL: NEGATIVE
NRBC # BLD: 0 K/UL (ref 0–0.2)
NT PRO BNP: 2173 PG/ML (ref 5–125)
OTHER OBSERVATIONS: ABNORMAL
PH UR STRIP: 5 [PH] (ref 5–9)
PH UR: 5.5 [PH] (ref 5–9)
PLATELET # BLD AUTO: 412 K/UL (ref 150–450)
PMV BLD AUTO: 10 FL (ref 9.4–12.3)
POTASSIUM SERPL-SCNC: 3.9 MMOL/L (ref 3.5–5.1)
PROCALCITONIN SERPL-MCNC: 0.29 NG/ML (ref 0–0.49)
PROT SERPL-MCNC: 6.6 G/DL (ref 6.3–8.2)
PROT UR QL: >300 MG/DL
PROT UR STRIP-MCNC: 100 MG/DL
RBC # BLD AUTO: 3.37 M/UL (ref 4.23–5.6)
RBC # UR STRIP: ABNORMAL /UL
RBC #/AREA URNS HPF: >100 /HPF
SERVICE CMNT-IMP: ABNORMAL
SODIUM SERPL-SCNC: 136 MMOL/L (ref 136–145)
SP GR UR REFRACTOMETRY: 1.03 (ref 1–1.02)
SP GR UR: 1.02 (ref 1–1.02)
UROBILINOGEN UR QL STRIP.AUTO: 1 EU/DL (ref 0.2–1)
UROBILINOGEN UR QL: 2 EU/DL (ref 0.2–1)
WBC # BLD AUTO: 14.5 K/UL (ref 4.3–11.1)
WBC URNS QL MICRO: >100 /HPF

## 2022-07-23 PROCEDURE — 84145 PROCALCITONIN (PCT): CPT

## 2022-07-23 PROCEDURE — 74176 CT ABD & PELVIS W/O CONTRAST: CPT

## 2022-07-23 PROCEDURE — 93005 ELECTROCARDIOGRAM TRACING: CPT | Performed by: EMERGENCY MEDICINE

## 2022-07-23 PROCEDURE — 81001 URINALYSIS AUTO W/SCOPE: CPT

## 2022-07-23 PROCEDURE — 99285 EMERGENCY DEPT VISIT HI MDM: CPT

## 2022-07-23 PROCEDURE — 87086 URINE CULTURE/COLONY COUNT: CPT

## 2022-07-23 PROCEDURE — 71045 X-RAY EXAM CHEST 1 VIEW: CPT

## 2022-07-23 PROCEDURE — 85025 COMPLETE CBC W/AUTO DIFF WBC: CPT

## 2022-07-23 PROCEDURE — 80053 COMPREHEN METABOLIC PANEL: CPT

## 2022-07-23 PROCEDURE — 6360000002 HC RX W HCPCS: Performed by: EMERGENCY MEDICINE

## 2022-07-23 PROCEDURE — 94762 N-INVAS EAR/PLS OXIMTRY CONT: CPT

## 2022-07-23 PROCEDURE — 87040 BLOOD CULTURE FOR BACTERIA: CPT

## 2022-07-23 PROCEDURE — 83605 ASSAY OF LACTIC ACID: CPT

## 2022-07-23 PROCEDURE — 2580000003 HC RX 258: Performed by: EMERGENCY MEDICINE

## 2022-07-23 PROCEDURE — 83735 ASSAY OF MAGNESIUM: CPT

## 2022-07-23 PROCEDURE — 1100000000 HC RM PRIVATE

## 2022-07-23 PROCEDURE — 83880 ASSAY OF NATRIURETIC PEPTIDE: CPT

## 2022-07-23 PROCEDURE — 81003 URINALYSIS AUTO W/O SCOPE: CPT

## 2022-07-23 PROCEDURE — 85610 PROTHROMBIN TIME: CPT

## 2022-07-23 RX ORDER — SODIUM CHLORIDE 9 MG/ML
INJECTION, SOLUTION INTRAVENOUS CONTINUOUS
Status: DISCONTINUED | OUTPATIENT
Start: 2022-07-23 | End: 2022-07-26

## 2022-07-23 RX ORDER — 0.9 % SODIUM CHLORIDE 0.9 %
500 INTRAVENOUS SOLUTION INTRAVENOUS ONCE
Status: COMPLETED | OUTPATIENT
Start: 2022-07-23 | End: 2022-07-24

## 2022-07-23 RX ADMIN — CEFTRIAXONE 1000 MG: 1 INJECTION, POWDER, FOR SOLUTION INTRAMUSCULAR; INTRAVENOUS at 23:44

## 2022-07-23 RX ADMIN — SODIUM CHLORIDE: 9 INJECTION, SOLUTION INTRAVENOUS at 23:43

## 2022-07-23 ASSESSMENT — ENCOUNTER SYMPTOMS
DIARRHEA: 0
WHEEZING: 0
VOMITING: 0
ABDOMINAL PAIN: 1
NAUSEA: 0
BACK PAIN: 1
COUGH: 0
SHORTNESS OF BREATH: 1

## 2022-07-23 ASSESSMENT — PAIN SCALES - GENERAL: PAINLEVEL_OUTOF10: 4

## 2022-07-23 ASSESSMENT — PAIN - FUNCTIONAL ASSESSMENT
PAIN_FUNCTIONAL_ASSESSMENT: 0-10
PAIN_FUNCTIONAL_ASSESSMENT: 0-10

## 2022-07-23 ASSESSMENT — PAIN DESCRIPTION - LOCATION: LOCATION: FLANK

## 2022-07-23 ASSESSMENT — PAIN DESCRIPTION - ORIENTATION: ORIENTATION: RIGHT

## 2022-07-23 NOTE — ED TRIAGE NOTES
Pt reports SOB that started yesterday, generalized muscle weakness, denies CP/fluid retention, dyspnea with exertion,, mild cough at night. Left sided flank pain, hematuria began this afternoon, denies urine retention/hx of kidney stones.  Had episode of diarrhea this morning

## 2022-07-24 ENCOUNTER — APPOINTMENT (OUTPATIENT)
Dept: GENERAL RADIOLOGY | Age: 62
DRG: 659 | End: 2022-07-24
Payer: MEDICARE

## 2022-07-24 ENCOUNTER — APPOINTMENT (OUTPATIENT)
Dept: ULTRASOUND IMAGING | Age: 62
DRG: 659 | End: 2022-07-24
Payer: MEDICARE

## 2022-07-24 PROBLEM — J18.9 CAP (COMMUNITY ACQUIRED PNEUMONIA): Status: ACTIVE | Noted: 2022-07-24

## 2022-07-24 PROBLEM — I50.22 CHRONIC SYSTOLIC (CONGESTIVE) HEART FAILURE (HCC): Status: ACTIVE | Noted: 2022-07-24

## 2022-07-24 LAB
ANION GAP SERPL CALC-SCNC: 3 MMOL/L (ref 7–16)
BASOPHILS # BLD: 0.1 K/UL (ref 0–0.2)
BASOPHILS NFR BLD: 1 % (ref 0–2)
BUN SERPL-MCNC: 23 MG/DL (ref 8–23)
CALCIUM SERPL-MCNC: 8 MG/DL (ref 8.3–10.4)
CHLORIDE SERPL-SCNC: 108 MMOL/L (ref 98–107)
CO2 SERPL-SCNC: 27 MMOL/L (ref 21–32)
CREAT SERPL-MCNC: 1.7 MG/DL (ref 0.8–1.5)
DIFFERENTIAL METHOD BLD: ABNORMAL
EKG ATRIAL RATE: 74 BPM
EKG ATRIAL RATE: 82 BPM
EKG DIAGNOSIS: NORMAL
EKG DIAGNOSIS: NORMAL
EKG P AXIS: 41 DEGREES
EKG P AXIS: 49 DEGREES
EKG P-R INTERVAL: 200 MS
EKG P-R INTERVAL: 202 MS
EKG Q-T INTERVAL: 402 MS
EKG Q-T INTERVAL: 440 MS
EKG QRS DURATION: 100 MS
EKG QRS DURATION: 98 MS
EKG QTC CALCULATION (BAZETT): 469 MS
EKG QTC CALCULATION (BAZETT): 488 MS
EKG R AXIS: 56 DEGREES
EKG R AXIS: 58 DEGREES
EKG T AXIS: 85 DEGREES
EKG T AXIS: 92 DEGREES
EKG VENTRICULAR RATE: 74 BPM
EKG VENTRICULAR RATE: 82 BPM
EOSINOPHIL # BLD: 0.2 K/UL (ref 0–0.8)
EOSINOPHIL NFR BLD: 1 % (ref 0.5–7.8)
ERYTHROCYTE [DISTWIDTH] IN BLOOD BY AUTOMATED COUNT: 16.4 % (ref 11.9–14.6)
FERRITIN SERPL-MCNC: 971 NG/ML (ref 8–388)
FOLATE SERPL-MCNC: 23.7 NG/ML (ref 3.1–17.5)
GLUCOSE SERPL-MCNC: 94 MG/DL (ref 65–100)
HCT VFR BLD AUTO: 28.1 % (ref 41.1–50.3)
HCT VFR BLD AUTO: 28.7 % (ref 41.1–50.3)
HCT VFR BLD AUTO: 29.2 % (ref 41.1–50.3)
HGB BLD-MCNC: 8.4 G/DL (ref 13.6–17.2)
HGB BLD-MCNC: 8.5 G/DL (ref 13.6–17.2)
HGB BLD-MCNC: 8.7 G/DL (ref 13.6–17.2)
HGB RETIC QN AUTO: 22 PG (ref 29–35)
IMM GRANULOCYTES # BLD AUTO: 0.1 K/UL (ref 0–0.5)
IMM GRANULOCYTES NFR BLD AUTO: 1 % (ref 0–5)
IMM RETICS NFR: 28.8 % (ref 2.3–13.4)
INR PPP: >16.2
INR PPP: >5
IRON SATN MFR SERPL: 11 %
IRON SERPL-MCNC: 18 UG/DL (ref 35–150)
IRON SERPL-MCNC: 19 UG/DL (ref 35–150)
LACTATE SERPL-SCNC: 0.8 MMOL/L (ref 0.4–2)
LYMPHOCYTES # BLD: 1.6 K/UL (ref 0.5–4.6)
LYMPHOCYTES NFR BLD: 12 % (ref 13–44)
MCH RBC QN AUTO: 26.7 PG (ref 26.1–32.9)
MCHC RBC AUTO-ENTMCNC: 29.6 G/DL (ref 31.4–35)
MCV RBC AUTO: 90.3 FL (ref 79.6–97.8)
MONOCYTES # BLD: 1.4 K/UL (ref 0.1–1.3)
MONOCYTES NFR BLD: 11 % (ref 4–12)
NEUTS SEG # BLD: 10 K/UL (ref 1.7–8.2)
NEUTS SEG NFR BLD: 75 % (ref 43–78)
NRBC # BLD: 0 K/UL (ref 0–0.2)
PLATELET # BLD AUTO: 397 K/UL (ref 150–450)
PMV BLD AUTO: 10.1 FL (ref 9.4–12.3)
POTASSIUM SERPL-SCNC: 3.9 MMOL/L (ref 3.5–5.1)
PROTHROMBIN TIME: >120 SEC (ref 12.6–14.5)
PROTHROMBIN TIME: >14.5 SEC (ref 12.6–14.5)
RBC # BLD AUTO: 3.18 M/UL (ref 4.23–5.6)
RETICS # AUTO: 0.06 M/UL (ref 0.03–0.1)
RETICS/RBC NFR AUTO: 2 % (ref 0.3–2)
SARS-COV-2 RDRP RESP QL NAA+PROBE: NOT DETECTED
SODIUM SERPL-SCNC: 138 MMOL/L (ref 136–145)
SOURCE: NORMAL
TIBC SERPL-MCNC: 165 UG/DL (ref 250–450)
TROPONIN I SERPL HS-MCNC: 15.1 PG/ML (ref 0–14)
TROPONIN I SERPL HS-MCNC: 15.4 PG/ML (ref 0–14)
TROPONIN I SERPL HS-MCNC: 16.7 PG/ML (ref 0–14)
VIT B12 SERPL-MCNC: 738 PG/ML (ref 193–986)
WBC # BLD AUTO: 13.3 K/UL (ref 4.3–11.1)

## 2022-07-24 PROCEDURE — 6360000002 HC RX W HCPCS: Performed by: EMERGENCY MEDICINE

## 2022-07-24 PROCEDURE — 80048 BASIC METABOLIC PNL TOTAL CA: CPT

## 2022-07-24 PROCEDURE — 83540 ASSAY OF IRON: CPT

## 2022-07-24 PROCEDURE — 85046 RETICYTE/HGB CONCENTRATE: CPT

## 2022-07-24 PROCEDURE — 2580000003 HC RX 258: Performed by: EMERGENCY MEDICINE

## 2022-07-24 PROCEDURE — 85014 HEMATOCRIT: CPT

## 2022-07-24 PROCEDURE — 1100000000 HC RM PRIVATE

## 2022-07-24 PROCEDURE — 82746 ASSAY OF FOLIC ACID SERUM: CPT

## 2022-07-24 PROCEDURE — 2580000003 HC RX 258: Performed by: INTERNAL MEDICINE

## 2022-07-24 PROCEDURE — 71045 X-RAY EXAM CHEST 1 VIEW: CPT

## 2022-07-24 PROCEDURE — 87635 SARS-COV-2 COVID-19 AMP PRB: CPT

## 2022-07-24 PROCEDURE — 93970 EXTREMITY STUDY: CPT

## 2022-07-24 PROCEDURE — 84484 ASSAY OF TROPONIN QUANT: CPT

## 2022-07-24 PROCEDURE — 85025 COMPLETE CBC W/AUTO DIFF WBC: CPT

## 2022-07-24 PROCEDURE — 83605 ASSAY OF LACTIC ACID: CPT

## 2022-07-24 PROCEDURE — 99222 1ST HOSP IP/OBS MODERATE 55: CPT | Performed by: INTERNAL MEDICINE

## 2022-07-24 PROCEDURE — 85610 PROTHROMBIN TIME: CPT

## 2022-07-24 PROCEDURE — 82607 VITAMIN B-12: CPT

## 2022-07-24 PROCEDURE — 76770 US EXAM ABDO BACK WALL COMP: CPT

## 2022-07-24 PROCEDURE — 6370000000 HC RX 637 (ALT 250 FOR IP): Performed by: INTERNAL MEDICINE

## 2022-07-24 PROCEDURE — 36415 COLL VENOUS BLD VENIPUNCTURE: CPT

## 2022-07-24 PROCEDURE — 2500000003 HC RX 250 WO HCPCS: Performed by: INTERNAL MEDICINE

## 2022-07-24 PROCEDURE — 93005 ELECTROCARDIOGRAM TRACING: CPT | Performed by: INTERNAL MEDICINE

## 2022-07-24 PROCEDURE — 87040 BLOOD CULTURE FOR BACTERIA: CPT

## 2022-07-24 PROCEDURE — 82728 ASSAY OF FERRITIN: CPT

## 2022-07-24 PROCEDURE — 6370000000 HC RX 637 (ALT 250 FOR IP): Performed by: FAMILY MEDICINE

## 2022-07-24 RX ORDER — ACETAMINOPHEN 325 MG/1
650 TABLET ORAL EVERY 6 HOURS PRN
Status: DISCONTINUED | OUTPATIENT
Start: 2022-07-24 | End: 2022-08-01 | Stop reason: HOSPADM

## 2022-07-24 RX ORDER — ONDANSETRON 8 MG/1
4 TABLET, ORALLY DISINTEGRATING ORAL EVERY 8 HOURS PRN
Status: DISCONTINUED | OUTPATIENT
Start: 2022-07-24 | End: 2022-08-01 | Stop reason: HOSPADM

## 2022-07-24 RX ORDER — SODIUM CHLORIDE 0.9 % (FLUSH) 0.9 %
5-40 SYRINGE (ML) INJECTION EVERY 12 HOURS SCHEDULED
Status: DISCONTINUED | OUTPATIENT
Start: 2022-07-24 | End: 2022-08-01 | Stop reason: HOSPADM

## 2022-07-24 RX ORDER — OXYCODONE HYDROCHLORIDE 5 MG/1
5 TABLET ORAL EVERY 4 HOURS PRN
Status: DISCONTINUED | OUTPATIENT
Start: 2022-07-24 | End: 2022-08-01 | Stop reason: HOSPADM

## 2022-07-24 RX ORDER — HYDROCODONE BITARTRATE AND HOMATROPINE METHYLBROMIDE ORAL SOLUTION 5; 1.5 MG/5ML; MG/5ML
5 LIQUID ORAL EVERY 4 HOURS PRN
Status: DISCONTINUED | OUTPATIENT
Start: 2022-07-24 | End: 2022-08-01 | Stop reason: HOSPADM

## 2022-07-24 RX ORDER — PHYTONADIONE 5 MG/1
5 TABLET ORAL ONCE
Status: COMPLETED | OUTPATIENT
Start: 2022-07-24 | End: 2022-07-24

## 2022-07-24 RX ORDER — ACETAMINOPHEN 650 MG/1
650 SUPPOSITORY RECTAL EVERY 6 HOURS PRN
Status: DISCONTINUED | OUTPATIENT
Start: 2022-07-24 | End: 2022-08-01 | Stop reason: HOSPADM

## 2022-07-24 RX ORDER — VITAMIN B COMPLEX
1000 TABLET ORAL DAILY
Status: DISCONTINUED | OUTPATIENT
Start: 2022-07-24 | End: 2022-08-01 | Stop reason: HOSPADM

## 2022-07-24 RX ORDER — QUETIAPINE FUMARATE 100 MG/1
100 TABLET, FILM COATED ORAL NIGHTLY
Status: DISCONTINUED | OUTPATIENT
Start: 2022-07-24 | End: 2022-08-01 | Stop reason: HOSPADM

## 2022-07-24 RX ORDER — MEXILETINE HYDROCHLORIDE 150 MG/1
150 CAPSULE ORAL 3 TIMES DAILY
Status: DISCONTINUED | OUTPATIENT
Start: 2022-07-24 | End: 2022-08-01 | Stop reason: HOSPADM

## 2022-07-24 RX ORDER — ONDANSETRON 2 MG/ML
4 INJECTION INTRAMUSCULAR; INTRAVENOUS EVERY 6 HOURS PRN
Status: DISCONTINUED | OUTPATIENT
Start: 2022-07-24 | End: 2022-08-01 | Stop reason: HOSPADM

## 2022-07-24 RX ORDER — ASPIRIN 81 MG/1
81 TABLET, CHEWABLE ORAL DAILY
Status: DISCONTINUED | OUTPATIENT
Start: 2022-07-24 | End: 2022-08-01 | Stop reason: HOSPADM

## 2022-07-24 RX ORDER — SODIUM CHLORIDE 0.9 % (FLUSH) 0.9 %
5-40 SYRINGE (ML) INJECTION PRN
Status: DISCONTINUED | OUTPATIENT
Start: 2022-07-24 | End: 2022-08-01 | Stop reason: HOSPADM

## 2022-07-24 RX ORDER — AMIODARONE HYDROCHLORIDE 200 MG/1
400 TABLET ORAL 2 TIMES DAILY
Status: DISCONTINUED | OUTPATIENT
Start: 2022-07-24 | End: 2022-08-01 | Stop reason: HOSPADM

## 2022-07-24 RX ORDER — GUAIFENESIN/DEXTROMETHORPHAN 100-10MG/5
5 SYRUP ORAL EVERY 4 HOURS PRN
Status: DISCONTINUED | OUTPATIENT
Start: 2022-07-24 | End: 2022-08-01 | Stop reason: HOSPADM

## 2022-07-24 RX ORDER — METOPROLOL SUCCINATE 25 MG/1
25 TABLET, EXTENDED RELEASE ORAL 2 TIMES DAILY
Status: DISCONTINUED | OUTPATIENT
Start: 2022-07-24 | End: 2022-07-29 | Stop reason: SDUPTHER

## 2022-07-24 RX ORDER — MORPHINE SULFATE 4 MG/ML
4 INJECTION INTRAVENOUS EVERY 4 HOURS PRN
Status: DISCONTINUED | OUTPATIENT
Start: 2022-07-24 | End: 2022-07-25

## 2022-07-24 RX ORDER — POLYETHYLENE GLYCOL 3350 17 G/17G
17 POWDER, FOR SOLUTION ORAL DAILY PRN
Status: DISCONTINUED | OUTPATIENT
Start: 2022-07-24 | End: 2022-08-01 | Stop reason: HOSPADM

## 2022-07-24 RX ORDER — SODIUM CHLORIDE 9 MG/ML
INJECTION, SOLUTION INTRAVENOUS PRN
Status: DISCONTINUED | OUTPATIENT
Start: 2022-07-24 | End: 2022-08-01 | Stop reason: HOSPADM

## 2022-07-24 RX ADMIN — MEXILETINE HYDROCHLORIDE 150 MG: 150 CAPSULE ORAL at 20:33

## 2022-07-24 RX ADMIN — DOXYCYCLINE 100 MG: 100 INJECTION, POWDER, LYOPHILIZED, FOR SOLUTION INTRAVENOUS at 20:37

## 2022-07-24 RX ADMIN — METOPROLOL SUCCINATE 25 MG: 25 TABLET, FILM COATED, EXTENDED RELEASE ORAL at 08:38

## 2022-07-24 RX ADMIN — DOXYCYCLINE 100 MG: 100 INJECTION, POWDER, LYOPHILIZED, FOR SOLUTION INTRAVENOUS at 08:46

## 2022-07-24 RX ADMIN — PHYTONADIONE 5 MG: 5 TABLET ORAL at 05:32

## 2022-07-24 RX ADMIN — PHYTONADIONE 5 MG: 10 INJECTION, EMULSION INTRAMUSCULAR; INTRAVENOUS; SUBCUTANEOUS at 02:30

## 2022-07-24 RX ADMIN — SODIUM CHLORIDE, PRESERVATIVE FREE 5 ML: 5 INJECTION INTRAVENOUS at 08:38

## 2022-07-24 RX ADMIN — MEXILETINE HYDROCHLORIDE 150 MG: 150 CAPSULE ORAL at 13:48

## 2022-07-24 RX ADMIN — SODIUM CHLORIDE 500 ML: 9 INJECTION, SOLUTION INTRAVENOUS at 00:20

## 2022-07-24 RX ADMIN — AMIODARONE HYDROCHLORIDE 400 MG: 200 TABLET ORAL at 08:39

## 2022-07-24 RX ADMIN — SODIUM CHLORIDE, PRESERVATIVE FREE 5 ML: 5 INJECTION INTRAVENOUS at 20:34

## 2022-07-24 RX ADMIN — ASPIRIN 81 MG: 81 TABLET, CHEWABLE ORAL at 08:39

## 2022-07-24 RX ADMIN — SODIUM CHLORIDE: 9 INJECTION, SOLUTION INTRAVENOUS at 07:37

## 2022-07-24 RX ADMIN — METOPROLOL SUCCINATE 25 MG: 25 TABLET, FILM COATED, EXTENDED RELEASE ORAL at 20:33

## 2022-07-24 RX ADMIN — QUETIAPINE FUMARATE 100 MG: 100 TABLET ORAL at 20:33

## 2022-07-24 RX ADMIN — AMIODARONE HYDROCHLORIDE 400 MG: 200 TABLET ORAL at 20:33

## 2022-07-24 RX ADMIN — GUAIFENESIN SYRUP AND DEXTROMETHORPHAN 5 ML: 100; 10 SYRUP ORAL at 02:51

## 2022-07-24 RX ADMIN — OXYCODONE 5 MG: 5 TABLET ORAL at 02:52

## 2022-07-24 RX ADMIN — MELATONIN 1000 UNITS: at 08:39

## 2022-07-24 RX ADMIN — MEXILETINE HYDROCHLORIDE 150 MG: 150 CAPSULE ORAL at 09:41

## 2022-07-24 ASSESSMENT — PAIN SCALES - GENERAL
PAINLEVEL_OUTOF10: 0
PAINLEVEL_OUTOF10: 5
PAINLEVEL_OUTOF10: 0

## 2022-07-24 ASSESSMENT — PAIN DESCRIPTION - ORIENTATION: ORIENTATION: LEFT

## 2022-07-24 ASSESSMENT — PAIN DESCRIPTION - LOCATION: LOCATION: FLANK

## 2022-07-24 ASSESSMENT — PAIN DESCRIPTION - DESCRIPTORS: DESCRIPTORS: ACHING

## 2022-07-24 NOTE — PLAN OF CARE
Problem: Discharge Planning  Goal: Discharge to home or other facility with appropriate resources  Outcome: Progressing  Flowsheets (Taken 7/24/2022 0225 by Digna Meade RN)  Discharge to home or other facility with appropriate resources: Identify barriers to discharge with patient and caregiver     Problem: Pain  Goal: Verbalizes/displays adequate comfort level or baseline comfort level  7/24/2022 1052 by Yuliya Flaherty RN  Outcome: Progressing  7/24/2022 0751 by Radha Geiger RN  Outcome: Progressing     Problem: Safety - Adult  Goal: Free from fall injury  Outcome: Progressing  Flowsheets (Taken 7/24/2022 0745)  Free From Fall Injury: Instruct family/caregiver on patient safety     Problem: ABCDS Injury Assessment  Goal: Absence of physical injury  Outcome: Progressing  Flowsheets  Taken 7/24/2022 0540 by Digna Meade RN  Absence of Physical Injury: Implement safety measures based on patient assessment  Taken 7/24/2022 0225 by Digna Meade RN  Absence of Physical Injury: Implement safety measures based on patient assessment

## 2022-07-24 NOTE — H&P
will f/u repeat, Hold coumadin due to bleeding    8.) Hematuria - + hydroureter, ? Stone, ? Mass, urology eval    9.) Anemia/ABLA - see above, ck Fe studies, stool, PPI    10.) HTN - BP ok,     11.) HL    12.) Depression    13.) Coagulopathy - I just received lab results after the admit and at 12:00 am of INR > 5 from Dr. Radha Jimenez. Pt without overt bleeding at the present. Will give Vit K but reserve Kcentra unless acute bleeding resumes. Ck H+H q6h. F/u INR and use reversal judiciously given large protruding recent apical thrombus. 14.) Debility      Plan:    Admit/See orders  Ck Renal US  Ck FENa  Urology eval  Hold coumadin  BC/UC  Give Vit K  Ck H+H q6h  F/u PT/INR  Start IV abx  Ck am labs      Pt is critically ill and the critical care time for the care of this pt was approximately 40 min. Dispo/Discharge Planning:     Home     Diet: No diet orders on file  VTE ppx: None due to bleeding  Code status: Prior    Hospital Problems:  Principal Problem:    Acute kidney injury (BEN) with acute tubular necrosis (ATN) (HCC)  Resolved Problems:    * No resolved hospital problems.  *       Past History:     Past Medical History:   Diagnosis Date    Acute hypoxemic respiratory failure due to COVID-19 ([address] 10/9/2021    CAD (coronary artery disease)     heart attack 2005 stentS HEART    CHF (congestive heart failure) ([address] 9/27/2011    Chronic systolic heart failure ([address] 10/2/2015    Coronary atherosclerosis of native coronary vessel 10/2/2015    Hyperlipidemia 10/2/2015    Hypoxemia requiring supplemental oxygen 10/6/2021    IGT (impaired glucose tolerance) 12/3/2017    Other ill-defined conditions(799.89)      blind left eye    Other ill-defined conditions(799.89)     cardiomyopathy    Pneumonia due to COVID-19 virus 11/13/2021    Resolved    Primary insomnia 6/7/2017    Psychiatric disorder     depression     Sepsis, unspecified 4/5/2011    Thromboembolus ([address]     thrombus in heart     Thrombus 10/2/2015 Past Surgical History:   Procedure Laterality Date    CARDIAC CATHETERIZATION      5 stents total    CARDIAC DEFIBRILLATOR PLACEMENT      [employer]    CARDIAC PROCEDURE N/A 2022    LEFT HEART CATH / CORONARY ANGIOGRAPHY performed by Michael Elias MD at [address] [place] [address] CATH LAB    COLONOSCOPY  2010    CORONARY ARTERY BYPASS GRAFT N/A 2022    CORONARY ARTERY BYPASS GRAFT (CABG X 3), [place] ; ENDOSCOPIC VEIN HARVEST, LEFT GREATER SAPHENOUS VEIN performed by Viki Barcenas MD at [facility] MAIN OR    HEENT      oral surgery    PACEMAKER      ID CARDIAC SURG PROCEDURE UNLIST       1 stent     TRANSESOPHAGEAL ECHOCARDIOGRAM N/A 2022    TRANSESOPHAGEAL ECHOCARDIOGRAM performed by Viki Barcenas MD at [facility] MAIN OR      Social History     Tobacco Use    Smoking status: Former     Packs/day: 1.00     Types: Cigarettes     Start date: 1978     Quit date: 2022     Years since quittin.1    Smokeless tobacco: Never   Substance Use Topics    Alcohol use: No      Social History     Substance and Sexual Activity   Drug Use No    Types: Prescription     Family History   Problem Relation Age of Onset    Heart Disease Mother     Diabetes Paternal Grandfather     Cancer Paternal Grandmother     Heart Disease Brother     Diabetes Maternal Grandmother     Bleeding Prob Father     Diabetes Mother     Heart Disease Paternal Grandfather     Heart Attack Mother 67        mi      Family history reviewed and negative except as noted above.       Immunization History   Administered Date(s) Administered    PPD Test 2022    Pneumococcal Polysaccharide (Xhemkkbsb19) 2018    Tdap (Boostrix, Adacel) 2017     Allergies   Allergen Reactions    Penicillins Swelling     Face swelling    Atorvastatin Other (See Comments)     itching    Evolocumab Other (See Comments)     Itching    Lisinopril Other (See Comments)    Rosuvastatin Other (See Comments)     itching     Prior to Admit Medications:  Current Outpatient Medications   Medication Instructions    acetaminophen (TYLENOL) 650 mg, Oral, EVERY 6 HOURS PRN    amiodarone (PACERONE) 400 mg, Oral, 2 TIMES DAILY    aspirin 81 mg, Oral, DAILY    Cetirizine HCl 10 MG CAPS Oral, PRN    metoprolol succinate (TOPROL XL) 25 mg, Oral, 2 times daily    mexiletine (MEXITIL) 150 mg, Oral, 3 TIMES DAILY    Multiple Vitamins-Minerals (THERAPEUTIC MULTIVITAMIN-MINERALS) tablet 1 tablet, Oral, DAILY    nitroGLYCERIN (NITROSTAT) 0.4 mg, SubLINGual, EVERY 5 MIN PRN, up to max of 3 total doses. If no relief after 1 dose, call 911. QUEtiapine (SEROQUEL) 100 mg, Oral, NIGHTLY    Vitamin D (CHOLECALCIFEROL) 1,000 Units, Oral, DAILY    warfarin (COUMADIN) 5 MG tablet Take 1 to 1 1/2 tablet daily or as directed         Objective:   Patient Vitals for the past 24 hrs:   Temp Pulse Resp BP SpO2   07/23/22 1920 98.8 °F (37.1 °C) 86 22 110/71 96 %       Oxygen Therapy  SpO2: 96 %  Pulse Oximeter Device Mode: Intermittent  O2 Device: None (Room air)    Estimated body mass index is 29.98 kg/m² as calculated from the following:    Height as of this encounter: 5' 9\" (1.753 m). Weight as of this encounter: 203 lb (92.1 kg). No intake or output data in the 24 hours ending 07/23/22 2302      Physical Exam:  Pt seen and evaluated. Blood pressure 110/71, pulse 86, temperature 98.8 °F (37.1 °C), temperature source Oral, resp. rate 22, height 5' 9\" (1.753 m), weight 203 lb (92.1 kg), SpO2 96 %. General:    Well nourished. Head:  Normocephalic, atraumatic  Eyes:  Sclerae appear normal.  Pupils equally round. ENT:  Nares appear normal, no drainage. Dry oral mucosa  Neck:  No restricted ROM. Trachea midline   CV:   RRR. No m/r/g.  + HJR, No jugular venous distension. Lungs:   Diminished   No wheezing, rhonchi, or rales. Symmetric expansion. Abdomen: Bowel sounds present. Soft, + R CVA tenderness nondistended. Extremities: No cyanosis or clubbing.   No (L) >60 ml/min/1.73m2    Calcium 8.1 (L) 8.3 - 10.4 MG/DL    Total Bilirubin 0.8 0.2 - 1.1 MG/DL    ALT 16 12 - 65 U/L    AST 34 15 - 37 U/L    Alk Phosphatase 80 50 - 136 U/L    Total Protein 6.6 6.3 - 8.2 g/dL    Albumin 2.9 (L) 3.2 - 4.6 g/dL    Globulin 3.7 (H) 2.3 - 3.5 g/dL    Albumin/Globulin Ratio 0.8 (L) 1.2 - 3.5     Magnesium    Collection Time: 07/23/22  7:39 PM   Result Value Ref Range    Magnesium 2.5 (H) 1.8 - 2.4 mg/dL   proBNP, N-TERMINAL    Collection Time: 07/23/22  7:39 PM   Result Value Ref Range    NT Pro-BNP 2,173 (H) 5 - 125 PG/ML   POCT Urinalysis no Micro    Collection Time: 07/23/22  7:43 PM   Result Value Ref Range    Specific Gravity, Urine, POC 1.025 (H) 1.001 - 1.023      pH, Urine, POC 5.5 5.0 - 9.0      Protein, Urine, POC >300 (A) NEG mg/dL    Glucose, UA POC Negative NEG mg/dL    KETONES, Urine, POC 15 (A) NEG mg/dL    Bilirubin, Urine, POC LARGE (A) NEG      Blood, UA POC LARGE (A) NEG      URINE UROBILINOGEN POC 2.0 (H) 0.2 - 1.0 EU/dL    Nitrate, Urine, POC Negative NEG      Leukocyte Est, UA POC LARGE (A) NEG      Performed by: Kiran Bennett    Urinalysis w rflx microscopic    Collection Time: 07/23/22  7:49 PM   Result Value Ref Range    Color, UA BROWN     Appearance TURBID      Specific Gravity, UA 1.027 (H) 1.001 - 1.023      pH, Urine 5.0 5.0 - 9.0      Protein,  (A) NEG mg/dL    Glucose, UA Negative mg/dL    Ketones, Urine Negative NEG mg/dL    Bilirubin Urine MODERATE (A) NEG      Blood, Urine MODERATE (A) NEG      Urobilinogen, Urine 1.0 0.2 - 1.0 EU/dL    Nitrite, Urine Positive (A) NEG      Leukocyte Esterase, Urine MODERATE (A) NEG      WBC, UA >100 0 /hpf    RBC, UA >100 0 /hpf    BACTERIA, URINE 2+ (H) 0 /hpf    Casts GRANULAR 0 /lpf    OTHER OBSERVATIONS RESULTS VERIFIED MANUALLY         Other Studies:  XR CHEST PORTABLE    Result Date: 7/23/2022  EXAMINATION: XR CHEST PORTABLE 7/23/2022 7:45 PM ACCESSION NUMBER: [id number] COMPARISON: Chest radiograph dated 7/17/2022 INDICATION: Shortness of Breath TECHNIQUE: A single view of the chest was obtained. FINDINGS: Support Devices: *  Left subclavian approach pacer/AICD lead overlies the right heart. Cardiac Silhouette: Mildly enlarged, stable. Mediastinum: Median sternotomy wires. Post CABG changes. Lungs: Slightly increased retrocardiac airspace opacities. Small left pleural effusion. Trace right pleural effusion. No pneumothorax. Upper Abdomen: Normal Miscellaneous: No fracture or suspicious osseous lesion. Mildly increased left lower lobe airspace opacities with small bilateral pleural effusions. Differential considerations include atelectasis, pneumonia, or asymmetric pulmonary edema. CT ABDOMEN PELVIS RENAL STONE Additional Contrast? None    Result Date: 7/23/2022  Clinical history: Left flank pain. Suprapubic pain. Hematuria. TECHNIQUE: Axial, coronal and sagittal CT of the abdomen/pelvis without IV contrast. Radiation dose reduction techniques were used for this study:  Our CT scanners use one or all of the following: Automated exposure control, adjustment of the mA and/or kVp according to patient's size, iterative reconstruction. Comparison: None FINDINGS: There is consolidation in the visualized left lung base. There is small right pleural effusion. Abdomen findings: Absence of IV contrast limits sensitivity. There is mild right hydronephrosis and hydroureter. High density material in the right renal collecting system. No definite ureteral stone is seen. Unenhanced left kidney is unremarkable. There is high density material within the gallbladder. No pericholecystic fluid or evidence of biliary ductal dilatation. The spleen and visualized liver are normal in size. There is no peripancreatic fluid collection. Unenhanced adrenal glands are unremarkable. Abdominal aorta is normal in course and caliber with atherosclerotic calcification. Stomach is normal in contour.  Small bowel loops are normal in caliber. No small bowel obstruction. No evidence of lymphadenopathy. Pelvic findings: Urinary bladder is normal in contour. Colon is normal in course and caliber. There is no evidence of appendicitis. There is no free air or free fluid. Surrounding bones are intact. 1. Mild right hydronephrosis and proximal hydroureter. No obstructive etiology or definite ureteral calculus is identified on this exam. There is high density material within the right renal collecting system. Findings raise the question of blood products, although neoplastic process is not entirely excluded. Follow-up recommended. 2. High density fluid in the gallbladder, could be secondary to biliary sludge. 3. No evidence of colitis, diverticulitis or bowel obstruction. 4. Consolidation/infiltrate in the visualized left lung base, captured at the periphery of the imaging field of view and not well evaluated. 5. Overall sensitivity is limited without the benefit of IV contrast.      Echocardiogram:  06/17/22    TRANSTHORACIC ECHOCARDIOGRAM (TTE) LIMITED (CONTRAST/BUBBLE/3D PRN) 06/18/2022  1:41 PM (Final)    Interpretation Summary  Formatting of this result is different from the original.      Left Ventricle: Moderately reduced left ventricular systolic function with a visually estimated EF of 25 - 30%. Moderate global hypokinesis present. Left Atrium: Left atrium is mildly dilated. Pericardium: Small (<1 cm) localized pericardial effusion present around the left ventricle.     Signed by: Jennifer Sheridan DO on 6/18/2022  1:41 PM      Meds previously ordered:  Orders Placed This Encounter   Medications    0.9 % sodium chloride bolus    0.9 % sodium chloride infusion    cefTRIAXone (ROCEPHIN) 1000 mg IVPB in NS 50ml minibag     Order Specific Question:   Antimicrobial Indications     Answer:   Urinary Tract Infection       Blood Product Consent:  I have discussed with the patient the rationale for blood component transfusion; its benefits in treating or preventing fatigue, organ damage, or death; and its risk which includes mild transfusion reactions, rare risk of blood borne infection, or more serious but rare reactions. I have discussed the alternatives to transfusion, including the risk and consequences of not receiving transfusion. The patient had an opportunity to ask questions and had agreed to proceed with transfusion of blood components. Signed:      Aubrey Smith. Sandra Terry MD, [address]  Internal Medicine - Hospitalist    Part of this note may have been written by using a voice dictation software. The note has been proof read but may still contain some grammatical/other typographical errors.

## 2022-07-24 NOTE — ED NOTES
TRANSFER - OUT REPORT:    Verbal report given to RN on Ricky Casarez  being transferred to 6th floor for routine progression of patient care       Report consisted of patient's Situation, Background, Assessment and   Recommendations(SBAR). Information from the following report(s) ED SBAR was reviewed with the receiving nurse. Lines:   Peripheral IV 07/23/22 Left Antecubital (Active)        Opportunity for questions and clarification was provided.       Patient transported with:  Patient-specific medications from Pharmacy and Marvina Eyager [place], [place]  07/24/22 [id number]

## 2022-07-24 NOTE — PROGRESS NOTES
TRANSFER - IN REPORT:    Verbal report received from [id number] on Nir Parekh  being received from ED for routine progression of patient care      Report consisted of patient's Situation, Background, Assessment and   Recommendations(SBAR). Information from the following report(s) Nurse Handoff Report, Index, ED Encounter Summary, Intake/Output, and Neuro Assessment was reviewed with the receiving nurse. Opportunity for questions and clarification was provided. Assessment will be completed upon patient's arrival to unit and care assumed.

## 2022-07-24 NOTE — H&P
[facility] Cardiology Initial Cardiac Evaluation                 Date of  Admission: 7/23/2022  7:53 PM     Primary Care Physician: Renita Pelayo DO  Primary Cardiologist: Dr Lilly/Colleen  Referring Physician: Dr Ricarda Leahy  Attending Physician: Dr Krystian Spann    CC: Aissatou Stubbs is a 58 y.o. male admitted for Hypoxemia [R09.02]  Ureteral obstruction, right [N13.5]  Acute kidney injury ([facility] [address] [N17.9]  Poisoning by warfarin sodium, accidental or unintentional, initial encounter [T45.511A]  Urinary tract infection with hematuria, site unspecified [N39.0, R31.9]  Acute kidney injury (BEN) with acute tubular necrosis (ATN) ([address] [N17.0]. He has a h/o ICM w anterior STEMI in 2005 s/p [employer] dual chamber ICD implanted in 2011. Apical thrombus in 2011 on coumadin. Admitted 5-2022 w escalating episodes of VF w ICD shock. [facility] w multivessel disease, 5-31-22 CABG X 3 with LIMA to the LAD, reverse SVG to the Intermediate coronary, and reverse SVG to the PDA. D/C 6-6. Readmit 6-17 to 6-22 with VT storm treated w amio and Mexitil. Presented to the ER 7-23 w SOB, increased LE edema, weakness, L sided flank pain and hematuria w diarrhea. CT showed R hydroureter, in acute renal failure. No ICD shock. Admitted by hospitalist. , K 3.9, cr 1.7, HS trop 15, mag 2.5, WBC 13, hgb 8.5, platelets 974. EKG NSR w rate 74 w NSST/T wave changes. /71. PBNP 2173, CXR increased LL airspace opacity either PNA or uneven edema. INR greater than 5- coumadin held, given Vit K. Admitted, started on antibiotics, given gentle hydration. Repeat CXR no significant change. Pt not on remote tele. Today patient denies any CP, SOB, dizziness, palpitations, syncope, LE edema. Repeat INR today greater than 16.      Past cardiac work up:  ICM w anterior STEMI in 2005 s/p [employer] dual chamber ICD implanted in 2011 5-2022 w escalating episodes of VF w ICD shock- [facility] w multivessel disease  5-31-22 CABG X 3 with LIMA to the LAD, reverse SVG to the Intermediate coronary, and reverse SVG to the PDA  6-18-22 echo w EF 25-30% w moderate global hypokinesis, mild LAE     Soc: 1 ppd since 1995  FH: Mom w  MI at age 67    Patient Active Problem List   Diagnosis    Overweight (BMI 25.0-29. 9)    Coronary atherosclerosis of native coronary vessel    Benign prostatic hyperplasia with weak urinary stream    History of 2019 novel coronavirus disease (COVID-19)    Numbness of left foot    Primary insomnia    Chronic systolic congestive heart failure (HCC)    ICD (implantable cardioverter-defibrillator) in place    Seasonal allergic rhinitis due to pollen    Smoking greater than 20 pack years    Refused influenza vaccine    Chronic eczematous otitis externa of both ears    High risk medication use    Dyslipidemia    Intracardiac thrombus    Ventricular tachycardia ([address]    COVID-19 vaccination refused    Anemia    Chronic fatigue    Tobacco abuse    Statin intolerance    Defibrillator discharge    COVID-19    CAD, multiple vessel    S/P CABG x 3    Hypoxemia    Atelectasis    Acute kidney injury (BEN) with acute tubular necrosis (ATN) ([address]       Past Medical History:   Diagnosis Date    Acute hypoxemic respiratory failure due to COVID-19 ([address] 10/9/2021    CAD (coronary artery disease)     heart attack 2005 stentS HEART    CHF (congestive heart failure) ([address] 9/27/2011    Chronic systolic heart failure ([address] 10/2/2015    Coronary atherosclerosis of native coronary vessel 10/2/2015    Hyperlipidemia 10/2/2015    Hypoxemia requiring supplemental oxygen 10/6/2021    IGT (impaired glucose tolerance) 12/3/2017    Other ill-defined conditions(799.89)      blind left eye    Other ill-defined conditions(799.89)     cardiomyopathy    Pneumonia due to COVID-19 virus 11/13/2021    Resolved    Primary insomnia 6/7/2017    Psychiatric disorder     depression     Sepsis, unspecified 4/5/2011    Thromboembolus ([address]     thrombus in heart Thrombus 10/2/2015      Past Surgical History:   Procedure Laterality Date    CARDIAC CATHETERIZATION      5 stents total    CARDIAC DEFIBRILLATOR PLACEMENT      [employer]    CARDIAC PROCEDURE N/A 2022    LEFT HEART CATH / CORONARY ANGIOGRAPHY performed by Joseph Elizondo MD at [address] [place] [address] CATH LAB    COLONOSCOPY  2010    CORONARY ARTERY BYPASS GRAFT N/A 2022    CORONARY ARTERY BYPASS GRAFT (CABG X 3), LIMA ; ENDOSCOPIC VEIN HARVEST, LEFT GREATER SAPHENOUS VEIN performed by Tammie Rogers MD at [facility] MAIN OR    HEENT      oral surgery    PACEMAKER      MT CARDIAC SURG PROCEDURE UNLIST       1 stent     TRANSESOPHAGEAL ECHOCARDIOGRAM N/A 2022    TRANSESOPHAGEAL ECHOCARDIOGRAM performed by Tammie Rogers MD at [facility] MAIN OR     Allergies   Allergen Reactions    Penicillins Swelling     Face swelling    Atorvastatin Other (See Comments)     itching    Evolocumab Other (See Comments)     Itching    Lisinopril Other (See Comments)    Rosuvastatin Other (See Comments)     itching      Family History   Problem Relation Age of Onset    Heart Disease Mother     Diabetes Paternal Grandfather     Cancer Paternal Grandmother     Heart Disease Brother     Diabetes Maternal Grandmother     Bleeding Prob Father     Diabetes Mother     Heart Disease Paternal Grandfather     Heart Attack Mother 67        mi      Social History     Tobacco Use    Smoking status: Former     Packs/day: 1.00     Types: Cigarettes     Start date: 1978     Quit date: 2022     Years since quittin.1    Smokeless tobacco: Never   Substance Use Topics    Alcohol use: No        Current Facility-Administered Medications   Medication Dose Route Frequency    amiodarone (CORDARONE) tablet 400 mg  400 mg Oral BID    aspirin chewable tablet 81 mg  81 mg Oral Daily    metoprolol succinate (TOPROL XL) extended release tablet 25 mg  25 mg Oral BID    mexiletine (MEXITIL) capsule 150 mg  150 mg Oral TID    QUEtiapine (SEROQUEL) tablet 100 mg  100 mg Oral Nightly    Vitamin D (CHOLECALCIFEROL) tablet 1,000 Units  1,000 Units Oral Daily    sodium chloride flush 0.9 % injection 5-40 mL  5-40 mL IntraVENous 2 times per day    sodium chloride flush 0.9 % injection 5-40 mL  5-40 mL IntraVENous PRN    0.9 % sodium chloride infusion   IntraVENous PRN    ondansetron (ZOFRAN-ODT) disintegrating tablet 4 mg  4 mg Oral Q8H PRN    Or    ondansetron (ZOFRAN) injection 4 mg  4 mg IntraVENous Q6H PRN    polyethylene glycol (GLYCOLAX) packet 17 g  17 g Oral Daily PRN    acetaminophen (TYLENOL) tablet 650 mg  650 mg Oral Q6H PRN    Or    acetaminophen (TYLENOL) suppository 650 mg  650 mg Rectal Q6H PRN    cefTRIAXone (ROCEPHIN) 1000 mg IVPB in NS 50ml minibag  1,000 mg IntraVENous Q24H    HYDROcodone homatropine (HYCODAN) 5-1.5 MG/5ML solution 5 mL  5 mL Oral Q4H PRN    guaiFENesin-dextromethorphan (ROBITUSSIN DM) 100-10 MG/5ML syrup 5 mL  5 mL Oral Q4H PRN    oxyCODONE (ROXICODONE) immediate release tablet 5 mg  5 mg Oral Q4H PRN    morphine injection 4 mg  4 mg IntraVENous Q4H PRN    doxycycline (VIBRAMYCIN) 100 mg in sodium chloride 0.9 % 100 mL IVPB  100 mg IntraVENous Q12H    0.9 % sodium chloride infusion   IntraVENous Continuous       Review of Symptoms:  General: no weight change,  + weakness, fever or chills  Skin: no rashes, lumps, or other skin changes  HEENT: no headache, dizziness, lightheadedness, vision changes, hearing changes, tinnitus, vertigo, sinus pressure/pain, bleeding gums, sore throat, or hoarseness  Neck: no swollen glands, goiter, pain or stiffness  Respiratory: no cough, sputum, hemoptysis, no dyspnea, wheezing  Cardiovascular: + as per HPI  Gastrointestinal: no GERD, constipation, diarrhea, liver problems, or h/o GI bleed  Urinary: + hematuria  Peripheral Vascular: no claudication, leg cramps, prior DVTs, swelling of calves, legs, or feet, color change, or swelling with redness or tenderness  Musculoskeletal: no muscle or joint systolic (congestive) heart failure ([address]- EF 25-30% w moderate global hypokinesis s/p [employer] dual chamber ICD implanted in 2011  - monitor w gentle hydration  - cont BB, no ACE/ACE due to hypotension and ACE allergy of itching      CAP (community acquired pneumonia)  - antibiotics      Intracardiac thrombus  - hold coumadin w elevated INR      Ventricular tachycardia (HCC)  - improved, cont mexitil and amio       Thank you very much for this referral. We appreciate the opportunity to participate in this patient's care. We will follow along with above stated plan.     LEISA Hernandez, PA-C  Consulting MD: Bethanne Cabot

## 2022-07-24 NOTE — PROGRESS NOTES
Hospitalist Progress Note   Admit Date:  2022  7:53 PM   Name:  Nir Parekh   Age:  58 y.o. Sex:  male  :  1960   MRN:  [id number]   Room:  608/01    Presenting Complaint: Shortness of Breath and Flank Pain     Reason(s) for Admission: Hypoxemia [R09.02]  Ureteral obstruction, right [N13.5]  Acute kidney injury ([facility] [address] [N17.9]  Poisoning by warfarin sodium, accidental or unintentional, initial encounter [T45.511A]  Urinary tract infection with hematuria, site unspecified [N39.0, R31.9]  Acute kidney injury (BEN) with acute tubular necrosis (ATN) ([facility] [address] [N17.0]     Hospital Course & Interval History:   Nir Parekh is a 58 y.o. male with medical history of MV CAD s/p CABG 2 months ago, Severe LVSD EF 25-30%, Large protruding apical thrombus on [facility] [place]in, Tob abuse in past, Anemia, and recurrent VTACH s/p ICD on multiple anti-arrhythmics. The pt was in his USOH until he was admitted by cardio in  for recurrent Vtach. He had his ICD interrogated and it was ok. The pt has been on Amio and mexiletine since. The pt has also been on coumadin for large protruding apical thrombus. He apparently started having cough with purulent sputum, SOB and FELDMAN and chills. The pt also noted hematuria that started in the last 24 hours. The pt came to the ED and had a CT abd that shows R Hydroureter with BMP revealing BEN. The pt denies any CP, N, V, diarrhea or HA. He did admit to mid abd pain yesterday and c/o some LBP since the last 2 days. He is now seen for further eval at the [facility] request.     Subjective/24hr Events (22): Patient reports that he is comfortable. Explained reason for hospitalization to him regarding right hydroureter BEN as well as left lower lobe community-acquired pneumonia. Fortunately appears fairly stable from a cardiovascular standpoint with warfarin coagulopathy. Warfarin on hold and recommend continue to hold as may need urologic intervention.   Patient with coronary artery disease stable, chronic systolic heart failure and problems with ventricular tachycardia with ICD and on dual antiarrhythmics with amiodarone and Mexitil-patient denies any chest pain at rest, mild dyspnea improved with oxygen no significant productive cough at present time but cough. He denies shaking chills or fevers. Denies diarrhea. Review of Systems:  10 systems reviewed and negative except as noted in HPI. Assessment & Plan:      Principal Problem:    Acute kidney injury (BEN) with acute tubular necrosis (ATN) (HCC)  Right hydroureter--suspect obstructive process per admit H&P.  7/24-continue hydration-await urology opinion-keep off warfarin-current Coumadin coagulopathy-surgical intervention may be required. Acute CAP -   7/24-continue cephalosporin and doxycycline. Coagulopathy -   7/24--hold warfarin-Daily PT/INR-pharmacy to follow but for now leave off anticoagulation as may require urologic intervention. Additional problems:  CAD - s/p CABG-continue home meds. Chronic Severe LVSD EF 25%/CHF - appears clinically compensated  7/24-monitor closely with hydration. Appreciate cardiology follow-up and recommendations. Recurrent Vtach - s/p ICD, on Antiarrhythmic rx dual therapy with amiodarone and Mexitil-continue per cardiology.       apical thrombus -   7/24--we will resume anticoagulation when clinically appropriate      Anemia/ABLA - see above, ck Fe studies, stool, PPI  7/24--acute blood loss anemia suspected secondary to coagulopathy with hematuria     HTN -   7/24-continue home beta-blocker twice daily long-acting Toprol     Hyperlipidemia-continue home med     History depression-continue home meds denies suicidal thoughts or ideations           Dispo/Discharge Planning:    Home      Diet: No diet orders on file  VTE ppx: None due to bleeding  Code status: Prior            Hospital Problems:  Principal Problem:    Acute kidney injury (BEN) with acute tubular necrosis (ATN) ([facility])  Active Problems:    CAD, multiple vessel    Chronic systolic (congestive) heart failure ([facility] [address]    CAP (community acquired pneumonia)    Intracardiac thrombus    Ventricular tachycardia (HCC)  Resolved Problems:    * No resolved hospital problems. *      Objective:   Patient Vitals for the past 24 hrs:   Temp Pulse Resp BP SpO2   07/24/22 0745 99.3 °F (37.4 °C) 75 18 133/69 --   07/24/22 0428 99 °F (37.2 °C) 76 18 110/69 92 %   07/24/22 0136 99.9 °F (37.7 °C) 75 18 132/73 97 %   07/24/22 0106 -- 75 25 -- 96 %   07/24/22 0056 -- 72 21 129/71 95 %   07/24/22 0026 -- 74 25 117/65 96 %   07/23/22 2356 -- 75 25 119/67 95 %   07/23/22 2326 -- 76 26 -- 96 %   07/23/22 1920 98.8 °F (37.1 °C) 86 22 110/71 96 %       Oxygen Therapy  SpO2: 92 %  Pulse Oximeter Device Mode: Intermittent  O2 Device: None (Room air)    Estimated body mass index is 29.98 kg/m² as calculated from the following:    Height as of this encounter: 5' 9\" (1.753 m). Weight as of this encounter: 203 lb (92.1 kg). Intake/Output Summary (Last 24 hours) at 7/24/2022 1201  Last data filed at 7/24/2022 0917  Gross per 24 hour   Intake 300 ml   Output 100 ml   Net 200 ml         Physical Exam:     Blood pressure 133/69, pulse 75, temperature 99.3 °F (37.4 °C), temperature source Oral, resp. rate 18, height 5' 9\" (1.753 m), weight 203 lb (92.1 kg), SpO2 92 %. General-alert and oriented x3, cooperative and calm  Eyes-conjunctive are clear pupils equal round react to light extraocular muscles intact    Ears-no deformity-no drainage  Nares-no nasal deformity-no discharge-turbinates not significantly enlarged  Throat-oral mucosa moist, no erythema or exudate  Heart-rate controlled-no gross S3 gallop. No JVD or HJR. Lungs-clear auscultation palpation and percussion, symmetric excursion of the chest wall. No wheezing    Abdomen-soft, nontender, no gross percussible organomegaly. No gross distention.   Bowel sounds present all 4 quadrants    Extremities-no significant creased edema, moves all extremities symmetrically. Neurologic-cranial nerves II through XII grossly intact, no focal motor deficits grossly. No tremor    Psychiatric-no suicidal ideations or homicidal ideations. Denies depressed thoughts.     I have reviewed ordered lab tests and independently visualized imaging below:    Recent Labs:  Recent Results (from the past 48 hour(s))   EKG 12 Lead    Collection Time: 07/23/22  7:26 PM   Result Value Ref Range    Ventricular Rate 82 BPM    Atrial Rate 82 BPM    P-R Interval 200 ms    QRS Duration 98 ms    Q-T Interval 402 ms    QTc Calculation (Bazett) 469 ms    P Axis 49 degrees    R Axis 56 degrees    T Axis 92 degrees    Diagnosis Normal sinus rhythm    CBC with Auto Differential    Collection Time: 07/23/22  7:39 PM   Result Value Ref Range    WBC 14.5 (H) 4.3 - 11.1 K/uL    RBC 3.37 (L) 4.23 - 5.6 M/uL    Hemoglobin 9.2 (L) 13.6 - 17.2 g/dL    Hematocrit 29.6 (L) 41.1 - 50.3 %    MCV 87.8 79.6 - 97.8 FL    MCH 27.3 26.1 - 32.9 PG    MCHC 31.1 (L) 31.4 - 35.0 g/dL    RDW 16.4 (H) 11.9 - 14.6 %    Platelets 179 237 - 822 K/uL    MPV 10.0 9.4 - 12.3 FL    nRBC 0.00 0.0 - 0.2 K/uL    Differential Type AUTOMATED      Seg Neutrophils 78 43 - 78 %    Lymphocytes 11 (L) 13 - 44 %    Monocytes 9 4.0 - 12.0 %    Eosinophils % 1 0.5 - 7.8 %    Basophils 0 0.0 - 2.0 %    Immature Granulocytes 1 0.0 - 5.0 %    Segs Absolute 11.4 (H) 1.7 - 8.2 K/UL    Absolute Lymph # 1.6 0.5 - 4.6 K/UL    Absolute Mono # 1.3 0.1 - 1.3 K/UL    Absolute Eos # 0.1 0.0 - 0.8 K/UL    Basophils Absolute 0.1 0.0 - 0.2 K/UL    Absolute Immature Granulocyte 0.1 0.0 - 0.5 K/UL   Comprehensive Metabolic Panel    Collection Time: 07/23/22  7:39 PM   Result Value Ref Range    Sodium 136 136 - 145 mmol/L    Potassium 3.9 3.5 - 5.1 mmol/L    Chloride 105 98 - 107 mmol/L    CO2 24 21 - 32 mmol/L    Anion Gap 7 7 - 16 mmol/L    Glucose 149 (H) 65 - 100 mg/dL    BUN 33 (H) 8 - 23 MG/DL    Creatinine 2.30 (H) 0.8 - 1.5 MG/DL    GFR African American 37 (L) >60 ml/min/1.73m2    GFR Non- 31 (L) >60 ml/min/1.73m2    Calcium 8.1 (L) 8.3 - 10.4 MG/DL    Total Bilirubin 0.8 0.2 - 1.1 MG/DL    ALT 16 12 - 65 U/L    AST 34 15 - 37 U/L    Alk Phosphatase 80 50 - 136 U/L    Total Protein 6.6 6.3 - 8.2 g/dL    Albumin 2.9 (L) 3.2 - 4.6 g/dL    Globulin 3.7 (H) 2.3 - 3.5 g/dL    Albumin/Globulin Ratio 0.8 (L) 1.2 - 3.5     Magnesium    Collection Time: 07/23/22  7:39 PM   Result Value Ref Range    Magnesium 2.5 (H) 1.8 - 2.4 mg/dL   proBNP, N-TERMINAL    Collection Time: 07/23/22  7:39 PM   Result Value Ref Range    NT Pro-BNP 2,173 (H) 5 - 125 PG/ML   Procalcitonin    Collection Time: 07/23/22  7:39 PM   Result Value Ref Range    Procalcitonin 0.29 0.00 - 0.49 ng/mL   POCT Urinalysis no Micro    Collection Time: 07/23/22  7:43 PM   Result Value Ref Range    Specific Gravity, Urine, POC 1.025 (H) 1.001 - 1.023      pH, Urine, POC 5.5 5.0 - 9.0      Protein, Urine, POC >300 (A) NEG mg/dL    Glucose, UA POC Negative NEG mg/dL    KETONES, Urine, POC 15 (A) NEG mg/dL    Bilirubin, Urine, POC LARGE (A) NEG      Blood, UA POC LARGE (A) NEG      URINE UROBILINOGEN POC 2.0 (H) 0.2 - 1.0 EU/dL    Nitrate, Urine, POC Negative NEG      Leukocyte Est, UA POC LARGE (A) NEG      Performed by: Marjorie Linder    Urinalysis w rflx microscopic    Collection Time: 07/23/22  7:49 PM   Result Value Ref Range    Color, UA BROWN     Appearance TURBID      Specific Gravity, UA 1.027 (H) 1.001 - 1.023      pH, Urine 5.0 5.0 - 9.0      Protein,  (A) NEG mg/dL    Glucose, UA Negative mg/dL    Ketones, Urine Negative NEG mg/dL    Bilirubin Urine MODERATE (A) NEG      Blood, Urine MODERATE (A) NEG      Urobilinogen, Urine 1.0 0.2 - 1.0 EU/dL    Nitrite, Urine Positive (A) NEG      Leukocyte Esterase, Urine MODERATE (A) NEG      WBC, UA >100 0 /hpf    RBC, UA >100 0 /hpf    BACTERIA, URINE 2+ (H) 0 /hpf 29 - 35 pg   EKG 12 lead    Collection Time: 07/24/22  7:07 AM   Result Value Ref Range    Ventricular Rate 74 BPM    Atrial Rate 74 BPM    P-R Interval 202 ms    QRS Duration 100 ms    Q-T Interval 440 ms    QTc Calculation (Bazett) 488 ms    P Axis 41 degrees    R Axis 58 degrees    T Axis 85 degrees    Diagnosis Normal sinus rhythm    COVID-19, Rapid    Collection Time: 07/24/22  7:26 AM    Specimen: Nasopharyngeal   Result Value Ref Range    Source NASAL      SARS-CoV-2, Rapid Not detected NOTD     Troponin    Collection Time: 07/24/22  7:54 AM   Result Value Ref Range    Troponin, High Sensitivity 15.4 (H) 0 - 14 pg/mL   Lactic Acid    Collection Time: 07/24/22  7:54 AM   Result Value Ref Range    Lactic Acid, Plasma 0.8 0.4 - 2.0 MMOL/L   CBC with Auto Differential    Collection Time: 07/24/22  7:54 AM   Result Value Ref Range    WBC 13.3 (H) 4.3 - 11.1 K/uL    RBC 3.18 (L) 4.23 - 5.6 M/uL    Hemoglobin 8.5 (L) 13.6 - 17.2 g/dL    Hematocrit 28.7 (L) 41.1 - 50.3 %    MCV 90.3 79.6 - 97.8 FL    MCH 26.7 26.1 - 32.9 PG    MCHC 29.6 (L) 31.4 - 35.0 g/dL    RDW 16.4 (H) 11.9 - 14.6 %    Platelets 386 562 - 317 K/uL    MPV 10.1 9.4 - 12.3 FL    nRBC 0.00 0.0 - 0.2 K/uL    Differential Type AUTOMATED      Seg Neutrophils 75 43 - 78 %    Lymphocytes 12 (L) 13 - 44 %    Monocytes 11 4.0 - 12.0 %    Eosinophils % 1 0.5 - 7.8 %    Basophils 1 0.0 - 2.0 %    Immature Granulocytes 1 0.0 - 5.0 %    Segs Absolute 10.0 (H) 1.7 - 8.2 K/UL    Absolute Lymph # 1.6 0.5 - 4.6 K/UL    Absolute Mono # 1.4 (H) 0.1 - 1.3 K/UL    Absolute Eos # 0.2 0.0 - 0.8 K/UL    Basophils Absolute 0.1 0.0 - 0.2 K/UL    Absolute Immature Granulocyte 0.1 0.0 - 0.5 K/UL   Basic Metabolic Panel    Collection Time: 07/24/22  7:54 AM   Result Value Ref Range    Sodium 138 136 - 145 mmol/L    Potassium 3.9 3.5 - 5.1 mmol/L    Chloride 108 (H) 98 - 107 mmol/L    CO2 27 21 - 32 mmol/L    Anion Gap 3 (L) 7 - 16 mmol/L    Glucose 94 65 - 100 mg/dL    BUN 23 8 - 23 MG/DL    Creatinine 1.70 (H) 0.8 - 1.5 MG/DL    GFR  53 (L) >60 ml/min/1.73m2    GFR Non- 44 (L) >60 ml/min/1.73m2    Calcium 8.0 (L) 8.3 - 10.4 MG/DL       Other Studies:  XR CHEST PORTABLE   Final Result   No significant change. CT ABDOMEN PELVIS RENAL STONE Additional Contrast? None   Final Result      1. Mild right hydronephrosis and proximal hydroureter. No obstructive etiology   or definite ureteral calculus is identified on this exam. There is high density   material within the right renal collecting system. Findings raise the question   of blood products, although neoplastic process is not entirely excluded. Follow-up recommended. 2. High density fluid in the gallbladder, could be secondary to biliary sludge. 3. No evidence of colitis, diverticulitis or bowel obstruction. 4. Consolidation/infiltrate in the visualized left lung base, captured at the   periphery of the imaging field of view and not well evaluated. 5. Overall sensitivity is limited without the benefit of IV contrast.      XR CHEST PORTABLE   Final Result   Mildly increased left lower lobe airspace opacities with small bilateral pleural   effusions. Differential considerations include atelectasis, pneumonia, or   asymmetric pulmonary edema.             Vascular duplex lower extremity venous bilateral    (Results Pending)   US RETROPERITONEAL COMPLETE    (Results Pending)       Current Meds:  Current Facility-Administered Medications   Medication Dose Route Frequency    amiodarone (CORDARONE) tablet 400 mg  400 mg Oral BID    aspirin chewable tablet 81 mg  81 mg Oral Daily    metoprolol succinate (TOPROL XL) extended release tablet 25 mg  25 mg Oral BID    mexiletine (MEXITIL) capsule 150 mg  150 mg Oral TID    QUEtiapine (SEROQUEL) tablet 100 mg  100 mg Oral Nightly    Vitamin D (CHOLECALCIFEROL) tablet 1,000 Units  1,000 Units Oral Daily    sodium chloride flush 0.9 % injection 5-40 mL  5-40 mL IntraVENous 2 times per day    sodium chloride flush 0.9 % injection 5-40 mL  5-40 mL IntraVENous PRN    0.9 % sodium chloride infusion   IntraVENous PRN    ondansetron (ZOFRAN-ODT) disintegrating tablet 4 mg  4 mg Oral Q8H PRN    Or    ondansetron (ZOFRAN) injection 4 mg  4 mg IntraVENous Q6H PRN    polyethylene glycol (GLYCOLAX) packet 17 g  17 g Oral Daily PRN    acetaminophen (TYLENOL) tablet 650 mg  650 mg Oral Q6H PRN    Or    acetaminophen (TYLENOL) suppository 650 mg  650 mg Rectal Q6H PRN    cefTRIAXone (ROCEPHIN) 1000 mg IVPB in NS 50ml minibag  1,000 mg IntraVENous Q24H    HYDROcodone homatropine (HYCODAN) 5-1.5 MG/5ML solution 5 mL  5 mL Oral Q4H PRN    guaiFENesin-dextromethorphan (ROBITUSSIN DM) 100-10 MG/5ML syrup 5 mL  5 mL Oral Q4H PRN    oxyCODONE (ROXICODONE) immediate release tablet 5 mg  5 mg Oral Q4H PRN    morphine injection 4 mg  4 mg IntraVENous Q4H PRN    doxycycline (VIBRAMYCIN) 100 mg in sodium chloride 0.9 % 100 mL IVPB  100 mg IntraVENous Q12H    0.9 % sodium chloride infusion   IntraVENous Continuous       Signed:  Pepe Valerio DO    Part of this note may have been written by using a voice dictation software. The note has been proof read but may still contain some grammatical/other typographical errors.

## 2022-07-24 NOTE — CONSULTS
[facility] CARDIOLOGY PROGRESS NOTE           7/24/2022 10:18 AM    Admit Date: 7/23/2022      Subjective:   Patient was seen and examined in his room. Cardiology consult transcribed by Mrs Kash Sifuentes. I agree with her assessment and plan. ROS:  GEN:  No fever or chills  Cardiovascular:  As noted above:no CP or palpitations. Pulmonary:  As noted above:no SOB  Neuro:  No new focal motor or sensory loss    Objective:      Vitals:    07/24/22 0106 07/24/22 0136 07/24/22 0428 07/24/22 0745   BP:  132/73 110/69 133/69   Pulse: 75 75 76 75   Resp: 25 18 18 18   Temp:  99.9 °F (37.7 °C) 99 °F (37.2 °C) 99.3 °F (37.4 °C)   TempSrc:  Oral Oral Oral   SpO2: 96% 97% 92%    Weight:       Height:           Physical Exam:  General-No distress  Neck- supple, no JVD  CV- regular rate and rhythm no MRG  Lung- clear bilaterally  Abd- soft, nontender, nondistended  Ext- no edema bilaterally. Skin- warm and dry  Psychiatric:  Normal mood and affect. Neurologic:  Alert and oriented X 3      Data Review:   Recent Labs     07/23/22  1939 07/23/22  2200 07/24/22  0237 07/24/22  0754     --   --  138   K 3.9  --   --  3.9   MG 2.5*  --   --   --    BUN 33*  --   --  23   WBC 14.5*  --   --  13.3*   HGB 9.2*  --  8.4* 8.5*   HCT 29.6*  --  28.1* 28.7*     --   --  397   INR  --  >5.0* >16.2*  --        TELEMETRY:  n/a    Assessment/Plan:     Principal Problem:    Acute kidney injury (BEN) with acute tubular necrosis (ATN) (HCC):per primary team and Urology. Active Problems:    CAD, multiple vessel:Stable. .Continue current medications. (ASA,BB)    Chronic systolic (congestive) heart failure (HCC):Stable. Continue BB. Monitor volume status closely with hydration and avoid volume overload. Will follow along. Hx of allergy to ACEI in past.Distant hx of ICD. CAP (community acquired pneumonia);per Hospitalist.    Intracardiac thrombus:Resume Warfarin when INR permits.     Ventricular tachycardia (HCC):Stable. Continue Amiodarone and Mexitil.               Princess Garry MD  7/24/2022 10:18 AM

## 2022-07-24 NOTE — ED PROVIDER NOTES
[facility] Emergency Department Provider Note                   PCP:                Kiera Mora DO               Age: 58 y.o. Sex: male     No diagnosis found. DISPOSITION          MDM  Number of Diagnoses or Management Options  Diagnosis management comments: Flank pain, hematuria on a patient on Coumadin. Check CT for stone or tumor. No abnormal bleeding otherwise. Check INR. Regarding some shortness of breath, may be related to the heat exposure. Check chest x-ray for effusion or congestive failure or pneumonia. Patient says INR is been running high. I believe pulmonary embolism much less likely. Amount and/or Complexity of Data Reviewed  Clinical lab tests: ordered and reviewed  Tests in the radiology section of CPT®: ordered and reviewed  Tests in the medicine section of CPT®: reviewed and ordered  Review and summarize past medical records: yes Jeffory Hisrch artery bypass grafting 8 weeks ago. Has ICD in place. Congestive heart failure. Hospitalized for COVID earlier this year.)  Independent visualization of images, tracings, or specimens: yes    Risk of Complications, Morbidity, and/or Mortality  Presenting problems: moderate  Diagnostic procedures: minimal  Management options: low         Orders Placed This Encounter   Procedures    XR CHEST PORTABLE    CT ABDOMEN PELVIS RENAL STONE Additional Contrast? None    CBC with Auto Differential    Comprehensive Metabolic Panel    Magnesium    proBNP, N-TERMINAL    Urinalysis w rflx microscopic    Protime-INR    Continuous Pulse Oximetry    POCT Urine Dipstick    POCT Urinalysis no Micro    EKG 12 Lead        Chick Janet is a 58 y.o. male who presents to the Emergency Department with chief complaint of    Chief Complaint   Patient presents with    Shortness of Breath    Flank Pain      42-year-old male arrives with 2 complaints.   The first is shortness of breath with some dyspnea exertion of the last few days with patient's been walking outside. Slight cough. No fever or chills. No chest pain. Patient is also noticed some suprapubic pain for about 3 days. No diarrhea constipation or bleeding his bowels. He had the onset of left flank pain along with some hematuria this morning. No history of kidney stones. Patient has a history of coronary artery disease with stenting in the past.  He had bypass grafting 2 months ago. Also has implantable defibrillator. History of congestive heart failure. Had [facility] previously. No history of DVT or PE. No aggravating relieving factors regarding the flank pain or hematuria other than patient seems to have urinary urgency with somewhat decreased symptoms afterwards. Does not try any medications for this. The history is provided by the patient. Abdominal Pain  Pain location:  Suprapubic  Pain quality: aching and dull    Pain radiates to:  L flank  Pain severity:  Moderate  Onset quality:  Gradual  Duration:  3 days  Timing:  Constant  Progression:  Worsening  Chronicity:  New  Context: not sick contacts and not trauma    Worsened by:  Urination  Ineffective treatments:  None tried  Associated symptoms: chills, dysuria, hematuria and shortness of breath    Associated symptoms: no chest pain, no cough, no diarrhea, no fever, no nausea and no vomiting        Review of Systems   Constitutional:  Positive for chills. Negative for fever. Respiratory:  Positive for shortness of breath. Negative for cough and wheezing. Cardiovascular:  Negative for chest pain and palpitations. Gastrointestinal:  Positive for abdominal pain. Negative for diarrhea, nausea and vomiting. Genitourinary:  Positive for difficulty urinating, dysuria and hematuria. Musculoskeletal:  Positive for back pain. All other systems reviewed and are negative.     Past Medical History:   Diagnosis Date    Acute hypoxemic respiratory failure due to COVID-19 [facility]) 10/9/2021    CAD (coronary artery disease)     heart attack 2005 stentS HEART    CHF (congestive heart failure) ([facility] [address] 9/27/2011    Chronic systolic heart failure ([facility] [address] 10/2/2015    Coronary atherosclerosis of native coronary vessel 10/2/2015    Hyperlipidemia 10/2/2015    Hypoxemia requiring supplemental oxygen 10/6/2021    IGT (impaired glucose tolerance) 12/3/2017    Other ill-defined conditions(799.89)      blind left eye    Other ill-defined conditions(799.89)     cardiomyopathy    Pneumonia due to COVID-19 virus 11/13/2021    Resolved    Primary insomnia 6/7/2017    Psychiatric disorder     depression     Sepsis, unspecified 4/5/2011    Thromboembolus ([facility] [address]     thrombus in heart     Thrombus 10/2/2015        Past Surgical History:   Procedure Laterality Date    CARDIAC CATHETERIZATION      5 stents total    CARDIAC DEFIBRILLATOR PLACEMENT  2011    [address] N/A 5/27/2022    LEFT HEART CATH / CORONARY ANGIOGRAPHY performed by Yasmeen Monsivais MD at [address] [place] [address] CATH LAB    COLONOSCOPY  12/2010    CORONARY ARTERY BYPASS GRAFT N/A 5/31/2022    CORONARY ARTERY BYPASS GRAFT (CABG X 3), LIMA ; ENDOSCOPIC VEIN HARVEST, LEFT GREATER SAPHENOUS VEIN performed by Roberto Bee MD at [facility] MAIN OR    HEENT      oral surgery    PACEMAKER      ID CARDIAC SURG PROCEDURE UNLIST       1 stent 2005    TRANSESOPHAGEAL ECHOCARDIOGRAM N/A 5/31/2022    TRANSESOPHAGEAL ECHOCARDIOGRAM performed by Roberto Bee MD at [facility] MAIN OR        Family History   Problem Relation Age of Onset    Heart Disease Mother     Diabetes Paternal Grandfather     Cancer Paternal Grandmother     Heart Disease Brother     Diabetes Maternal Grandmother     Bleeding Prob Father     Diabetes Mother     Heart Disease Paternal Grandfather     Heart Attack Mother 67        mi        Social History     Socioeconomic History    Marital status:      Spouse name: None    Number of children: None    Years of education: None    Highest education level: None   Tobacco Use    Smoking status: Former Packs/day: 1.00     Types: Cigarettes     Start date: 1978     Quit date: 2022     Years since quittin.1    Smokeless tobacco: Never   Vaping Use    Vaping Use: Never used   Substance and Sexual Activity    Alcohol use: No    Drug use: No     Types: Prescription    Sexual activity: Not Currently         Penicillins, Atorvastatin, Evolocumab, Lisinopril, and Rosuvastatin     Previous Medications    ACETAMINOPHEN (TYLENOL) 325 MG TABLET    Take 2 tablets by mouth every 6 hours as needed for Pain    AMIODARONE (PACERONE) 400 MG TABLET    Take 1 tablet by mouth in the morning and 1 tablet before bedtime. ASPIRIN 81 MG CHEWABLE TABLET    Take 1 tablet by mouth daily    CETIRIZINE HCL 10 MG CAPS    Take by mouth as needed    METOPROLOL SUCCINATE (TOPROL XL) 25 MG EXTENDED RELEASE TABLET    Take 1 tablet by mouth in the morning and at bedtime    MEXILETINE (MEXITIL) 150 MG CAPSULE    Take 1 capsule by mouth in the morning and 1 capsule at noon and 1 capsule before bedtime. MULTIPLE VITAMINS-MINERALS (THERAPEUTIC MULTIVITAMIN-MINERALS) TABLET    Take 1 tablet by mouth daily    NITROGLYCERIN (NITROSTAT) 0.4 MG SL TABLET    Place 1 tablet under the tongue every 5 minutes as needed for Chest pain up to max of 3 total doses. If no relief after 1 dose, call 911. QUETIAPINE (SEROQUEL) 100 MG TABLET    Take 100 mg by mouth nightly     VITAMIN D (CHOLECALCIFEROL) 25 MCG (1000 UT) TABS TABLET    Take 1,000 Units by mouth daily    WARFARIN (COUMADIN) 5 MG TABLET    Take 1 to 1 1/2 tablet daily or as directed        Vitals signs and nursing note reviewed. Patient Vitals for the past 4 hrs:   Temp Pulse Resp BP SpO2   22 1920 98.8 °F (37.1 °C) 86 22 110/71 96 %          Physical Exam  Vitals and nursing note reviewed. Constitutional:       Appearance: He is not ill-appearing. HENT:      Head: Normocephalic and atraumatic.       Right Ear: External ear normal.      Left Ear: External ear normal. Mouth/Throat:      Mouth: Mucous membranes are moist.      Pharynx: Oropharynx is clear. Eyes:      General: No scleral icterus. Extraocular Movements: Extraocular movements intact. Pupils: Pupils are equal, round, and reactive to light. Cardiovascular:      Rate and Rhythm: Normal rate and regular rhythm. Pulmonary:      Effort: Pulmonary effort is normal.      Breath sounds: Normal breath sounds. Abdominal:      Palpations: Abdomen is soft. Tenderness: There is abdominal tenderness in the suprapubic area. There is left CVA tenderness. Musculoskeletal:         General: No swelling or tenderness. Cervical back: Normal range of motion and neck supple. Right lower leg: No edema. Left lower leg: No edema. Skin:     General: Skin is warm and dry. Neurological:      General: No focal deficit present. Mental Status: He is alert. Procedures      Labs Reviewed   CBC WITH AUTO DIFFERENTIAL - Abnormal; Notable for the following components:       Result Value    WBC 14.5 (*)     RBC 3.37 (*)     Hemoglobin 9.2 (*)     Hematocrit 29.6 (*)     MCHC 31.1 (*)     RDW 16.4 (*)     Lymphocytes 11 (*)     Segs Absolute 11.4 (*)     All other components within normal limits   POCT URINALYSIS DIPSTICK - Abnormal; Notable for the following components:    Specific Gravity, Urine, POC 1.025 (*)     Protein, Urine, POC >300 (*)     KETONES, Urine, POC 15 (*)     Bilirubin, Urine, POC LARGE (*)     Blood, UA POC LARGE (*)     URINE UROBILINOGEN POC 2.0 (*)     Leukocyte Est, UA POC LARGE (*)     All other components within normal limits   COMPREHENSIVE METABOLIC PANEL   MAGNESIUM   PROBNP, N-TERMINAL   URINALYSIS   PROTIME-INR        XR CHEST PORTABLE   Final Result   Mildly increased left lower lobe airspace opacities with small bilateral pleural   effusions. Differential considerations include atelectasis, pneumonia, or   asymmetric pulmonary edema.             CT ABDOMEN PELVIS RENAL STONE Additional Contrast? None    (Results Pending)                  Results Include:    Recent Results (from the past 24 hour(s))   EKG 12 Lead    Collection Time: 07/23/22  7:26 PM   Result Value Ref Range    Ventricular Rate 82 BPM    Atrial Rate 82 BPM    P-R Interval 200 ms    QRS Duration 98 ms    Q-T Interval 402 ms    QTc Calculation (Bazett) 469 ms    P Axis 49 degrees    R Axis 56 degrees    T Axis 92 degrees    Diagnosis Normal sinus rhythm    CBC with Auto Differential    Collection Time: 07/23/22  7:39 PM   Result Value Ref Range    WBC 14.5 (H) 4.3 - 11.1 K/uL    RBC 3.37 (L) 4.23 - 5.6 M/uL    Hemoglobin 9.2 (L) 13.6 - 17.2 g/dL    Hematocrit 29.6 (L) 41.1 - 50.3 %    MCV 87.8 79.6 - 97.8 FL    MCH 27.3 26.1 - 32.9 PG    MCHC 31.1 (L) 31.4 - 35.0 g/dL    RDW 16.4 (H) 11.9 - 14.6 %    Platelets 011 192 - 092 K/uL    MPV 10.0 9.4 - 12.3 FL    nRBC 0.00 0.0 - 0.2 K/uL    Differential Type AUTOMATED      Seg Neutrophils 78 43 - 78 %    Lymphocytes 11 (L) 13 - 44 %    Monocytes 9 4.0 - 12.0 %    Eosinophils % 1 0.5 - 7.8 %    Basophils 0 0.0 - 2.0 %    Immature Granulocytes 1 0.0 - 5.0 %    Segs Absolute 11.4 (H) 1.7 - 8.2 K/UL    Absolute Lymph # 1.6 0.5 - 4.6 K/UL    Absolute Mono # 1.3 0.1 - 1.3 K/UL    Absolute Eos # 0.1 0.0 - 0.8 K/UL    Basophils Absolute 0.1 0.0 - 0.2 K/UL    Absolute Immature Granulocyte 0.1 0.0 - 0.5 K/UL   Comprehensive Metabolic Panel    Collection Time: 07/23/22  7:39 PM   Result Value Ref Range    Sodium 136 136 - 145 mmol/L    Potassium 3.9 3.5 - 5.1 mmol/L    Chloride 105 98 - 107 mmol/L    CO2 24 21 - 32 mmol/L    Anion Gap 7 7 - 16 mmol/L    Glucose 149 (H) 65 - 100 mg/dL    BUN 33 (H) 8 - 23 MG/DL    Creatinine 2.30 (H) 0.8 - 1.5 MG/DL    GFR African American 37 (L) >60 ml/min/1.73m2    GFR Non- 31 (L) >60 ml/min/1.73m2    Calcium 8.1 (L) 8.3 - 10.4 MG/DL    Total Bilirubin 0.8 0.2 - 1.1 MG/DL    ALT 16 12 - 65 U/L    AST 34 15 - 37 U/L    Alk Phosphatase 80 50 - 136 U/L    Total Protein 6.6 6.3 - 8.2 g/dL    Albumin 2.9 (L) 3.2 - 4.6 g/dL    Globulin 3.7 (H) 2.3 - 3.5 g/dL    Albumin/Globulin Ratio 0.8 (L) 1.2 - 3.5     Magnesium    Collection Time: 07/23/22  7:39 PM   Result Value Ref Range    Magnesium 2.5 (H) 1.8 - 2.4 mg/dL   proBNP, N-TERMINAL    Collection Time: 07/23/22  7:39 PM   Result Value Ref Range    NT Pro-BNP 2,173 (H) 5 - 125 PG/ML   POCT Urinalysis no Micro    Collection Time: 07/23/22  7:43 PM   Result Value Ref Range    Specific Gravity, Urine, POC 1.025 (H) 1.001 - 1.023      pH, Urine, POC 5.5 5.0 - 9.0      Protein, Urine, POC >300 (A) NEG mg/dL    Glucose, UA POC Negative NEG mg/dL    KETONES, Urine, POC 15 (A) NEG mg/dL    Bilirubin, Urine, POC LARGE (A) NEG      Blood, UA POC LARGE (A) NEG      URINE UROBILINOGEN POC 2.0 (H) 0.2 - 1.0 EU/dL    Nitrate, Urine, POC Negative NEG      Leukocyte Est, UA POC LARGE (A) NEG      Performed by: Tianna Cervantes    Urinalysis w rflx microscopic    Collection Time: 07/23/22  7:49 PM   Result Value Ref Range    Color, UA BROWN     Appearance TURBID      Specific Gravity, UA 1.027 (H) 1.001 - 1.023      pH, Urine 5.0 5.0 - 9.0      Protein,  (A) NEG mg/dL    Glucose, UA Negative mg/dL    Ketones, Urine Negative NEG mg/dL    Bilirubin Urine MODERATE (A) NEG      Blood, Urine MODERATE (A) NEG      Urobilinogen, Urine 1.0 0.2 - 1.0 EU/dL    Nitrite, Urine Positive (A) NEG      Leukocyte Esterase, Urine MODERATE (A) NEG      WBC, UA >100 0 /hpf    RBC, UA >100 0 /hpf    BACTERIA, URINE 2+ (H) 0 /hpf    Casts GRANULAR 0 /lpf    OTHER OBSERVATIONS RESULTS VERIFIED MANUALLY       XR CHEST (2 VW)    Result Date: 7/17/2022  Chest 2 view CLINICAL INDICATION: Previous cardiac surgery about 2 months ago presents with subacute worsening moderate substernal chest pain COMPARISON: 7/1/2022, 6/2/2022 TECHNIQUE: Upright PA  and lateral views of the chest FINDINGS: Lung volumes are well inflated.    Stable mediastinal and hilar contours, postoperative appearance of the heart, left side pacemaker/ICD. No pneumothorax, dense lobar consolidation, or increasing effusion. Small persisting bibasilar pleural effusions. Mild bilateral pulmonary vascular congestion. Mild subsegmental atelectasis at both bases. No acute osseous abnormalities are seen. 1. Persisting small bilateral pleural effusions. Mild pulmonary vascular congestion. 2. No consolidation. 3. Stable postoperative changes of the mediastinum. XR CHEST PORTABLE    Result Date: 7/23/2022  EXAMINATION: XR CHEST PORTABLE 7/23/2022 7:45 PM ACCESSION NUMBER: [id number] COMPARISON: Chest radiograph dated 7/17/2022 INDICATION: Shortness of Breath TECHNIQUE: A single view of the chest was obtained. FINDINGS: Support Devices: *  Left subclavian approach pacer/AICD lead overlies the right heart. Cardiac Silhouette: Mildly enlarged, stable. Mediastinum: Median sternotomy wires. Post CABG changes. Lungs: Slightly increased retrocardiac airspace opacities. Small left pleural effusion. Trace right pleural effusion. No pneumothorax. Upper Abdomen: Normal Miscellaneous: No fracture or suspicious osseous lesion. Mildly increased left lower lobe airspace opacities with small bilateral pleural effusions. Differential considerations include atelectasis, pneumonia, or asymmetric pulmonary edema. XR CHEST PORTABLE    Result Date: 7/2/2022  Portable chest xray  COMPARISON: June 2, 2022 CLINICAL HISTORY: Chest pain. FINDINGS: Suggestion of small bilateral pleural effusions. No evidence of pulmonary edema. No pneumothorax or pulmonary consolidation. Heart is enlarged. Mediastinal contour is within normal limits. Left-sided cardiac pacer and midline sternotomy wires are stable. Surrounding bones are intact. 1. Suggestion of small bilateral pleural effusions. No evidence of pulmonary edema.     CT ABDOMEN PELVIS RENAL STONE Additional Contrast? None    Result Date: 7/23/2022  Clinical history: Left flank pain. Suprapubic pain. Hematuria. TECHNIQUE: Axial, coronal and sagittal CT of the abdomen/pelvis without IV contrast. Radiation dose reduction techniques were used for this study:  Our CT scanners use one or all of the following: Automated exposure control, adjustment of the mA and/or kVp according to patient's size, iterative reconstruction. Comparison: None FINDINGS: There is consolidation in the visualized left lung base. There is small right pleural effusion. Abdomen findings: Absence of IV contrast limits sensitivity. There is mild right hydronephrosis and hydroureter. High density material in the right renal collecting system. No definite ureteral stone is seen. Unenhanced left kidney is unremarkable. There is high density material within the gallbladder. No pericholecystic fluid or evidence of biliary ductal dilatation. The spleen and visualized liver are normal in size. There is no peripancreatic fluid collection. Unenhanced adrenal glands are unremarkable. Abdominal aorta is normal in course and caliber with atherosclerotic calcification. Stomach is normal in contour. Small bowel loops are normal in caliber. No small bowel obstruction. No evidence of lymphadenopathy. Pelvic findings: Urinary bladder is normal in contour. Colon is normal in course and caliber. There is no evidence of appendicitis. There is no free air or free fluid. Surrounding bones are intact. 1. Mild right hydronephrosis and proximal hydroureter. No obstructive etiology or definite ureteral calculus is identified on this exam. There is high density material within the right renal collecting system. Findings raise the question of blood products, although neoplastic process is not entirely excluded. Follow-up recommended. 2. High density fluid in the gallbladder, could be secondary to biliary sludge. 3. No evidence of colitis, diverticulitis or bowel obstruction.  4. Consolidation/infiltrate in the visualized left lung base, captured at the periphery of the imaging field of view and not well evaluated. 5. Overall sensitivity is limited without the benefit of IV contrast.   Some urinary tract blockage along with urinary tract infection. Will give IV antibiotics. Start on fluids. We will notify hospitalist regarding admission.    ===================================================================  This patient is critically ill and there is a high probability of of imminent or life threatening deterioration in the patient's condition without immediate management. The nature of the patient's clinical problem is: Sepsis, urinary tract infection, urinary tract obstruction    I have spent 45 minutes in direct patient care, documentation, review of labs/xrays/old records, discussion with Family, Colleague . The time involved in the performance of separately reportable procedures was not counted toward critical care time. Carline Eckert MD; 7/23/2022 @10:05 PM  ===================================================================              Voice dictation software was used during the making of this note. This software is not perfect and grammatical and other typographical errors may be present. This note has not been completely proofread for errors.      Carline Eckert MD  07/23/22 [id number]       Carline Eckert MD  07/23/22 0571

## 2022-07-25 ENCOUNTER — ANESTHESIA EVENT (OUTPATIENT)
Dept: MEDSURG UNIT | Age: 62
DRG: 659 | End: 2022-07-25
Payer: MEDICARE

## 2022-07-25 ENCOUNTER — APPOINTMENT (OUTPATIENT)
Dept: CT IMAGING | Age: 62
DRG: 659 | End: 2022-07-25
Payer: MEDICARE

## 2022-07-25 PROBLEM — N13.30 HYDRONEPHROSIS: Status: ACTIVE | Noted: 2022-07-23

## 2022-07-25 PROBLEM — N17.9 ACUTE KIDNEY INJURY (HCC): Status: ACTIVE | Noted: 2022-07-25

## 2022-07-25 LAB
ALBUMIN SERPL-MCNC: 2.2 G/DL (ref 3.2–4.6)
ALBUMIN/GLOB SERPL: 0.7 {RATIO} (ref 1.2–3.5)
ALP SERPL-CCNC: 64 U/L (ref 50–136)
ALT SERPL-CCNC: 13 U/L (ref 12–65)
ANION GAP SERPL CALC-SCNC: 8 MMOL/L (ref 7–16)
APTT PPP: 43 SEC (ref 24.1–35.1)
AST SERPL-CCNC: 24 U/L (ref 15–37)
BASOPHILS # BLD: 0.1 K/UL (ref 0–0.2)
BASOPHILS NFR BLD: 1 % (ref 0–2)
BILIRUB SERPL-MCNC: 0.4 MG/DL (ref 0.2–1.1)
BUN SERPL-MCNC: 21 MG/DL (ref 8–23)
CALCIUM SERPL-MCNC: 7.9 MG/DL (ref 8.3–10.4)
CHLORIDE SERPL-SCNC: 110 MMOL/L (ref 98–107)
CO2 SERPL-SCNC: 24 MMOL/L (ref 21–32)
CREAT SERPL-MCNC: 1.5 MG/DL (ref 0.8–1.5)
DIFFERENTIAL METHOD BLD: ABNORMAL
EOSINOPHIL # BLD: 0.3 K/UL (ref 0–0.8)
EOSINOPHIL NFR BLD: 3 % (ref 0.5–7.8)
ERYTHROCYTE [DISTWIDTH] IN BLOOD BY AUTOMATED COUNT: 16.6 % (ref 11.9–14.6)
GLOBULIN SER CALC-MCNC: 3.2 G/DL (ref 2.3–3.5)
GLUCOSE SERPL-MCNC: 82 MG/DL (ref 65–100)
HCT VFR BLD AUTO: 25.1 % (ref 41.1–50.3)
HCT VFR BLD AUTO: 28.4 % (ref 41.1–50.3)
HGB BLD-MCNC: 7.4 G/DL (ref 13.6–17.2)
HGB BLD-MCNC: 8.3 G/DL (ref 13.6–17.2)
IMM GRANULOCYTES # BLD AUTO: 0.1 K/UL (ref 0–0.5)
IMM GRANULOCYTES NFR BLD AUTO: 1 % (ref 0–5)
INR PPP: 1.6
LACTATE SERPL-SCNC: 1 MMOL/L (ref 0.4–2)
LYMPHOCYTES # BLD: 2 K/UL (ref 0.5–4.6)
LYMPHOCYTES NFR BLD: 20 % (ref 13–44)
MCH RBC QN AUTO: 26.9 PG (ref 26.1–32.9)
MCHC RBC AUTO-ENTMCNC: 29.5 G/DL (ref 31.4–35)
MCV RBC AUTO: 91.3 FL (ref 79.6–97.8)
MONOCYTES # BLD: 1.2 K/UL (ref 0.1–1.3)
MONOCYTES NFR BLD: 12 % (ref 4–12)
NEUTS SEG # BLD: 6.3 K/UL (ref 1.7–8.2)
NEUTS SEG NFR BLD: 63 % (ref 43–78)
NRBC # BLD: 0 K/UL (ref 0–0.2)
PLATELET # BLD AUTO: 385 K/UL (ref 150–450)
PMV BLD AUTO: 10 FL (ref 9.4–12.3)
POTASSIUM SERPL-SCNC: 3.6 MMOL/L (ref 3.5–5.1)
PROT SERPL-MCNC: 5.4 G/DL (ref 6.3–8.2)
PROTHROMBIN TIME: 19.7 SEC (ref 12.6–14.5)
RBC # BLD AUTO: 2.75 M/UL (ref 4.23–5.6)
SODIUM SERPL-SCNC: 142 MMOL/L (ref 136–145)
WBC # BLD AUTO: 9.9 K/UL (ref 4.3–11.1)

## 2022-07-25 PROCEDURE — 83605 ASSAY OF LACTIC ACID: CPT

## 2022-07-25 PROCEDURE — 97165 OT EVAL LOW COMPLEX 30 MIN: CPT

## 2022-07-25 PROCEDURE — 1100000000 HC RM PRIVATE

## 2022-07-25 PROCEDURE — 97161 PT EVAL LOW COMPLEX 20 MIN: CPT

## 2022-07-25 PROCEDURE — 80053 COMPREHEN METABOLIC PANEL: CPT

## 2022-07-25 PROCEDURE — 85025 COMPLETE CBC W/AUTO DIFF WBC: CPT

## 2022-07-25 PROCEDURE — 97530 THERAPEUTIC ACTIVITIES: CPT

## 2022-07-25 PROCEDURE — 6370000000 HC RX 637 (ALT 250 FOR IP): Performed by: INTERNAL MEDICINE

## 2022-07-25 PROCEDURE — 2500000003 HC RX 250 WO HCPCS: Performed by: INTERNAL MEDICINE

## 2022-07-25 PROCEDURE — 36415 COLL VENOUS BLD VENIPUNCTURE: CPT

## 2022-07-25 PROCEDURE — 2580000003 HC RX 258: Performed by: INTERNAL MEDICINE

## 2022-07-25 PROCEDURE — 6360000004 HC RX CONTRAST MEDICATION: Performed by: FAMILY MEDICINE

## 2022-07-25 PROCEDURE — 6360000002 HC RX W HCPCS: Performed by: INTERNAL MEDICINE

## 2022-07-25 PROCEDURE — 74178 CT ABD&PLV WO CNTR FLWD CNTR: CPT

## 2022-07-25 PROCEDURE — 99232 SBSQ HOSP IP/OBS MODERATE 35: CPT | Performed by: INTERNAL MEDICINE

## 2022-07-25 PROCEDURE — 85018 HEMOGLOBIN: CPT

## 2022-07-25 PROCEDURE — 85610 PROTHROMBIN TIME: CPT

## 2022-07-25 PROCEDURE — 97535 SELF CARE MNGMENT TRAINING: CPT

## 2022-07-25 PROCEDURE — 85730 THROMBOPLASTIN TIME PARTIAL: CPT

## 2022-07-25 RX ORDER — HYDROMORPHONE HYDROCHLORIDE 2 MG/ML
0.5 INJECTION, SOLUTION INTRAMUSCULAR; INTRAVENOUS; SUBCUTANEOUS EVERY 5 MIN PRN
Status: CANCELLED | OUTPATIENT
Start: 2022-07-25

## 2022-07-25 RX ORDER — LABETALOL HYDROCHLORIDE 5 MG/ML
10 INJECTION, SOLUTION INTRAVENOUS
Status: CANCELLED | OUTPATIENT
Start: 2022-07-25

## 2022-07-25 RX ORDER — MORPHINE SULFATE 4 MG/ML
2 INJECTION INTRAVENOUS EVERY 4 HOURS PRN
Status: DISCONTINUED | OUTPATIENT
Start: 2022-07-25 | End: 2022-08-01 | Stop reason: HOSPADM

## 2022-07-25 RX ORDER — OXYCODONE HYDROCHLORIDE 5 MG/1
10 TABLET ORAL PRN
Status: CANCELLED | OUTPATIENT
Start: 2022-07-25 | End: 2022-07-25

## 2022-07-25 RX ORDER — DIPHENHYDRAMINE HYDROCHLORIDE 50 MG/ML
12.5 INJECTION INTRAMUSCULAR; INTRAVENOUS
Status: CANCELLED | OUTPATIENT
Start: 2022-07-25 | End: 2022-07-25

## 2022-07-25 RX ORDER — HYDRALAZINE HYDROCHLORIDE 20 MG/ML
10 INJECTION INTRAMUSCULAR; INTRAVENOUS
Status: CANCELLED | OUTPATIENT
Start: 2022-07-25

## 2022-07-25 RX ORDER — OXYCODONE HYDROCHLORIDE 5 MG/1
5 TABLET ORAL PRN
Status: CANCELLED | OUTPATIENT
Start: 2022-07-25 | End: 2022-07-25

## 2022-07-25 RX ORDER — PROCHLORPERAZINE EDISYLATE 5 MG/ML
5 INJECTION INTRAMUSCULAR; INTRAVENOUS
Status: CANCELLED | OUTPATIENT
Start: 2022-07-25 | End: 2022-07-25

## 2022-07-25 RX ORDER — IPRATROPIUM BROMIDE AND ALBUTEROL SULFATE 2.5; .5 MG/3ML; MG/3ML
1 SOLUTION RESPIRATORY (INHALATION)
Status: CANCELLED | OUTPATIENT
Start: 2022-07-25 | End: 2022-07-25

## 2022-07-25 RX ORDER — HALOPERIDOL 5 MG/ML
1 INJECTION INTRAMUSCULAR
Status: CANCELLED | OUTPATIENT
Start: 2022-07-25 | End: 2022-07-25

## 2022-07-25 RX ADMIN — IOPAMIDOL 100 ML: 755 INJECTION, SOLUTION INTRAVENOUS at 11:22

## 2022-07-25 RX ADMIN — AMIODARONE HYDROCHLORIDE 400 MG: 200 TABLET ORAL at 21:04

## 2022-07-25 RX ADMIN — DOXYCYCLINE 100 MG: 100 INJECTION, POWDER, LYOPHILIZED, FOR SOLUTION INTRAVENOUS at 08:50

## 2022-07-25 RX ADMIN — MEXILETINE HYDROCHLORIDE 150 MG: 150 CAPSULE ORAL at 08:53

## 2022-07-25 RX ADMIN — METOPROLOL SUCCINATE 25 MG: 25 TABLET, FILM COATED, EXTENDED RELEASE ORAL at 21:04

## 2022-07-25 RX ADMIN — CEFTRIAXONE 1000 MG: 1 INJECTION, POWDER, FOR SOLUTION INTRAMUSCULAR; INTRAVENOUS at 00:00

## 2022-07-25 RX ADMIN — CEFTRIAXONE 1000 MG: 1 INJECTION, POWDER, FOR SOLUTION INTRAMUSCULAR; INTRAVENOUS at 23:31

## 2022-07-25 RX ADMIN — QUETIAPINE FUMARATE 100 MG: 100 TABLET ORAL at 21:04

## 2022-07-25 RX ADMIN — METOPROLOL SUCCINATE 25 MG: 25 TABLET, FILM COATED, EXTENDED RELEASE ORAL at 08:53

## 2022-07-25 RX ADMIN — MELATONIN 1000 UNITS: at 08:53

## 2022-07-25 RX ADMIN — DOXYCYCLINE 100 MG: 100 INJECTION, POWDER, LYOPHILIZED, FOR SOLUTION INTRAVENOUS at 21:05

## 2022-07-25 RX ADMIN — MEXILETINE HYDROCHLORIDE 150 MG: 150 CAPSULE ORAL at 13:09

## 2022-07-25 RX ADMIN — SODIUM CHLORIDE, PRESERVATIVE FREE 5 ML: 5 INJECTION INTRAVENOUS at 21:03

## 2022-07-25 RX ADMIN — AMIODARONE HYDROCHLORIDE 400 MG: 200 TABLET ORAL at 08:54

## 2022-07-25 RX ADMIN — SODIUM CHLORIDE, PRESERVATIVE FREE 5 ML: 5 INJECTION INTRAVENOUS at 08:55

## 2022-07-25 RX ADMIN — MEXILETINE HYDROCHLORIDE 150 MG: 150 CAPSULE ORAL at 21:04

## 2022-07-25 ASSESSMENT — PAIN DESCRIPTION - LOCATION: LOCATION: KNEE

## 2022-07-25 ASSESSMENT — PAIN DESCRIPTION - PAIN TYPE: TYPE: CHRONIC PAIN

## 2022-07-25 ASSESSMENT — PAIN SCALES - GENERAL
PAINLEVEL_OUTOF10: 0
PAINLEVEL_OUTOF10: 2

## 2022-07-25 ASSESSMENT — PAIN DESCRIPTION - ORIENTATION: ORIENTATION: LEFT

## 2022-07-25 ASSESSMENT — PAIN DESCRIPTION - DESCRIPTORS: DESCRIPTORS: ACHING;SORE

## 2022-07-25 NOTE — PROGRESS NOTES
Hospitalist Progress Note   Admit Date:  2022  7:53 PM   Name:  Jarad Jimenez   Age:  58 y.o. Sex:  male  :  1960   MRN:  [id number]   Room:  608/01    Presenting Complaint: Shortness of Breath and Flank Pain     Reason(s) for Admission: Hypoxemia [R09.02]  Ureteral obstruction, right [N13.5]  Acute kidney injury ([address] [N17.9]  Poisoning by warfarin sodium, accidental or unintentional, initial encounter [T45.511A]  Urinary tract infection with hematuria, site unspecified [N39.0, R31.9]  Acute kidney injury (BEN) with acute tubular necrosis (ATN) [facility]) [N17.0]     Hospital Course & Interval History:   Jarad Jimenez is a 58 y.o. CM with a PMH of MV CAD s/p CABG x2 months ago, severe LVSD EF 25-30%, Large protruding apical thrombus on chronic OAC with warfarin, tob abuse in past, anemia, and recurrent VTACH s/p ICD on multiple anti-arrhythmics. The pt was in his usual health until he was admitted by cardio in  for recurrent Vtach. He had his ICD interrogated and it was ok. The pt has been on amio and mexiletine since. The pt has also been on warfarin for a large protruding apical thrombus. He apparently started having a cough with purulent sputum, chills, SOB, and FELDMAN. The pt also noted hematuria that started in the last 24 hours. The pt came to the ER and had a CT abd that shows R hydroureter with BMP revealing BEN. The pt denies any CP, N, V, diarrhea or HA. He did admit to mid abd pain yesterday and c/o some LBP since the last 2 days. He was admitted to the Hospitalist service with consults placed to Cardiology and Urology. Subjective/24hr Events (22): The patient was seen this morning. He denies CP, he endorses occasional SOB. Cardiology recs appreciated. Urology to perform cystoscopy tomorrow. Supratherapeutic INR corrected; hold warfarin in preparation for cystoscopy. Look to resume post procedure.     Assessment & Plan:     Principal Problem:  Acute kidney injury R Axis 56 degrees    T Axis 92 degrees    Diagnosis Normal sinus rhythm    CBC with Auto Differential    Collection Time: 07/23/22  7:39 PM   Result Value Ref Range    WBC 14.5 (H) 4.3 - 11.1 K/uL    RBC 3.37 (L) 4.23 - 5.6 M/uL    Hemoglobin 9.2 (L) 13.6 - 17.2 g/dL    Hematocrit 29.6 (L) 41.1 - 50.3 %    MCV 87.8 79.6 - 97.8 FL    MCH 27.3 26.1 - 32.9 PG    MCHC 31.1 (L) 31.4 - 35.0 g/dL    RDW 16.4 (H) 11.9 - 14.6 %    Platelets 909 062 - 870 K/uL    MPV 10.0 9.4 - 12.3 FL    nRBC 0.00 0.0 - 0.2 K/uL    Differential Type AUTOMATED      Seg Neutrophils 78 43 - 78 %    Lymphocytes 11 (L) 13 - 44 %    Monocytes 9 4.0 - 12.0 %    Eosinophils % 1 0.5 - 7.8 %    Basophils 0 0.0 - 2.0 %    Immature Granulocytes 1 0.0 - 5.0 %    Segs Absolute 11.4 (H) 1.7 - 8.2 K/UL    Absolute Lymph # 1.6 0.5 - 4.6 K/UL    Absolute Mono # 1.3 0.1 - 1.3 K/UL    Absolute Eos # 0.1 0.0 - 0.8 K/UL    Basophils Absolute 0.1 0.0 - 0.2 K/UL    Absolute Immature Granulocyte 0.1 0.0 - 0.5 K/UL   Comprehensive Metabolic Panel    Collection Time: 07/23/22  7:39 PM   Result Value Ref Range    Sodium 136 136 - 145 mmol/L    Potassium 3.9 3.5 - 5.1 mmol/L    Chloride 105 98 - 107 mmol/L    CO2 24 21 - 32 mmol/L    Anion Gap 7 7 - 16 mmol/L    Glucose 149 (H) 65 - 100 mg/dL    BUN 33 (H) 8 - 23 MG/DL    Creatinine 2.30 (H) 0.8 - 1.5 MG/DL    GFR African American 37 (L) >60 ml/min/1.73m2    GFR Non- 31 (L) >60 ml/min/1.73m2    Calcium 8.1 (L) 8.3 - 10.4 MG/DL    Total Bilirubin 0.8 0.2 - 1.1 MG/DL    ALT 16 12 - 65 U/L    AST 34 15 - 37 U/L    Alk Phosphatase 80 50 - 136 U/L    Total Protein 6.6 6.3 - 8.2 g/dL    Albumin 2.9 (L) 3.2 - 4.6 g/dL    Globulin 3.7 (H) 2.3 - 3.5 g/dL    Albumin/Globulin Ratio 0.8 (L) 1.2 - 3.5     Magnesium    Collection Time: 07/23/22  7:39 PM   Result Value Ref Range    Magnesium 2.5 (H) 1.8 - 2.4 mg/dL   proBNP, N-TERMINAL    Collection Time: 07/23/22  7:39 PM   Result Value Ref Range    NT Pro-BNP 2,173 (H) 5 - 125 PG/ML   Procalcitonin    Collection Time: 07/23/22  7:39 PM   Result Value Ref Range    Procalcitonin 0.29 0.00 - 0.49 ng/mL   POCT Urinalysis no Micro    Collection Time: 07/23/22  7:43 PM   Result Value Ref Range    Specific Gravity, Urine, POC 1.025 (H) 1.001 - 1.023      pH, Urine, POC 5.5 5.0 - 9.0      Protein, Urine, POC >300 (A) NEG mg/dL    Glucose, UA POC Negative NEG mg/dL    KETONES, Urine, POC 15 (A) NEG mg/dL    Bilirubin, Urine, POC LARGE (A) NEG      Blood, UA POC LARGE (A) NEG      URINE UROBILINOGEN POC 2.0 (H) 0.2 - 1.0 EU/dL    Nitrate, Urine, POC Negative NEG      Leukocyte Est, UA POC LARGE (A) NEG      Performed by: Crystal Leyva    Urinalysis w rflx microscopic    Collection Time: 07/23/22  7:49 PM   Result Value Ref Range    Color, UA BROWN     Appearance TURBID      Specific Gravity, UA 1.027 (H) 1.001 - 1.023      pH, Urine 5.0 5.0 - 9.0      Protein,  (A) NEG mg/dL    Glucose, UA Negative mg/dL    Ketones, Urine Negative NEG mg/dL    Bilirubin Urine MODERATE (A) NEG      Blood, Urine MODERATE (A) NEG      Urobilinogen, Urine 1.0 0.2 - 1.0 EU/dL    Nitrite, Urine Positive (A) NEG      Leukocyte Esterase, Urine MODERATE (A) NEG      WBC, UA >100 0 /hpf    RBC, UA >100 0 /hpf    BACTERIA, URINE 2+ (H) 0 /hpf    Casts GRANULAR 0 /lpf    OTHER OBSERVATIONS RESULTS VERIFIED MANUALLY     Protime-INR    Collection Time: 07/23/22 10:00 PM   Result Value Ref Range    Protime >14.5 (H) 12.6 - 14.5 sec    INR >5.0 (HH)    Culture, Urine    Collection Time: 07/23/22 10:00 PM    Specimen: URINE-CLEAN CATCH   Result Value Ref Range    Special Requests NO SPECIAL REQUESTS      Culture NO GROWTH 1 DAY     Lactic Acid    Collection Time: 07/23/22 10:12 PM   Result Value Ref Range    Lactic Acid, Plasma 1.8 0.4 - 2.0 MMOL/L   Culture, Blood 1    Collection Time: 07/23/22 10:12 PM    Specimen: Blood   Result Value Ref Range    Special Requests RIGHT  Antecubital        Culture NO GROWTH 2 DAYS     Culture, Blood 1    Collection Time: 07/24/22  2:37 AM    Specimen: Blood   Result Value Ref Range    Special Requests LEFT  ARM        Culture NO GROWTH 1 DAY     Protime-INR    Collection Time: 07/24/22  2:37 AM   Result Value Ref Range    Protime >120.0 (H) 12.6 - 14.5 sec    INR >16.2 (HH)    Hemoglobin and Hematocrit    Collection Time: 07/24/22  2:37 AM   Result Value Ref Range    Hemoglobin 8.4 (L) 13.6 - 17.2 g/dL    Hematocrit 28.1 (L) 41.1 - 50.3 %   Iron    Collection Time: 07/24/22  2:37 AM   Result Value Ref Range    Iron 19 (L) 35 - 150 ug/dL   Ferritin    Collection Time: 07/24/22  2:37 AM   Result Value Ref Range    Ferritin 971 (H) 8 - 388 NG/ML   Folate    Collection Time: 07/24/22  2:37 AM   Result Value Ref Range    Folate 23.7 (H) 3.1 - 17.5 ng/mL   Vitamin B12    Collection Time: 07/24/22  2:37 AM   Result Value Ref Range    Vitamin B-12 738 193 - 986 pg/mL   Transferrin Saturation    Collection Time: 07/24/22  2:37 AM   Result Value Ref Range    Iron 18 (L) 35 - 150 ug/dL    TIBC 165 (L) 250 - 450 ug/dL    TRANSFERRIN SATURATION 11 (L) >20 %   Reticulocytes    Collection Time: 07/24/22  2:37 AM   Result Value Ref Range    Reticulocyte Count,Automated 2.0 0.3 - 2.0 %    Absolute Retic # 0.0627 0.026 - 0.095 M/ul    Immature Retic Fraction 28.8 (H) 2.3 - 13.4 %    Retic Hemoglobin conc. 22 (L) 29 - 35 pg   EKG 12 lead    Collection Time: 07/24/22  7:07 AM   Result Value Ref Range    Ventricular Rate 74 BPM    Atrial Rate 74 BPM    P-R Interval 202 ms    QRS Duration 100 ms    Q-T Interval 440 ms    QTc Calculation (Bazett) 488 ms    P Axis 41 degrees    R Axis 58 degrees    T Axis 85 degrees    Diagnosis Normal sinus rhythm    COVID-19, Rapid    Collection Time: 07/24/22  7:26 AM    Specimen: Nasopharyngeal   Result Value Ref Range    Source NASAL      SARS-CoV-2, Rapid Not detected NOTD     Troponin    Collection Time: 07/24/22  7:54 AM   Result Value Ref Range    Troponin, High Sensitivity 15.4 (H) 0 - 14 pg/mL   Culture, Blood 1    Collection Time: 07/24/22  7:54 AM    Specimen: Blood   Result Value Ref Range    Special Requests LEFT  Antecubital        Culture NO GROWTH AFTER 21 HOURS     Lactic Acid    Collection Time: 07/24/22  7:54 AM   Result Value Ref Range    Lactic Acid, Plasma 0.8 0.4 - 2.0 MMOL/L   CBC with Auto Differential    Collection Time: 07/24/22  7:54 AM   Result Value Ref Range    WBC 13.3 (H) 4.3 - 11.1 K/uL    RBC 3.18 (L) 4.23 - 5.6 M/uL    Hemoglobin 8.5 (L) 13.6 - 17.2 g/dL    Hematocrit 28.7 (L) 41.1 - 50.3 %    MCV 90.3 79.6 - 97.8 FL    MCH 26.7 26.1 - 32.9 PG    MCHC 29.6 (L) 31.4 - 35.0 g/dL    RDW 16.4 (H) 11.9 - 14.6 %    Platelets 705 895 - 409 K/uL    MPV 10.1 9.4 - 12.3 FL    nRBC 0.00 0.0 - 0.2 K/uL    Differential Type AUTOMATED      Seg Neutrophils 75 43 - 78 %    Lymphocytes 12 (L) 13 - 44 %    Monocytes 11 4.0 - 12.0 %    Eosinophils % 1 0.5 - 7.8 %    Basophils 1 0.0 - 2.0 %    Immature Granulocytes 1 0.0 - 5.0 %    Segs Absolute 10.0 (H) 1.7 - 8.2 K/UL    Absolute Lymph # 1.6 0.5 - 4.6 K/UL    Absolute Mono # 1.4 (H) 0.1 - 1.3 K/UL    Absolute Eos # 0.2 0.0 - 0.8 K/UL    Basophils Absolute 0.1 0.0 - 0.2 K/UL    Absolute Immature Granulocyte 0.1 0.0 - 0.5 K/UL   Basic Metabolic Panel    Collection Time: 07/24/22  7:54 AM   Result Value Ref Range    Sodium 138 136 - 145 mmol/L    Potassium 3.9 3.5 - 5.1 mmol/L    Chloride 108 (H) 98 - 107 mmol/L    CO2 27 21 - 32 mmol/L    Anion Gap 3 (L) 7 - 16 mmol/L    Glucose 94 65 - 100 mg/dL    BUN 23 8 - 23 MG/DL    Creatinine 1.70 (H) 0.8 - 1.5 MG/DL    GFR  53 (L) >60 ml/min/1.73m2    GFR Non- 44 (L) >60 ml/min/1.73m2    Calcium 8.0 (L) 8.3 - 10.4 MG/DL   Culture, Blood 1    Collection Time: 07/24/22  8:12 AM    Specimen: Blood   Result Value Ref Range    Special Requests RIGHT  HAND        Culture NO GROWTH AFTER 21 HOURS     Troponin    Collection Time: 07/24/22 12:28 PM   Result Value Ref Range    Troponin, High Sensitivity 16.7 (H) 0 - 14 pg/mL   Troponin    Collection Time: 07/24/22  3:10 PM   Result Value Ref Range    Troponin, High Sensitivity 15.1 (H) 0 - 14 pg/mL   Hemoglobin and Hematocrit    Collection Time: 07/24/22  6:41 PM   Result Value Ref Range    Hemoglobin 8.7 (L) 13.6 - 17.2 g/dL    Hematocrit 29.2 (L) 41.1 - 50.3 %   Comprehensive Metabolic Panel w/ Reflex to MG    Collection Time: 07/25/22  4:41 AM   Result Value Ref Range    Sodium 142 136 - 145 mmol/L    Potassium 3.6 3.5 - 5.1 mmol/L    Chloride 110 (H) 98 - 107 mmol/L    CO2 24 21 - 32 mmol/L    Anion Gap 8 7 - 16 mmol/L    Glucose 82 65 - 100 mg/dL    BUN 21 8 - 23 MG/DL    Creatinine 1.50 0.8 - 1.5 MG/DL    GFR African American >60 >60 ml/min/1.73m2    GFR Non- 50 (L) >60 ml/min/1.73m2    Calcium 7.9 (L) 8.3 - 10.4 MG/DL    Total Bilirubin 0.4 0.2 - 1.1 MG/DL    ALT 13 12 - 65 U/L    AST 24 15 - 37 U/L    Alk Phosphatase 64 50 - 136 U/L    Total Protein 5.4 (L) 6.3 - 8.2 g/dL    Albumin 2.2 (L) 3.2 - 4.6 g/dL    Globulin 3.2 2.3 - 3.5 g/dL    Albumin/Globulin Ratio 0.7 (L) 1.2 - 3.5     Lactic Acid    Collection Time: 07/25/22  4:41 AM   Result Value Ref Range    Lactic Acid, Plasma 1.0 0.4 - 2.0 MMOL/L   CBC with Auto Differential    Collection Time: 07/25/22  4:41 AM   Result Value Ref Range    WBC 9.9 4.3 - 11.1 K/uL    RBC 2.75 (L) 4.23 - 5.6 M/uL    Hemoglobin 7.4 (L) 13.6 - 17.2 g/dL    Hematocrit 25.1 (L) 41.1 - 50.3 %    MCV 91.3 79.6 - 97.8 FL    MCH 26.9 26.1 - 32.9 PG    MCHC 29.5 (L) 31.4 - 35.0 g/dL    RDW 16.6 (H) 11.9 - 14.6 %    Platelets 923 911 - 184 K/uL    MPV 10.0 9.4 - 12.3 FL    nRBC 0.00 0.0 - 0.2 K/uL    Differential Type AUTOMATED      Seg Neutrophils 63 43 - 78 %    Lymphocytes 20 13 - 44 %    Monocytes 12 4.0 - 12.0 %    Eosinophils % 3 0.5 - 7.8 %    Basophils 1 0.0 - 2.0 %    Immature Granulocytes 1 0.0 - 5.0 %    Segs Absolute 6.3 1.7 - 8.2 K/UL    Absolute Lymph # 2.0 0.5 - 4.6 K/UL    Absolute Mono # 1.2 0.1 - 1.3 K/UL    Absolute Eos # 0.3 0.0 - 0.8 K/UL    Basophils Absolute 0.1 0.0 - 0.2 K/UL    Absolute Immature Granulocyte 0.1 0.0 - 0.5 K/UL   Protime-INR    Collection Time: 07/25/22  4:41 AM   Result Value Ref Range    Protime 19.7 (H) 12.6 - 14.5 sec    INR 1.6     APTT    Collection Time: 07/25/22  4:41 AM   Result Value Ref Range    PTT 43.0 (H) 24.1 - 35.1 SEC       Other Studies:  US RETROPERITONEAL COMPLETE   Final Result      1. Mild prominence of the right renal collecting system. 2. Left kidney appears within normal limits. No left hydronephrosis. Vascular duplex lower extremity venous bilateral   Final Result      1. No evidence of deep vein thrombosis. XR CHEST PORTABLE   Final Result   No significant change. CT ABDOMEN PELVIS RENAL STONE Additional Contrast? None   Final Result      1. Mild right hydronephrosis and proximal hydroureter. No obstructive etiology   or definite ureteral calculus is identified on this exam. There is high density   material within the right renal collecting system. Findings raise the question   of blood products, although neoplastic process is not entirely excluded. Follow-up recommended. 2. High density fluid in the gallbladder, could be secondary to biliary sludge. 3. No evidence of colitis, diverticulitis or bowel obstruction. 4. Consolidation/infiltrate in the visualized left lung base, captured at the   periphery of the imaging field of view and not well evaluated. 5. Overall sensitivity is limited without the benefit of IV contrast.      XR CHEST PORTABLE   Final Result   Mildly increased left lower lobe airspace opacities with small bilateral pleural   effusions. Differential considerations include atelectasis, pneumonia, or   asymmetric pulmonary edema.             CT ABDOMEN PELVIS HEMATURIA Additional Contrast? Radiologist Recommendation    (Results Pending)       Current Meds:  Current Facility-Administered Medications   Medication Dose Route Frequency    amiodarone (CORDARONE) tablet 400 mg  400 mg Oral BID    aspirin chewable tablet 81 mg  81 mg Oral Daily    metoprolol succinate (TOPROL XL) extended release tablet 25 mg  25 mg Oral BID    mexiletine (MEXITIL) capsule 150 mg  150 mg Oral TID    QUEtiapine (SEROQUEL) tablet 100 mg  100 mg Oral Nightly    Vitamin D (CHOLECALCIFEROL) tablet 1,000 Units  1,000 Units Oral Daily    sodium chloride flush 0.9 % injection 5-40 mL  5-40 mL IntraVENous 2 times per day    sodium chloride flush 0.9 % injection 5-40 mL  5-40 mL IntraVENous PRN    0.9 % sodium chloride infusion   IntraVENous PRN    ondansetron (ZOFRAN-ODT) disintegrating tablet 4 mg  4 mg Oral Q8H PRN    Or    ondansetron (ZOFRAN) injection 4 mg  4 mg IntraVENous Q6H PRN    polyethylene glycol (GLYCOLAX) packet 17 g  17 g Oral Daily PRN    acetaminophen (TYLENOL) tablet 650 mg  650 mg Oral Q6H PRN    Or    acetaminophen (TYLENOL) suppository 650 mg  650 mg Rectal Q6H PRN    cefTRIAXone (ROCEPHIN) 1000 mg IVPB in NS 50ml minibag  1,000 mg IntraVENous Q24H    HYDROcodone homatropine (HYCODAN) 5-1.5 MG/5ML solution 5 mL  5 mL Oral Q4H PRN    guaiFENesin-dextromethorphan (ROBITUSSIN DM) 100-10 MG/5ML syrup 5 mL  5 mL Oral Q4H PRN    oxyCODONE (ROXICODONE) immediate release tablet 5 mg  5 mg Oral Q4H PRN    morphine injection 4 mg  4 mg IntraVENous Q4H PRN    doxycycline (VIBRAMYCIN) 100 mg in sodium chloride 0.9 % 100 mL IVPB  100 mg IntraVENous Q12H    0.9 % sodium chloride infusion   IntraVENous Continuous       Signed:  LORENZA Stevens - CNP    Part of this note may have been written by using a voice dictation software. The note has been proof read but may still contain some grammatical/other typographical errors.

## 2022-07-25 NOTE — PROGRESS NOTES
Resting in bed. IV infusing. UOP noted to be reddish/brown overnight. Hourly rounds completed, all needs met this shift. Bed in L/L with call light in reach. Will give report to oncoming nurse.

## 2022-07-25 NOTE — PROGRESS NOTES
PHYSICAL THERAPY Initial Assessment, Daily Note, and AM  (Link to Caseload Tracking: PT Visit Days : 1  Acknowledge Orders  Time In/Out  PT Charge Capture  Rehab Caseload Tracker    Rylan Haskins is a 58 y.o. male   PRIMARY DIAGNOSIS: Acute kidney injury (BEN) with acute tubular necrosis (ATN) (HCC)  Hypoxemia [R09.02]  Ureteral obstruction, right [N13.5]  Acute kidney injury ([facility] [N17.9]  Poisoning by warfarin sodium, accidental or unintentional, initial encounter [T45.511A]  Urinary tract infection with hematuria, site unspecified [N39.0, R31.9]  Acute kidney injury (BEN) with acute tubular necrosis (ATN) ([facility] [N17.0]     Reason for Referral: Difficulty in walking, Not elsewhere classified (R26.2)  Other abnormalities of gait and mobility (R26.89)  Inpatient: Payor: Marilou Salamanca / Plan: Jonelle Coil / Product Type: *No Product type* /     ASSESSMENT:     REHAB RECOMMENDATIONS:   Recommendation to date pending progress:  Setting:  Outpatient Therapy    Equipment:    To Be Determined     ASSESSMENT:  Mr. Nioklas Whipple is a 58year old M who presents to hospital with reports of SOB. Admitted with BEN, hypoxemia, anemia and coagulopathy (INR initially quite elevated; resolved in response to Vit K). PMH includes CAD s/p CABG earlier this year (May/June 2022). This date pt performs mobility including bed mobility, sitting balance activities, sit <> stand transfers, standing balance activities, and ambulation in room and hallway with SBA/CGA. Pt fatigued and endorsing SOB following ~200 ft, which he reports is considerably below his baseline. SpO2 9% on room air, though pt notably dyspneic. Pt presents as functioning below his baseline, with deficits in mobility including gait, balance, and activity tolerance. Pt will benefit from skilled therapy services to address stated deficits to promote return to highest level of function, independence, and safety. Will continue to follow.      [employer] Rolling [] [] [] [x] [] [] [] [] [] [] []    Supine to Sit [] [] [] [x] [] [] [] [] [] [] []    Scooting [] [] [] [x] [] [] [] [] [] [] []    Sit to Supine [] [] [] [x] [] [] [] [] [] [] []    Transfers    Sit to Stand [] [] [] [] [x] [] [] [] [] [] []    Bed to Chair [] [] [] [] [x] [] [] [] [] [] []    Stand to Sit [] [] [] [] [x] [] [] [] [] [] []     [] [] [] [] [] [] [] [] [] [] []    I=Independent, Mod I=Modified Independent, S=Supervision, SBA=Standby Assistance, CGA=Contact Guard Assistance,   Min=Minimal Assistance, Mod=Moderate Assistance, Max=Maximal Assistance, Total=Total Assistance, NT=Not Tested    GAIT: I Mod I S SBA CGA Min Mod Max Total  NT x2 Comments:   Level of Assistance [] [] [] [x] [x] [] [] [] [] [] []    Distance 220 feet    DME None    Gait Quality Antalgic, Path deviations , and Trunk sway increased    Weightbearing Status      Stairs      I=Independent, Mod I=Modified Independent, S=Supervision, SBA=Standby Assistance, CGA=Contact Guard Assistance,   Min=Minimal Assistance, Mod=Moderate Assistance, Max=Maximal Assistance, Total=Total Assistance, NT=Not Tested    PLAN:   ACUTE PHYSICAL THERAPY GOALS:   (Developed with and agreed upon by patient and/or caregiver.)  (1.) Daljit Catherine will move from supine to sit and sit to supine , scoot up and down, and roll side to side with MODIFIED INDEPENDENCE within 7 treatment day(s). (2.) Daljit Catherine will transfer from bed to chair and chair to bed with MODIFIED INDEPENDENCE using the least restrictive device within 7 treatment day(s). (3.) Daljit Catherine will ambulate with MODIFIED INDEPENDENCE for 1000+ feet with the least restrictive device within 7 treatment day(s). (4.) Daljit Catherine will perform standing static and dynamic balance activities x 25 minutes with MODIFIED INDEPENDENCE to improve safety and activity tolerance within 7 treatment day(s).   (5.) Daljit Catherine will ascend and descend 3 stairs using no

## 2022-07-25 NOTE — PROGRESS NOTES
[facility] CARDIOLOGY PROGRESS NOTE           7/25/2022 8:14 AM    Admit Date: 7/23/2022      Subjective: Intermittent SOB, at baseline, volume status - 525, IV hydration stopped, yesterday. Cr much improved. HS trop 15, no CP dizziness or palpitations. No ICD shocks. Urine still coca cola colored. - urology consult pending. INR 1.6 down from greater than 16 s/p Vit K.     ROS:  Cardiovascular:  As noted above  : Urine coca cola colored     Objective:      Vitals:    07/24/22 2256 07/25/22 0353 07/25/22 0500 07/25/22 0802   BP: 123/69 100/67  108/68   Pulse: 72 65  75   Resp: 18 18  24   Temp: 99.1 °F (37.3 °C) 97.8 °F (36.6 °C)  99.5 °F (37.5 °C)   TempSrc: Oral Oral     SpO2: 93% 91%  94%   Weight:   204 lb 12.9 oz (92.9 kg)    Height:           Physical Exam:  General-No Acute Distress  Neck- supple, no JVD  CV- regular rate and rhythm, ICD in chest wall   Lung-  few crackles at bases B   Abd- soft, nontender, nondistended  Ext- no edema bilaterally. Skin- warm and dry    Tele: Not on remote tele, ICD    Data Review:   Recent Labs     07/23/22  1939 07/23/22  2200 07/24/22  0237 07/24/22  0754 07/24/22  1841 07/25/22  0441     --   --  138  --  142   K 3.9  --   --  3.9  --  3.6   MG 2.5*  --   --   --   --   --    BUN 33*  --   --  23  --  21   WBC 14.5*  --   --  13.3*  --  9.9   HGB 9.2*  --  8.4* 8.5* 8.7* 7.4*   HCT 29.6*  --  28.1* 28.7* 29.2* 25.1*     --   --  397  --  385   INR  --    < > >16.2*  --   --  1.6    < > = values in this interval not displayed.        Assessment/Plan:   Acute kidney injury (BEN) with acute tubular necrosis (ATN) (HCC)  - improved s/p gentle hydration  - antibiotics  - urology consult pending       CAD, multiple vessel- s/p 5-31-22 CABG X 3 with LIMA to the LAD, reverse SVG to the Intermediate coronary, and reverse SVG to the PDA  - no anginal symptoms, cont ASA, BB, no statin due to intolerance      Chronic systolic (congest[facility]) [facility] failure ([facility]- EF 25-30% w moderate global hypokinesis s/p [employer] dual chamber ICD implanted in 2011  - volume status stable   - cont BB, no ACE/ACE due to hypotension and ACE allergy of itching      CAP (community acquired pneumonia)  - antibiotics      Intracardiac thrombus  - coumadin held, s/p Vit K, INR down    Anemia- hgb down w hematuria       Ventricular tachycardia ([facility]  - improved, cont mexitil and amio       Melinda Paci Mol, [address], [address]  7/25/2022 8:14 AM           [facility] CARDIOLOGY     7/25/2022     10:20 AM    I have personally seen and examined Delvis Alvarez with Felipe DE LA FUENTE. I agree and confirm findings in history, physical exam, and assessment/plan as outlined above with following pertinent additions/exceptions:   Patient denies any active chest pain. Some dyspnea on exertion. No recurrent arrhythmias or ICD discharges. Worsening anemia noted. Given vitamin K yesterday and INR down to 1.6. Renal function marginally improved overnight. Physical Exam:  General: Well Developed, Well Nourished, No Acute Distress  HEENT: pupils equal and round, no abnormalities noted  Neck: supple, no JVD, no carotid bruits  Heart: S1S2 with RRR without murmurs or gallops  Lungs: Clear throughout auscultation bilaterally without adventitious sounds  Abd: soft, nontender, nondistended, with good bowel sounds  Ext: warm, trivial edema, calves supple/nontender, pulses 2+ bilaterally  Skin: warm and dry  Psychiatric: Normal mood and affect  Neurologic: Alert and oriented X 3    ASS/Plan:  1. Ventricular tachycardia: No recent events. Continue mexiletine and amiodarone. 2.  History of apical thrombus. Coumadin held. Continue to hold with recent bleeding and worsening anemia. Can readdress in the next several days. 3.  Acute renal insufficiency: Improved. Urology consult pending. 4.  CHF: Monitor closely with hydration.     Aniceto Torrez MD

## 2022-07-25 NOTE — PLAN OF CARE
Problem: Pain  Goal: Verbalizes/displays adequate comfort level or baseline comfort level  Outcome: Progressing  Flowsheets (Taken 7/24/2022 1908 by Rolf Rucker RN)  Verbalizes/displays adequate comfort level or baseline comfort level:   Encourage patient to monitor pain and request assistance   Assess pain using appropriate pain scale   Administer analgesics based on type and severity of pain and evaluate response   Implement non-pharmacological measures as appropriate and evaluate response

## 2022-07-25 NOTE — PROGRESS NOTES
Urology Consult    Patient: Bucky Brink MRN: [id number]  SSN: [id number]    YOB: 1960  Age: 58 y.o. Sex: male      Subjective:      Bucky Brink is a 58 y.o. male with hx of CHF, CAD s/p CABG, apical thrombus (on coumadin), recurrent vtach with ICD, who presents to ER with c/o cough, SOB, SP pain and flank pain with hematuria. Also with urinary urgency. No urological hx. Hx of tobacco use. Admitted by hospitalist for BEN, pneumonia. UA with +nitrites, >100 WBC, >100 RBC, +2 bacteria, urine culture with no growth. On rocephin. CT shows mild right hydronephrosis and proximal hydroureter with no obstructive etiology or calculus identified with high density material in the R renal pelvis. We were consulted. Dr. Shepherd reviewed images, chart and saw the pt. He personally reviewed CT - there are suspicions for bleeding into the R renal pelvis. Cr has improved since then to 1.50 from 2.30. Hgb is 7.4 today. Pt is voiding, urine is tea colored (no active bleeding/clots). No flank pain.       Past Medical History:   Diagnosis Date    Acute hypoxemic respiratory failure due to COVID-19 [facility]) 10/9/2021    CAD (coronary artery disease)     heart attack 2005 stentS HEART    CHF (congestive heart failure) ([facility] 9/27/2011    Chronic systolic heart failure ([facility] 10/2/2015    Coronary atherosclerosis of native coronary vessel 10/2/2015    Hyperlipidemia 10/2/2015    Hypoxemia requiring supplemental oxygen 10/6/2021    IGT (impaired glucose tolerance) 12/3/2017    Other ill-defined conditions(799.89)      blind left eye    Other ill-defined conditions(799.89)     cardiomyopathy    Pneumonia due to COVID-19 virus 11/13/2021    Resolved    Primary insomnia 6/7/2017    Psychiatric disorder     depression     Sepsis, unspecified 4/5/2011    Thromboembolus ([facility] [facility]     thrombus in heart     Thrombus 10/2/2015     Past Surgical History:   Procedure Laterality Date    CARDIAC CATHETERIZATION      5 stents total    CARDIAC DEFIBRILLATOR PLACEMENT      [employer]    CARDIAC PROCEDURE N/A 2022    LEFT HEART CATH / CORONARY ANGIOGRAPHY performed by Yasemin Hilton MD at [address] [place] [address] CATH LAB    COLONOSCOPY  2010    CORONARY ARTERY BYPASS GRAFT N/A 2022    CORONARY ARTERY BYPASS GRAFT (CABG X 3), LIMA ; ENDOSCOPIC VEIN HARVEST, LEFT GREATER SAPHENOUS VEIN performed by Arthur Cullen MD at [facility] MAIN OR    HEENT      oral surgery    PACEMAKER      [place] CARDIAC SURG PROCEDURE UNLIST       1 stent     TRANSESOPHAGEAL ECHOCARDIOGRAM N/A 2022    TRANSESOPHAGEAL ECHOCARDIOGRAM performed by Arthur Cullen MD at [facility] MAIN OR      Family History   Problem Relation Age of Onset    Heart Disease Mother     Diabetes Paternal Grandfather     Cancer Paternal Grandmother     Heart Disease Brother     Diabetes Maternal Grandmother     Bleeding Prob Father     Diabetes Mother     Heart Disease Paternal Grandfather     Heart Attack Mother 67        mi     Social History     Tobacco Use    Smoking status: Former     Packs/day: 1.00     Types: Cigarettes     Start date: 1978     Quit date: 2022     Years since quittin.1    Smokeless tobacco: Never   Substance Use Topics    Alcohol use: No      Prior to Admission medications    Medication Sig Start Date End Date Taking? Authorizing Provider   amiodarone (PACERONE) 400 MG tablet Take 1 tablet by mouth in the morning and 1 tablet before bedtime. Patient taking differently: Take 200 mg by mouth See Admin Instructions 2 tablets twice a day 22   Yahaira Garcia MD   mexiletine (MEXITIL) 150 MG capsule Take 1 capsule by mouth in the morning and 1 capsule at noon and 1 capsule before bedtime.  22   Yahaira Garcia MD   Vitamin D (CHOLECALCIFEROL) 25 MCG (1000 UT) TABS tablet Take 1,000 Units by mouth daily    Historical Provider, MD   Multiple Vitamins-Minerals (THERAPEUTIC MULTIVITAMIN-MINERALS) tablet Take 1 tablet by mouth daily Historical Provider, MD   metoprolol succinate (TOPROL XL) 25 MG extended release tablet Take 1 tablet by mouth in the morning and at bedtime 6/22/22   LORENZA Zhang CNP   nitroGLYCERIN (NITROSTAT) 0.4 MG SL tablet Place 1 tablet under the tongue every 5 minutes as needed for Chest pain up to max of 3 total doses. If no relief after 1 dose, call 911.   Patient not taking: Reported on 7/24/2022 6/7/22   Ariadna Diamond MD   aspirin 81 MG chewable tablet Take 1 tablet by mouth daily  Patient not taking: Reported on 7/24/2022 6/7/22   LEISA Galvez   acetaminophen (TYLENOL) 325 mg tablet Take 2 tablets by mouth every 6 hours as needed for Pain 6/6/22   LEISA Galvez   Cetirizine HCl 10 MG CAPS Take by mouth as needed    Ar Automatic Reconciliation   QUEtiapine (SEROQUEL) 100 MG tablet Take 100 mg by mouth nightly  6/18/21   Ar Automatic Reconciliation   warfarin (COUMADIN) 5 MG tablet Take 1 to 1 1/2 tablet daily or as directed 12/3/21   Ar Automatic Reconciliation        Allergies   Allergen Reactions    Penicillins Swelling     Face swelling    Atorvastatin Other (See Comments)     itching    Evolocumab Other (See Comments)     Itching    Lisinopril Other (See Comments)    Rosuvastatin Other (See Comments)     itching       Review of Systems:  As stated in H&P    Objective:     Vitals:    07/25/22 0353 07/25/22 0500 07/25/22 0802 07/25/22 0914   BP: 100/67  108/68    Pulse: 65  75    Resp: 18  24    Temp: 97.8 °F (36.6 °C)  99.5 °F (37.5 °C)    TempSrc: Oral  Oral    SpO2: 91%  94% 95%   Weight:  204 lb 12.9 oz (92.9 kg)     Height:            Physical Exam:  GENERAL ASSESSMENT: alert, oriented to person, place and time, no acute distress Chest: clear to auscultation  CVS exam: normal rate, regular rhythm, normal S1, S2  ABDOMEN: soft non tender  Neurological exam reveals alert, oriented, normal speech      Assessment:     57 yo male with hx of cad, cabg and apical thrombus- on coumadin (currently held) admitted with cough, SOB,flank pain and episode of gross hematuria with UTI, CT showing  R hydroureter and hydronephrosis with no visible obstruction with high density material into the R renal pelvis, BEN. Cr improved, Hgb dropping. Urine is tea colored with no clots. Does have hx of tobacco use. Plan:     Observe urine color for now. Pt needs hematuria workup with CT hematuria protocol (w/wo contrast) and cystoscopy with R URS and retrogrades. He does not currently have grossly bloody urine or clots. Timing for procedure to be determined. Cr down today, would consider contrasted CT today. Will discuss with primary team.           Addendum:  D/w Dr. Hiro Topete- we will plan for ct hematuria protocol today (w/wo contrast) and we will schedule cystoscopy, R URS, right retrograde pyelogram for tomorrow evening.           Signed By: Wes Weathers, LORENZA - CNP     July 25, 2022

## 2022-07-26 ENCOUNTER — HOSPITAL ENCOUNTER (OUTPATIENT)
Dept: CARDIAC REHAB | Age: 62
Setting detail: RECURRING SERIES
End: 2022-07-26
Payer: MEDICARE

## 2022-07-26 ENCOUNTER — ANESTHESIA (OUTPATIENT)
Dept: MEDSURG UNIT | Age: 62
DRG: 659 | End: 2022-07-26
Payer: MEDICARE

## 2022-07-26 PROBLEM — R31.0 GROSS HEMATURIA: Status: ACTIVE | Noted: 2022-07-26

## 2022-07-26 PROBLEM — N28.89 RIGHT KIDNEY MASS: Status: ACTIVE | Noted: 2022-07-26

## 2022-07-26 PROBLEM — N13.5 URETERAL OBSTRUCTION, RIGHT: Status: ACTIVE | Noted: 2022-07-26

## 2022-07-26 LAB
ANION GAP SERPL CALC-SCNC: 8 MMOL/L (ref 7–16)
BACTERIA SPEC CULT: NORMAL
BUN SERPL-MCNC: 15 MG/DL (ref 8–23)
CALCIUM SERPL-MCNC: 8.1 MG/DL (ref 8.3–10.4)
CHLORIDE SERPL-SCNC: 112 MMOL/L (ref 98–107)
CO2 SERPL-SCNC: 22 MMOL/L (ref 21–32)
CREAT SERPL-MCNC: 1.2 MG/DL (ref 0.8–1.5)
GLUCOSE SERPL-MCNC: 78 MG/DL (ref 65–100)
HCT VFR BLD AUTO: 25.6 % (ref 41.1–50.3)
HGB BLD-MCNC: 7.6 G/DL (ref 13.6–17.2)
INR BLD: 3.2 (ref 0.9–1.2)
INR PPP: 2.2
POTASSIUM SERPL-SCNC: 3.7 MMOL/L (ref 3.5–5.1)
PROTHROMBIN TIME: 25.3 SEC (ref 12.6–14.5)
PT BLD: 36.1 SECS (ref 9.6–11.6)
SERVICE CMNT-IMP: NORMAL
SODIUM SERPL-SCNC: 142 MMOL/L (ref 136–145)

## 2022-07-26 PROCEDURE — 85610 PROTHROMBIN TIME: CPT

## 2022-07-26 PROCEDURE — 2580000003 HC RX 258: Performed by: INTERNAL MEDICINE

## 2022-07-26 PROCEDURE — 80048 BASIC METABOLIC PNL TOTAL CA: CPT

## 2022-07-26 PROCEDURE — 2500000003 HC RX 250 WO HCPCS: Performed by: INTERNAL MEDICINE

## 2022-07-26 PROCEDURE — 1100000000 HC RM PRIVATE

## 2022-07-26 PROCEDURE — 99232 SBSQ HOSP IP/OBS MODERATE 35: CPT | Performed by: INTERNAL MEDICINE

## 2022-07-26 PROCEDURE — 6360000002 HC RX W HCPCS: Performed by: INTERNAL MEDICINE

## 2022-07-26 PROCEDURE — 6370000000 HC RX 637 (ALT 250 FOR IP): Performed by: INTERNAL MEDICINE

## 2022-07-26 PROCEDURE — 97530 THERAPEUTIC ACTIVITIES: CPT

## 2022-07-26 PROCEDURE — 99233 SBSQ HOSP IP/OBS HIGH 50: CPT | Performed by: UROLOGY

## 2022-07-26 PROCEDURE — 85018 HEMOGLOBIN: CPT

## 2022-07-26 PROCEDURE — 36415 COLL VENOUS BLD VENIPUNCTURE: CPT

## 2022-07-26 RX ORDER — FENTANYL CITRATE 50 UG/ML
100 INJECTION, SOLUTION INTRAMUSCULAR; INTRAVENOUS
Status: DISCONTINUED | OUTPATIENT
Start: 2022-07-26 | End: 2022-07-26 | Stop reason: HOSPADM

## 2022-07-26 RX ORDER — LIDOCAINE HYDROCHLORIDE 10 MG/ML
1 INJECTION, SOLUTION INFILTRATION; PERINEURAL
Status: DISCONTINUED | OUTPATIENT
Start: 2022-07-26 | End: 2022-07-26 | Stop reason: HOSPADM

## 2022-07-26 RX ORDER — MIDAZOLAM HYDROCHLORIDE 2 MG/2ML
2 INJECTION, SOLUTION INTRAMUSCULAR; INTRAVENOUS
Status: DISCONTINUED | OUTPATIENT
Start: 2022-07-26 | End: 2022-07-26 | Stop reason: HOSPADM

## 2022-07-26 RX ADMIN — ASPIRIN 81 MG: 81 TABLET, CHEWABLE ORAL at 08:26

## 2022-07-26 RX ADMIN — MELATONIN 1000 UNITS: at 08:26

## 2022-07-26 RX ADMIN — SODIUM CHLORIDE, PRESERVATIVE FREE 5 ML: 5 INJECTION INTRAVENOUS at 08:27

## 2022-07-26 RX ADMIN — ACETAMINOPHEN 650 MG: 325 TABLET ORAL at 23:43

## 2022-07-26 RX ADMIN — CEFTRIAXONE 1000 MG: 1 INJECTION, POWDER, FOR SOLUTION INTRAMUSCULAR; INTRAVENOUS at 23:39

## 2022-07-26 RX ADMIN — MEXILETINE HYDROCHLORIDE 150 MG: 150 CAPSULE ORAL at 21:13

## 2022-07-26 RX ADMIN — AMIODARONE HYDROCHLORIDE 400 MG: 200 TABLET ORAL at 08:26

## 2022-07-26 RX ADMIN — MEXILETINE HYDROCHLORIDE 150 MG: 150 CAPSULE ORAL at 08:26

## 2022-07-26 RX ADMIN — SODIUM CHLORIDE, PRESERVATIVE FREE 5 ML: 5 INJECTION INTRAVENOUS at 21:23

## 2022-07-26 RX ADMIN — METOPROLOL SUCCINATE 25 MG: 25 TABLET, FILM COATED, EXTENDED RELEASE ORAL at 08:26

## 2022-07-26 RX ADMIN — QUETIAPINE FUMARATE 100 MG: 100 TABLET ORAL at 21:13

## 2022-07-26 RX ADMIN — DOXYCYCLINE 100 MG: 100 INJECTION, POWDER, LYOPHILIZED, FOR SOLUTION INTRAVENOUS at 21:14

## 2022-07-26 RX ADMIN — AMIODARONE HYDROCHLORIDE 400 MG: 200 TABLET ORAL at 21:12

## 2022-07-26 RX ADMIN — MEXILETINE HYDROCHLORIDE 150 MG: 150 CAPSULE ORAL at 13:13

## 2022-07-26 RX ADMIN — DOXYCYCLINE 100 MG: 100 INJECTION, POWDER, LYOPHILIZED, FOR SOLUTION INTRAVENOUS at 08:27

## 2022-07-26 ASSESSMENT — PAIN SCALES - GENERAL: PAINLEVEL_OUTOF10: 0

## 2022-07-26 ASSESSMENT — PAIN - FUNCTIONAL ASSESSMENT: PAIN_FUNCTIONAL_ASSESSMENT: 0-10

## 2022-07-26 NOTE — PERIOP NOTE
TRANSFER - IN REPORT:    Verbal report received from Ralph on Guerrero Hammonder  being received from rm. [phone] for ordered procedure      Report consisted of patient's Situation, Background, Assessment and   Recommendations(SBAR). Information from the following report(s) Pre Procedure Checklist was reviewed with the receiving nurse. Opportunity for questions and clarification was provided. Assessment completed upon patient's arrival to unit and care assumed.   K

## 2022-07-26 NOTE — PROGRESS NOTES
Admit Date: 7/23/2022      Subjective:     Robert Cutler is resting. Voiding- urine is clear/rachael/tea colored. Vss.   He did eat breakfast at 8 am.     Objective:     Patient Vitals for the past 8 hrs:   BP Temp Temp src Pulse Resp SpO2 Weight   07/26/22 0745 107/62 99.3 °F (37.4 °C) Oral 72 20 95 % --   07/26/22 0313 115/61 98.6 °F (37 °C) Oral 74 18 94 % 204 lb 2.3 oz (92.6 kg)     07/26 0701 - 07/26 1900  In: -   Out: 775 [Urine:775]  07/24 1901 - 07/26 0700  In: 240 [P.O.:240]  Out: 3325 [Urine:3325]    Physical Exam:  GENERAL ASSESSMENT: alert, oriented to person, place and time, no acute distress Chest: clear to auscultation  CVS exam: normal rate, regular rhythm, normal S1, S2  ABDOMEN: soft, non tender  Neurological exam reveals alert, oriented, normal speech    Data Review   Recent Results (from the past 24 hour(s))   Hemoglobin and Hematocrit    Collection Time: 07/25/22  6:27 PM   Result Value Ref Range    Hemoglobin 8.3 (L) 13.6 - 17.2 g/dL    Hematocrit 28.4 (L) 41.1 - 50.3 %   Protime-INR    Collection Time: 07/26/22  5:40 AM   Result Value Ref Range    Protime 25.3 (H) 12.6 - 14.5 sec    INR 2.2     Basic Metabolic Panel    Collection Time: 07/26/22  5:40 AM   Result Value Ref Range    Sodium 142 136 - 145 mmol/L    Potassium 3.7 3.5 - 5.1 mmol/L    Chloride 112 (H) 98 - 107 mmol/L    CO2 22 21 - 32 mmol/L    Anion Gap 8 7 - 16 mmol/L    Glucose 78 65 - 100 mg/dL    BUN 15 8 - 23 MG/DL    Creatinine 1.20 0.8 - 1.5 MG/DL    GFR African American >60 >60 ml/min/1.73m2    GFR Non- >60 >60 ml/min/1.73m2    Calcium 8.1 (L) 8.3 - 10.4 MG/DL   Hemoglobin and Hematocrit    Collection Time: 07/26/22  5:40 AM   Result Value Ref Range    Hemoglobin 7.6 (L) 13.6 - 17.2 g/dL    Hematocrit 25.6 (L) 41.1 - 50.3 %       Assessment:     Principal Problem:    Acute kidney injury (BEN) with acute tubular necrosis (ATN) (HCC)  Active Problems:    CAD, multiple vessel    Chronic systolic (congestive) heart failure ([facility]    CAP (community acquired pneumonia)    Acute kidney injury ([facility] [address]    Intracardiac thrombus    Ventricular tachycardia (HCC)    Hydronephrosis  Resolved Problems:    * No resolved hospital problems. *       60 yo male with hx of cad, cabg and apical thrombus- on coumadin (currently held) admitted with cough, SOB,flank pain and episode of gross hematuria with UTI, CT showing  R hydroureter and hydronephrosis with no visible obstruction with high density material into the R renal pelvis, BEN. Cr improved, Hgb dropping. Urine is tea colored with no clots. Does have hx of tobacco use. CT hematuria protocol ordered 7/25- urothelial thickening involving the right renal pelvis and extending into the right proximal ureter, degree of thickening concerning for urothelial malignancy. Hgb 7.6 today. Afebrile, VSS. Plan: To OR today for cystoscopy, right ureteroscopy, right retrograde pyelogram.  NPO starting at 9 am.  Verify consent has been obtained. Signed By: Arlene Cuevas, LORENZA - CNP     July 26, 2022      [facility]    Urology Attending Physician Note:     Admit Date: 7/23/2022    Subjective:     No acute events overnight. Afebrile. CT with abnormality in R renal pelvis concerning for UCC vs. Blood clot. NPO for OR today. Off blood thinner.     Objective:     Patient Vitals for the past 8 hrs:   BP Temp Temp src Pulse Resp SpO2   07/26/22 0745 107/62 99.3 °F (37.4 °C) Oral 72 20 95 %     07/26 0701 - 07/26 1900  In: -   Out: 672 [Urine:775]  07/24 1901 - 07/26 0700  In: 240 [P.O.:240]  Out: 0543 [Urine:3325]    Physical Exam:   /62   Pulse 72   Temp 99.3 °F (37.4 °C) (Oral)   Resp 20   Ht 5' 9\" (1.753 m)   Wt 204 lb 2.3 oz (92.6 kg)   SpO2 95%   BMI 30.15 kg/m²      GENERAL: No acute distress, Awake, Alert, Oriented X 3  CARDIAC: regular rate and rhythm  CHEST AND LUNG: Easy work of breathing  ABDOMEN: soft, non tender, non-distended          Data Review   Recent Results (from the past 24 hour(s))   Hemoglobin and Hematocrit    Collection Time: 07/25/22  6:27 PM   Result Value Ref Range    Hemoglobin 8.3 (L) 13.6 - 17.2 g/dL    Hematocrit 28.4 (L) 41.1 - 50.3 %   Protime-INR    Collection Time: 07/26/22  5:40 AM   Result Value Ref Range    Protime 25.3 (H) 12.6 - 14.5 sec    INR 2.2     Basic Metabolic Panel    Collection Time: 07/26/22  5:40 AM   Result Value Ref Range    Sodium 142 136 - 145 mmol/L    Potassium 3.7 3.5 - 5.1 mmol/L    Chloride 112 (H) 98 - 107 mmol/L    CO2 22 21 - 32 mmol/L    Anion Gap 8 7 - 16 mmol/L    Glucose 78 65 - 100 mg/dL    BUN 15 8 - 23 MG/DL    Creatinine 1.20 0.8 - 1.5 MG/DL    GFR African American >60 >60 ml/min/1.73m2    GFR Non- >60 >60 ml/min/1.73m2    Calcium 8.1 (L) 8.3 - 10.4 MG/DL   Hemoglobin and Hematocrit    Collection Time: 07/26/22  5:40 AM   Result Value Ref Range    Hemoglobin 7.6 (L) 13.6 - 17.2 g/dL    Hematocrit 25.6 (L) 41.1 - 50.3 %           Assessment:     Principal Problem:    Acute kidney injury (BEN) with acute tubular necrosis (ATN) ([facility])  Active Problems:    CAD, multiple vessel    Chronic systolic (congestive) heart failure (HCC)    CAP (community acquired pneumonia)    Acute kidney injury ([facility] [address]    Intracardiac thrombus    Ventricular tachycardia (HCC)    Hydronephrosis  Resolved Problems:    * No resolved hospital problems. *    55 yo male with hx of cad, cabg and apical thrombus- on coumadin (currently held) admitted with cough, SOB,flank pain and episode of gross hematuria with UTI, CT showing  R hydroureter and hydronephrosis with no visible obstruction with high density material into the R renal pelvis, BEN. Cr improved, Hgb dropping. Urine is tea colored with no clots. Does have hx of tobacco use.    CT hematuria protocol ordered 7/25- urothelial thickening involving the right renal pelvis and extending into the right proximal ureter, degree of thickening concerning for urothelial malignancy. Hgb 7.6 today. Afebrile, VSS. Plan:     -To OR for cystoscopy, right ureteroscopy, right retrograde pyelogram  -Consented. Risks/benefits reviewed. Wants to proceed. -Antibiotic on call to OR  -NPO for procedure.   -Continue to hold blood thinners. In addition to my documentation above, I have also reviewed the nurse practitioner note and personally examined the patient. I agree with the HPI, exam, assessment and plan. Diony Lutz M.D.     [facility]  [address]  Phone: [phone]  Fax: [phone]

## 2022-07-26 NOTE — PROGRESS NOTES
Resting in bed. IV infusing. UOP noted to be yellow/clear overnight. Hourly rounds completed, all needs met this shift. Bed in L/L with call light in reach. Will give report to oncoming nurse.

## 2022-07-26 NOTE — PROGRESS NOTES
I spoke with Pre-Op and will have patient on clear liquid diet until I get a definitive answer on when to make patient NPO prior to surgery today.

## 2022-07-26 NOTE — PROGRESS NOTES
PHYSICAL THERAPY Daily Note and PM  (Link to Caseload Tracking: PT Visit Days : 2  Time In/Out PT Charge Capture  Rehab Caseload Tracker  Orders    Azam Almonte is a 58 y.o. male   PRIMARY DIAGNOSIS: Acute kidney injury (BEN) with acute tubular necrosis (ATN) (HCC)  Hypoxemia [R09.02]  Ureteral obstruction, right [N13.5]  Acute kidney injury ([address] [N17.9]  Poisoning by warfarin sodium, accidental or unintentional, initial encounter [T45.511A]  Urinary tract infection with hematuria, site unspecified [N39.0, R31.9]  Acute kidney injury (BEN) with acute tubular necrosis (ATN) ([address] [N17.0]  Procedure(s) (LRB):  CYSTOSCOPY,RIGHT URETEROSCOPY,RIGHT RETROGRADE PYELOGRAM (Right)     Inpatient: Payor: Laura Herrera / Plan: Sarah DRUMMOND COMPLETE / Product Type: *No Product type* /     ASSESSMENT:     REHAB RECOMMENDATIONS:   Recommendation to date pending progress:  Setting:  Outpatient Therapy (pt declined)    Equipment:    None     ASSESSMENT:  Mr. DEREK CHANEY presents resting in chair, agreeable to therapy. Of note, planned for cystoscopy this PM.  This date pt performs mobility including bed mobility, sitting balance activities, sit <> stand transfers, standing balance activities, and ambulation in room and hallway with SBA. Improved activity tolerance compared to yesterday's session; less SOB reported, and able to increase distance and overall activity today. Good progress. Pt presents as functioning below his baseline, with deficits in mobility including gait, balance, and activity tolerance. Pt will benefit from skilled therapy services to address stated deficits to promote return to highest level of function, independence, and safety. Will continue to follow.      SUBJECTIVE:   Mr. DEREK CHANEY states, Melita Shelton are going to look at my bladder later today\"     Social/Functional Lives With: Alone  Type of Home: House  Home Layout: One level  Home Access: Stairs to enter without rails  Entrance Stairs - Number of Steps: 3  Bathroom Shower/Tub: Tub/Shower unit  Bathroom Toilet: Standard  Bathroom Accessibility: Accessible  Home Equipment: [place] Help From: Friend(s), Family  ADL Assistance: Independent  Homemaking Assistance: Independent  Homemaking Responsibilities: Yes  Ambulation Assistance: Independent  Transfer Assistance: Independent  Active : Yes  Mode of Transportation: Car  OBJECTIVE:     PAIN: Derotha Boys / O2: Bunny Hanks / Leiva Ivorian / Hiral Dawley:   Pre Treatment:   Pain Assessment: None - Denies Pain      Post Treatment: 0/10 Vitals        Oxygen    None    RESTRICTIONS/PRECAUTIONS:        MOBILITY: I Mod I S SBA CGA Min Mod Max Total  NT x2 Comments:   Bed Mobility    Rolling [] [] [] [x] [] [] [] [] [] [] []    Supine to Sit [] [] [] [x] [] [] [] [] [] [] []    Scooting [] [] [] [x] [] [] [] [] [] [] []    Sit to Supine [] [] [] [x] [] [] [] [] [] [] []    Transfers    Sit to Stand [] [] [] [x] [] [] [] [] [] [] []    Bed to Chair [] [] [] [x] [] [] [] [] [] [] []    Stand to Sit [] [] [] [x] [] [] [] [] [] [] []     [] [] [] [] [] [] [] [] [] [] []    I=Independent, Mod I=Modified Independent, S=Supervision, SBA=Standby Assistance, CGA=Contact Guard Assistance,   Min=Minimal Assistance, Mod=Moderate Assistance, Max=Maximal Assistance, Total=Total Assistance, NT=Not Tested    BALANCE: Good Fair+ Fair Fair- Poor NT Comments   Sitting Static [x] [] [] [] [] []    Sitting Dynamic [x] [] [] [] [] []              Standing Static [] [x] [] [] [] []    Standing Dynamic [] [x] [] [] [] []      GAIT: I Mod I S SBA CGA Min Mod Max Total  NT x2 Comments:   Level of Assistance [] [] [] [x] [x] [] [] [] [] [] []    Distance 400 feet    DME None    Gait Quality Antalgic    Weightbearing Status      Stairs      I=Independent, Mod I=Modified Independent, S=Supervision, SBA=Standby Assistance, CGA=Contact Guard Assistance,   Min=Minimal Assistance, Mod=Moderate Assistance, Max=Maximal Assistance, Total=Total Assistance, NT=Not Tested    PLAN:   ACUTE PHYSICAL THERAPY GOALS:   (Developed with and agreed upon by patient and/or caregiver.)  (1.) Bj Ashford will move from supine to sit and sit to supine , scoot up and down, and roll side to side with MODIFIED INDEPENDENCE within 7 treatment day(s). (2.) Bj Ashford will transfer from bed to chair and chair to bed with MODIFIED INDEPENDENCE using the least restrictive device within 7 treatment day(s). (3.) Bj Ashford will ambulate with MODIFIED INDEPENDENCE for 1000+ feet with the least restrictive device within 7 treatment day(s). (4.) Bj Ashford will perform standing static and dynamic balance activities x 25 minutes with MODIFIED INDEPENDENCE to improve safety and activity tolerance within 7 treatment day(s). (5.) Bj Ashford will ascend and descend 3 stairs using no hand rail(s) with MODIFIED INDEPENDENCE to improve functional mobility and safety within 7 treatment day(s). (6.) Bj Ashford will perform therapeutic exercises x 20 min for HEP with INDEPENDENCE to improve strength, endurance, and functional mobility within 7 treatment day(s). FREQUENCY AND DURATION: 3 times/week for duration of hospital stay or until stated goals are met, whichever comes first.    TREATMENT:   TREATMENT:   Therapeutic Activity (8 Minutes): Therapeutic activity included Transfer Training, Ambulation on level ground, Sitting balance , and Standing balance to improve functional Activity tolerance, Balance, Coordination, Mobility, and Strength.     TREATMENT GRID:  N/A    AFTER TREATMENT PRECAUTIONS: Bed/Chair Locked, Call light within reach, Chair, Needs within reach, and RN notified    INTERDISCIPLINARY COLLABORATION:  RN/ PCT and PT/ PTA    EDUCATION:      TIME IN/OUT:  Time In: 1325  Time Out: 1700 [facility]  Minutes: Teodoro Curiel, PT

## 2022-07-26 NOTE — PROGRESS NOTES
TRANSFER - IN REPORT:    Verbal report received from [facility] on [place]  being received from PACU for routine progression of patient care      Report consisted of patient's Situation, Background, Assessment and   Recommendations(SBAR). Information from the following report(s) Nurse Handoff Report was reviewed with the receiving nurse. Opportunity for questions and clarification was provided. Assessment completed upon patient's arrival to unit and care assumed.

## 2022-07-26 NOTE — PERIOP NOTE
Procedure cancelled per Dr Taryn Jo due to high INR of 3.2 per ISTAT-pt aware and voiced understanding-report called to [facility] verb understanding of all info given/pt back to inpt rm.  Via pt transport team/thogan,rn

## 2022-07-26 NOTE — PROGRESS NOTES
[facility] CARDIOLOGY PROGRESS NOTE           7/26/2022 10:56 AM    Admit Date: 7/23/2022      Subjective:   Patient denies any active chest pain or dyspnea. Renal function continues to improve. Urine is clear. Plans for cystoscopy today. Remains anemic. ROS:  Cardiovascular:  As noted above    Objective:      Vitals:    07/25/22 1915 07/25/22 2303 07/26/22 0313 07/26/22 0745   BP: 125/69 100/64 115/61 107/62   Pulse: 81 77 74 72   Resp: 18 17 18 20   Temp: 100.3 °F (37.9 °C) 99.3 °F (37.4 °C) 98.6 °F (37 °C) 99.3 °F (37.4 °C)   TempSrc: Oral Oral Oral Oral   SpO2: 94% 92% 94% 95%   Weight:   204 lb 2.3 oz (92.6 kg)    Height:           Physical Exam:  General-No Acute Distress  Neck- supple, no JVD  CV- regular rate and rhythm no MRG  Lung- clear bilaterally  Abd- soft, nontender, nondistended  Ext- no edema bilaterally. Skin- warm and dry      Data Review:   Recent Labs     07/26/22  0540 07/25/22  1827 07/25/22  0441 07/24/22  1841 07/24/22  0754 07/24/22  0237 07/23/22  1939   WBC  --   --  9.9  --  13.3*  --  14.5*   HGB 7.6* 8.3* 7.4*   < > 8.5*   < > 9.2*   HCT 25.6* 28.4* 25.1*   < > 28.7*   < > 29.6*   MCV  --   --  91.3  --  90.3  --  87.8   PLT  --   --  385  --  397  --  412    < > = values in this interval not displayed. Recent Labs     07/26/22  0540 07/25/22  0441    142   K 3.7 3.6   * 110*   CO2 22 24   BUN 15 21   CREATININE 1.20 1.50   GLUCOSE 78 82   CALCIUM 8.1* 7.9*   PROT  --  5.4*   LABALBU  --  2.2*   BILITOT  --  0.4   ALKPHOS  --  64   AST  --  24   ALT  --  13   LABGLOM >60 50*   GFRAA >60 >60   GLOB  --  3.2       No results for input(s): CKTOTAL, CKMB, CKMBINDEX, DDIMER, TROPONINI in the last 720 hours. Assessment/Plan:     Active Hospital Problems    Chronic systolic (congestive) heart failure (HCC)  Renal function now stable. Hold further hydration to avoid volume overload. Continue Toprol.   Has not been able to tolerate ACE/ARB/Entresto due to low blood pressure in the past.      CAP (community acquired pneumonia)  Defer management to primary team.        CAD, multiple vessel  No angina. Recent coronary artery bypass grafting. Continue aspirin. Acute kidney injury ([address]  Improved. Stop hydration. Ventricular tachycardia [facility])  Recent hospital admissions with complex ventricular arrhythmias. No recent events on mexiletine and amiodarone. Continue current doses. Intracardiac thrombus  Holding Coumadin due to anemia and supratherapeutic INR. Resume when anemia stable and bleeding risk low. Hydronephrosis  Await upcoming cystoscopy.                 Elvia Castillo MD

## 2022-07-26 NOTE — PROGRESS NOTES
CM met with patient to discuss d/c plan and needs. Patient alert/orient x4. Patient verified demographic no changes. Patient verified PCP and insurance. Patient voiced no concerns about purchasing or affording medications. Patient states he lives alone in a one level home with three steps to enter into the home. Patient states he's independent with his ADL's and has no DME's in the home. Patient states he do has family / friends support. States that his daughter lives in [facility] and brother / father lives near by. Patient states he has no hx with HH / rehab. States his family / friend will transport him to his appointment. PT has recommended outpatient therapy. CM met with patient concerning outpatient therapy, patient has declined. CM will continue to follow any needs or concerns that may occur.               07/26/22 0906   Service Assessment   Patient Orientation Alert and Oriented;Person;Place;Situation;Self   Cognition Alert   History Provided By Patient   Primary [address] is: Named in [address] [place] [address]   PCP Verified by CM Yes   Last Visit to PCP Within last 3 months   Prior Functional Level Independent in ADLs/IADLs   Current Functional Level Independent in ADLs/IADLs   Can patient return to prior living arrangement Yes   Ability to make needs known: Good   Family able to assist with home care needs: Yes   Would you like for me to discuss the discharge plan with any other family members/significant others, and if so, who? Yes   Social/Functional History   Lives With Alone   Type of [address] [facility] One level   [facility] [address] to enter without rails   Entrance Stairs - Number of Steps 3   Bathroom Shower/Tub Tub/Shower unit   [facility]   Receives Help From Friend(s); Family   ADL Assistance Independent   Homemaking Assistance Independent   Homemaking Responsibilities Yes   Ambulation Assistance Independent   Transfer Assistance Independent   Active  Yes   Mode of Transportation Car   Discharge Planning   Type of [place] Alone   Current Services Prior To Admission None   Potential Assistance Needed N/A   DME Ordered? No   Potential Assistance Purchasing Medications No   Type of Home Care Services None   Patient expects to be discharged to: House   One/Two Story Residence One story   History of falls? 0   Services At/After Discharge   [employer] Information Provided?  No   Mode of Transport at Discharge Other (see comment)   Confirm Follow Up Transport Friends

## 2022-07-27 LAB
ANION GAP SERPL CALC-SCNC: 8 MMOL/L (ref 7–16)
BUN SERPL-MCNC: 15 MG/DL (ref 8–23)
CALCIUM SERPL-MCNC: 8.7 MG/DL (ref 8.3–10.4)
CHLORIDE SERPL-SCNC: 108 MMOL/L (ref 98–107)
CO2 SERPL-SCNC: 26 MMOL/L (ref 21–32)
CREAT SERPL-MCNC: 1.3 MG/DL (ref 0.8–1.5)
GLUCOSE SERPL-MCNC: 91 MG/DL (ref 65–100)
POTASSIUM SERPL-SCNC: 3.7 MMOL/L (ref 3.5–5.1)
SODIUM SERPL-SCNC: 142 MMOL/L (ref 138–145)

## 2022-07-27 PROCEDURE — 99231 SBSQ HOSP IP/OBS SF/LOW 25: CPT | Performed by: NURSE PRACTITIONER

## 2022-07-27 PROCEDURE — 6370000000 HC RX 637 (ALT 250 FOR IP): Performed by: STUDENT IN AN ORGANIZED HEALTH CARE EDUCATION/TRAINING PROGRAM

## 2022-07-27 PROCEDURE — 6360000002 HC RX W HCPCS: Performed by: INTERNAL MEDICINE

## 2022-07-27 PROCEDURE — 2580000003 HC RX 258: Performed by: INTERNAL MEDICINE

## 2022-07-27 PROCEDURE — 2500000003 HC RX 250 WO HCPCS: Performed by: INTERNAL MEDICINE

## 2022-07-27 PROCEDURE — 80048 BASIC METABOLIC PNL TOTAL CA: CPT

## 2022-07-27 PROCEDURE — 6370000000 HC RX 637 (ALT 250 FOR IP): Performed by: INTERNAL MEDICINE

## 2022-07-27 PROCEDURE — 1100000000 HC RM PRIVATE

## 2022-07-27 PROCEDURE — 97530 THERAPEUTIC ACTIVITIES: CPT

## 2022-07-27 PROCEDURE — 36415 COLL VENOUS BLD VENIPUNCTURE: CPT

## 2022-07-27 PROCEDURE — 99232 SBSQ HOSP IP/OBS MODERATE 35: CPT | Performed by: INTERNAL MEDICINE

## 2022-07-27 RX ORDER — PHYTONADIONE 5 MG/1
5 TABLET ORAL ONCE
Status: COMPLETED | OUTPATIENT
Start: 2022-07-27 | End: 2022-07-27

## 2022-07-27 RX ADMIN — EMPAGLIFLOZIN 10 MG: 10 TABLET, FILM COATED ORAL at 17:59

## 2022-07-27 RX ADMIN — DOXYCYCLINE 100 MG: 100 INJECTION, POWDER, LYOPHILIZED, FOR SOLUTION INTRAVENOUS at 21:51

## 2022-07-27 RX ADMIN — QUETIAPINE FUMARATE 100 MG: 100 TABLET ORAL at 21:51

## 2022-07-27 RX ADMIN — MEXILETINE HYDROCHLORIDE 150 MG: 150 CAPSULE ORAL at 21:51

## 2022-07-27 RX ADMIN — SODIUM CHLORIDE, PRESERVATIVE FREE 10 ML: 5 INJECTION INTRAVENOUS at 09:19

## 2022-07-27 RX ADMIN — PHYTONADIONE 5 MG: 5 TABLET ORAL at 09:36

## 2022-07-27 RX ADMIN — ASPIRIN 81 MG: 81 TABLET, CHEWABLE ORAL at 09:18

## 2022-07-27 RX ADMIN — DOXYCYCLINE 100 MG: 100 INJECTION, POWDER, LYOPHILIZED, FOR SOLUTION INTRAVENOUS at 09:17

## 2022-07-27 RX ADMIN — MEXILETINE HYDROCHLORIDE 150 MG: 150 CAPSULE ORAL at 14:28

## 2022-07-27 RX ADMIN — MEXILETINE HYDROCHLORIDE 150 MG: 150 CAPSULE ORAL at 09:18

## 2022-07-27 RX ADMIN — METOPROLOL SUCCINATE 25 MG: 25 TABLET, FILM COATED, EXTENDED RELEASE ORAL at 21:49

## 2022-07-27 RX ADMIN — AMIODARONE HYDROCHLORIDE 400 MG: 200 TABLET ORAL at 09:18

## 2022-07-27 RX ADMIN — METOPROLOL SUCCINATE 25 MG: 25 TABLET, FILM COATED, EXTENDED RELEASE ORAL at 09:18

## 2022-07-27 RX ADMIN — SODIUM CHLORIDE, PRESERVATIVE FREE 10 ML: 5 INJECTION INTRAVENOUS at 21:58

## 2022-07-27 RX ADMIN — CEFTRIAXONE 1000 MG: 1 INJECTION, POWDER, FOR SOLUTION INTRAMUSCULAR; INTRAVENOUS at 23:19

## 2022-07-27 RX ADMIN — ACETAMINOPHEN 650 MG: 325 TABLET ORAL at 19:33

## 2022-07-27 RX ADMIN — MELATONIN 1000 UNITS: at 09:18

## 2022-07-27 NOTE — PROGRESS NOTES
Hospitalist Progress Note   Admit Date:  2022  7:53 PM   Name:  Kim Pappas   Age:  58 y.o. Sex:  male  :  1960   MRN:  [id number]   Room:  608/01    Presenting Complaint: Shortness of Breath and Flank Pain     Reason(s) for Admission: Hypoxemia [R09.02]  Ureteral obstruction, right [N13.5]  Acute kidney injury ([address] [N17.9]  Poisoning by warfarin sodium, accidental or unintentional, initial encounter [T45.511A]  Urinary tract infection with hematuria, site unspecified [N39.0, R31.9]  Acute kidney injury (BEN) with acute tubular necrosis (ATN) [facility]) [N17.0]     Hospital Course & Interval History:   Kim Pappas is a 58 y.o. CM with a PMH of MV CAD s/p CABG x2 months ago, severe LVSD EF 25-30%, Large protruding apical thrombus on chronic OAC with warfarin, tob abuse in past, anemia, and recurrent VTACH s/p ICD on multiple anti-arrhythmics admitted with community-acquired pneumonia, warfarin coagulopathy, BEN and R hydroureter, likely obstructive process. Started on empiric antibiotics. Warfarin held. Urology consulted and plan for cystoscopy. Subjective/24hr Events (22): Patient is seen at the bedside. Reports feeling better. He spiked fever of 101.1. His INR is still elevated 3. He declined HHT. Denies chest pain, palpitation, nausea, vomiting or abdominal pain. Plan for cystoscopy today.     Assessment & Plan:     Acute kidney injury with acute tubular necrosis   R hydroureter, suspect obstructive process   - CT showed thickening of R renal pelvis extending into R proximal ureter  - Cystoscopy  with right retrograde pyelogram scheduled for , if INR <1.5   Hold anticoagulation at this time  - sCr 2.30 --> 1.20 > 1.3  Urology following     Acute CAP   - CXR reviewed  - Continue cephalosporin and doxycycline.  - Bcx NGTD     Warfarin Coagulopathy: Corrected  Giving a dose of vitamin k  - Monitor PT/INR  - For now leave off anticoagulation due to scheduled urologic intervention.  - Consult PharmD when [facility] is resumed  - INR 3.5  today      CAD s/p CABG  Bilat bloody pleural effusions  - Continue home meds  - Loculated effusions noted on CT; d/w Radiologist, Dr. Donovan Vines; thought to be related to recent CABG     Chronic Severe LVSD EF 25%  - appears clinically compensated  Started on empagliflozin   - Appreciate cardiology follow-up and recommendations. Recurrent Vtach   - s/p ICD  - on antiarrhythmic rx dual therapy with amiodarone and Mexitil-continue per cardiology. Apical thrombus  - we will resume anticoagulation when clinically appropriate  - Continue to hold warfarin in preparation for cystoscopy     Anemia/ABLA   - acute blood loss anemia suspected secondary to coagulopathy with hematuria  - H/H stable     HTN   - Takes Toprol BID at home     Hyperlipidemia  - continue home med     Hx depression  - continue home meds       Discharge Planning: Anticipate discharge in 24 to 48 hours      Diet:  ADULT DIET; Regular  DVT PPx: SCD  Code status: Full Code    Hospital Problems:  Principal Problem:    Acute kidney injury (BEN) with acute tubular necrosis (ATN) (HCC)  Active Problems:    CAD, multiple vessel    Chronic systolic (congestive) heart failure (HCC)    CAP (community acquired pneumonia)    Acute kidney injury ([facility]    Ureteral obstruction, right    Gross hematuria    Right kidney mass    Intracardiac thrombus    Ventricular tachycardia (HCC)    Hydronephrosis  Resolved Problems:    * No resolved hospital problems.  *      Objective:   Patient Vitals for the past 24 hrs:   Temp Pulse Resp BP SpO2   07/27/22 1121 100 °F (37.8 °C) 88 24 112/66 93 %   07/27/22 0728 99.5 °F (37.5 °C) 89 20 117/68 91 %   07/27/22 0343 98.4 °F (36.9 °C) 74 17 97/60 90 %   07/27/22 0045 99.3 °F (37.4 °C) -- -- -- --   07/26/22 2327 (!) 101.1 °F (38.4 °C) 95 18 124/62 90 %   07/26/22 1936 99.7 °F (37.6 °C) 79 18 129/72 95 %   07/26/22 1734 100.4 °F (38 °C) 74 15 130/68 96 %   07/26/22 1550 99.1 °F (37.3 °C) 77 20 118/67 95 %       Oxygen Therapy  SpO2: 93 %  Pulse Oximetry Type: Intermittent  Pulse via Oximetry: 74 beats per minute  Pulse Oximeter Device Mode: Intermittent  Pulse Oximeter Device Location: Left, Finger  O2 Device: None (Room air)  Skin Assessment: Clean, dry, & intact  Skin Protection for O2 Device: N/A  Blood Gas  Performed?: No  Oxygen Therapy: None (Room air)    Estimated body mass index is 29.85 kg/m² as calculated from the following:    Height as of this encounter: 5' 9\" (1.753 m). Weight as of this encounter: 202 lb 2.6 oz (91.7 kg). Intake/Output Summary (Last 24 hours) at 7/27/2022 1544  Last data filed at 7/27/2022 1121  Gross per 24 hour   Intake --   Output 1100 ml   Net -1100 ml         Physical Exam:   Blood pressure 112/66, pulse 88, temperature 100 °F (37.8 °C), temperature source Oral, resp. rate 24, height 5' 9\" (1.753 m), weight 202 lb 2.6 oz (91.7 kg), SpO2 93 %. General:    NAD  Head:  Normocephalic, atraumatic  Eyes:  Sclerae appear normal.  Pupils equally round. ENT:  Nares appear normal, no drainage. Moist oral mucosa  Neck:  No restricted ROM. Trachea midline   CV:   RRR. No m/r/g. Lungs: No wheezing. No tachypnea. Abdomen:   Soft, nondistended. Extremities: No cyanosis or clubbing. Skin:     No rashes and normal coloration. Warm and dry. Neuro:  CN II-XII grossly intact. A&Ox3  Psych:  Normal mood and affect.       I have reviewed ordered lab tests and independently visualized imaging below:    Recent Labs:  Recent Results (from the past 48 hour(s))   Hemoglobin and Hematocrit    Collection Time: 07/25/22  6:27 PM   Result Value Ref Range    Hemoglobin 8.3 (L) 13.6 - 17.2 g/dL    Hematocrit 28.4 (L) 41.1 - 50.3 %   Protime-INR    Collection Time: 07/26/22  5:40 AM   Result Value Ref Range    Protime 25.3 (H) 12.6 - 14.5 sec    INR 2.2     Basic Metabolic Panel    Collection Time: 07/26/22  5:40 AM   Result Value Ref Range    Sodium 142 136 - 145 mmol/L    Potassium 3.7 3.5 - 5.1 mmol/L    Chloride 112 (H) 98 - 107 mmol/L    CO2 22 21 - 32 mmol/L    Anion Gap 8 7 - 16 mmol/L    Glucose 78 65 - 100 mg/dL    BUN 15 8 - 23 MG/DL    Creatinine 1.20 0.8 - 1.5 MG/DL    GFR African American >60 >60 ml/min/1.73m2    GFR Non- >60 >60 ml/min/1.73m2    Calcium 8.1 (L) 8.3 - 10.4 MG/DL   Hemoglobin and Hematocrit    Collection Time: 07/26/22  5:40 AM   Result Value Ref Range    Hemoglobin 7.6 (L) 13.6 - 17.2 g/dL    Hematocrit 25.6 (L) 41.1 - 50.3 %   POCT INR    Collection Time: 07/26/22  7:06 PM   Result Value Ref Range    POC Protime 36.1 (H) 9.6 - 11.6 SECS    POC INR 3.2 (H) 0.9 - 1.2     Basic Metabolic Panel    Collection Time: 07/27/22  6:31 AM   Result Value Ref Range    Sodium 142 138 - 145 mmol/L    Potassium 3.7 3.5 - 5.1 mmol/L    Chloride 108 (H) 98 - 107 mmol/L    CO2 26 21 - 32 mmol/L    Anion Gap 8 7 - 16 mmol/L    Glucose 91 65 - 100 mg/dL    BUN 15 8 - 23 MG/DL    Creatinine 1.30 0.8 - 1.5 MG/DL    GFR African American >60 >60 ml/min/1.73m2    GFR Non- 59 (L) >60 ml/min/1.73m2    Calcium 8.7 8.3 - 10.4 MG/DL       Other Studies:  CT ABDOMEN PELVIS HEMATURIA Additional Contrast? Radiologist Recommendation   Final Result   1. Urothelial thickening involving the right renal pelvis and extending into   the right proximal ureter. The degree of thickening is concerning for urothelial   malignancy. 2.  Evaluation of the urinary bladder is limited due to underdistention from   excreted contrast. No bladder mass is evident. 3.  Bilateral hemothoraces, possibly related to recent CABG. The pleural   effusions appear fairly loculated. US RETROPERITONEAL COMPLETE   Final Result      1. Mild prominence of the right renal collecting system. 2. Left kidney appears within normal limits. No left hydronephrosis. Vascular duplex lower extremity venous bilateral   Final Result      1.  No evidence of deep vein thrombosis. XR CHEST PORTABLE   Final Result   No significant change. CT ABDOMEN PELVIS RENAL STONE Additional Contrast? None   Final Result      1. Mild right hydronephrosis and proximal hydroureter. No obstructive etiology   or definite ureteral calculus is identified on this exam. There is high density   material within the right renal collecting system. Findings raise the question   of blood products, although neoplastic process is not entirely excluded. Follow-up recommended. 2. High density fluid in the gallbladder, could be secondary to biliary sludge. 3. No evidence of colitis, diverticulitis or bowel obstruction. 4. Consolidation/infiltrate in the visualized left lung base, captured at the   periphery of the imaging field of view and not well evaluated. 5. Overall sensitivity is limited without the benefit of IV contrast.      XR CHEST PORTABLE   Final Result   Mildly increased left lower lobe airspace opacities with small bilateral pleural   effusions. Differential considerations include atelectasis, pneumonia, or   asymmetric pulmonary edema.                 Current Meds:  Current Facility-Administered Medications   Medication Dose Route Frequency    empagliflozin (JARDIANCE) tablet 10 mg  10 mg Oral Daily    morphine injection 2 mg  2 mg IntraVENous Q4H PRN    amiodarone (CORDARONE) tablet 400 mg  400 mg Oral BID    aspirin chewable tablet 81 mg  81 mg Oral Daily    metoprolol succinate (TOPROL XL) extended release tablet 25 mg  25 mg Oral BID    mexiletine (MEXITIL) capsule 150 mg  150 mg Oral TID    QUEtiapine (SEROQUEL) tablet 100 mg  100 mg Oral Nightly    Vitamin D (CHOLECALCIFEROL) tablet 1,000 Units  1,000 Units Oral Daily    sodium chloride flush 0.9 % injection 5-40 mL  5-40 mL IntraVENous 2 times per day    sodium chloride flush 0.9 % injection 5-40 mL  5-40 mL IntraVENous PRN    0.9 % sodium chloride infusion   IntraVENous PRN ondansetron (ZOFRAN-ODT) disintegrating tablet 4 mg  4 mg Oral Q8H PRN    Or    ondansetron (ZOFRAN) injection 4 mg  4 mg IntraVENous Q6H PRN    polyethylene glycol (GLYCOLAX) packet 17 g  17 g Oral Daily PRN    acetaminophen (TYLENOL) tablet 650 mg  650 mg Oral Q6H PRN    Or    acetaminophen (TYLENOL) suppository 650 mg  650 mg Rectal Q6H PRN    cefTRIAXone (ROCEPHIN) 1000 mg IVPB in NS 50ml minibag  1,000 mg IntraVENous Q24H    HYDROcodone homatropine (HYCODAN) 5-1.5 MG/5ML solution 5 mL  5 mL Oral Q4H PRN    guaiFENesin-dextromethorphan (ROBITUSSIN DM) 100-10 MG/5ML syrup 5 mL  5 mL Oral Q4H PRN    oxyCODONE (ROXICODONE) immediate release tablet 5 mg  5 mg Oral Q4H PRN    doxycycline (VIBRAMYCIN) 100 mg in sodium chloride 0.9 % 100 mL IVPB  100 mg IntraVENous Q12H       Signed:  King Lowe MD    Part of this note may have been written by using a voice dictation software. The note has been proof read but may still contain some grammatical/other typographical errors.

## 2022-07-27 NOTE — PROGRESS NOTES
Patient were discussed in [address] [facility] team meeting this AM.  Patient walking 400 per PT. Patient has declined HH. No needs has been identified at this time. CM will continue to follow / monitor for any needs, concerns or questions that may occur.

## 2022-07-27 NOTE — PROGRESS NOTES
Admit Date: 7/23/2022      Subjective:     Owens Fleischer is resting. Voiding well. VSS. INR up to 3.2. Objective:     Patient Vitals for the past 8 hrs:   BP Temp Temp src Pulse Resp SpO2 Weight   07/27/22 0728 117/68 99.5 °F (37.5 °C) Oral 89 20 91 % --   07/27/22 0343 97/60 98.4 °F (36.9 °C) -- 74 17 90 % 202 lb 2.6 oz (91.7 kg)     07/27 0701 - 07/27 1900  In: -   Out: 650 [Urine:650]  07/25 1901 - 07/27 0700  In: -   Out: 4498 [Urine:2775]    Physical Exam:  GENERAL ASSESSMENT: alert, oriented to person, place and time, no acute distress Chest: clear to auscultation  CVS exam: normal rate, regular rhythm, normal S1, S2  ABDOMEN: soft, non tender  Neurological exam reveals alert, oriented, normal speech    Data Review   Recent Results (from the past 24 hour(s))   POCT INR    Collection Time: 07/26/22  7:06 PM   Result Value Ref Range    POC Protime 36.1 (H) 9.6 - 11.6 SECS    POC INR 3.2 (H) 0.9 - 1.2     Basic Metabolic Panel    Collection Time: 07/27/22  6:31 AM   Result Value Ref Range    Sodium 142 138 - 145 mmol/L    Potassium 3.7 3.5 - 5.1 mmol/L    Chloride 108 (H) 98 - 107 mmol/L    CO2 26 21 - 32 mmol/L    Anion Gap 8 7 - 16 mmol/L    Glucose 91 65 - 100 mg/dL    BUN 15 8 - 23 MG/DL    Creatinine 1.30 0.8 - 1.5 MG/DL    GFR African American >60 >60 ml/min/1.73m2    GFR Non- 59 (L) >60 ml/min/1.73m2    Calcium 8.7 8.3 - 10.4 MG/DL       Assessment:     Principal Problem:    Acute kidney injury (BEN) with acute tubular necrosis (ATN) ([facility])  Active Problems:    CAD, multiple vessel    Chronic systolic (congestive) heart failure (HCC)    CAP (community acquired pneumonia)    Acute kidney injury ([facility] [address]    Ureteral obstruction, right    Gross hematuria    Right kidney mass    Intracardiac thrombus    Ventricular tachycardia (HCC)    Hydronephrosis  Resolved Problems:    * No resolved hospital problems.  *    57 yo male with hx of cad, cabg and apical thrombus- on coumadin (currently held) admitted with cough, SOB,flank pain and episode of gross hematuria with UTI, CT showing  R hydroureter and hydronephrosis with no visible obstruction with high density material into the R renal pelvis, BEN. Cr improved, Hgb dropping. Urine is tea colored with no clots. Does have hx of tobacco use. CT hematuria protocol ordered 7/25- urothelial thickening involving the right renal pelvis and extending into the right proximal ureter, degree of thickening concerning for urothelial malignancy. scheduled for cystoscopy, right URS, right retrograde pyelogram on 7/26, this was cancelled d/t INR >2. INR up to 3.2. Plan: We will plan for cystoscopy, right URS, right retrograde pyelogram on Friday, 7/29, with Dr. Sabine Monsalve once INR corrected (and <1.5). I have messaged primary team regarding this info. Thank you for the opportunity to assist in the care of this patient. Signed By: LORENZA Rosas - CNP     July 27, 2022      [facility]    I have reviewed the above note. I agree with the HPI, exam, assessment and plan as outlined by the nurse practitioner. Diony Monsalve M.D.     [facility]  [place] [address] [facility]  Phone: [phone]  Fax: [phone]

## 2022-07-27 NOTE — PROGRESS NOTES
[facility] CARDIOLOGY PROGRESS NOTE           7/27/2022 6:35 AM    Admit Date: 7/23/2022      Subjective:   No chest or abdominal pain    Objective:      Vitals:    07/26/22 1936 07/26/22 2327 07/27/22 0045 07/27/22 0343   BP: 129/72 124/62  97/60   Pulse: 79 95  74   Resp: 18 18  17   Temp: 99.7 °F (37.6 °C) (!) 101.1 °F (38.4 °C) 99.3 °F (37.4 °C) 98.4 °F (36.9 °C)   TempSrc: Oral Oral Oral    SpO2: 95% 90%  90%   Weight:    202 lb 2.6 oz (91.7 kg)   Height:           Physical Exam:  General-No Acute Distress  Neck- supple, no JVD  CV- regular rate and rhythm no MRG  Lung- clear bilaterally  Abd- soft, nontender, nondistended  Ext- no edema bilaterally. Skin- warm and dry    Data Review:   Recent Labs     07/24/22  0754 07/24/22  1841 07/25/22  0441 07/25/22  1827 07/26/22  0540 07/26/22  1906     --  142  --  142  --    K 3.9  --  3.6  --  3.7  --    BUN 23  --  21  --  15  --    WBC 13.3*  --  9.9  --   --   --    HGB 8.5*   < > 7.4* 8.3* 7.6*  --    HCT 28.7*   < > 25.1* 28.4* 25.6*  --      --  385  --   --   --    INR  --    < > 1.6  --  2.2 3.2*    < > = values in this interval not displayed. Assessment/Plan:     Hematuria  ? Urothelial malignancy    Icm:  Recurrent VT, amiodarone (large dose) and  mexiletine  S/p cabg in May  Hx of LV thrombus, none on most recent echo  Ace allergy    Raul  Due to obstruction, resolved    Warfarin coagulopathy    Anemia    Pneumonia    ////    I think we can inch towards improved GDMT.   Will empagliflozin today    Marylin Mcdonnell MD  7/27/2022 6:35 AM

## 2022-07-27 NOTE — PROGRESS NOTES
Urology Update Note:     Procedure canceled today due to INR > 2. Will discuss with medicine team but ideally INR needs to be < 1.5 prior to procedure. We could re-attempt later this week if INR cooperates. OK to resume diet from urology standpoint. Diony Acevedo M.D.     [facility]  [address], [facility]  Phone: [phone]  Fax: [phone]

## 2022-07-28 ENCOUNTER — ANESTHESIA EVENT (OUTPATIENT)
Dept: SURGERY | Age: 62
DRG: 659 | End: 2022-07-28
Payer: MEDICARE

## 2022-07-28 ENCOUNTER — APPOINTMENT (OUTPATIENT)
Dept: CARDIAC REHAB | Age: 62
End: 2022-07-28
Payer: MEDICARE

## 2022-07-28 LAB
ANION GAP SERPL CALC-SCNC: 5 MMOL/L (ref 7–16)
BACTERIA SPEC CULT: NORMAL
BASOPHILS # BLD: 0.1 K/UL (ref 0–0.2)
BASOPHILS NFR BLD: 1 % (ref 0–2)
BUN SERPL-MCNC: 16 MG/DL (ref 8–23)
CALCIUM SERPL-MCNC: 8.5 MG/DL (ref 8.3–10.4)
CHLORIDE SERPL-SCNC: 108 MMOL/L (ref 98–107)
CO2 SERPL-SCNC: 28 MMOL/L (ref 21–32)
CREAT SERPL-MCNC: 1.2 MG/DL (ref 0.8–1.5)
DIFFERENTIAL METHOD BLD: ABNORMAL
EOSINOPHIL # BLD: 0.2 K/UL (ref 0–0.8)
EOSINOPHIL NFR BLD: 1 % (ref 0.5–7.8)
ERYTHROCYTE [DISTWIDTH] IN BLOOD BY AUTOMATED COUNT: 16.6 % (ref 11.9–14.6)
GLUCOSE SERPL-MCNC: 83 MG/DL (ref 65–100)
HCT VFR BLD AUTO: 26 % (ref 41.1–50.3)
HGB BLD-MCNC: 7.7 G/DL (ref 13.6–17.2)
IMM GRANULOCYTES # BLD AUTO: 0.1 K/UL (ref 0–0.5)
IMM GRANULOCYTES NFR BLD AUTO: 1 % (ref 0–5)
INR PPP: 1.7
LYMPHOCYTES # BLD: 1.9 K/UL (ref 0.5–4.6)
LYMPHOCYTES NFR BLD: 14 % (ref 13–44)
MAGNESIUM SERPL-MCNC: 1.7 MG/DL (ref 1.8–2.4)
MCH RBC QN AUTO: 26.2 PG (ref 26.1–32.9)
MCHC RBC AUTO-ENTMCNC: 29.6 G/DL (ref 31.4–35)
MCV RBC AUTO: 88.4 FL (ref 79.6–97.8)
MONOCYTES # BLD: 1.8 K/UL (ref 0.1–1.3)
MONOCYTES NFR BLD: 13 % (ref 4–12)
NEUTS SEG # BLD: 10.1 K/UL (ref 1.7–8.2)
NEUTS SEG NFR BLD: 71 % (ref 43–78)
NRBC # BLD: 0 K/UL (ref 0–0.2)
PHOSPHATE SERPL-MCNC: 2.9 MG/DL (ref 2.3–3.7)
PLATELET # BLD AUTO: 532 K/UL (ref 150–450)
PMV BLD AUTO: 9.7 FL (ref 9.4–12.3)
POTASSIUM SERPL-SCNC: 3.5 MMOL/L (ref 3.5–5.1)
PROTHROMBIN TIME: 20.1 SEC (ref 12.6–14.5)
RBC # BLD AUTO: 2.94 M/UL (ref 4.23–5.6)
SERVICE CMNT-IMP: NORMAL
SODIUM SERPL-SCNC: 141 MMOL/L (ref 138–145)
WBC # BLD AUTO: 14.2 K/UL (ref 4.3–11.1)

## 2022-07-28 PROCEDURE — 84100 ASSAY OF PHOSPHORUS: CPT

## 2022-07-28 PROCEDURE — 85610 PROTHROMBIN TIME: CPT

## 2022-07-28 PROCEDURE — 2500000003 HC RX 250 WO HCPCS: Performed by: INTERNAL MEDICINE

## 2022-07-28 PROCEDURE — 6370000000 HC RX 637 (ALT 250 FOR IP): Performed by: STUDENT IN AN ORGANIZED HEALTH CARE EDUCATION/TRAINING PROGRAM

## 2022-07-28 PROCEDURE — 83735 ASSAY OF MAGNESIUM: CPT

## 2022-07-28 PROCEDURE — 85025 COMPLETE CBC W/AUTO DIFF WBC: CPT

## 2022-07-28 PROCEDURE — 6370000000 HC RX 637 (ALT 250 FOR IP): Performed by: FAMILY MEDICINE

## 2022-07-28 PROCEDURE — 36415 COLL VENOUS BLD VENIPUNCTURE: CPT

## 2022-07-28 PROCEDURE — 6370000000 HC RX 637 (ALT 250 FOR IP): Performed by: INTERNAL MEDICINE

## 2022-07-28 PROCEDURE — 2580000003 HC RX 258: Performed by: INTERNAL MEDICINE

## 2022-07-28 PROCEDURE — 1100000000 HC RM PRIVATE

## 2022-07-28 PROCEDURE — 99232 SBSQ HOSP IP/OBS MODERATE 35: CPT | Performed by: UROLOGY

## 2022-07-28 PROCEDURE — 99231 SBSQ HOSP IP/OBS SF/LOW 25: CPT | Performed by: INTERNAL MEDICINE

## 2022-07-28 PROCEDURE — 6360000002 HC RX W HCPCS: Performed by: INTERNAL MEDICINE

## 2022-07-28 PROCEDURE — 80048 BASIC METABOLIC PNL TOTAL CA: CPT

## 2022-07-28 RX ORDER — POTASSIUM CHLORIDE 20 MEQ/1
40 TABLET, EXTENDED RELEASE ORAL ONCE
Status: COMPLETED | OUTPATIENT
Start: 2022-07-28 | End: 2022-07-28

## 2022-07-28 RX ORDER — LANOLIN ALCOHOL/MO/W.PET/CERES
400 CREAM (GRAM) TOPICAL DAILY
Status: DISCONTINUED | OUTPATIENT
Start: 2022-07-28 | End: 2022-07-30

## 2022-07-28 RX ADMIN — SODIUM CHLORIDE, PRESERVATIVE FREE 10 ML: 5 INJECTION INTRAVENOUS at 08:58

## 2022-07-28 RX ADMIN — ACETAMINOPHEN 650 MG: 325 TABLET ORAL at 16:51

## 2022-07-28 RX ADMIN — SODIUM CHLORIDE, PRESERVATIVE FREE 5 ML: 5 INJECTION INTRAVENOUS at 21:35

## 2022-07-28 RX ADMIN — AMIODARONE HYDROCHLORIDE 400 MG: 200 TABLET ORAL at 08:59

## 2022-07-28 RX ADMIN — AMIODARONE HYDROCHLORIDE 400 MG: 200 TABLET ORAL at 21:35

## 2022-07-28 RX ADMIN — HYDROCODONE BITARTRATE AND HOMATROPINE METHYLBROMIDE 5 ML: 5; 1.5 SOLUTION ORAL at 21:48

## 2022-07-28 RX ADMIN — CEFTRIAXONE 1000 MG: 1 INJECTION, POWDER, FOR SOLUTION INTRAMUSCULAR; INTRAVENOUS at 23:22

## 2022-07-28 RX ADMIN — MEXILETINE HYDROCHLORIDE 150 MG: 150 CAPSULE ORAL at 21:35

## 2022-07-28 RX ADMIN — METOPROLOL SUCCINATE 25 MG: 25 TABLET, FILM COATED, EXTENDED RELEASE ORAL at 08:59

## 2022-07-28 RX ADMIN — QUETIAPINE FUMARATE 100 MG: 100 TABLET ORAL at 21:35

## 2022-07-28 RX ADMIN — MEXILETINE HYDROCHLORIDE 150 MG: 150 CAPSULE ORAL at 13:49

## 2022-07-28 RX ADMIN — ASPIRIN 81 MG: 81 TABLET, CHEWABLE ORAL at 08:59

## 2022-07-28 RX ADMIN — Medication 400 MG: at 09:58

## 2022-07-28 RX ADMIN — DOXYCYCLINE 100 MG: 100 INJECTION, POWDER, LYOPHILIZED, FOR SOLUTION INTRAVENOUS at 09:01

## 2022-07-28 RX ADMIN — POTASSIUM CHLORIDE 40 MEQ: 1500 TABLET, EXTENDED RELEASE ORAL at 09:58

## 2022-07-28 RX ADMIN — MELATONIN 1000 UNITS: at 08:59

## 2022-07-28 RX ADMIN — MEXILETINE HYDROCHLORIDE 150 MG: 150 CAPSULE ORAL at 08:59

## 2022-07-28 RX ADMIN — DOXYCYCLINE 100 MG: 100 INJECTION, POWDER, LYOPHILIZED, FOR SOLUTION INTRAVENOUS at 21:35

## 2022-07-28 RX ADMIN — EMPAGLIFLOZIN 10 MG: 10 TABLET, FILM COATED ORAL at 08:58

## 2022-07-28 ASSESSMENT — PAIN SCALES - GENERAL
PAINLEVEL_OUTOF10: 0
PAINLEVEL_OUTOF10: 0

## 2022-07-28 ASSESSMENT — LIFESTYLE VARIABLES: SMOKING_STATUS: 1

## 2022-07-28 NOTE — PROGRESS NOTES
Admit Date: 7/23/2022      Subjective:     Nieves Cuenca is resting. Voiding well. VSS. INR down to 1.7 today. Objective:     Patient Vitals for the past 8 hrs:   BP Temp Pulse Resp SpO2 Weight   07/28/22 0801 98/65 (!) 100.8 °F (38.2 °C) 96 18 94 % --   07/28/22 0349 94/64 98.8 °F (37.1 °C) 72 -- 91 % 198 lb 13.7 oz (90.2 kg)       No intake/output data recorded.   07/26 1901 - 07/28 0700  In: -   Out: 2000 [Urine:2000]    Physical Exam:  GENERAL ASSESSMENT: alert, oriented to person, place and time, no acute distress Chest: clear to auscultation  CVS exam: normal rate, regular rhythm, normal S1, S2  ABDOMEN: soft, non tender  Neurological exam reveals alert, oriented, normal speech    Data Review   Recent Results (from the past 24 hour(s))   Basic Metabolic Panel    Collection Time: 07/28/22  5:36 AM   Result Value Ref Range    Sodium 141 138 - 145 mmol/L    Potassium 3.5 3.5 - 5.1 mmol/L    Chloride 108 (H) 98 - 107 mmol/L    CO2 28 21 - 32 mmol/L    Anion Gap 5 (L) 7 - 16 mmol/L    Glucose 83 65 - 100 mg/dL    BUN 16 8 - 23 MG/DL    Creatinine 1.20 0.8 - 1.5 MG/DL    GFR African American >60 >60 ml/min/1.73m2    GFR Non- >60 >60 ml/min/1.73m2    Calcium 8.5 8.3 - 10.4 MG/DL   CBC with Auto Differential    Collection Time: 07/28/22  5:36 AM   Result Value Ref Range    WBC 14.2 (H) 4.3 - 11.1 K/uL    RBC 2.94 (L) 4.23 - 5.6 M/uL    Hemoglobin 7.7 (L) 13.6 - 17.2 g/dL    Hematocrit 26.0 (L) 41.1 - 50.3 %    MCV 88.4 79.6 - 97.8 FL    MCH 26.2 26.1 - 32.9 PG    MCHC 29.6 (L) 31.4 - 35.0 g/dL    RDW 16.6 (H) 11.9 - 14.6 %    Platelets 376 (H) 603 - 450 K/uL    MPV 9.7 9.4 - 12.3 FL    nRBC 0.00 0.0 - 0.2 K/uL    Differential Type AUTOMATED      Seg Neutrophils 71 43 - 78 %    Lymphocytes 14 13 - 44 %    Monocytes 13 (H) 4.0 - 12.0 %    Eosinophils % 1 0.5 - 7.8 %    Basophils 1 0.0 - 2.0 %    Immature Granulocytes 1 0.0 - 5.0 %    Segs Absolute 10.1 (H) 1.7 - 8.2 K/UL    Absolute Lymph # 1.9 0.5 - 4.6 K/UL    Absolute Mono # 1.8 (H) 0.1 - 1.3 K/UL    Absolute Eos # 0.2 0.0 - 0.8 K/UL    Basophils Absolute 0.1 0.0 - 0.2 K/UL    Absolute Immature Granulocyte 0.1 0.0 - 0.5 K/UL   Magnesium    Collection Time: 07/28/22  5:36 AM   Result Value Ref Range    Magnesium 1.7 (L) 1.8 - 2.4 mg/dL   Phosphorus    Collection Time: 07/28/22  5:36 AM   Result Value Ref Range    Phosphorus 2.9 2.3 - 3.7 MG/DL   Protime-INR    Collection Time: 07/28/22  5:36 AM   Result Value Ref Range    Protime 20.1 (H) 12.6 - 14.5 sec    INR 1.7         Assessment:     Principal Problem:    Acute kidney injury (BEN) with acute tubular necrosis (ATN) ([facility])  Active Problems:    CAD, multiple vessel    Chronic systolic (congestive) heart failure (HCC)    CAP (community acquired pneumonia)    Acute kidney injury ([facility] [address]    Ureteral obstruction, right    Gross hematuria    Right kidney mass    Intracardiac thrombus    Ventricular tachycardia ([facility])    Hydronephrosis  Resolved Problems:    * No resolved hospital problems. *    55 yo male with hx of cad, cabg and apical thrombus- on coumadin (currently held) admitted with cough, SOB,flank pain and episode of gross hematuria with UTI, CT showing  R hydroureter and hydronephrosis with no visible obstruction with high density material into the R renal pelvis, BEN. Cr improved, Hgb dropping. Urine is tea colored with no clots. Does have hx of tobacco use. CT hematuria protocol ordered 7/25- urothelial thickening involving the right renal pelvis and extending into the right proximal ureter, degree of thickening concerning for urothelial malignancy. scheduled for cystoscopy, right URS, right retrograde pyelogram on 7/26, this was cancelled d/t INR >2. INR now down to 1.7. Plan: We will plan for cystoscopy, right URS, right retrograde pyelogram on Friday, 7/29 once INR corrected (and ideally <1.5). Please make NPO at midnight for procedure tomorrow    Will follow    . Diony TRACY Roxie Newton M.D.     [facility]  [address], [address] [facility]  Phone: [phone]  Fax: [phone]

## 2022-07-28 NOTE — PERIOP NOTE
6th floor staff informed of patient's procedure @3410 tomorrow (7/28) with Dr. Roxie Newton. Pre-op arrival time of 1600.

## 2022-07-28 NOTE — ANESTHESIA PRE PROCEDURE
Department of Anesthesiology  Preprocedure Note       Name:  Luis F Hawley   Age:  58 y.o.  :  1960                                          MRN:  [id number]         Date:  2022      Surgeon: Rhonda Reed):  Lenette Schirmer, MD    Procedure: Procedure(s):  CYSTOSCOPY,RIGHT URETEROSCOPY,RIGHT RETROGRADE PYELOGRAM    Medications prior to admission:   Prior to Admission medications    Medication Sig Start Date End Date Taking? Authorizing Provider   amiodarone (PACERONE) 400 MG tablet Take 1 tablet by mouth in the morning and 1 tablet before bedtime. Patient taking differently: Take 200 mg by mouth See Admin Instructions 2 tablets twice a day 22   Osiel Sher MD   mexiletine (MEXITIL) 150 MG capsule Take 1 capsule by mouth in the morning and 1 capsule at noon and 1 capsule before bedtime. 22   Osiel Sher MD   Vitamin D (CHOLECALCIFEROL) 25 MCG (1000 UT) TABS tablet Take 1,000 Units by mouth daily    Historical Provider, MD   Multiple Vitamins-Minerals (THERAPEUTIC MULTIVITAMIN-MINERALS) tablet Take 1 tablet by mouth daily    Historical Provider, MD   metoprolol succinate (TOPROL XL) 25 MG extended release tablet Take 1 tablet by mouth in the morning and at bedtime 22   Raffaele Rapp APRN - CNP   nitroGLYCERIN (NITROSTAT) 0.4 MG SL tablet Place 1 tablet under the tongue every 5 minutes as needed for Chest pain up to max of 3 total doses. If no relief after 1 dose, call 911.   Patient not taking: Reported on 2022   Osiel Sher MD   aspirin 81 MG chewable tablet Take 1 tablet by mouth daily  Patient not taking: Reported on 2022   LEISA Oswald   acetaminophen (TYLENOL) 325 mg tablet Take 2 tablets by mouth every 6 hours as needed for Pain 22   LEISA Oswald   Cetirizine HCl 10 MG CAPS Take by mouth as needed    Ar Automatic Reconciliation   QUEtiapine (SEROQUEL) 100 MG tablet Take 100 mg by mouth nightly  21   Ar Automatic Reconciliation   warfarin (COUMADIN) 5 MG tablet Take 1 to 1 1/2 tablet daily or as directed 12/3/21   Ar Automatic Reconciliation       Current medications:    No current facility-administered medications for this visit. No current outpatient medications on file.      Facility-Administered Medications Ordered in Other Visits   Medication Dose Route Frequency Provider Last Rate Last Admin    magnesium oxide (MAG-OX) tablet 400 mg  400 mg Oral Daily Darryl Dukes MD   400 mg at 07/28/22 0958    empagliflozin (JARDIANCE) tablet 10 mg  10 mg Oral Daily Tobin Mccoy MD   10 mg at 07/28/22 0858    morphine injection 2 mg  2 mg IntraVENous Q4H PRN LORENZA Donovan - FRANKIE        amiodarone (CORDARONE) tablet 400 mg  400 mg Oral BID Jennifer Pugh MD   400 mg at 07/28/22 4384    aspirin chewable tablet 81 mg  81 mg Oral Daily Jennifer Pugh MD   81 mg at 07/28/22 0859    [Held by provider] metoprolol succinate (TOPROL XL) extended release tablet 25 mg  25 mg Oral BID Jennifer Pugh MD   25 mg at 07/28/22 8268    mexiletine (MEXITIL) capsule 150 mg  150 mg Oral TID Jennifer Pugh MD   150 mg at 07/28/22 1349    QUEtiapine (SEROQUEL) tablet 100 mg  100 mg Oral Nightly Jennifer Pugh MD   100 mg at 07/27/22 2151    Vitamin D (CHOLECALCIFEROL) tablet 1,000 Units  1,000 Units Oral Daily Jennifer Pugh MD   1,000 Units at 07/28/22 0859    sodium chloride flush 0.9 % injection 5-40 mL  5-40 mL IntraVENous 2 times per day Jennifer Pugh MD   10 mL at 07/28/22 0858    sodium chloride flush 0.9 % injection 5-40 mL  5-40 mL IntraVENous PRN Jennifer Pugh MD        0.9 % sodium chloride infusion   IntraVENous PRN Jennifer Pugh MD        ondansetron (ZOFRAN-ODT) disintegrating tablet 4 mg  4 mg Oral Q8H PRN Jennifer Pugh MD        Or    ondansetron [facility]) injection 4 mg  4 mg IntraVENous Q6H PRN Jennifer Pugh MD        polyethylene glycol [facility]) packet 17 g  17 g Oral Daily PRN Dariel Allison MD        acetaminophen (TYLENOL) tablet 650 mg  650 mg Oral Q6H PRN Dariel Allison MD   650 mg at 07/28/22 1651    Or    acetaminophen (TYLENOL) suppository 650 mg  650 mg Rectal Q6H PRN Dariel Allison MD        cefTRIAXone (ROCEPHIN) 1000 mg IVPB in NS 50ml minibag  1,000 mg IntraVENous Q24H Wanda Crescencio, DO   Stopped at 07/28/22 0033    HYDROcodone homatropine (HYCODAN) 5-1.5 MG/5ML solution 5 mL  5 mL Oral Q4H PRN Muriel Catherine MD        guaiFENesin-dextromethorphan [facility] DM) 100-10 MG/5ML syrup 5 mL  5 mL Oral Q4H PRN Muriel Catherine MD   5 mL at 07/24/22 0251    oxyCODONE (ROXICODONE) immediate release tablet 5 mg  5 mg Oral Q4H PRN Muriel Catherine MD   5 mg at 07/24/22 0252    doxycycline (VIBRAMYCIN) 100 mg in sodium chloride 0.9 % 100 mL IVPB  100 mg IntraVENous Q12H Wanda Rodney, DO   Stopped at 07/28/22 1034       Allergies: Allergies   Allergen Reactions    Penicillins Swelling     Face swelling    Atorvastatin Other (See Comments)     itching    Evolocumab Other (See Comments)     Itching    Lisinopril Other (See Comments)    Rosuvastatin Other (See Comments)     itching       Problem List:    Patient Active Problem List   Diagnosis Code    Overweight (BMI 25.0-29. 9) E66.3    Coronary atherosclerosis of native coronary vessel I25.10    Benign prostatic hyperplasia with weak urinary stream N40.1, R39.12    History of 2019 novel coronavirus disease (COVID-19) Z86.16    Numbness of left foot R20.8    Primary insomnia F51.01    Chronic systolic congestive heart failure (HCC) I50.22    ICD (implantable cardioverter-defibrillator) in place Z95.810    Seasonal allergic rhinitis due to pollen J30.1    Smoking greater than 20 pack years F17.210    Refused influenza vaccine Z28.21    Chronic eczematous otitis externa of both ears H60.8X3    High risk medication use Z79.899    Dyslipidemia E78.5    Intracardiac thrombus I51.3  Ventricular tachycardia ([address] I47.2    COVID-19 vaccination refused Z28.21    Anemia D64.9    Chronic fatigue R53.82    Tobacco abuse Z72.0    Statin intolerance Z78.9    Defibrillator discharge Z45.02    COVID-19 U07.1    CAD, multiple vessel I25.10    S/P CABG x 3 Z95.1    Hypoxemia R09.02    Atelectasis J98.11    Acute kidney injury (BEN) with acute tubular necrosis (ATN) (HCC) N17.0    Chronic systolic (congestive) heart failure (HCC) I50.22    CAP (community acquired pneumonia) J18.9    Acute kidney injury (HCC) N17.9    Hydronephrosis N13.30    Ureteral obstruction, right N13.5    Gross hematuria R31.0    Right kidney mass N28.89       Past Medical History:        Diagnosis Date    Acute hypoxemic respiratory failure due to COVID-19 ([address] 10/9/2021    CAD (coronary artery disease)     heart attack 2005 stentS HEART    CHF (congestive heart failure) ([address] 9/27/2011    Chronic systolic heart failure (HCC) 10/2/2015    Coronary atherosclerosis of native coronary vessel 10/2/2015    Hyperlipidemia 10/2/2015    Hypoxemia requiring supplemental oxygen 10/6/2021    IGT (impaired glucose tolerance) 12/3/2017    Other ill-defined conditions(799.89)      blind left eye    Other ill-defined conditions(799.89)     cardiomyopathy    Pneumonia due to COVID-19 virus 11/13/2021    Resolved    Primary insomnia 6/7/2017    Psychiatric disorder     depression     Sepsis, unspecified 4/5/2011    Thromboembolus ([address]     thrombus in heart     Thrombus 10/2/2015       Past Surgical History:        Procedure Laterality Date    CARDIAC CATHETERIZATION      5 stents total    CARDIAC DEFIBRILLATOR PLACEMENT  2011    [employer]    CARDIAC PROCEDURE N/A 5/27/2022    LEFT HEART CATH / CORONARY ANGIOGRAPHY performed by Joseph Elizondo MD at [address] [place] [address] CATH LAB    COLONOSCOPY  12/2010    CORONARY ARTERY BYPASS GRAFT N/A 5/31/2022    CORONARY ARTERY BYPASS GRAFT (CABG X 3), [place] ; 04:41 AM    ALKPHOS 52 10/15/2021 07:37 AM    AST 24 07/25/2022 04:41 AM    ALT 13 07/25/2022 04:41 AM       POC Tests: No results for input(s): POCGLU, POCNA, POCK, POCCL, POCBUN, POCHEMO, POCHCT in the last 72 hours. Coags:   Lab Results   Component Value Date/Time    PROTIME 20.1 07/28/2022 05:36 AM    INR 1.7 07/28/2022 05:36 AM    APTT 43.0 07/25/2022 04:41 AM    APTT 115.3 10/09/2021 01:55 PM       HCG (If Applicable): No results found for: PREGTESTUR, PREGSERUM, HCG, HCGQUANT     ABGs: No results found for: PHART, PO2ART, EKK0ITY, DPV4GZH, BEART, [id number]     Type & Screen (If Applicable):  No results found for: LABABO, LABRH    Drug/Infectious Status (If Applicable):  No results found for: HIV, HEPCAB    COVID-19 Screening (If Applicable):   Lab Results   Component Value Date/Time    COVID19 Not detected 07/24/2022 07:26 AM           Anesthesia Evaluation  Patient summary reviewed  Airway: Mallampati: II  TM distance: >3 FB   Neck ROM: full  Mouth opening: > = 3 FB   Dental: normal exam   (+) edentulous      Pulmonary:normal exam    (+) pneumonia (H/o Covid PNA Oct/Nov 2021. Dx with PNA this admission and being treated with Cephalosporin and Doxycycline.):  decreased breath sounds: bilateral current smoker                          ROS comment: Bilateral loculated hemothoraces noted on CT scan, like from recent CABG   Cardiovascular:  Exercise tolerance: poor (<4 METS),   (+) pacemaker (Dual chamber ICD placed 2011. Had been receiving multiple shocks from his ICD in March 2022.):, past MI (NSTEMI in 2005):, CAD:, CABG/stent (Has had 5 stents.  CABG was 5/2022.):, dysrhythmias: SVT and ventricular tachycardia, CHF (HFrEF): systolic,         Rhythm: regular  Rate: normal                 ROS comment: Admitted in June 2022 (a month after CABG with VT storm and multiple ICD shocks, which was treated with Amiodarone and Mexitil)    Echo June 2022 showed LVEF 25-30% with moderate global hypokinesis, mild LAE Is on Coumadin for h/o LV thrombus     Neuro/Psych:   (+) depression/anxiety             GI/Hepatic/Renal:   (+) renal disease: ARF,          ROS comment: Obesity. Endo/Other:    (+) malignancy/cancer (Has urothelial thickening in proximal ureter and renal pelvis concerning for malignancy). Diabetes: Impaired fasting glucose. Abdominal:              PE comment: Deferred   Vascular: Other Findings:             Anesthesia Plan      general     ASA 4     (INR has been trending down and is 1.7 today.)  Induction: intravenous. arterial line    Anesthetic plan and risks discussed with patient. Use of blood products discussed with patient whom consented to blood products.                      Molly Diaz MD   7/28/2022

## 2022-07-28 NOTE — PROGRESS NOTES
Pt resting, denies needs at this time, NAD. Medicated with Tylenol for fever, see MAR. Hourly rounds completed. Bed in L/L position, call light and personal items within reach. Will continue to monitor and give bedside report to oncoming nurse.

## 2022-07-28 NOTE — PROGRESS NOTES
[facility] CARDIOLOGY PROGRESS NOTE           7/28/2022 10:32 AM    Admit Date: 7/23/2022      Subjective:   No cp or sob    bjective:      Vitals:    07/27/22 2145 07/27/22 2329 07/28/22 0349 07/28/22 0801   BP:  112/70 94/64 98/65   Pulse:  79 72 96   Resp:  18  18   Temp: (!) 100.7 °F (38.2 °C) 98.6 °F (37 °C) 98.8 °F (37.1 °C) (!) 100.8 °F (38.2 °C)   TempSrc: Oral Oral  Oral   SpO2:  92% 91% 94%   Weight:   198 lb 13.7 oz (90.2 kg)    Height:           Physical Exam:  General-No Acute Distress  Neck- supple, no JVD  CV- regular rate and rhythm no MRG  Lung- clear bilaterally  Abd- soft, nontender, nondistended  Ext- no edema bilaterally. Skin- warm and dry    Data Review:   Recent Labs     07/26/22  0540 07/26/22  1906 07/27/22  0631 07/28/22  0536     --  142 141   K 3.7  --  3.7 3.5   MG  --   --   --  1.7*   BUN 15  --  15 16   WBC  --   --   --  14.2*   HGB 7.6*  --   --  7.7*   HCT 25.6*  --   --  26.0*   PLT  --   --   --  532*   INR 2.2 3.2*  --  1.7       Assessment/Plan:     Fever  Hematuria,? Urothelial malignancy  Icm:  Recurrent VT, amiodarone (large dose) and  mexiletine  S/p cabg 5/2022  Hx of LV thrombus, none on most recent echo  Ace allergy  Raul, due to obstruction, resolved  Warfarin coagulopathy, resolved  Anemia, not due to iron deficiency per labs studies  Pneumonia    ////    No BB or acei/arb due to low BP  Cont. Epagliflozin  Cont. Amio and mexitil  Not on a statin, must be a reason?   Cysto pending      Bill Sweet MD  7/28/2022 10:32 AM

## 2022-07-28 NOTE — PROGRESS NOTES
Hospitalist Progress Note   Admit Date:  2022  7:53 PM   Name:  Bj Ashford   Age:  58 y.o. Sex:  male  :  1960   MRN:  [id number]   Room:  608/01    Presenting Complaint: Shortness of Breath and Flank Pain     Reason(s) for Admission: Hypoxemia [R09.02]  Ureteral obstruction, right [N13.5]  Acute kidney injury ([address] [N17.9]  Poisoning by warfarin sodium, accidental or unintentional, initial encounter [T45.511A]  Urinary tract infection with hematuria, site unspecified [N39.0, R31.9]  Acute kidney injury (BEN) with acute tubular necrosis (ATN) [facility]) [N17.0]     Hospital Course & Interval History:   Bj Ashford is a 58 y.o. CM with a PMH of MV CAD s/p CABG x2 months ago, severe LVSD EF 25-30%, Large protruding apical thrombus on chronic OAC with warfarin, tob abuse in past, anemia, and recurrent VTACH s/p ICD on multiple anti-arrhythmics admitted with community-acquired pneumonia, warfarin coagulopathy, BEN and R hydroureter, likely obstructive process. Started on empiric antibiotics. Warfarin held. Urology consulted and plan for cystoscopy. Subjective/24hr Events (22): Patient is seen at the bedside. Reports feeling better. He spiked fever of 101.1. His INR is trending down. He is resting comfortably on chair  Denies chest pain, palpitation, nausea, vomiting or abdominal pain.  Plan for cystoscopy tomorrow     Assessment & Plan:     Acute kidney injury with acute tubular necrosis   R hydroureter, suspect obstructive process   - CT showed thickening of R renal pelvis extending into R proximal ureter  - Cystoscopy  with right retrograde pyelogram scheduled for , if INR <1.5   Hold anticoagulation at this time  - sCr 2.30 --> 1.20 > 1.3  Urology following     Acute CAP   - CXR reviewed  - Continue cephalosporin and doxycycline.  - Bcx NGTD     Warfarin Coagulopathy: Corrected  Giving a dose of vitamin k  - Monitor PT/INR  - For now leave off anticoagulation due to scheduled urologic intervention.  - Consult PharmD when [facility] is resumed  - INR 1.7 today      CAD s/p CABG  Bilat bloody pleural effusions  - Continue home meds  - Loculated effusions noted on CT; d/w Radiologist, Dr. Roberto Silva; thought to be related to recent CABG     Chronic Severe LVSD EF 25%  - appears clinically compensated  Started on empagliflozin   - Appreciate cardiology follow-up and recommendations. Recurrent Vtach   - s/p ICD  - on antiarrhythmic rx dual therapy with amiodarone and Mexitil-continue per cardiology. Apical thrombus  - we will resume anticoagulation when clinically appropriate  - Continue to hold warfarin in preparation for cystoscopy     Anemia/ABLA   - acute blood loss anemia suspected secondary to coagulopathy with hematuria  - H/H stable     HTN   -Holding toprol BID due to hypotension     Hyperlipidemia  - continue home med     Hx depression  - continue home meds       Discharge Planning: Anticipate discharge in 2-3 days     Diet:  ADULT DIET; Regular  Diet NPO  DVT PPx: SCD due to anticipating procedure and holding coumadin  Code status: Full Code    Hospital Problems:  Principal Problem:    Acute kidney injury (BEN) with acute tubular necrosis (ATN) (HCC)  Active Problems:    CAD, multiple vessel    Chronic systolic (congestive) heart failure (HCC)    CAP (community acquired pneumonia)    Acute kidney injury ([address]    Ureteral obstruction, right    Gross hematuria    Right kidney mass    Intracardiac thrombus    Ventricular tachycardia (HCC)    Hydronephrosis  Resolved Problems:    * No resolved hospital problems.  *      Objective:   Patient Vitals for the past 24 hrs:   Temp Pulse Resp BP SpO2   07/28/22 1626 (!) 101.1 °F (38.4 °C) 81 16 120/72 94 %   07/28/22 1124 99.3 °F (37.4 °C) 79 18 97/60 93 %   07/28/22 0801 (!) 100.8 °F (38.2 °C) 96 18 98/65 94 %   07/28/22 0349 98.8 °F (37.1 °C) 72 -- 94/64 91 %   07/27/22 2329 98.6 °F (37 °C) 79 18 112/70 92 %   07/27/22 2145 (!) 100.7 °F (38.2 °C) -- -- -- --   07/27/22 1858 (!) 100.8 °F (38.2 °C) 95 17 113/70 94 %       Oxygen Therapy  SpO2: 94 %  Pulse Oximetry Type: Intermittent  Pulse via Oximetry: 74 beats per minute  Pulse Oximeter Device Mode: Intermittent  Pulse Oximeter Device Location: Left, Finger  O2 Device: None (Room air)  Skin Assessment: Clean, dry, & intact  Skin Protection for O2 Device: N/A  Blood Gas  Performed?: No  Oxygen Therapy: None (Room air)    Estimated body mass index is 29.37 kg/m² as calculated from the following:    Height as of this encounter: 5' 9\" (1.753 m). Weight as of this encounter: 198 lb 13.7 oz (90.2 kg). Intake/Output Summary (Last 24 hours) at 7/28/2022 1656  Last data filed at 7/28/2022 1626  Gross per 24 hour   Intake 240 ml   Output 2250 ml   Net -2010 ml         Physical Exam:   Blood pressure 120/72, pulse 81, temperature (!) 101.1 °F (38.4 °C), temperature source Oral, resp. rate 16, height 5' 9\" (1.753 m), weight 198 lb 13.7 oz (90.2 kg), SpO2 94 %. General:    NAD  Head:  Normocephalic, atraumatic  Eyes:  Sclerae appear normal.  Pupils equally round. ENT:  Nares appear normal, no drainage. Moist oral mucosa  Neck:  No restricted ROM. Trachea midline   CV:   RRR. No m/r/g. Lungs: No wheezing. No tachypnea. Abdomen:   Soft, nondistended. Extremities: No cyanosis or clubbing. Skin:     No rashes and normal coloration. Warm and dry. Neuro:  CN II-XII grossly intact. A&Ox3  Psych:  Normal mood and affect.       I have reviewed ordered lab tests and independently visualized imaging below:    Recent Labs:  Recent Results (from the past 48 hour(s))   POCT INR    Collection Time: 07/26/22  7:06 PM   Result Value Ref Range    POC Protime 36.1 (H) 9.6 - 11.6 SECS    POC INR 3.2 (H) 0.9 - 1.2     Basic Metabolic Panel    Collection Time: 07/27/22  6:31 AM   Result Value Ref Range    Sodium 142 138 - 145 mmol/L    Potassium 3.7 3.5 - 5.1 mmol/L    Chloride 108 (H) 98 - 107 mmol/L CO2 26 21 - 32 mmol/L    Anion Gap 8 7 - 16 mmol/L    Glucose 91 65 - 100 mg/dL    BUN 15 8 - 23 MG/DL    Creatinine 1.30 0.8 - 1.5 MG/DL    GFR African American >60 >60 ml/min/1.73m2    GFR Non- 59 (L) >60 ml/min/1.73m2    Calcium 8.7 8.3 - 10.4 MG/DL   Basic Metabolic Panel    Collection Time: 07/28/22  5:36 AM   Result Value Ref Range    Sodium 141 138 - 145 mmol/L    Potassium 3.5 3.5 - 5.1 mmol/L    Chloride 108 (H) 98 - 107 mmol/L    CO2 28 21 - 32 mmol/L    Anion Gap 5 (L) 7 - 16 mmol/L    Glucose 83 65 - 100 mg/dL    BUN 16 8 - 23 MG/DL    Creatinine 1.20 0.8 - 1.5 MG/DL    GFR African American >60 >60 ml/min/1.73m2    GFR Non- >60 >60 ml/min/1.73m2    Calcium 8.5 8.3 - 10.4 MG/DL   CBC with Auto Differential    Collection Time: 07/28/22  5:36 AM   Result Value Ref Range    WBC 14.2 (H) 4.3 - 11.1 K/uL    RBC 2.94 (L) 4.23 - 5.6 M/uL    Hemoglobin 7.7 (L) 13.6 - 17.2 g/dL    Hematocrit 26.0 (L) 41.1 - 50.3 %    MCV 88.4 79.6 - 97.8 FL    MCH 26.2 26.1 - 32.9 PG    MCHC 29.6 (L) 31.4 - 35.0 g/dL    RDW 16.6 (H) 11.9 - 14.6 %    Platelets 444 (H) 291 - 450 K/uL    MPV 9.7 9.4 - 12.3 FL    nRBC 0.00 0.0 - 0.2 K/uL    Differential Type AUTOMATED      Seg Neutrophils 71 43 - 78 %    Lymphocytes 14 13 - 44 %    Monocytes 13 (H) 4.0 - 12.0 %    Eosinophils % 1 0.5 - 7.8 %    Basophils 1 0.0 - 2.0 %    Immature Granulocytes 1 0.0 - 5.0 %    Segs Absolute 10.1 (H) 1.7 - 8.2 K/UL    Absolute Lymph # 1.9 0.5 - 4.6 K/UL    Absolute Mono # 1.8 (H) 0.1 - 1.3 K/UL    Absolute Eos # 0.2 0.0 - 0.8 K/UL    Basophils Absolute 0.1 0.0 - 0.2 K/UL    Absolute Immature Granulocyte 0.1 0.0 - 0.5 K/UL   Magnesium    Collection Time: 07/28/22  5:36 AM   Result Value Ref Range    Magnesium 1.7 (L) 1.8 - 2.4 mg/dL   Phosphorus    Collection Time: 07/28/22  5:36 AM   Result Value Ref Range    Phosphorus 2.9 2.3 - 3.7 MG/DL   Protime-INR    Collection Time: 07/28/22  5:36 AM   Result Value Ref Range Protime 20.1 (H) 12.6 - 14.5 sec    INR 1.7         Other Studies:  CT ABDOMEN PELVIS HEMATURIA Additional Contrast? Radiologist Recommendation   Final Result   1. Urothelial thickening involving the right renal pelvis and extending into   the right proximal ureter. The degree of thickening is concerning for urothelial   malignancy. 2.  Evaluation of the urinary bladder is limited due to underdistention from   excreted contrast. No bladder mass is evident. 3.  Bilateral hemothoraces, possibly related to recent CABG. The pleural   effusions appear fairly loculated. US RETROPERITONEAL COMPLETE   Final Result      1. Mild prominence of the right renal collecting system. 2. Left kidney appears within normal limits. No left hydronephrosis. Vascular duplex lower extremity venous bilateral   Final Result      1. No evidence of deep vein thrombosis. XR CHEST PORTABLE   Final Result   No significant change. CT ABDOMEN PELVIS RENAL STONE Additional Contrast? None   Final Result      1. Mild right hydronephrosis and proximal hydroureter. No obstructive etiology   or definite ureteral calculus is identified on this exam. There is high density   material within the right renal collecting system. Findings raise the question   of blood products, although neoplastic process is not entirely excluded. Follow-up recommended. 2. High density fluid in the gallbladder, could be secondary to biliary sludge. 3. No evidence of colitis, diverticulitis or bowel obstruction. 4. Consolidation/infiltrate in the visualized left lung base, captured at the   periphery of the imaging field of view and not well evaluated. 5. Overall sensitivity is limited without the benefit of IV contrast.      XR CHEST PORTABLE   Final Result   Mildly increased left lower lobe airspace opacities with small bilateral pleural   effusions.  Differential considerations include atelectasis, pneumonia, or   asymmetric pulmonary edema. Current Meds:  Current Facility-Administered Medications   Medication Dose Route Frequency    magnesium oxide (MAG-OX) tablet 400 mg  400 mg Oral Daily    empagliflozin (JARDIANCE) tablet 10 mg  10 mg Oral Daily    morphine injection 2 mg  2 mg IntraVENous Q4H PRN    amiodarone (CORDARONE) tablet 400 mg  400 mg Oral BID    aspirin chewable tablet 81 mg  81 mg Oral Daily    [Held by provider] metoprolol succinate (TOPROL XL) extended release tablet 25 mg  25 mg Oral BID    mexiletine (MEXITIL) capsule 150 mg  150 mg Oral TID    QUEtiapine (SEROQUEL) tablet 100 mg  100 mg Oral Nightly    Vitamin D (CHOLECALCIFEROL) tablet 1,000 Units  1,000 Units Oral Daily    sodium chloride flush 0.9 % injection 5-40 mL  5-40 mL IntraVENous 2 times per day    sodium chloride flush 0.9 % injection 5-40 mL  5-40 mL IntraVENous PRN    0.9 % sodium chloride infusion   IntraVENous PRN    ondansetron (ZOFRAN-ODT) disintegrating tablet 4 mg  4 mg Oral Q8H PRN    Or    ondansetron (ZOFRAN) injection 4 mg  4 mg IntraVENous Q6H PRN    polyethylene glycol (GLYCOLAX) packet 17 g  17 g Oral Daily PRN    acetaminophen (TYLENOL) tablet 650 mg  650 mg Oral Q6H PRN    Or    acetaminophen (TYLENOL) suppository 650 mg  650 mg Rectal Q6H PRN    cefTRIAXone (ROCEPHIN) 1000 mg IVPB in NS 50ml minibag  1,000 mg IntraVENous Q24H    HYDROcodone homatropine (HYCODAN) 5-1.5 MG/5ML solution 5 mL  5 mL Oral Q4H PRN    guaiFENesin-dextromethorphan (ROBITUSSIN DM) 100-10 MG/5ML syrup 5 mL  5 mL Oral Q4H PRN    oxyCODONE (ROXICODONE) immediate release tablet 5 mg  5 mg Oral Q4H PRN    doxycycline (VIBRAMYCIN) 100 mg in sodium chloride 0.9 % 100 mL IVPB  100 mg IntraVENous Q12H       Signed:  Chinmay Adams MD    Part of this note may have been written by using a voice dictation software. The note has been proof read but may still contain some grammatical/other typographical errors.

## 2022-07-29 ENCOUNTER — ANESTHESIA (OUTPATIENT)
Dept: SURGERY | Age: 62
DRG: 659 | End: 2022-07-29
Payer: MEDICARE

## 2022-07-29 LAB
BACTERIA SPEC CULT: NORMAL
BASOPHILS # BLD: 0.1 K/UL (ref 0–0.2)
BASOPHILS # BLD: 0.1 K/UL (ref 0–0.2)
BASOPHILS NFR BLD: 1 % (ref 0–2)
BASOPHILS NFR BLD: 1 % (ref 0–2)
DIFFERENTIAL METHOD BLD: ABNORMAL
DIFFERENTIAL METHOD BLD: ABNORMAL
EOSINOPHIL # BLD: 0.2 K/UL (ref 0–0.8)
EOSINOPHIL # BLD: 0.2 K/UL (ref 0–0.8)
EOSINOPHIL NFR BLD: 2 % (ref 0.5–7.8)
EOSINOPHIL NFR BLD: 2 % (ref 0.5–7.8)
ERYTHROCYTE [DISTWIDTH] IN BLOOD BY AUTOMATED COUNT: 16.6 % (ref 11.9–14.6)
ERYTHROCYTE [DISTWIDTH] IN BLOOD BY AUTOMATED COUNT: 16.7 % (ref 11.9–14.6)
HCT VFR BLD AUTO: 24.2 % (ref 41.1–50.3)
HCT VFR BLD AUTO: 28.4 % (ref 41.1–50.3)
HGB BLD-MCNC: 7.2 G/DL (ref 13.6–17.2)
HGB BLD-MCNC: 8.7 G/DL (ref 13.6–17.2)
HISTORY CHECK: NORMAL
IMM GRANULOCYTES # BLD AUTO: 0.2 K/UL (ref 0–0.5)
IMM GRANULOCYTES # BLD AUTO: 0.2 K/UL (ref 0–0.5)
IMM GRANULOCYTES NFR BLD AUTO: 1 % (ref 0–5)
IMM GRANULOCYTES NFR BLD AUTO: 1 % (ref 0–5)
INR PPP: 1.4
LYMPHOCYTES # BLD: 1.9 K/UL (ref 0.5–4.6)
LYMPHOCYTES # BLD: 1.9 K/UL (ref 0.5–4.6)
LYMPHOCYTES NFR BLD: 15 % (ref 13–44)
LYMPHOCYTES NFR BLD: 15 % (ref 13–44)
MAGNESIUM SERPL-MCNC: 1.7 MG/DL (ref 1.8–2.4)
MCH RBC QN AUTO: 26.4 PG (ref 26.1–32.9)
MCH RBC QN AUTO: 27.2 PG (ref 26.1–32.9)
MCHC RBC AUTO-ENTMCNC: 29.8 G/DL (ref 31.4–35)
MCHC RBC AUTO-ENTMCNC: 30.6 G/DL (ref 31.4–35)
MCV RBC AUTO: 88.6 FL (ref 79.6–97.8)
MCV RBC AUTO: 88.8 FL (ref 79.6–97.8)
MONOCYTES # BLD: 1.4 K/UL (ref 0.1–1.3)
MONOCYTES # BLD: 1.5 K/UL (ref 0.1–1.3)
MONOCYTES NFR BLD: 11 % (ref 4–12)
MONOCYTES NFR BLD: 12 % (ref 4–12)
NEUTS SEG # BLD: 9 K/UL (ref 1.7–8.2)
NEUTS SEG # BLD: 9.5 K/UL (ref 1.7–8.2)
NEUTS SEG NFR BLD: 69 % (ref 43–78)
NEUTS SEG NFR BLD: 70 % (ref 43–78)
NRBC # BLD: 0 K/UL (ref 0–0.2)
NRBC # BLD: 0 K/UL (ref 0–0.2)
PHOSPHATE SERPL-MCNC: 2.9 MG/DL (ref 2.3–3.7)
PLATELET # BLD AUTO: 533 K/UL (ref 150–450)
PLATELET # BLD AUTO: 593 K/UL (ref 150–450)
PMV BLD AUTO: 9.4 FL (ref 9.4–12.3)
PMV BLD AUTO: 9.8 FL (ref 9.4–12.3)
PROTHROMBIN TIME: 17.8 SEC (ref 12.6–14.5)
RBC # BLD AUTO: 2.73 M/UL (ref 4.23–5.6)
RBC # BLD AUTO: 3.2 M/UL (ref 4.23–5.6)
SERVICE CMNT-IMP: NORMAL
WBC # BLD AUTO: 12.7 K/UL (ref 4.3–11.1)
WBC # BLD AUTO: 13.3 K/UL (ref 4.3–11.1)

## 2022-07-29 PROCEDURE — 3600000002 HC SURGERY LEVEL 2 BASE: Performed by: UROLOGY

## 2022-07-29 PROCEDURE — 2580000003 HC RX 258: Performed by: NURSE ANESTHETIST, CERTIFIED REGISTERED

## 2022-07-29 PROCEDURE — 7100000001 HC PACU RECOVERY - ADDTL 15 MIN: Performed by: UROLOGY

## 2022-07-29 PROCEDURE — 97110 THERAPEUTIC EXERCISES: CPT

## 2022-07-29 PROCEDURE — 6360000002 HC RX W HCPCS: Performed by: NURSE ANESTHETIST, CERTIFIED REGISTERED

## 2022-07-29 PROCEDURE — 2720000010 HC SURG SUPPLY STERILE: Performed by: UROLOGY

## 2022-07-29 PROCEDURE — 2580000003 HC RX 258: Performed by: INTERNAL MEDICINE

## 2022-07-29 PROCEDURE — 2500000003 HC RX 250 WO HCPCS: Performed by: NURSE ANESTHETIST, CERTIFIED REGISTERED

## 2022-07-29 PROCEDURE — 30233N1 TRANSFUSION OF NONAUTOLOGOUS RED BLOOD CELLS INTO PERIPHERAL VEIN, PERCUTANEOUS APPROACH: ICD-10-PCS | Performed by: STUDENT IN AN ORGANIZED HEALTH CARE EDUCATION/TRAINING PROGRAM

## 2022-07-29 PROCEDURE — 83735 ASSAY OF MAGNESIUM: CPT

## 2022-07-29 PROCEDURE — 87086 URINE CULTURE/COLONY COUNT: CPT

## 2022-07-29 PROCEDURE — 99232 SBSQ HOSP IP/OBS MODERATE 35: CPT | Performed by: INTERNAL MEDICINE

## 2022-07-29 PROCEDURE — 6360000002 HC RX W HCPCS: Performed by: INTERNAL MEDICINE

## 2022-07-29 PROCEDURE — P9016 RBC LEUKOCYTES REDUCED: HCPCS

## 2022-07-29 PROCEDURE — 0T768DZ DILATION OF RIGHT URETER WITH INTRALUMINAL DEVICE, VIA NATURAL OR ARTIFICIAL OPENING ENDOSCOPIC: ICD-10-PCS | Performed by: UROLOGY

## 2022-07-29 PROCEDURE — 3600000012 HC SURGERY LEVEL 2 ADDTL 15MIN: Performed by: UROLOGY

## 2022-07-29 PROCEDURE — 6370000000 HC RX 637 (ALT 250 FOR IP): Performed by: INTERNAL MEDICINE

## 2022-07-29 PROCEDURE — 99232 SBSQ HOSP IP/OBS MODERATE 35: CPT | Performed by: UROLOGY

## 2022-07-29 PROCEDURE — 2709999900 HC NON-CHARGEABLE SUPPLY: Performed by: UROLOGY

## 2022-07-29 PROCEDURE — 6360000004 HC RX CONTRAST MEDICATION: Performed by: UROLOGY

## 2022-07-29 PROCEDURE — 1100000000 HC RM PRIVATE

## 2022-07-29 PROCEDURE — C2617 STENT, NON-COR, TEM W/O DEL: HCPCS | Performed by: UROLOGY

## 2022-07-29 PROCEDURE — 2580000003 HC RX 258: Performed by: ANESTHESIOLOGY

## 2022-07-29 PROCEDURE — 3700000001 HC ADD 15 MINUTES (ANESTHESIA): Performed by: UROLOGY

## 2022-07-29 PROCEDURE — 6370000000 HC RX 637 (ALT 250 FOR IP): Performed by: STUDENT IN AN ORGANIZED HEALTH CARE EDUCATION/TRAINING PROGRAM

## 2022-07-29 PROCEDURE — 7100000000 HC PACU RECOVERY - FIRST 15 MIN: Performed by: UROLOGY

## 2022-07-29 PROCEDURE — BT1D1ZZ FLUOROSCOPY OF RIGHT KIDNEY, URETER AND BLADDER USING LOW OSMOLAR CONTRAST: ICD-10-PCS | Performed by: UROLOGY

## 2022-07-29 PROCEDURE — 86901 BLOOD TYPING SEROLOGIC RH(D): CPT

## 2022-07-29 PROCEDURE — 74420 UROGRAPHY RTRGR +-KUB: CPT | Performed by: UROLOGY

## 2022-07-29 PROCEDURE — 52332 CYSTOSCOPY AND TREATMENT: CPT | Performed by: UROLOGY

## 2022-07-29 PROCEDURE — 97530 THERAPEUTIC ACTIVITIES: CPT

## 2022-07-29 PROCEDURE — 85610 PROTHROMBIN TIME: CPT

## 2022-07-29 PROCEDURE — 3700000000 HC ANESTHESIA ATTENDED CARE: Performed by: UROLOGY

## 2022-07-29 PROCEDURE — 36415 COLL VENOUS BLD VENIPUNCTURE: CPT

## 2022-07-29 PROCEDURE — 86923 COMPATIBILITY TEST ELECTRIC: CPT

## 2022-07-29 PROCEDURE — 2500000003 HC RX 250 WO HCPCS: Performed by: INTERNAL MEDICINE

## 2022-07-29 PROCEDURE — 84100 ASSAY OF PHOSPHORUS: CPT

## 2022-07-29 PROCEDURE — 36430 TRANSFUSION BLD/BLD COMPNT: CPT

## 2022-07-29 PROCEDURE — 85025 COMPLETE CBC W/AUTO DIFF WBC: CPT

## 2022-07-29 DEVICE — URETERAL STENT
Type: IMPLANTABLE DEVICE | Site: URETER | Status: FUNCTIONAL
Brand: PERCUFLEX™ PLUS

## 2022-07-29 RX ORDER — LIDOCAINE HYDROCHLORIDE 10 MG/ML
1 INJECTION, SOLUTION INFILTRATION; PERINEURAL
Status: DISCONTINUED | OUTPATIENT
Start: 2022-07-29 | End: 2022-07-29 | Stop reason: HOSPADM

## 2022-07-29 RX ORDER — HYDROMORPHONE HYDROCHLORIDE 2 MG/ML
0.5 INJECTION, SOLUTION INTRAMUSCULAR; INTRAVENOUS; SUBCUTANEOUS EVERY 5 MIN PRN
Status: DISCONTINUED | OUTPATIENT
Start: 2022-07-29 | End: 2022-07-29 | Stop reason: HOSPADM

## 2022-07-29 RX ORDER — GLYCOPYRROLATE 0.2 MG/ML
INJECTION INTRAMUSCULAR; INTRAVENOUS PRN
Status: DISCONTINUED | OUTPATIENT
Start: 2022-07-29 | End: 2022-07-29 | Stop reason: SDUPTHER

## 2022-07-29 RX ORDER — NEOSTIGMINE METHYLSULFATE 1 MG/ML
INJECTION, SOLUTION INTRAVENOUS PRN
Status: DISCONTINUED | OUTPATIENT
Start: 2022-07-29 | End: 2022-07-29 | Stop reason: SDUPTHER

## 2022-07-29 RX ORDER — FENTANYL CITRATE 50 UG/ML
100 INJECTION, SOLUTION INTRAMUSCULAR; INTRAVENOUS
Status: DISCONTINUED | OUTPATIENT
Start: 2022-07-29 | End: 2022-07-29 | Stop reason: HOSPADM

## 2022-07-29 RX ORDER — ETOMIDATE 2 MG/ML
INJECTION INTRAVENOUS PRN
Status: DISCONTINUED | OUTPATIENT
Start: 2022-07-29 | End: 2022-07-29 | Stop reason: SDUPTHER

## 2022-07-29 RX ORDER — SODIUM CHLORIDE 9 MG/ML
INJECTION, SOLUTION INTRAVENOUS PRN
Status: DISCONTINUED | OUTPATIENT
Start: 2022-07-29 | End: 2022-08-01 | Stop reason: HOSPADM

## 2022-07-29 RX ORDER — OXYCODONE HYDROCHLORIDE 5 MG/1
5 TABLET ORAL
Status: DISCONTINUED | OUTPATIENT
Start: 2022-07-29 | End: 2022-07-29 | Stop reason: HOSPADM

## 2022-07-29 RX ORDER — ROCURONIUM BROMIDE 10 MG/ML
INJECTION, SOLUTION INTRAVENOUS PRN
Status: DISCONTINUED | OUTPATIENT
Start: 2022-07-29 | End: 2022-07-29 | Stop reason: SDUPTHER

## 2022-07-29 RX ORDER — METOPROLOL SUCCINATE 25 MG/1
25 TABLET, EXTENDED RELEASE ORAL DAILY
Status: DISCONTINUED | OUTPATIENT
Start: 2022-07-30 | End: 2022-08-01 | Stop reason: HOSPADM

## 2022-07-29 RX ORDER — LANOLIN ALCOHOL/MO/W.PET/CERES
400 CREAM (GRAM) TOPICAL DAILY
Status: COMPLETED | OUTPATIENT
Start: 2022-07-29 | End: 2022-07-31

## 2022-07-29 RX ORDER — LIDOCAINE HYDROCHLORIDE 20 MG/ML
INJECTION, SOLUTION EPIDURAL; INFILTRATION; INTRACAUDAL; PERINEURAL PRN
Status: DISCONTINUED | OUTPATIENT
Start: 2022-07-29 | End: 2022-07-29 | Stop reason: SDUPTHER

## 2022-07-29 RX ORDER — MIDAZOLAM HYDROCHLORIDE 2 MG/2ML
2 INJECTION, SOLUTION INTRAMUSCULAR; INTRAVENOUS
Status: DISCONTINUED | OUTPATIENT
Start: 2022-07-29 | End: 2022-07-29 | Stop reason: HOSPADM

## 2022-07-29 RX ORDER — SODIUM CHLORIDE, SODIUM LACTATE, POTASSIUM CHLORIDE, CALCIUM CHLORIDE 600; 310; 30; 20 MG/100ML; MG/100ML; MG/100ML; MG/100ML
100 INJECTION, SOLUTION INTRAVENOUS CONTINUOUS
Status: DISCONTINUED | OUTPATIENT
Start: 2022-07-29 | End: 2022-07-29 | Stop reason: HOSPADM

## 2022-07-29 RX ORDER — PROPOFOL 10 MG/ML
INJECTION, EMULSION INTRAVENOUS PRN
Status: DISCONTINUED | OUTPATIENT
Start: 2022-07-29 | End: 2022-07-29 | Stop reason: SDUPTHER

## 2022-07-29 RX ORDER — PROCHLORPERAZINE EDISYLATE 5 MG/ML
5 INJECTION INTRAMUSCULAR; INTRAVENOUS
Status: DISCONTINUED | OUTPATIENT
Start: 2022-07-29 | End: 2022-07-29 | Stop reason: HOSPADM

## 2022-07-29 RX ORDER — ONDANSETRON 2 MG/ML
INJECTION INTRAMUSCULAR; INTRAVENOUS PRN
Status: DISCONTINUED | OUTPATIENT
Start: 2022-07-29 | End: 2022-07-29 | Stop reason: SDUPTHER

## 2022-07-29 RX ORDER — DEXAMETHASONE SODIUM PHOSPHATE 4 MG/ML
INJECTION, SOLUTION INTRA-ARTICULAR; INTRALESIONAL; INTRAMUSCULAR; INTRAVENOUS; SOFT TISSUE PRN
Status: DISCONTINUED | OUTPATIENT
Start: 2022-07-29 | End: 2022-07-29 | Stop reason: SDUPTHER

## 2022-07-29 RX ADMIN — LIDOCAINE HYDROCHLORIDE 80 MG: 20 INJECTION, SOLUTION EPIDURAL; INFILTRATION; INTRACAUDAL; PERINEURAL at 19:05

## 2022-07-29 RX ADMIN — DOXYCYCLINE 100 MG: 100 INJECTION, POWDER, LYOPHILIZED, FOR SOLUTION INTRAVENOUS at 22:16

## 2022-07-29 RX ADMIN — Medication 5 MG: at 19:54

## 2022-07-29 RX ADMIN — DEXAMETHASONE SODIUM PHOSPHATE 4 MG: 4 INJECTION, SOLUTION INTRAMUSCULAR; INTRAVENOUS at 19:20

## 2022-07-29 RX ADMIN — MEXILETINE HYDROCHLORIDE 150 MG: 150 CAPSULE ORAL at 09:01

## 2022-07-29 RX ADMIN — SODIUM CHLORIDE, PRESERVATIVE FREE 10 ML: 5 INJECTION INTRAVENOUS at 22:16

## 2022-07-29 RX ADMIN — ONDANSETRON 4 MG: 2 INJECTION INTRAMUSCULAR; INTRAVENOUS at 19:20

## 2022-07-29 RX ADMIN — ROCURONIUM BROMIDE 50 MG: 50 INJECTION, SOLUTION INTRAVENOUS at 19:05

## 2022-07-29 RX ADMIN — SODIUM CHLORIDE, POTASSIUM CHLORIDE, SODIUM LACTATE AND CALCIUM CHLORIDE 100 ML/HR: 600; 310; 30; 20 INJECTION, SOLUTION INTRAVENOUS at 18:01

## 2022-07-29 RX ADMIN — PHENYLEPHRINE HYDROCHLORIDE 2 ML: 10 INJECTION INTRAVENOUS at 19:47

## 2022-07-29 RX ADMIN — Medication 400 MG: at 09:01

## 2022-07-29 RX ADMIN — AMIODARONE HYDROCHLORIDE 400 MG: 200 TABLET ORAL at 22:15

## 2022-07-29 RX ADMIN — DOXYCYCLINE 100 MG: 100 INJECTION, POWDER, LYOPHILIZED, FOR SOLUTION INTRAVENOUS at 09:07

## 2022-07-29 RX ADMIN — EMPAGLIFLOZIN 10 MG: 10 TABLET, FILM COATED ORAL at 09:02

## 2022-07-29 RX ADMIN — MEXILETINE HYDROCHLORIDE 150 MG: 150 CAPSULE ORAL at 14:43

## 2022-07-29 RX ADMIN — CEFTRIAXONE 1000 MG: 1 INJECTION, POWDER, FOR SOLUTION INTRAMUSCULAR; INTRAVENOUS at 23:58

## 2022-07-29 RX ADMIN — ETOMIDATE 24 MG: 2 INJECTION, SOLUTION INTRAVENOUS at 19:05

## 2022-07-29 RX ADMIN — AMIODARONE HYDROCHLORIDE 400 MG: 200 TABLET ORAL at 09:02

## 2022-07-29 RX ADMIN — Medication 400 MG: at 09:04

## 2022-07-29 RX ADMIN — QUETIAPINE FUMARATE 100 MG: 100 TABLET ORAL at 22:15

## 2022-07-29 RX ADMIN — PHENYLEPHRINE HYDROCHLORIDE 2 ML: 10 INJECTION INTRAVENOUS at 19:34

## 2022-07-29 RX ADMIN — FENTANYL CITRATE 100 MCG: 50 INJECTION INTRAMUSCULAR; INTRAVENOUS at 19:05

## 2022-07-29 RX ADMIN — SODIUM CHLORIDE, PRESERVATIVE FREE 10 ML: 5 INJECTION INTRAVENOUS at 09:03

## 2022-07-29 RX ADMIN — MELATONIN 1000 UNITS: at 09:01

## 2022-07-29 RX ADMIN — ASPIRIN 81 MG: 81 TABLET, CHEWABLE ORAL at 09:04

## 2022-07-29 RX ADMIN — MEXILETINE HYDROCHLORIDE 150 MG: 150 CAPSULE ORAL at 22:15

## 2022-07-29 RX ADMIN — PROPOFOL 50 MG: 10 INJECTION, EMULSION INTRAVENOUS at 19:05

## 2022-07-29 RX ADMIN — GLYCOPYRROLATE 0.8 MG: 0.2 INJECTION, SOLUTION INTRAMUSCULAR; INTRAVENOUS at 19:54

## 2022-07-29 ASSESSMENT — PAIN - FUNCTIONAL ASSESSMENT
PAIN_FUNCTIONAL_ASSESSMENT: 0-10
PAIN_FUNCTIONAL_ASSESSMENT: NONE - DENIES PAIN
PAIN_FUNCTIONAL_ASSESSMENT: NONE - DENIES PAIN

## 2022-07-29 ASSESSMENT — LIFESTYLE VARIABLES: SMOKING_STATUS: 1

## 2022-07-29 ASSESSMENT — PAIN SCALES - GENERAL
PAINLEVEL_OUTOF10: 0

## 2022-07-29 NOTE — PROGRESS NOTES
Patient were discussed in [id number] team meeting this AM.  Patient will be going to OR today for cystoscopy, right URS, right retrograde pyelogram.  CM will continue to follow for any needs, concerns or questions that may occur.

## 2022-07-29 NOTE — ANESTHESIA PRE PROCEDURE
Department of Anesthesiology  Preprocedure Note       Name:  Bucky Brink   Age:  58 y.o.  :  1960                                          MRN:  [id number]         Date:  2022      Surgeon: Jun Manning):  Ivonne Robles MD    Procedure: Procedure(s):  CYSTOSCOPY,RIGHT URETEROSCOPY,RIGHT RETROGRADE PYELOGRAM    Medications prior to admission:   Prior to Admission medications    Medication Sig Start Date End Date Taking? Authorizing Provider   amiodarone (PACERONE) 400 MG tablet Take 1 tablet by mouth in the morning and 1 tablet before bedtime. Patient taking differently: Take 200 mg by mouth See Admin Instructions 2 tablets twice a day 22   Rodger Cunha MD   mexiletine (MEXITIL) 150 MG capsule Take 1 capsule by mouth in the morning and 1 capsule at noon and 1 capsule before bedtime. 22   Rodger Cunha MD   Vitamin D (CHOLECALCIFEROL) 25 MCG (1000 UT) TABS tablet Take 1,000 Units by mouth daily    Historical Provider, MD   Multiple Vitamins-Minerals (THERAPEUTIC MULTIVITAMIN-MINERALS) tablet Take 1 tablet by mouth daily    Historical Provider, MD   metoprolol succinate (TOPROL XL) 25 MG extended release tablet Take 1 tablet by mouth in the morning and at bedtime 22   LORENZA Dooley - CNP   nitroGLYCERIN (NITROSTAT) 0.4 MG SL tablet Place 1 tablet under the tongue every 5 minutes as needed for Chest pain up to max of 3 total doses. If no relief after 1 dose, call 911.   Patient not taking: Reported on 2022   Rodger Cunha MD   aspirin 81 MG chewable tablet Take 1 tablet by mouth daily  Patient not taking: Reported on 2022   LEISA Mcghee   acetaminophen (TYLENOL) 325 mg tablet Take 2 tablets by mouth every 6 hours as needed for Pain 22   LEISA Mcghee   Cetirizine HCl 10 MG CAPS Take by mouth as needed    Ar Automatic Reconciliation   QUEtiapine (SEROQUEL) 100 MG tablet Take 100 mg by mouth nightly  21   Ar Automatic Reconciliation   warfarin (COUMADIN) 5 MG tablet Take 1 to 1 1/2 tablet daily or as directed 12/3/21   Ar Automatic Reconciliation       Current medications:    Current Facility-Administered Medications   Medication Dose Route Frequency Provider Last Rate Last Admin    lidocaine 1 % injection 1 mL  1 mL IntraDERmal Once PRN Danyelle Fuller MD        fentaNYL (SUBLIMAZE) injection 100 mcg  100 mcg IntraVENous Once PRN Danyelle Fuller MD        lactated ringers infusion  100 mL/hr IntraVENous Continuous Danyelle Fuller  mL/hr at 07/29/22 1801 100 mL/hr at 07/29/22 1801    midazolam PF (VERSED) injection 2 mg  2 mg IntraVENous Once PRN Danyelle Fuller MD        magnesium oxide (MAG-OX) tablet 400 mg  400 mg Oral Daily Ade Angel MD   400 mg at 07/29/22 0901    0.9 % sodium chloride infusion   IntraVENous PRN Danyelle Fuller MD        [START ON 7/30/2022] metoprolol succinate (TOPROL XL) extended release tablet 25 mg  25 mg Oral Daily Alana Swann MD        magnesium oxide (MAG-OX) tablet 400 mg  400 mg Oral Daily Ade Angel MD   400 mg at 07/29/22 4181    empagliflozin (JARDIANCE) tablet 10 mg  10 mg Oral Daily Alana Swann MD   10 mg at 07/29/22 0902    morphine injection 2 mg  2 mg IntraVENous Q4H PRN LORENZA Akhtar - FRANKIE        amiodarone (CORDARONE) tablet 400 mg  400 mg Oral BID Amarilis White MD   400 mg at 07/29/22 0902    aspirin chewable tablet 81 mg  81 mg Oral Daily Amarilis White MD   81 mg at 07/29/22 4352    mexiletine (MEXITIL) capsule 150 mg  150 mg Oral TID Amarilis White MD   150 mg at 07/29/22 1443    QUEtiapine (SEROQUEL) tablet 100 mg  100 mg Oral Nightly Amarilis White MD   100 mg at 07/28/22 2135    Vitamin D (CHOLECALCIFEROL) tablet 1,000 Units  1,000 Units Oral Daily Amarilis White MD   1,000 Units at 07/29/22 0901    sodium chloride flush 0.9 % injection 5-40 mL  5-40 mL IntraVENous 2 times per day Amarilis White MD   10 mL at 07/29/22 0903    sodium chloride flush 0.9 % injection 5-40 mL  5-40 mL IntraVENous PRN Shahbaz Vera MD        0.9 % sodium chloride infusion   IntraVENous PRN Shahbaz Vera MD        ondansetron (ZOFRAN-ODT) disintegrating tablet 4 mg  4 mg Oral Q8H PRN Shahbaz Vera MD        Or    [facility] injection 4 mg  4 mg IntraVENous Q6H PRN Shahbaz Vera MD        polyethylene glycol [facility]) packet 17 g  17 g Oral Daily PRN Shahbaz Vera MD        acetaminophen (TYLENOL) tablet 650 mg  650 mg Oral Q6H PRN Shahbaz Vera MD   650 mg at 07/28/22 1651    Or    acetaminophen (TYLENOL) suppository 650 mg  650 mg Rectal Q6H PRN Shahbaz Vera MD        cefTRIAXone (ROCEPHIN) 1000 mg IVPB in NS 50ml minibag  1,000 mg IntraVENous Q24H Nam Chaney DO   Stopped at 07/28/22 2358    HYDROcodone homatropine (HYCODAN) 5-1.5 MG/5ML solution 5 mL  5 mL Oral Q4H PRN Julio C Figueredo MD   5 mL at 07/28/22 2148    guaiFENesin-dextromethorphan (ROBITUSSIN DM) 100-10 MG/5ML syrup 5 mL  5 mL Oral Q4H PRN Julio C Figueredo MD   5 mL at 07/24/22 0251    oxyCODONE (ROXICODONE) immediate release tablet 5 mg  5 mg Oral Q4H PRN Julio C Figueredo MD   5 mg at 07/24/22 0252    doxycycline (VIBRAMYCIN) 100 mg in sodium chloride 0.9 % 100 mL IVPB  100 mg IntraVENous Q12H Nam Chaney DO   Stopped at 07/29/22 1014       Allergies: Allergies   Allergen Reactions    Penicillins Swelling     Face swelling    Atorvastatin Other (See Comments)     itching    Evolocumab Other (See Comments)     Itching    Lisinopril Other (See Comments)    Rosuvastatin Other (See Comments)     itching       Problem List:    Patient Active Problem List   Diagnosis Code    Overweight (BMI 25.0-29. 9) E66.3    Coronary atherosclerosis of native coronary vessel I25.10    Benign prostatic hyperplasia with weak urinary stream N40.1, R39.12    History of 2019 novel coronavirus disease (COVID-19) Z86.16    Numbness of left foot R20.8    Primary insomnia F51.01    Chronic systolic congestive heart failure (HCC) I50.22    ICD (implantable cardioverter-defibrillator) in place Z95.810    Seasonal allergic rhinitis due to pollen J30.1    Smoking greater than 20 pack years F17.210    Refused influenza vaccine Z28.21    Chronic eczematous otitis externa of both ears H60.8X3    High risk medication use Z79.899    Dyslipidemia E78.5    Intracardiac thrombus I51.3    Ventricular tachycardia (HCC) I47.2    COVID-19 vaccination refused Z28.21    Anemia D64.9    Chronic fatigue R53.82    Tobacco abuse Z72.0    Statin intolerance Z78.9    Defibrillator discharge Z45.02    COVID-19 U07.1    CAD, multiple vessel I25.10    S/P CABG x 3 Z95.1    Hypoxemia R09.02    Atelectasis J98.11    Acute kidney injury (BEN) with acute tubular necrosis (ATN) ([facility]) N17.0    Chronic systolic (congestive) heart failure (HCC) I50.22    CAP (community acquired pneumonia) J18.9    Acute kidney injury ([facility] N17.9    Hydronephrosis N13.30    Ureteral obstruction, right N13.5    Gross hematuria R31.0    Right kidney mass N28.89       Past Medical History:        Diagnosis Date    Acute hypoxemic respiratory failure due to COVID-19 ([address] 10/9/2021    CAD (coronary artery disease)     heart attack 2005 stentS HEART    CHF (congestive heart failure) ([address] 9/27/2011    Chronic systolic heart failure ([address] 10/2/2015    Coronary atherosclerosis of native coronary vessel 10/2/2015    Hyperlipidemia 10/2/2015    Hypoxemia requiring supplemental oxygen 10/6/2021    IGT (impaired glucose tolerance) 12/3/2017    Other ill-defined conditions(799.89)      blind left eye    Other ill-defined conditions(799.89)     cardiomyopathy    Pneumonia due to COVID-19 virus 11/13/2021    Resolved    Primary insomnia 6/7/2017    Psychiatric disorder     depression     Sepsis, unspecified 4/5/2011    Thromboembolus ([facility] thrombus in heart     Thrombus 10/2/2015       Past Surgical History:        Procedure Laterality Date    CARDIAC CATHETERIZATION      5 stents total    CARDIAC DEFIBRILLATOR PLACEMENT      [employer]    CARDIAC PROCEDURE N/A 2022    LEFT HEART CATH / CORONARY ANGIOGRAPHY performed by Reena Rob MD at [address] [place] [address] CATH LAB    COLONOSCOPY  2010    CORONARY ARTERY BYPASS GRAFT N/A 2022    CORONARY ARTERY BYPASS GRAFT (CABG X 3), [place] ; ENDOSCOPIC VEIN HARVEST, LEFT GREATER SAPHENOUS VEIN performed by Merritt Castaneda MD at [facility] MAIN OR    HEENT      oral surgery    PACEMAKER      HI CARDIAC SURG PROCEDURE UNLIST       1 stent     TRANSESOPHAGEAL ECHOCARDIOGRAM N/A 2022    TRANSESOPHAGEAL ECHOCARDIOGRAM performed by Merritt Castaneda MD at [facility] MAIN OR       Social History:    Social History     Tobacco Use    Smoking status: Former     Packs/day: 1.00     Types: Cigarettes     Start date: 1978     Quit date: 2022     Years since quittin.1    Smokeless tobacco: Never   Substance Use Topics    Alcohol use:  No                                Counseling given: Not Answered      Vital Signs (Current):   Vitals:    22 1514 22 1515 22 1627 22 1752   BP: 114/67 116/72 124/76 116/62   Pulse: 77 76 85 80   Resp: 18 18 18 16   Temp: 98.7 °F (37.1 °C) 98.7 °F (37.1 °C) 99.7 °F (37.6 °C) 98.3 °F (36.8 °C)   TempSrc: Oral  Oral Oral   SpO2: 95% 95% 94% 95%   Weight:       Height:                                                  BP Readings from Last 3 Encounters:   22 116/62   22 118/70   22 136/79       NPO Status: Time of last liquid consumption: 0000                        Time of last solid consumption: 0000                        Date of last liquid consumption: 22                        Date of last solid food consumption: 22    BMI:   Wt Readings from Last 3 Encounters:   22 196 lb 13.9 oz (89.3 kg)   22 207 lb (93.9 kg)   07/14/22 200 lb 8 oz (90.9 kg)     Body mass index is 29.07 kg/m². CBC:   Lab Results   Component Value Date/Time    WBC 13.3 07/29/2022 04:53 PM    RBC 3.20 07/29/2022 04:53 PM    HGB 8.7 07/29/2022 04:53 PM    HCT 28.4 07/29/2022 04:53 PM    MCV 88.8 07/29/2022 04:53 PM    RDW 16.6 07/29/2022 04:53 PM     07/29/2022 04:53 PM       CMP:   Lab Results   Component Value Date/Time     07/28/2022 05:36 AM    K 3.5 07/28/2022 05:36 AM     07/28/2022 05:36 AM    CO2 28 07/28/2022 05:36 AM    BUN 16 07/28/2022 05:36 AM    CREATININE 1.20 07/28/2022 05:36 AM    GFRAA >60 07/28/2022 05:36 AM    AGRATIO 0.6 10/15/2021 07:37 AM    LABGLOM >60 07/28/2022 05:36 AM    GLUCOSE 83 07/28/2022 05:36 AM    PROT 5.4 07/25/2022 04:41 AM    CALCIUM 8.5 07/28/2022 05:36 AM    BILITOT 0.4 07/25/2022 04:41 AM    ALKPHOS 64 07/25/2022 04:41 AM    ALKPHOS 52 10/15/2021 07:37 AM    AST 24 07/25/2022 04:41 AM    ALT 13 07/25/2022 04:41 AM       POC Tests: No results for input(s): POCGLU, POCNA, POCK, POCCL, POCBUN, POCHEMO, POCHCT in the last 72 hours.     Coags:   Lab Results   Component Value Date/Time    PROTIME 17.8 07/29/2022 06:12 AM    INR 1.4 07/29/2022 06:12 AM    APTT 43.0 07/25/2022 04:41 AM    APTT 115.3 10/09/2021 01:55 PM       HCG (If Applicable): No results found for: PREGTESTUR, PREGSERUM, HCG, HCGQUANT     ABGs: No results found for: PHART, PO2ART, RWZ7BDC, EKJ1LFG, BEART, U1JDDOHB     Type & Screen (If Applicable):  No results found for: LABABO, LABRH    Drug/Infectious Status (If Applicable):  No results found for: HIV, HEPCAB    COVID-19 Screening (If Applicable):   Lab Results   Component Value Date/Time    COVID19 Not detected 07/24/2022 07:26 AM           Anesthesia Evaluation    Airway: Mallampati: II  TM distance: >3 FB   Neck ROM: full  Mouth opening: > = 3 FB   Dental: normal exam   (+) edentulous      Pulmonary:normal exam  breath sounds clear to auscultation  (+) current smoker                           Cardiovascular:  Exercise tolerance: good (>4 METS), Ambulates unassisted  (+) pacemaker (2011): AICD, past MI (2005):, CAD:, CABG/stent (CABG 5/22, stents 2005):, dysrhythmias (VT in May- shocked by AICD. ):, CHF:,         Rhythm: regular  Rate: normal                 ROS comment: Echo 6/22:    Left Ventricle: Moderately reduced left ventricular systolic function with a visually estimated EF of 25 - 30%. Moderate global hypokinesis present.   Left Atrium: Left atrium is mildly dilated.   Pericardium: Small (<1 cm) localized pericardial effusion present around the left ventricle.       Order-Level Documents:    Scan on 6/18/2022 11:25 AM: EKG 12-LEAD       Neuro/Psych:               GI/Hepatic/Renal:             Endo/Other:    (+) blood dyscrasia: anemia:., .                 Abdominal:             Vascular: Other Findings:           Anesthesia Plan      general     ASA 4       Induction: intravenous. Anesthetic plan and risks discussed with patient.                         Tiarra Cid MD   7/29/2022

## 2022-07-29 NOTE — PROGRESS NOTES
TRANSFER - OUT REPORT:    [facility]  [facility] to Pre-Op for ordered procedure       Report consisted of patient's Situation, Background, Assessment and   Recommendations(SBAR). Information from the following report(s) Nurse Handoff Report, Adult Overview, MAR, Med Rec Status, Pre Procedure Checklist, and Neuro Assessment was reviewed with the receiving nurse. Lines:   Peripheral IV 07/23/22 Left Antecubital (Active)   Site Assessment Dry; Intact 07/29/22 0720   Line Status Capped 07/29/22 0720   Line Care Connections checked and tightened 07/29/22 0720   Phlebitis Assessment No symptoms 07/29/22 0720   Infiltration Assessment 0 07/29/22 0720   Alcohol Cap Used Yes 07/29/22 0720   Dressing Status Clean, dry & intact 07/29/22 0720   Dressing Type Transparent 07/29/22 0720       Peripheral IV 07/23/22 Right Antecubital (Active)   Site Assessment Clean;Dry 07/29/22 0928   Line Status Infusing 07/29/22 0928   Line Care Connections checked and tightened 07/29/22 0720   Phlebitis Assessment No symptoms 07/29/22 0720   Infiltration Assessment 0 07/29/22 0720   Alcohol Cap Used Yes 07/29/22 0720   Dressing Status Clean, dry & intact 07/29/22 0928   Dressing Type Transparent 07/29/22 0720        Opportunity for questions and clarification was provided.       Patient transported with:  [employer]

## 2022-07-29 NOTE — PLAN OF CARE
Problem: Discharge Planning  Goal: Discharge to home or other facility with appropriate resources  Outcome: Progressing  Flowsheets (Taken 7/28/2022 1928)  Discharge to home or other facility with appropriate resources: Identify barriers to discharge with patient and caregiver     Problem: Pain  Goal: Verbalizes/displays adequate comfort level or baseline comfort level  Outcome: Progressing  Flowsheets (Taken 7/28/2022 1930)  Verbalizes/displays adequate comfort level or baseline comfort level:   Encourage patient to monitor pain and request assistance   Administer analgesics based on type and severity of pain and evaluate response   Assess pain using appropriate pain scale   Implement non-pharmacological measures as appropriate and evaluate response   Consider cultural and social influences on pain and pain management   Notify Licensed Independent Practitioner if interventions unsuccessful or patient reports new pain     Problem: Safety - Adult  Goal: Free from fall injury  Outcome: Progressing  Flowsheets (Taken 7/28/2022 1935)  Free From Fall Injury: Instruct family/caregiver on patient safety     Problem: ABCDS Injury Assessment  Goal: Absence of physical injury  Outcome: Progressing  Flowsheets (Taken 7/28/2022 1935)  Absence of Physical Injury: Implement safety measures based on patient assessment     Problem: Chronic Conditions and Co-morbidities  Goal: Patient's chronic conditions and co-morbidity symptoms are monitored and maintained or improved  Outcome: Progressing  Flowsheets (Taken 7/28/2022 1928)  Care Plan - Patient's Chronic Conditions and Co-Morbidity Symptoms are Monitored and Maintained or Improved: Monitor and assess patient's chronic conditions and comorbid symptoms for stability, deterioration, or improvement

## 2022-07-29 NOTE — PERIOP NOTE
TRANSFER - IN REPORT:    Verbal report received from [facility] on Kalin Mink  being received from [phone] for ordered procedure      Report consisted of patient's Situation, Background, Assessment and   Recommendations(SBAR). Information from the following report(s) Nurse Handoff Report and MAR was reviewed with the receiving nurse. Opportunity for questions and clarification was provided. Assessment completed upon patient's arrival to unit and care assumed.

## 2022-07-29 NOTE — PROGRESS NOTES
Hospitalist Progress Note   Admit Date:  2022  7:53 PM   Name:  Ulysses Caldron   Age:  58 y.o. Sex:  male  :  1960   MRN:  [id number]   Room:  608/01    Presenting Complaint: Shortness of Breath and Flank Pain     Reason(s) for Admission: Hypoxemia [R09.02]  Ureteral obstruction, right [N13.5]  Acute kidney injury ([address] [N17.9]  Poisoning by warfarin sodium, accidental or unintentional, initial encounter [T45.511A]  Urinary tract infection with hematuria, site unspecified [N39.0, R31.9]  Acute kidney injury (BEN) with acute tubular necrosis (ATN) [facility]) [N17.0]     Hospital Course & Interval History:   Ulysses Caldron is a 58 y.o. CM with a PMH of MV CAD s/p CABG x2 months ago, severe LVSD EF 25-30%, Large protruding apical thrombus on chronic OAC with warfarin, tob abuse in past, anemia, and recurrent VTACH s/p ICD on multiple anti-arrhythmics admitted with community-acquired pneumonia, warfarin coagulopathy, BEN and R hydroureter, likely obstructive process. Started on empiric antibiotics. Warfarin held. Urology consulted and plan for cystoscopy. Subjective/24hr Events (22): Patient is seen at the bedside. Reports feeling better. He spiked fever of 101F yesterday. He is resting comfortably on the bed. Denies chest pain, palpitation, nausea, vomiting or abdominal pain. Plan for cystoscopy tomorrow     Assessment & Plan:     Acute kidney injury with acute tubular necrosis   R hydroureter, suspect obstructive process   - CT showed thickening of R renal pelvis extending into R proximal ureter  - Cystoscopy  with right retrograde pyelogram scheduled for ,   Hold anticoagulation at this time  - sCr 2.30 --> 1.20 > 1.3  Urology following  PT recommended outpatient therapy     Acute CAP   Febrile, leukocytosis of 12 K  - CXR reviewed  - Continue cephalosporin and doxycycline.  - Bcx NGTD       Thrombocytosis  Platelet count of 124E and most likely reactive.     Warfarin Coagulopathy: Corrected  Giving a dose of vitamin k  - Monitor PT/INR  - For now leave off anticoagulation due to scheduled urologic intervention.  - Consult PharmD when [facility] is resumed  - INR 1.4 today      CAD s/p CABG  Bilat bloody pleural effusions  - Continue home meds  - Loculated effusions noted on CT; d/w Radiologist, Dr. Lakshmi Bradford; thought to be related to recent CABG     Chronic Severe LVSD EF 25%  - appears clinically compensated  Continue empagliflozin   - Appreciate cardiology follow-up and recommendations. Recurrent Vtach   - s/p ICD  - on antiarrhythmic rx dual therapy with amiodarone and Mexitil-continue per cardiology. Apical thrombus  - we will resume anticoagulation when clinically appropriate  - Continue to hold warfarin in preparation for cystoscopy     Anemia/ABLA   - acute blood loss anemia suspected secondary to coagulopathy with hematuria  - H/H stable     HTN   -Holding toprol BID due to hypotension     Hyperlipidemia  Not on statins due to allergy. Hx depression  - continue home meds       Discharge Planning: Anticipate discharge in 2-3 days     Diet:  Diet NPO  DVT PPx: SCD due to anticipating procedure and holding coumadin  Code status: Full Code    Hospital Problems:  Principal Problem:    Acute kidney injury (BEN) with acute tubular necrosis (ATN) (HCC)  Active Problems:    CAD, multiple vessel    Chronic systolic (congestive) heart failure (HCC)    CAP (community acquired pneumonia)    Acute kidney injury ([facility] [address]    Ureteral obstruction, right    Gross hematuria    Right kidney mass    Intracardiac thrombus    Ventricular tachycardia (HCC)    Hydronephrosis  Resolved Problems:    * No resolved hospital problems.  *      Objective:   Patient Vitals for the past 24 hrs:   Temp Pulse Resp BP SpO2   07/29/22 1514 98.7 °F (37.1 °C) 77 18 114/67 95 %   07/29/22 1451 98.8 °F (37.1 °C) 80 20 112/68 95 %   07/29/22 1421 99.4 °F (37.4 °C) 85 18 103/68 93 %   07/29/22 1351 99 °F (37.2 °C) affect.       I have reviewed ordered lab tests and independently visualized imaging below:    Recent Labs:  Recent Results (from the past 48 hour(s))   Basic Metabolic Panel    Collection Time: 07/28/22  5:36 AM   Result Value Ref Range    Sodium 141 138 - 145 mmol/L    Potassium 3.5 3.5 - 5.1 mmol/L    Chloride 108 (H) 98 - 107 mmol/L    CO2 28 21 - 32 mmol/L    Anion Gap 5 (L) 7 - 16 mmol/L    Glucose 83 65 - 100 mg/dL    BUN 16 8 - 23 MG/DL    Creatinine 1.20 0.8 - 1.5 MG/DL    GFR African American >60 >60 ml/min/1.73m2    GFR Non- >60 >60 ml/min/1.73m2    Calcium 8.5 8.3 - 10.4 MG/DL   CBC with Auto Differential    Collection Time: 07/28/22  5:36 AM   Result Value Ref Range    WBC 14.2 (H) 4.3 - 11.1 K/uL    RBC 2.94 (L) 4.23 - 5.6 M/uL    Hemoglobin 7.7 (L) 13.6 - 17.2 g/dL    Hematocrit 26.0 (L) 41.1 - 50.3 %    MCV 88.4 79.6 - 97.8 FL    MCH 26.2 26.1 - 32.9 PG    MCHC 29.6 (L) 31.4 - 35.0 g/dL    RDW 16.6 (H) 11.9 - 14.6 %    Platelets 287 (H) 740 - 450 K/uL    MPV 9.7 9.4 - 12.3 FL    nRBC 0.00 0.0 - 0.2 K/uL    Differential Type AUTOMATED      Seg Neutrophils 71 43 - 78 %    Lymphocytes 14 13 - 44 %    Monocytes 13 (H) 4.0 - 12.0 %    Eosinophils % 1 0.5 - 7.8 %    Basophils 1 0.0 - 2.0 %    Immature Granulocytes 1 0.0 - 5.0 %    Segs Absolute 10.1 (H) 1.7 - 8.2 K/UL    Absolute Lymph # 1.9 0.5 - 4.6 K/UL    Absolute Mono # 1.8 (H) 0.1 - 1.3 K/UL    Absolute Eos # 0.2 0.0 - 0.8 K/UL    Basophils Absolute 0.1 0.0 - 0.2 K/UL    Absolute Immature Granulocyte 0.1 0.0 - 0.5 K/UL   Magnesium    Collection Time: 07/28/22  5:36 AM   Result Value Ref Range    Magnesium 1.7 (L) 1.8 - 2.4 mg/dL   Phosphorus    Collection Time: 07/28/22  5:36 AM   Result Value Ref Range    Phosphorus 2.9 2.3 - 3.7 MG/DL   Protime-INR    Collection Time: 07/28/22  5:36 AM   Result Value Ref Range    Protime 20.1 (H) 12.6 - 14.5 sec    INR 1.7     CBC with Auto Differential    Collection Time: 07/29/22  6:12 AM   Result Value Ref Range    WBC 12.7 (H) 4.3 - 11.1 K/uL    RBC 2.73 (L) 4.23 - 5.6 M/uL    Hemoglobin 7.2 (L) 13.6 - 17.2 g/dL    Hematocrit 24.2 (L) 41.1 - 50.3 %    MCV 88.6 79.6 - 97.8 FL    MCH 26.4 26.1 - 32.9 PG    MCHC 29.8 (L) 31.4 - 35.0 g/dL    RDW 16.7 (H) 11.9 - 14.6 %    Platelets 647 (H) 993 - 450 K/uL    MPV 9.4 9.4 - 12.3 FL    nRBC 0.00 0.0 - 0.2 K/uL    Differential Type AUTOMATED      Seg Neutrophils 69 43 - 78 %    Lymphocytes 15 13 - 44 %    Monocytes 12 4.0 - 12.0 %    Eosinophils % 2 0.5 - 7.8 %    Basophils 1 0.0 - 2.0 %    Immature Granulocytes 1 0.0 - 5.0 %    Segs Absolute 9.0 (H) 1.7 - 8.2 K/UL    Absolute Lymph # 1.9 0.5 - 4.6 K/UL    Absolute Mono # 1.5 (H) 0.1 - 1.3 K/UL    Absolute Eos # 0.2 0.0 - 0.8 K/UL    Basophils Absolute 0.1 0.0 - 0.2 K/UL    Absolute Immature Granulocyte 0.2 0.0 - 0.5 K/UL   Magnesium    Collection Time: 07/29/22  6:12 AM   Result Value Ref Range    Magnesium 1.7 (L) 1.8 - 2.4 mg/dL   Phosphorus    Collection Time: 07/29/22  6:12 AM   Result Value Ref Range    Phosphorus 2.9 2.3 - 3.7 MG/DL   Protime-INR    Collection Time: 07/29/22  6:12 AM   Result Value Ref Range    Protime 17.8 (H) 12.6 - 14.5 sec    INR 1.4     PREPARE RBC (CROSSMATCH), 1 Units    Collection Time: 07/29/22 10:30 AM   Result Value Ref Range    History Check Historical check performed    TYPE AND SCREEN    Collection Time: 07/29/22 10:41 AM   Result Value Ref Range    Crossmatch expiration date 08/01/2022,7249     ABO/Rh O POSITIVE     Antibody Screen NEG     Blood Bank Comment       BLOOD READY CALLED UZIEL AT [facility] DESK @ [address] [facility] ON [id number]      [facility] FACILITY     Unit Number [id number]     Product Code Blood Bank RC LR     Unit Divison 00     Dispense Status Blood Bank ISSUED     Crossmatch Result Compatible        Other Studies:  CT ABDOMEN PELVIS HEMATURIA Additional Contrast? Radiologist Recommendation   Final Result   1.   Urothelial thickening involving the right renal pelvis and extending into the right proximal ureter. The degree of thickening is concerning for urothelial   malignancy. 2.  Evaluation of the urinary bladder is limited due to underdistention from   excreted contrast. No bladder mass is evident. 3.  Bilateral hemothoraces, possibly related to recent CABG. The pleural   effusions appear fairly loculated. US RETROPERITONEAL COMPLETE   Final Result      1. Mild prominence of the right renal collecting system. 2. Left kidney appears within normal limits. No left hydronephrosis. Vascular duplex lower extremity venous bilateral   Final Result      1. No evidence of deep vein thrombosis. XR CHEST PORTABLE   Final Result   No significant change. CT ABDOMEN PELVIS RENAL STONE Additional Contrast? None   Final Result      1. Mild right hydronephrosis and proximal hydroureter. No obstructive etiology   or definite ureteral calculus is identified on this exam. There is high density   material within the right renal collecting system. Findings raise the question   of blood products, although neoplastic process is not entirely excluded. Follow-up recommended. 2. High density fluid in the gallbladder, could be secondary to biliary sludge. 3. No evidence of colitis, diverticulitis or bowel obstruction. 4. Consolidation/infiltrate in the visualized left lung base, captured at the   periphery of the imaging field of view and not well evaluated. 5. Overall sensitivity is limited without the benefit of IV contrast.      XR CHEST PORTABLE   Final Result   Mildly increased left lower lobe airspace opacities with small bilateral pleural   effusions. Differential considerations include atelectasis, pneumonia, or   asymmetric pulmonary edema.                 Current Meds:  Current Facility-Administered Medications   Medication Dose Route Frequency    magnesium oxide (MAG-OX) tablet 400 mg  400 mg Oral Daily    0.9 % sodium chloride infusion   IntraVENous PRN    magnesium oxide (MAG-OX) tablet 400 mg  400 mg Oral Daily    empagliflozin (JARDIANCE) tablet 10 mg  10 mg Oral Daily    morphine injection 2 mg  2 mg IntraVENous Q4H PRN    amiodarone (CORDARONE) tablet 400 mg  400 mg Oral BID    aspirin chewable tablet 81 mg  81 mg Oral Daily    [Held by provider] metoprolol succinate (TOPROL XL) extended release tablet 25 mg  25 mg Oral BID    mexiletine (MEXITIL) capsule 150 mg  150 mg Oral TID    QUEtiapine (SEROQUEL) tablet 100 mg  100 mg Oral Nightly    Vitamin D (CHOLECALCIFEROL) tablet 1,000 Units  1,000 Units Oral Daily    sodium chloride flush 0.9 % injection 5-40 mL  5-40 mL IntraVENous 2 times per day    sodium chloride flush 0.9 % injection 5-40 mL  5-40 mL IntraVENous PRN    0.9 % sodium chloride infusion   IntraVENous PRN    ondansetron (ZOFRAN-ODT) disintegrating tablet 4 mg  4 mg Oral Q8H PRN    Or    ondansetron (ZOFRAN) injection 4 mg  4 mg IntraVENous Q6H PRN    polyethylene glycol (GLYCOLAX) packet 17 g  17 g Oral Daily PRN    acetaminophen (TYLENOL) tablet 650 mg  650 mg Oral Q6H PRN    Or    acetaminophen (TYLENOL) suppository 650 mg  650 mg Rectal Q6H PRN    cefTRIAXone (ROCEPHIN) 1000 mg IVPB in NS 50ml minibag  1,000 mg IntraVENous Q24H    HYDROcodone homatropine (HYCODAN) 5-1.5 MG/5ML solution 5 mL  5 mL Oral Q4H PRN    guaiFENesin-dextromethorphan (ROBITUSSIN DM) 100-10 MG/5ML syrup 5 mL  5 mL Oral Q4H PRN    oxyCODONE (ROXICODONE) immediate release tablet 5 mg  5 mg Oral Q4H PRN    doxycycline (VIBRAMYCIN) 100 mg in sodium chloride 0.9 % 100 mL IVPB  100 mg IntraVENous Q12H       Signed:  Dulce Lutz MD    Part of this note may have been written by using a voice dictation software. The note has been proof read but may still contain some grammatical/other typographical errors.

## 2022-07-29 NOTE — PROGRESS NOTES
Pt is stable with bed in lowest position, wheels locked, and call light within reach. Hourly rounds completed. VSS. IV is patent and dressing is clean, dry, and intact. Report to be given to day shift nurse.

## 2022-07-29 NOTE — PROGRESS NOTES
Admit Date: 7/23/2022      Subjective:     Bucky Brink is resting. He is urinating consistently- urine is clear/rachael. VSS. INR 1.4. Objective:     Patient Vitals for the past 8 hrs:   BP Temp Temp src Pulse Resp SpO2 Weight   07/29/22 0742 101/63 100 °F (37.8 °C) Oral 83 19 92 % --   07/29/22 0516 -- -- -- -- -- -- 196 lb 13.9 oz (89.3 kg)   07/29/22 0312 92/60 98.6 °F (37 °C) Oral 82 18 90 % --     07/29 0701 - 07/29 1900  In: 50 [P.O.:50]  Out: 300 [Urine:300]  07/27 1901 - 07/29 0700  In: 480 [P.O.:480]  Out: 3050 [Urine:3050]    Physical Exam:  GENERAL ASSESSMENT: alert, oriented to person, place and time, no acute distress Chest: clear to auscultation  CVS exam: normal rate, regular rhythm, normal S1, S2  ABDOMEN: soft, non tender  Neurological exam reveals alert, oriented, normal speech, no focal findings or movement disorder noted.       Data Review   Recent Results (from the past 24 hour(s))   CBC with Auto Differential    Collection Time: 07/29/22  6:12 AM   Result Value Ref Range    WBC 12.7 (H) 4.3 - 11.1 K/uL    RBC 2.73 (L) 4.23 - 5.6 M/uL    Hemoglobin 7.2 (L) 13.6 - 17.2 g/dL    Hematocrit 24.2 (L) 41.1 - 50.3 %    MCV 88.6 79.6 - 97.8 FL    MCH 26.4 26.1 - 32.9 PG    MCHC 29.8 (L) 31.4 - 35.0 g/dL    RDW 16.7 (H) 11.9 - 14.6 %    Platelets 543 (H) 882 - 450 K/uL    MPV 9.4 9.4 - 12.3 FL    nRBC 0.00 0.0 - 0.2 K/uL    Differential Type AUTOMATED      Seg Neutrophils 69 43 - 78 %    Lymphocytes 15 13 - 44 %    Monocytes 12 4.0 - 12.0 %    Eosinophils % 2 0.5 - 7.8 %    Basophils 1 0.0 - 2.0 %    Immature Granulocytes 1 0.0 - 5.0 %    Segs Absolute 9.0 (H) 1.7 - 8.2 K/UL    Absolute Lymph # 1.9 0.5 - 4.6 K/UL    Absolute Mono # 1.5 (H) 0.1 - 1.3 K/UL    Absolute Eos # 0.2 0.0 - 0.8 K/UL    Basophils Absolute 0.1 0.0 - 0.2 K/UL    Absolute Immature Granulocyte 0.2 0.0 - 0.5 K/UL   Magnesium    Collection Time: 07/29/22  6:12 AM   Result Value Ref Range    Magnesium 1.7 (L) 1.8 - 2.4 mg/dL Phosphorus    Collection Time: 07/29/22  6:12 AM   Result Value Ref Range    Phosphorus 2.9 2.3 - 3.7 MG/DL   Protime-INR    Collection Time: 07/29/22  6:12 AM   Result Value Ref Range    Protime 17.8 (H) 12.6 - 14.5 sec    INR 1.4         Assessment:     Principal Problem:    Acute kidney injury (BEN) with acute tubular necrosis (ATN) ([facility])  Active Problems:    CAD, multiple vessel    Chronic systolic (congestive) heart failure (HCC)    CAP (community acquired pneumonia)    Acute kidney injury ([address]    Ureteral obstruction, right    Gross hematuria    Right kidney mass    Intracardiac thrombus    Ventricular tachycardia (HCC)    Hydronephrosis  Resolved Problems:    * No resolved hospital problems. *    55 yo male with hx of cad, cabg and apical thrombus- on coumadin (currently held) admitted with cough, SOB,flank pain and episode of gross hematuria with UTI, CT showing  R hydroureter and hydronephrosis with no visible obstruction with high density material into the R renal pelvis, BEN. Cr improved, Hgb dropping. Urine is tea colored with no clots. Does have hx of tobacco use. CT hematuria protocol ordered 7/25- urothelial thickening involving the right renal pelvis and extending into the right proximal ureter, degree of thickening concerning for urothelial malignancy. scheduled for cystoscopy, right URS, right retrograde pyelogram on 7/26, this was cancelled d/t INR >2. INR now down to 1.4. Tmax 101 last PM.  Has been on IV rocephin since 7/24. Plan: To OR today for cystoscopy, right URS, right retrograde pyelogram, continue NPO. Verify consent has been obtained. Signed By: Galina Serrano, LORENZA - FRANKIE     July 29, 2022      [facility]    Urology Attending Physician Note:     Admit Date: 7/23/2022    Subjective:     No acute events today. Urine clear. INR 1.4. NPO for procedure today.      Objective:     Patient Vitals for the past 8 hrs:   BP Temp Temp src Pulse Resp SpO2   07/29/22 1321 101/63 99.3 °F (37.4 °C) Oral 76 18 95 %   07/29/22 1251 100/61 99.4 °F (37.4 °C) Oral 80 18 94 %   07/29/22 1235 105/66 99.2 °F (37.3 °C) Oral 82 18 95 %   07/29/22 1220 101/72 99 °F (37.2 °C) Oral 80 18 95 %   07/29/22 1154 101/72 99 °F (37.2 °C) Oral 80 18 94 %   07/29/22 0742 101/63 100 °F (37.8 °C) Oral 83 19 92 %     07/29 0701 - 07/29 1900  In: 50 [P.O.:50]  Out: 300 [Urine:300]  07/27 1901 - 07/29 0700  In: 480 [P.O.:480]  Out: 3050 [Urine:3050]    Physical Exam:   /63   Pulse 76   Temp 99.3 °F (37.4 °C) (Oral)   Resp 18   Ht 5' 9\" (1.753 m)   Wt 196 lb 13.9 oz (89.3 kg)   SpO2 95%   BMI 29.07 kg/m²      GENERAL: No acute distress, Awake, Alert, Oriented X 3,  CARDIAC: regular rate and rhythm  CHEST AND LUNG: Easy work of breathing, clear to auscultation bilaterally, no cyanosis  ABDOMEN: soft, non tender, non-distended        Data Review   Recent Results (from the past 24 hour(s))   CBC with Auto Differential    Collection Time: 07/29/22  6:12 AM   Result Value Ref Range    WBC 12.7 (H) 4.3 - 11.1 K/uL    RBC 2.73 (L) 4.23 - 5.6 M/uL    Hemoglobin 7.2 (L) 13.6 - 17.2 g/dL    Hematocrit 24.2 (L) 41.1 - 50.3 %    MCV 88.6 79.6 - 97.8 FL    MCH 26.4 26.1 - 32.9 PG    MCHC 29.8 (L) 31.4 - 35.0 g/dL    RDW 16.7 (H) 11.9 - 14.6 %    Platelets 513 (H) 828 - 450 K/uL    MPV 9.4 9.4 - 12.3 FL    nRBC 0.00 0.0 - 0.2 K/uL    Differential Type AUTOMATED      Seg Neutrophils 69 43 - 78 %    Lymphocytes 15 13 - 44 %    Monocytes 12 4.0 - 12.0 %    Eosinophils % 2 0.5 - 7.8 %    Basophils 1 0.0 - 2.0 %    Immature Granulocytes 1 0.0 - 5.0 %    Segs Absolute 9.0 (H) 1.7 - 8.2 K/UL    Absolute Lymph # 1.9 0.5 - 4.6 K/UL    Absolute Mono # 1.5 (H) 0.1 - 1.3 K/UL    Absolute Eos # 0.2 0.0 - 0.8 K/UL    Basophils Absolute 0.1 0.0 - 0.2 K/UL    Absolute Immature Granulocyte 0.2 0.0 - 0.5 K/UL   Magnesium    Collection Time: 07/29/22  6:12 AM   Result Value Ref Range    Magnesium 1.7 (L) 1.8 - 2.4 mg/dL   Phosphorus    Collection Time: 07/29/22  6:12 AM   Result Value Ref Range    Phosphorus 2.9 2.3 - 3.7 MG/DL   Protime-INR    Collection Time: 07/29/22  6:12 AM   Result Value Ref Range    Protime 17.8 (H) 12.6 - 14.5 sec    INR 1.4     PREPARE RBC (CROSSMATCH), 1 Units    Collection Time: 07/29/22 10:30 AM   Result Value Ref Range    History Check Historical check performed    TYPE AND SCREEN    Collection Time: 07/29/22 10:41 AM   Result Value Ref Range    Crossmatch expiration date 08/01/2022,2352     ABO/Rh O POSITIVE     Antibody Screen NEG     Blood Bank Comment       BLOOD READY CALLED UZIEL AT [address] @ 1200 [place] [id number]      [facility]     Unit Number [id number]     Product Code Blood Bank RC LR     Unit Divison 00     Dispense Status Blood Bank ISSUED     Crossmatch Result Compatible            Assessment:     Principal Problem:    Acute kidney injury (BEN) with acute tubular necrosis (ATN) (HCC)  Active Problems:    CAD, multiple vessel    Chronic systolic (congestive) heart failure (HCC)    CAP (community acquired pneumonia)    Acute kidney injury ([facility] [address]    Ureteral obstruction, right    Gross hematuria    Right kidney mass    Intracardiac thrombus    Ventricular tachycardia (HCC)    Hydronephrosis  Resolved Problems:    * No resolved hospital problems. *    55 yo male with hx of cad, cabg and apical thrombus- on coumadin (currently held) admitted with cough, SOB,flank pain and episode of gross hematuria with UTI, CT showing  R hydroureter and hydronephrosis with no visible obstruction with high density material into the R renal pelvis, BEN. Cr improved, Hgb dropping. Urine is tea colored with no clots. Does have hx of tobacco use. CT hematuria protocol ordered 7/25- urothelial thickening involving the right renal pelvis and extending into the right proximal ureter, degree of thickening concerning for urothelial malignancy.   scheduled for cystoscopy, right URS, right retrograde pyelogram on 7/26, this was cancelled d/t INR >2. INR now down to 1.4. Tmax 101 last PM.  Has been on IV rocephin since 7/24. Urine culture negative. Plan: To OR today for cystoscopy, right URS, right retrograde pyelogram, NPO for procedure tonight. Verify consent has been obtained. Holding blood thinners until after procedure. In addition to my documentation above, I have also reviewed the nurse practitioner note and personally examined the patient. I agree with the HPI, exam, assessment and plan. Diony Hancock M.D.     [facility]  [address], [facility] [address] [facility]  Phone: [phone]  Fax: [phone]

## 2022-07-29 NOTE — PROGRESS NOTES
Pre Treatment:  none         Post Treatment: none Vitals        Oxygen    None    RESTRICTIONS/PRECAUTIONS:        MOBILITY: I Mod I S SBA CGA Min Mod Max Total  NT x2 Comments:   Bed Mobility    Rolling [] [] [] [x] [] [] [] [] [] [] []    Supine to Sit [] [] [] [x] [] [] [] [] [] [] []    Scooting [] [] [] [x] [] [] [] [] [] [] []    Sit to Supine [] [] [] [x] [] [] [] [] [] [] []    Transfers    Sit to Stand [] [] [] [x] [] [] [] [] [] [] []    Bed to Chair [] [] [] [x] [] [] [] [] [] [] []    Stand to Sit [] [] [] [x] [] [] [] [] [] [] []     [] [] [] [] [] [] [] [] [] [] []    I=Independent, Mod I=Modified Independent, S=Supervision, SBA=Standby Assistance, CGA=Contact Guard Assistance,   Min=Minimal Assistance, Mod=Moderate Assistance, Max=Maximal Assistance, Total=Total Assistance, NT=Not Tested    BALANCE: Good Fair+ Fair Fair- Poor NT Comments   Sitting Static [x] [] [] [] [] []    Sitting Dynamic [x] [] [] [] [] []              Standing Static [] [x] [] [] [] []    Standing Dynamic [] [x] [] [] [] []      GAIT: I Mod I S SBA CGA Min Mod Max Total  NT x2 Comments:   Level of Assistance [] [] [] [x] [] [] [] [] [] [] []    Distance 500 feet    DME None    Gait Quality Decreased step clearance and Decreased step length    Weightbearing Status      Stairs      I=Independent, Mod I=Modified Independent, S=Supervision, SBA=Standby Assistance, CGA=Contact Guard Assistance,   Min=Minimal Assistance, Mod=Moderate Assistance, Max=Maximal Assistance, Total=Total Assistance, NT=Not Tested    PLAN:   ACUTE PHYSICAL THERAPY GOALS:   (Developed with and agreed upon by patient and/or caregiver.)  (1.) Ulysses Caldron will move from supine to sit and sit to supine , scoot up and down, and roll side to side with MODIFIED INDEPENDENCE within 7 treatment day(s).     (2.) Ulysses Caldron will transfer from bed to chair and chair to bed with MODIFIED INDEPENDENCE using the least restrictive device within 7 treatment day(s). (3.) Christa Carmichael will ambulate with MODIFIED INDEPENDENCE for 1000+ feet with the least restrictive device within 7 treatment day(s). (4.) Christa Carmichael will perform standing static and dynamic balance activities x 25 minutes with MODIFIED INDEPENDENCE to improve safety and activity tolerance within 7 treatment day(s). (5.) Christa Carmichael will ascend and descend 3 stairs using no hand rail(s) with MODIFIED INDEPENDENCE to improve functional mobility and safety within 7 treatment day(s). (6.) Christa Carmichael will perform therapeutic exercises x 20 min for HEP with INDEPENDENCE to improve strength, endurance, and functional mobility within 7 treatment day(s). FREQUENCY AND DURATION: 3 times/week for duration of hospital stay or until stated goals are met, whichever comes first.    TREATMENT:   TREATMENT:   Therapeutic Activity (15 Minutes): Therapeutic activity included Supine to Sit, Sit to Supine, Scooting, Transfer Training, Ambulation on level ground, Sitting balance , and Standing balance to improve functional Activity tolerance, Balance, Coordination, Mobility, and Strength. Therapeutic Exercise (10 Minutes): Therapeutic exercises noted below to improve functional activity tolerance, strength, and mobility.      TREATMENT GRID:  N/A    AFTER TREATMENT PRECAUTIONS: Bed, Bed/Chair Locked, Call light within reach, Needs within reach, and RN notified    INTERDISCIPLINARY COLLABORATION:  RN/ PCT and PT/ PTA    EDUCATION:      TIME IN/OUT:  Time In: 1115  Time Out: [address]  Minutes: Maxi Strickland 35., PTA

## 2022-07-30 DIAGNOSIS — R31.0 GROSS HEMATURIA: Primary | ICD-10-CM

## 2022-07-30 LAB
ABO + RH BLD: NORMAL
ANION GAP SERPL CALC-SCNC: 8 MMOL/L (ref 7–16)
APTT PPP: 37.6 SEC (ref 24.1–35.1)
BASOPHILS # BLD: 0 K/UL (ref 0–0.2)
BASOPHILS NFR BLD: 0 % (ref 0–2)
BLD PROD TYP BPU: NORMAL
BLOOD BANK CMNT PATIENT-IMP: NORMAL
BLOOD BANK DISPENSE STATUS: NORMAL
BLOOD GROUP ANTIBODIES SERPL: NORMAL
BPU ID: NORMAL
BUN SERPL-MCNC: 19 MG/DL (ref 8–23)
CALCIUM SERPL-MCNC: 8.4 MG/DL (ref 8.3–10.4)
CHLORIDE SERPL-SCNC: 106 MMOL/L (ref 98–107)
CO2 SERPL-SCNC: 27 MMOL/L (ref 21–32)
CREAT SERPL-MCNC: 1.1 MG/DL (ref 0.8–1.5)
CROSSMATCH RESULT: NORMAL
DIFFERENTIAL METHOD BLD: ABNORMAL
EOSINOPHIL # BLD: 0 K/UL (ref 0–0.8)
EOSINOPHIL NFR BLD: 0 % (ref 0.5–7.8)
ERYTHROCYTE [DISTWIDTH] IN BLOOD BY AUTOMATED COUNT: 16.6 % (ref 11.9–14.6)
GLUCOSE SERPL-MCNC: 112 MG/DL (ref 65–100)
HCT VFR BLD AUTO: 25 % (ref 41.1–50.3)
HGB BLD-MCNC: 7.5 G/DL (ref 13.6–17.2)
IMM GRANULOCYTES # BLD AUTO: 0.2 K/UL (ref 0–0.5)
IMM GRANULOCYTES NFR BLD AUTO: 1 % (ref 0–5)
INR PPP: 1.4
INR PPP: 1.4
LYMPHOCYTES # BLD: 0.9 K/UL (ref 0.5–4.6)
LYMPHOCYTES NFR BLD: 7 % (ref 13–44)
MAGNESIUM SERPL-MCNC: 1.7 MG/DL (ref 1.8–2.4)
MCH RBC QN AUTO: 26.6 PG (ref 26.1–32.9)
MCHC RBC AUTO-ENTMCNC: 30 G/DL (ref 31.4–35)
MCV RBC AUTO: 88.7 FL (ref 79.6–97.8)
MONOCYTES # BLD: 0.7 K/UL (ref 0.1–1.3)
MONOCYTES NFR BLD: 6 % (ref 4–12)
NEUTS SEG # BLD: 10.4 K/UL (ref 1.7–8.2)
NEUTS SEG NFR BLD: 85 % (ref 43–78)
NRBC # BLD: 0 K/UL (ref 0–0.2)
PHOSPHATE SERPL-MCNC: 4.5 MG/DL (ref 2.3–3.7)
PLATELET # BLD AUTO: 602 K/UL (ref 150–450)
PMV BLD AUTO: 9.9 FL (ref 9.4–12.3)
POTASSIUM SERPL-SCNC: 4.5 MMOL/L (ref 3.5–5.1)
PROTHROMBIN TIME: 17.5 SEC (ref 12.6–14.5)
PROTHROMBIN TIME: 17.8 SEC (ref 12.6–14.5)
RBC # BLD AUTO: 2.82 M/UL (ref 4.23–5.6)
SODIUM SERPL-SCNC: 141 MMOL/L (ref 138–145)
SPECIMEN EXP DATE BLD: NORMAL
UFH PPP CHRO-ACNC: <0.1 IU/ML (ref 0.3–0.7)
UFH PPP CHRO-ACNC: <0.1 IU/ML (ref 0.3–0.7)
UNIT DIVISION: 0
WBC # BLD AUTO: 12.2 K/UL (ref 4.3–11.1)

## 2022-07-30 PROCEDURE — 99232 SBSQ HOSP IP/OBS MODERATE 35: CPT | Performed by: UROLOGY

## 2022-07-30 PROCEDURE — 6360000002 HC RX W HCPCS: Performed by: STUDENT IN AN ORGANIZED HEALTH CARE EDUCATION/TRAINING PROGRAM

## 2022-07-30 PROCEDURE — 85730 THROMBOPLASTIN TIME PARTIAL: CPT

## 2022-07-30 PROCEDURE — 76937 US GUIDE VASCULAR ACCESS: CPT

## 2022-07-30 PROCEDURE — 85520 HEPARIN ASSAY: CPT

## 2022-07-30 PROCEDURE — 85025 COMPLETE CBC W/AUTO DIFF WBC: CPT

## 2022-07-30 PROCEDURE — 85610 PROTHROMBIN TIME: CPT

## 2022-07-30 PROCEDURE — 84100 ASSAY OF PHOSPHORUS: CPT

## 2022-07-30 PROCEDURE — 2580000003 HC RX 258: Performed by: INTERNAL MEDICINE

## 2022-07-30 PROCEDURE — 83735 ASSAY OF MAGNESIUM: CPT

## 2022-07-30 PROCEDURE — 6370000000 HC RX 637 (ALT 250 FOR IP): Performed by: INTERNAL MEDICINE

## 2022-07-30 PROCEDURE — 2500000003 HC RX 250 WO HCPCS: Performed by: INTERNAL MEDICINE

## 2022-07-30 PROCEDURE — 80048 BASIC METABOLIC PNL TOTAL CA: CPT

## 2022-07-30 PROCEDURE — 36415 COLL VENOUS BLD VENIPUNCTURE: CPT

## 2022-07-30 PROCEDURE — 6370000000 HC RX 637 (ALT 250 FOR IP): Performed by: STUDENT IN AN ORGANIZED HEALTH CARE EDUCATION/TRAINING PROGRAM

## 2022-07-30 PROCEDURE — 1100000000 HC RM PRIVATE

## 2022-07-30 PROCEDURE — 99232 SBSQ HOSP IP/OBS MODERATE 35: CPT | Performed by: INTERNAL MEDICINE

## 2022-07-30 RX ORDER — HEPARIN SODIUM 1000 [USP'U]/ML
2000 INJECTION, SOLUTION INTRAVENOUS; SUBCUTANEOUS PRN
Status: DISCONTINUED | OUTPATIENT
Start: 2022-07-30 | End: 2022-08-01 | Stop reason: HOSPADM

## 2022-07-30 RX ORDER — HEPARIN SODIUM 1000 [USP'U]/ML
4000 INJECTION, SOLUTION INTRAVENOUS; SUBCUTANEOUS PRN
Status: DISCONTINUED | OUTPATIENT
Start: 2022-07-30 | End: 2022-08-01 | Stop reason: HOSPADM

## 2022-07-30 RX ORDER — HEPARIN SODIUM 10000 [USP'U]/100ML
5-30 INJECTION, SOLUTION INTRAVENOUS CONTINUOUS
Status: DISCONTINUED | OUTPATIENT
Start: 2022-07-30 | End: 2022-07-30

## 2022-07-30 RX ORDER — HEPARIN SODIUM 1000 [USP'U]/ML
4000 INJECTION, SOLUTION INTRAVENOUS; SUBCUTANEOUS ONCE
Status: COMPLETED | OUTPATIENT
Start: 2022-07-30 | End: 2022-07-30

## 2022-07-30 RX ORDER — HEPARIN SODIUM 10000 [USP'U]/100ML
5-30 INJECTION, SOLUTION INTRAVENOUS CONTINUOUS
Status: DISCONTINUED | OUTPATIENT
Start: 2022-07-30 | End: 2022-08-01 | Stop reason: HOSPADM

## 2022-07-30 RX ADMIN — EMPAGLIFLOZIN 10 MG: 10 TABLET, FILM COATED ORAL at 08:28

## 2022-07-30 RX ADMIN — DOXYCYCLINE 100 MG: 100 INJECTION, POWDER, LYOPHILIZED, FOR SOLUTION INTRAVENOUS at 08:26

## 2022-07-30 RX ADMIN — HEPARIN SODIUM 11 UNITS/KG/HR: 10000 INJECTION, SOLUTION INTRAVENOUS at 13:50

## 2022-07-30 RX ADMIN — ASPIRIN 81 MG: 81 TABLET, CHEWABLE ORAL at 08:25

## 2022-07-30 RX ADMIN — HEPARIN SODIUM 4000 UNITS: 1000 INJECTION INTRAVENOUS; SUBCUTANEOUS at 13:37

## 2022-07-30 RX ADMIN — HEPARIN SODIUM 4000 UNITS: 1000 INJECTION INTRAVENOUS; SUBCUTANEOUS at 22:20

## 2022-07-30 RX ADMIN — MEXILETINE HYDROCHLORIDE 150 MG: 150 CAPSULE ORAL at 20:40

## 2022-07-30 RX ADMIN — WARFARIN SODIUM 5 MG: 2 TABLET ORAL at 17:30

## 2022-07-30 RX ADMIN — SODIUM CHLORIDE, PRESERVATIVE FREE 10 ML: 5 INJECTION INTRAVENOUS at 09:00

## 2022-07-30 RX ADMIN — MELATONIN 1000 UNITS: at 09:00

## 2022-07-30 RX ADMIN — QUETIAPINE FUMARATE 100 MG: 100 TABLET ORAL at 20:40

## 2022-07-30 RX ADMIN — AMIODARONE HYDROCHLORIDE 400 MG: 200 TABLET ORAL at 20:40

## 2022-07-30 RX ADMIN — SODIUM CHLORIDE, PRESERVATIVE FREE 10 ML: 5 INJECTION INTRAVENOUS at 20:40

## 2022-07-30 RX ADMIN — DOXYCYCLINE 100 MG: 100 INJECTION, POWDER, LYOPHILIZED, FOR SOLUTION INTRAVENOUS at 20:55

## 2022-07-30 RX ADMIN — AMIODARONE HYDROCHLORIDE 400 MG: 200 TABLET ORAL at 08:24

## 2022-07-30 RX ADMIN — MEXILETINE HYDROCHLORIDE 150 MG: 150 CAPSULE ORAL at 08:24

## 2022-07-30 RX ADMIN — MEXILETINE HYDROCHLORIDE 150 MG: 150 CAPSULE ORAL at 14:12

## 2022-07-30 RX ADMIN — Medication 400 MG: at 08:24

## 2022-07-30 ASSESSMENT — PAIN SCALES - GENERAL
PAINLEVEL_OUTOF10: 0

## 2022-07-30 NOTE — PROGRESS NOTES
Patient arrived by cart from transport. No c/o discomfort at this time. Voiding bloody urine, tolerating urination well. Alert and oriented. Resting quietly, will monitor.

## 2022-07-30 NOTE — PROGRESS NOTES
Pt has been resting quietly through the night, c/o mild burning sensation with urination. Hourly rounding being performed, fall prcautios in place, will continue to monitor.

## 2022-07-30 NOTE — ANESTHESIA POSTPROCEDURE EVALUATION
Department of Anesthesiology  Postprocedure Note    Patient: Maryellen Alarcon  MRN: [id number]  YOB: 1960  Date of evaluation: 7/29/2022      Procedure Summary     Date: 07/29/22 Room / Location: [facility] OR 01 CYSTO / [facility] MA[facility] OR    Anesthesia Start: Ary Baker Anesthesia Stop: 2013    Procedure: CYSTOSCOPY,RIGHT URETEROSCOPY,RIGHT RETROGRADE PYELOGRAM (Right: Ureter) Diagnosis:       Hydronephrosis, unspecified hydronephrosis type      (Hydronephrosis, unspecified hydronephrosis type [N13.30])    Providers: Mona Verdugo MD Responsible Provider: Tesha Whiting MD    Anesthesia Type: General ASA Status: 4          Anesthesia Type: General    Susie Phase I: Susie Score: 9    Susie Phase II:        Anesthesia Post Evaluation    Patient location during evaluation: PACU  Patient participation: complete - patient participated  Level of consciousness: awake and alert  Airway patency: patent  Nausea & Vomiting: no nausea and no vomiting  Complications: no  Cardiovascular status: hemodynamically stable  Respiratory status: acceptable, nonlabored ventilation and spontaneous ventilation  Hydration status: euvolemic  Comments: /76   Pulse 96   Temp 98 °F (36.7 °C) (Temporal)   Resp 16   Ht 5' 9\" (1.753 m)   Wt 196 lb 13.9 oz (89.3 kg)   SpO2 98%   BMI 29.07 kg/m²     Multimodal analgesia pain management approach

## 2022-07-30 NOTE — PROGRESS NOTES
Placed 20g 8cm Endurance EDC in left cephalic vein with ultrasound assistance and 20g PIV in right forearm.

## 2022-07-30 NOTE — PROGRESS NOTES
Called lab for XA results; XA was ordered but was not drawn with other labs after 8am.  Lab to come collect XA per order.

## 2022-07-30 NOTE — PROGRESS NOTES
Urology Progress Note    Admit Date: 7/23/2022    Subjective:     Patient has no new complaints. Voiding well since surgery. Hematuria has resolved. Pain controlled. I was unable to access R kidney during ureteroscopy yesterday due to R ureter narrowing at proximal 1/3 and therefore R ureter stent was placed to help with ureteral dilation. RPG in OR appeared normal on R, however. Objective:     Patient Vitals for the past 8 hrs:   BP Temp Temp src Pulse Resp SpO2 Weight   07/30/22 0802 98/66 97.7 °F (36.5 °C) Oral 90 20 98 % --   07/30/22 0548 -- -- -- -- -- -- 188 lb 15 oz (85.7 kg)   07/30/22 0416 102/77 97.2 °F (36.2 °C) Oral 83 18 98 % --     No intake/output data recorded.   07/28 1901 - 07/30 0700  In: 1117 [P.O.:50; I.V.:750]  Out: 65 [Urine:3150]    Physical Exam:   BP 98/66   Pulse 90   Temp 97.7 °F (36.5 °C) (Oral)   Resp 20   Ht 5' 9\" (1.753 m)   Wt 188 lb 15 oz (85.7 kg)   SpO2 98%   BMI 27.90 kg/m²      GENERAL: No acute distress, Awake, Alert, Oriented X 3  CARDIAC: regular rate and rhythm  CHEST AND LUNG: Easy work of breathing  ABDOMEN: soft, non tender, non-distended        Data Review   Recent Results (from the past 24 hour(s))   PREPARE RBC (CROSSMATCH), 1 Units    Collection Time: 07/29/22 10:30 AM   Result Value Ref Range    History Check Historical check performed    TYPE AND SCREEN    Collection Time: 07/29/22 10:41 AM   Result Value Ref Range    Crossmatch expiration date 08/01/2022,7578     ABO/Rh O POSITIVE     Antibody Screen NEG     Blood Bank Comment       BLOOD READY CALLED UZIEL AT [facility] FLOOR DESK @ [facility] 94 [phone]      [facility]     Unit Number [id number]     Product Code Blood Bank RC LR     Unit Divison 00     Dispense Status Blood Bank TRANSFUSED     Crossmatch Result Compatible    CBC with Auto Differential    Collection Time: 07/29/22  4:53 PM   Result Value Ref Range    WBC 13.3 (H) 4.3 - 11.1 K/uL    RBC 3.20 (L) 4.23 - 5.6 M/uL    Hemoglobin 8.7 (L) 13.6 - 17.2 g/dL    Hematocrit 28.4 (L) 41.1 - 50.3 %    MCV 88.8 79.6 - 97.8 FL    MCH 27.2 26.1 - 32.9 PG    MCHC 30.6 (L) 31.4 - 35.0 g/dL    RDW 16.6 (H) 11.9 - 14.6 %    Platelets 341 (H) 937 - 450 K/uL    MPV 9.8 9.4 - 12.3 FL    nRBC 0.00 0.0 - 0.2 K/uL    Differential Type AUTOMATED      Seg Neutrophils 70 43 - 78 %    Lymphocytes 15 13 - 44 %    Monocytes 11 4.0 - 12.0 %    Eosinophils % 2 0.5 - 7.8 %    Basophils 1 0.0 - 2.0 %    Immature Granulocytes 1 0.0 - 5.0 %    Segs Absolute 9.5 (H) 1.7 - 8.2 K/UL    Absolute Lymph # 1.9 0.5 - 4.6 K/UL    Absolute Mono # 1.4 (H) 0.1 - 1.3 K/UL    Absolute Eos # 0.2 0.0 - 0.8 K/UL    Basophils Absolute 0.1 0.0 - 0.2 K/UL    Absolute Immature Granulocyte 0.2 0.0 - 0.5 K/UL   CBC with Auto Differential    Collection Time: 07/30/22  5:46 AM   Result Value Ref Range    WBC 12.2 (H) 4.3 - 11.1 K/uL    RBC 2.82 (L) 4.23 - 5.6 M/uL    Hemoglobin 7.5 (L) 13.6 - 17.2 g/dL    Hematocrit 25.0 (L) 41.1 - 50.3 %    MCV 88.7 79.6 - 97.8 FL    MCH 26.6 26.1 - 32.9 PG    MCHC 30.0 (L) 31.4 - 35.0 g/dL    RDW 16.6 (H) 11.9 - 14.6 %    Platelets 506 (H) 369 - 450 K/uL    MPV 9.9 9.4 - 12.3 FL    nRBC 0.00 0.0 - 0.2 K/uL    Differential Type AUTOMATED      Seg Neutrophils 85 (H) 43 - 78 %    Lymphocytes 7 (L) 13 - 44 %    Monocytes 6 4.0 - 12.0 %    Eosinophils % 0 (L) 0.5 - 7.8 %    Basophils 0 0.0 - 2.0 %    Immature Granulocytes 1 0.0 - 5.0 %    Segs Absolute 10.4 (H) 1.7 - 8.2 K/UL    Absolute Lymph # 0.9 0.5 - 4.6 K/UL    Absolute Mono # 0.7 0.1 - 1.3 K/UL    Absolute Eos # 0.0 0.0 - 0.8 K/UL    Basophils Absolute 0.0 0.0 - 0.2 K/UL    Absolute Immature Granulocyte 0.2 0.0 - 0.5 K/UL   Magnesium    Collection Time: 07/30/22  5:46 AM   Result Value Ref Range    Magnesium 1.7 (L) 1.8 - 2.4 mg/dL   Phosphorus    Collection Time: 07/30/22  5:46 AM   Result Value Ref Range    Phosphorus 4.5 (H) 2.3 - 3.7 MG/DL   Protime-INR    Collection Time: 07/30/22  5:46 AM   Result Value Ref Range Protime 17.8 (H) 12.6 - 14.5 sec    INR 1.4     Basic Metabolic Panel w/ Reflex to MG    Collection Time: 07/30/22  5:46 AM   Result Value Ref Range    Sodium 141 138 - 145 mmol/L    Potassium 4.5 3.5 - 5.1 mmol/L    Chloride 106 98 - 107 mmol/L    CO2 27 21 - 32 mmol/L    Anion Gap 8 7 - 16 mmol/L    Glucose 112 (H) 65 - 100 mg/dL    BUN 19 8 - 23 MG/DL    Creatinine 1.10 0.8 - 1.5 MG/DL    GFR African American >60 >60 ml/min/1.73m2    GFR Non- >60 >60 ml/min/1.73m2    Calcium 8.4 8.3 - 10.4 MG/DL           Assessment:     Principal Problem:    Acute kidney injury (BEN) with acute tubular necrosis (ATN) ([facility])  Active Problems:    CAD, multiple vessel    Chronic systolic (congestive) heart failure ([facility])    CAP (community acquired pneumonia)    Acute kidney injury ([address]    Ureteral obstruction, right    Gross hematuria    Right kidney mass    Intracardiac thrombus    Ventricular tachycardia ([facility])    Hydronephrosis  Resolved Problems:    * No resolved hospital problems. *    56 year old male with gross hematuria and possible clot vs. UCC in the R renal pelvis / proximal R ureter. Hematuria has resolved. S/p R diagnostic ureteroscopy / R stent placement 7/29/22. Recovering well. Plan:     -OK to resume anticoagulation from Urology standpoint  -Will arrange outpatient follow up with me in about 2 weeks with repeat CT hematuria protocol to re-evaluate R kidney. Will discuss need for repeat R diagnostic ureteroscopy at that time which will hopefully be more successful now that R ureter stent is in to help dilate the ureter / facilitate camera passage. However, decision on whether or not to proceed with this ureteroscopy will be based on repeat CT scan.   -Will sign off. Call with questions. Diony Daly M.D.     [facility]  [facility] [address], [address] [place] [address] [address]  [place], [address]  Phone: [phone]  Fax: [phone]

## 2022-07-30 NOTE — PROGRESS NOTES
[facility] CARDIOLOGY PROGRESS NOTE           7/30/2022 8:48 AM    Admit Date: 7/23/2022      Subjective:       Review of Systems   Constitutional:  Negative for activity change. Cardiovascular:  Negative for chest pain. Objective:      Vitals:    07/29/22 2356 07/30/22 0416 07/30/22 0548 07/30/22 0802   BP: 98/65 102/77  98/66   Pulse: 77 83  90   Resp: 18 18  20   Temp: 98.2 °F (36.8 °C) 97.2 °F (36.2 °C)  97.7 °F (36.5 °C)   TempSrc: Oral Oral  Oral   SpO2: 96% 98%  98%   Weight:   188 lb 15 oz (85.7 kg)    Height:             Physical Exam  Constitutional:       Appearance: Normal appearance. HENT:      Head: Normocephalic and atraumatic. Nose: Nose normal.      Mouth/Throat:      Mouth: Mucous membranes are moist.   Eyes:      Pupils: Pupils are equal, round, and reactive to light. Cardiovascular:      Rate and Rhythm: Normal rate. Heart sounds: No murmur heard. Abdominal:      General: Abdomen is flat. Musculoskeletal:         General: No swelling. Neurological:      Mental Status: Mental status is at baseline.    Psychiatric:         Mood and Affect: Mood normal.       Data Review:   Recent Labs     07/28/22  0536 07/29/22  0612 07/29/22  1653 07/30/22  0546     --   --  141   K 3.5  --   --  4.5   MG 1.7* 1.7*  --  1.7*   BUN 16  --   --  19   WBC 14.2* 12.7* 13.3* 12.2*   HGB 7.7* 7.2* 8.7* 7.5*   HCT 26.0* 24.2* 28.4* 25.0*   * 533* 593* 602*   INR 1.7 1.4  --  1.4       [unfilled]  Current Facility-Administered Medications   Medication Dose Route Frequency    heparin (porcine) injection 4,000 Units  4,000 Units IntraVENous Once    heparin (porcine) injection 4,000 Units  4,000 Units IntraVENous PRN    heparin (porcine) injection 2,000 Units  2,000 Units IntraVENous PRN    heparin 25,000 units in dextrose 5% 250 mL (premix) infusion  5-30 Units/kg/hr IntraVENous Continuous    warfarin (COUMADIN) tablet 5 mg  5 mg Oral Daily    magnesium oxide (MAG-OX) tablet 400 mg  400 mg Oral Daily    0.9 % sodium chloride infusion   IntraVENous PRN    [Held by provider] metoprolol succinate (TOPROL XL) extended release tablet 25 mg  25 mg Oral Daily    hyoscyamine (LEVSIN/SL) sublingual tablet 125 mcg  125 mcg SubLINGual Q4H PRN    empagliflozin (JARDIANCE) tablet 10 mg  10 mg Oral Daily    morphine injection 2 mg  2 mg IntraVENous Q4H PRN    amiodarone (CORDARONE) tablet 400 mg  400 mg Oral BID    aspirin chewable tablet 81 mg  81 mg Oral Daily    mexiletine (MEXITIL) capsule 150 mg  150 mg Oral TID    QUEtiapine (SEROQUEL) tablet 100 mg  100 mg Oral Nightly    Vitamin D (CHOLECALCIFEROL) tablet 1,000 Units  1,000 Units Oral Daily    sodium chloride flush 0.9 % injection 5-40 mL  5-40 mL IntraVENous 2 times per day    sodium chloride flush 0.9 % injection 5-40 mL  5-40 mL IntraVENous PRN    0.9 % sodium chloride infusion   IntraVENous PRN    ondansetron (ZOFRAN-ODT) disintegrating tablet 4 mg  4 mg Oral Q8H PRN    Or    ondansetron (ZOFRAN) injection 4 mg  4 mg IntraVENous Q6H PRN    polyethylene glycol (GLYCOLAX) packet 17 g  17 g Oral Daily PRN    acetaminophen (TYLENOL) tablet 650 mg  650 mg Oral Q6H PRN    Or    acetaminophen (TYLENOL) suppository 650 mg  650 mg Rectal Q6H PRN    cefTRIAXone (ROCEPHIN) 1000 mg IVPB in NS 50ml minibag  1,000 mg IntraVENous Q24H    HYDROcodone homatropine (HYCODAN) 5-1.5 MG/5ML solution 5 mL  5 mL Oral Q4H PRN    guaiFENesin-dextromethorphan (ROBITUSSIN DM) 100-10 MG/5ML syrup 5 mL  5 mL Oral Q4H PRN    oxyCODONE (ROXICODONE) immediate release tablet 5 mg  5 mg Oral Q4H PRN    doxycycline (VIBRAMYCIN) 100 mg in sodium chloride 0.9 % 100 mL IVPB  100 mg IntraVENous Q12H       Left Ventricle: Severely reduced left ventricular systolic function with a visually estimated EF of 25 - 30%. Left ventricle is mildly dilated. Normal wall thickness.  Akinetic and aneurysmal apex with severe hypokinesis of the mid anteroseptal and mid

## 2022-07-30 NOTE — PROGRESS NOTES
Hospitalist Progress Note   Admit Date:  2022  7:53 PM   Name:  Ulysses Caldron   Age:  58 y.o. Sex:  male  :  1960   MRN:  [id number]   Room:  608/01    Presenting Complaint: Shortness of Breath and Flank Pain     Reason(s) for Admission: Hypoxemia [R09.02]  Ureteral obstruction, right [N13.5]  Acute kidney injury ([address] [N17.9]  Poisoning by warfarin sodium, accidental or unintentional, initial encounter [T45.511A]  Urinary tract infection with hematuria, site unspecified [N39.0, R31.9]  Acute kidney injury (BEN) with acute tubular necrosis (ATN) [facility]) [N17.0]     Hospital Course & Interval History:   Ulysses Caldron is a 58 y.o. CM with a PMH of MV CAD s/p CABG x2 months ago, severe LVSD EF 25-30%, Large protruding apical thrombus on chronic OAC with warfarin, tob abuse in past, anemia, and recurrent VTACH s/p ICD on multiple anti-arrhythmics admitted with community-acquired pneumonia, warfarin coagulopathy, BEN and R hydroureter, likely obstructive process. Started on empiric antibiotics. Warfarin held. Urology consulted and plan for cystoscopy. Subjective/24hr Events (22): Patient is seen at the bedside. Reports feeling better. He is afebrile. Pt had cystoscopy yesterday. He is resting comfortably on the bed. Denies chest pain, palpitation, nausea, vomiting or abdominal pain. Assessment & Plan:     Acute kidney injury with acute tubular necrosis   R hydroureter, suspect obstructive process   - CT showed thickening of R renal pelvis extending into R proximal ureter  - s/p Cystoscopy  with right retrograde pyelogram on  ,   - sCr 2.30 --> 1.20 > 1.3 >1.10  F/u Outpatient  in about 2 weeks with repeat CT hematuria protocol to re-evaluate R kidney.   Urology signed off  PT recommended outpatient therapy     Acute CAP   Afebrile,  leukocytosis of 12 K  - CXR reviewed  - Completed cephalosporin and doxycycline.  - Bcx NGTD       Thrombocytosis  Platelet count of 583T and most likely reactive. Warfarin Coagulopathy: Corrected  - Monitor PT/INR   AC is resumed with heparin bridging  INR 1.4      CAD s/p CABG  Bilat bloody pleural effusions  - Continue home meds  - Loculated effusions noted on CT; d/w Radiologist, Dr. Ting Jaimes; thought to be related to recent CABG     Chronic Severe LVSD EF 25%  - appears clinically compensated  Continue empagliflozin   - Appreciate cardiology follow-up and recommendations. Recurrent Vtach   ICD in situ  Recent device interrogation with therapies delivered ATP  ACE inhibitor allergy  - s/p ICD  - on antiarrhythmic rx dual therapy with amiodarone and Mexitil-continue per cardiology. Apical thrombus   will resume anticoagulation -     Anemia/ABLA   Hb is stable around 7.5  S/p PRBC on 7/29  - acute blood loss anemia suspected secondary to coagulopathy with hematuria  - H/H stable     HTN   -Holding toprol BID due to hypotension     Hyperlipidemia  Not on statins due to allergy. Hx depression  - continue home meds       Discharge Planning: Anticipate discharge in 2-3 days     Diet:  ADULT DIET; Regular  DVT PPx: SCD due to anticipating procedure and holding coumadin  Code status: Full Code    Hospital Problems:  Principal Problem:    Acute kidney injury (BEN) with acute tubular necrosis (ATN) ([facility])  Active Problems:    CAD, multiple vessel    Chronic systolic (congestive) heart failure ([facility])    CAP (community acquired pneumonia)    Acute kidney injury ([address]    Ureteral obstruction, right    Gross hematuria    Right kidney mass    Intracardiac thrombus    Ventricular tachycardia ([facility])    Hydronephrosis  Resolved Problems:    * No resolved hospital problems.  *      Objective:   Patient Vitals for the past 24 hrs:   Temp Pulse Resp BP SpO2   07/30/22 1222 97.9 °F (36.6 °C) 83 16 110/69 93 %   07/30/22 0802 97.7 °F (36.5 °C) 90 20 98/66 98 %   07/30/22 0416 97.2 °F (36.2 °C) 83 18 102/77 98 %   07/29/22 2356 98.2 °F (36.8 °C) 77 18 98/65 96 % 07/29/22 2146 97.5 °F (36.4 °C) 92 22 113/78 94 %   07/29/22 2045 98 °F (36.7 °C) 96 16 138/76 98 %   07/29/22 2040 -- 96 16 (!) 140/80 96 %   07/29/22 2035 -- 88 16 137/73 96 %   07/29/22 2030 -- 90 16 136/73 95 %   07/29/22 2025 -- (!) 110 16 (!) 149/82 95 %   07/29/22 2020 -- 77 16 (!) 158/84 96 %   07/29/22 2015 -- (!) 109 16 (!) 154/85 96 %   07/29/22 2012 98.1 °F (36.7 °C) (!) 113 16 -- 95 %   07/29/22 1752 98.3 °F (36.8 °C) 80 16 116/62 95 %   07/29/22 1627 99.7 °F (37.6 °C) 85 18 124/76 94 %   07/29/22 1515 98.7 °F (37.1 °C) 76 18 116/72 95 %   07/29/22 1514 98.7 °F (37.1 °C) 77 18 114/67 95 %       Oxygen Therapy  SpO2: 93 %  Pulse Oximetry Type: Intermittent  Pulse via Oximetry: 98 beats per minute  Pulse Oximeter Device Mode: Continuous  Pulse Oximeter Device Location: Left  O2 Device: None (Room air)  Skin Assessment: Clean, dry, & intact  Skin Protection for O2 Device: N/A  O2 Flow Rate (L/min): 3 L/min  Blood Gas  Performed?: No  Oxygen Therapy: None (Room air)    Estimated body mass index is 27.9 kg/m² as calculated from the following:    Height as of this encounter: 5' 9\" (1.753 m). Weight as of this encounter: 188 lb 15 oz (85.7 kg). Intake/Output Summary (Last 24 hours) at 7/30/2022 1510  Last data filed at 7/30/2022 1222  Gross per 24 hour   Intake 1067 ml   Output 2875 ml   Net -1808 ml         Physical Exam:   Blood pressure 110/69, pulse 83, temperature 97.9 °F (36.6 °C), temperature source Oral, resp. rate 16, height 5' 9\" (1.753 m), weight 188 lb 15 oz (85.7 kg), SpO2 93 %. General:    NAD  Head:  Normocephalic, atraumatic  Eyes:  Sclerae appear normal.  Pupils equally round. ENT:  Nares appear normal, no drainage. Moist oral mucosa  Neck:  No restricted ROM. Trachea midline   CV:   RRR. No m/r/g. Lungs: No wheezing. No tachypnea. Abdomen:   Soft, nondistended. Extremities: No cyanosis or clubbing. Skin:     No rashes and normal coloration. Warm and dry.     Neuro:  CN Unit Divison 00     Dispense Status Blood Bank TRANSFUSED     Crossmatch Result Compatible    CBC with Auto Differential    Collection Time: 07/29/22  4:53 PM   Result Value Ref Range    WBC 13.3 (H) 4.3 - 11.1 K/uL    RBC 3.20 (L) 4.23 - 5.6 M/uL    Hemoglobin 8.7 (L) 13.6 - 17.2 g/dL    Hematocrit 28.4 (L) 41.1 - 50.3 %    MCV 88.8 79.6 - 97.8 FL    MCH 27.2 26.1 - 32.9 PG    MCHC 30.6 (L) 31.4 - 35.0 g/dL    RDW 16.6 (H) 11.9 - 14.6 %    Platelets 652 (H) 182 - 450 K/uL    MPV 9.8 9.4 - 12.3 FL    nRBC 0.00 0.0 - 0.2 K/uL    Differential Type AUTOMATED      Seg Neutrophils 70 43 - 78 %    Lymphocytes 15 13 - 44 %    Monocytes 11 4.0 - 12.0 %    Eosinophils % 2 0.5 - 7.8 %    Basophils 1 0.0 - 2.0 %    Immature Granulocytes 1 0.0 - 5.0 %    Segs Absolute 9.5 (H) 1.7 - 8.2 K/UL    Absolute Lymph # 1.9 0.5 - 4.6 K/UL    Absolute Mono # 1.4 (H) 0.1 - 1.3 K/UL    Absolute Eos # 0.2 0.0 - 0.8 K/UL    Basophils Absolute 0.1 0.0 - 0.2 K/UL    Absolute Immature Granulocyte 0.2 0.0 - 0.5 K/UL   Culture, Urine    Collection Time: 07/29/22  7:16 PM    Specimen: Urine   Result Value Ref Range    Special Requests NO SPECIAL REQUESTS      Culture        No growth after short period of incubation. Further results to follow after overnight incubation.    CBC with Auto Differential    Collection Time: 07/30/22  5:46 AM   Result Value Ref Range    WBC 12.2 (H) 4.3 - 11.1 K/uL    RBC 2.82 (L) 4.23 - 5.6 M/uL    Hemoglobin 7.5 (L) 13.6 - 17.2 g/dL    Hematocrit 25.0 (L) 41.1 - 50.3 %    MCV 88.7 79.6 - 97.8 FL    MCH 26.6 26.1 - 32.9 PG    MCHC 30.0 (L) 31.4 - 35.0 g/dL    RDW 16.6 (H) 11.9 - 14.6 %    Platelets 386 (H) 773 - 450 K/uL    MPV 9.9 9.4 - 12.3 FL    nRBC 0.00 0.0 - 0.2 K/uL    Differential Type AUTOMATED      Seg Neutrophils 85 (H) 43 - 78 %    Lymphocytes 7 (L) 13 - 44 %    Monocytes 6 4.0 - 12.0 %    Eosinophils % 0 (L) 0.5 - 7.8 %    Basophils 0 0.0 - 2.0 %    Immature Granulocytes 1 0.0 - 5.0 %    Segs Absolute 10.4 loculated. US RETROPERITONEAL COMPLETE   Final Result      1. Mild prominence of the right renal collecting system. 2. Left kidney appears within normal limits. No left hydronephrosis. Vascular duplex lower extremity venous bilateral   Final Result      1. No evidence of deep vein thrombosis. XR CHEST PORTABLE   Final Result   No significant change. CT ABDOMEN PELVIS RENAL STONE Additional Contrast? None   Final Result      1. Mild right hydronephrosis and proximal hydroureter. No obstructive etiology   or definite ureteral calculus is identified on this exam. There is high density   material within the right renal collecting system. Findings raise the question   of blood products, although neoplastic process is not entirely excluded. Follow-up recommended. 2. High density fluid in the gallbladder, could be secondary to biliary sludge. 3. No evidence of colitis, diverticulitis or bowel obstruction. 4. Consolidation/infiltrate in the visualized left lung base, captured at the   periphery of the imaging field of view and not well evaluated. 5. Overall sensitivity is limited without the benefit of IV contrast.      XR CHEST PORTABLE   Final Result   Mildly increased left lower lobe airspace opacities with small bilateral pleural   effusions. Differential considerations include atelectasis, pneumonia, or   asymmetric pulmonary edema.                 Current Meds:  Current Facility-Administered Medications   Medication Dose Route Frequency    heparin (porcine) injection 4,000 Units  4,000 Units IntraVENous PRN    heparin (porcine) injection 2,000 Units  2,000 Units IntraVENous PRN    heparin 25,000 units in dextrose 5% 250 mL (premix) infusion  5-30 Units/kg/hr IntraVENous Continuous    warfarin (COUMADIN) tablet 5 mg  5 mg Oral Daily    magnesium oxide (MAG-OX) tablet 400 mg  400 mg Oral Daily    0.9 % sodium chloride infusion   IntraVENous PRN [Held by provider] metoprolol succinate (TOPROL XL) extended release tablet 25 mg  25 mg Oral Daily    hyoscyamine (LEVSIN/SL) sublingual tablet 125 mcg  125 mcg SubLINGual Q4H PRN    empagliflozin (JARDIANCE) tablet 10 mg  10 mg Oral Daily    morphine injection 2 mg  2 mg IntraVENous Q4H PRN    amiodarone (CORDARONE) tablet 400 mg  400 mg Oral BID    aspirin chewable tablet 81 mg  81 mg Oral Daily    mexiletine (MEXITIL) capsule 150 mg  150 mg Oral TID    QUEtiapine (SEROQUEL) tablet 100 mg  100 mg Oral Nightly    Vitamin D (CHOLECALCIFEROL) tablet 1,000 Units  1,000 Units Oral Daily    sodium chloride flush 0.9 % injection 5-40 mL  5-40 mL IntraVENous 2 times per day    sodium chloride flush 0.9 % injection 5-40 mL  5-40 mL IntraVENous PRN    0.9 % sodium chloride infusion   IntraVENous PRN    ondansetron (ZOFRAN-ODT) disintegrating tablet 4 mg  4 mg Oral Q8H PRN    Or    ondansetron (ZOFRAN) injection 4 mg  4 mg IntraVENous Q6H PRN    polyethylene glycol (GLYCOLAX) packet 17 g  17 g Oral Daily PRN    acetaminophen (TYLENOL) tablet 650 mg  650 mg Oral Q6H PRN    Or    acetaminophen (TYLENOL) suppository 650 mg  650 mg Rectal Q6H PRN    cefTRIAXone (ROCEPHIN) 1000 mg IVPB in NS 50ml minibag  1,000 mg IntraVENous Q24H    HYDROcodone homatropine (HYCODAN) 5-1.5 MG/5ML solution 5 mL  5 mL Oral Q4H PRN    guaiFENesin-dextromethorphan (ROBITUSSIN DM) 100-10 MG/5ML syrup 5 mL  5 mL Oral Q4H PRN    oxyCODONE (ROXICODONE) immediate release tablet 5 mg  5 mg Oral Q4H PRN    doxycycline (VIBRAMYCIN) 100 mg in sodium chloride 0.9 % 100 mL IVPB  100 mg IntraVENous Q12H       Signed:  Vera Lacy MD    Part of this note may have been written by using a voice dictation software. The note has been proof read but may still contain some grammatical/other typographical errors.

## 2022-07-30 NOTE — OP NOTE
[address] [place] [address]  OPERATIVE REPORT    Name:  Juancarlos Cooper  MR#:  [id number]  :  1960  ACCOUNT #:  [de-identified]  DATE OF SERVICE:  2022    PREOPERATIVE DIAGNOSES:  Gross hematuria and a right kidney mass. POSTOPERATIVE DIAGNOSES:  Gross hematuria and a right kidney mass. PROCEDURES PERFORMED:  Cystoscopy, right ureteroscopy, right retrograde pyelogram attempted right ureteral dilation, right ureteral stent placement, intraoperative interpretation of fluoroscopy less than one hour. SURGEON:  Cynthia Iglesias MD    ASSISTANT:  None. ANESTHESIA:  General.    COMPLICATIONS:  None. SPECIMENS REMOVED:  Urine for cytology from the bladder. IMPLANTS:  7 x 26 double-J right ureter stent without strings. ESTIMATED BLOOD LOSS:  Minimal.    DRAINS:  None. FINDINGS:  Right proximal ureter stricture that prevented passage of ureteral dilators and the ureteroscope into the renal pelvis. Old blood clots traveling down the right ureter seen from the right kidney. Normal cystoscopy without bladder tumor or blood from the left UO. BPH with lateral lobe obstruction amenable to UroLift or TURP. INTRAOPERATIVE INTERPRETATION OF FLUOROSCOPY LESS THAN ONE HOUR:  Right retrograde pyelogram:  No obvious filling defect or hydroureteronephrosis seen on right retrograde pyelogram, no extravasation of contrast.    INDICATIONS FOR OPERATIVE PROCEDURE:  The patient is a 57-year-old gentleman with multiple medical problems who was admitted for a supratherapeutic INR and anemia. He also was found to have gross hematuria. Upon admission that gradually improved on its own. He had a CT hematuria protocol that showed a right hydroureter and hydronephrosis with no visible obstruction and a high density material in the right renal pelvis as well as BEN. The CT could not rule out a concern for a urothelial malignancy of the right renal pelvis and therefore, Urology was consulted.   He was tell.  The calyces did not have blunting and seemed to highlight nicely. At this point, I then placed a second sensor wire through the semi-rigid scope into the right renal pelvis. I then switched to my flexible ureteroscope and attempted to pass this over the wire into the right renal pelvis. Unfortunately, the ureter was too narrow at the proximal stricture site and the flexible ureteroscope would not pass. I then attempted to dilate the area with ureteral dilators serially. Unfortunately, the area was able to dilate with smaller dilators but had difficulty dilating today with the 10-Jamaican dilator and therefore, I could not dilate it enough to get my flexible ureteroscope to pass. At this point, I decided the best course of action rather than risk of ureteral injury would be to abort and place a right ureteral stent. I did attempt to dilate the ureter further. I therefore removed the ureteroscope and inserted the rigid cystoscope, I loaded the wire onto the rigid cystoscope with the orange pusher and then I passed a 7 x 26 double-J right ureter stent without strings over the wire under fluoroscopic guidance into the right renal pelvis. Once the stent was in position, I pulled the wire noting a good curl in the right renal pelvis as well as the bladder. It should be noted that old blood clots that were organized appeared to be coming down from the right renal pelvis were in the right ureter, but again I did not see any obvious tumor in the right ureter. I then drained the bladder completely and the patient was awoken from anesthesia. He was transferred to the PACU in stable condition. He tolerated the procedure well. There were no complications. All counts were correct at the end of procedure. He will be transferred back to his room and I will discuss options with him.   He likely will repeat a CAT scan in a week or two once he recovers from his acute illness and we will consider another attempted diagnostic ureteroscopy in a week or two once the stent had a chance to dilate the ureter up further and hopefully make it easier for the ureteroscope to pass into the renal pelvis and  visualized things.       Saleem Cintron MD      [id number]  D:  07/29/2022 20:29  T:  07/30/2022 6:48  JOB #:  [id number]

## 2022-07-30 NOTE — BRIEF OP NOTE
Brief Postoperative Note      Patient: Prabhakar Adan  YOB: 1960  MRN: [id number]    Date of Procedure: 7/29/2022    Pre-Op Diagnosis: Hydronephrosis, unspecified hydronephrosis type [N13.30]    Post-Op Diagnosis: Same       Procedure(s):  CYSTOSCOPY,RIGHT URETEROSCOPY,RIGHT RETROGRADE PYELOGRAM, Attempted Right Ureteral Dilation, Right Ureteral Stent Placement, Intra-Operative Interpretation of Fluoroscopy < 1 hour    Surgeon(s):  Tolu Ellis MD    Assistant:  * No surgical staff found *    Anesthesia: General    Estimated Blood Loss (mL): Minimal    Complications: None    Specimens:   ID Type Source Tests Collected by Time Destination   A : Bladder Urine for cytology Urine Urine, Cystoscopic CYTOLOGY, NON-GYN Tolu Ellis MD 7/29/2022 1916        Implants:  Implant Name Type Inv. Item Serial No.  Lot No. LRB No. Used Action   STENT URET 7FR L26CM PERCFLX + HYDR+ FIRM DUROMETER DBL - [id number]  STENT URET 7FR L26CM PERCFLX + HYDR+ FIRM DUROMETER DBL  [employer] [facility] UROLOGY- [id number] Right 1 Implanted         Drains: * No LDAs found *    Findings: Right proximal ureteral stricture that prevented passage of ureteral dilators / ureteroscope. Old blood clots seen traveling down R ureter from R kidney. Normal cystoscopy without bladder tumor or blood from left UO.   BPH with lateral lobe obstruction amenable to urolift or TURP    Intra-operative Interpretation of Fluoroscopy < 1 hour:   Right Retrograde Pyelogram:   No obvious filling defect or hydro-ureteronephrosis seen on R retrograde pyelogram.  No extravasation of contrast     Electronically signed by Tolu Ellis MD on 7/29/2022 at 8:16 PM

## 2022-07-31 LAB
ANION GAP SERPL CALC-SCNC: 4 MMOL/L (ref 7–16)
BUN SERPL-MCNC: 18 MG/DL (ref 8–23)
CALCIUM SERPL-MCNC: 8.4 MG/DL (ref 8.3–10.4)
CHLORIDE SERPL-SCNC: 107 MMOL/L (ref 98–107)
CO2 SERPL-SCNC: 30 MMOL/L (ref 21–32)
CREAT SERPL-MCNC: 1.1 MG/DL (ref 0.8–1.5)
GLUCOSE SERPL-MCNC: 110 MG/DL (ref 65–100)
INR PPP: 1.6
LMWH PPP CHRO-ACNC: 0.45 IU/ML (ref 0.5–1)
PHOSPHATE SERPL-MCNC: 3.5 MG/DL (ref 2.3–3.7)
POTASSIUM SERPL-SCNC: 4 MMOL/L (ref 3.5–5.1)
PROTHROMBIN TIME: 19.8 SEC (ref 12.6–14.5)
SODIUM SERPL-SCNC: 141 MMOL/L (ref 138–145)
UFH PPP CHRO-ACNC: 0.2 IU/ML (ref 0.3–0.7)
UFH PPP CHRO-ACNC: 0.22 IU/ML (ref 0.3–0.7)

## 2022-07-31 PROCEDURE — 6360000002 HC RX W HCPCS: Performed by: STUDENT IN AN ORGANIZED HEALTH CARE EDUCATION/TRAINING PROGRAM

## 2022-07-31 PROCEDURE — 6370000000 HC RX 637 (ALT 250 FOR IP): Performed by: STUDENT IN AN ORGANIZED HEALTH CARE EDUCATION/TRAINING PROGRAM

## 2022-07-31 PROCEDURE — 36415 COLL VENOUS BLD VENIPUNCTURE: CPT

## 2022-07-31 PROCEDURE — 6370000000 HC RX 637 (ALT 250 FOR IP): Performed by: HOSPITALIST

## 2022-07-31 PROCEDURE — 6370000000 HC RX 637 (ALT 250 FOR IP): Performed by: INTERNAL MEDICINE

## 2022-07-31 PROCEDURE — 99232 SBSQ HOSP IP/OBS MODERATE 35: CPT | Performed by: INTERNAL MEDICINE

## 2022-07-31 PROCEDURE — 85610 PROTHROMBIN TIME: CPT

## 2022-07-31 PROCEDURE — 85520 HEPARIN ASSAY: CPT

## 2022-07-31 PROCEDURE — 99232 SBSQ HOSP IP/OBS MODERATE 35: CPT | Performed by: UROLOGY

## 2022-07-31 PROCEDURE — 80048 BASIC METABOLIC PNL TOTAL CA: CPT

## 2022-07-31 PROCEDURE — 2580000003 HC RX 258: Performed by: INTERNAL MEDICINE

## 2022-07-31 PROCEDURE — 84100 ASSAY OF PHOSPHORUS: CPT

## 2022-07-31 PROCEDURE — 1100000000 HC RM PRIVATE

## 2022-07-31 RX ORDER — HEPARIN SODIUM 1000 [USP'U]/ML
2000 INJECTION, SOLUTION INTRAVENOUS; SUBCUTANEOUS ONCE
Status: COMPLETED | OUTPATIENT
Start: 2022-07-31 | End: 2022-07-31

## 2022-07-31 RX ORDER — PHENAZOPYRIDINE HYDROCHLORIDE 95 MG/1
95 TABLET ORAL
Status: DISCONTINUED | OUTPATIENT
Start: 2022-07-31 | End: 2022-08-01 | Stop reason: HOSPADM

## 2022-07-31 RX ADMIN — MEXILETINE HYDROCHLORIDE 150 MG: 150 CAPSULE ORAL at 13:44

## 2022-07-31 RX ADMIN — ASPIRIN 81 MG: 81 TABLET, CHEWABLE ORAL at 08:57

## 2022-07-31 RX ADMIN — MEXILETINE HYDROCHLORIDE 150 MG: 150 CAPSULE ORAL at 20:49

## 2022-07-31 RX ADMIN — MEXILETINE HYDROCHLORIDE 150 MG: 150 CAPSULE ORAL at 08:56

## 2022-07-31 RX ADMIN — HEPARIN SODIUM 19 UNITS/KG/HR: 10000 INJECTION, SOLUTION INTRAVENOUS at 23:48

## 2022-07-31 RX ADMIN — HEPARIN SODIUM 17 UNITS/KG/HR: 10000 INJECTION, SOLUTION INTRAVENOUS at 07:05

## 2022-07-31 RX ADMIN — AMIODARONE HYDROCHLORIDE 400 MG: 200 TABLET ORAL at 20:49

## 2022-07-31 RX ADMIN — HEPARIN SODIUM 2000 UNITS: 1000 INJECTION INTRAVENOUS; SUBCUTANEOUS at 13:39

## 2022-07-31 RX ADMIN — Medication 400 MG: at 08:57

## 2022-07-31 RX ADMIN — QUETIAPINE FUMARATE 100 MG: 100 TABLET ORAL at 20:50

## 2022-07-31 RX ADMIN — PHENAZOPYRIDINE HYDROCHLORIDE 95 MG: 95 TABLET ORAL at 20:53

## 2022-07-31 RX ADMIN — EMPAGLIFLOZIN 10 MG: 10 TABLET, FILM COATED ORAL at 08:57

## 2022-07-31 RX ADMIN — MELATONIN 1000 UNITS: at 08:57

## 2022-07-31 RX ADMIN — SODIUM CHLORIDE, PRESERVATIVE FREE 10 ML: 5 INJECTION INTRAVENOUS at 20:54

## 2022-07-31 RX ADMIN — HEPARIN SODIUM 2000 UNITS: 1000 INJECTION INTRAVENOUS; SUBCUTANEOUS at 05:27

## 2022-07-31 RX ADMIN — SODIUM CHLORIDE, PRESERVATIVE FREE 10 ML: 5 INJECTION INTRAVENOUS at 08:56

## 2022-07-31 RX ADMIN — AMIODARONE HYDROCHLORIDE 400 MG: 200 TABLET ORAL at 08:57

## 2022-07-31 RX ADMIN — WARFARIN SODIUM 5 MG: 2 TABLET ORAL at 17:17

## 2022-07-31 ASSESSMENT — PAIN SCALES - GENERAL: PAINLEVEL_OUTOF10: 0

## 2022-07-31 NOTE — PROGRESS NOTES
Pt is alert and oriented x4. Pt in bed, resting. Bed in low position, wheels locked, call light within reach, instructed to call with any needs. Hourly rounds completed this shift. NAD noted. VSS. Pt has not c/o pain. IV is infusing, and dressing is clean, dry, and intact. Next heparin Xa due at 1130. Report to be given to day shift nurse.

## 2022-07-31 NOTE — PROGRESS NOTES
Warfarin dosing per pharmacist    Nieves Cuenca is a 58 y.o. male. Height: 5' 9\" (175.3 cm)  Weight: 188 lb 15 oz (85.7 kg)    Indication:  apical thrombus     Goal INR:  2-3    Home dose:  7.5 mg MWF; 5 mg all other days    Risk factors or significant drug interactions:  recent elevated INR    Other anticoagulants:  heparin gtt    Daily Monitoring  Date  INR     Warfarin dose HGB              Notes  7/30  1.4  5 mg  7.5   7/31  1.6  5 mg  ---       Pharmacy is consulted to manage warfarin for Mr. Penny Lam during this admission. He had an outpatient INR check on 7/19, which was therapeutic at 2.8, but then he was admitted on 7/23 with cough, SOB, chills, hematuria, BEN, and an INR of >16.2. He was given vitamin K 5 mg IV x 1 on 7/24, vitamin K 5 mg PO x 1 again on 7/24 and vitamin K 5 mg PO x 1 on 7/27. INR 1.6. Continue warfarin 5 mg qhs. INR has trended up slightly after one dose. Continue heparin gtt while INR is subtherapeutic. Daily INR. Pharmacy will continue to follow. Please call with any questions. Thank you,  Rayna Luz.  Moshe Burden, [facility]

## 2022-07-31 NOTE — PROGRESS NOTES
[facility] CARDIOLOGY PROGRESS NOTE           7/31/2022 8:43 AM    Admit Date: 7/23/2022      Subjective:       Review of Systems   Constitutional:  Negative for activity change. Cardiovascular:  Negative for chest pain. Objective:      Vitals:    07/30/22 2351 07/31/22 0439 07/31/22 0444 07/31/22 0653   BP: 98/71 (!) 83/53 95/61 124/71   Pulse: 69 62 59 82   Resp: 14 18  18   Temp: 97.7 °F (36.5 °C) 97.7 °F (36.5 °C)  97.5 °F (36.4 °C)   TempSrc: Oral Oral  Oral   SpO2: 94% 93% 95% 94%   Weight:       Height:             Physical Exam  Constitutional:       Appearance: Normal appearance. HENT:      Head: Normocephalic and atraumatic. Nose: Nose normal.      Mouth/Throat:      Mouth: Mucous membranes are moist.   Eyes:      Pupils: Pupils are equal, round, and reactive to light. Cardiovascular:      Rate and Rhythm: Normal rate. Heart sounds: No murmur heard. Abdominal:      General: Abdomen is flat. Musculoskeletal:         General: No swelling. Neurological:      Mental Status: Mental status is at baseline.    Psychiatric:         Mood and Affect: Mood normal.       Data Review:   Recent Labs     07/29/22  0612 07/29/22  1653 07/30/22  0546 07/30/22  0819 07/31/22  0415   NA  --   --  141  --  141   K  --   --  4.5  --  4.0   MG 1.7*  --  1.7*  --   --    BUN  --   --  19  --  18   WBC 12.7* 13.3* 12.2*  --   --    HGB 7.2* 8.7* 7.5*  --   --    HCT 24.2* 28.4* 25.0*  --   --    * 593* 602*  --   --    INR 1.4  --  1.4 1.4 1.6         [unfilled]  Current Facility-Administered Medications   Medication Dose Route Frequency    heparin (porcine) injection 4,000 Units  4,000 Units IntraVENous PRN    heparin (porcine) injection 2,000 Units  2,000 Units IntraVENous PRN    heparin 25,000 units in dextrose 5% 250 mL (premix) infusion  5-30 Units/kg/hr IntraVENous Continuous    warfarin (COUMADIN) tablet 5 mg  5 mg Oral Daily    magnesium oxide (MAG-OX) tablet 400 mg 400 mg Oral Daily    0.9 % sodium chloride infusion   IntraVENous PRN    [Held by provider] metoprolol succinate (TOPROL XL) extended release tablet 25 mg  25 mg Oral Daily    hyoscyamine (LEVSIN/SL) sublingual tablet 125 mcg  125 mcg SubLINGual Q4H PRN    empagliflozin (JARDIANCE) tablet 10 mg  10 mg Oral Daily    morphine injection 2 mg  2 mg IntraVENous Q4H PRN    amiodarone (CORDARONE) tablet 400 mg  400 mg Oral BID    aspirin chewable tablet 81 mg  81 mg Oral Daily    mexiletine (MEXITIL) capsule 150 mg  150 mg Oral TID    QUEtiapine (SEROQUEL) tablet 100 mg  100 mg Oral Nightly    Vitamin D (CHOLECALCIFEROL) tablet 1,000 Units  1,000 Units Oral Daily    sodium chloride flush 0.9 % injection 5-40 mL  5-40 mL IntraVENous 2 times per day    sodium chloride flush 0.9 % injection 5-40 mL  5-40 mL IntraVENous PRN    0.9 % sodium chloride infusion   IntraVENous PRN    ondansetron (ZOFRAN-ODT) disintegrating tablet 4 mg  4 mg Oral Q8H PRN    Or    ondansetron (ZOFRAN) injection 4 mg  4 mg IntraVENous Q6H PRN    polyethylene glycol (GLYCOLAX) packet 17 g  17 g Oral Daily PRN    acetaminophen (TYLENOL) tablet 650 mg  650 mg Oral Q6H PRN    Or    acetaminophen (TYLENOL) suppository 650 mg  650 mg Rectal Q6H PRN    HYDROcodone homatropine (HYCODAN) 5-1.5 MG/5ML solution 5 mL  5 mL Oral Q4H PRN    guaiFENesin-dextromethorphan (ROBITUSSIN DM) 100-10 MG/5ML syrup 5 mL  5 mL Oral Q4H PRN    oxyCODONE (ROXICODONE) immediate release tablet 5 mg  5 mg Oral Q4H PRN       Left Ventricle: Severely reduced left ventricular systolic function with a visually estimated EF of 25 - 30%. Left ventricle is mildly dilated. Normal wall thickness. Akinetic and aneurysmal apex with severe hypokinesis of the mid anteroseptal and mid anterolateral walls with best preserved function of the inferior and inferior -lateral walls. Grade I diastolic dysfunction with normal LAP. Left Atrium: Left atrium is mildly dilated.     Contrast used: Definity. Assessment/Plan:     58 y.o. male history of congestive heart failure coronary artery disease ventricular tachycardia intracardiac thrombus. Patient with [address] dual-chamber ICD in place. Patient with anemia and recent hydronephrosis. Ischemic cardiomyopathy with recurrent VT  Coronary artery bypass grafting 5/2022  Currently on amiodarone mexiletine  ICD in situ  Recent device interrogation with therapies delivered ATP.   ACE inhibitor allergy    Anemia  Per primary team  Metoprolol currently on hold due to hypotension    History of left ventricular thrombus  History of recent anemia  Anticoagulation currently with IV heparin  On warfarin last INR 1.6     Please call with questions      Val Fontanez MD  7/31/2022 8:43 AM

## 2022-07-31 NOTE — PROGRESS NOTES
I have discussed with the patient the rationale for blood component transfusion; its benefits in treating or preventing fatigue, organ damage, or death; and its risk which includes mild transfusion reactions, rare risk of blood borne infection, or more serious but rare reactions. I have discussed the alternatives to transfusion, including the risk and consequences of not receiving transfusion. The patient had an opportunity to ask questions and had agreed to proceed with transfusion of blood components. I spent 15 minutes of time caring for this patient on the unit nearby or at bedside, and more than 50 percent was spent on coordination of care activities, and/or patient/family counseling regarding status and plan of care.

## 2022-07-31 NOTE — PROGRESS NOTES
Urology Progress Note    Admit Date: 7/23/2022    Subjective:     Patient reports continued intermittent dark brown urine, no clots and R flank pain only when voiding. Wants to discuss today.   Cr back to normal at 1.10    Objective:     Patient Vitals for the past 8 hrs:   BP Temp Temp src Pulse Resp SpO2   07/31/22 0653 124/71 97.5 °F (36.4 °C) Oral 82 18 94 %     07/31 0701 - 07/31 1900  In: -   Out: 1400 [Urine:1400]  07/29 1901 - 07/31 0700  In: 750 [I.V.:750]  Out: 4375 [Urine:4375]    Physical Exam:   /71   Pulse 82   Temp 97.5 °F (36.4 °C) (Oral)   Resp 18   Ht 5' 9\" (1.753 m)   Wt 188 lb 15 oz (85.7 kg)   SpO2 94%   BMI 27.90 kg/m²      GENERAL: No acute distress, Awake, Alert, Oriented X 3  CARDIAC: regular rate and rhythm  CHEST AND LUNG: Easy work of breathing, clear to auscultation bilaterally, no cyanosis  ABDOMEN: soft, non tender, non-distended,          Data Review   Recent Results (from the past 24 hour(s))   Anti-Xa, Unfractionated Heparin    Collection Time: 07/30/22  8:18 PM   Result Value Ref Range    Anti-XA Unfrac Heparin <0.10 (L) 0.3 - 0.7 IU/mL   Anti-Xa, Unfractionated Heparin    Collection Time: 07/31/22  4:15 AM   Result Value Ref Range    Anti-XA Unfrac Heparin 0.22 (L) 0.3 - 0.7 IU/mL   Protime-INR    Collection Time: 07/31/22  4:15 AM   Result Value Ref Range    Protime 19.8 (H) 12.6 - 14.5 sec    INR 1.6     Basic Metabolic Panel w/ Reflex to MG    Collection Time: 07/31/22  4:15 AM   Result Value Ref Range    Sodium 141 138 - 145 mmol/L    Potassium 4.0 3.5 - 5.1 mmol/L    Chloride 107 98 - 107 mmol/L    CO2 30 21 - 32 mmol/L    Anion Gap 4 (L) 7 - 16 mmol/L    Glucose 110 (H) 65 - 100 mg/dL    BUN 18 8 - 23 MG/DL    Creatinine 1.10 0.8 - 1.5 MG/DL    GFR African American >60 >60 ml/min/1.73m2    GFR Non- >60 >60 ml/min/1.73m2    Calcium 8.4 8.3 - 10.4 MG/DL   Phosphorus    Collection Time: 07/31/22  4:15 AM   Result Value Ref Range    Phosphorus 3.5 2.3 - 3.7 MG/DL   Anti-Xa, Unfractionated Heparin    Collection Time: 07/31/22 11:30 AM   Result Value Ref Range    Anti-XA Unfrac Heparin 0.20 (L) 0.3 - 0.7 IU/mL           Assessment:     Principal Problem:    Acute kidney injury (BEN) with acute tubular necrosis (ATN) ([facility])  Active Problems:    CAD, multiple vessel    Chronic systolic (congestive) heart failure (HCC)    CAP (community acquired pneumonia)    Acute kidney injury ([address]    Ureteral obstruction, right    Gross hematuria    Right kidney mass    Intracardiac thrombus    Ventricular tachycardia ([facility])    Hydronephrosis  Resolved Problems:    * No resolved hospital problems. *    POD 2 s/p R URS/attempted ureteral dilation / stent placement. Plan:     -Discussed that flank pain with voiding is normal with a stent in due to reflux of urine up stent. This should improve with time as his body gets used to the stent  -Recommended levsin PRN pain to help with this flank pain  -Discussed that intermittent hematuria is normal with a stent in place as well and to continue to hydrate to avoid clots  -Will keep outpatient follow up in my office the same as previously discussed  -Call with questions. Diony Christopher M.D.     [facility]  [facility] [address]  [place], [address]  Phone: [phone]  Fax: [phone]

## 2022-08-01 ENCOUNTER — APPOINTMENT (OUTPATIENT)
Dept: GENERAL RADIOLOGY | Age: 62
DRG: 659 | End: 2022-08-01
Payer: MEDICARE

## 2022-08-01 VITALS
OXYGEN SATURATION: 96 % | WEIGHT: 192.24 LBS | SYSTOLIC BLOOD PRESSURE: 114 MMHG | HEART RATE: 73 BPM | TEMPERATURE: 98.1 F | DIASTOLIC BLOOD PRESSURE: 66 MMHG | HEIGHT: 69 IN | BODY MASS INDEX: 28.47 KG/M2 | RESPIRATION RATE: 16 BRPM

## 2022-08-01 LAB
ANION GAP SERPL CALC-SCNC: 2 MMOL/L (ref 7–16)
BACTERIA SPEC CULT: NORMAL
BASOPHILS # BLD: 0.1 K/UL (ref 0–0.2)
BASOPHILS NFR BLD: 0 % (ref 0–2)
BUN SERPL-MCNC: 18 MG/DL (ref 8–23)
CALCIUM SERPL-MCNC: 8.5 MG/DL (ref 8.3–10.4)
CHLORIDE SERPL-SCNC: 106 MMOL/L (ref 98–107)
CO2 SERPL-SCNC: 30 MMOL/L (ref 21–32)
CREAT SERPL-MCNC: 1.3 MG/DL (ref 0.8–1.5)
DIFFERENTIAL METHOD BLD: ABNORMAL
EOSINOPHIL # BLD: 0.1 K/UL (ref 0–0.8)
EOSINOPHIL NFR BLD: 1 % (ref 0.5–7.8)
ERYTHROCYTE [DISTWIDTH] IN BLOOD BY AUTOMATED COUNT: 17.1 % (ref 11.9–14.6)
GLUCOSE SERPL-MCNC: 98 MG/DL (ref 65–100)
HCT VFR BLD AUTO: 29.9 % (ref 41.1–50.3)
HGB BLD-MCNC: 9 G/DL (ref 13.6–17.2)
IMM GRANULOCYTES # BLD AUTO: 0.3 K/UL (ref 0–0.5)
IMM GRANULOCYTES NFR BLD AUTO: 2 % (ref 0–5)
INR PPP: 2
LYMPHOCYTES # BLD: 2.6 K/UL (ref 0.5–4.6)
LYMPHOCYTES NFR BLD: 19 % (ref 13–44)
MCH RBC QN AUTO: 26.6 PG (ref 26.1–32.9)
MCHC RBC AUTO-ENTMCNC: 30.1 G/DL (ref 31.4–35)
MCV RBC AUTO: 88.5 FL (ref 79.6–97.8)
MONOCYTES # BLD: 1.3 K/UL (ref 0.1–1.3)
MONOCYTES NFR BLD: 9 % (ref 4–12)
NEUTS SEG # BLD: 9.4 K/UL (ref 1.7–8.2)
NEUTS SEG NFR BLD: 68 % (ref 43–78)
NRBC # BLD: 0.02 K/UL (ref 0–0.2)
PHOSPHATE SERPL-MCNC: 3.6 MG/DL (ref 2.3–3.7)
PLATELET # BLD AUTO: 617 K/UL (ref 150–450)
PMV BLD AUTO: 9.4 FL (ref 9.4–12.3)
POTASSIUM SERPL-SCNC: 4 MMOL/L (ref 3.5–5.1)
PROTHROMBIN TIME: 23 SEC (ref 12.6–14.5)
RBC # BLD AUTO: 3.38 M/UL (ref 4.23–5.6)
SERVICE CMNT-IMP: NORMAL
SODIUM SERPL-SCNC: 138 MMOL/L (ref 136–145)
UFH PPP CHRO-ACNC: 0.27 IU/ML (ref 0.3–0.7)
WBC # BLD AUTO: 13.7 K/UL (ref 4.3–11.1)

## 2022-08-01 PROCEDURE — 80048 BASIC METABOLIC PNL TOTAL CA: CPT

## 2022-08-01 PROCEDURE — 36415 COLL VENOUS BLD VENIPUNCTURE: CPT

## 2022-08-01 PROCEDURE — 85610 PROTHROMBIN TIME: CPT

## 2022-08-01 PROCEDURE — 6370000000 HC RX 637 (ALT 250 FOR IP): Performed by: INTERNAL MEDICINE

## 2022-08-01 PROCEDURE — 6360000002 HC RX W HCPCS: Performed by: STUDENT IN AN ORGANIZED HEALTH CARE EDUCATION/TRAINING PROGRAM

## 2022-08-01 PROCEDURE — 85025 COMPLETE CBC W/AUTO DIFF WBC: CPT

## 2022-08-01 PROCEDURE — 84100 ASSAY OF PHOSPHORUS: CPT

## 2022-08-01 PROCEDURE — 85520 HEPARIN ASSAY: CPT

## 2022-08-01 PROCEDURE — 6370000000 HC RX 637 (ALT 250 FOR IP): Performed by: HOSPITALIST

## 2022-08-01 PROCEDURE — 2580000003 HC RX 258: Performed by: INTERNAL MEDICINE

## 2022-08-01 RX ORDER — PHENAZOPYRIDINE HYDROCHLORIDE 95 MG/1
95 TABLET ORAL
Qty: 9 TABLET | Refills: 0 | Status: SHIPPED | OUTPATIENT
Start: 2022-08-01 | End: 2022-08-04

## 2022-08-01 RX ORDER — OXYCODONE HYDROCHLORIDE 5 MG/1
5 TABLET ORAL EVERY 6 HOURS PRN
Qty: 12 TABLET | Refills: 0 | Status: SHIPPED | OUTPATIENT
Start: 2022-08-01 | End: 2022-08-04

## 2022-08-01 RX ADMIN — ASPIRIN 81 MG: 81 TABLET, CHEWABLE ORAL at 08:50

## 2022-08-01 RX ADMIN — MELATONIN 1000 UNITS: at 08:49

## 2022-08-01 RX ADMIN — PHENAZOPYRIDINE HYDROCHLORIDE 95 MG: 95 TABLET ORAL at 08:49

## 2022-08-01 RX ADMIN — HEPARIN SODIUM 21 UNITS/KG/HR: 10000 INJECTION, SOLUTION INTRAVENOUS at 04:11

## 2022-08-01 RX ADMIN — SODIUM CHLORIDE, PRESERVATIVE FREE 10 ML: 5 INJECTION INTRAVENOUS at 08:56

## 2022-08-01 RX ADMIN — MEXILETINE HYDROCHLORIDE 150 MG: 150 CAPSULE ORAL at 08:50

## 2022-08-01 RX ADMIN — HEPARIN SODIUM 2000 UNITS: 1000 INJECTION INTRAVENOUS; SUBCUTANEOUS at 04:10

## 2022-08-01 RX ADMIN — AMIODARONE HYDROCHLORIDE 400 MG: 200 TABLET ORAL at 08:50

## 2022-08-01 RX ADMIN — EMPAGLIFLOZIN 10 MG: 10 TABLET, FILM COATED ORAL at 08:52

## 2022-08-01 NOTE — DISCHARGE SUMMARY
Hospitalist Discharge Summary   Admit Date:  2022  7:53 PM   DC Note date: 2022  Name:  Gricelda Lucas   Age:  58 y.o. Sex:  male  :  1960   MRN:  [id number]   Room:  [facility]  PCP:  Shivani Campo DO    Presenting Complaint: Shortness of Breath and Flank Pain    Initial Admission Diagnosis: Hypoxemia [R09.02]  Ureteral obstruction, right [N13.5]  Acute kidney injury ([facility] [N17.9]  Poisoning by warfarin sodium, accidental or unintentional, initial encounter [T45.511A]  Urinary tract infection with hematuria, site unspecified [N39.0, R31.9]  Acute kidney injury (BEN) with acute tubular necrosis (ATN) ([facility] [address] [N17.0]     Problem List for this Hospitalization:  [unfilled]  Did Patient have Sepsis (YES OR NO): YES    Hospital Course:  Gricelda Lucas is a 58 y.o. CM with a PMH of MV CAD s/p CABG x 2 months ago, severe LVSD EF 25-30%, Large protruding apical thrombus on chronic OAC with warfarin, tob abuse in past, anemia, and recurrent VTACH s/p ICD on multiple anti-arrhythmics presented with hematuria. ED course labs significant for leukocytosis 14 K, hemoglobin 9.2, hematocrit 29.6, BUN 33/creatinine 2.3. Lactic acid, procalcitonin was normal.  Crain was placed and he was started on empiric antibiotics. UA positive. Chest x-ray showed left lower lobe infiltrate. CT showed thickening of R renal pelvis extending into R proximal ureter. Warfarin was held for the surgery. Urology consulted and had cystoscopy with right retrograde pyelogram on  . Urine cultures and blood cultures negative. Patient clinically improved, fever subsided and creatinine improved back to normal. Coumadin was started with heparin bridging. INR is 2 in therapeutic range. PT recommended outpatient therapy. Patient is hemodynamically stable for discharge. Patient is still complaining of right flank pain and hematuria. Urology was reconsulted.   It is normal to have flank pain with voiding and hematuria with a stent in due to reflux of urine up stent. He was started on Levsin as needed for pain and advised to hydrate to avoid clots. Thrombocytosis  Platelet count of 900E and most likely reactive. Follow-up with PCP and heme oncologist as outpatient     Warfarin Coagulopathy:   -Heparin is discontinued and resumed Coumadin  INR is 2 on 8/1  Follow-up with PCP for repeat PT/PTT in 2 days    CAD s/p CABG  Bilat bloody pleural effusions  - Continue home meds  - Loculated effusions noted on CT; d/w Radiologist, Dr. Chichi Shabazz; thought to be related to recent CABG     Chronic Severe LVSD EF 25%  Euvolemic  Started on empagliflozin  -Follow-up with cardiology as outpatient     Recurrent Vtach  ICD in situ  Recent device interrogation with therapies delivered ATP  ACE inhibitor allergy  - s/p ICD  - on antiarrhythmic rx dual therapy with amiodarone and Mexitil-continue per cardiology. Apical thrombus  Continue anticoagulation -   Recent echo showed no apical thrombus      Anemia/ABLA  Hb is stable around 7-8  S/p PRBC on 7/29  - acute blood loss anemia suspected secondary to coagulopathy with hematuria       HTN  Discontinued toprol BID due to hypotension     Hyperlipidemia  Not on statins due to allergy. Hx depression  - continue home meds    Disposition: [unfilled]  Diet: ADULT DIET; Regular  Code Status: Full Code     Follow Up Orders:  No follow-ups on file. [unfilled]    Follow up labs/diagnostics (ultimately defer to outpatient provider): Follow-up with PCP in 3 to 5 days  Follow-up with urology in 2 weeks  Follow-up with repeat PT/PTT in 3 days  Time spent in patient discharge and coordination 35 minutes. Plan was discussed with patient. All questions answered. Patient was stable at time of discharge. Instructions given to call a physician or return if any concerns.     Discharge Info:     Current Discharge Medication List             Details   oxyCODONE (ROXICODONE) 5 MG immediate release tablet Take 1 tablet by mouth every 6 hours as needed for Pain (severe pain) for up to 3 days. Qty: 12 tablet, Refills: 0    Comments: Reduce doses taken as pain becomes manageable  Associated Diagnoses: Right kidney mass; Gross hematuria      empagliflozin (JARDIANCE) 10 MG tablet Take 1 tablet by mouth in the morning. Qty: 30 tablet, Refills: 3      phenazopyridine (PYRIDIUM) 95 MG tablet Take 1 tablet by mouth in the morning and 1 tablet at noon and 1 tablet in the evening. Take with meals. Do all this for 3 days. Qty: 9 tablet, Refills: 0      hyoscyamine (LEVSIN/SL) 125 MCG sublingual tablet Place 1 tablet under the tongue every 4 hours as needed for Cramping  Qty: 90 tablet, Refills: 3                Details   amiodarone (PACERONE) 400 MG tablet Take 1 tablet by mouth in the morning and 1 tablet before bedtime. Qty: 60 tablet, Refills: 3      mexiletine (MEXITIL) 150 MG capsule Take 1 capsule by mouth in the morning and 1 capsule at noon and 1 capsule before bedtime.   Qty: 90 capsule, Refills: 3      Vitamin D (CHOLECALCIFEROL) 25 MCG (1000 UT) TABS tablet Take 1,000 Units by mouth daily      Multiple Vitamins-Minerals (THERAPEUTIC MULTIVITAMIN-MINERALS) tablet Take 1 tablet by mouth daily      acetaminophen (TYLENOL) 325 mg tablet Take 2 tablets by mouth every 6 hours as needed for Pain  Qty: 120 tablet, Refills: 3      Cetirizine HCl 10 MG CAPS Take by mouth as needed      QUEtiapine (SEROQUEL) 100 MG tablet Take 100 mg by mouth nightly       warfarin (COUMADIN) 5 MG tablet Take 1 to 1 1/2 tablet daily or as directed              Procedures done this admission:  Procedure(s):  CYSTOSCOPY,RIGHT URETEROSCOPY,RIGHT RETROGRADE PYELOGRAM    Consults this admission:  IP CONSULT TO CARDIOLOGY  IP CONSULT TO SOCIAL WORK  IP CONSULT TO UROLOGY  IP CONSULT TO PHARMACY  IP CONSULT TO PHARMACY  IP CONSULT TO PHARMACY  IP CONSULT TO UROLOGY    Echocardiogram/EKG results:  @BSHSILASTIMGCAT(OVF2497:1)@     No results is normal in contour. 1. Mild prominence of the right renal collecting system. 2. Left kidney appears within normal limits. No left hydronephrosis. Vascular duplex lower extremity venous bilateral    Result Date: 7/24/2022  Clinical history: Edema TECHNIQUE: Grayscale and color Doppler venous ultrasound of the lower extremities. FINDINGS: Both common femoral, femoral, popliteal, peroneal and posterior tibial veins are patent and compressible. There is color Doppler flow. No evidence of DVT. 1. No evidence of deep vein thrombosis. CT ABDOMEN PELVIS RENAL STONE Additional Contrast? None    Result Date: 7/23/2022  Clinical history: Left flank pain. Suprapubic pain. Hematuria. TECHNIQUE: Axial, coronal and sagittal CT of the abdomen/pelvis without IV contrast. Radiation dose reduction techniques were used for this study:  Our CT scanners use one or all of the following: Automated exposure control, adjustment of the mA and/or kVp according to patient's size, iterative reconstruction. Comparison: None FINDINGS: There is consolidation in the visualized left lung base. There is small right pleural effusion. Abdomen findings: Absence of IV contrast limits sensitivity. There is mild right hydronephrosis and hydroureter. High density material in the right renal collecting system. No definite ureteral stone is seen. Unenhanced left kidney is unremarkable. There is high density material within the gallbladder. No pericholecystic fluid or evidence of biliary ductal dilatation. The spleen and visualized liver are normal in size. There is no peripancreatic fluid collection. Unenhanced adrenal glands are unremarkable. Abdominal aorta is normal in course and caliber with atherosclerotic calcification. Stomach is normal in contour. Small bowel loops are normal in caliber. No small bowel obstruction. No evidence of lymphadenopathy. Pelvic findings: Urinary bladder is normal in contour.  Colon is normal in course and caliber. There is no evidence of appendicitis. There is no free air or free fluid. Surrounding bones are intact. 1. Mild right hydronephrosis and proximal hydroureter. No obstructive etiology or definite ureteral calculus is identified on this exam. There is high density material within the right renal collecting system. Findings raise the question of blood products, although neoplastic process is not entirely excluded. Follow-up recommended. 2. High density fluid in the gallbladder, could be secondary to biliary sludge. 3. No evidence of colitis, diverticulitis or bowel obstruction. 4. Consolidation/infiltrate in the visualized left lung base, captured at the periphery of the imaging field of view and not well evaluated. 5. Overall sensitivity is limited without the benefit of IV contrast.      [unfilled]    Labs: Results:       BMP, Mg, Phos Recent Labs     07/30/22  0546 07/31/22  0415 08/01/22  0325    141 138   K 4.5 4.0 4.0    107 106   CO2 27 30 30   BUN 19 18 18   MG 1.7*  --   --    PHOS 4.5* 3.5 3.6      CBC Recent Labs     07/29/22  1653 07/30/22  0546 08/01/22  0325   WBC 13.3* 12.2* 13.7*   RBC 3.20* 2.82* 3.38*   HGB 8.7* 7.5* 9.0*   HCT 28.4* 25.0* 29.9*   * 602* 617*      LFT No results for input(s): ALT, TP, ALB, GLOB in the last 72 hours.     Invalid input(s): SGOT, TBIL, AP, AGRAT, GPT   Cardiac Testing No results found for: BNP, CPK, RCK1, CKMB   Coagulation Tests Lab Results   Component Value Date/Time    INR 2.0 08/01/2022 03:25 AM    INR 1.6 07/31/2022 04:15 AM    INR 1.4 07/30/2022 08:19 AM    APTT 37.6 07/30/2022 08:19 AM    APTT 43.0 07/25/2022 04:41 AM    APTT 35.3 05/31/2022 12:49 PM    APTT 115.3 10/09/2021 01:55 PM    APTT  10/09/2021 11:51 AM     CORRECTION TO MEDICAL RECORD-DISREGARD THESE TEST RESULTS    APTT 104.4 10/09/2021 10:00 AM      A1c No results found for: HBA1C   Lipid Panel Lab Results   Component Value Date/Time    CHOL 179 06/28/2022 10:50 AM HDL 29 06/28/2022 10:50 AM    VLDL 45 06/14/2021 10:23 AM      Thyroid Panel Lab Results   Component Value Date/Time    TSH 2.820 06/18/2020 12:42 PM        Most Recent UA Lab Results   Component Value Date/Time    MUCUS 1+ 06/02/2022 04:06 PM    UCOM RESULTS VERIFIED MANUALLY 07/23/2022 07:49 PM          All Labs from Last 24 Hrs:  Recent Results (from the past 24 hour(s))   Anti-XA LMW Heparin    Collection Time: 07/31/22  7:31 PM   Result Value Ref Range    Anti-Xa LMW Heparin 0.45 (L) 0.5 - 1.0 IU/mL   Basic Metabolic Panel w/ Reflex to MG    Collection Time: 08/01/22  3:25 AM   Result Value Ref Range    Sodium 138 136 - 145 mmol/L    Potassium 4.0 3.5 - 5.1 mmol/L    Chloride 106 98 - 107 mmol/L    CO2 30 21 - 32 mmol/L    Anion Gap 2 (L) 7 - 16 mmol/L    Glucose 98 65 - 100 mg/dL    BUN 18 8 - 23 MG/DL    Creatinine 1.30 0.8 - 1.5 MG/DL    GFR African American >60 >60 ml/min/1.73m2    GFR Non- 59 (L) >60 ml/min/1.73m2    Calcium 8.5 8.3 - 10.4 MG/DL   Protime-INR    Collection Time: 08/01/22  3:25 AM   Result Value Ref Range    Protime 23.0 (H) 12.6 - 14.5 sec    INR 2.0     CBC with Auto Differential    Collection Time: 08/01/22  3:25 AM   Result Value Ref Range    WBC 13.7 (H) 4.3 - 11.1 K/uL    RBC 3.38 (L) 4.23 - 5.6 M/uL    Hemoglobin 9.0 (L) 13.6 - 17.2 g/dL    Hematocrit 29.9 (L) 41.1 - 50.3 %    MCV 88.5 79.6 - 97.8 FL    MCH 26.6 26.1 - 32.9 PG    MCHC 30.1 (L) 31.4 - 35.0 g/dL    RDW 17.1 (H) 11.9 - 14.6 %    Platelets 434 (H) 464 - 450 K/uL    MPV 9.4 9.4 - 12.3 FL    nRBC 0.02 0.0 - 0.2 K/uL    Differential Type AUTOMATED      Seg Neutrophils 68 43 - 78 %    Lymphocytes 19 13 - 44 %    Monocytes 9 4.0 - 12.0 %    Eosinophils % 1 0.5 - 7.8 %    Basophils 0 0.0 - 2.0 %    Immature Granulocytes 2 0.0 - 5.0 %    Segs Absolute 9.4 (H) 1.7 - 8.2 K/UL    Absolute Lymph # 2.6 0.5 - 4.6 K/UL    Absolute Mono # 1.3 0.1 - 1.3 K/UL    Absolute Eos # 0.1 0.0 - 0.8 K/UL    Basophils Absolute 0.1 0.0 - 0.2 K/UL    Absolute Immature Granulocyte 0.3 0.0 - 0.5 K/UL   Phosphorus    Collection Time: 08/01/22  3:25 AM   Result Value Ref Range    Phosphorus 3.6 2.3 - 3.7 MG/DL   Anti-Xa, Unfractionated Heparin    Collection Time: 08/01/22  3:25 AM   Result Value Ref Range    Anti-XA Unfrac Heparin 0.27 (L) 0.3 - 0.7 IU/mL       Current Med List in Hospital:   Current Facility-Administered Medications   Medication Dose Route Frequency    phenazopyridine (PYRIDIUM) tablet 95 mg  95 mg Oral TID WC    heparin (porcine) injection 4,000 Units  4,000 Units IntraVENous PRN    heparin (porcine) injection 2,000 Units  2,000 Units IntraVENous PRN    heparin 25,000 units in dextrose 5% 250 mL (premix) infusion  5-30 Units/kg/hr IntraVENous Continuous    warfarin (COUMADIN) tablet 5 mg  5 mg Oral Daily    0.9 % sodium chloride infusion   IntraVENous PRN    [Held by provider] metoprolol succinate (TOPROL XL) extended release tablet 25 mg  25 mg Oral Daily    hyoscyamine (LEVSIN/SL) sublingual tablet 125 mcg  125 mcg SubLINGual Q4H PRN    empagliflozin (JARDIANCE) tablet 10 mg  10 mg Oral Daily    morphine injection 2 mg  2 mg IntraVENous Q4H PRN    amiodarone (CORDARONE) tablet 400 mg  400 mg Oral BID    aspirin chewable tablet 81 mg  81 mg Oral Daily    mexiletine (MEXITIL) capsule 150 mg  150 mg Oral TID    QUEtiapine (SEROQUEL) tablet 100 mg  100 mg Oral Nightly    Vitamin D (CHOLECALCIFEROL) tablet 1,000 Units  1,000 Units Oral Daily    sodium chloride flush 0.9 % injection 5-40 mL  5-40 mL IntraVENous 2 times per day    sodium chloride flush 0.9 % injection 5-40 mL  5-40 mL IntraVENous PRN    0.9 % sodium chloride infusion   IntraVENous PRN    ondansetron (ZOFRAN-ODT) disintegrating tablet 4 mg  4 mg Oral Q8H PRN    Or    ondansetron (ZOFRAN) injection 4 mg  4 mg IntraVENous Q6H PRN    polyethylene glycol (GLYCOLAX) packet 17 g  17 g Oral Daily PRN    acetaminophen (TYLENOL) tablet 650 mg  650 mg Oral Q6H PRN    Or acetaminophen (TYLENOL) suppository 650 mg  650 mg Rectal Q6H PRN    HYDROcodone homatropine (HYCODAN) 5-1.5 MG/5ML solution 5 mL  5 mL Oral Q4H PRN    guaiFENesin-dextromethorphan (ROBITUSSIN DM) 100-10 MG/5ML syrup 5 mL  5 mL Oral Q4H PRN    oxyCODONE (ROXICODONE) immediate release tablet 5 mg  5 mg Oral Q4H PRN       Allergies   Allergen Reactions    Penicillins Swelling     Face swelling    Atorvastatin Other (See Comments)     itching    Evolocumab Other (See Comments)     Itching    Lisinopril Other (See Comments)    Rosuvastatin Other (See Comments)     itching     Immunization History   Administered Date(s) Administered    PPD Test 05/31/2022    Pneumococcal Polysaccharide ([id number]) 06/12/2018    Tdap (Boostrix, Adacel) 06/07/2017       Recent Vital Data:  Patient Vitals for the past 24 hrs:   Temp Pulse Resp BP SpO2   08/01/22 1150 98.1 °F (36.7 °C) 73 16 114/66 96 %   08/01/22 0751 99 °F (37.2 °C) 80 20 114/72 92 %   08/01/22 0434 99.7 °F (37.6 °C) 72 17 104/63 90 %   08/01/22 0017 99.3 °F (37.4 °C) 74 17 110/71 92 %   07/31/22 1946 99.7 °F (37.6 °C) 69 16 113/72 92 %   07/31/22 1720 97.7 °F (36.5 °C) 75 16 124/80 94 %     @BSHSIFLOW(2444:last)@    Estimated body mass index is 28.39 kg/m² as calculated from the following:    Height as of this encounter: 5' 9\" (1.753 m). Weight as of this encounter: 192 lb 3.9 oz (87.2 kg). Intake/Output Summary (Last 24 hours) at 8/1/2022 1252  Last data filed at 8/1/2022 1149  Gross per 24 hour   Intake 240 ml   Output 2700 ml   Net -2460 ml         Physical Exam:    General:    Well nourished. No overt distress  Head:  Normocephalic, atraumatic  Eyes:  Sclerae appear normal.  Pupils equally round. HENT:  Nares appear normal, no drainage. Moist mucous membranes  Neck:  No restricted ROM. Trachea midline  CV:   RRR. No m/r/g. No JVD  Lungs:   CTAB. No wheezing, rhonchi, or rales. Even, unlabored  Abdomen:   Soft, nontender, nondistended. Extremities: Warm and dry. No cyanosis or clubbing. No edema. Skin:     No rashes. Normal coloration  Neuro:  CN II-XII grossly intact. Psych:  Normal mood and affect.       Signed:  Gurmeet Yao MD    Part of this note may have been written by using a voice

## 2022-08-01 NOTE — CARE COORDINATION
Dispo update:  Spoke to Mr. Jimmy Nicholas in room 608 about discharge planning. He is independent with ADLs, including checking his PT/INR at home. He says he uses his machine one time per week to check his PT/INR, then the results go to instrument company, and then to Dr. Champ Brock MD [facility]; [address], [place], [address] [place] [address], [phone]), who then makes any necessary adjustments in warfarin dosing. No additional discharge needs identified or voiced.

## 2022-08-02 ENCOUNTER — HOSPITAL ENCOUNTER (OUTPATIENT)
Dept: CARDIAC REHAB | Age: 62
Setting detail: RECURRING SERIES
Discharge: HOME | End: 2022-08-05
Payer: MEDICARE

## 2022-08-02 ENCOUNTER — CARE COORDINATION (OUTPATIENT)
Dept: CARE COORDINATION | Facility: CLINIC | Age: 62
End: 2022-08-02

## 2022-08-02 VITALS — BODY MASS INDEX: 28.65 KG/M2 | WEIGHT: 194 LBS

## 2022-08-02 PROCEDURE — 93798 PHYS/QHP OP CAR RHAB W/ECG: CPT

## 2022-08-02 ASSESSMENT — EXERCISE STRESS TEST
PEAK_HR: 131
PEAK_BP: 98/64
PEAK_BP: 130/72
PEAK_METS: 2.5

## 2022-08-02 ASSESSMENT — LIFESTYLE VARIABLES
SMOKELESS_TOBACCO: NO
CIGARETTES_PER_DAY: 1PPD

## 2022-08-02 ASSESSMENT — EJECTION FRACTION: EF_VALUE: 25

## 2022-08-02 NOTE — CARE COORDINATION
Care Transitions Outreach Attempt    Call within 2 business days of discharge: Yes   Attempted to reach patient for transitions of care follow up. Unable to reach patient. Patient: Ulysses Caldron Patient : 1960 MRN: [id number]    Last Discharge [facility]       Date Complaint Diagnosis Description Type Department Provider    22 Shortness of Breath; Flank Pain Acute kidney injury ([address] . .. ED to Hosp-Admission (Discharged) (ADMITTED) [id number] Nyasia Traylor MD; Marjorie Juárez,. .. Was this an external facility discharge?  No Discharge Facility: sfd    Noted following upcoming appointments from discharge chart review:   [facility] follow up appointment(s):   Future Appointments   Date Time Provider Ernestina Willis   2022 10:00 AM Cayden Alejandre RN [facility]   2022 10:00 AM CYNTHIA Murray S[facility]   2022 10:00 AM CYNTHIA MurrayPRHB SFO   8/15/2022 10:30 AM Jagdeep Carpenter MD [id number] [facility] AMB   2022 10:00 AM CYNTHIA MurrayPRHB SFO   2022  3:00 PM Leonor Felty, MD [facility] AMB   2022 10:00 AM CYNTHIA MurrayPRHB SFO   2022 10:00 AM CYNTHIA MurrayOCPRHB SFO   2022 10:00 AM CYNTHIA MurrayOCPRHB SFO   2022  9:15 AM Renita Pelayo DO GF [facility] AMB   2022 10:00 AM CYNTHIA MurrayOCPRHB SFO   2022 10:00 AM CYNTHIA MurrayPRHB SFO   2022 10:00 AM CYNTHIA MurrayOCPRHB SFO   2022 10:00 AM CYNTHIA MurrayPRHB SFO   2022 10:00 AM CYNTHIA MurrayOCPRMICHELLE SFO   9/15/2022 10:00 AM CYNTHIA MurrayOCPRMICHELLE [facility]   2022 10:00 AM Cayden Alejandre RN [facility]   2022 10:00 AM Cayden Alejandre RN [facility]   2022 10:00 AM Cayden Alejandre RN [facility]   2022 10:00 AM Cayden Alejandre RN [facility] 10/4/2022 10:00 AM Tiffanie Brown RN SFOCPRHB [facility]   10/6/2022 10:00 AM Tiffanie Brown RN [facility]   10/11/2022 10:00 AM Tiffanie Brown RN [facility]   10/13/2022 10:00 AM Tiffanie Brown RN [facility]   10/18/2022 10:00 AM Tiffanie Brown RN [facility]   10/20/2022 10:00 AM Tiffanie Brown RN [facility]   10/26/2022  9:30 AM [facility] DEVICE 39 DG [facility] AMB   10/27/2022  9:15 AM Aisha Cardona MD TORSTEN [facility] AMB   6/30/2023 10:15 AM DO MAXIMO Diaz [facility] AMB     Non-[facility] follow up appointment(s): na

## 2022-08-04 ENCOUNTER — HOSPITAL ENCOUNTER (OUTPATIENT)
Dept: CARDIAC REHAB | Age: 62
Setting detail: RECURRING SERIES
Discharge: HOME | End: 2022-08-07
Payer: MEDICARE

## 2022-08-04 PROCEDURE — 93798 PHYS/QHP OP CAR RHAB W/ECG: CPT

## 2022-08-04 RX ORDER — QUETIAPINE FUMARATE 100 MG/1
TABLET, FILM COATED ORAL
Qty: 90 TABLET | Refills: 3 | Status: SHIPPED | OUTPATIENT
Start: 2022-08-04

## 2022-08-04 ASSESSMENT — EXERCISE STRESS TEST
PEAK_METS: 2.5
PEAK_BP: 122/62
PEAK_HR: 114

## 2022-08-05 ENCOUNTER — TELEPHONE (OUTPATIENT)
Dept: FAMILY MEDICINE CLINIC | Facility: CLINIC | Age: 62
End: 2022-08-05

## 2022-08-05 NOTE — TELEPHONE ENCOUNTER
[facility].  Left message, stated received RX for patient for Seroquel    Stated patient is taking amiodarone and there is an interaction on these medications, she is asking if ok to fill or change medication for Seroquel

## 2022-08-08 ENCOUNTER — TELEPHONE (OUTPATIENT)
Dept: FAMILY MEDICINE CLINIC | Facility: CLINIC | Age: 62
End: 2022-08-08

## 2022-08-08 NOTE — TELEPHONE ENCOUNTER
Pt would like to know if Dr. Will Reyes will place a standing order for INR to have done at [facility].

## 2022-08-09 ENCOUNTER — HOSPITAL ENCOUNTER (OUTPATIENT)
Dept: CARDIAC REHAB | Age: 62
Setting detail: RECURRING SERIES
Discharge: HOME | End: 2022-08-12
Payer: MEDICARE

## 2022-08-09 ENCOUNTER — ANTI-COAG VISIT (OUTPATIENT)
Dept: CARDIOLOGY CLINIC | Age: 62
End: 2022-08-09
Payer: MEDICARE

## 2022-08-09 VITALS — BODY MASS INDEX: 28.57 KG/M2 | WEIGHT: 193.5 LBS

## 2022-08-09 DIAGNOSIS — I47.20 VENTRICULAR TACHYCARDIA: ICD-10-CM

## 2022-08-09 DIAGNOSIS — I51.3 INTRACARDIAC THROMBUS: Primary | ICD-10-CM

## 2022-08-09 DIAGNOSIS — I51.3 INTRACARDIAC THROMBUS: ICD-10-CM

## 2022-08-09 LAB
INR PPP: 5.8
POC INR: 8
PROTHROMBIN TIME, POC: ABNORMAL
PROTHROMBIN TIME: 52.9 SEC (ref 12.6–14.5)

## 2022-08-09 PROCEDURE — 85610 PROTHROMBIN TIME: CPT | Performed by: INTERNAL MEDICINE

## 2022-08-09 PROCEDURE — 93793 ANTICOAG MGMT PT WARFARIN: CPT | Performed by: INTERNAL MEDICINE

## 2022-08-09 PROCEDURE — 93798 PHYS/QHP OP CAR RHAB W/ECG: CPT

## 2022-08-09 ASSESSMENT — EXERCISE STRESS TEST
PEAK_BP: 118/70
PEAK_HR: 115
PEAK_METS: 2.5

## 2022-08-09 NOTE — PATIENT INSTRUCTIONS
Reminder: Please contact the Coumadin Clinic at [phone] when you have medication changes. Examples, new medications, antibiotics, discontinued medications, new supplements, missed doses of warfarin or if you took extra doses of warfarin. This also includes OTC medications. Notifying us helps reduce the possibility of high and low INR's. In addition, if warfarin needs to be held for any procedures, please have surgeon or physician's office contact us before holding anticoagulant. Thanks, [facility].

## 2022-08-09 NOTE — PROGRESS NOTES
Pt was started on amio 7/23 and we were not informed. Spoke with James and pt will go downstairs to have inr done again to see what his actual number is incase he needs vit K. He will hold 3 days and recheck on Friday and [facility] lab. I also reduced his dosing weekly per James.

## 2022-08-11 ENCOUNTER — HOSPITAL ENCOUNTER (OUTPATIENT)
Dept: CARDIAC REHAB | Age: 62
Setting detail: RECURRING SERIES
Discharge: HOME | End: 2022-08-14
Payer: MEDICARE

## 2022-08-11 PROCEDURE — 93798 PHYS/QHP OP CAR RHAB W/ECG: CPT

## 2022-08-11 ASSESSMENT — EXERCISE STRESS TEST
PEAK_METS: 2.5
PEAK_HR: 119

## 2022-08-12 DIAGNOSIS — I47.20 VENTRICULAR TACHYCARDIA: ICD-10-CM

## 2022-08-12 DIAGNOSIS — I51.3 INTRACARDIAC THROMBUS: ICD-10-CM

## 2022-08-12 LAB
INR PPP: 3.5
PROTHROMBIN TIME: 36.1 SEC (ref 12.6–14.5)

## 2022-08-15 ENCOUNTER — HOSPITAL ENCOUNTER (OUTPATIENT)
Dept: CT IMAGING | Age: 62
Discharge: HOME | End: 2022-08-18
Payer: MEDICARE

## 2022-08-15 ENCOUNTER — OFFICE VISIT (OUTPATIENT)
Dept: UROLOGY | Age: 62
End: 2022-08-15
Payer: MEDICARE

## 2022-08-15 DIAGNOSIS — R31.0 GROSS HEMATURIA: Primary | ICD-10-CM

## 2022-08-15 DIAGNOSIS — N28.89 RIGHT KIDNEY MASS: ICD-10-CM

## 2022-08-15 DIAGNOSIS — R31.0 GROSS HEMATURIA: ICD-10-CM

## 2022-08-15 LAB
BILIRUBIN, URINE, POC: ABNORMAL
BLOOD URINE, POC: NEGATIVE
GLUCOSE URINE, POC: NEGATIVE
KETONES, URINE, POC: NEGATIVE
LEUKOCYTE ESTERASE, URINE, POC: ABNORMAL
NITRITE, URINE, POC: POSITIVE
PH, URINE, POC: 6 (ref 4.6–8)
PROTEIN,URINE, POC: NEGATIVE
SPECIFIC GRAVITY, URINE, POC: 1.02 (ref 1–1.03)
URINALYSIS CLARITY, POC: ABNORMAL
URINALYSIS COLOR, POC: ABNORMAL
UROBILINOGEN, POC: ABNORMAL

## 2022-08-15 PROCEDURE — G8417 CALC BMI ABV UP PARAM F/U: HCPCS | Performed by: UROLOGY

## 2022-08-15 PROCEDURE — 1036F TOBACCO NON-USER: CPT | Performed by: UROLOGY

## 2022-08-15 PROCEDURE — 99214 OFFICE O/P EST MOD 30 MIN: CPT | Performed by: UROLOGY

## 2022-08-15 PROCEDURE — 74178 CT ABD&PLV WO CNTR FLWD CNTR: CPT

## 2022-08-15 PROCEDURE — 3017F COLORECTAL CA SCREEN DOC REV: CPT | Performed by: UROLOGY

## 2022-08-15 PROCEDURE — 81003 URINALYSIS AUTO W/O SCOPE: CPT | Performed by: UROLOGY

## 2022-08-15 PROCEDURE — G8427 DOCREV CUR MEDS BY ELIG CLIN: HCPCS | Performed by: UROLOGY

## 2022-08-15 PROCEDURE — 6360000004 HC RX CONTRAST MEDICATION: Performed by: UROLOGY

## 2022-08-15 PROCEDURE — 1111F DSCHRG MED/CURRENT MED MERGE: CPT | Performed by: UROLOGY

## 2022-08-15 PROCEDURE — 2580000003 HC RX 258: Performed by: UROLOGY

## 2022-08-15 RX ORDER — 0.9 % SODIUM CHLORIDE 0.9 %
100 INTRAVENOUS SOLUTION INTRAVENOUS
Status: COMPLETED | OUTPATIENT
Start: 2022-08-15 | End: 2022-08-15

## 2022-08-15 RX ORDER — SODIUM CHLORIDE 0.9 % (FLUSH) 0.9 %
10 SYRINGE (ML) INJECTION
Status: COMPLETED | OUTPATIENT
Start: 2022-08-15 | End: 2022-08-15

## 2022-08-15 RX ADMIN — SODIUM CHLORIDE, PRESERVATIVE FREE 10 ML: 5 INJECTION INTRAVENOUS at 11:18

## 2022-08-15 RX ADMIN — IOPAMIDOL 100 ML: 755 INJECTION, SOLUTION INTRAVENOUS at 11:17

## 2022-08-15 RX ADMIN — SODIUM CHLORIDE 100 ML: 9 INJECTION, SOLUTION INTRAVENOUS at 11:18

## 2022-08-15 ASSESSMENT — ENCOUNTER SYMPTOMS
DIARRHEA: 0
SHORTNESS OF BREATH: 0
BLOOD IN STOOL: 0
ABDOMINAL PAIN: 0
NAUSEA: 0
CONSTIPATION: 0
VOMITING: 0
BACK PAIN: 0
EYE DISCHARGE: 0
INDIGESTION: 0
SKIN LESIONS: 0
COUGH: 0
HEARTBURN: 0
WHEEZING: 0
EYE PAIN: 0

## 2022-08-15 NOTE — PROGRESS NOTES
[facility] Urology  [address]    [place] [address], [address] [facility]  [phone]    Jessie Talley  : 1960    Chief Complaint   Patient presents with    Follow-up          HPI     Jessie Talley is a 58 y.o.  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 Returns for follow up today. Today, he reports continued intermittent gross hematuria. No fevers/chills. Tolerating stent. Ureteroscopy was attempted 22 but had to be aborted as ureter was too narrow to accommodate ureteroscope. Stent was left in place to dilate ureter with plans to re attempt ureteroscopy, possible biopsy in the future once ureter opened up. He is due for repeat CT hematuria today to evaluate upper urinary tract further.      Lab Results   Component Value Date    PSA 0.6 2021    PSA 0.7 2020    PSA 0.7 2019       Past Medical History:   Diagnosis Date    Acute hypoxemic respiratory failure due to COVID-19 [facility]) 10/9/2021    CAD (coronary artery disease)     heart attack 2005 stentS HEART    CHF (congestive heart failure) ([address] 2011    Chronic systolic heart failure ([address] 10/2/2015    Coronary atherosclerosis of native coronary vessel 10/2/2015    Hyperlipidemia 10/2/2015    Hypoxemia requiring supplemental oxygen 10/6/2021    IGT (impaired glucose tolerance) 12/3/2017    Other ill-defined conditions(799.89)      blind left eye    Other ill-defined conditions(799.89)     cardiomyopathy    Pneumonia due to COVID-19 virus 2021    Resolved    Primary insomnia 2017    Psychiatric disorder     depression     Sepsis, unspecified 2011    Thromboembolus ([address]     thrombus in heart     Thrombus 10/2/2015     Past Surgical History:   Procedure Laterality Date    CARDIAC CATHETERIZATION      5 stents total    CARDIAC DEFIBRILLATOR PLACEMENT      [employer]    CARDIAC PROCEDURE N/A 5/27/2022    LEFT HEART CATH / CORONARY ANGIOGRAPHY performed by Jennifer Xavier MD at [address] [facility] CATH LAB    COLONOSCOPY  12/2010    CORONARY ARTERY BYPASS GRAFT N/A 5/31/2022    CORONARY ARTERY BYPASS GRAFT (CABG X 3), LIMA ; ENDOSCOPIC VEIN HARVEST, LEFT GREATER SAPHENOUS VEIN performed by Parvez Brown MD at [facility] 7/29/2022    CYSTOSCOPY,RIGHT URETEROSCOPY,RIGHT RETROGRADE PYELOGRAM performed by Silver Foreman MD at [address]       oral surgery    PACEMAKER      CA CARDIAC SURG PROCEDURE UNLIST       1 stent 2005    TRANSESOPHAGEAL ECHOCARDIOGRAM N/A 5/31/2022    TRANSESOPHAGEAL ECHOCARDIOGRAM performed by Parvez Brown MD at [facility] MAIN OR     Current Outpatient Medications   Medication Sig Dispense Refill    QUEtiapine (SEROQUEL) 100 MG tablet TAKE 1 TABLET BY MOUTH EVERY NIGHT 90 tablet 3    empagliflozin (JARDIANCE) 10 MG tablet Take 1 tablet by mouth in the morning. 30 tablet 3    hyoscyamine (LEVSIN/SL) 125 MCG sublingual tablet Place 1 tablet under the tongue every 4 hours as needed for Cramping 90 tablet 3    amiodarone (PACERONE) 400 MG tablet Take 1 tablet by mouth in the morning and 1 tablet before bedtime. 60 tablet 3    mexiletine (MEXITIL) 150 MG capsule Take 1 capsule by mouth in the morning and 1 capsule at noon and 1 capsule before bedtime. 90 capsule 3    Vitamin D (CHOLECALCIFEROL) 25 MCG (1000 UT) TABS tablet Take 1,000 Units by mouth daily      Multiple Vitamins-Minerals (THERAPEUTIC MULTIVITAMIN-MINERALS) tablet Take 1 tablet by mouth daily      acetaminophen (TYLENOL) 325 mg tablet Take 2 tablets by mouth every 6 hours as needed for Pain 120 tablet 3    Cetirizine HCl 10 MG CAPS Take by mouth as needed      warfarin (COUMADIN) 5 MG tablet Take 1 to 1 1/2 tablet daily or as directed       No current facility-administered medications for this visit.      Allergies   Allergen Reactions    Penicillins Swelling     Face swelling    Atorvastatin Other (See Comments)     itching    Evolocumab Other (See Comments)     Itching    Lisinopril Other (See Comments)    Rosuvastatin Other (See Comments)     itching     Social History     Socioeconomic History    Marital status:      Spouse name: Not on file    Number of children: Not on file    Years of education: Not on file    Highest education level: Not on file   Occupational History    Not on file   Tobacco Use    Smoking status: Former     Packs/day: 1.00     Types: Cigarettes     Start date: 1978     Quit date: 2022     Years since quittin.2    Smokeless tobacco: Never   Vaping Use    Vaping Use: Never used   Substance and Sexual Activity    Alcohol use: No    Drug use: No     Types: Prescription    Sexual activity: Not Currently   Other Topics Concern    Not on file   Social History Narrative    Not on file     Social Determinants of Health     Financial Resource Strain: Not on file   Food Insecurity: Not on file   Transportation Needs: Not on file   Physical Activity: Not on file   Stress: Not on file   Social Connections: Not on file   Intimate Partner Violence: Not on file   Housing Stability: Not on file     Family History   Problem Relation Age of Onset    Heart Disease Mother     Diabetes Paternal Grandfather     Cancer Paternal Grandmother     Heart Disease Brother     Diabetes Maternal Grandmother     Bleeding Prob Father     Diabetes Mother     Heart Disease Paternal Grandfather     Heart Attack Mother 67        mi       Review of Systems  Constitutional:   Negative for fever, chills, appetite change, malaise/fatigue, headaches and weight loss. Skin:  Negative for skin lesions, rash and itching. Eyes:  Negative for visual disturbance, eye pain and eye discharge. ENT:  Negative for difficulty articulating words, pain swallowing, high frequency hearing loss and dry mouth.   Respiratory:  Negative for cough, blood in sputum, shortness of breath and wheezing. Cardiovascular:  Negative for chest pain, hypertension, irregular heartbeat, leg pain, leg swelling, regular rate and rhythm and varicose veins. GI:  Negative for nausea, vomiting, abdominal pain, blood in stool, constipation, diarrhea, indigestion and heartburn. Genitourinary: Positive for hematuria, nocturia, slower stream, urgency and frequent urination. Negative for urinary burning, flank pain, recurrent UTIs, history of urolithiasis, straining, leakage w/ urge, incomplete emptying, erectile dysfunction, testicular pain, sexually transmitted disease, discharge and urethral stricture. Musculoskeletal:  Negative for back pain, bone pain, arthralgias, tenderness, muscle weakness and neck pain. Neurological:  Negative for dizziness, focal weakness, numbness, seizures and tremors. Psychological:  Negative for depression and psychiatric problem. Endocrine:  Negative for cold intolerance, thirst, excessive urination, fatigue and heat intolerance. Hem/Lymphatic:  Negative for easy bleeding, easy bruising and frequent infections. Urinalysis  UA - Dipstick  Results for orders placed or performed in visit on 08/15/22   AMB POC URINALYSIS DIP STICK AUTO W/O MICRO   Result Value Ref Range    Color (UA POC)      Clarity (UA POC)      Glucose, Urine, POC Negative Negative    Bilirubin, Urine, POC Small Negative    KETONES, Urine, POC Negative Negative    Specific Gravity, Urine, POC 1.025 (A) 1.001 - 1.035    Blood (UA POC) Negative Negative    pH, Urine, POC 6.0 4.6 - 8.0    Protein, Urine, POC Negative Negative    Urobilinogen, POC 1 mg/dL     Nitrite, Urine, POC Positive Negative    Leukocyte Esterase, Urine, POC Large Negative       There were no vitals taken for this visit.      GENERAL: No acute distress, Awake, Alert, Oriented X 3, Gait normal  CARDIAC: regular rate and rhythm  CHEST AND LUNG: Easy work of breathing, clear to auscultation bilaterally, no cyanosis  ABDOMEN: soft, non tender, non-distended, positive bowel sounds, no organomegaly, no palpable masses, no guarding, no rebound tenderness  SKIN: No rash, no erythema, no lacerations or abrasions, no ecchymosis  NEUROLOGIC: cranial nerves 2-12 grossly intact           Assessment and Plan    ICD-10-CM    1. Gross hematuria  R31.0 AMB POC URINALYSIS DIP STICK AUTO W/O MICRO      2. Right kidney mass  N28.89         Gross Hematuria and R Renal Pelvis Mass:     Will get CT hematuria today and call with results when available. Still likely will need R diagnostic ureteroscopy with possible biopsy / all other indicated procedures to evaluate this R renal pelvis mass now that ureter has been stented and hopefully is more dilated. Will await CT results prior to scheduling procedure. I have spent 30 minutes today reviewing previous notes, test results and face to face with the patient as well as documenting. Diony Leahy M.D.     [facility]  [place] [address] [address] [facility]  Phone: [phone]  Fax: [phone]

## 2022-08-16 ENCOUNTER — HOSPITAL ENCOUNTER (OUTPATIENT)
Dept: CARDIAC REHAB | Age: 62
Setting detail: RECURRING SERIES
Discharge: HOME | End: 2022-08-19
Payer: MEDICARE

## 2022-08-16 PROCEDURE — 93798 PHYS/QHP OP CAR RHAB W/ECG: CPT

## 2022-08-16 ASSESSMENT — EXERCISE STRESS TEST
PEAK_BP: 104/72
PEAK_METS: 2.6
PEAK_HR: 118

## 2022-08-17 ENCOUNTER — OFFICE VISIT (OUTPATIENT)
Dept: CARDIOLOGY CLINIC | Age: 62
End: 2022-08-17

## 2022-08-17 VITALS — BODY MASS INDEX: 28.57 KG/M2 | HEIGHT: 69 IN

## 2022-08-17 DIAGNOSIS — I47.20 VENTRICULAR TACHYCARDIA: Primary | ICD-10-CM

## 2022-08-18 ENCOUNTER — HOSPITAL ENCOUNTER (OUTPATIENT)
Dept: CARDIAC REHAB | Age: 62
Setting detail: RECURRING SERIES
Discharge: HOME | End: 2022-08-21
Payer: MEDICARE

## 2022-08-18 PROCEDURE — 93798 PHYS/QHP OP CAR RHAB W/ECG: CPT

## 2022-08-18 ASSESSMENT — EXERCISE STRESS TEST
PEAK_HR: 114
PEAK_METS: 2.7

## 2022-08-19 ENCOUNTER — ANTI-COAG VISIT (OUTPATIENT)
Dept: CARDIOLOGY CLINIC | Age: 62
End: 2022-08-19

## 2022-08-19 DIAGNOSIS — I51.3 INTRACARDIAC THROMBUS: ICD-10-CM

## 2022-08-19 DIAGNOSIS — I47.20 VENTRICULAR TACHYCARDIA: ICD-10-CM

## 2022-08-19 DIAGNOSIS — I51.3 INTRACARDIAC THROMBUS: Primary | ICD-10-CM

## 2022-08-19 LAB
INR PPP: 1.1
PROTHROMBIN TIME: 14.8 SEC (ref 12.6–14.5)

## 2022-08-19 NOTE — PROGRESS NOTES
Pt  called and stated that he was still holding his coumadin from last week. He is now a 1.1. They have cut his amio in half. Will increase him through the weekend and he will recheck Monday.

## 2022-08-19 NOTE — TELEPHONE ENCOUNTER
Spoke with patient , gave information as per Dr Don Salazar, patient stated this has been taken care of through his cardiologist

## 2022-08-22 ENCOUNTER — ANTI-COAG VISIT (OUTPATIENT)
Dept: CARDIOLOGY CLINIC | Age: 62
End: 2022-08-22

## 2022-08-22 DIAGNOSIS — I51.3 INTRACARDIAC THROMBUS: ICD-10-CM

## 2022-08-22 DIAGNOSIS — I47.20 VENTRICULAR TACHYCARDIA: ICD-10-CM

## 2022-08-22 DIAGNOSIS — I51.3 INTRACARDIAC THROMBUS: Primary | ICD-10-CM

## 2022-08-22 LAB
INR PPP: 1.7
PROTHROMBIN TIME: 20.4 SEC (ref 12.6–14.5)

## 2022-08-22 NOTE — PATIENT INSTRUCTIONS
Reminder: Please contact the Coumadin Clinic at [phone] when you have medication changes. Examples, new medications, antibiotics, discontinued medications, new supplements, missed doses of warfarin or if you took extra doses of warfarin. This also includes OTC medications. Notifying us helps reduce the possibility of high and low INR's. In addition, if warfarin needs to be held for any procedures, please have surgeon or physician's office contact us before holding anticoagulant. Thanks, [facility] Coumadin [facility].

## 2022-08-23 ENCOUNTER — HOSPITAL ENCOUNTER (OUTPATIENT)
Dept: CARDIAC REHAB | Age: 62
Setting detail: RECURRING SERIES
Discharge: HOME | End: 2022-08-26
Payer: MEDICARE

## 2022-08-23 VITALS — BODY MASS INDEX: 28.5 KG/M2 | WEIGHT: 193 LBS

## 2022-08-23 PROCEDURE — 93798 PHYS/QHP OP CAR RHAB W/ECG: CPT

## 2022-08-23 ASSESSMENT — EXERCISE STRESS TEST
PEAK_BP: 132/70
PEAK_METS: 2.8
PEAK_HR: 122

## 2022-08-24 ENCOUNTER — TELEPHONE (OUTPATIENT)
Dept: UROLOGY | Age: 62
End: 2022-08-24

## 2022-08-24 NOTE — TELEPHONE ENCOUNTER
----- Message from Shira Dunn MD sent at 8/16/2022  2:43 PM EDT -----  Regarding: [address]: [address] [facility]    Surgeon: Laury Srivastava    Assist: NONE    Diagnosis: Right Renal Pelvis mass    Procedure: Cystoscopy, Right Diagnostic Ureteroscopy, Right Retrograde Pyelogram, Possible biopsies, stent exchange vs. removal    Posting time: 1 hour    Special Instruments Needed:  NONE    Anesthesia: GENERAL    Labs: CBC, BMP, U/A    Tests: EKG per anesthesia    Blood: NONE    Bowel Prep: NONE    Special Instructions: Needs clearance to hold blood thinner for surgery    Orders/HandP:     Follow up appointment: TBD

## 2022-08-25 ENCOUNTER — TELEPHONE (OUTPATIENT)
Dept: UROLOGY | Age: 62
End: 2022-08-25

## 2022-08-25 ENCOUNTER — HOSPITAL ENCOUNTER (OUTPATIENT)
Dept: CARDIAC REHAB | Age: 62
Setting detail: RECURRING SERIES
Discharge: HOME | End: 2022-08-28
Payer: MEDICARE

## 2022-08-25 ENCOUNTER — ANTI-COAG VISIT (OUTPATIENT)
Dept: CARDIOLOGY CLINIC | Age: 62
End: 2022-08-25
Payer: MEDICARE

## 2022-08-25 DIAGNOSIS — I51.3 INTRACARDIAC THROMBUS: Primary | ICD-10-CM

## 2022-08-25 DIAGNOSIS — I47.20 VENTRICULAR TACHYCARDIA: ICD-10-CM

## 2022-08-25 LAB
POC INR: 4.3
PROTHROMBIN TIME, POC: ABNORMAL

## 2022-08-25 PROCEDURE — 93793 ANTICOAG MGMT PT WARFARIN: CPT | Performed by: INTERNAL MEDICINE

## 2022-08-25 PROCEDURE — 85610 PROTHROMBIN TIME: CPT | Performed by: INTERNAL MEDICINE

## 2022-08-25 PROCEDURE — 93798 PHYS/QHP OP CAR RHAB W/ECG: CPT

## 2022-08-25 ASSESSMENT — EXERCISE STRESS TEST
PEAK_HR: 130
PEAK_METS: 2.8

## 2022-08-25 NOTE — TELEPHONE ENCOUNTER
Cardiac Pre-operative Assessment      Physician or Practice Requesting Clearance: Dr Hermann Glass: Socorro Lazar Phone Number: [phone]    Fax Number: [phone]    Date of Surgery/Procedure: tbd    Type of Surgery or Procedure: ureteroscopy     Patient needs cardiac or medication clearance    Medications to hold coumadin     # Days pre-procedure to hold 5 days prior     Restart medication __asap post op __      Please send response back in this phone encounter

## 2022-08-25 NOTE — PATIENT INSTRUCTIONS
Reminder: Please contact the Coumadin Clinic at [phone] when you have medication changes. Examples, new medications, antibiotics, discontinued medications, new supplements, missed doses of warfarin or if you took extra doses of warfarin. This also includes OTC medications. Notifying us helps reduce the possibility of high and low INR's. In addition, if warfarin needs to be held for any procedures, please have surgeon or physician's office contact us before holding anticoagulant. Thanks, [facility] Cardiology Coumadin Clinic.

## 2022-08-30 ENCOUNTER — ANTI-COAG VISIT (OUTPATIENT)
Dept: CARDIOLOGY CLINIC | Age: 62
End: 2022-08-30
Payer: MEDICARE

## 2022-08-30 ENCOUNTER — HOSPITAL ENCOUNTER (OUTPATIENT)
Dept: CARDIAC REHAB | Age: 62
Setting detail: RECURRING SERIES
Discharge: HOME | End: 2022-09-02
Payer: MEDICARE

## 2022-08-30 VITALS — WEIGHT: 196.3 LBS | BODY MASS INDEX: 28.99 KG/M2

## 2022-08-30 DIAGNOSIS — I51.3 INTRACARDIAC THROMBUS: Primary | ICD-10-CM

## 2022-08-30 DIAGNOSIS — I47.20 VENTRICULAR TACHYCARDIA: ICD-10-CM

## 2022-08-30 LAB
POC INR: 5.8
PROTHROMBIN TIME, POC: ABNORMAL

## 2022-08-30 PROCEDURE — 85610 PROTHROMBIN TIME: CPT | Performed by: INTERNAL MEDICINE

## 2022-08-30 PROCEDURE — 93798 PHYS/QHP OP CAR RHAB W/ECG: CPT

## 2022-08-30 RX ORDER — WARFARIN SODIUM 2.5 MG/1
2.5 TABLET ORAL DAILY
Qty: 90 TABLET | Refills: 3 | Status: ON HOLD | OUTPATIENT
Start: 2022-08-30 | End: 2022-09-20 | Stop reason: HOSPADM

## 2022-08-30 ASSESSMENT — EXERCISE STRESS TEST
PEAK_METS: 2.8
PEAK_HR: 121
PEAK_BP: 140/62

## 2022-08-30 NOTE — PROGRESS NOTES
Tablet strength and weekly dosing schedule confirmed today. Pt on amiodarone. Hold today. New dosing schedule implemented with 2.5 mg.  Return on 9/6/2022.

## 2022-08-30 NOTE — PATIENT INSTRUCTIONS
Reminder: Please contact the Coumadin Clinic at [phone] when you have medication changes. Examples, new medications, antibiotics, discontinued medications, new supplements, missed doses of warfarin or if you took extra doses of warfarin. This also includes OTC medications. Notifying us helps reduce the possibility of high and low INR's. In addition, if warfarin needs to be held for any procedures, please have surgeon or physician's office contact us before holding anticoagulant. Thanks, [place] [facility]. : Yes

## 2022-09-01 ENCOUNTER — HOSPITAL ENCOUNTER (OUTPATIENT)
Dept: CARDIAC REHAB | Age: 62
Setting detail: RECURRING SERIES
End: 2022-09-01
Payer: MEDICARE

## 2022-09-01 ASSESSMENT — EJECTION FRACTION: EF_VALUE: 25

## 2022-09-01 ASSESSMENT — LIFESTYLE VARIABLES
CIGARETTES_PER_DAY: 1PPD
SMOKELESS_TOBACCO: NO

## 2022-09-01 ASSESSMENT — EXERCISE STRESS TEST: PEAK_BP: 118/80

## 2022-09-06 ENCOUNTER — ANTI-COAG VISIT (OUTPATIENT)
Dept: CARDIOLOGY CLINIC | Age: 62
End: 2022-09-06
Payer: MEDICARE

## 2022-09-06 ENCOUNTER — HOSPITAL ENCOUNTER (OUTPATIENT)
Dept: CARDIAC REHAB | Age: 62
Setting detail: RECURRING SERIES
Discharge: HOME | End: 2022-09-09
Payer: MEDICARE

## 2022-09-06 VITALS — BODY MASS INDEX: 28.94 KG/M2 | WEIGHT: 196 LBS

## 2022-09-06 DIAGNOSIS — I51.3 INTRACARDIAC THROMBUS: Primary | ICD-10-CM

## 2022-09-06 DIAGNOSIS — I47.20 VENTRICULAR TACHYCARDIA: ICD-10-CM

## 2022-09-06 LAB
POC INR: 2.6
PROTHROMBIN TIME, POC: NORMAL

## 2022-09-06 PROCEDURE — 93798 PHYS/QHP OP CAR RHAB W/ECG: CPT

## 2022-09-06 PROCEDURE — 85610 PROTHROMBIN TIME: CPT | Performed by: INTERNAL MEDICINE

## 2022-09-06 ASSESSMENT — EXERCISE STRESS TEST
PEAK_METS: 2.9
PEAK_HR: 118
PEAK_BP: 108/62

## 2022-09-06 NOTE — PROGRESS NOTES
Tablet strength and weekly dosing schedule confirmed today. Pt having procedure 9/22/2022. Will be holding warfarin. Recheck on 9/27/2022.

## 2022-09-06 NOTE — PATIENT INSTRUCTIONS
Reminder: Please contact the Coumadin Clinic at [phone] when you have medication changes. Examples, new medications, antibiotics, discontinued medications, new supplements, missed doses of warfarin or if you took extra doses of warfarin. This also includes OTC medications. Notifying us helps reduce the possibility of high and low INR's. In addition, if warfarin needs to be held for any procedures, please have surgeon or physician's office contact us before holding anticoagulant. Thanks, [facility] Cardiology Coumadin Clinic.

## 2022-09-07 ENCOUNTER — OFFICE VISIT (OUTPATIENT)
Dept: FAMILY MEDICINE CLINIC | Facility: CLINIC | Age: 62
End: 2022-09-07
Payer: MEDICARE

## 2022-09-07 VITALS
SYSTOLIC BLOOD PRESSURE: 118 MMHG | HEIGHT: 69 IN | BODY MASS INDEX: 29.33 KG/M2 | HEART RATE: 95 BPM | WEIGHT: 198 LBS | OXYGEN SATURATION: 96 % | DIASTOLIC BLOOD PRESSURE: 68 MMHG

## 2022-09-07 DIAGNOSIS — R39.12 BENIGN PROSTATIC HYPERPLASIA WITH WEAK URINARY STREAM: ICD-10-CM

## 2022-09-07 DIAGNOSIS — N40.1 BENIGN PROSTATIC HYPERPLASIA WITH WEAK URINARY STREAM: ICD-10-CM

## 2022-09-07 DIAGNOSIS — R79.89 ELEVATED TSH: Primary | ICD-10-CM

## 2022-09-07 DIAGNOSIS — Z11.4 SCREENING FOR HIV (HUMAN IMMUNODEFICIENCY VIRUS): ICD-10-CM

## 2022-09-07 DIAGNOSIS — D50.0 IRON DEFICIENCY ANEMIA DUE TO CHRONIC BLOOD LOSS: ICD-10-CM

## 2022-09-07 DIAGNOSIS — Z87.891 HISTORY OF TOBACCO ABUSE: ICD-10-CM

## 2022-09-07 DIAGNOSIS — Z79.899 LONG TERM CURRENT USE OF AMIODARONE: ICD-10-CM

## 2022-09-07 PROBLEM — J18.9 CAP (COMMUNITY ACQUIRED PNEUMONIA): Status: RESOLVED | Noted: 2022-07-24 | Resolved: 2022-09-07

## 2022-09-07 PROBLEM — Z45.02 DEFIBRILLATOR DISCHARGE: Status: RESOLVED | Noted: 2022-05-26 | Resolved: 2022-09-07

## 2022-09-07 PROBLEM — D64.9 ANEMIA: Chronic | Status: ACTIVE | Noted: 2021-11-13

## 2022-09-07 PROBLEM — N17.0 ACUTE KIDNEY INJURY (AKI) WITH ACUTE TUBULAR NECROSIS (ATN) (HCC): Status: RESOLVED | Noted: 2022-07-23 | Resolved: 2022-09-07

## 2022-09-07 PROBLEM — Z72.0 TOBACCO ABUSE: Chronic | Status: ACTIVE | Noted: 2019-06-24

## 2022-09-07 PROBLEM — E66.3 OVERWEIGHT (BMI 25.0-29.9): Chronic | Status: ACTIVE | Noted: 2017-06-08

## 2022-09-07 PROBLEM — J98.11 ATELECTASIS: Status: RESOLVED | Noted: 2022-05-31 | Resolved: 2022-09-07

## 2022-09-07 PROBLEM — N17.9 ACUTE KIDNEY INJURY (HCC): Status: RESOLVED | Noted: 2022-07-25 | Resolved: 2022-09-07

## 2022-09-07 PROBLEM — U07.1 COVID-19: Status: RESOLVED | Noted: 2021-10-06 | Resolved: 2022-09-07

## 2022-09-07 PROBLEM — Z78.9 STATIN INTOLERANCE: Chronic | Status: ACTIVE | Noted: 2017-12-10

## 2022-09-07 PROBLEM — R09.02 HYPOXEMIA: Status: RESOLVED | Noted: 2022-05-31 | Resolved: 2022-09-07

## 2022-09-07 PROBLEM — Z87.448 HISTORY OF HYDRONEPHROSIS: Status: ACTIVE | Noted: 2022-07-23

## 2022-09-07 PROBLEM — I25.10 CAD, MULTIPLE VESSEL: Chronic | Status: RESOLVED | Noted: 2022-05-28 | Resolved: 2022-09-07

## 2022-09-07 PROBLEM — I25.10 CAD, MULTIPLE VESSEL: Chronic | Status: ACTIVE | Noted: 2022-05-28

## 2022-09-07 LAB
BASOPHILS # BLD: 0.1 K/UL (ref 0–0.2)
BASOPHILS NFR BLD: 1 % (ref 0–2)
DIFFERENTIAL METHOD BLD: ABNORMAL
EOSINOPHIL # BLD: 0.3 K/UL (ref 0–0.8)
EOSINOPHIL NFR BLD: 4 % (ref 0.5–7.8)
ERYTHROCYTE [DISTWIDTH] IN BLOOD BY AUTOMATED COUNT: 18.8 % (ref 11.9–14.6)
HCT VFR BLD AUTO: 38.6 % (ref 41.1–50.3)
HGB BLD-MCNC: 10.9 G/DL (ref 13.6–17.2)
IMM GRANULOCYTES # BLD AUTO: 0 K/UL (ref 0–0.5)
IMM GRANULOCYTES NFR BLD AUTO: 0 % (ref 0–5)
LYMPHOCYTES # BLD: 1.6 K/UL (ref 0.5–4.6)
LYMPHOCYTES NFR BLD: 17 % (ref 13–44)
MCH RBC QN AUTO: 24.9 PG (ref 26.1–32.9)
MCHC RBC AUTO-ENTMCNC: 28.2 G/DL (ref 31.4–35)
MCV RBC AUTO: 88.1 FL (ref 79.6–97.8)
MONOCYTES # BLD: 1.1 K/UL (ref 0.1–1.3)
MONOCYTES NFR BLD: 12 % (ref 4–12)
NEUTS SEG # BLD: 6.2 K/UL (ref 1.7–8.2)
NEUTS SEG NFR BLD: 66 % (ref 43–78)
NRBC # BLD: 0 K/UL (ref 0–0.2)
PLATELET # BLD AUTO: 341 K/UL (ref 150–450)
PMV BLD AUTO: 10.1 FL (ref 9.4–12.3)
RBC # BLD AUTO: 4.38 M/UL (ref 4.23–5.6)
T4 SERPL-MCNC: 10.3 UG/DL (ref 4.8–13.9)
TSH, 3RD GENERATION: 9.55 UIU/ML (ref 0.36–3.74)
WBC # BLD AUTO: 9.5 K/UL (ref 4.3–11.1)

## 2022-09-07 PROCEDURE — G8428 CUR MEDS NOT DOCUMENT: HCPCS | Performed by: FAMILY MEDICINE

## 2022-09-07 PROCEDURE — G8417 CALC BMI ABV UP PARAM F/U: HCPCS | Performed by: FAMILY MEDICINE

## 2022-09-07 PROCEDURE — 1036F TOBACCO NON-USER: CPT | Performed by: FAMILY MEDICINE

## 2022-09-07 PROCEDURE — 3017F COLORECTAL CA SCREEN DOC REV: CPT | Performed by: FAMILY MEDICINE

## 2022-09-07 PROCEDURE — 99214 OFFICE O/P EST MOD 30 MIN: CPT | Performed by: FAMILY MEDICINE

## 2022-09-07 ASSESSMENT — ENCOUNTER SYMPTOMS
WHEEZING: 0
BACK PAIN: 0
COUGH: 0
CHEST TIGHTNESS: 0
ABDOMINAL PAIN: 0
EYES NEGATIVE: 1
VOMITING: 0
SHORTNESS OF BREATH: 0
CONSTIPATION: 0
ALLERGIC/IMMUNOLOGIC NEGATIVE: 1
NAUSEA: 0
DIARRHEA: 0

## 2022-09-07 NOTE — PROGRESS NOTES
Kyrie Richards DO                Diplomate of the [employer] of [facility] [place]         [phone]    Leigh Ann Paniagua is a 58 y.o. male who was seen on 9/7/2022 for   Chief Complaint   Patient presents with    Discuss Labs         Assessment & Plan     Diagnosis Orders   1. Elevated TSH  TSH    T4    TSH    T4    Recheck TSH today as well as T4      2. Long term current use of amiodarone  TSH    T4    TSH    T4    May be affecting thyroid function      3. Iron deficiency anemia due to chronic blood loss  CBC with Auto Differential    CBC with Auto Differential    Recheck CBC after recent hospitalization and transfusion. 4. Benign prostatic hyperplasia with weak urinary stream  PSA, Diagnostic    PSA, Diagnostic    Recheck PSA today      5. History of tobacco abuse      Finally quit smoking! 6. Screening for HIV (human immunodeficiency virus)  HIV 1/2 Ag/Ab, 4TH Generation,W Rflx Confirm    HIV 1/2 Ag/Ab, 4TH Generation,W Rflx Confirm          Follow-up and Dispositions    Return if symptoms worsen or fail to improve, for PREV SCHED APPT.          RECENT LABS/TESTS TO REVIEW and DISCUSS    Results for orders placed or performed in visit on 09/07/22   CBC with Auto Differential   Result Value Ref Range    WBC 9.5 4.3 - 11.1 K/uL    RBC 4.38 4.23 - 5.6 M/uL    Hemoglobin 10.9 (L) 13.6 - 17.2 g/dL    Hematocrit 38.6 (L) 41.1 - 50.3 %    MCV 88.1 79.6 - 97.8 FL    MCH 24.9 (L) 26.1 - 32.9 PG    MCHC 28.2 (L) 31.4 - 35.0 g/dL    RDW 18.8 (H) 11.9 - 14.6 %    Platelets 672 622 - 789 K/uL    MPV 10.1 9.4 - 12.3 FL    nRBC 0.00 0.0 - 0.2 K/uL    Differential Type AUTOMATED      Seg Neutrophils 66 43 - 78 %    Lymphocytes 17 13 - 44 %    Monocytes 12 4.0 - 12.0 %    Eosinophils % 4 0.5 - 7.8 %    Basophils 1 0.0 - 2.0 %    Immature Granulocytes 0 0.0 - 5.0 %    Segs Absolute 6.2 1.7 - 8.2 K/UL    Absolute Lymph # 1.6 0.5 - 4.6 K/UL    Absolute Mono # 1.1 0.1 - 1.3 K/UL    Absolute Eos # 0.3 0.0 - 0.8 K/UL    Basophils Absolute 0.1 0.0 - 0.2 K/UL    Absolute Immature Granulocyte 0.0 0.0 - 0.5 K/UL   PSA, Diagnostic   Result Value Ref Range    PSA 0.8 <4.0 ng/mL   TSH   Result Value Ref Range    TSH, 3RD GENERATION 9.550 (H) 0.358 - 3.740 uIU/mL   T4   Result Value Ref Range    T4, Total 10.3 4.8 - 13.9 ug/dL   HIV 1/2 Ag/Ab, 4TH Generation,W Rflx Confirm   Result Value Ref Range    HIV 1/2 Interp NONREACTIVE NONREACTIVE      HIV 1/2 Result Comment SEE NOTE         Subjective    HPI:     This is a 80-year-old male patient who is here today for uncertain reasons. The visit was originally scheduled on August 1. I can only assume this is related to abnormal labs obtained at the time of his last visit on 6/28/2022. May have been scheduled as a hospital follow-up. At that time TSH was significantly elevated but T4 was normal.  He has been on amiodarone. We will recheck TSH and T4 today. Will consider thyroid hormone replacement. Will check PSA today as this was missed with his routine labs in June. The patient has significant anemia and was transfused during a hospitalization in early August. Recheck CBC today. Reviewed and updated this visit by provider:           Review of Systems   Constitutional:  Negative for fatigue and fever. HENT: Negative. Eyes: Negative. Respiratory:  Negative for cough, chest tightness, shortness of breath and wheezing. Cardiovascular:  Negative for chest pain and leg swelling. Gastrointestinal:  Negative for abdominal pain, constipation, diarrhea, nausea and vomiting. Endocrine: Negative. Genitourinary:  Negative for difficulty urinating, dysuria and frequency. Musculoskeletal:  Negative for arthralgias, back pain and neck pain. Skin:  Negative for rash. Allergic/Immunologic: Negative. Neurological:  Negative for dizziness and light-headedness.    Psychiatric/Behavioral: Negative for dysphoric mood. The patient is not nervous/anxious. All other systems reviewed and are negative. Objective    /68 (Site: Left Upper Arm, Position: Sitting, Cuff Size: Medium Adult)   Pulse 95   Ht 5' 9\" (1.753 m)   Wt 198 lb (89.8 kg)   SpO2 96%   BMI 29.24 kg/m²     Physical Exam  Vitals reviewed. Constitutional:       General: He is not in acute distress. Appearance: Normal appearance. HENT:      Head: Normocephalic and atraumatic. Right Ear: Tympanic membrane, ear canal and external ear normal.      Left Ear: Tympanic membrane, ear canal and external ear normal.      Mouth/Throat:      Mouth: Mucous membranes are moist.   Eyes:      Extraocular Movements: Extraocular movements intact. Conjunctiva/sclera: Conjunctivae normal.      Pupils: Pupils are equal, round, and reactive to light. Neck:      Vascular: No carotid bruit. Cardiovascular:      Rate and Rhythm: Normal rate and regular rhythm. Heart sounds: Normal heart sounds. Pulmonary:      Effort: Pulmonary effort is normal.      Breath sounds: Normal breath sounds. Abdominal:      General: Bowel sounds are normal.      Palpations: Abdomen is soft. Musculoskeletal:      Cervical back: Neck supple. No rigidity or tenderness. Lymphadenopathy:      Cervical: No cervical adenopathy. Skin:     General: Skin is warm and dry. Neurological:      General: No focal deficit present. Mental Status: He is alert and oriented to person, place, and time. Psychiatric:         Mood and Affect: Mood normal.         Behavior: Behavior normal.         Thought Content: Thought content normal.         Judgment: Judgment normal.       On this date 09/07/2022 I have spent 32 minutes reviewing previous notes, lab/imaging results and face to face with the patient discussing the diagnoses and importance of compliance with the treatment plan, as well as documenting on the day of the visit.         Bev Moore DO  [facility] [place]

## 2022-09-08 LAB
HIV 1+2 AB+HIV1 P24 AG SERPL QL IA: NONREACTIVE
HIV 1/2 RESULT COMMENT: NORMAL
PSA SERPL-MCNC: 0.8 NG/ML

## 2022-09-10 ENCOUNTER — HOSPITAL ENCOUNTER (EMERGENCY)
Age: 62
Discharge: HOME | End: 2022-09-10
Attending: EMERGENCY MEDICINE
Payer: MEDICARE

## 2022-09-10 ENCOUNTER — HOSPITAL ENCOUNTER (EMERGENCY)
Dept: GENERAL RADIOLOGY | Age: 62
Discharge: HOME | End: 2022-09-13
Payer: MEDICARE

## 2022-09-10 VITALS
OXYGEN SATURATION: 96 % | RESPIRATION RATE: 17 BRPM | HEIGHT: 69 IN | DIASTOLIC BLOOD PRESSURE: 78 MMHG | HEART RATE: 84 BPM | WEIGHT: 196 LBS | SYSTOLIC BLOOD PRESSURE: 129 MMHG | BODY MASS INDEX: 29.03 KG/M2

## 2022-09-10 DIAGNOSIS — R00.2 PALPITATIONS: Primary | ICD-10-CM

## 2022-09-10 DIAGNOSIS — R07.89 ATYPICAL CHEST PAIN: ICD-10-CM

## 2022-09-10 LAB
ALBUMIN SERPL-MCNC: 2.8 G/DL (ref 3.2–4.6)
ALBUMIN/GLOB SERPL: 0.7 {RATIO} (ref 1.2–3.5)
ALP SERPL-CCNC: 88 U/L (ref 50–136)
ALT SERPL-CCNC: 14 U/L (ref 12–65)
ANION GAP SERPL CALC-SCNC: 7 MMOL/L (ref 4–13)
AST SERPL-CCNC: 53 U/L (ref 15–37)
BILIRUB SERPL-MCNC: 0.3 MG/DL (ref 0.2–1.1)
BUN SERPL-MCNC: 13 MG/DL (ref 8–23)
CALCIUM SERPL-MCNC: 8.3 MG/DL (ref 8.3–10.4)
CHLORIDE SERPL-SCNC: 109 MMOL/L (ref 101–110)
CO2 SERPL-SCNC: 26 MMOL/L (ref 21–32)
CREAT SERPL-MCNC: 1.4 MG/DL (ref 0.8–1.5)
EKG ATRIAL RATE: 96 BPM
EKG DIAGNOSIS: NORMAL
EKG P AXIS: 57 DEGREES
EKG P-R INTERVAL: 168 MS
EKG Q-T INTERVAL: 428 MS
EKG QRS DURATION: 110 MS
EKG QTC CALCULATION (BAZETT): 540 MS
EKG R AXIS: 56 DEGREES
EKG T AXIS: 77 DEGREES
EKG VENTRICULAR RATE: 96 BPM
ERYTHROCYTE [DISTWIDTH] IN BLOOD BY AUTOMATED COUNT: 18.8 % (ref 11.9–14.6)
GLOBULIN SER CALC-MCNC: 3.9 G/DL (ref 2.3–3.5)
GLUCOSE SERPL-MCNC: 114 MG/DL (ref 65–100)
HCT VFR BLD AUTO: 36.2 % (ref 41.1–50.3)
HGB BLD-MCNC: 10.6 G/DL (ref 13.6–17.2)
MAGNESIUM SERPL-MCNC: 2.5 MG/DL (ref 1.8–2.4)
MCH RBC QN AUTO: 25.1 PG (ref 26.1–32.9)
MCHC RBC AUTO-ENTMCNC: 29.3 G/DL (ref 31.4–35)
MCV RBC AUTO: 85.6 FL (ref 79.6–97.8)
NRBC # BLD: 0 K/UL (ref 0–0.2)
PLATELET # BLD AUTO: 336 K/UL (ref 150–450)
PMV BLD AUTO: 10.3 FL (ref 9.4–12.3)
POTASSIUM SERPL-SCNC: 4.4 MMOL/L (ref 3.5–5.1)
PROT SERPL-MCNC: 6.7 G/DL (ref 6.3–8.2)
RBC # BLD AUTO: 4.23 M/UL (ref 4.23–5.6)
SODIUM SERPL-SCNC: 142 MMOL/L (ref 136–145)
TROPONIN I SERPL HS-MCNC: 6.3 PG/ML (ref 0–14)
TROPONIN I SERPL HS-MCNC: 9 PG/ML (ref 0–14)
WBC # BLD AUTO: 8.8 K/UL (ref 4.3–11.1)

## 2022-09-10 PROCEDURE — 93005 ELECTROCARDIOGRAM TRACING: CPT | Performed by: EMERGENCY MEDICINE

## 2022-09-10 PROCEDURE — 94761 N-INVAS EAR/PLS OXIMETRY MLT: CPT

## 2022-09-10 PROCEDURE — 71045 X-RAY EXAM CHEST 1 VIEW: CPT

## 2022-09-10 PROCEDURE — 85027 COMPLETE CBC AUTOMATED: CPT

## 2022-09-10 PROCEDURE — 99285 EMERGENCY DEPT VISIT HI MDM: CPT

## 2022-09-10 PROCEDURE — 83735 ASSAY OF MAGNESIUM: CPT

## 2022-09-10 PROCEDURE — 84484 ASSAY OF TROPONIN QUANT: CPT

## 2022-09-10 PROCEDURE — 80053 COMPREHEN METABOLIC PANEL: CPT

## 2022-09-10 ASSESSMENT — ENCOUNTER SYMPTOMS
NAUSEA: 0
VOICE CHANGE: 0
COLOR CHANGE: 0
CHEST TIGHTNESS: 1
VOMITING: 0
BACK PAIN: 0
EYE REDNESS: 0
ABDOMINAL PAIN: 0
SHORTNESS OF BREATH: 0
TROUBLE SWALLOWING: 0

## 2022-09-10 ASSESSMENT — PAIN SCALES - GENERAL: PAINLEVEL_OUTOF10: 0

## 2022-09-10 ASSESSMENT — PAIN DESCRIPTION - FREQUENCY: FREQUENCY: CONTINUOUS

## 2022-09-10 ASSESSMENT — PAIN - FUNCTIONAL ASSESSMENT: PAIN_FUNCTIONAL_ASSESSMENT: 0-10

## 2022-09-10 NOTE — ED TRIAGE NOTES
Pt to ED via EMS because he feels as if he is going in to [facility]. Pt has an extensive cardiac hx and has a hx of going in to [facility]. Pt does have a pacemaker that is currently firing. EMS states the last two times they have transported this pt his ICD went off multiple times. 20 G L Hand    /82 HR 90s paced Pt is on RA.

## 2022-09-10 NOTE — ED PROVIDER NOTES
Emergency Department Provider Note                   PCP:                Jyothi Ramos DO               Age: 58 y.o. Sex: male     No diagnosis found. DISPOSITION          MDM  Number of Diagnoses or Management Options  Diagnosis management comments: Well-appearing here, EKG is consistent with a paced rhythm. We will have device interrogated to rule out any significant dysrhythmias. We will check labs including electrolytes. Obtain chest x-ray as well    4:42 AM  Device interrogated, no events noted tonight. No defibrillations noted. Patient's vital signs remained stable here. Awaiting second troponin. 6:22 AM  Troponin is delayed secondary to lab issues. I discussed this with patient. Plan for discharge if this is stable. Amount and/or Complexity of Data Reviewed  Clinical lab tests: reviewed and ordered  Tests in the radiology section of CPT®: ordered and reviewed    Risk of Complications, Morbidity, and/or Mortality  Presenting problems: moderate  Diagnostic procedures: moderate  Management options: moderate    Patient Progress  Patient progress: stable       Orders Placed This Encounter   Procedures    XR CHEST PORTABLE    CBC    Comprehensive Metabolic Panel    Troponin    Magnesium    Cardiac Monitor    Pulse Oximetry    EKG 12 Lead    Saline lock IV        Medications - No data to display    New Prescriptions    No medications on file        Yifan Ruth is a 58 y.o. male who presents to the Emergency Department with chief complaint of    Chief Complaint   Patient presents with    Pacemaker Problem      Patient presents to the ER with complaints of feeling that his heart was racing fast as well as some weakness. Patient states around 1030 this evening had an episode where he felt that his heart was going fast.  Denies any associated nausea, vomiting or diaphoresis. States he did feel \"somewhat weak\". States most of symptoms have resolved. Denies any chest pain.   He does have a defibrillator but denies any shocks. The history is provided by the patient. Palpitations  Palpitations quality:  Fast  Onset quality:  Gradual  Duration:  1 hour  Progression:  Resolved  Chronicity:  New  Context: not anxiety, not appetite suppressants, not illicit drugs and not nicotine    Associated symptoms: no back pain, no chest pain, no diaphoresis, no dizziness, no malaise/fatigue, no nausea, no numbness, no shortness of breath, no syncope, no vomiting and no weakness    Risk factors: no heart disease        Review of Systems   Constitutional:  Negative for diaphoresis, fever, malaise/fatigue and unexpected weight change. HENT:  Negative for congestion, drooling, trouble swallowing and voice change. Eyes:  Negative for redness and visual disturbance. Respiratory:  Positive for chest tightness. Negative for shortness of breath. Cardiovascular:  Positive for palpitations. Negative for chest pain and syncope. Gastrointestinal:  Negative for abdominal pain, nausea and vomiting. Endocrine: Negative for polydipsia, polyphagia and polyuria. Genitourinary:  Negative for flank pain and urgency. Musculoskeletal:  Negative for back pain, myalgias and neck pain. Skin:  Negative for color change and pallor. Neurological:  Positive for light-headedness. Negative for dizziness, tremors, weakness and numbness. Hematological:  Negative for adenopathy. Does not bruise/bleed easily. Psychiatric/Behavioral:  Negative for behavioral problems and confusion. All other systems reviewed and are negative.     Past Medical History:   Diagnosis Date    Acute hypoxemic respiratory failure due to COVID-19 [facility]) 10/9/2021    CAD (coronary artery disease)     heart attack 2005 stentS HEART    CHF (congestive heart failure) ([facility] [address] 9/27/2011    Chronic systolic heart failure ([facility] [address] 10/2/2015    Coronary atherosclerosis of native coronary vessel 10/2/2015    Hyperlipidemia 10/2/2015    Hypoxemia requiring supplemental oxygen 10/6/2021    IGT (impaired glucose tolerance) 12/3/2017    Other ill-defined conditions(799.89)      blind left eye    Other ill-defined conditions(799.89)     cardiomyopathy    Pneumonia due to COVID-19 virus 2021    Resolved    Primary insomnia 2017    Psychiatric disorder     depression     Sepsis, unspecified 2011    Thromboembolus ([address]     thrombus in heart     Thrombus 10/2/2015        Past Surgical History:   Procedure Laterality Date    CARDIAC CATHETERIZATION      5 stents total    CARDIAC DEFIBRILLATOR PLACEMENT      [address] N/A 2022    LEFT HEART CATH / CORONARY ANGIOGRAPHY performed by Mary Grace Scruggs MD at [address] [facility] CATH LAB    COLONOSCOPY  2010    CORONARY ARTERY BYPASS GRAFT N/A 2022    CORONARY ARTERY BYPASS GRAFT (CABG X 3), LIMA ; ENDOSCOPIC VEIN HARVEST, LEFT GREATER SAPHENOUS VEIN performed by Christi Noble MD at [facility] 2022    CYSTOSCOPY,RIGHT URETEROSCOPY,RIGHT RETROGRADE PYELOGRAM performed by Darian Martinez MD at [address]       oral surgery    PACEMAKER      ND CARDIAC SURG PROCEDURE UNLIST       1 stent     TRANSESOPHAGEAL ECHOCARDIOGRAM N/A 2022    TRANSESOPHAGEAL ECHOCARDIOGRAM performed by Christi Noble MD at [facility] MAIN OR        Family History   Problem Relation Age of Onset    Heart Disease Mother     Diabetes Paternal Grandfather     Cancer Paternal Grandmother     Heart Disease Brother     Diabetes Maternal Grandmother     Bleeding Prob Father     Diabetes Mother     Heart Disease Paternal Grandfather     Heart Attack Mother 67        mi        Social History     Socioeconomic History    Marital status:    Tobacco Use    Smoking status: Former     Packs/day: 1.00     Types: Cigarettes     Start date: 1978     Quit date: 2022     Years since quittin.2    Smokeless tobacco: Never   Vaping Use    Vaping Use: Never used Substance and Sexual Activity    Alcohol use: No    Drug use: No     Types: Prescription    Sexual activity: Not Currently         Penicillins, Atorvastatin, Evolocumab, Lisinopril, and Rosuvastatin     Previous Medications    ACETAMINOPHEN (TYLENOL) 325 MG TABLET    Take 2 tablets by mouth every 6 hours as needed for Pain    AMIODARONE (PACERONE) 400 MG TABLET    Take 1 tablet by mouth in the morning and 1 tablet before bedtime. CETIRIZINE HCL 10 MG CAPS    Take by mouth as needed    HYOSCYAMINE (LEVSIN/SL) 125 MCG SUBLINGUAL TABLET    Place 1 tablet under the tongue every 4 hours as needed for Cramping    MEXILETINE (MEXITIL) 150 MG CAPSULE    Take 1 capsule by mouth in the morning and 1 capsule at noon and 1 capsule before bedtime. MULTIPLE VITAMINS-MINERALS (THERAPEUTIC MULTIVITAMIN-MINERALS) TABLET    Take 1 tablet by mouth daily    QUETIAPINE (SEROQUEL) 100 MG TABLET    TAKE 1 TABLET BY MOUTH EVERY NIGHT    VITAMIN D (CHOLECALCIFEROL) 25 MCG (1000 UT) TABS TABLET    Take 1,000 Units by mouth daily    WARFARIN (COUMADIN) 2.5 MG TABLET    Take 1 tablet by mouth daily        Vitals signs and nursing note reviewed. Patient Vitals for the past 4 hrs:   Pulse Resp BP SpO2   09/10/22 0142 99 18 108/76 100 %          Physical Exam  Vitals and nursing note reviewed. Constitutional:       General: He is not in acute distress. Appearance: Normal appearance. He is not ill-appearing. HENT:      Head: Normocephalic and atraumatic. Right Ear: External ear normal.      Left Ear: External ear normal.      Nose: Nose normal. No congestion or rhinorrhea. Mouth/Throat:      Mouth: Mucous membranes are moist.      Pharynx: No oropharyngeal exudate or posterior oropharyngeal erythema. Eyes:      General:         Right eye: No discharge. Left eye: No discharge. Extraocular Movements: Extraocular movements intact. Pupils: Pupils are equal, round, and reactive to light.    Cardiovascular: Rate and Rhythm: Normal rate. Pulses: Normal pulses. Heart sounds: Normal heart sounds. Pulmonary:      Effort: Pulmonary effort is normal. No respiratory distress. Breath sounds: Normal breath sounds. No stridor. Abdominal:      General: Abdomen is flat. There is no distension. Palpations: Abdomen is soft. There is no mass. Musculoskeletal:         General: No swelling, tenderness, deformity or signs of injury. Normal range of motion. Cervical back: Normal range of motion and neck supple. No rigidity or tenderness. Skin:     General: Skin is warm. Capillary Refill: Capillary refill takes less than 2 seconds. Coloration: Skin is not jaundiced or pale. Findings: No erythema or lesion. Neurological:      General: No focal deficit present. Mental Status: He is alert and oriented to person, place, and time. Cranial Nerves: No cranial nerve deficit. Sensory: No sensory deficit. Motor: No weakness.    Psychiatric:         Mood and Affect: Mood normal.         Behavior: Behavior normal.        Procedures      Results Include:    Recent Results (from the past 24 hour(s))   CBC    Collection Time: 09/10/22  1:50 AM   Result Value Ref Range    WBC 8.8 4.3 - 11.1 K/uL    RBC 4.23 4.23 - 5.6 M/uL    Hemoglobin 10.6 (L) 13.6 - 17.2 g/dL    Hematocrit 36.2 (L) 41.1 - 50.3 %    MCV 85.6 79.6 - 97.8 FL    MCH 25.1 (L) 26.1 - 32.9 PG    MCHC 29.3 (L) 31.4 - 35.0 g/dL    RDW 18.8 (H) 11.9 - 14.6 %    Platelets 208 491 - 632 K/uL    MPV 10.3 9.4 - 12.3 FL    nRBC 0.00 0.0 - 0.2 K/uL   Comprehensive Metabolic Panel    Collection Time: 09/10/22  1:50 AM   Result Value Ref Range    Sodium 142 136 - 145 mmol/L    Potassium 4.4 3.5 - 5.1 mmol/L    Chloride 109 101 - 110 mmol/L    CO2 26 21 - 32 mmol/L    Anion Gap 7 4 - 13 mmol/L    Glucose 114 (H) 65 - 100 mg/dL    BUN 13 8 - 23 MG/DL    Creatinine 1.40 0.8 - 1.5 MG/DL    GFR African American >60 >60 ml/min/1.73m2 GFR Non-African American 55 (L) >60 ml/min/1.73m2    Calcium 8.3 8.3 - 10.4 MG/DL    Total Bilirubin 0.3 0.2 - 1.1 MG/DL    ALT 14 12 - 65 U/L    AST 53 (H) 15 - 37 U/L    Alk Phosphatase 88 50 - 136 U/L    Total Protein 6.7 6.3 - 8.2 g/dL    Albumin 2.8 (L) 3.2 - 4.6 g/dL    Globulin 3.9 (H) 2.3 - 3.5 g/dL    Albumin/Globulin Ratio 0.7 (L) 1.2 - 3.5     Troponin    Collection Time: 09/10/22  1:50 AM   Result Value Ref Range    Troponin, High Sensitivity 6.3 0 - 14 pg/mL   Magnesium    Collection Time: 09/10/22  1:50 AM   Result Value Ref Range    Magnesium 2.5 (H) 1.8 - 2.4 mg/dL          XR CHEST PORTABLE    (Results Pending)                          Voice dictation software was used during the making of this note. This software is not perfect and grammatical and other typographical errors may be present. This note has not been completely proofread for errors.      Judy Mario MD  09/10/22 500 15Regi MONCADA MD  09/10/22 1000 Susannah Pedraza MD  09/10/22 6068

## 2022-09-10 NOTE — ED NOTES
I have reviewed discharge instructions with the patient. The patient verbalized understanding. Patient left ED via Discharge Method: ambulatory to Home with self. Opportunity for questions and clarification provided. Patient given 0 scripts. To continue your aftercare when you leave the hospital, you may receive an automated call from our care team to check in on how you are doing. This is a free service and part of our promise to provide the best care and service to meet your aftercare needs.  If you have questions, or wish to unsubscribe from this service please call [phone]. Thank you for Choosing our [facility] Emergency Department.         Terry Grayson RN  09/10/22 0077

## 2022-09-10 NOTE — DISCHARGE INSTRUCTIONS
Your testing done today shows no signs of any serious or life-threatening illnesses  Continue to monitor your symptoms at home  Follow-up with your cardiologist  Return to the ER for any new, worsening or life-threatening symptoms

## 2022-09-13 ENCOUNTER — HOSPITAL ENCOUNTER (OUTPATIENT)
Dept: CARDIAC REHAB | Age: 62
Setting detail: RECURRING SERIES
Discharge: HOME | End: 2022-09-16
Payer: MEDICARE

## 2022-09-13 VITALS — WEIGHT: 197 LBS | BODY MASS INDEX: 29.09 KG/M2

## 2022-09-13 DIAGNOSIS — I47.20 VENTRICULAR TACHYCARDIA: Primary | ICD-10-CM

## 2022-09-13 DIAGNOSIS — I50.22 CHRONIC SYSTOLIC CONGESTIVE HEART FAILURE (HCC): ICD-10-CM

## 2022-09-13 PROCEDURE — 93798 PHYS/QHP OP CAR RHAB W/ECG: CPT

## 2022-09-13 ASSESSMENT — EXERCISE STRESS TEST
PEAK_BP: 138/60
PEAK_HR: 119
PEAK_METS: 3

## 2022-09-14 ENCOUNTER — HOSPITAL ENCOUNTER (OUTPATIENT)
Dept: PREADMISSION TESTING | Age: 62
Discharge: HOME | DRG: 065 | End: 2022-09-17
Payer: MEDICARE

## 2022-09-14 VITALS
RESPIRATION RATE: 18 BRPM | DIASTOLIC BLOOD PRESSURE: 67 MMHG | OXYGEN SATURATION: 96 % | SYSTOLIC BLOOD PRESSURE: 120 MMHG | TEMPERATURE: 98.2 F | HEART RATE: 73 BPM | HEIGHT: 69 IN | BODY MASS INDEX: 29.7 KG/M2 | WEIGHT: 200.5 LBS

## 2022-09-14 LAB
APPEARANCE UR: ABNORMAL
BACTERIA URNS QL MICRO: ABNORMAL /HPF
BILIRUB UR QL: NEGATIVE
CASTS URNS QL MICRO: ABNORMAL /LPF
COLOR UR: ABNORMAL
CRYSTALS URNS QL MICRO: ABNORMAL /LPF
EPI CELLS #/AREA URNS HPF: ABNORMAL /HPF
GLUCOSE UR STRIP.AUTO-MCNC: NEGATIVE MG/DL
HGB UR QL STRIP: ABNORMAL
KETONES UR QL STRIP.AUTO: ABNORMAL MG/DL
LEUKOCYTE ESTERASE UR QL STRIP.AUTO: ABNORMAL
NITRITE UR QL STRIP.AUTO: NEGATIVE
OTHER OBSERVATIONS: ABNORMAL
PH UR STRIP: 5.5 [PH] (ref 5–9)
PROT UR STRIP-MCNC: 300 MG/DL
RBC #/AREA URNS HPF: >100 /HPF
SP GR UR REFRACTOMETRY: 1.02 (ref 1–1.02)
UROBILINOGEN UR QL STRIP.AUTO: 1 EU/DL (ref 0.2–1)
WBC URNS QL MICRO: ABNORMAL /HPF

## 2022-09-14 PROCEDURE — 81001 URINALYSIS AUTO W/SCOPE: CPT

## 2022-09-14 RX ORDER — NITROGLYCERIN 0.4 MG/1
0.4 TABLET SUBLINGUAL EVERY 5 MIN PRN
COMMUNITY

## 2022-09-14 NOTE — PERIOP NOTE
PLEASE CONTINUE TAKING ALL PRESCRIPTION MEDICATIONS UP TO THE DAY OF SURGERY UNLESS OTHERWISE DIRECTED BELOW. Take ONLY the following medications on the day of surgery:   Amiodorone  Mexiletine  Zyrtec        Bring nitrostat the day of your surgery        Hold the following medications as specified:   Start holding coumadin on 9/17/22   DISCONTINUE all vitamins and supplements 7 days prior to surgery. DISCONTINUE Non-Steriodal Anti-Inflammatory (NSAIDS) such as Advil and Aleve 5 days prior to surgery. Comments       9/21/2022-On the day before surgery please take Tylenol (Acetaminophen) 1000mg in the morning and then again before bed OR you may substitute for Tylenol 650 mg morning, noon, and evening. Please do not bring home medications with you on the day of surgery unless otherwise directed by your nurse. If you are instructed to bring home medications, please give them to your nurse as they will be administered by the nursing staff. If you have any questions, please call [address] [place] [address] [phone] or [facility] [phone]. A copy of this note was provided to the patient for reference.

## 2022-09-14 NOTE — PERIOP NOTE
UA reviewed and routed to surgeon   Latest Reference Range & Units 9/14/22 14:05   Color, UA -   DARK YELLOW   Glucose, UA mg/dL Negative   Bilirubin, Urine NEG   Negative   Ketones, Urine NEG mg/dL TRACE ! Specific Gravity, UA 1.001 - 1.023   1.020   Blood, Urine NEG   LARGE ! Protein, UA NEG mg/dL 300 ! Urobilinogen, Urine 0.2 - 1.0 EU/dL 1.0   Nitrite, Urine NEG   Negative   Leukocyte Esterase, Urine NEG   LARGE !    Appearance -   CLOUDY   pH, Urine 5.0 - 9.0   5.5   !: Data is abnormal

## 2022-09-14 NOTE — PERIOP NOTE
Patient confirms name and . Order to obtain consent  found in EHR and matches case posting. Pt verified    Type 1B surgery,  assessment complete. Labs per surgeon: UA  Labs per anesthesia protocol: PT/INR DOS. Noted recent hgb 10.6 on 9/10/22 (pt hx of anemia). EKG: not indicted  Glucose:NA    Pt has dual chamber BS ICD. Medication list updated today. Pt did not bring medication bottles or updated list today, but provided list of medications from memory to the best of their ability. Pt answered medical and surgical history questions to the best of their ability. Noted coumadin to hold 5 days prior to procedure per Dr Barnes Link. Pt instructed to hold starting 22-pt voiced understanding. Hibiclens/Dynahex antiseptic wash and instructions given per hospital policy. Patient provided with and instructed on educational handouts including Guide to Surgery, Pain Management, Hand Hygiene, Blood Transfusion Education, and [facility] Brochure. Patient answered medical/surgical history questions at their best of ability. All prior to admission medications documented in [facility]. Patient instructed on the following:  Arrive at [facility], time of arrival to be called the day before by 1700  NPO after midnight including gum, mints, and ice chips. Responsible adult must drive patient to the hospital, stay during surgery, and patient will require supervision 24 hours after anesthesia. Use hibiclens in shower the night before surgery and on the morning of surgery. Leave all valuables (money and jewelry) at home but bring insurance card and ID on DOS. Do not wear make-up, nail polish, lotions, cologne, perfumes, powders, or oil on skin. You may be required to pay a deductible or co-pay on the day of your procedure. You can pre-pay by calling [phone] if your surgery is at the [facility] or [phone] if your surgery is at the [facility].   You will received a call from the pre-op nurse by 5 pm on the business day prior to the scheduled procedure. If you have not spoken with a nurse, please check your voicemail. If you have not received an arrival time by 5 pm, please call [phone]. Patient teach back successful and patient demonstrates knowledge of instruction.

## 2022-09-15 ENCOUNTER — APPOINTMENT (OUTPATIENT)
Dept: CT IMAGING | Age: 62
DRG: 065 | End: 2022-09-15
Payer: MEDICARE

## 2022-09-15 ENCOUNTER — APPOINTMENT (OUTPATIENT)
Dept: NON INVASIVE DIAGNOSTICS | Age: 62
DRG: 065 | End: 2022-09-15
Payer: MEDICARE

## 2022-09-15 ENCOUNTER — HOSPITAL ENCOUNTER (INPATIENT)
Age: 62
LOS: 5 days | Discharge: HOME | DRG: 065 | End: 2022-09-20
Attending: EMERGENCY MEDICINE
Payer: MEDICARE

## 2022-09-15 ENCOUNTER — APPOINTMENT (OUTPATIENT)
Dept: GENERAL RADIOLOGY | Age: 62
DRG: 065 | End: 2022-09-15
Payer: MEDICARE

## 2022-09-15 DIAGNOSIS — I63.9 CEREBROVASCULAR ACCIDENT (CVA), UNSPECIFIED MECHANISM (HCC): Primary | ICD-10-CM

## 2022-09-15 PROBLEM — E03.8 SUBCLINICAL HYPOTHYROIDISM: Status: ACTIVE | Noted: 2022-09-15

## 2022-09-15 PROBLEM — H54.8 LEGALLY BLIND: Status: ACTIVE | Noted: 2022-09-15

## 2022-09-15 LAB
ALBUMIN SERPL-MCNC: 3 G/DL (ref 3.2–4.6)
ALBUMIN/GLOB SERPL: 0.8 {RATIO} (ref 1.2–3.5)
ALP SERPL-CCNC: 71 U/L (ref 50–136)
ALT SERPL-CCNC: 14 U/L (ref 12–65)
ANION GAP SERPL CALC-SCNC: 4 MMOL/L (ref 4–13)
APTT PPP: 33.7 SEC (ref 24.1–35.1)
AST SERPL-CCNC: 19 U/L (ref 15–37)
BASOPHILS # BLD: 0.1 K/UL (ref 0–0.2)
BASOPHILS NFR BLD: 1 % (ref 0–2)
BILIRUB SERPL-MCNC: 0.2 MG/DL (ref 0.2–1.1)
BUN SERPL-MCNC: 14 MG/DL (ref 8–23)
CALCIUM SERPL-MCNC: 9 MG/DL (ref 8.3–10.4)
CHLORIDE SERPL-SCNC: 110 MMOL/L (ref 101–110)
CO2 SERPL-SCNC: 27 MMOL/L (ref 21–32)
CREAT SERPL-MCNC: 1.4 MG/DL (ref 0.8–1.5)
DIFFERENTIAL METHOD BLD: ABNORMAL
ECHO AO ASC DIAM: 3.1 CM
ECHO AO ROOT DIAM: 3.4 CM
ECHO AV AREA PEAK VELOCITY: 3.6 CM2
ECHO AV AREA VTI: 3.6 CM2
ECHO AV MEAN GRADIENT: 2 MMHG
ECHO AV MEAN VELOCITY: 0.7 M/S
ECHO AV PEAK GRADIENT: 4 MMHG
ECHO AV PEAK VELOCITY: 1.1 M/S
ECHO AV VELOCITY RATIO: 0.82
ECHO AV VTI: 19.2 CM
ECHO EST RA PRESSURE: 3 MMHG
ECHO IVC PROX: 1.5 CM
ECHO LA AREA 2C: 18.4 CM2
ECHO LA AREA 4C: 16.9 CM2
ECHO LA MAJOR AXIS: 5.6 CM
ECHO LA MINOR AXIS: 5.1 CM
ECHO LA VOL BP: 48 ML (ref 18–58)
ECHO LV E' LATERAL VELOCITY: 10 CM/S
ECHO LV E' SEPTAL VELOCITY: 5 CM/S
ECHO LV EDV A2C: 141 ML
ECHO LV EDV A4C: 154 ML
ECHO LV EJECTION FRACTION A2C: 30 %
ECHO LV EJECTION FRACTION A4C: 31 %
ECHO LV EJECTION FRACTION BIPLANE: 30 % (ref 55–100)
ECHO LV ESV A2C: 99 ML
ECHO LV ESV A4C: 106 ML
ECHO LV FRACTIONAL SHORTENING: 23 % (ref 28–44)
ECHO LV INTERNAL DIMENSION DIASTOLIC: 5.3 CM (ref 4.2–5.9)
ECHO LV INTERNAL DIMENSION SYSTOLIC: 4.1 CM
ECHO LV IVSD: 0.9 CM (ref 0.6–1)
ECHO LV MASS 2D: 174.5 G (ref 88–224)
ECHO LV POSTERIOR WALL DIASTOLIC: 0.9 CM (ref 0.6–1)
ECHO LV RELATIVE WALL THICKNESS RATIO: 0.34
ECHO LVOT AREA: 4.2 CM2
ECHO LVOT AV VTI INDEX: 0.88
ECHO LVOT DIAM: 2.3 CM
ECHO LVOT MEAN GRADIENT: 1 MMHG
ECHO LVOT PEAK GRADIENT: 3 MMHG
ECHO LVOT PEAK VELOCITY: 0.9 M/S
ECHO LVOT SV: 69.8 ML
ECHO LVOT VTI: 16.8 CM
ECHO MV A VELOCITY: 0.9 M/S
ECHO MV E DECELERATION TIME (DT): 168 MS
ECHO MV E VELOCITY: 0.45 M/S
ECHO MV E/A RATIO: 0.5
ECHO MV E/E' LATERAL: 4.5
ECHO MV E/E' RATIO (AVERAGED): 6.75
ECHO MV E/E' SEPTAL: 9
ECHO RIGHT VENTRICULAR SYSTOLIC PRESSURE (RVSP): 25 MMHG
ECHO RV TAPSE: 1.6 CM (ref 1.7–?)
ECHO TV REGURGITANT MAX VELOCITY: 2.35 M/S
ECHO TV REGURGITANT PEAK GRADIENT: 22 MMHG
EOSINOPHIL # BLD: 0.3 K/UL (ref 0–0.8)
EOSINOPHIL NFR BLD: 3 % (ref 0.5–7.8)
ERYTHROCYTE [DISTWIDTH] IN BLOOD BY AUTOMATED COUNT: 18.8 % (ref 11.9–14.6)
GLOBULIN SER CALC-MCNC: 3.6 G/DL (ref 2.3–3.5)
GLUCOSE BLD STRIP.AUTO-MCNC: 123 MG/DL (ref 65–100)
GLUCOSE SERPL-MCNC: 127 MG/DL (ref 65–100)
HCT VFR BLD AUTO: 39.6 % (ref 41.1–50.3)
HGB BLD-MCNC: 11.4 G/DL (ref 13.6–17.2)
IMM GRANULOCYTES # BLD AUTO: 0 K/UL (ref 0–0.5)
IMM GRANULOCYTES NFR BLD AUTO: 0 % (ref 0–5)
INR PPP: 1.3
LYMPHOCYTES # BLD: 1.7 K/UL (ref 0.5–4.6)
LYMPHOCYTES NFR BLD: 17 % (ref 13–44)
MAGNESIUM SERPL-MCNC: 2.4 MG/DL (ref 1.8–2.4)
MCH RBC QN AUTO: 25 PG (ref 26.1–32.9)
MCHC RBC AUTO-ENTMCNC: 28.8 G/DL (ref 31.4–35)
MCV RBC AUTO: 86.8 FL (ref 79.6–97.8)
MONOCYTES # BLD: 0.7 K/UL (ref 0.1–1.3)
MONOCYTES NFR BLD: 7 % (ref 4–12)
NEUTS SEG # BLD: 7 K/UL (ref 1.7–8.2)
NEUTS SEG NFR BLD: 71 % (ref 43–78)
NRBC # BLD: 0 K/UL (ref 0–0.2)
PHOSPHATE SERPL-MCNC: 2.6 MG/DL (ref 2.3–3.7)
PLATELET # BLD AUTO: 354 K/UL (ref 150–450)
PMV BLD AUTO: 9.6 FL (ref 9.4–12.3)
POTASSIUM SERPL-SCNC: 4 MMOL/L (ref 3.5–5.1)
PROT SERPL-MCNC: 6.6 G/DL (ref 6.3–8.2)
PROTHROMBIN TIME: 16.8 SEC (ref 12.6–14.5)
RBC # BLD AUTO: 4.56 M/UL (ref 4.23–5.6)
SERVICE CMNT-IMP: ABNORMAL
SODIUM SERPL-SCNC: 141 MMOL/L (ref 138–145)
TROPONIN I SERPL HS-MCNC: 5.9 PG/ML (ref 0–14)
TROPONIN I SERPL HS-MCNC: 7.1 PG/ML (ref 0–14)
WBC # BLD AUTO: 9.8 K/UL (ref 4.3–11.1)

## 2022-09-15 PROCEDURE — 85610 PROTHROMBIN TIME: CPT

## 2022-09-15 PROCEDURE — 36415 COLL VENOUS BLD VENIPUNCTURE: CPT

## 2022-09-15 PROCEDURE — 2580000003 HC RX 258: Performed by: EMERGENCY MEDICINE

## 2022-09-15 PROCEDURE — 6360000004 HC RX CONTRAST MEDICATION: Performed by: EMERGENCY MEDICINE

## 2022-09-15 PROCEDURE — 6370000000 HC RX 637 (ALT 250 FOR IP): Performed by: FAMILY MEDICINE

## 2022-09-15 PROCEDURE — 84484 ASSAY OF TROPONIN QUANT: CPT

## 2022-09-15 PROCEDURE — 85025 COMPLETE CBC W/AUTO DIFF WBC: CPT

## 2022-09-15 PROCEDURE — 99285 EMERGENCY DEPT VISIT HI MDM: CPT

## 2022-09-15 PROCEDURE — 80053 COMPREHEN METABOLIC PANEL: CPT

## 2022-09-15 PROCEDURE — 85730 THROMBOPLASTIN TIME PARTIAL: CPT

## 2022-09-15 PROCEDURE — 6360000002 HC RX W HCPCS: Performed by: FAMILY MEDICINE

## 2022-09-15 PROCEDURE — 83735 ASSAY OF MAGNESIUM: CPT

## 2022-09-15 PROCEDURE — 93306 TTE W/DOPPLER COMPLETE: CPT | Performed by: INTERNAL MEDICINE

## 2022-09-15 PROCEDURE — C8929 TTE W OR WO FOL WCON,DOPPLER: HCPCS

## 2022-09-15 PROCEDURE — 70496 CT ANGIOGRAPHY HEAD: CPT

## 2022-09-15 PROCEDURE — 85520 HEPARIN ASSAY: CPT

## 2022-09-15 PROCEDURE — 4A03X5D MEASUREMENT OF ARTERIAL FLOW, INTRACRANIAL, EXTERNAL APPROACH: ICD-10-PCS | Performed by: INTERNAL MEDICINE

## 2022-09-15 PROCEDURE — 70450 CT HEAD/BRAIN W/O DYE: CPT

## 2022-09-15 PROCEDURE — 2580000003 HC RX 258: Performed by: FAMILY MEDICINE

## 2022-09-15 PROCEDURE — 6360000004 HC RX CONTRAST MEDICATION: Performed by: FAMILY MEDICINE

## 2022-09-15 PROCEDURE — 1100000000 HC RM PRIVATE

## 2022-09-15 PROCEDURE — 71045 X-RAY EXAM CHEST 1 VIEW: CPT

## 2022-09-15 PROCEDURE — 99222 1ST HOSP IP/OBS MODERATE 55: CPT | Performed by: PSYCHIATRY & NEUROLOGY

## 2022-09-15 PROCEDURE — 84100 ASSAY OF PHOSPHORUS: CPT

## 2022-09-15 PROCEDURE — 94760 N-INVAS EAR/PLS OXIMETRY 1: CPT

## 2022-09-15 PROCEDURE — 82962 GLUCOSE BLOOD TEST: CPT

## 2022-09-15 RX ORDER — LABETALOL HYDROCHLORIDE 5 MG/ML
10 INJECTION, SOLUTION INTRAVENOUS EVERY 10 MIN PRN
Status: DISCONTINUED | OUTPATIENT
Start: 2022-09-15 | End: 2022-09-20 | Stop reason: HOSPADM

## 2022-09-15 RX ORDER — ASPIRIN 300 MG/1
300 SUPPOSITORY RECTAL DAILY
Status: DISCONTINUED | OUTPATIENT
Start: 2022-09-15 | End: 2022-09-20 | Stop reason: HOSPADM

## 2022-09-15 RX ORDER — 0.9 % SODIUM CHLORIDE 0.9 %
100 INTRAVENOUS SOLUTION INTRAVENOUS
Status: COMPLETED | OUTPATIENT
Start: 2022-09-15 | End: 2022-09-15

## 2022-09-15 RX ORDER — ASPIRIN 81 MG/1
81 TABLET ORAL DAILY
Status: DISCONTINUED | OUTPATIENT
Start: 2022-09-15 | End: 2022-09-20 | Stop reason: HOSPADM

## 2022-09-15 RX ORDER — ONDANSETRON 2 MG/ML
4 INJECTION INTRAMUSCULAR; INTRAVENOUS EVERY 6 HOURS PRN
Status: DISCONTINUED | OUTPATIENT
Start: 2022-09-15 | End: 2022-09-20 | Stop reason: HOSPADM

## 2022-09-15 RX ORDER — HEPARIN SODIUM 10000 [USP'U]/100ML
5-30 INJECTION, SOLUTION INTRAVENOUS CONTINUOUS
Status: DISCONTINUED | OUTPATIENT
Start: 2022-09-15 | End: 2022-09-20

## 2022-09-15 RX ORDER — ONDANSETRON 4 MG/1
4 TABLET, ORALLY DISINTEGRATING ORAL EVERY 8 HOURS PRN
Status: DISCONTINUED | OUTPATIENT
Start: 2022-09-15 | End: 2022-09-20 | Stop reason: HOSPADM

## 2022-09-15 RX ORDER — SODIUM CHLORIDE 0.9 % (FLUSH) 0.9 %
10 SYRINGE (ML) INJECTION
Status: COMPLETED | OUTPATIENT
Start: 2022-09-15 | End: 2022-09-15

## 2022-09-15 RX ADMIN — SODIUM CHLORIDE 100 ML: 9 INJECTION, SOLUTION INTRAVENOUS at 12:33

## 2022-09-15 RX ADMIN — IOPAMIDOL 50 ML: 755 INJECTION, SOLUTION INTRAVENOUS at 12:32

## 2022-09-15 RX ADMIN — CEFTRIAXONE 1000 MG: 1 INJECTION, POWDER, FOR SOLUTION INTRAMUSCULAR; INTRAVENOUS at 16:11

## 2022-09-15 RX ADMIN — ASPIRIN 81 MG: 81 TABLET ORAL at 14:10

## 2022-09-15 RX ADMIN — PERFLUTREN 0.45 ML: 6.52 INJECTION, SUSPENSION INTRAVENOUS at 14:21

## 2022-09-15 RX ADMIN — HEPARIN SODIUM 10 UNITS/KG/HR: 10000 INJECTION, SOLUTION INTRAVENOUS at 15:51

## 2022-09-15 RX ADMIN — SODIUM CHLORIDE, PRESERVATIVE FREE 10 ML: 5 INJECTION INTRAVENOUS at 12:33

## 2022-09-15 ASSESSMENT — PAIN - FUNCTIONAL ASSESSMENT: PAIN_FUNCTIONAL_ASSESSMENT: NONE - DENIES PAIN

## 2022-09-15 ASSESSMENT — PAIN SCALES - GENERAL: PAINLEVEL_OUTOF10: 0

## 2022-09-15 NOTE — ED NOTES
TRANSFER - OUT REPORT:    Verbal report given to Mango MARIE on Benji Connolly  being transferred to [phone] for routine progression of patient care       Report consisted of patient's Situation, Background, Assessment and   Recommendations(SBAR). Information from the following report(s) Nurse Handoff Report, ED Encounter Summary, ED SBAR, [facility], and Recent Results was reviewed with the receiving nurse. Lines:   Peripheral IV 09/15/22 Left Antecubital (Active)        Opportunity for questions and clarification was provided.       Patient transported with:  Rom Croft RN  09/15/22 0758

## 2022-09-15 NOTE — ED PROVIDER NOTES
Emergency Department Provider Note                   PCP:                Teresa Deras DO               Age: 58 y.o. Sex: male       ICD-10-CM    1. Cerebrovascular accident (CVA), unspecified mechanism ([facility] [address]  I63.9           DISPOSITION Decision To Admit 09/15/2022 11:22:31 AM       MDM  Number of Diagnoses or Management Options  Cerebrovascular accident (CVA), unspecified mechanism ([address]): new, needed workup  Diagnosis management comments: 61-year-old male patient with stroke symptoms x2 days  Fairly profound receptive aphasia  His speech is clear but he is not oriented and is not able to follow basic commands    We will consult neurology  And we will start a stroke work-up with greater than 24 hours of symptoms. He is not a candidate for tPA or conventional neurology given the length of his symptoms. 11:23 AM  Reviewed findings with neurology  Recommend admission and maximizing anticoagulation  Recommend routine stroke work-up       Amount and/or Complexity of Data Reviewed  Clinical lab tests: ordered and reviewed  Tests in the radiology section of CPT®: ordered and reviewed  Tests in the medicine section of CPT®: ordered and reviewed  Decide to obtain previous medical records or to obtain history from someone other than the patient: yes  Review and summarize past medical records: yes  Discuss the patient with other providers: yes  Independent visualization of images, tracings, or specimens: yes    Risk of Complications, Morbidity, and/or Mortality  Presenting problems: high  Diagnostic procedures: high  Management options: high  General comments: Elements of this note have been dictated via voice recognition software. Text and phrases may be limited by the accuracy of the software. The chart has been reviewed, but errors may still be present.         Critical Care  Total time providing critical care: 30-74 minutes    Patient Progress  Patient progress: stable       Orders Placed This Encounter Procedures    Critical Care    XR CHEST 1 VIEW    CT HEAD WO CONTRAST    CBC with Auto Differential    Comprehensive Metabolic Panel    Protime-INR    Magnesium    Phosphorus    Troponin    Diet NPO    Cardiac Monitor - ED Only    NIHSS    Nursing swallow assessment    Inpatient consult to Neurology    Pulse oximetry, continuous    POCT Glucose    POCT Glucose    EKG 12 Lead        Medications - No data to display    New Prescriptions    No medications on file        Marah Cintron is a 58 y.o. male who presents to the Emergency Department with chief complaint of    Chief Complaint   Patient presents with    Altered Mental Status      35-year-old male patient presents via EMS  Concern for possible stroke. Patient's daughter noted unusual text messages. Apparently the patient was stating and texting things that were out of character for him and his daughter became concerned  EMS reports upon arrival that the patient was awake but was unable to follow even simple commands or answer questions  Patient was last seen normal 2 days ago by a neighbor who he does spend a significant amount of time with  He had no direct contact with anyone yesterday and then with the recurring unusual behavior EMS was summoned  Patient's blood sugar was normal on scene  Patient is chronically anticoagulated on Coumadin secondary to an intracardiac thrombus    The history is provided by the EMS personnel. The history is limited by the condition of the patient. Altered Mental Status  Presenting symptoms: behavior changes and confusion    Severity:  Severe  Episode history:  Single  Duration:  2 days  Timing:  Constant  Progression:  Unchanged  Chronicity:  New  Context: not alcohol use, not dementia, not head injury and not homeless    Associated symptoms: no fever and no slurred speech      All other systems reviewed and are negative unless otherwise stated in the history of present illness section.     Review of Systems   Unable to perform ROS: Mental status change   Constitutional:  Negative for fever. Psychiatric/Behavioral:  Positive for confusion.       Past Medical History:   Diagnosis Date    Acute hypoxemic respiratory failure due to COVID-19 ([address] 10/09/2021    Anemia     BPH (benign prostatic hyperplasia) 2019    CAD (coronary artery disease)     heart attack 2005 stentS HEART    CHF (congestive heart failure) ([address] 09/27/2011    pt denies    Chronic systolic heart failure ([address] 10/02/2015    Coronary atherosclerosis of native coronary vessel 10/02/2015    H/O transesophageal echocardiography (VALERY) for monitoring 06/18/2022    LVEF 25-30%    History of blood transfusion     Hyperlipidemia 10/02/2015    Hypoxemia requiring supplemental oxygen 10/06/2021    ICD (implantable cardioverter-defibrillator) in place 09/26/2011    dual chamber [employer] Last discharge 6/17/22    IGT (impaired glucose tolerance) 12/03/2017    Other ill-defined conditions(799.89)      blind left eye    Other ill-defined conditions(799.89)     cardiomyopathy    Pneumonia due to COVID-19 virus 11/13/2021    Resolved    Primary insomnia 06/07/2017    Psychiatric disorder     depression     Sepsis, unspecified 04/05/2011    Thromboembolus ([address]     thrombus in heart     Thrombus 10/02/2015        Past Surgical History:   Procedure Laterality Date    CARDIAC CATHETERIZATION      5 stents total    CARDIAC DEFIBRILLATOR PLACEMENT  2011    [employer], ICD    CARDIAC PROCEDURE N/A 05/27/2022    LEFT HEART CATH / CORONARY ANGIOGRAPHY performed by Sara Lorenzo MD at [address] [place] [address] CATH LAB    COLONOSCOPY  12/2010    CORONARY ARTERY BYPASS GRAFT N/A 05/31/2022    CORONARY ARTERY BYPASS GRAFT (CABG X 3), LIMA ; ENDOSCOPIC VEIN HARVEST, LEFT GREATER SAPHENOUS VEIN performed by Lucia Virgen MD at [facility] Right 07/29/2022    CYSTOSCOPY,RIGHT URETEROSCOPY,RIGHT RETROGRADE PYELOGRAM performed by Kathe Peguero MD at [address] [place] [address] surgery    PACEMAKER      DE CARDIAC SURG PROCEDURE UNLIST       1 stent 2005    TRANSESOPHAGEAL ECHOCARDIOGRAM N/A 2022    TRANSESOPHAGEAL ECHOCARDIOGRAM performed by Joel Deluca MD at [facility] MAIN OR        Family History   Problem Relation Age of Onset    Heart Disease Mother     Diabetes Paternal Grandfather     Cancer Paternal Grandmother     Heart Disease Brother     Diabetes Maternal Grandmother     Bleeding Prob Father     Diabetes Mother     Heart Disease Paternal Grandfather     Heart Attack Mother 67        mi        Social History     Socioeconomic History    Marital status:    Tobacco Use    Smoking status: Former     Packs/day: 1.00     Types: Cigarettes     Start date: 1978     Quit date: 2022     Years since quittin.3    Smokeless tobacco: Never   Vaping Use    Vaping Use: Never used   Substance and Sexual Activity    Alcohol use: No    Drug use: Never     Types: Prescription    Sexual activity: Not Currently        Allergies: Penicillins, Atorvastatin, Evolocumab, and Rosuvastatin    Previous Medications    ACETAMINOPHEN (TYLENOL) 325 MG TABLET    Take 2 tablets by mouth every 6 hours as needed for Pain    AMIODARONE (PACERONE) 400 MG TABLET    Take 1 tablet by mouth in the morning and 1 tablet before bedtime. CETIRIZINE HCL 10 MG CAPS    Take by mouth as needed    HYOSCYAMINE (LEVSIN/SL) 125 MCG SUBLINGUAL TABLET    Place 1 tablet under the tongue every 4 hours as needed for Cramping    MEXILETINE (MEXITIL) 150 MG CAPSULE    Take 1 capsule by mouth in the morning and 1 capsule at noon and 1 capsule before bedtime. MULTIPLE VITAMINS-MINERALS (THERAPEUTIC MULTIVITAMIN-MINERALS) TABLET    Take 1 tablet by mouth daily    NITROGLYCERIN (NITROSTAT) 0.4 MG SL TABLET    Place 0.4 mg under the tongue every 5 minutes as needed for Chest pain up to max of 3 total doses. If no relief after 1 dose, call 911.     QUETIAPINE (SEROQUEL) 100 MG TABLET    TAKE 1 TABLET BY MOUTH EVERY Mental Status: He is alert and oriented to person, place, and time. GCS: GCS eye subscore is 4. GCS verbal subscore is 3. GCS motor subscore is 5. Psychiatric:         Attention and Perception: He is inattentive. Mood and Affect: Affect is blunt and flat. Speech: He is noncommunicative. Behavior: Behavior is cooperative.       Comments: Patient is noted to have normal strength in all 4 extremities  No obvious facial droop    Unable to answer questions related to sensory deficit    Patient seems to have a fairly profound receptive aphasia he will not follow simple commands or answer basic questions  I asked him specifically if he could tell me his first name, and he could not        Critical Care    Date/Time: 9/15/2022 11:25 AM  Performed by: Edith Figueredo MD  Authorized by: Edith Figueredo MD     Critical care provider statement:     Critical care time (minutes):  40    Critical care time was exclusive of:  Separately billable procedures and treating other patients    Critical care was necessary to treat or prevent imminent or life-threatening deterioration of the following conditions:  CNS failure or compromise    Critical care was time spent personally by me on the following activities:  Blood draw for specimens, discussions with consultants, examination of patient, ordering and review of laboratory studies, ordering and review of radiographic studies, pulse oximetry, re-evaluation of patient's condition and review of old charts    I assumed direction of critical care for this patient from another provider in my specialty: no      Care discussed with: admitting provider    EKG 12 Lead    Date/Time: 9/15/2022 11:38 AM  Performed by: Edith Figueredo MD  Authorized by: Edith Figueredo MD     ECG reviewed by ED Physician in the absence of a cardiologist: yes    Interpretation:     Interpretation: normal    Rate:     ECG rate assessment: normal    Rhythm:     Rhythm: sinus rhythm Ectopy:     Ectopy: none    QRS:     QRS axis:  Normal    QRS intervals:  Normal    QRS conduction: normal    ST segments:     ST segments:  Normal  T waves:     T waves: normal    Comments:      No acute ischemic changes    ED EKG Interpretation  EKG was interpreted in the absence of a cardiologist.      Results for orders placed or performed during the hospital encounter of 09/15/22   XR CHEST 1 VIEW    Narrative    CHEST X-RAY, single portable view  9/15/2022    History: Chest pain. Technique: Single frontal view of the chest.    Comparison: Chest x-ray 1922    Findings: There is a stable left-sided intracardiac device. Worsening aeration is seen of  the lungs. The heart is mildly enlarged although grossly unchanged when the  worsening aeration in AP technique are considered. There is no pneumothorax. No  evolving consolidation are demonstrated pleural effusion is seen. Impression    1. Stable mild cardiomegaly. No significant evolving acute changes are seen. Only worsening aeration is seen of the lungs which may be related to the  patient's respiratory effort. This report was made using voice transcription. Despite my best efforts to avoid  any, transcription errors may persist. If there is any question about the  accuracy of the report or need for clarification, then please call [phone], or text me through [email or web address] for clarification or correction. CT HEAD WO CONTRAST    Narrative    EXAM: CT HEAD WITHOUT CONTRAST    INDICATION: Stroke symptoms greater than 24 hours. COMPARISON: None. TECHNIQUE: Contiguous axial images were obtained from the skull base through the  vertex without IV contrast. Radiation dose reduction techniques were used for  this study. Our CT scanners use one or all of the following: Automated exposure  control, adjustment of the mA and/or kV according to patient size, iterative  reconstruction.     FINDINGS:   Hypoattenuation in the left frontal and temporal lobes with loss of gray-white  matter differentiation. No intracranial hemorrhage present. No hydrocephalus or  midline shift. No extra-axial mass or hemorrhage. The basal cisterns are patent. The visualized portions of the orbits are normal. The mastoid air cells and  paranasal sinuses are patent. The visualized vascular structures have an unremarkable noncontrast appearance. The calvarium and soft tissues appear normal.      Impression    Findings in the left frontal and temporal lobes concerning for acute MCA  territory infarction. Although not highly suspected, a mass is difficult to  exclude entirely.  Recommend brain MRI without and with IV contrast.     CBC with Auto Differential   Result Value Ref Range    WBC 9.8 4.3 - 11.1 K/uL    RBC 4.56 4.23 - 5.6 M/uL    Hemoglobin 11.4 (L) 13.6 - 17.2 g/dL    Hematocrit 39.6 (L) 41.1 - 50.3 %    MCV 86.8 79.6 - 97.8 FL    MCH 25.0 (L) 26.1 - 32.9 PG    MCHC 28.8 (L) 31.4 - 35.0 g/dL    RDW 18.8 (H) 11.9 - 14.6 %    Platelets 193 773 - 100 K/uL    MPV 9.6 9.4 - 12.3 FL    nRBC 0.00 0.0 - 0.2 K/uL    Differential Type AUTOMATED      Seg Neutrophils 71 43 - 78 %    Lymphocytes 17 13 - 44 %    Monocytes 7 4.0 - 12.0 %    Eosinophils % 3 0.5 - 7.8 %    Basophils 1 0.0 - 2.0 %    Immature Granulocytes 0 0.0 - 5.0 %    Segs Absolute 7.0 1.7 - 8.2 K/UL    Absolute Lymph # 1.7 0.5 - 4.6 K/UL    Absolute Mono # 0.7 0.1 - 1.3 K/UL    Absolute Eos # 0.3 0.0 - 0.8 K/UL    Basophils Absolute 0.1 0.0 - 0.2 K/UL    Absolute Immature Granulocyte 0.0 0.0 - 0.5 K/UL   Comprehensive Metabolic Panel   Result Value Ref Range    Sodium 141 138 - 145 mmol/L    Potassium 4.0 3.5 - 5.1 mmol/L    Chloride 110 101 - 110 mmol/L    CO2 27 21 - 32 mmol/L    Anion Gap 4 4 - 13 mmol/L    Glucose 127 (H) 65 - 100 mg/dL    BUN 14 8 - 23 MG/DL    Creatinine 1.40 0.8 - 1.5 MG/DL    GFR African American >60 >60 ml/min/1.73m2    GFR Non- 55 (L) >60 ml/min/1.73m2 Calcium 9.0 8.3 - 10.4 MG/DL    Total Bilirubin 0.2 0.2 - 1.1 MG/DL    ALT 14 12 - 65 U/L    AST 19 15 - 37 U/L    Alk Phosphatase 71 50 - 136 U/L    Total Protein 6.6 6.3 - 8.2 g/dL    Albumin 3.0 (L) 3.2 - 4.6 g/dL    Globulin 3.6 (H) 2.3 - 3.5 g/dL    Albumin/Globulin Ratio 0.8 (L) 1.2 - 3.5     Magnesium   Result Value Ref Range    Magnesium 2.4 1.8 - 2.4 mg/dL   Phosphorus   Result Value Ref Range    Phosphorus 2.6 2.3 - 3.7 MG/DL   POCT Glucose   Result Value Ref Range    POC Glucose 123 (H) 65 - 100 mg/dL    Performed by: Christina         CT HEAD WO CONTRAST   Final Result   Findings in the left frontal and temporal lobes concerning for acute MCA   territory infarction. Although not highly suspected, a mass is difficult to   exclude entirely. Recommend brain MRI without and with IV contrast.         XR CHEST 1 VIEW   Final Result   1. Stable mild cardiomegaly. No significant evolving acute changes are seen. Only worsening aeration is seen of the lungs which may be related to the   patient's respiratory effort. This report was made using voice transcription. Despite my best efforts to avoid   any, transcription errors may persist. If there is any question about the   accuracy of the report or need for clarification, then please call [phone], or text me through [email or web address] for clarification or correction. NIH Stroke Scale  Interval: Baseline  Level of Consciousness (1a): Alert  LOC Questions (1b):  Answers neither question correctly  LOC Commands (1c): Performs one task correctly  Best Gaze (2): (!) Forced deviation  Visual (3): (!) Complete hemianopia  Facial Palsy (4): Normal symmetrical movement  Motor Arm, Left (5a): Drift, but does not hit bed  Motor Arm, Right (5b): Drift, but does not hit bed  Motor Leg, Left (6a): Drift, but does not hit bed  Motor Leg, Right (6b): Drift, but does not hit bed  Limb Ataxia (7): Absent  Sensory (8): Normal  Best Language (9): Severe aphasia  Dysarthria (10): Severe, unintelligible slurring or mute                   Voice dictation software was used during the making of this note. This software is not perfect and grammatical and other typographical errors may be present. This note has not been completely proofread for errors.      Rebecca Bowden MD  09/15/22 Ramon Mejia MD  09/15/22 [id number]

## 2022-09-15 NOTE — H&P
Hospitalist History and Physical   Admit Date:  9/15/2022  8:56 AM   Name:  Luis F Hawley   Age:  58 y.o. Sex:  male  :  1960   MRN:  [id number]   Room:  ER30/30    Presenting Complaint: Altered Mental Status     Reason(s) for Admission: Acute cerebrovascular accident (CVA) ([facility] [address] [I63.9]     History of Present Illness:   Luis F Hawley is a 58 y.o. male with medical history of coronary artery disease with history of CABG in May 2022, history of intracardiac thrombus, history of V. tach status post ICD, chronic systolic congestive heart failure, legally blind, statin intolerance and on disability. Patient lives alone, can take care of his day-to-day activity at baseline. As per triage note patient's interview text last night, EMS this morning noticed that patient was not answering any questions appropriately. As per triage note last known well was 2 days ago reported by neighbor. Time of examination, patient's brother and father was at bedside. As per patient's prior, patient was completely normal until yesterday afternoon when they dropped him back at his home after an appointment. Patient presently has expressive aphasia with neologism, cannot give a good review of systems secondary to expressive aphasia. He is able to respond to verbal commands. When wrote on a paper, if he had any headache or dizziness any nausea vomiting abdominal pain or chest pain or shortness of breath-indicates as no. CT head-  Findings in the left frontal and temporal lobes concerning for acute MCA   territory infarction. Although not highly suspected, a mass is difficult to   exclude entirely. Recommend brain MRI without and with IV contrast.   Patient was seen by Dr. Ben Osorio, felt embolic, okay to start heparin drip for subtherapeutic INR. INR 1.3. Patient be admitted for acute CVA. Review of Systems:  10 systems reviewed and negative except as noted in HPI.   Assessment & Plan: -New CVA  Probable embolic  Expressive aphasia with neologism  Neurology evaluated patient in the emergency room  CTA head and neck ordered-later on today showed-  1. Occlusion of the distal left M1 segment of the left MCA, consistent with   acute ischemia. Associated low-density in the left MCA territory. 2.  Moderate stenosis at the origin of the right vertebral artery. As per neurology no intervention required. MRI brain with and without contrast ordered  N.p.o., speech evaluation  PT OT consult  Echocardiogram    -History of intracardiac thrombus-last echo no thrombus  Patient on Coumadin-Subtherapeutic INR  Discussed with neurology, okay to continue IV heparin.    -Looking at previous note, mild elevated TSH 9.55 on 9/7/2022 with a normal T4. Subclinical hypothyroidism but with cardiac history, probably will benefit from starting on a low-dose of Synthroid when cleared by speech. Could this be from amiodarone, need to discuss with cardiology before starting on levothyroxine.      -Coronary disease with history of carotid bypass surgery  -History of V. tach with s/p ICD-on amiodarone and mexiletine-can start medication once cleared by speech  -Chronic systolic congestive heart failure-EF 25 to 30%  -Statin intolerance  -Legally blind-shades left eye, mild vision intact rt eye.    -Recent hematuria with s/p ureteral stent placement, as per brother no bleeding since past 3 weeks, was supposed to have his ureteral stent removed today. We will consult urology tomorrow.     Advanced life care discussed with patient brother at bedside-patient is full code  DVT prophylaxis-patient on heparin drip        Hospital Problems:  Principal Problem:    Acute cerebrovascular accident (CVA) [facility])  Active Problems:    Chronic systolic congestive heart failure ([address]    ICD (implantable cardioverter-defibrillator) in place    S/P CABG x 3    Legally blind    Coronary artery disease involving autologous vein coronary bypass graft without angina pectoris    Statin intolerance    History of hydronephrosis  Resolved Problems:    * No resolved hospital problems.  *       Past History:     Past Medical History:   Diagnosis Date    Acute hypoxemic respiratory failure due to COVID-19 ([address] 10/09/2021    Anemia     BPH (benign prostatic hyperplasia) 2019    CAD (coronary artery disease)     heart attack 2005 stentS HEART    CHF (congestive heart failure) ([address] 09/27/2011    pt denies    Chronic systolic heart failure ([address] 10/02/2015    Coronary atherosclerosis of native coronary vessel 10/02/2015    H/O transesophageal echocardiography (VALERY) for monitoring 06/18/2022    LVEF 25-30%    History of blood transfusion     Hyperlipidemia 10/02/2015    Hypoxemia requiring supplemental oxygen 10/06/2021    ICD (implantable cardioverter-defibrillator) in place 09/26/2011    dual chamber [employer] Last discharge 6/17/22    IGT (impaired glucose tolerance) 12/03/2017    Other ill-defined conditions(799.89)      blind left eye    Other ill-defined conditions(799.89)     cardiomyopathy    Pneumonia due to COVID-19 virus 11/13/2021    Resolved    Primary insomnia 06/07/2017    Psychiatric disorder     depression     Sepsis, unspecified 04/05/2011    Thromboembolus ([facility] [address]     thrombus in heart     Thrombus 10/02/2015       Past Surgical History:   Procedure Laterality Date    CARDIAC CATHETERIZATION      5 stents total    CARDIAC DEFIBRILLATOR PLACEMENT  2011    [employer], ICD    CARDIAC PROCEDURE N/A 05/27/2022    LEFT HEART CATH / CORONARY ANGIOGRAPHY performed by Nikos Meza MD at [address] [place] [address] CATH LAB    COLONOSCOPY  12/2010    CORONARY ARTERY BYPASS GRAFT N/A 05/31/2022    CORONARY ARTERY BYPASS GRAFT (CABG X 3), LIMA ; ENDOSCOPIC VEIN HARVEST, LEFT GREATER SAPHENOUS VEIN performed by Robbie Manning MD at [facility] Right 07/29/2022    CYSTOSCOPY,RIGHT URETEROSCOPY,RIGHT RETROGRADE PYELOGRAM performed by Veronica Jauregui Aquilino Berry MD at [facility] MAIN OR    HEENT      oral surgery    PACEMAKER      AL CARDIAC SURG PROCEDURE UNLIST       1 stent     TRANSESOPHAGEAL ECHOCARDIOGRAM N/A 2022    TRANSESOPHAGEAL ECHOCARDIOGRAM performed by Dana Hutchinson MD at [facility] MAIN OR        Social History     Tobacco Use    Smoking status: Former     Packs/day: 1.00     Types: Cigarettes     Start date: 1978     Quit date: 2022     Years since quittin.3    Smokeless tobacco: Never   Substance Use Topics    Alcohol use: No      Social History     Substance and Sexual Activity   Drug Use Never    Types: Prescription       Family History   Problem Relation Age of Onset    Heart Disease Mother     Diabetes Paternal Grandfather     Cancer Paternal Grandmother     Heart Disease Brother     Diabetes Maternal Grandmother     Bleeding Prob Father     Diabetes Mother     Heart Disease Paternal Grandfather     Heart Attack Mother 67        mi        Immunization History   Administered Date(s) Administered    PPD Test 2022    Pneumococcal Polysaccharide ([id number]) 2018    Tdap (Boostrix, Adacel) 2017     Allergies   Allergen Reactions    Penicillins Swelling     Face swelling    Atorvastatin Other (See Comments)     itching    Evolocumab Other (See Comments)     Itching    Rosuvastatin Other (See Comments)     itching     Prior to Admit Medications:  Current Outpatient Medications   Medication Instructions    acetaminophen (TYLENOL) 650 mg, Oral, EVERY 6 HOURS PRN    amiodarone (PACERONE) 400 mg, Oral, 2 TIMES DAILY    Cetirizine HCl 10 MG CAPS Oral, PRN    hyoscyamine (LEVSIN/SL) 125 mcg, SubLINGual, EVERY 4 HOURS PRN    mexiletine (MEXITIL) 150 mg, Oral, 3 TIMES DAILY    Multiple Vitamins-Minerals (THERAPEUTIC MULTIVITAMIN-MINERALS) tablet 1 tablet, Oral, DAILY    nitroGLYCERIN (NITROSTAT) 0.4 mg, SubLINGual, EVERY 5 MIN PRN, up to max of 3 total doses. If no relief after 1 dose, call 911.     QUEtiapine (SEROQUEL) 100 MG tablet TAKE 1 TABLET BY MOUTH EVERY NIGHT    Vitamin D (CHOLECALCIFEROL) 1,000 Units, Oral, DAILY    warfarin (COUMADIN) 2.5 mg, Oral, DAILY         Objective:   Patient Vitals for the past 24 hrs:   Temp Pulse Resp BP SpO2   09/15/22 1215 -- 66 13 (!) 132/93 98 %   09/15/22 1101 -- 64 -- 134/83 96 %   09/15/22 1041 -- 63 -- (!) 146/82 96 %   09/15/22 1031 -- 64 -- (!) 150/90 --   09/15/22 1011 -- 61 -- (!) 147/88 96 %   09/15/22 1001 -- 63 -- (!) 149/92 96 %   09/15/22 0858 98.2 °F (36.8 °C) 75 20 134/79 100 %       Oxygen Therapy  SpO2: 98 %    Estimated body mass index is 30.27 kg/m² as calculated from the following:    Height as of 9/14/22: 5' 9\" (1.753 m). Weight as of this encounter: 205 lb (93 kg). No intake or output data in the 24 hours ending 09/15/22 1347      Physical Exam:    Blood pressure (!) 132/93, pulse 66, temperature 98.2 °F (36.8 °C), temperature source Oral, resp. rate 13, weight 205 lb (93 kg), SpO2 98 %. General:    Well nourished. Expressive aphasia  Head:  Normocephalic, atraumatic  Eyes:  Sclerae appear normal.  Pupils equally round. Legally blind  ENT:  Nares appear normal, no drainage. Moist oral mucosa  Neck:  No restricted ROM. Trachea midline   CV:   RRR. No m/r/g. No jugular venous distension. Lungs:   CTAB. No wheezing, rhonchi, or rales. Symmetric expansion. Abdomen: Bowel sounds present. Soft, nontender, nondistended. Extremities: No cyanosis or clubbing. No edema  Skin:     No rashes and normal coloration. Warm and dry.     Neuro:  Expressive aphasia, normal range of movements of all extremities, normal power  Psych:   calm    I have personally reviewed labs and tests showing:  Recent Labs:  Recent Results (from the past 24 hour(s))   Urinalysis    Collection Time: 09/14/22  2:05 PM   Result Value Ref Range    Color, UA DARK YELLOW      Appearance CLOUDY      Specific Gravity, UA 1.020 1.001 - 1.023      pH, Urine 5.5 5.0 - 9.0      Protein,  (A) NEG mg/dL    Glucose, UA Negative mg/dL    Ketones, Urine TRACE (A) NEG mg/dL    Bilirubin Urine Negative NEG      Blood, Urine LARGE (A) NEG      Urobilinogen, Urine 1.0 0.2 - 1.0 EU/dL    Nitrite, Urine Negative NEG      Leukocyte Esterase, Urine LARGE (A) NEG      WBC, UA  0 /hpf    RBC, UA >100 0 /hpf    Epithelial Cells UA 0-3 0 /hpf    BACTERIA, URINE TRACE 0 /hpf    Casts 0-3 0 /lpf    Crystals MARKED 0 /LPF    OTHER OBSERVATIONS RESULTS VERIFIED MANUALLY     CBC with Auto Differential    Collection Time: 09/15/22  9:12 AM   Result Value Ref Range    WBC 9.8 4.3 - 11.1 K/uL    RBC 4.56 4.23 - 5.6 M/uL    Hemoglobin 11.4 (L) 13.6 - 17.2 g/dL    Hematocrit 39.6 (L) 41.1 - 50.3 %    MCV 86.8 79.6 - 97.8 FL    MCH 25.0 (L) 26.1 - 32.9 PG    MCHC 28.8 (L) 31.4 - 35.0 g/dL    RDW 18.8 (H) 11.9 - 14.6 %    Platelets 747 204 - 160 K/uL    MPV 9.6 9.4 - 12.3 FL    nRBC 0.00 0.0 - 0.2 K/uL    Differential Type AUTOMATED      Seg Neutrophils 71 43 - 78 %    Lymphocytes 17 13 - 44 %    Monocytes 7 4.0 - 12.0 %    Eosinophils % 3 0.5 - 7.8 %    Basophils 1 0.0 - 2.0 %    Immature Granulocytes 0 0.0 - 5.0 %    Segs Absolute 7.0 1.7 - 8.2 K/UL    Absolute Lymph # 1.7 0.5 - 4.6 K/UL    Absolute Mono # 0.7 0.1 - 1.3 K/UL    Absolute Eos # 0.3 0.0 - 0.8 K/UL    Basophils Absolute 0.1 0.0 - 0.2 K/UL    Absolute Immature Granulocyte 0.0 0.0 - 0.5 K/UL   Comprehensive Metabolic Panel    Collection Time: 09/15/22  9:12 AM   Result Value Ref Range    Sodium 141 138 - 145 mmol/L    Potassium 4.0 3.5 - 5.1 mmol/L    Chloride 110 101 - 110 mmol/L    CO2 27 21 - 32 mmol/L    Anion Gap 4 4 - 13 mmol/L    Glucose 127 (H) 65 - 100 mg/dL    BUN 14 8 - 23 MG/DL    Creatinine 1.40 0.8 - 1.5 MG/DL    GFR African American >60 >60 ml/min/1.73m2    GFR Non- 55 (L) >60 ml/min/1.73m2    Calcium 9.0 8.3 - 10.4 MG/DL    Total Bilirubin 0.2 0.2 - 1.1 MG/DL    ALT 14 12 - 65 U/L    AST 19 15 - 37 U/L    Alk Phosphatase 71 50 - 136 U/L    Total Protein 6.6 6.3 - 8.2 g/dL    Albumin 3.0 (L) 3.2 - 4.6 g/dL    Globulin 3.6 (H) 2.3 - 3.5 g/dL    Albumin/Globulin Ratio 0.8 (L) 1.2 - 3.5     Protime-INR    Collection Time: 09/15/22  9:12 AM   Result Value Ref Range    Protime 16.8 (H) 12.6 - 14.5 sec    INR 1.3     Magnesium    Collection Time: 09/15/22  9:12 AM   Result Value Ref Range    Magnesium 2.4 1.8 - 2.4 mg/dL   Phosphorus    Collection Time: 09/15/22  9:12 AM   Result Value Ref Range    Phosphorus 2.6 2.3 - 3.7 MG/DL   POCT Glucose    Collection Time: 09/15/22  9:16 AM   Result Value Ref Range    POC Glucose 123 (H) 65 - 100 mg/dL    Performed by: Christina    Troponin    Collection Time: 09/15/22 11:13 AM   Result Value Ref Range    Troponin, High Sensitivity 7.1 0 - 14 pg/mL       I have personally reviewed imaging studies showing:  CT HEAD WO CONTRAST    Result Date: 9/15/2022  EXAM: CT HEAD WITHOUT CONTRAST INDICATION: Stroke symptoms greater than 24 hours. COMPARISON: None. TECHNIQUE: Contiguous axial images were obtained from the skull base through the vertex without IV contrast. Radiation dose reduction techniques were used for this study. Our CT scanners use one or all of the following: Automated exposure control, adjustment of the mA and/or kV according to patient size, iterative reconstruction. FINDINGS: Hypoattenuation in the left frontal and temporal lobes with loss of gray-white matter differentiation. No intracranial hemorrhage present. No hydrocephalus or midline shift. No extra-axial mass or hemorrhage. The basal cisterns are patent. The visualized portions of the orbits are normal. The mastoid air cells and paranasal sinuses are patent. The visualized vascular structures have an unremarkable noncontrast appearance. The calvarium and soft tissues appear normal.     Findings in the left frontal and temporal lobes concerning for acute MCA territory infarction.  Although not highly suspected, a mass is difficult to exclude entirely. Recommend brain MRI without and with IV contrast.     XR CHEST 1 VIEW    Result Date: 9/15/2022  CHEST X-RAY, single portable view  9/15/2022 History: Chest pain. Technique: Single frontal view of the chest. Comparison: Chest x-ray 1922 Findings: There is a stable left-sided intracardiac device. Worsening aeration is seen of the lungs. The heart is mildly enlarged although grossly unchanged when the worsening aeration in AP technique are considered. There is no pneumothorax. No evolving consolidation are demonstrated pleural effusion is seen. 1.  Stable mild cardiomegaly. No significant evolving acute changes are seen. Only worsening aeration is seen of the lungs which may be related to the patient's respiratory effort. This report was made using voice transcription. Despite my best efforts to avoid any, transcription errors may persist. If there is any question about the accuracy of the report or need for clarification, then please call [phone], or text me through [email or web address] for clarification or correction. Echocardiogram:  06/17/22    TRANSTHORACIC ECHOCARDIOGRAM (TTE) LIMITED (CONTRAST/BUBBLE/3D PRN) 06/18/2022  1:41 PM (Final)    Interpretation Summary  Formatting of this result is different from the original.      Left Ventricle: Moderately reduced left ventricular systolic function with a visually estimated EF of 25 - 30%. Moderate global hypokinesis present. Left Atrium: Left atrium is mildly dilated. Pericardium: Small (<1 cm) localized pericardial effusion present around the left ventricle.     Signed by: Dameon Smith DO on 6/18/2022  1:41 PM        Orders Placed This Encounter   Medications    0.9 % sodium chloride bolus    sodium chloride flush 0.9 % injection 10 mL    iopamidol (ISOVUE-370) 76 % injection 50 mL    OR Linked Order Group     ondansetron (ZOFRAN-ODT) disintegrating tablet 4 mg     ondansetron (ZOFRAN) injection 4 mg    OR Linked Order Group aspirin EC tablet 81 mg     aspirin suppository 300 mg    labetalol (NORMODYNE;TRANDATE) injection 10 mg    cefTRIAXone (ROCEPHIN) 1,000 mg in sodium chloride 0.9 % 50 mL IVPB mini-bag     Order Specific Question:   Antimicrobial Indications     Answer:   Urinary Tract Infection     Order Specific Question:   UTI duration of therapy     Answer:   3 days    heparin 25,000 units in dextrose 5% 250 mL (premix) infusion         Signed:  Pradeep Hayward MD

## 2022-09-15 NOTE — CONSULTS
Consult    Patient: Veronica Borges MRN: [id number]     YOB: 1960  Age: 58 y.o. Sex: male      Subjective:      Veronica Borges is a 58 y.o. male who is being seen for a chief complaint of stroke. It is not known when the patient was last known well. He was sending text messages to a family member that did not make sense. For these reasons, the patient was sent to the emergency department for further assessment. The patient has severe aphasia and cannot answer questions appropriately or follow commands. A CT scan of the head was obtained which shows a hypodensity involving the temporal and frontal lobe on the left. Again, the patient cannot provide history and there are no family members at bedside.     Past Medical History:   Diagnosis Date    Acute hypoxemic respiratory failure due to COVID-19 ([address] 10/09/2021    Anemia     BPH (benign prostatic hyperplasia) 2019    CAD (coronary artery disease)     heart attack 2005 stentS HEART    CHF (congestive heart failure) ([address] 09/27/2011    pt denies    Chronic systolic heart failure ([address] 10/02/2015    Coronary atherosclerosis of native coronary vessel 10/02/2015    H/O transesophageal echocardiography (VALERY) for monitoring 06/18/2022    LVEF 25-30%    History of blood transfusion     Hyperlipidemia 10/02/2015    Hypoxemia requiring supplemental oxygen 10/06/2021    ICD (implantable cardioverter-defibrillator) in place 09/26/2011    dual chamber [facility] Last discharge 6/17/22    IGT (impaired glucose tolerance) 12/03/2017    Other ill-defined conditions(799.89)      blind left eye    Other ill-defined conditions(799.89)     cardiomyopathy    Pneumonia due to COVID-19 virus 11/13/2021    Resolved    Primary insomnia 06/07/2017    Psychiatric disorder     depression     Sepsis, unspecified 04/05/2011    Thromboembolus ([address]     thrombus in heart     Thrombus 10/02/2015     Past Surgical History:   Procedure Laterality Date    CARDIAC CATHETERIZATION      5 stents total    CARDIAC DEFIBRILLATOR PLACEMENT      [employer], ICD    CARDIAC PROCEDURE N/A 2022    LEFT HEART CATH / CORONARY ANGIOGRAPHY performed by Carl Syed MD at [facility] CATH LAB    COLONOSCOPY  2010    CORONARY ARTERY BYPASS GRAFT N/A 2022    CORONARY ARTERY BYPASS GRAFT (CABG X 3), LIMA ; ENDOSCOPIC VEIN HARVEST, LEFT GREATER SAPHENOUS VEIN performed by Gerri Frankel, MD at [facility] Right 2022    CYSTOSCOPY,RIGHT URETEROSCOPY,RIGHT RETROGRADE PYELOGRAM performed by Lv Ann MD at [address]       oral surgery    PACEMAKER      HI CARDIAC SURG PROCEDURE UNLIST       1 stent     TRANSESOPHAGEAL ECHOCARDIOGRAM N/A 2022    TRANSESOPHAGEAL ECHOCARDIOGRAM performed by Gerri Frankel, MD at [facility] MAIN OR      Family History   Problem Relation Age of Onset    Heart Disease Mother     Diabetes Paternal Grandfather     Cancer Paternal Grandmother     Heart Disease Brother     Diabetes Maternal Grandmother     Bleeding Prob Father     Diabetes Mother     Heart Disease Paternal Grandfather     Heart Attack Mother 67        mi     Social History     Tobacco Use    Smoking status: Former     Packs/day: 1.00     Types: Cigarettes     Start date: 1978     Quit date: 2022     Years since quittin.3    Smokeless tobacco: Never   Substance Use Topics    Alcohol use: No      No current facility-administered medications for this encounter. Current Outpatient Medications   Medication Sig Dispense Refill    nitroGLYCERIN (NITROSTAT) 0.4 MG SL tablet Place 0.4 mg under the tongue every 5 minutes as needed for Chest pain up to max of 3 total doses. If no relief after 1 dose, call 911.       warfarin (COUMADIN) 2.5 MG tablet Take 1 tablet by mouth daily 90 tablet 3    QUEtiapine (SEROQUEL) 100 MG tablet TAKE 1 TABLET BY MOUTH EVERY NIGHT (Patient taking differently: nightly) 90 tablet 3    hyoscyamine (LEVSIN/SL) 125 MCG sublingual tablet Place 1 tablet under the tongue every 4 hours as needed for Cramping 90 tablet 3    amiodarone (PACERONE) 400 MG tablet Take 1 tablet by mouth in the morning and 1 tablet before bedtime. (Patient taking differently: Take 200 mg by mouth 2 times daily 200mg am and 200mg in pm) 60 tablet 3    mexiletine (MEXITIL) 150 MG capsule Take 1 capsule by mouth in the morning and 1 capsule at noon and 1 capsule before bedtime. (Patient taking differently: Take 150 mg by mouth in the morning and at bedtime Am and pm) 90 capsule 3    Vitamin D (CHOLECALCIFEROL) 25 MCG (1000 UT) TABS tablet Take 1,000 Units by mouth daily      Multiple Vitamins-Minerals (THERAPEUTIC MULTIVITAMIN-MINERALS) tablet Take 1 tablet by mouth daily      acetaminophen (TYLENOL) 325 mg tablet Take 2 tablets by mouth every 6 hours as needed for Pain (Patient not taking: No sig reported) 120 tablet 3    Cetirizine HCl 10 MG CAPS Take by mouth as needed          Allergies   Allergen Reactions    Penicillins Swelling     Face swelling    Atorvastatin Other (See Comments)     itching    Evolocumab Other (See Comments)     Itching    Rosuvastatin Other (See Comments)     itching       Review of Systems:  Could not obtain due to altered mental status        Objective:     Vitals:    09/15/22 1011 09/15/22 1031 09/15/22 1041 09/15/22 1101   BP: (!) 147/88 (!) 150/90 (!) 146/82 134/83   Pulse: 61 64 63 64   Resp:       Temp:       TempSrc:       SpO2: 96%  96% 96%   Weight:            Physical Exam:  General - Well developed, well nourished, in no apparent distress. Pleasant and conversant but with neologisms   HEENT - Normocephalic, atraumatic. Conjunctiva, tympanic membranes, and oropharynx are clear. Neck - Supple without masses, no bruits   Cardiovascular -: Systolic murmur. Lungs - Clear to auscultation. Abdomen - Soft, nontender with normal bowel sounds. Extremities - Peripheral pulses intact. No edema and no rashes. Neurological examination -    Comprehension is poor. Attention is poor. Language testing reveals a prominent receptive aphasia with neologism, concerning for a Wernicke's aphasia. Speech is clear. On cranial nerve examination pupils are equal round and reactive to light. Fundoscopic examination is normal. 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 Extraocular motility shows gaze evoked nystagmus. . Face is symmetric and sensation is intact to light touch. Hearing is intact to finger rustle bilaterally. Motor examination - There is normal muscle tone and bulk. Power is full throughout. Muscle stretch reflexes are normoactive and there are no pathological reflexes present. He could not participate in sensory examination, cerebellar examination, gait or stance secondary to altered mental status    NIHSS   NIHSS Score: 7  1a-Level of Consciousness 0  1b-What is Month/Age 2  1c-Open/Close Eyes&Hand 2  2 -Best Gaze 0  3 -Visual Fields 1  4 -Facial Palsy 0  5a-Motor-Left Arm 0  5b-Motor-Right Arm 0  6a-Motor-Left Leg 0  6b-Motor-Right Leg 0  7 -Limb Ataxia 0  8 -Sensory 0  9 -Best Language 2  10-Dysarthria 0  11-Extinction/Inattention 0    Lab Results   Component Value Date/Time    CHOL 179 06/28/2022 10:50 AM    HDL 29 06/28/2022 10:50 AM    VLDL 45 06/14/2021 10:23 AM        No results found for: HBA1C, [id number]     CT Results (most recent): Personally Reviewed   Results for orders placed during the hospital encounter of 09/15/22    CT HEAD WO CONTRAST    Narrative  EXAM: CT HEAD WITHOUT CONTRAST    INDICATION: Stroke symptoms greater than 24 hours. COMPARISON: None. TECHNIQUE: Contiguous axial images were obtained from the skull base through the  vertex without IV contrast. Radiation dose reduction techniques were used for  this study.  Our CT scanners use one or all of the following: Automated exposure  control, adjustment of the mA and/or kV according to patient size, iterative  reconstruction. FINDINGS:  Hypoattenuation in the left frontal and temporal lobes with loss of gray-white  matter differentiation. No intracranial hemorrhage present. No hydrocephalus or  midline shift. No extra-axial mass or hemorrhage. The basal cisterns are patent. The visualized portions of the orbits are normal. The mastoid air cells and  paranasal sinuses are patent. The visualized vascular structures have an unremarkable noncontrast appearance. The calvarium and soft tissues appear normal.    Impression  Findings in the left frontal and temporal lobes concerning for acute MCA  territory infarction. Although not highly suspected, a mass is difficult to  exclude entirely. Recommend brain MRI without and with IV contrast.       Most recent Echo  Results for orders placed during the hospital encounter of 05/26/22    Transthoracic echocardiogram (TTE) complete with contrast, bubble, strain, and 3D PRN    Interpretation Summary  Formatting of this result is different from the original.      Left Ventricle: Severely reduced left ventricular systolic function with a visually estimated EF of 25 - 30%. Left ventricle is mildly dilated. Normal wall thickness. Akinetic and aneurysmal apex with severe hypokinesis of the mid anteroseptal and mid anterolateral walls with best preserved function of the inferior and inferior -lateral walls. Grade I diastolic dysfunction with normal LAP. Left Atrium: Left atrium is mildly dilated. Contrast used: Definity. Assessment and Plan    70-year-old man who presents with a predominant receptive aphasia (Wernicke aphasia). His stroke affects the superior temporal gyrus on the left which is responsible for receptive language. In addition, there is some involvement of the left frontal lobe.   An embolic etiology is most likely. Recommendations  INR is pending.   Recommend INR between 2 and 3  MRI of the brain  CTA of the head and neck  For now, no need for aspirin  No need for permissive hypertension  PT/OT/ST  Further recommendations to follow

## 2022-09-15 NOTE — PROGRESS NOTES
TRANSFER - IN REPORT:    Verbal report received from Cristal Trent RN on Nir Parekh  being received from ED for routine progression of patient care      Report consisted of patient's Situation, Background, Assessment and   Recommendations(SBAR). Information from the following report(s) Nurse Handoff Report was reviewed with the receiving nurse. Opportunity for questions and clarification was provided. Assessment completed upon patient's arrival to unit and care assumed.

## 2022-09-16 LAB
CHOLEST SERPL-MCNC: 201 MG/DL
ERYTHROCYTE [DISTWIDTH] IN BLOOD BY AUTOMATED COUNT: 18.8 % (ref 11.9–14.6)
EST. AVERAGE GLUCOSE BLD GHB EST-MCNC: 100 MG/DL
HBA1C MFR BLD: 5.1 % (ref 4.8–5.6)
HCT VFR BLD AUTO: 39.6 % (ref 41.1–50.3)
HDLC SERPL-MCNC: 36 MG/DL (ref 40–60)
HDLC SERPL: 5.6 {RATIO}
HGB BLD-MCNC: 11.3 G/DL (ref 13.6–17.2)
INR PPP: 1.2
LDLC SERPL CALC-MCNC: 138.4 MG/DL
MCH RBC QN AUTO: 24.4 PG (ref 26.1–32.9)
MCHC RBC AUTO-ENTMCNC: 28.5 G/DL (ref 31.4–35)
MCV RBC AUTO: 85.3 FL (ref 79.6–97.8)
NRBC # BLD: 0 K/UL (ref 0–0.2)
PLATELET # BLD AUTO: 365 K/UL (ref 150–450)
PMV BLD AUTO: 10.1 FL (ref 9.4–12.3)
PROTHROMBIN TIME: 15.8 SEC (ref 12.6–14.5)
RBC # BLD AUTO: 4.64 M/UL (ref 4.23–5.6)
TRIGL SERPL-MCNC: 133 MG/DL (ref 35–150)
UFH PPP CHRO-ACNC: 0.2 IU/ML (ref 0.3–0.7)
UFH PPP CHRO-ACNC: 0.38 IU/ML (ref 0.3–0.7)
UFH PPP CHRO-ACNC: <0.1 IU/ML (ref 0.3–0.7)
VLDLC SERPL CALC-MCNC: 26.6 MG/DL (ref 6–23)
WBC # BLD AUTO: 9.5 K/UL (ref 4.3–11.1)

## 2022-09-16 PROCEDURE — 6370000000 HC RX 637 (ALT 250 FOR IP): Performed by: INTERNAL MEDICINE

## 2022-09-16 PROCEDURE — 85520 HEPARIN ASSAY: CPT

## 2022-09-16 PROCEDURE — 97535 SELF CARE MNGMENT TRAINING: CPT

## 2022-09-16 PROCEDURE — 85610 PROTHROMBIN TIME: CPT

## 2022-09-16 PROCEDURE — 83036 HEMOGLOBIN GLYCOSYLATED A1C: CPT

## 2022-09-16 PROCEDURE — 6360000002 HC RX W HCPCS: Performed by: FAMILY MEDICINE

## 2022-09-16 PROCEDURE — 6370000000 HC RX 637 (ALT 250 FOR IP): Performed by: FAMILY MEDICINE

## 2022-09-16 PROCEDURE — 99222 1ST HOSP IP/OBS MODERATE 55: CPT | Performed by: PSYCHIATRY & NEUROLOGY

## 2022-09-16 PROCEDURE — 97165 OT EVAL LOW COMPLEX 30 MIN: CPT

## 2022-09-16 PROCEDURE — 2580000003 HC RX 258: Performed by: FAMILY MEDICINE

## 2022-09-16 PROCEDURE — 99223 1ST HOSP IP/OBS HIGH 75: CPT | Performed by: INTERNAL MEDICINE

## 2022-09-16 PROCEDURE — 36415 COLL VENOUS BLD VENIPUNCTURE: CPT

## 2022-09-16 PROCEDURE — 97161 PT EVAL LOW COMPLEX 20 MIN: CPT

## 2022-09-16 PROCEDURE — 92523 SPEECH SOUND LANG COMPREHEN: CPT

## 2022-09-16 PROCEDURE — 85027 COMPLETE CBC AUTOMATED: CPT

## 2022-09-16 PROCEDURE — 1100000000 HC RM PRIVATE

## 2022-09-16 PROCEDURE — 80061 LIPID PANEL: CPT

## 2022-09-16 PROCEDURE — 92610 EVALUATE SWALLOWING FUNCTION: CPT

## 2022-09-16 RX ORDER — QUETIAPINE FUMARATE 100 MG/1
100 TABLET, FILM COATED ORAL NIGHTLY
Status: DISCONTINUED | OUTPATIENT
Start: 2022-09-16 | End: 2022-09-20 | Stop reason: HOSPADM

## 2022-09-16 RX ORDER — VITAMIN B COMPLEX
1000 TABLET ORAL DAILY
Status: DISCONTINUED | OUTPATIENT
Start: 2022-09-16 | End: 2022-09-20 | Stop reason: HOSPADM

## 2022-09-16 RX ORDER — AMIODARONE HYDROCHLORIDE 200 MG/1
400 TABLET ORAL 2 TIMES DAILY
Status: DISCONTINUED | OUTPATIENT
Start: 2022-09-16 | End: 2022-09-17

## 2022-09-16 RX ORDER — MEXILETINE HYDROCHLORIDE 150 MG/1
150 CAPSULE ORAL 2 TIMES DAILY
Status: DISCONTINUED | OUTPATIENT
Start: 2022-09-16 | End: 2022-09-20 | Stop reason: HOSPADM

## 2022-09-16 RX ADMIN — MEXILETINE HYDROCHLORIDE 150 MG: 150 CAPSULE ORAL at 21:37

## 2022-09-16 RX ADMIN — VITAMIN D, TAB 1000IU (100/BT) 1000 UNITS: 25 TAB at 16:36

## 2022-09-16 RX ADMIN — MEXILETINE HYDROCHLORIDE 150 MG: 150 CAPSULE ORAL at 16:36

## 2022-09-16 RX ADMIN — QUETIAPINE FUMARATE 100 MG: 100 TABLET ORAL at 21:37

## 2022-09-16 RX ADMIN — CEFTRIAXONE 1000 MG: 1 INJECTION, POWDER, FOR SOLUTION INTRAMUSCULAR; INTRAVENOUS at 12:46

## 2022-09-16 RX ADMIN — HEPARIN SODIUM 16 UNITS/KG/HR: 10000 INJECTION, SOLUTION INTRAVENOUS at 12:49

## 2022-09-16 RX ADMIN — ASPIRIN 81 MG: 81 TABLET ORAL at 09:48

## 2022-09-16 RX ADMIN — AMIODARONE HYDROCHLORIDE 400 MG: 200 TABLET ORAL at 16:36

## 2022-09-16 RX ADMIN — HEPARIN SODIUM 16 UNITS/KG/HR: 10000 INJECTION, SOLUTION INTRAVENOUS at 09:49

## 2022-09-16 ASSESSMENT — PAIN SCALES - GENERAL
PAINLEVEL_OUTOF10: 0
PAINLEVEL_OUTOF10: 0

## 2022-09-16 NOTE — WOUND CARE
Patient has skin tag at upper portion of gluteal cleft, the area is staying wet with urine, recommend to use zinc barrier cream bid and prn. There are no sacral wounds, no erythema or edema. Offloading and incontinence care need continued.

## 2022-09-16 NOTE — CARE COORDINATION
Pt is a 59 yo male admitted due to an acute CVA. Chart reviewed. PT/OT evals complete with no further therapy needed. Pt's primary deficit is speech and he is aphasic. If he has adequate family support [facility]. CM will follow up with pt/family tomorrow to confirm dc needs and proceed with referrals as needed. 09/16/22 1614   Service Assessment   Cognition Alert   History Provided By Medical Record   Primary Caregiver Self   Support Systems Parent; Family Members   Patient's Janice [address] is: Named in [address] [place] [address]   PCP Verified by CM Yes   Last Visit to PCP Within last 3 months   Prior Functional Level Independent in ADLs/IADLs   Current Functional Level Independent in ADLs/IADLs   Can patient return to prior living arrangement Unknown at present   Ability to make needs known: Poor  (aphasic due to CVA)   Family able to assist with home care needs: No   Financial Resources Medicare   Social/Functional History   Lives With Alone   Type of [address]   Ambulation Assistance Independent   Transfer Assistance Independent   Active  Yes   Occupation Retired   [place] Prior To Admission None   [address]; Outpatient PT/OT   DME Ordered? No   Potential Assistance Purchasing Medications No   Type of Home Care Services Skilled Therapy  (SLP)   Patient expects to be discharged to: Unknown   History of falls? 0   Services At/After Discharge   Transition of Care Consult (CM Consult) Discharge [address] Discharge SLP   [address] Provided?  No   Confirm Follow Up Transport Self   Condition of Participation: Discharge Planning   The Plan for Transition of Care is related to the following treatment goals: to be determined

## 2022-09-16 NOTE — PROGRESS NOTES
Social/Functional Lives With: Alone    OBJECTIVE:     LINES / Hassie Remak / AIRWAY: IV    RESTRICTIONS/PRECAUTIONS:  Restrictions/Precautions: Fall Risk    PAIN: VITALS / O2:   Pre Treatment:   Pain Assessment: Face, Legs, Activity, Cry, and Consolability (FLACC)  Pain Level: 0      Post Treatment: 0       Vitals          Oxygen            GROSS EVALUATION: INTACT IMPAIRED   (See Comments)   UE AROM [x] []   UE PROM [x] []   Strength [x]       Posture / Balance [] Posture: Good  Sitting - Static: Good  Sitting - Dynamic: Good  Standing - Static: Good  Standing - Dynamic: Fair   Sensation []  NT   Coordination [x]       Tone [x]       Edema [x]    Activity Tolerance [x]       Hand Dominance R [] L [] Unknown      COGNITION/  PERCEPTION: INTACT IMPAIRED   (See Comments)   Orientation []  Unable to verbalize secondary to aphasia    Vision []  Per chart, legally blind    Hearing []     Cognition  [] Overall Cognitive Status: Exceptions  Following Commands: Inconsistently follows commands   Perception []       MOBILITY: I Mod I S SBA CGA Min Mod Max Total  NT x2 Comments:   Bed Mobility    Rolling [] [] [] [] [] [] [] [] [] [x] []    Supine to Sit [] [] [] [] [] [x] [] [] [] [] [] Verbal, visual, tactile cues to initiate task    Scooting [] [] [] [] [] [] [] [] [] [x] []    Sit to Supine [] [] [] [x] [] [] [] [] [] [] []    Transfers    Sit to Stand [] [] [] [] [x] [] [] [] [] [] []    Bed to Chair [] [] [] [] [x] [] [] [] [] [] []    Stand to Sit [] [] [] [] [x] [] [] [] [] [] []    Tub/Shower [] [] [] [] [] [] [] [] [] [x] []     Toilet [] [] [] [] [x] [] [] [] [] [] []      [] [] [] [] [] [] [] [] [] [] []    I=Independent, Mod I=Modified Independent, S=Supervision/Setup, SBA=Standby Assistance, CGA=Contact Guard Assistance, Min=Minimal Assistance, Mod=Moderate Assistance, Max=Maximal Assistance, Total=Total Assistance, NT=Not Tested    ACTIVITIES OF DAILY LIVING: I Mod I S SBA CGA Min Mod Max Total NT Comments   BASIC participated in toileting, lower body dressing, and grooming ADLs in standing with maximal visual, verbal, and tactile cueing to increase independence. Patient also participated in functional mobility training to increase independence. Based on results of today's evaluation, co-treatment by PT/OT at future sessions is likely not warranted.        TREATMENT GRID:  N/A    AFTER TREATMENT PRECAUTIONS: Alarm Activated, Bed, Bed/Chair Locked, Call light within reach, Needs within reach, and RN notified    INTERDISCIPLINARY COLLABORATION:  RN/ PCT, PT/ PTA, and OT/ MCLEOD    EDUCATION:  Education Given To: Patient  Education Provided: Role of Therapy;Plan of Care  Education Outcome: Unable to demonstrate understanding    TOTAL TREATMENT DURATION AND TIME:  Time In: 1121  Time Out: 1141  Minutes: [address], OT

## 2022-09-16 NOTE — H&P
[facility] Cardiology Initial Cardiac Evaluation   Primary Cardiologist: Dr. Sangita Roman  Attending Cardiologist: Dr. Sangita Roman      Date of  Admission: 9/15/2022  8:56 AM     HPI:  Jimmie Valera is a 58 y.o. male with h/o CAD s/p CABG in May 2022 with LIMA-LAD, SVG-RI and SVG-PDA, LV thrombus on coumadin, HFrEF 35-40%, ICM, recurrent VT s/p BSC DC ICD on Amiodarone and Mexitil, HLD, is legally blind, statin intolerance and on disability. Patient lives alone, can take care of his day-to-day activity at baseline. Patient's daughter reported she got unusual text messages from him and sent EMS. He was unable to talk to EMS. He was brought to [facility] ED. CT head showed findings in the left frontal and temporal lobes concerning for acute MCA territory infarction. Pt was admitted by the hospitalist and neurology consulted. Hospitalist noted at time of examination, patient's brother and father were at bedside and reported \"patient was completely normal until yesterday afternoon when they dropped him back at his home after an appointment. \"  Patient was seen by Dr. Greg Hughes, infarct felt to be embolic and he was started on heparin drip for subtherapeutic INR of 1.3.  9/15 -CTA head and neck shows left M1 segment of the MCA occlusion. Cardiology was consulted to evaluate Pt due to concerns of elevated TSH and Pt taking Amiodarone. Pt is unable to talk or write with paper and pencil. Chart reviewed and it appears Pt last known therapeutic INR was 8/12/22. A coumadin clinic note on 9/6/22 stated He would be holding coumadin for upcoming procedure. It appears a urology procedure was scheduled for 9/22 with hold coumadin instructions starting on 9/17/22.         Past Medical History:   Diagnosis Date    Acute hypoxemic respiratory failure due to COVID-19 ([facility] [address] 10/09/2021    Anemia     BPH (benign prostatic hyperplasia) 2019    CAD (coronary artery disease)     heart attack 2005 stentS HEART    CHF (congestive heart failure) ([address] 09/27/2011    pt denies    Chronic systolic heart failure ([facility] [address] 10/02/2015    Coronary atherosclerosis of native coronary vessel 10/02/2015    H/O transesophageal echocardiography (VALERY) for monitoring 06/18/2022    LVEF 25-30%    History of blood transfusion     Hyperlipidemia 10/02/2015    Hypoxemia requiring supplemental oxygen 10/06/2021    ICD (implantable cardioverter-defibrillator) in place 09/26/2011    dual chamber [employer] Last discharge 6/17/22    IGT (impaired glucose tolerance) 12/03/2017    Other ill-defined conditions(799.89)      blind left eye    Other ill-defined conditions(799.89)     cardiomyopathy    Pneumonia due to COVID-19 virus 11/13/2021    Resolved    Primary insomnia 06/07/2017    Psychiatric disorder     depression     Sepsis, unspecified 04/05/2011    Thromboembolus ([facility] [address]     thrombus in heart     Thrombus 10/02/2015      Past Surgical History:   Procedure Laterality Date    CARDIAC CATHETERIZATION      5 stents total    CARDIAC DEFIBRILLATOR PLACEMENT  2011    [employer], ICD    CARDIAC PROCEDURE N/A 05/27/2022    LEFT HEART CATH / CORONARY ANGIOGRAPHY performed by Waylon Newton MD at [facility] CATH LAB    COLONOSCOPY  12/2010    CORONARY ARTERY BYPASS GRAFT N/A 05/31/2022    CORONARY ARTERY BYPASS GRAFT (CABG X 3), LIMA ; ENDOSCOPIC VEIN HARVEST, LEFT GREATER SAPHENOUS VEIN performed by Luisa Way MD at [facility] 07/29/2022    CYSTOSCOPY,RIGHT URETEROSCOPY,RIGHT RETROGRADE PYELOGRAM performed by Sharon Belcher MD at [address]      oral surgery    PACEMAKER      SD CARDIAC SURG PROCEDURE UNLIST       1 stent 2005    TRANSESOPHAGEAL ECHOCARDIOGRAM N/A 05/31/2022    TRANSESOPHAGEAL ECHOCARDIOGRAM performed by Luisa Way MD at [facility] MAIN OR       Allergies   Allergen Reactions    Penicillins Swelling     Face swelling    Atorvastatin Other (See Comments)     itching    Evolocumab Other (See Comments)     Itching    Rosuvastatin Other (See Comments)     itching      Social History     Socioeconomic History    Marital status:      Spouse name: Not on file    Number of children: Not on file    Years of education: Not on file    Highest education level: Not on file   Occupational History    Not on file   Tobacco Use    Smoking status: Former     Packs/day: 1.00     Types: Cigarettes     Start date: 1978     Quit date: 2022     Years since quittin.3    Smokeless tobacco: Never   Vaping Use    Vaping Use: Never used   Substance and Sexual Activity    Alcohol use: No    Drug use: Never     Types: Prescription    Sexual activity: Not Currently   Other Topics Concern    Not on file   Social History Narrative    Not on file     Social Determinants of Health     Financial Resource Strain: Not on file   Food Insecurity: Not on file   Transportation Needs: Not on file   Physical Activity: Not on file   Stress: Not on file   Social Connections: Not on file   Intimate Partner Violence: Not on file   Housing Stability: Not on file     Family History   Problem Relation Age of Onset    Heart Disease Mother     Diabetes Paternal Grandfather     Cancer Paternal Grandmother     Heart Disease Brother     Diabetes Maternal Grandmother     Bleeding Prob Father     Diabetes Mother     Heart Disease Paternal Grandfather     Heart Attack Mother 67        mi        Current Facility-Administered Medications   Medication Dose Route Frequency    amiodarone (CORDARONE) tablet 400 mg  400 mg Oral BID    hyoscyamine (LEVSIN/SL) sublingual tablet 125 mcg  125 mcg SubLINGual Q4H PRN    mexiletine (MEXITIL) capsule 150 mg  150 mg Oral BID    QUEtiapine (SEROQUEL) tablet 100 mg  100 mg Oral Nightly    Vitamin D (CHOLECALCIFEROL) tablet 1,000 Units  1,000 Units Oral Daily    ondansetron (ZOFRAN-ODT) disintegrating tablet 4 mg  4 mg Oral Q8H PRN    Or    ondansetron (ZOFRAN) injection 4 mg  4 mg IntraVENous Q6H PRN    aspirin EC tablet 81 mg  81 mg Oral Daily Or    aspirin suppository 300 mg  300 mg Rectal Daily    labetalol (NORMODYNE;TRANDATE) injection 10 mg  10 mg IntraVENous Q10 Min PRN    heparin 25,000 units in dextrose 5% 250 mL (premix) infusion  5-30 Units/kg/hr IntraVENous Continuous       Review of symptoms:  Unable to obtain secondary to aphasia     Subjective:   Physical Exam  Vitals:    09/16/22 0400 09/16/22 0800 09/16/22 1200 09/16/22 1534   BP: 122/83 115/78 116/74 116/83   Pulse: 67 73 71 65   Resp: 18 18 18 18   Temp: 98.2 °F (36.8 °C) 98.1 °F (36.7 °C) 98.6 °F (37 °C) 98.4 °F (36.9 °C)   TempSrc: Oral Oral Oral Oral   SpO2: 95% 94% 95% 96%   Weight:          GEN: A&O x 3, no acute distress  HEENT: NCAT, PERRL  CV: S1S2 RRR  Chest: CTA B, no wheezing, rales or rhonchi  Abd: soft, +BS, non tender  Ext: no LE edema, cyanosis or clubbing    Labs:   Recent Results (from the past 24 hour(s))   Anti-Xa, Unfractionated Heparin    Collection Time: 09/15/22  9:48 PM   Result Value Ref Range    Anti-XA Unfrac Heparin <0.10 (L) 0.3 - 0.7 IU/mL   CBC    Collection Time: 09/16/22  4:55 AM   Result Value Ref Range    WBC 9.5 4.3 - 11.1 K/uL    RBC 4.64 4.23 - 5.6 M/uL    Hemoglobin 11.3 (L) 13.6 - 17.2 g/dL    Hematocrit 39.6 (L) 41.1 - 50.3 %    MCV 85.3 79.6 - 97.8 FL    MCH 24.4 (L) 26.1 - 32.9 PG    MCHC 28.5 (L) 31.4 - 35.0 g/dL    RDW 18.8 (H) 11.9 - 14.6 %    Platelets 642 629 - 866 K/uL    MPV 10.1 9.4 - 12.3 FL    nRBC 0.00 0.0 - 0.2 K/uL   Hemoglobin A1c    Collection Time: 09/16/22  4:55 AM   Result Value Ref Range    Hemoglobin A1C 5.1 4.8 - 5.6 %    eAG 100 mg/dL   Lipid panel - fasting    Collection Time: 09/16/22  4:55 AM   Result Value Ref Range    Cholesterol, Total 201 (H) <200 MG/DL    Triglycerides 133 35 - 150 MG/DL    HDL 36 (L) 40 - 60 MG/DL    LDL Calculated 138.4 (H) <100 MG/DL    VLDL Cholesterol Calculated 26.6 (H) 6.0 - 23.0 MG/DL    Chol/HDL Ratio 5.6     Protime-INR    Collection Time: 09/16/22  4:55 AM   Result Value Ref Range Protime 15.8 (H) 12.6 - 14.5 sec    INR 1.2     Anti-Xa, Unfractionated Heparin    Collection Time: 09/16/22  8:30 AM   Result Value Ref Range    Anti-XA Unfrac Heparin 0.20 (L) 0.3 - 0.7 IU/mL       Pt was seen and examined by Dr. Joann Reece, he agrees with the following A&P. Assessment/Plan:         Patient Active Problem List    Diagnosis    Acute cerebrovascular accident (CVA)- L MCA- neurology feels embolic- on IV Heparin, awaiting coumadin start to see plans of urologist    LV thrombus- on coumadin at home- Pt unable to report when he stopped it but INR sub therapeutic on admission at 1.3, IV heparin    Ureteral obstruction s/p stent- procedure planned 9/22 per chart- urology consulted    CAD S/P CABG x 3- denies CP/SOB, ASA, statin intolerance    Recurrent VT- on Amiodarone and Mexitil, s/p ICD    HFrEF 35-40%- not volume overloaded at present    ICD (implantable cardioverter-defibrillator) in place- [facility] DC ICD    Dyslipidemia- statin intolerance    Hypothyroidism- TSH 9.550- treat per primary team    Overweight (BMI 25.0-29. 9)    Legally blind     Treat elevated TSH, continue Amiodarone/Mexitil for recurrent VT. Thank you for allowing us to participate in the care of this patient, we will continue to follow along with you.     Sarah Yanes PA-C

## 2022-09-16 NOTE — THERAPY EVALUATION
PHYSICAL THERAPY Initial Assessment and AM  (Link to Caseload Tracking: PT Visit Days : 1  Acknowledge Orders  Time In/Out  PT Charge Capture  Rehab Caseload Tracker    Radha Ulloa is a 58 y.o. male   PRIMARY DIAGNOSIS: Acute cerebrovascular accident (CVA) ([facility] [address]  Acute cerebrovascular accident (CVA) ([facility] [address] [I63.9]  Cerebrovascular accident (CVA), unspecified mechanism ([facility] [address] [I63.9]       Reason for Referral: Generalized Muscle Weakness (M62.81)  Difficulty in walking, Not elsewhere classified (R26.2)  Inpatient: Payor: Todd Oconnell / Plan: [employer] / Product Type: *No Product type* /     ASSESSMENT:     REHAB RECOMMENDATIONS:   Recommendation to date pending progress:  Setting: Will need 24/7 supervision but no skilled PT needs after DC from hospital    Equipment:    None     ASSESSMENT:  Mr. DEREK CHANEY  is a 58year old male who presents with aphasia. Pt is able to follow approximately 25% of one-word verbal cues and tactile cues. This date pt performs mobility including bed mobility, transfers, and ambulation with CGA. Pt will likely not need skilled PT services following DC from hospital, but he will need 24/7 supervision. Pt presents as functioning below his baseline, with deficits in mobility including transfers, gait, balance, and activity tolerance. Pt will benefit from skilled therapy services to address stated deficits to promote return to highest level of function, independence, and safety. Will continue to follow.      [address] [facility] [address] [address] AM-PAC 6 Clicks Basic Mobility Inpatient Short Form  AM-PAC Mobility Inpatient   How much difficulty turning over in bed?: None  How much difficulty sitting down on / standing up from a chair with arms?: None  How much difficulty moving from lying on back to sitting on side of bed?: None  How much help from another person moving to and from a bed to a chair?: None  How much help from another person needed to walk in hospital room?: None  How much help from another person for climbing 3-5 steps with a railing?: A Little  AM-PAC Inpatient Mobility Raw Score : 23  AM-PAC Inpatient T-Scale Score : 56.93  Mobility Inpatient CMS 0-100% Score: 11.2  Mobility Inpatient CMS G-Code Modifier : CI    SUBJECTIVE:   Mr. Laureano Varela states, \"Yes\"     Social/Functional Lives With: Alone  Pt unable to provide history but per chart lives alone and is IND at baseline.   OBJECTIVE:     PAIN: Tilmon Baker / O2: PRECAUTION / Eufemia Pearl / DRAINS:   Pre Treatment:   Pain Assessment: None - Denies Pain      Post Treatment: None Vitals        Oxygen  O2 Therapy: Room air   IV    RESTRICTIONS/PRECAUTIONS:  Restrictions/Precautions: Fall Risk                 GROSS EVALUATION: Intact Impaired (Comments):   AROM [x]     PROM [x]    Strength [] BLE strength grossly 4+/5   Balance [] Posture: Good  Sitting - Static: Good  Sitting - Dynamic: Good  Standing - Static: Good  Standing - Dynamic: Fair, -   Sensation [] Pt unable answer for testing   Coordination [x]      Tone [x]     Edema [x]    Activity Tolerance [x]      []      COGNITION/  PERCEPTION: Intact Impaired (Comments):   Orientation [] Unable to answer orientation questions, when asked to write his name he wrote \"the base the\"   Vision [x]     Hearing [x]     Cognition  []       MOBILITY: I Mod I S SBA CGA Min Mod Max Total  NT x2 Comments:   Bed Mobility    Rolling [] [] [] [] [] [] [] [] [] [] []    Supine to Sit [] [] [] [] [x] [] [] [] [] [] [] Repeated one-word verbal/tactile cues provided for all mobility   Scooting [] [] [] [] [x] [] [] [] [] [] []    Sit to Supine [] [] [] [] [x] [] [] [] [] [] []    Transfers    Sit to Stand [] [] [] [] [x] [] [] [] [] [] []    Bed to Chair [] [] [] [] [] [] [] [] [] [] []    Stand to Sit [] [] [] [] [x] [] [] [] [] [] []     [] [] [] [] [] [] [] [] [] [] []    I=Independent, Mod I=Modified Independent, S=Supervision, SBA=Standby Assistance, CGA=Contact Guard Assistance,   Min=Minimal Assistance, Mod=Moderate Assistance, Max=Maximal Assistance, Total=Total Assistance, NT=Not Tested    GAIT: I Mod I S SBA CGA Min Mod Max Total  NT x2 Comments:   Level of Assistance [] [] [] [] [x] [] [] [] [] [] [] Able to follow verbal cues for L/R turns   Distance 250 feet    DME HHA    Gait Quality Decreased step clearance, Trunk sway increased, and Wide base of support    Weightbearing Status Restrictions/Precautions  Restrictions/Precautions: Fall Risk    Stairs      I=Independent, Mod I=Modified Independent, S=Supervision, SBA=Standby Assistance, CGA=Contact Guard Assistance,   Min=Minimal Assistance, Mod=Moderate Assistance, Max=Maximal Assistance, Total=Total Assistance, NT=Not Tested    PLAN:   ACUTE PHYSICAL THERAPY GOALS:   (Developed with and agreed upon by patient and/or caregiver.)    (1.) Azam Almonte will ambulate with INDEPENDENT for 750 feet with the least restrictive device within 7 treatment day(s). (2.) Azam Almonte will perform standing static and dynamic balance activities x 15 minutes with INDEPENDENT to improve safety within 7 treatment day(s). (3.) Azam Almonte will ascend and descend 12 stairs using unilateral hand rail(s) with STAND BY ASSIST to improve functional mobility and safety within 7 treatment day(s).       FREQUENCY AND DURATION: 3 times/week for duration of hospital stay or until stated goals are met, whichever comes first.    THERAPY PROGNOSIS: Fair    PROBLEM LIST:   (Skilled intervention is medically necessary to address:)  Decreased Balance  Decreased Gait Ability  Decreased Safety Awareness  Decreased Strength INTERVENTIONS PLANNED:   (Benefits and precautions of physical therapy have been discussed with the patient.)  Therapeutic Activity  Therapeutic Exercise/HEP  Neuromuscular Re-education  Gait Training  Education       TREATMENT:   EVALUATION: LOW COMPLEXITY: (Untimed Charge)    AFTER TREATMENT PRECAUTIONS: Alarm Activated, Bed, Bed/Chair Locked, Call light within reach, and Needs within reach    INTERDISCIPLINARY COLLABORATION:  RN/ PCT and OT/ [facility]    EDUCATION: Education Given To: Patient  Education Provided: Role of Therapy;Plan of Care  Education Method: Verbal  Barriers to Learning: None  Education Outcome: Unable to verbalize    TIME IN/OUT:  Time In: 1121  Time Out: 200 [facility]  Minutes: [facility], PT

## 2022-09-16 NOTE — PROGRESS NOTES
Hospitalist Progress Note   Admit Date:  9/15/2022  8:56 AM   Name:  Nieves Cuenca   Age:  58 y.o. Sex:  male  :  1960   MRN:  [id number]   Room:  [facility]    Presenting Complaint: Altered Mental Status     Reason(s) for Admission: Acute cerebrovascular accident (CVA) [facility]) [I63.9]  Cerebrovascular accident (CVA), unspecified mechanism [facility] [I63.9]     Hospital Course:   Nieves Cuenca is a 58 y.o. male with medical history of coronary artery disease with history of CABG in May 2022, history of intracardiac thrombus, history of V. tach status post ICD, chronic systolic congestive heart failure, legally blind, statin intolerance and on disability. Patient lives alone, can take care of his day-to-day activity at baseline. As per triage note patient's interview text last night, EMS this morning noticed that patient was not answering any questions appropriately. As per triage note last known well was 2 days ago reported by neighbor. Time of examination, patient's brother and father was at bedside. As per patient's prior, patient was completely normal until yesterday afternoon when they dropped him back at his home after an appointment. Patient presently has expressive aphasia with neologism, cannot give a good review of systems secondary to expressive aphasia. He is able to respond to verbal commands. When wrote on a paper, if he had any headache or dizziness any nausea vomiting abdominal pain or chest pain or shortness of breath-indicates as no. CT head-  Findings in the left frontal and temporal lobes concerning for acute MCA   territory infarction. Although not highly suspected, a mass is difficult to   exclude entirely. Recommend brain MRI without and with IV contrast.   Patient was seen by Dr. Ingrid Anthony, felt embolic, okay to start heparin drip for subtherapeutic INR. INR 1.3. Patient be admitted for acute CVA.     9/15 -CTA head and neck shows left M1 segment of the MCA occlusion      Subjective & 24hr Events (09/16/22): Patient without acute event overnight. Still aphasic. Assessment & Plan:     Principal Problem:    Acute cerebrovascular accident (CVA) ([facility] [address]  Plan: Will continue heparin drip until his INR is therapeutic. Patient will need 24/7 supervision but no skilled needs   Active Problems:    Chronic systolic congestive heart failure ([facility] [address]  Plan: compensated      Ureteral obstruction  Plan: s/p stent with removal scheduled for today as an outpatient,  await Urology's plan      Pyuria  Plan: UA shows leukocyte Esterace but patient without fever and leukocytosis. Will d/c iv rocephin      Ventricular thrombus  Plan: holding restarting coumadin until Urology's plan has been formulated      ICD (implantable cardioverter-defibrillator) in place  Plan: aware      Legally blind  Plan: aware      Subclinical hypothyroidism  Plan: question if this could be due amiodarone, will consult Cardiology for evaluation       Anticipated discharge needs: Will need 24/7 supervision    Diet:  ADULT DIET; Easy to Chew  DVT PPx: Heparin drip  Code status: Full Code    Hospital Problems:  Principal Problem:    Acute cerebrovascular accident (CVA) ([facility] [address]  Active Problems:    Chronic systolic congestive heart failure (HCC)    ICD (implantable cardioverter-defibrillator) in place    S/P CABG x 3    Legally blind    Subclinical hypothyroidism    Coronary artery disease involving autologous vein coronary bypass graft without angina pectoris    Statin intolerance    History of hydronephrosis  Resolved Problems:    * No resolved hospital problems.  *      Objective:   Patient Vitals for the past 24 hrs:   Temp Pulse Resp BP SpO2   09/16/22 1200 98.6 °F (37 °C) 71 18 116/74 95 %   09/16/22 0800 98.1 °F (36.7 °C) 73 18 115/78 94 %   09/16/22 0400 98.2 °F (36.8 °C) 67 18 122/83 95 %   09/16/22 0000 98.4 °F (36.9 °C) 66 18 131/78 96 %   09/15/22 2000 99.5 °F (37.5 °C) 66 18 129/78 97 %   09/15/22 1503 98.6 °F (37 °C) 66 20 134/75 97 %       Oxygen Therapy  SpO2: 95 %  O2 Device: None (Room air)    Estimated body mass index is 30.27 kg/m² as calculated from the following:    Height as of 9/14/22: 5' 9\" (1.753 m). Weight as of this encounter: 205 lb (93 kg). Intake/Output Summary (Last 24 hours) at 9/16/2022 1338  Last data filed at 9/16/2022 1215  Gross per 24 hour   Intake 600 ml   Output --   Net 600 ml         Physical Exam:     Blood pressure 116/74, pulse 71, temperature 98.6 °F (37 °C), temperature source Oral, resp. rate 18, weight 205 lb (93 kg), SpO2 95 %. General:    Well nourished. Head:  Normocephalic, atraumatic  Eyes:  Sclerae appear normal.  Pupils equally round. ENT:  Nares appear normal, no drainage. Moist oral mucosa  Neck:  No restricted ROM. Trachea midline   CV:   RRR. No m/r/g. No jugular venous distension. Lungs:   CTAB. No wheezing, rhonchi, or rales. Symmetric expansion. Abdomen: Bowel sounds present. Soft, nontender, nondistended. Extremities: No cyanosis or clubbing. No edema  Skin:     No rashes and normal coloration. Warm and dry. Neuro:  Legaly blind. Sensation intact. A&Ox1. Aphasic,   Psych:  Normal mood and affect.       I have personally reviewed labs and tests showing:  Recent Labs:  Recent Results (from the past 48 hour(s))   Urinalysis    Collection Time: 09/14/22  2:05 PM   Result Value Ref Range    Color, UA DARK YELLOW      Appearance CLOUDY      Specific Gravity, UA 1.020 1.001 - 1.023      pH, Urine 5.5 5.0 - 9.0      Protein,  (A) NEG mg/dL    Glucose, UA Negative mg/dL    Ketones, Urine TRACE (A) NEG mg/dL    Bilirubin Urine Negative NEG      Blood, Urine LARGE (A) NEG      Urobilinogen, Urine 1.0 0.2 - 1.0 EU/dL    Nitrite, Urine Negative NEG      Leukocyte Esterase, Urine LARGE (A) NEG      WBC, UA  0 /hpf    RBC, UA >100 0 /hpf    Epithelial Cells UA 0-3 0 /hpf    BACTERIA, URINE TRACE 0 /hpf Casts 0-3 0 /lpf    Crystals MARKED 0 /LPF    OTHER OBSERVATIONS RESULTS VERIFIED MANUALLY     CBC with Auto Differential    Collection Time: 09/15/22  9:12 AM   Result Value Ref Range    WBC 9.8 4.3 - 11.1 K/uL    RBC 4.56 4.23 - 5.6 M/uL    Hemoglobin 11.4 (L) 13.6 - 17.2 g/dL    Hematocrit 39.6 (L) 41.1 - 50.3 %    MCV 86.8 79.6 - 97.8 FL    MCH 25.0 (L) 26.1 - 32.9 PG    MCHC 28.8 (L) 31.4 - 35.0 g/dL    RDW 18.8 (H) 11.9 - 14.6 %    Platelets 247 888 - 211 K/uL    MPV 9.6 9.4 - 12.3 FL    nRBC 0.00 0.0 - 0.2 K/uL    Differential Type AUTOMATED      Seg Neutrophils 71 43 - 78 %    Lymphocytes 17 13 - 44 %    Monocytes 7 4.0 - 12.0 %    Eosinophils % 3 0.5 - 7.8 %    Basophils 1 0.0 - 2.0 %    Immature Granulocytes 0 0.0 - 5.0 %    Segs Absolute 7.0 1.7 - 8.2 K/UL    Absolute Lymph # 1.7 0.5 - 4.6 K/UL    Absolute Mono # 0.7 0.1 - 1.3 K/UL    Absolute Eos # 0.3 0.0 - 0.8 K/UL    Basophils Absolute 0.1 0.0 - 0.2 K/UL    Absolute Immature Granulocyte 0.0 0.0 - 0.5 K/UL   Comprehensive Metabolic Panel    Collection Time: 09/15/22  9:12 AM   Result Value Ref Range    Sodium 141 138 - 145 mmol/L    Potassium 4.0 3.5 - 5.1 mmol/L    Chloride 110 101 - 110 mmol/L    CO2 27 21 - 32 mmol/L    Anion Gap 4 4 - 13 mmol/L    Glucose 127 (H) 65 - 100 mg/dL    BUN 14 8 - 23 MG/DL    Creatinine 1.40 0.8 - 1.5 MG/DL    GFR African American >60 >60 ml/min/1.73m2    GFR Non- 55 (L) >60 ml/min/1.73m2    Calcium 9.0 8.3 - 10.4 MG/DL    Total Bilirubin 0.2 0.2 - 1.1 MG/DL    ALT 14 12 - 65 U/L    AST 19 15 - 37 U/L    Alk Phosphatase 71 50 - 136 U/L    Total Protein 6.6 6.3 - 8.2 g/dL    Albumin 3.0 (L) 3.2 - 4.6 g/dL    Globulin 3.6 (H) 2.3 - 3.5 g/dL    Albumin/Globulin Ratio 0.8 (L) 1.2 - 3.5     Protime-INR    Collection Time: 09/15/22  9:12 AM   Result Value Ref Range    Protime 16.8 (H) 12.6 - 14.5 sec    INR 1.3     Magnesium    Collection Time: 09/15/22  9:12 AM   Result Value Ref Range    Magnesium 2.4 1.8 - 2.4 mg/dL   Phosphorus    Collection Time: 09/15/22  9:12 AM   Result Value Ref Range    Phosphorus 2.6 2.3 - 3.7 MG/DL   Troponin    Collection Time: 09/15/22  9:12 AM   Result Value Ref Range    Troponin, High Sensitivity 5.9 0 - 14 pg/mL   POCT Glucose    Collection Time: 09/15/22  9:16 AM   Result Value Ref Range    POC Glucose 123 (H) 65 - 100 mg/dL    Performed by: Christina    Troponin    Collection Time: 09/15/22 11:13 AM   Result Value Ref Range    Troponin, High Sensitivity 7.1 0 - 14 pg/mL   Transthoracic echocardiogram (TTE) complete with contrast, bubble, strain, and 3D PRN    Collection Time: 09/15/22  2:20 PM   Result Value Ref Range    Fractional Shortening 2D 23 28 - 44 %    LV RWT Ratio 0.34     LV Mass 2D 174.5 88 - 224 g    MV E/A 0.50     E/E' Ratio (Averaged) 6.75     E/E' Lateral 4.50     E/E' Septal 9.00     AV Velocity Ratio 0.82     LVOT:AV VTI Index 0.88     Est. RA Pressure 3 mmHg    RVSP 25 mmHg    LV EDV A2C 141 mL    LV EDV A4C 154 mL    LV ESV A2C 99 mL    LV ESV A4C 106 mL    IVSd 0.9 0.6 - 1.0 cm    LVIDd 5.3 4.2 - 5.9 cm    LVIDs 4.1 cm    LVOT Diameter 2.3 cm    LVOT Mean Gradient 1 mmHg    LVOT VTI 16.8 cm    LVOT Peak Velocity 0.9 m/s    LVOT Peak Gradient 3 mmHg    LVPWd 0.9 0.6 - 1.0 cm    LV E' Lateral Velocity 10 cm/s    LV E' Septal Velocity 5 cm/s    LV Ejection Fraction A2C 30 %    LV Ejection Fraction A4C 31 %    EF BP 30 (A) 55 - 100 %    LVOT Area 4.2 cm2    LVOT SV 69.8 ml    LA Minor Axis 5.1 cm    LA Major [place] 5.6 cm    LA Area 2C 18.4 cm2    LA Area 4C 16.9 cm2    LA Volume BP 48 18 - 58 mL    AV Mean Velocity 0.7 m/s    AV Mean Gradient 2 mmHg    AV VTI 19.2 cm    AV Peak Velocity 1.1 m/s    AV Peak Gradient 4 mmHg    AV Area by VTI 3.6 cm2    AV Area by Peak Velocity 3.6 cm2    Aortic Root 3.4 cm    Ascending Aorta 3.1 cm    IVC Proxmal 1.5 cm    MV E Wave Deceleration Time 168.0 ms    MV A Velocity 0.90 m/s    MV E Velocity 0.45 m/s    TAPSE 1.6 (A) 1.7 cm    TR Max Velocity 2.35 m/s    TR Peak Gradient 22 mmHg   APTT    Collection Time: 09/15/22  3:44 PM   Result Value Ref Range    PTT 33.7 24.1 - 35.1 SEC   Anti-Xa, Unfractionated Heparin    Collection Time: 09/15/22  9:48 PM   Result Value Ref Range    Anti-XA Unfrac Heparin <0.10 (L) 0.3 - 0.7 IU/mL   CBC    Collection Time: 09/16/22  4:55 AM   Result Value Ref Range    WBC 9.5 4.3 - 11.1 K/uL    RBC 4.64 4.23 - 5.6 M/uL    Hemoglobin 11.3 (L) 13.6 - 17.2 g/dL    Hematocrit 39.6 (L) 41.1 - 50.3 %    MCV 85.3 79.6 - 97.8 FL    MCH 24.4 (L) 26.1 - 32.9 PG    MCHC 28.5 (L) 31.4 - 35.0 g/dL    RDW 18.8 (H) 11.9 - 14.6 %    Platelets 128 030 - 195 K/uL    MPV 10.1 9.4 - 12.3 FL    nRBC 0.00 0.0 - 0.2 K/uL   Hemoglobin A1c    Collection Time: 09/16/22  4:55 AM   Result Value Ref Range    Hemoglobin A1C 5.1 4.8 - 5.6 %    eAG 100 mg/dL   Lipid panel - fasting    Collection Time: 09/16/22  4:55 AM   Result Value Ref Range    Cholesterol, Total 201 (H) <200 MG/DL    Triglycerides 133 35 - 150 MG/DL    HDL 36 (L) 40 - 60 MG/DL    LDL Calculated 138.4 (H) <100 MG/DL    VLDL Cholesterol Calculated 26.6 (H) 6.0 - 23.0 MG/DL    Chol/HDL Ratio 5.6     Protime-INR    Collection Time: 09/16/22  4:55 AM   Result Value Ref Range    Protime 15.8 (H) 12.6 - 14.5 sec    INR 1.2     Anti-Xa, Unfractionated Heparin    Collection Time: 09/16/22  8:30 AM   Result Value Ref Range    Anti-XA Unfrac Heparin 0.20 (L) 0.3 - 0.7 IU/mL       I have personally reviewed imaging studies showing: Other Studies:  CTA HEAD NECK W WO CONTRAST   Final Result   1. Occlusion of the distal left M1 segment of the left MCA, consistent with   acute ischemia. Associated low-density in the left MCA territory. 2.  Moderate stenosis at the origin of the right vertebral artery. CT HEAD WO CONTRAST   Final Result   Findings in the left frontal and temporal lobes concerning for acute MCA   territory infarction.  Although not highly suspected, a mass is difficult to   exclude entirely. Recommend brain MRI without and with IV contrast.         XR CHEST 1 VIEW   Final Result   1. Stable mild cardiomegaly. No significant evolving acute changes are seen. Only worsening aeration is seen of the lungs which may be related to the   patient's respiratory effort. This report was made using voice transcription. Despite my best efforts to avoid   any, transcription errors may persist. If there is any question about the   accuracy of the report or need for clarification, then please call [phone], or text me through [email or web address] for clarification or correction. Current Meds:  Current Facility-Administered Medications   Medication Dose Route Frequency    ondansetron (ZOFRAN-ODT) disintegrating tablet 4 mg  4 mg Oral Q8H PRN    Or    ondansetron (ZOFRAN) injection 4 mg  4 mg IntraVENous Q6H PRN    aspirin EC tablet 81 mg  81 mg Oral Daily    Or    aspirin suppository 300 mg  300 mg Rectal Daily    labetalol (NORMODYNE;TRANDATE) injection 10 mg  10 mg IntraVENous Q10 Min PRN    cefTRIAXone (ROCEPHIN) 1,000 mg in sodium chloride 0.9 % 50 mL IVPB mini-bag  1,000 mg IntraVENous Q24H    heparin 25,000 units in dextrose 5% 250 mL (premix) infusion  5-30 Units/kg/hr IntraVENous Continuous       Signed:  Ju Owen MD    Part of this note may have been written by using a voice dictation software. The note has been proof read but may still contain some grammatical/other typographical errors.

## 2022-09-16 NOTE — PROGRESS NOTES
heart     Thrombus 10/02/2015     Past Surgical History:   Procedure Laterality Date    CARDIAC CATHETERIZATION      5 stents total    CARDIAC DEFIBRILLATOR PLACEMENT      [employer], ICD    CARDIAC PROCEDURE N/A 2022    LEFT HEART CATH / CORONARY ANGIOGRAPHY performed by Jostin Monroy MD at [facility] CATH LAB    COLONOSCOPY  2010    CORONARY ARTERY BYPASS GRAFT N/A 2022    CORONARY ARTERY BYPASS GRAFT (CABG X 3), LIMA ; ENDOSCOPIC VEIN HARVEST, LEFT GREATER SAPHENOUS VEIN performed by Elie Coleman MD at [facility] Right 2022    CYSTOSCOPY,RIGHT URETEROSCOPY,RIGHT RETROGRADE PYELOGRAM performed by Thanh Gooden MD at [address]      oral surgery    PACEMAKER      MD CARDIAC SURG PROCEDURE UNLIST       1 stent     TRANSESOPHAGEAL ECHOCARDIOGRAM N/A 2022    TRANSESOPHAGEAL ECHOCARDIOGRAM performed by Elie Coleman MD at [facility] MAIN OR      Family History   Problem Relation Age of Onset    Heart Disease Mother     Diabetes Paternal Grandfather     Cancer Paternal Grandmother     Heart Disease Brother     Diabetes Maternal Grandmother     Bleeding Prob Father     Diabetes Mother     Heart Disease Paternal Grandfather     Heart Attack Mother 67        mi     Social History     Tobacco Use    Smoking status: Former     Packs/day: 1.00     Types: Cigarettes     Start date: 1978     Quit date: 2022     Years since quittin.3    Smokeless tobacco: Never   Substance Use Topics    Alcohol use: No      Current Facility-Administered Medications   Medication Dose Route Frequency Provider Last Rate Last Admin    ondansetron (ZOFRAN-ODT) disintegrating tablet 4 mg  4 mg Oral Q8H PRN Juan Al MD        Or    ondansetron (ZOFRAN) injection 4 mg  4 mg IntraVENous Q6H PRN Juan Al MD        aspirin EC tablet 81 mg  81 mg Oral Daily Juan Al MD   81 mg at 22 0948    Or    aspirin suppository 300 mg  300 mg Rectal Daily Selinda Fothergill, MD        labetalol (NORMODYNE;TRANDATE) injection 10 mg  10 mg IntraVENous Q10 Min PRN Selinda Fothergill, MD        cefTRIAXone (ROCEPHIN) 1,000 mg in sodium chloride 0.9 % 50 mL IVPB mini-bag  1,000 mg IntraVENous Q24H Selinda Fothergill, MD   Stopped at 09/15/22 1705    heparin 25,000 units in dextrose 5% 250 mL (premix) infusion  5-30 Units/kg/hr IntraVENous Continuous Selinda Fothergill, MD 13 mL/hr at 09/16/22 0304 14 Units/kg/hr at 09/16/22 0304        Allergies   Allergen Reactions    Penicillins Swelling     Face swelling    Atorvastatin Other (See Comments)     itching    Evolocumab Other (See Comments)     Itching    Rosuvastatin Other (See Comments)     itching       Unable to obtain ROS due to mental status.       Objective:     Vitals:    09/15/22 0858 09/16/22 0000 09/16/22 0400 09/16/22 0800   BP:  131/78 122/83 115/78   Pulse:  66 67 73   Resp:  18 18 18   Temp:  98.4 °F (36.9 °C) 98.2 °F (36.8 °C) 98.1 °F (36.7 °C)   TempSrc:  Oral Oral Oral   SpO2:  96% 95% 94%   Weight: 205 lb (93 kg)             Current Facility-Administered Medications:     ondansetron (ZOFRAN-ODT) disintegrating tablet 4 mg, 4 mg, Oral, Q8H PRN **OR** ondansetron (ZOFRAN) injection 4 mg, 4 mg, IntraVENous, Q6H PRN, Selinda Fothergill, MD    aspirin EC tablet 81 mg, 81 mg, Oral, Daily, 81 mg at 09/16/22 0948 **OR** aspirin suppository 300 mg, 300 mg, Rectal, Daily, Selinda Fothergill, MD    labetalol (NORMODYNE;TRANDATE) injection 10 mg, 10 mg, IntraVENous, Q10 Min PRN, Selinda Fothergill, MD    cefTRIAXone (ROCEPHIN) 1,000 mg in sodium chloride 0.9 % 50 mL IVPB mini-bag, 1,000 mg, IntraVENous, Q24H, Selinda Fothergill, MD, Stopped at 09/15/22 1705    heparin 25,000 units in dextrose 5% 250 mL (premix) infusion, 5-30 Units/kg/hr, IntraVENous, Continuous, Selinda Fothergill, MD, Last Rate: 13 mL/hr at 09/16/22 0304, 14 Units/kg/hr at 09/16/22 0304    Recent Results (from the past 12 hour(s))   CBC    Collection Time: 09/16/22  4:55 AM Result Value Ref Range    WBC 9.5 4.3 - 11.1 K/uL    RBC 4.64 4.23 - 5.6 M/uL    Hemoglobin 11.3 (L) 13.6 - 17.2 g/dL    Hematocrit 39.6 (L) 41.1 - 50.3 %    MCV 85.3 79.6 - 97.8 FL    MCH 24.4 (L) 26.1 - 32.9 PG    MCHC 28.5 (L) 31.4 - 35.0 g/dL    RDW 18.8 (H) 11.9 - 14.6 %    Platelets 991 048 - 511 K/uL    MPV 10.1 9.4 - 12.3 FL    nRBC 0.00 0.0 - 0.2 K/uL   Hemoglobin A1c    Collection Time: 09/16/22  4:55 AM   Result Value Ref Range    Hemoglobin A1C 5.1 4.8 - 5.6 %    eAG 100 mg/dL   Lipid panel - fasting    Collection Time: 09/16/22  4:55 AM   Result Value Ref Range    Cholesterol, Total 201 (H) <200 MG/DL    Triglycerides 133 35 - 150 MG/DL    HDL 36 (L) 40 - 60 MG/DL    LDL Calculated 138.4 (H) <100 MG/DL    VLDL Cholesterol Calculated 26.6 (H) 6.0 - 23.0 MG/DL    Chol/HDL Ratio 5.6     Protime-INR    Collection Time: 09/16/22  4:55 AM   Result Value Ref Range    Protime 15.8 (H) 12.6 - 14.5 sec    INR 1.2     Anti-Xa, Unfractionated Heparin    Collection Time: 09/16/22  8:30 AM   Result Value Ref Range    Anti-XA Unfrac Heparin 0.20 (L) 0.3 - 0.7 IU/mL         Most recent CTA  Results for orders placed during the hospital encounter of 09/15/22    CTA HEAD NECK W WO CONTRAST    Narrative  Title:  CT arteriogram of the neck and head. Indication: Severe aphasia. Cerebrovascular accident (CVA). Left MCA  distribution stroke. Technique: Axial images of the neck and head were obtained after the uneventful  administration of intravenous iodinated contrast media. Contrast was used to  best identify the arterial structures. Images were reviewed on a separate, free  standing, three-dimensional workstation as per the referring physicians request.      All stenosis percentages derived by comparing the narrowest segment with the  distal Internal Carotid Artery luminal diameter, as described in the [place]  American Symptomatic Carotid Endarterectomy Trial (NASCET) criteria.     All CT scans at this facility are performed using dose reduction/dose modulation  techniques, as appropriate the performed exam, including the following:  Automated Exposure Control; Adjustment of the mA and/or kV according to patient  size (this includes techniques or standardized protocols for targeted exams  where dose is matched to indication/reason for exam); and Use of Iterative  Reconstruction Technique. The study was analyzed by the [address] ai algorithm. Comparison: None. Findings:    Lungs:  No focal consolidation or pleural effusions. No suspicious pulmonary  nodules. .  Bones:  No osseous destruction. .  Paranasal sinuses:  Paranasal sinuses are clear. .  Brain:  No midline shift. No enhancing mass lesion or hydrocephalus. .  Low-density in the left MCA distribution. Soft tissues:  Within normal limits. Other: Partially imaged left subclavian approach cardiac rhythm maintenance  device. Partially imaged prior median sternotomy. Coronary atherosclerosis. Dural venous sinuses:  Patent. Aortic arch:  Non-aneurysmal. Arch vessels are patent. ANTERIOR CIRCULATION    Right common carotid artery:  No significant stenosis or occlusion. Right internal carotid artery:  No significant stenosis or occlusion. Mild  atherosclerosis of the right carotid bulb. Moderate tortuosity of the distal  right ICA. Right middle cerebral artery:  Abrupt arterial cutoff in the distal left M1  segment of the left MCA. M2 and M3 segment branches do appear to opacify with  contrast.  Right anterior cerebral artery:  No significant stenosis, occlusion, or  aneurysm. Left common carotid artery: No significant stenosis or occlusion. Left internal carotid artery:  No significant stenosis or occlusion. Mild  atherosclerotic changes at the left carotid bulb. Moderate tortuosity of the  distal left ICA. Left middle cerebral artery:  No significant stenosis, occlusion, or aneurysm.   Left anterior cerebral artery:  No significant stenosis, occlusion, or aneurysm. Anterior communicating artery: No significant stenosis, occlusion, or aneurysm. POSTERIOR CIRCULATION    Right vertebral artery: Moderate stenosis at the origin of the right vertebral  artery. Left vertebral artery:  No significant stenosis or occlusion. Basilar artery:  No significant stenosis, occlusion, or aneurysm. Right posterior communicating artery: No significant stenosis, occlusion, or  aneurysm. Left posterior communicating artery:  No significant stenosis, occlusion, or  aneurysm. Right posterior cerebral artery:  No significant stenosis, occlusion, or  aneurysm. Left posterior cerebral artery:  No significant stenosis, occlusion, or  aneurysm. Impression  1. Occlusion of the distal left M1 segment of the left MCA, consistent with  acute ischemia. Associated low-density in the left MCA territory. 2.  Moderate stenosis at the origin of the right vertebral artery. Most recent Echo  Results for orders placed during the hospital encounter of 09/15/22    Transthoracic echocardiogram (TTE) complete with contrast, bubble, strain, and 3D PRN    Interpretation Summary  Formatting of this result is different from the original.      Left Ventricle: Severely reduced left ventricular systolic function with a visually estimated EF of 35 - 40%. Left ventricle size is normal. Normal wall thickness. Akinesis and aneurysmal dilation of the apex extending to the mid anteroseptal walls. Grade I diastolic dysfunction with normal LAP. No mass present. 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 Left Atrium: Left atrium is mildly dilated. Interatrial Septum: Agitated saline study was negative with and without provocation. Contrast used: Definity.    -Sinus rhythm noted during the study. -LV apical aneurysm noted but no evidence of LV apical thrombus. -Negative bubble study for intracardiac shunt.          Physical Exam:  General - Well developed, well nourished, in no apparent distress. Pleasant and conversant but with neologisms   HEENT - Normocephalic, atraumatic. Conjunctiva, tympanic membranes, and oropharynx are clear. Neck - Supple without masses, no bruits   Cardiovascular -: Systolic murmur. Lungs - Clear to auscultation. Abdomen - Soft, nontender with normal bowel sounds. Extremities - Peripheral pulses intact. No edema and no rashes. Neurological examination -    Comprehension is poor. Attention is poor. Language testing reveals a prominent receptive aphasia with neologism, concerning for a Wernicke's aphasia. Speech is clear. On cranial nerve examination pupils are equal round and reactive to light. Fundoscopic examination is normal. 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 Extraocular motility shows gaze evoked nystagmus. . Face is symmetric and sensation is intact to light touch. Hearing is intact to finger rustle bilaterally. Motor examination - There is normal muscle tone and bulk. Power is full throughout. Muscle stretch reflexes are normoactive and there are no pathological reflexes present.   He could not participate in sensory examination, cerebellar examination, gait or stance secondary to altered mental status         Signed By: Leticia Oshea DO     September 16, 2022

## 2022-09-17 LAB
ANION GAP SERPL CALC-SCNC: 5 MMOL/L (ref 4–13)
BASOPHILS # BLD: 0.1 K/UL (ref 0–0.2)
BASOPHILS NFR BLD: 1 % (ref 0–2)
BUN SERPL-MCNC: 11 MG/DL (ref 8–23)
CALCIUM SERPL-MCNC: 9.2 MG/DL (ref 8.3–10.4)
CHLORIDE SERPL-SCNC: 106 MMOL/L (ref 101–110)
CO2 SERPL-SCNC: 29 MMOL/L (ref 21–32)
CREAT SERPL-MCNC: 1.2 MG/DL (ref 0.8–1.5)
DIFFERENTIAL METHOD BLD: ABNORMAL
EOSINOPHIL # BLD: 0.3 K/UL (ref 0–0.8)
EOSINOPHIL NFR BLD: 4 % (ref 0.5–7.8)
ERYTHROCYTE [DISTWIDTH] IN BLOOD BY AUTOMATED COUNT: 18.9 % (ref 11.9–14.6)
GLUCOSE SERPL-MCNC: 90 MG/DL (ref 65–100)
HCT VFR BLD AUTO: 38.6 % (ref 41.1–50.3)
HGB BLD-MCNC: 11.3 G/DL (ref 13.6–17.2)
IMM GRANULOCYTES # BLD AUTO: 0 K/UL (ref 0–0.5)
IMM GRANULOCYTES NFR BLD AUTO: 1 % (ref 0–5)
INR PPP: 1.2
LYMPHOCYTES # BLD: 2.6 K/UL (ref 0.5–4.6)
LYMPHOCYTES NFR BLD: 30 % (ref 13–44)
MCH RBC QN AUTO: 24.8 PG (ref 26.1–32.9)
MCHC RBC AUTO-ENTMCNC: 29.3 G/DL (ref 31.4–35)
MCV RBC AUTO: 84.6 FL (ref 79.6–97.8)
MONOCYTES # BLD: 0.8 K/UL (ref 0.1–1.3)
MONOCYTES NFR BLD: 9 % (ref 4–12)
NEUTS SEG # BLD: 4.8 K/UL (ref 1.7–8.2)
NEUTS SEG NFR BLD: 56 % (ref 43–78)
NRBC # BLD: 0 K/UL (ref 0–0.2)
PLATELET # BLD AUTO: 346 K/UL (ref 150–450)
PMV BLD AUTO: 9.8 FL (ref 9.4–12.3)
POTASSIUM SERPL-SCNC: 3.8 MMOL/L (ref 3.5–5.1)
PROTHROMBIN TIME: 15.3 SEC (ref 12.6–14.5)
RBC # BLD AUTO: 4.56 M/UL (ref 4.23–5.6)
SODIUM SERPL-SCNC: 140 MMOL/L (ref 138–145)
T4 FREE SERPL-MCNC: 1 NG/DL (ref 0.78–1.46)
TSH W FREE THYROID IF ABNORMAL: 12.7 UIU/ML (ref 0.36–3.74)
UFH PPP CHRO-ACNC: 0.49 IU/ML (ref 0.3–0.7)
UFH PPP CHRO-ACNC: <0.1 IU/ML (ref 0.3–0.7)
WBC # BLD AUTO: 8.7 K/UL (ref 4.3–11.1)

## 2022-09-17 PROCEDURE — 85610 PROTHROMBIN TIME: CPT

## 2022-09-17 PROCEDURE — 84443 ASSAY THYROID STIM HORMONE: CPT

## 2022-09-17 PROCEDURE — 6360000002 HC RX W HCPCS: Performed by: FAMILY MEDICINE

## 2022-09-17 PROCEDURE — 85025 COMPLETE CBC W/AUTO DIFF WBC: CPT

## 2022-09-17 PROCEDURE — 85520 HEPARIN ASSAY: CPT

## 2022-09-17 PROCEDURE — 99232 SBSQ HOSP IP/OBS MODERATE 35: CPT | Performed by: INTERNAL MEDICINE

## 2022-09-17 PROCEDURE — 6370000000 HC RX 637 (ALT 250 FOR IP): Performed by: FAMILY MEDICINE

## 2022-09-17 PROCEDURE — 6370000000 HC RX 637 (ALT 250 FOR IP): Performed by: INTERNAL MEDICINE

## 2022-09-17 PROCEDURE — 84439 ASSAY OF FREE THYROXINE: CPT

## 2022-09-17 PROCEDURE — 36415 COLL VENOUS BLD VENIPUNCTURE: CPT

## 2022-09-17 PROCEDURE — 1100000000 HC RM PRIVATE

## 2022-09-17 PROCEDURE — 80048 BASIC METABOLIC PNL TOTAL CA: CPT

## 2022-09-17 RX ORDER — AMIODARONE HYDROCHLORIDE 200 MG/1
400 TABLET ORAL DAILY
Status: DISCONTINUED | OUTPATIENT
Start: 2022-09-18 | End: 2022-09-20 | Stop reason: HOSPADM

## 2022-09-17 RX ORDER — LEVOTHYROXINE SODIUM 0.07 MG/1
75 TABLET ORAL DAILY
Status: DISCONTINUED | OUTPATIENT
Start: 2022-09-17 | End: 2022-09-18

## 2022-09-17 RX ADMIN — AMIODARONE HYDROCHLORIDE 400 MG: 200 TABLET ORAL at 10:31

## 2022-09-17 RX ADMIN — HEPARIN SODIUM 16 UNITS/KG/HR: 10000 INJECTION, SOLUTION INTRAVENOUS at 03:44

## 2022-09-17 RX ADMIN — LEVOTHYROXINE SODIUM 75 MCG: 0.07 TABLET ORAL at 10:35

## 2022-09-17 RX ADMIN — HEPARIN SODIUM 16 UNITS/KG/HR: 10000 INJECTION, SOLUTION INTRAVENOUS at 23:22

## 2022-09-17 RX ADMIN — VITAMIN D, TAB 1000IU (100/BT) 1000 UNITS: 25 TAB at 10:31

## 2022-09-17 RX ADMIN — ASPIRIN 81 MG: 81 TABLET ORAL at 10:31

## 2022-09-17 RX ADMIN — MEXILETINE HYDROCHLORIDE 150 MG: 150 CAPSULE ORAL at 10:31

## 2022-09-17 RX ADMIN — QUETIAPINE FUMARATE 100 MG: 100 TABLET ORAL at 21:02

## 2022-09-17 RX ADMIN — MEXILETINE HYDROCHLORIDE 150 MG: 150 CAPSULE ORAL at 21:02

## 2022-09-17 ASSESSMENT — PAIN SCALES - GENERAL
PAINLEVEL_OUTOF10: 0
PAINLEVEL_OUTOF10: 0

## 2022-09-17 NOTE — CARE COORDINATION
CM spoke with pt's dtr, Beata Meliton, via phone, re: dc planning. She confirmed that the pt will have support and supervision in the home after dc. Pt's brother, friend and neighbor have all volunteered to assist as needed. She requested that the pt have [facility] services through [facility] [place]. [facility] referral for RN & SLP services faxed to [facility]. No other dc needs or concerns identified or reported. CM to continue to follow to assist as needed. 09/17/22 5174   Service Assessment   Patient Orientation Alert and Oriented   Cognition Alert   History Provided By Patient; Child/Family;Medical Record   Primary Caregiver Family   Support Systems Parent; Children;Family Members;Friends/Neighbors   Patient's Healthcare Decision Maker is: Named in [address] [place] [address]   PCP Verified by CM Yes   Last Visit to PCP Within last 3 months   Prior Functional Level Independent in ADLs/IADLs   Current Functional Level Independent in ADLs/IADLs   Can patient return to prior living arrangement Yes   Ability to make needs known: Fair   Family able to assist with home care needs: Yes   Would you like for me to discuss the discharge plan with any other family members/significant others, and if so, who? Yes  (Cheryl/dtr)   Financial Resources Medicare   Social/Functional History   Lives With Alone   Type of [address] [place] [address] Help From Family;Friend(s); Neighbor   ADL Assistance Independent   Homemaking Assistance Independent   Ambulation Assistance Independent   Transfer Assistance Independent   Active  No   Patient's  Info family and neighbors assist with transportation since pt is legally blind   Occupation Retired   Discharge Planning   Type of [address] [place] [address] Prior To Admission None   [address]   DME Ordered?  No   Potential Assistance Purchasing Medications No   Type of Home Care Services Skilled Therapy;Nursing Services   Patient expects to be discharged to: House   History of falls? 0   Services At/After Discharge   Transition of Care Consult (CM Consult) [facility] Discharge SLP;Nursing services; [address] Resource Information Provided? No   Mode of Transport at Discharge Other (see comment)  (family)   Confirm Follow Up Transport Family  (family or neighbors)   Condition of Participation: Discharge Planning   The Plan for Transition of Care is related to the following treatment goals: Home health nursing and SLP to improve pt's disease management and aphasia s/p CVA   The Patient and/or Patient Representative was provided with a Choice of Provider? Patient;Patient Representative   Name of the Patient Representative who was provided with the Choice of Provider and agrees with the Discharge Plan? Cheryl/dtr   The Patient and/Or Patient Representative agree with the Discharge Plan? Yes   Freedom of Choice list was provided with basic dialogue that supports the patient's individualized plan of care/goals, treatment preferences, and shares the quality data associated with the providers?   Yes

## 2022-09-17 NOTE — PROGRESS NOTES
Hospitalist Progress Note   Admit Date:  9/15/2022  8:56 AM   Name:  Radha Ulloa   Age:  58 y.o. Sex:  male  :  1960   MRN:  [id number]   Room:  [facility]/    Presenting Complaint: Altered Mental Status     Reason(s) for Admission: Acute cerebrovascular accident (CVA) [facility]) [I63.9]  Cerebrovascular accident (CVA), unspecified mechanism [facility]) [I63.9]     Hospital Course:   Radha Ulloa is a 58 y.o. male with medical history of coronary artery disease with history of CABG in May 2022, history of intracardiac thrombus, history of V. tach status post ICD, chronic systolic congestive heart failure, legally blind, statin intolerance and on disability. Patient lives alone, can take care of his day-to-day activity at baseline. As per triage note patient's interview text last night, EMS this morning noticed that patient was not answering any questions appropriately. As per triage note last known well was 2 days ago reported by neighbor. Time of examination, patient's brother and father was at bedside. As per patient's prior, patient was completely normal until yesterday afternoon when they dropped him back at his home after an appointment. Patient presently has expressive aphasia with neologism, cannot give a good review of systems secondary to expressive aphasia. He is able to respond to verbal commands. When wrote on a paper, if he had any headache or dizziness any nausea vomiting abdominal pain or chest pain or shortness of breath-indicates as no. CT head-  Findings in the left frontal and temporal lobes concerning for acute MCA   territory infarction. Although not highly suspected, a mass is difficult to   exclude entirely. Recommend brain MRI without and with IV contrast.   Patient was seen by Dr. Cierra Medina, felt embolic, okay to start heparin drip for subtherapeutic INR. INR 1.3. Patient be admitted for acute CVA.     9/15 -CTA head and neck shows left M1 segment of the MCA occlusion      Subjective & 24hr Events (09/17/22): Patient without acute event overnight. BMP wnl and Hg stable. Assessment & Plan:     Principal Problem:    Acute cerebrovascular accident (CVA) ([facility] [address]  Plan: Will continue heparin drip until his INR is therapeutic. Patient will need 24/7 supervision but no skilled needs. Will be in the hospital until his INR is therapeutic. Will follow up with Neuro as an outpatient    Active Problems:    Chronic systolic congestive heart failure ([address]  Plan: compensated      Ureteral obstruction  Plan: Removal of stent planned as an outpatient on 9/22      Pyuria  Plan: UA shows leukocyte Esterace but patient without fever and leukocytosis. Will d/c iv rocephin      Ventricular thrombus  Plan: holding coumadin for now until Urology's plan has been elucidated       ICD (implantable cardioverter-defibrillator) in place  Plan: aware      Legally blind  Plan: aware      Subclinical hypothyroidism  Plan: Cardiology feels it is not due to [address] [place] [address]. Recommend starting levothyroxine. Will start levothyroxine at 75 mcg. Anticipated discharge needs: Will need 24/7 supervision. Rubens Rico is the preferred POA and would like to contacted to arrange his care. Diet:  ADULT DIET; Easy to Chew  DVT PPx: Heparin drip  Code status: Full Code    Hospital Problems:  Principal Problem:    Cerebrovascular accident (CVA) ([address]  Active Problems:    Chronic systolic congestive heart failure ([address]    ICD (implantable cardioverter-defibrillator) in place    S/P CABG x 3    Legally blind    Subclinical hypothyroidism    Coronary artery disease involving autologous vein coronary bypass graft without angina pectoris    Statin intolerance    History of hydronephrosis  Resolved Problems:    * No resolved hospital problems.  *      Objective:   Patient Vitals for the past 24 hrs:   Temp Pulse Resp BP SpO2   09/17/22 0800 98.4 °F (36.9 °C) 71 12 104/70 92 %   09/17/22 0400 97.5 °F (36.4 °C) 64 17 108/72 94 %   09/17/22 0007 99 °F (37.2 °C) 69 17 115/71 92 %   09/16/22 2000 98.8 °F (37.1 °C) 65 17 125/73 94 %   09/16/22 1534 98.4 °F (36.9 °C) 65 18 116/83 96 %   09/16/22 1200 98.6 °F (37 °C) 71 18 116/74 95 %       Oxygen Therapy  SpO2: 92 %  O2 Device: None (Room air)    Estimated body mass index is 30.27 kg/m² as calculated from the following:    Height as of 9/14/22: 5' 9\" (1.753 m). Weight as of this encounter: 205 lb (93 kg). Intake/Output Summary (Last 24 hours) at 9/17/2022 1025  Last data filed at 9/17/2022 0800  Gross per 24 hour   Intake 600 ml   Output --   Net 600 ml         Physical Exam:     Blood pressure 104/70, pulse 71, temperature 98.4 °F (36.9 °C), temperature source Oral, resp. rate 12, weight 205 lb (93 kg), SpO2 92 %. General:    Well nourished. Head:  Normocephalic, atraumatic  Eyes:  Sclerae appear normal.  Pupils equally round. ENT:  Nares appear normal, no drainage. Moist oral mucosa  Neck:  No restricted ROM. Trachea midline   CV:   RRR. No m/r/g. No jugular venous distension. Lungs:   CTAB. No wheezing, rhonchi, or rales. Symmetric expansion. Abdomen: Bowel sounds present. Soft, nontender, nondistended. Extremities: No cyanosis or clubbing. No edema  Skin:     No rashes and normal coloration. Warm and dry. Neuro:  Legaly blind. Sensation intact. A&Ox1. Aphasic,   Psych:  Normal mood and affect.       I have personally reviewed labs and tests showing:  Recent Labs:  Recent Results (from the past 48 hour(s))   Troponin    Collection Time: 09/15/22 11:13 AM   Result Value Ref Range    Troponin, High Sensitivity 7.1 0 - 14 pg/mL   Transthoracic echocardiogram (TTE) complete with contrast, bubble, strain, and 3D PRN    Collection Time: 09/15/22  2:20 PM   Result Value Ref Range    Fractional Shortening 2D 23 28 - 44 %    LV RWT Ratio 0.34     LV Mass 2D 174.5 88 - 224 g    MV E/A 0.50     E/E' Ratio (Averaged) 6.75     E/E' Lateral 4.50     E/E' Septal 9.00     AV Velocity Ratio 0.82     LVOT:AV VTI Index 0.88     Est. RA Pressure 3 mmHg    RVSP 25 mmHg    LV EDV A2C 141 mL    LV EDV A4C 154 mL    LV ESV A2C 99 mL    LV ESV A4C 106 mL    IVSd 0.9 0.6 - 1.0 cm    LVIDd 5.3 4.2 - 5.9 cm    LVIDs 4.1 cm    LVOT Diameter 2.3 cm    LVOT Mean Gradient 1 mmHg    LVOT VTI 16.8 cm    LVOT Peak Velocity 0.9 m/s    LVOT Peak Gradient 3 mmHg    LVPWd 0.9 0.6 - 1.0 cm    LV E' Lateral Velocity 10 cm/s    LV E' Septal Velocity 5 cm/s    LV Ejection Fraction A2C 30 %    LV Ejection Fraction A4C 31 %    EF BP 30 (A) 55 - 100 %    LVOT Area 4.2 cm2    LVOT SV 69.8 ml    LA Minor Axis 5.1 cm    LA Major Stewart 5.6 cm    LA Area 2C 18.4 cm2    LA Area 4C 16.9 cm2    LA Volume BP 48 18 - 58 mL    AV Mean Velocity 0.7 m/s    AV Mean Gradient 2 mmHg    AV VTI 19.2 cm    AV Peak Velocity 1.1 m/s    AV Peak Gradient 4 mmHg    AV Area by VTI 3.6 cm2    AV Area by Peak Velocity 3.6 cm2    Aortic Root 3.4 cm    Ascending Aorta 3.1 cm    IVC Proxmal 1.5 cm    MV E Wave Deceleration Time 168.0 ms    MV A Velocity 0.90 m/s    MV E Velocity 0.45 m/s    TAPSE 1.6 (A) 1.7 cm    TR Max Velocity 2.35 m/s    TR Peak Gradient 22 mmHg   APTT    Collection Time: 09/15/22  3:44 PM   Result Value Ref Range    PTT 33.7 24.1 - 35.1 SEC   Anti-Xa, Unfractionated Heparin    Collection Time: 09/15/22  9:48 PM   Result Value Ref Range    Anti-XA Unfrac Heparin <0.10 (L) 0.3 - 0.7 IU/mL   CBC    Collection Time: 09/16/22  4:55 AM   Result Value Ref Range    WBC 9.5 4.3 - 11.1 K/uL    RBC 4.64 4.23 - 5.6 M/uL    Hemoglobin 11.3 (L) 13.6 - 17.2 g/dL    Hematocrit 39.6 (L) 41.1 - 50.3 %    MCV 85.3 79.6 - 97.8 FL    MCH 24.4 (L) 26.1 - 32.9 PG    MCHC 28.5 (L) 31.4 - 35.0 g/dL    RDW 18.8 (H) 11.9 - 14.6 %    Platelets 726 385 - 340 K/uL    MPV 10.1 9.4 - 12.3 FL    nRBC 0.00 0.0 - 0.2 K/uL   Hemoglobin A1c    Collection Time: 09/16/22  4:55 AM   Result Value Ref Range    Hemoglobin A1C 5.1 4.8 - 5.6 %    eAG 100 mg/dL   Lipid panel - fasting    Collection Time: 09/16/22  4:55 AM   Result Value Ref Range    Cholesterol, Total 201 (H) <200 MG/DL    Triglycerides 133 35 - 150 MG/DL    HDL 36 (L) 40 - 60 MG/DL    LDL Calculated 138.4 (H) <100 MG/DL    VLDL Cholesterol Calculated 26.6 (H) 6.0 - 23.0 MG/DL    Chol/HDL Ratio 5.6     Protime-INR    Collection Time: 09/16/22  4:55 AM   Result Value Ref Range    Protime 15.8 (H) 12.6 - 14.5 sec    INR 1.2     Anti-Xa, Unfractionated Heparin    Collection Time: 09/16/22  8:30 AM   Result Value Ref Range    Anti-XA Unfrac Heparin 0.20 (L) 0.3 - 0.7 IU/mL   Anti-Xa, Unfractionated Heparin    Collection Time: 09/16/22  3:59 PM   Result Value Ref Range    Anti-XA Unfrac Heparin 0.38 0.3 - 0.7 IU/mL   Anti-Xa, Unfractionated Heparin    Collection Time: 09/16/22 11:03 PM   Result Value Ref Range    Anti-XA Unfrac Heparin 0.49 0.3 - 0.7 IU/mL   Protime-INR    Collection Time: 09/17/22  4:55 AM   Result Value Ref Range    Protime 15.3 (H) 12.6 - 14.5 sec    INR 1.2     CBC with Auto Differential    Collection Time: 09/17/22  4:55 AM   Result Value Ref Range    WBC 8.7 4.3 - 11.1 K/uL    RBC 4.56 4.23 - 5.6 M/uL    Hemoglobin 11.3 (L) 13.6 - 17.2 g/dL    Hematocrit 38.6 (L) 41.1 - 50.3 %    MCV 84.6 79.6 - 97.8 FL    MCH 24.8 (L) 26.1 - 32.9 PG    MCHC 29.3 (L) 31.4 - 35.0 g/dL    RDW 18.9 (H) 11.9 - 14.6 %    Platelets 184 629 - 029 K/uL    MPV 9.8 9.4 - 12.3 FL    nRBC 0.00 0.0 - 0.2 K/uL    Differential Type AUTOMATED      Seg Neutrophils 56 43 - 78 %    Lymphocytes 30 13 - 44 %    Monocytes 9 4.0 - 12.0 %    Eosinophils % 4 0.5 - 7.8 %    Basophils 1 0.0 - 2.0 %    Immature Granulocytes 1 0.0 - 5.0 %    Segs Absolute 4.8 1.7 - 8.2 K/UL    Absolute Lymph # 2.6 0.5 - 4.6 K/UL    Absolute Mono # 0.8 0.1 - 1.3 K/UL    Absolute Eos # 0.3 0.0 - 0.8 K/UL    Basophils Absolute 0.1 0.0 - 0.2 K/UL    Absolute Immature Granulocyte 0.0 0.0 - 0.5 K/UL   Basic Metabolic Panel w/ Reflex to MG    Collection Time: 09/17/22  4:55 AM   Result Value Ref Range    Sodium 140 138 - 145 mmol/L    Potassium 3.8 3.5 - 5.1 mmol/L    Chloride 106 101 - 110 mmol/L    CO2 29 21 - 32 mmol/L    Anion Gap 5 4 - 13 mmol/L    Glucose 90 65 - 100 mg/dL    BUN 11 8 - 23 MG/DL    Creatinine 1.20 0.8 - 1.5 MG/DL    GFR African American >60 >60 ml/min/1.73m2    GFR Non- >60 >60 ml/min/1.73m2    Calcium 9.2 8.3 - 10.4 MG/DL   TSH with Reflex    Collection Time: 09/17/22  4:55 AM   Result Value Ref Range    TSH w Free Thyroid if Abnormal 12.70 (H) 0.358 - 3.740 UIU/ML   T4, Free    Collection Time: 09/17/22  4:55 AM   Result Value Ref Range    T4 Free 1.0 0.78 - 1.46 NG/DL       I have personally reviewed imaging studies showing: Other Studies:  CTA HEAD NECK W WO CONTRAST   Final Result   1. Occlusion of the distal left M1 segment of the left MCA, consistent with   acute ischemia. Associated low-density in the left MCA territory. 2.  Moderate stenosis at the origin of the right vertebral artery. CT HEAD WO CONTRAST   Final Result   Findings in the left frontal and temporal lobes concerning for acute MCA   territory infarction. Although not highly suspected, a mass is difficult to   exclude entirely. Recommend brain MRI without and with IV contrast.         XR CHEST 1 VIEW   Final Result   1. Stable mild cardiomegaly. No significant evolving acute changes are seen. Only worsening aeration is seen of the lungs which may be related to the   patient's respiratory effort. This report was made using voice transcription. Despite my best efforts to avoid   any, transcription errors may persist. If there is any question about the   accuracy of the report or need for clarification, then please call [phone], or text me through [email or web address] for clarification or correction.            Current Meds:  Current Facility-Administered Medications   Medication Dose Route Frequency

## 2022-09-18 LAB
ANION GAP SERPL CALC-SCNC: 5 MMOL/L (ref 4–13)
BASOPHILS # BLD: 0.1 K/UL (ref 0–0.2)
BASOPHILS NFR BLD: 1 % (ref 0–2)
BUN SERPL-MCNC: 12 MG/DL (ref 8–23)
CALCIUM SERPL-MCNC: 9 MG/DL (ref 8.3–10.4)
CHLORIDE SERPL-SCNC: 106 MMOL/L (ref 101–110)
CO2 SERPL-SCNC: 28 MMOL/L (ref 21–32)
CREAT SERPL-MCNC: 1.3 MG/DL (ref 0.8–1.5)
DIFFERENTIAL METHOD BLD: ABNORMAL
EOSINOPHIL # BLD: 0.4 K/UL (ref 0–0.8)
EOSINOPHIL NFR BLD: 5 % (ref 0.5–7.8)
ERYTHROCYTE [DISTWIDTH] IN BLOOD BY AUTOMATED COUNT: 19 % (ref 11.9–14.6)
GLUCOSE SERPL-MCNC: 104 MG/DL (ref 65–100)
HCT VFR BLD AUTO: 37.5 % (ref 41.1–50.3)
HGB BLD-MCNC: 11.2 G/DL (ref 13.6–17.2)
IMM GRANULOCYTES # BLD AUTO: 0 K/UL (ref 0–0.5)
IMM GRANULOCYTES NFR BLD AUTO: 1 % (ref 0–5)
INR PPP: 1.2
LYMPHOCYTES # BLD: 2.2 K/UL (ref 0.5–4.6)
LYMPHOCYTES NFR BLD: 27 % (ref 13–44)
MCH RBC QN AUTO: 25.3 PG (ref 26.1–32.9)
MCHC RBC AUTO-ENTMCNC: 29.9 G/DL (ref 31.4–35)
MCV RBC AUTO: 84.8 FL (ref 79.6–97.8)
MONOCYTES # BLD: 0.9 K/UL (ref 0.1–1.3)
MONOCYTES NFR BLD: 11 % (ref 4–12)
NEUTS SEG # BLD: 4.5 K/UL (ref 1.7–8.2)
NEUTS SEG NFR BLD: 55 % (ref 43–78)
NRBC # BLD: 0 K/UL (ref 0–0.2)
PLATELET # BLD AUTO: 321 K/UL (ref 150–450)
PMV BLD AUTO: 9.6 FL (ref 9.4–12.3)
POTASSIUM SERPL-SCNC: 3.9 MMOL/L (ref 3.5–5.1)
PROTHROMBIN TIME: 15.2 SEC (ref 12.6–14.5)
RBC # BLD AUTO: 4.42 M/UL (ref 4.23–5.6)
SODIUM SERPL-SCNC: 139 MMOL/L (ref 138–145)
UFH PPP CHRO-ACNC: 0.55 IU/ML (ref 0.3–0.7)
UFH PPP CHRO-ACNC: 0.64 IU/ML (ref 0.3–0.7)
UFH PPP CHRO-ACNC: 0.71 IU/ML (ref 0.3–0.7)
WBC # BLD AUTO: 8.1 K/UL (ref 4.3–11.1)

## 2022-09-18 PROCEDURE — 85610 PROTHROMBIN TIME: CPT

## 2022-09-18 PROCEDURE — 6360000002 HC RX W HCPCS: Performed by: HOSPITALIST

## 2022-09-18 PROCEDURE — 6370000000 HC RX 637 (ALT 250 FOR IP): Performed by: INTERNAL MEDICINE

## 2022-09-18 PROCEDURE — 2580000003 HC RX 258: Performed by: HOSPITALIST

## 2022-09-18 PROCEDURE — 80048 BASIC METABOLIC PNL TOTAL CA: CPT

## 2022-09-18 PROCEDURE — 6360000002 HC RX W HCPCS: Performed by: FAMILY MEDICINE

## 2022-09-18 PROCEDURE — 36415 COLL VENOUS BLD VENIPUNCTURE: CPT

## 2022-09-18 PROCEDURE — 85025 COMPLETE CBC W/AUTO DIFF WBC: CPT

## 2022-09-18 PROCEDURE — 6370000000 HC RX 637 (ALT 250 FOR IP): Performed by: FAMILY MEDICINE

## 2022-09-18 PROCEDURE — 6370000000 HC RX 637 (ALT 250 FOR IP): Performed by: HOSPITALIST

## 2022-09-18 PROCEDURE — 1100000000 HC RM PRIVATE

## 2022-09-18 PROCEDURE — 85520 HEPARIN ASSAY: CPT

## 2022-09-18 RX ORDER — METOPROLOL SUCCINATE 25 MG/1
25 TABLET, EXTENDED RELEASE ORAL 2 TIMES DAILY
Status: DISCONTINUED | OUTPATIENT
Start: 2022-09-18 | End: 2022-09-20 | Stop reason: HOSPADM

## 2022-09-18 RX ORDER — LEVOTHYROXINE SODIUM 0.05 MG/1
50 TABLET ORAL DAILY
Status: DISCONTINUED | OUTPATIENT
Start: 2022-09-19 | End: 2022-09-20 | Stop reason: HOSPADM

## 2022-09-18 RX ORDER — WARFARIN SODIUM 5 MG/1
2.5 TABLET ORAL DAILY
Status: DISCONTINUED | OUTPATIENT
Start: 2022-09-18 | End: 2022-09-19

## 2022-09-18 RX ADMIN — METOPROLOL SUCCINATE 25 MG: 25 TABLET, EXTENDED RELEASE ORAL at 21:51

## 2022-09-18 RX ADMIN — ASPIRIN 81 MG: 81 TABLET ORAL at 10:23

## 2022-09-18 RX ADMIN — CEFTRIAXONE 1000 MG: 1 INJECTION, POWDER, FOR SOLUTION INTRAMUSCULAR; INTRAVENOUS at 12:46

## 2022-09-18 RX ADMIN — QUETIAPINE FUMARATE 100 MG: 100 TABLET ORAL at 21:51

## 2022-09-18 RX ADMIN — METOPROLOL SUCCINATE 25 MG: 25 TABLET, EXTENDED RELEASE ORAL at 10:23

## 2022-09-18 RX ADMIN — WARFARIN SODIUM 2.5 MG: 5 TABLET ORAL at 18:41

## 2022-09-18 RX ADMIN — LEVOTHYROXINE SODIUM 75 MCG: 0.07 TABLET ORAL at 06:09

## 2022-09-18 RX ADMIN — MEXILETINE HYDROCHLORIDE 150 MG: 150 CAPSULE ORAL at 21:51

## 2022-09-18 RX ADMIN — HEPARIN SODIUM 16 UNITS/KG/HR: 10000 INJECTION, SOLUTION INTRAVENOUS at 16:30

## 2022-09-18 RX ADMIN — MEXILETINE HYDROCHLORIDE 150 MG: 150 CAPSULE ORAL at 10:22

## 2022-09-18 RX ADMIN — AMIODARONE HYDROCHLORIDE 400 MG: 200 TABLET ORAL at 10:23

## 2022-09-18 RX ADMIN — VITAMIN D, TAB 1000IU (100/BT) 1000 UNITS: 25 TAB at 10:23

## 2022-09-18 ASSESSMENT — PAIN SCALES - GENERAL
PAINLEVEL_OUTOF10: 0
PAINLEVEL_OUTOF10: 0

## 2022-09-18 NOTE — PROGRESS NOTES
Hospitalist Progress Note   Admit Date:  9/15/2022  8:56 AM   Name:  Benji Connolly   Age:  58 y.o. Sex:  male  :  1960   MRN:  [id number]   Room:  [facility]/    Presenting Complaint: Altered Mental Status     Reason(s) for Admission: Acute cerebrovascular accident (CVA) [facility]) [I63.9]  Cerebrovascular accident (CVA), unspecified mechanism [facility] [I63.9]     Hospital Course:   Benji Connolly is a 58 y.o. male with medical history of coronary artery disease with history of CABG in May 2022, history of intracardiac thrombus, history of V. tach status post ICD, chronic systolic congestive heart failure, legally blind, statin intolerance and on disability. Patient lives alone, can take care of his day-to-day activity at baseline. As per triage note patient's interview text last night, EMS this morning noticed that patient was not answering any questions appropriately. As per triage note last known well was 2 days ago reported by neighbor. Time of examination, patient's brother and father was at bedside. As per patient's prior, patient was completely normal until yesterday afternoon when they dropped him back at his home after an appointment. Patient presently has expressive aphasia with neologism, cannot give a good review of systems secondary to expressive aphasia. He is able to respond to verbal commands. When wrote on a paper, if he had any headache or dizziness any nausea vomiting abdominal pain or chest pain or shortness of breath-indicates as no. CT head-  Findings in the left frontal and temporal lobes concerning for acute MCA   territory infarction. Although not highly suspected, a mass is difficult to   exclude entirely. Recommend brain MRI without and with IV contrast.   Patient was seen by Dr. Fawn Finnegan, felt embolic, okay to start heparin drip for subtherapeutic INR. INR 1.3. Patient be admitted for acute CVA.     9/15 -CTA head and neck shows left M1 hydronephrosis  Resolved Problems:    * No resolved hospital problems. *      Objective:   Patient Vitals for the past 24 hrs:   Temp Pulse Resp BP SpO2   09/18/22 1200 98.6 °F (37 °C) 63 18 109/75 93 %   09/18/22 1023 -- 62 -- 109/68 --   09/18/22 0800 98.6 °F (37 °C) 70 18 94/67 95 %   09/18/22 0400 98.2 °F (36.8 °C) 65 (!) 118 94/67 91 %   09/18/22 0000 98.4 °F (36.9 °C) 71 18 102/70 93 %   09/17/22 2000 98.4 °F (36.9 °C) 67 18 109/71 93 %   09/17/22 1600 98.2 °F (36.8 °C) 67 18 111/75 --   09/17/22 1522 -- -- -- -- 93 %       Oxygen Therapy  SpO2: 93 %  O2 Device: None (Room air)    Estimated body mass index is 30.27 kg/m² as calculated from the following:    Height as of 9/14/22: 5' 9\" (1.753 m). Weight as of this encounter: 205 lb (93 kg). Intake/Output Summary (Last 24 hours) at 9/18/2022 1336  Last data filed at 9/17/2022 1803  Gross per 24 hour   Intake 240 ml   Output --   Net 240 ml         Physical Exam:     Blood pressure 109/75, pulse 63, temperature 98.6 °F (37 °C), temperature source Oral, resp. rate 18, weight 205 lb (93 kg), SpO2 93 %. General:    Well nourished. Alert, awake,   Head:  Normocephalic, atraumatic  Eyes:  Sclerae appear normal.  Pupils equally round. ENT:  Nares appear normal, no drainage. Moist oral mucosa  Neck:  No restricted ROM. Trachea midline   CV:   RRR. No m/r/g. No jugular venous distension. Lungs:   CTAB. No wheezing, rhonchi, or rales. Symmetric expansion. Abdomen: Bowel sounds present. Soft, nontender, nondistended. Extremities: No cyanosis or clubbing. No edema  Skin:     No rashes and normal coloration. Warm and dry. Neuro:  Legaly blind. Aphasic,  Sensation intact. Alert, awake,   Psych:  Normal mood and affect.       I have personally reviewed labs and tests showing:  Recent Labs:  Recent Results (from the past 48 hour(s))   Anti-Xa, Unfractionated Heparin    Collection Time: 09/16/22  3:59 PM   Result Value Ref Range    Anti-XA Unfrac Heparin 0.38 0.3 - 0.7 IU/mL   Anti-Xa, Unfractionated Heparin    Collection Time: 09/16/22 11:03 PM   Result Value Ref Range    Anti-XA Unfrac Heparin 0.49 0.3 - 0.7 IU/mL   Protime-INR    Collection Time: 09/17/22  4:55 AM   Result Value Ref Range    Protime 15.3 (H) 12.6 - 14.5 sec    INR 1.2     CBC with Auto Differential    Collection Time: 09/17/22  4:55 AM   Result Value Ref Range    WBC 8.7 4.3 - 11.1 K/uL    RBC 4.56 4.23 - 5.6 M/uL    Hemoglobin 11.3 (L) 13.6 - 17.2 g/dL    Hematocrit 38.6 (L) 41.1 - 50.3 %    MCV 84.6 79.6 - 97.8 FL    MCH 24.8 (L) 26.1 - 32.9 PG    MCHC 29.3 (L) 31.4 - 35.0 g/dL    RDW 18.9 (H) 11.9 - 14.6 %    Platelets 515 302 - 324 K/uL    MPV 9.8 9.4 - 12.3 FL    nRBC 0.00 0.0 - 0.2 K/uL    Differential Type AUTOMATED      Seg Neutrophils 56 43 - 78 %    Lymphocytes 30 13 - 44 %    Monocytes 9 4.0 - 12.0 %    Eosinophils % 4 0.5 - 7.8 %    Basophils 1 0.0 - 2.0 %    Immature Granulocytes 1 0.0 - 5.0 %    Segs Absolute 4.8 1.7 - 8.2 K/UL    Absolute Lymph # 2.6 0.5 - 4.6 K/UL    Absolute Mono # 0.8 0.1 - 1.3 K/UL    Absolute Eos # 0.3 0.0 - 0.8 K/UL    Basophils Absolute 0.1 0.0 - 0.2 K/UL    Absolute Immature Granulocyte 0.0 0.0 - 0.5 K/UL   Basic Metabolic Panel w/ Reflex to MG    Collection Time: 09/17/22  4:55 AM   Result Value Ref Range    Sodium 140 138 - 145 mmol/L    Potassium 3.8 3.5 - 5.1 mmol/L    Chloride 106 101 - 110 mmol/L    CO2 29 21 - 32 mmol/L    Anion Gap 5 4 - 13 mmol/L    Glucose 90 65 - 100 mg/dL    BUN 11 8 - 23 MG/DL    Creatinine 1.20 0.8 - 1.5 MG/DL    GFR African American >60 >60 ml/min/1.73m2    GFR Non- >60 >60 ml/min/1.73m2    Calcium 9.2 8.3 - 10.4 MG/DL   TSH with Reflex    Collection Time: 09/17/22  4:55 AM   Result Value Ref Range    TSH w Free Thyroid if Abnormal 12.70 (H) 0.358 - 3.740 UIU/ML   T4, Free    Collection Time: 09/17/22  4:55 AM   Result Value Ref Range    T4 Free 1.0 0.78 - 1.46 NG/DL   Anti-Xa, Unfractionated Heparin Collection Time: 09/17/22 10:42 PM   Result Value Ref Range    Anti-XA Unfrac Heparin <0.10 (L) 0.3 - 0.7 IU/mL   CBC with Auto Differential    Collection Time: 09/18/22  6:02 AM   Result Value Ref Range    WBC 8.1 4.3 - 11.1 K/uL    RBC 4.42 4.23 - 5.6 M/uL    Hemoglobin 11.2 (L) 13.6 - 17.2 g/dL    Hematocrit 37.5 (L) 41.1 - 50.3 %    MCV 84.8 79.6 - 97.8 FL    MCH 25.3 (L) 26.1 - 32.9 PG    MCHC 29.9 (L) 31.4 - 35.0 g/dL    RDW 19.0 (H) 11.9 - 14.6 %    Platelets 032 385 - 462 K/uL    MPV 9.6 9.4 - 12.3 FL    nRBC 0.00 0.0 - 0.2 K/uL    Differential Type AUTOMATED      Seg Neutrophils 55 43 - 78 %    Lymphocytes 27 13 - 44 %    Monocytes 11 4.0 - 12.0 %    Eosinophils % 5 0.5 - 7.8 %    Basophils 1 0.0 - 2.0 %    Immature Granulocytes 1 0.0 - 5.0 %    Segs Absolute 4.5 1.7 - 8.2 K/UL    Absolute Lymph # 2.2 0.5 - 4.6 K/UL    Absolute Mono # 0.9 0.1 - 1.3 K/UL    Absolute Eos # 0.4 0.0 - 0.8 K/UL    Basophils Absolute 0.1 0.0 - 0.2 K/UL    Absolute Immature Granulocyte 0.0 0.0 - 0.5 K/UL   Basic Metabolic Panel w/ Reflex to MG    Collection Time: 09/18/22  6:02 AM   Result Value Ref Range    Sodium 139 138 - 145 mmol/L    Potassium 3.9 3.5 - 5.1 mmol/L    Chloride 106 101 - 110 mmol/L    CO2 28 21 - 32 mmol/L    Anion Gap 5 4 - 13 mmol/L    Glucose 104 (H) 65 - 100 mg/dL    BUN 12 8 - 23 MG/DL    Creatinine 1.30 0.8 - 1.5 MG/DL    GFR African American >60 >60 ml/min/1.73m2    GFR Non- 59 (L) >60 ml/min/1.73m2    Calcium 9.0 8.3 - 10.4 MG/DL   Anti-Xa, Unfractionated Heparin    Collection Time: 09/18/22  6:02 AM   Result Value Ref Range    Anti-XA Unfrac Heparin 0.64 0.3 - 0.7 IU/mL   Protime-INR    Collection Time: 09/18/22  6:02 AM   Result Value Ref Range    Protime 15.2 (H) 12.6 - 14.5 sec    INR 1.2     Anti-Xa, Unfractionated Heparin    Collection Time: 09/18/22 12:34 PM   Result Value Ref Range    Anti-XA Unfrac Heparin 0.71 (H) 0.3 - 0.7 IU/mL       I have personally reviewed imaging studies showing: Other Studies:  CTA HEAD NECK W WO CONTRAST   Final Result   1. Occlusion of the distal left M1 segment of the left MCA, consistent with   acute ischemia. Associated low-density in the left MCA territory. 2.  Moderate stenosis at the origin of the right vertebral artery. CT HEAD WO CONTRAST   Final Result   Findings in the left frontal and temporal lobes concerning for acute MCA   territory infarction. Although not highly suspected, a mass is difficult to   exclude entirely. Recommend brain MRI without and with IV contrast.         XR CHEST 1 VIEW   Final Result   1. Stable mild cardiomegaly. No significant evolving acute changes are seen. Only worsening aeration is seen of the lungs which may be related to the   patient's respiratory effort. This report was made using voice transcription. Despite my best efforts to avoid   any, transcription errors may persist. If there is any question about the   accuracy of the report or need for clarification, then please call [phone], or text me through [employer] for clarification or correction.            Current Meds:  Current Facility-Administered Medications   Medication Dose Route Frequency    metoprolol succinate (TOPROL XL) extended release tablet 25 mg  25 mg Oral BID    cefTRIAXone (ROCEPHIN) 1,000 mg in sodium chloride 0.9 % 50 mL IVPB mini-bag  1,000 mg IntraVENous Q24H    levothyroxine (SYNTHROID) tablet 75 mcg  75 mcg Oral Daily    amiodarone (CORDARONE) tablet 400 mg  400 mg Oral Daily    hyoscyamine (LEVSIN/SL) sublingual tablet 125 mcg  125 mcg SubLINGual Q4H PRN    mexiletine (MEXITIL) capsule 150 mg  150 mg Oral BID    QUEtiapine (SEROQUEL) tablet 100 mg  100 mg Oral Nightly    Vitamin D (CHOLECALCIFEROL) tablet 1,000 Units  1,000 Units Oral Daily    ondansetron (ZOFRAN-ODT) disintegrating tablet 4 mg  4 mg Oral Q8H PRN    Or    ondansetron (ZOFRAN) injection 4 mg  4 mg IntraVENous Q6H PRN    aspirin EC tablet 81 mg  81 mg Oral Daily    Or    aspirin suppository 300 mg  300 mg Rectal Daily    labetalol (NORMODYNE;TRANDATE) injection 10 mg  10 mg IntraVENous Q10 Min PRN    heparin 25,000 units in dextrose 5% 250 mL (premix) infusion  5-30 Units/kg/hr IntraVENous Continuous       Signed:  Josias Be MD    Part of this note may have been written by using a voice dictation software. The note has been proof read but may still contain some grammatical/other typographical errors.

## 2022-09-19 LAB
ANION GAP SERPL CALC-SCNC: 3 MMOL/L (ref 4–13)
BASOPHILS # BLD: 0.1 K/UL (ref 0–0.2)
BASOPHILS NFR BLD: 1 % (ref 0–2)
BUN SERPL-MCNC: 14 MG/DL (ref 8–23)
CALCIUM SERPL-MCNC: 8.8 MG/DL (ref 8.3–10.4)
CHLORIDE SERPL-SCNC: 106 MMOL/L (ref 101–110)
CO2 SERPL-SCNC: 29 MMOL/L (ref 21–32)
CREAT SERPL-MCNC: 1.3 MG/DL (ref 0.8–1.5)
DIFFERENTIAL METHOD BLD: ABNORMAL
EOSINOPHIL # BLD: 0.5 K/UL (ref 0–0.8)
EOSINOPHIL NFR BLD: 5 % (ref 0.5–7.8)
ERYTHROCYTE [DISTWIDTH] IN BLOOD BY AUTOMATED COUNT: 19 % (ref 11.9–14.6)
GLUCOSE SERPL-MCNC: 91 MG/DL (ref 65–100)
HCT VFR BLD AUTO: 39.2 % (ref 41.1–50.3)
HGB BLD-MCNC: 11.6 G/DL (ref 13.6–17.2)
IMM GRANULOCYTES # BLD AUTO: 0 K/UL (ref 0–0.5)
IMM GRANULOCYTES NFR BLD AUTO: 0 % (ref 0–5)
INR PPP: 1.1
LYMPHOCYTES # BLD: 2 K/UL (ref 0.5–4.6)
LYMPHOCYTES NFR BLD: 22 % (ref 13–44)
MCH RBC QN AUTO: 25.2 PG (ref 26.1–32.9)
MCHC RBC AUTO-ENTMCNC: 29.6 G/DL (ref 31.4–35)
MCV RBC AUTO: 85 FL (ref 79.6–97.8)
MONOCYTES # BLD: 0.9 K/UL (ref 0.1–1.3)
MONOCYTES NFR BLD: 10 % (ref 4–12)
NEUTS SEG # BLD: 5.7 K/UL (ref 1.7–8.2)
NEUTS SEG NFR BLD: 61 % (ref 43–78)
NRBC # BLD: 0 K/UL (ref 0–0.2)
PLATELET # BLD AUTO: 339 K/UL (ref 150–450)
PMV BLD AUTO: 9.6 FL (ref 9.4–12.3)
POTASSIUM SERPL-SCNC: 3.9 MMOL/L (ref 3.5–5.1)
PROTHROMBIN TIME: 14.4 SEC (ref 12.6–14.5)
RBC # BLD AUTO: 4.61 M/UL (ref 4.23–5.6)
SODIUM SERPL-SCNC: 138 MMOL/L (ref 136–145)
UFH PPP CHRO-ACNC: 0.46 IU/ML (ref 0.3–0.7)
UFH PPP CHRO-ACNC: 0.49 IU/ML (ref 0.3–0.7)
WBC # BLD AUTO: 9.2 K/UL (ref 4.3–11.1)

## 2022-09-19 PROCEDURE — 85520 HEPARIN ASSAY: CPT

## 2022-09-19 PROCEDURE — 2580000003 HC RX 258: Performed by: HOSPITALIST

## 2022-09-19 PROCEDURE — 6360000002 HC RX W HCPCS: Performed by: FAMILY MEDICINE

## 2022-09-19 PROCEDURE — 6370000000 HC RX 637 (ALT 250 FOR IP): Performed by: INTERNAL MEDICINE

## 2022-09-19 PROCEDURE — 6370000000 HC RX 637 (ALT 250 FOR IP): Performed by: HOSPITALIST

## 2022-09-19 PROCEDURE — 85610 PROTHROMBIN TIME: CPT

## 2022-09-19 PROCEDURE — 6360000002 HC RX W HCPCS: Performed by: HOSPITALIST

## 2022-09-19 PROCEDURE — 6370000000 HC RX 637 (ALT 250 FOR IP): Performed by: FAMILY MEDICINE

## 2022-09-19 PROCEDURE — 92507 TX SP LANG VOICE COMM INDIV: CPT

## 2022-09-19 PROCEDURE — 36415 COLL VENOUS BLD VENIPUNCTURE: CPT

## 2022-09-19 PROCEDURE — 1100000003 HC PRIVATE W/ TELEMETRY

## 2022-09-19 PROCEDURE — 97530 THERAPEUTIC ACTIVITIES: CPT

## 2022-09-19 PROCEDURE — 85025 COMPLETE CBC W/AUTO DIFF WBC: CPT

## 2022-09-19 PROCEDURE — 99232 SBSQ HOSP IP/OBS MODERATE 35: CPT | Performed by: INTERNAL MEDICINE

## 2022-09-19 PROCEDURE — 80048 BASIC METABOLIC PNL TOTAL CA: CPT

## 2022-09-19 RX ORDER — WARFARIN SODIUM 5 MG/1
5 TABLET ORAL DAILY
Status: DISCONTINUED | OUTPATIENT
Start: 2022-09-19 | End: 2022-09-20 | Stop reason: HOSPADM

## 2022-09-19 RX ADMIN — HEPARIN SODIUM 14 UNITS/KG/HR: 10000 INJECTION, SOLUTION INTRAVENOUS at 11:29

## 2022-09-19 RX ADMIN — CEFTRIAXONE 1000 MG: 1 INJECTION, POWDER, FOR SOLUTION INTRAMUSCULAR; INTRAVENOUS at 10:52

## 2022-09-19 RX ADMIN — VITAMIN D, TAB 1000IU (100/BT) 1000 UNITS: 25 TAB at 09:34

## 2022-09-19 RX ADMIN — ASPIRIN 81 MG: 81 TABLET ORAL at 09:34

## 2022-09-19 RX ADMIN — METOPROLOL SUCCINATE 25 MG: 25 TABLET, EXTENDED RELEASE ORAL at 21:01

## 2022-09-19 RX ADMIN — METOPROLOL SUCCINATE 25 MG: 25 TABLET, EXTENDED RELEASE ORAL at 09:34

## 2022-09-19 RX ADMIN — AMIODARONE HYDROCHLORIDE 400 MG: 200 TABLET ORAL at 09:34

## 2022-09-19 RX ADMIN — MEXILETINE HYDROCHLORIDE 150 MG: 150 CAPSULE ORAL at 09:34

## 2022-09-19 RX ADMIN — QUETIAPINE FUMARATE 100 MG: 100 TABLET ORAL at 21:01

## 2022-09-19 RX ADMIN — LEVOTHYROXINE SODIUM 50 MCG: 0.05 TABLET ORAL at 05:23

## 2022-09-19 RX ADMIN — WARFARIN SODIUM 5 MG: 5 TABLET ORAL at 17:34

## 2022-09-19 RX ADMIN — MEXILETINE HYDROCHLORIDE 150 MG: 150 CAPSULE ORAL at 21:01

## 2022-09-19 ASSESSMENT — PAIN SCALES - GENERAL
PAINLEVEL_OUTOF10: 0
PAINLEVEL_OUTOF10: 0

## 2022-09-19 NOTE — PLAN OF CARE
Problem: Discharge Planning  Goal: Discharge to home or other facility with appropriate resources  Outcome: Progressing     Problem: Chronic Conditions and Co-morbidities  Goal: Patient's chronic conditions and co-morbidity symptoms are monitored and maintained or improved  Outcome: Progressing     Problem: Pain  Goal: Verbalizes/displays adequate comfort level or baseline comfort level  Outcome: Progressing     Problem: Skin/Tissue Integrity  Goal: Absence of new skin breakdown  Description: 1. Monitor for areas of redness and/or skin breakdown  2. Assess vascular access sites hourly  3. Every 4-6 hours minimum:  Change oxygen saturation probe site  4. Every 4-6 hours:  If on nasal continuous positive airway pressure, respiratory therapy assess nares and determine need for appliance change or resting period.   Outcome: Progressing     Problem: Safety - Adult  Goal: Free from fall injury  Outcome: Progressing  Flowsheets (Taken 9/19/2022 0800)  Free From Fall Injury:   Instruct family/caregiver on patient safety   Based on caregiver fall risk screen, instruct family/caregiver to ask for assistance with transferring infant if caregiver noted to have fall risk factors     Problem: ABCDS Injury Assessment  Goal: Absence of physical injury  Outcome: Progressing  Flowsheets (Taken 9/19/2022 0800)  Absence of Physical Injury: Implement safety measures based on patient assessment

## 2022-09-19 NOTE — PROGRESS NOTES
Hospitalist Progress Note   Admit Date:  9/15/2022  8:56 AM   Name:  Leigh Ann Paniagua   Age:  58 y.o. Sex:  male  :  1960   MRN:  [id number]   Room:  [facility]/    Presenting Complaint: Altered Mental Status     Reason(s) for Admission: Acute cerebrovascular accident (CVA) [facility]) [I63.9]  Cerebrovascular accident (CVA), unspecified mechanism [facility]) [I63.9]     Hospital Course:   Leigh Ann Panaigua is a 58 y.o. male with medical history of coronary artery disease with history of CABG in May 2022, history of intracardiac thrombus, history of V. tach status post ICD, chronic systolic congestive heart failure, legally blind, statin intolerance and on disability. Patient lives alone, can take care of his day-to-day activity at baseline. As per triage note patient's interview text last night, EMS this morning noticed that patient was not answering any questions appropriately. As per triage note last known well was 2 days ago reported by neighbor. Time of examination, patient's brother and father was at bedside. As per patient's prior, patient was completely normal until yesterday afternoon when they dropped him back at his home after an appointment. Patient presently has expressive aphasia with neologism, cannot give a good review of systems secondary to expressive aphasia. He is able to respond to verbal commands. When wrote on a paper, if he had any headache or dizziness any nausea vomiting abdominal pain or chest pain or shortness of breath-indicates as no. CT head-  Findings in the left frontal and temporal lobes concerning for acute MCA   territory infarction. Although not highly suspected, a mass is difficult to   exclude entirely. Recommend brain MRI without and with IV contrast.   Patient was seen by Dr. Nimo Marroquin, felt embolic, okay to start heparin drip for subtherapeutic INR. Patient be admitted for acute CVA.     9/15 -CTA head and neck shows left M1 segment of the MCA occlusion      Subjective & 24hr Events (09/19/22): Patient has expressive aphasia. Able to work with therapy. No chest pain. No shortness of breath. Assessment & Plan:     Principal Problem: This is a 58 y Male with:    Acute ischemic cerebrovascular accident (CVA) ([address]  Will continue heparin drip until his INR is therapeutic. Patient will need 24/7 supervision but no skilled needs. Continue Coumadin. INR is 1.1 this morning. Will be in the hospital until his INR is therapeutic. Will follow up with Neurology as an outpatient. Continue Aspirin. He is intolerant to statins. Active Problems:    Chronic systolic congestive heart failure ([address]  Plan: compensated. Ureteral obstruction  Plan: Removal of stent initially planned as an outpatient on 9/22  Urology consulted. Appreciate recommendations. Pyuria/UTI   Unfortunately urine cultures were not sent. Continue ceftriaxone as urine analysis is suggestive of UTI. Ventricular thrombus  Restart Coumadin. ICD (implantable cardioverter-defibrillator) in place  Plan: aware      Legally blind  Plan: aware      Subclinical hypothyroidism  Plan: Cardiology feels it is not due to amiodarone. Recommend starting levothyroxine. Continue levothyroxine dose decreased to 50 mcg. Anticipated discharge needs:    Anticipate discharge home with home health when INR is therapeutic. Will need 24/7 supervision. Patient's daughter Ms. Otis Rocha updated regarding patient condition and current plan of care.       Diet:  ADULT DIET; Easy to Chew  DVT PPx: Heparin drip/Coumadin  Code status: Full Code    Hospital Problems:  Principal Problem:    Cerebrovascular accident (CVA) ([address]  Active Problems:    Chronic systolic congestive heart failure ([address]    ICD (implantable cardioverter-defibrillator) in place    S/P CABG x 3    Legally blind    Subclinical hypothyroidism    Coronary artery disease involving autologous vein coronary bypass graft without angina pectoris    Ventricular tachycardia (HCC)    Statin intolerance    History of hydronephrosis  Resolved Problems:    * No resolved hospital problems. *      Objective:   Patient Vitals for the past 24 hrs:   Temp Pulse Resp BP SpO2   09/19/22 1200 98.8 °F (37.1 °C) 59 18 104/67 --   09/19/22 1116 -- -- -- -- 93 %   09/19/22 0800 98.6 °F (37 °C) 60 17 98/68 --   09/19/22 0754 -- -- -- -- 95 %   09/19/22 0400 98.6 °F (37 °C) 64 18 98/77 92 %   09/19/22 0000 98.8 °F (37.1 °C) 63 18 98/76 94 %   09/18/22 2000 99.5 °F (37.5 °C) 69 18 108/72 96 %   09/18/22 1600 98.6 °F (37 °C) 74 18 99/64 --       Oxygen Therapy  SpO2: 93 %  O2 Device: None (Room air)    Estimated body mass index is 30.27 kg/m² as calculated from the following:    Height as of 9/14/22: 5' 9\" (1.753 m). Weight as of this encounter: 205 lb (93 kg). Intake/Output Summary (Last 24 hours) at 9/19/2022 1427  Last data filed at 9/19/2022 1412  Gross per 24 hour   Intake 410 ml   Output 1250 ml   Net -840 ml         Physical Exam:     Blood pressure 104/67, pulse 59, temperature 98.8 °F (37.1 °C), temperature source Oral, resp. rate 18, weight 205 lb (93 kg), SpO2 93 %. General:    Well nourished. Alert, awake,   Head:  Normocephalic, atraumatic  Eyes:  Sclerae appear normal.  Pupils equally round. ENT:  Nares appear normal, no drainage. Moist oral mucosa  Neck:  No restricted ROM. Trachea midline   CV:   RRR. No m/r/g. No jugular venous distension. Lungs:   CTAB. No wheezing, rhonchi, or rales. Symmetric expansion. Abdomen: Bowel sounds present. Soft, nontender, nondistended. Extremities: No cyanosis or clubbing. No edema  Skin:     No rashes and normal coloration. Warm and dry. Neuro:  Legaly blind. Aphasic, strength intact, sensation intact. Alert, awake,   Psych:  Flat mood and affect.       I have personally reviewed labs and tests showing:  Recent Labs:  Recent Results (from the past 48 hour(s))   Anti-Xa, Unfractionated Heparin    Collection Time: 09/17/22 10:42 PM   Result Value Ref Range    Anti-XA Unfrac Heparin <0.10 (L) 0.3 - 0.7 IU/mL   CBC with Auto Differential    Collection Time: 09/18/22  6:02 AM   Result Value Ref Range    WBC 8.1 4.3 - 11.1 K/uL    RBC 4.42 4.23 - 5.6 M/uL    Hemoglobin 11.2 (L) 13.6 - 17.2 g/dL    Hematocrit 37.5 (L) 41.1 - 50.3 %    MCV 84.8 79.6 - 97.8 FL    MCH 25.3 (L) 26.1 - 32.9 PG    MCHC 29.9 (L) 31.4 - 35.0 g/dL    RDW 19.0 (H) 11.9 - 14.6 %    Platelets 233 669 - 935 K/uL    MPV 9.6 9.4 - 12.3 FL    nRBC 0.00 0.0 - 0.2 K/uL    Differential Type AUTOMATED      Seg Neutrophils 55 43 - 78 %    Lymphocytes 27 13 - 44 %    Monocytes 11 4.0 - 12.0 %    Eosinophils % 5 0.5 - 7.8 %    Basophils 1 0.0 - 2.0 %    Immature Granulocytes 1 0.0 - 5.0 %    Segs Absolute 4.5 1.7 - 8.2 K/UL    Absolute Lymph # 2.2 0.5 - 4.6 K/UL    Absolute Mono # 0.9 0.1 - 1.3 K/UL    Absolute Eos # 0.4 0.0 - 0.8 K/UL    Basophils Absolute 0.1 0.0 - 0.2 K/UL    Absolute Immature Granulocyte 0.0 0.0 - 0.5 K/UL   Basic Metabolic Panel w/ Reflex to MG    Collection Time: 09/18/22  6:02 AM   Result Value Ref Range    Sodium 139 138 - 145 mmol/L    Potassium 3.9 3.5 - 5.1 mmol/L    Chloride 106 101 - 110 mmol/L    CO2 28 21 - 32 mmol/L    Anion Gap 5 4 - 13 mmol/L    Glucose 104 (H) 65 - 100 mg/dL    BUN 12 8 - 23 MG/DL    Creatinine 1.30 0.8 - 1.5 MG/DL    GFR African American >60 >60 ml/min/1.73m2    GFR Non- 59 (L) >60 ml/min/1.73m2    Calcium 9.0 8.3 - 10.4 MG/DL   Anti-Xa, Unfractionated Heparin    Collection Time: 09/18/22  6:02 AM   Result Value Ref Range    Anti-XA Unfrac Heparin 0.64 0.3 - 0.7 IU/mL   Protime-INR    Collection Time: 09/18/22  6:02 AM   Result Value Ref Range    Protime 15.2 (H) 12.6 - 14.5 sec    INR 1.2     Anti-Xa, Unfractionated Heparin    Collection Time: 09/18/22 12:34 PM   Result Value Ref Range    Anti-XA Unfrac Heparin 0.71 (H) 0.3 - 0.7 IU/mL   Anti-Xa, Unfractionated Heparin    Collection Time: 09/18/22  7:00 PM   Result Value Ref Range    Anti-XA Unfrac Heparin 0.55 0.3 - 0.7 IU/mL   Anti-Xa, Unfractionated Heparin    Collection Time: 09/19/22  1:25 AM   Result Value Ref Range    Anti-XA Unfrac Heparin 0.49 0.3 - 0.7 IU/mL   CBC with Auto Differential    Collection Time: 09/19/22  2:40 AM   Result Value Ref Range    WBC 9.2 4.3 - 11.1 K/uL    RBC 4.61 4.23 - 5.6 M/uL    Hemoglobin 11.6 (L) 13.6 - 17.2 g/dL    Hematocrit 39.2 (L) 41.1 - 50.3 %    MCV 85.0 79.6 - 97.8 FL    MCH 25.2 (L) 26.1 - 32.9 PG    MCHC 29.6 (L) 31.4 - 35.0 g/dL    RDW 19.0 (H) 11.9 - 14.6 %    Platelets 856 118 - 884 K/uL    MPV 9.6 9.4 - 12.3 FL    nRBC 0.00 0.0 - 0.2 K/uL    Differential Type AUTOMATED      Seg Neutrophils 61 43 - 78 %    Lymphocytes 22 13 - 44 %    Monocytes 10 4.0 - 12.0 %    Eosinophils % 5 0.5 - 7.8 %    Basophils 1 0.0 - 2.0 %    Immature Granulocytes 0 0.0 - 5.0 %    Segs Absolute 5.7 1.7 - 8.2 K/UL    Absolute Lymph # 2.0 0.5 - 4.6 K/UL    Absolute Mono # 0.9 0.1 - 1.3 K/UL    Absolute Eos # 0.5 0.0 - 0.8 K/UL    Basophils Absolute 0.1 0.0 - 0.2 K/UL    Absolute Immature Granulocyte 0.0 0.0 - 0.5 K/UL   Basic Metabolic Panel w/ Reflex to MG    Collection Time: 09/19/22  2:40 AM   Result Value Ref Range    Sodium 138 136 - 145 mmol/L    Potassium 3.9 3.5 - 5.1 mmol/L    Chloride 106 101 - 110 mmol/L    CO2 29 21 - 32 mmol/L    Anion Gap 3 (L) 4 - 13 mmol/L    Glucose 91 65 - 100 mg/dL    BUN 14 8 - 23 MG/DL    Creatinine 1.30 0.8 - 1.5 MG/DL    GFR African American >60 >60 ml/min/1.73m2    GFR Non- 59 (L) >60 ml/min/1.73m2    Calcium 8.8 8.3 - 10.4 MG/DL   Protime-INR    Collection Time: 09/19/22  2:40 AM   Result Value Ref Range    Protime 14.4 12.6 - 14.5 sec    INR 1.1     Anti-Xa, Unfractionated Heparin    Collection Time: 09/19/22  9:26 AM   Result Value Ref Range    Anti-XA Unfrac Heparin 0.46 0.3 - 0.7 IU/mL       I have personally reviewed imaging studies showing: Other Studies:  CTA HEAD NECK W WO CONTRAST   Final Result   1. Occlusion of the distal left M1 segment of the left MCA, consistent with   acute ischemia. Associated low-density in the left MCA territory. 2.  Moderate stenosis at the origin of the right vertebral artery. CT HEAD WO CONTRAST   Final Result   Findings in the left frontal and temporal lobes concerning for acute MCA   territory infarction. Although not highly suspected, a mass is difficult to   exclude entirely. Recommend brain MRI without and with IV contrast.         XR CHEST 1 VIEW   Final Result   1. Stable mild cardiomegaly. No significant evolving acute changes are seen. Only worsening aeration is seen of the lungs which may be related to the   patient's respiratory effort. This report was made using voice transcription. Despite my best efforts to avoid   any, transcription errors may persist. If there is any question about the   accuracy of the report or need for clarification, then please call [phone], or text me through [email or web address] for clarification or correction.            Current Meds:  Current Facility-Administered Medications   Medication Dose Route Frequency    warfarin (COUMADIN) tablet 5 mg  5 mg Oral Daily    metoprolol succinate (TOPROL XL) extended release tablet 25 mg  25 mg Oral BID    cefTRIAXone (ROCEPHIN) 1,000 mg in sodium chloride 0.9 % 50 mL IVPB mini-bag  1,000 mg IntraVENous Q24H    levothyroxine (SYNTHROID) tablet 50 mcg  50 mcg Oral Daily    amiodarone (CORDARONE) tablet 400 mg  400 mg Oral Daily    hyoscyamine (LEVSIN/SL) sublingual tablet 125 mcg  125 mcg SubLINGual Q4H PRN    mexiletine (MEXITIL) capsule 150 mg  150 mg Oral BID    QUEtiapine (SEROQUEL) tablet 100 mg  100 mg Oral Nightly    Vitamin D (CHOLECALCIFEROL) tablet 1,000 Units  1,000 Units Oral Daily    ondansetron (ZOFRAN-ODT) disintegrating tablet 4 mg  4 mg Oral Q8H PRN    Or    ondansetron (ZOFRAN) injection 4 mg  4 mg IntraVENous Q6H PRN    aspirin EC tablet 81 mg  81 mg Oral Daily    Or    aspirin suppository 300 mg  300 mg Rectal Daily    labetalol (NORMODYNE;TRANDATE) injection 10 mg  10 mg IntraVENous Q10 Min PRN    heparin 25,000 units in dextrose 5% 250 mL (premix) infusion  5-30 Units/kg/hr IntraVENous Continuous       Signed:  Dany Andujar MD    Part of this note may have been written by using a voice dictation software. The note has been proof read but may still contain some grammatical/other typographical errors.

## 2022-09-19 NOTE — PROGRESS NOTES
PHYSICAL THERAPY: Daily Note PM   (Link to Caseload Tracking: PT Visit Days : 2  Time In/Out PT Charge Capture  Rehab Caseload Tracker  Orders    Jessie Talley is a 58 y.o. male   PRIMARY DIAGNOSIS: Cerebrovascular accident (CVA) ([facility]  Acute cerebrovascular accident (CVA) ([facility] [address] [I63.9]  Cerebrovascular accident (CVA), unspecified mechanism ([facility] [address] [I63.9]       Inpatient: Payor: Ro Nolasco / Plan: [employer] DUAL COMPLETE / Product Type: *No Product type* /     ASSESSMENT:     REHAB RECOMMENDATIONS:   Recommendation to date pending progress:  Setting:  No further skilled therapy after discharge from hospital    Equipment:    To Be Determined     ASSESSMENT:  Mr. Tsering Recinos was received supine in bed, agreeable to therapy. He was able to sit at EOB with SBA. Upon standing pt noted to be incontinent with blood in urine- RN notified. After cleaning up and changing pt gown, he was able to ambulate 500 ft with SBA-CGA and no AD. Pt noted to have B hip ER but moves well. Transfer to chair with SBA. Good progress, will continue to follow.       SUBJECTIVE:   Mr. Tsering Recinos states, \"fine\"     Social/Functional Lives With: Alone  Type of Home: House  Receives Help From: Family, Friend(s), Neighbor  ADL Assistance: Independent  Homemaking Assistance: Independent  Ambulation Assistance: Independent  Transfer Assistance: Independent  Active : No  Patient's  Info: family and neighbors assist with transportation since pt is legally blind  Occupation: Retired  OBJECTIVE:     PAIN: VITALS / O2: PRECAUTION / Edin Mims / Cyndie [place]:   Pre Treatment:   Pain Assessment: None - Denies Pain      Post Treatment: 0 Vitals        Oxygen    Purewick    RESTRICTIONS/PRECAUTIONS:  Restrictions/Precautions  Restrictions/Precautions: Fall Risk  Restrictions/Precautions: Fall Risk     MOBILITY: I Mod I S SBA CGA Min Mod Max Total  NT x2 Comments:   Bed Mobility    Rolling [] [] [] [x] [] [] [] [] [] [] []    Supine to Sit [] [] [] [x] [] [] [] [] [] [] []    Scooting [] [] [] [x] [] [] [] [] [] [] []    Sit to Supine [] [] [] [] [] [] [] [] [] [x] []    Transfers    Sit to Stand [] [] [] [x] [] [] [] [] [] [] []    Bed to Chair [] [] [] [x] [] [] [] [] [] [] []    Stand to Sit [] [] [] [x] [] [] [] [] [] [] []     [] [] [] [] [] [] [] [] [] [] []    I=Independent, Mod I=Modified Independent, S=Supervision, SBA=Standby Assistance, CGA=Contact Guard Assistance,   Min=Minimal Assistance, Mod=Moderate Assistance, Max=Maximal Assistance, Total=Total Assistance, NT=Not Tested    BALANCE: Good Fair+ Fair Fair- Poor NT Comments   Sitting Static [x] [] [] [] [] []    Sitting Dynamic [x] [] [] [] [] []              Standing Static [x] [] [] [] [] []    Standing Dynamic [] [x] [] [] [] []      GAIT: I Mod I S SBA CGA Min Mod Max Total  NT x2 Comments:   Level of Assistance [] [] [] [x] [x] [] [] [] [] [] []    Distance 500 feet    DME None    Gait Quality Decreased sapna  and B hip ER    Weightbearing Status      Stairs      I=Independent, Mod I=Modified Independent, S=Supervision, SBA=Standby Assistance, CGA=Contact Guard Assistance,   Min=Minimal Assistance, Mod=Moderate Assistance, Max=Maximal Assistance, Total=Total Assistance, NT=Not Tested    PLAN:   ACUTE PHYSICAL THERAPY GOALS:   (Developed with and agreed upon by patient and/or caregiver.)     (1.) Richard Cast will ambulate with INDEPENDENT for 750 feet with the least restrictive device within 7 treatment day(s). (2.) Richard Cast will perform standing static and dynamic balance activities x 15 minutes with INDEPENDENT to improve safety within 7 treatment day(s). (3.) Richard Cast will ascend and descend 12 stairs using unilateral hand rail(s) with STAND BY ASSIST to improve functional mobility and safety within 7 treatment day(s).     FREQUENCY AND DURATION: 3 times/week for duration of hospital stay or until stated goals are met, whichever comes first.    TREATMENT:

## 2022-09-19 NOTE — PROGRESS NOTES
[facility] CARDIOLOGY PROGRESS NOTE           9/19/2022 8:00 AM    Admit Date: 9/15/2022      Subjective:   Patient without events. Remains aphasic. No apparent distress. BP remains low. No symptoms. No arrhythmias on telemetry. In sinus rhythm. On IV heparin and coumadin. ROS:  Cardiovascular:  As noted above    Objective:      Vitals:    09/15/22 0858 09/19/22 0000 09/19/22 0400 09/19/22 0754   BP:  98/76 98/77 98/68   Pulse:  63 64 60   Resp:  18 18 17   Temp:  98.8 °F (37.1 °C) 98.6 °F (37 °C) 98.6 °F (37 °C)   TempSrc:  Oral Oral Oral   SpO2:  94% 92% 95%   Weight: 205 lb (93 kg)          Physical Exam:  General-No Acute Distress  Neck- supple, no JVD  CV- regular rate and rhythm no MRG  Lung- clear bilaterally  Abd- soft, nontender, nondistended  Ext- no edema bilaterally. Skin- warm and dry      Data Review:   Recent Labs     09/19/22  0240 09/18/22  0602 09/17/22  0455   WBC 9.2 8.1 8.7   HGB 11.6* 11.2* 11.3*   HCT 39.2* 37.5* 38.6*   MCV 85.0 84.8 84.6    321 346         Recent Labs     09/19/22  0240 09/17/22  0455 09/15/22  0912      < > 141   K 3.9   < > 4.0      < > 110   CO2 29   < > 27   BUN 14   < > 14   CREATININE 1.30   < > 1.40   GLUCOSE 91   < > 127*   CALCIUM 8.8   < > 9.0   PROT  --   --  6.6   LABALBU  --   --  3.0*   BILITOT  --   --  0.2   ALKPHOS  --   --  71   AST  --   --  19   ALT  --   --  14   LABGLOM 59*   < > 55*   GFRAA >60   < > >60   GLOB  --   --  3.6*    < > = values in this interval not displayed. No results for input(s): CKTOTAL, CKMB, CKMBINDEX, DDIMER, TROPONINI in the last 720 hours. Assessment/Plan:     Active Hospital Problems    Chronic systolic congestive heart failure (HCC)  ContinueToprol 25 mg a day. Monitor volume status daily. ICD (implantable cardioverter-defibrillator) in place  Normal function      *Cerebrovascular accident (CVA) ([address]  [place] embolic. Continue IV heparin until INR therapeutic. Patient elects observation. Subclinical hypothyroidism  Defer management to primary team.        S/P CABG x 3  No angina. On ASA. Ventricular tachycardia (HCC)  Continue amiodarone and Mexilitine.                Megan Geiger MD

## 2022-09-19 NOTE — PROGRESS NOTES
GOALS:   LTG: Patient will increase receptive/expressive language skills demonstrated by the ability to communicate basic wants/needs across environments. STG: Patient will answer yes/no questions with 90% accuracy given minimal cueing  STG: Patient will follow 1 step commands with 80% accuracy given min-mod cueing  STG: Patient will complete automatic naming tasks with 90% accuracy given minimal cueing  STG: Patient will complete basic phrase completion with 70% accuracy given moderate cueing. STG: Patient will name common objects with 70% accuracy given moderate cueing. SPEECH LANGUAGE PATHOLOGY: COMMUNICATION Daily Note #1    MRN: [id number]    ADMISSION DATE: 9/15/2022  PRIMARY DIAGNOSIS: Cerebrovascular accident (CVA) ([facility] [address]  Acute cerebrovascular accident (CVA) ([facility] [address] [I63.9]  Cerebrovascular accident (CVA), unspecified mechanism ([facility] [address] [I63.9]    ICD-10: Treatment Diagnosis: F80.2 Mixed Receptive-Expressive Language Disorder  I69.32 Aphasia following Cerebral Infraction    RECOMMENDATIONS:   Recommendations:  aphasia treatment  24/7 supervision upon discharge     Patient continues to require skilled intervention: Yes  Ongoing speech therapy is recommended at next level of care, Ongoing speech therapy is recommended during this hospitalization     ASSESSMENT   Speech Therapy Diagnosis  Communication Diagnosis: global aphasia with severe expressive and receptive language deficits. Comprehension impaired with inability to follow 1-step basic commands;imitates with model. Responses to yes/no questions are unreliable/inconsistent. Verbal expression extremely limited and non functional. Able to count to 10 and state days of the week with minimal cues; however, significant breakdown with basic phrase completion and opposites.        GENERAL      Communication Observation: Aphasia  Follows Directions: None  Patient Positioning: Upright in bed     Respiratory Status: Room air               Prior Dysphagia strategies to communicate wants/needs  Patient Education Response: Verbalizes understanding, Needs reinforcement    PRECAUTIONS/ALLERGIES: Penicillins, Atorvastatin, Evolocumab, and Rosuvastatin     Safety Devices in place: Yes  Type of devices: Nurse notified, Left in bed, Call light within reach    Therapy Time  SLP Individual Minutes  Time In: 1109  Time Out: 0225  Minutes: Nupur Hitchcock, [place]  9/19/2022 11:31 AM

## 2022-09-19 NOTE — PROGRESS NOTES
Warfarin dosing per pharmacist    Kalin Simon is a 58 y.o. male. Weight: 205 lb (93 kg)    Indication:  Hx of ventricular thrombus    Goal INR:  2 to 3    Home dose:  1.25 mg Tues,Thurs,Sat, 2.5 mg all other days of the week   (total weekly dose 13.75 mg)    Risk factors or significant drug interactions:  amiodarone (PTA med)    Other anticoagulants:  Heparin drip bridge therapy    Daily Monitoring  Date  INR     Warfarin dose HGB              Notes  9/18  1.2  2.5 mg  11.2    9/19  1.1  5 mg  11.6      Pharmacy consulted to dose warfarin in patient with history of Ventricular thrombus who presented with subtherapeutic INR of 1.3. 8 Ariela Max clinic notes reviewed to determine outpatient warfarin regimen. INR today 1.1. Will give 5 mg this evening. Daily INR for now.      Thank you,  Dayron Hernández, [facility]

## 2022-09-20 VITALS
RESPIRATION RATE: 20 BRPM | DIASTOLIC BLOOD PRESSURE: 68 MMHG | WEIGHT: 205 LBS | HEART RATE: 60 BPM | SYSTOLIC BLOOD PRESSURE: 100 MMHG | TEMPERATURE: 98.2 F | OXYGEN SATURATION: 95 % | BODY MASS INDEX: 30.27 KG/M2

## 2022-09-20 PROBLEM — Z95.1 S/P CABG X 3: Chronic | Status: ACTIVE | Noted: 2022-05-31

## 2022-09-20 PROBLEM — Z87.448 HISTORY OF HYDRONEPHROSIS: Chronic | Status: ACTIVE | Noted: 2022-07-23

## 2022-09-20 PROBLEM — I47.20 VENTRICULAR TACHYCARDIA (HCC): Status: RESOLVED | Noted: 2020-06-30 | Resolved: 2022-09-20

## 2022-09-20 PROBLEM — H54.8 LEGALLY BLIND: Chronic | Status: ACTIVE | Noted: 2022-09-15

## 2022-09-20 PROBLEM — E03.8 SUBCLINICAL HYPOTHYROIDISM: Chronic | Status: ACTIVE | Noted: 2022-09-15

## 2022-09-20 LAB
ANION GAP SERPL CALC-SCNC: 5 MMOL/L (ref 4–13)
BASOPHILS # BLD: 0.1 K/UL (ref 0–0.2)
BASOPHILS NFR BLD: 1 % (ref 0–2)
BUN SERPL-MCNC: 16 MG/DL (ref 8–23)
CALCIUM SERPL-MCNC: 9.2 MG/DL (ref 8.3–10.4)
CHLORIDE SERPL-SCNC: 105 MMOL/L (ref 101–110)
CO2 SERPL-SCNC: 28 MMOL/L (ref 21–32)
CREAT SERPL-MCNC: 1.4 MG/DL (ref 0.8–1.5)
DIFFERENTIAL METHOD BLD: ABNORMAL
EOSINOPHIL # BLD: 0.5 K/UL (ref 0–0.8)
EOSINOPHIL NFR BLD: 5 % (ref 0.5–7.8)
ERYTHROCYTE [DISTWIDTH] IN BLOOD BY AUTOMATED COUNT: 19.5 % (ref 11.9–14.6)
GLUCOSE BLD STRIP.AUTO-MCNC: 90 MG/DL (ref 65–100)
GLUCOSE SERPL-MCNC: 87 MG/DL (ref 65–100)
HCT VFR BLD AUTO: 41 % (ref 41.1–50.3)
HGB BLD-MCNC: 12 G/DL (ref 13.6–17.2)
IMM GRANULOCYTES # BLD AUTO: 0.1 K/UL (ref 0–0.5)
IMM GRANULOCYTES NFR BLD AUTO: 1 % (ref 0–5)
INR PPP: 1.1
LYMPHOCYTES # BLD: 1.9 K/UL (ref 0.5–4.6)
LYMPHOCYTES NFR BLD: 18 % (ref 13–44)
MCH RBC QN AUTO: 25.3 PG (ref 26.1–32.9)
MCHC RBC AUTO-ENTMCNC: 29.3 G/DL (ref 31.4–35)
MCV RBC AUTO: 86.3 FL (ref 79.6–97.8)
MONOCYTES # BLD: 1 K/UL (ref 0.1–1.3)
MONOCYTES NFR BLD: 10 % (ref 4–12)
NEUTS SEG # BLD: 7.1 K/UL (ref 1.7–8.2)
NEUTS SEG NFR BLD: 67 % (ref 43–78)
NRBC # BLD: 0 K/UL (ref 0–0.2)
PLATELET # BLD AUTO: 323 K/UL (ref 150–450)
PMV BLD AUTO: 9.6 FL (ref 9.4–12.3)
POTASSIUM SERPL-SCNC: 4.1 MMOL/L (ref 3.5–5.1)
PROTHROMBIN TIME: 14.8 SEC (ref 12.6–14.5)
RBC # BLD AUTO: 4.75 M/UL (ref 4.23–5.6)
SERVICE CMNT-IMP: NORMAL
SODIUM SERPL-SCNC: 138 MMOL/L (ref 138–145)
WBC # BLD AUTO: 10.6 K/UL (ref 4.3–11.1)

## 2022-09-20 PROCEDURE — 6370000000 HC RX 637 (ALT 250 FOR IP): Performed by: INTERNAL MEDICINE

## 2022-09-20 PROCEDURE — 2580000003 HC RX 258: Performed by: HOSPITALIST

## 2022-09-20 PROCEDURE — 6360000002 HC RX W HCPCS: Performed by: INTERNAL MEDICINE

## 2022-09-20 PROCEDURE — 6370000000 HC RX 637 (ALT 250 FOR IP): Performed by: FAMILY MEDICINE

## 2022-09-20 PROCEDURE — 99232 SBSQ HOSP IP/OBS MODERATE 35: CPT | Performed by: INTERNAL MEDICINE

## 2022-09-20 PROCEDURE — 6370000000 HC RX 637 (ALT 250 FOR IP): Performed by: HOSPITALIST

## 2022-09-20 PROCEDURE — 6360000002 HC RX W HCPCS: Performed by: FAMILY MEDICINE

## 2022-09-20 PROCEDURE — 36415 COLL VENOUS BLD VENIPUNCTURE: CPT

## 2022-09-20 PROCEDURE — 85610 PROTHROMBIN TIME: CPT

## 2022-09-20 PROCEDURE — 97116 GAIT TRAINING THERAPY: CPT

## 2022-09-20 PROCEDURE — 6360000002 HC RX W HCPCS: Performed by: HOSPITALIST

## 2022-09-20 PROCEDURE — 85025 COMPLETE CBC W/AUTO DIFF WBC: CPT

## 2022-09-20 PROCEDURE — 82962 GLUCOSE BLOOD TEST: CPT

## 2022-09-20 PROCEDURE — 80048 BASIC METABOLIC PNL TOTAL CA: CPT

## 2022-09-20 RX ORDER — CEFDINIR 300 MG/1
300 CAPSULE ORAL 2 TIMES DAILY
Qty: 6 CAPSULE | Refills: 0 | Status: SHIPPED | OUTPATIENT
Start: 2022-09-20 | End: 2022-09-23

## 2022-09-20 RX ORDER — MEXILETINE HYDROCHLORIDE 150 MG/1
150 CAPSULE ORAL 2 TIMES DAILY
Qty: 90 CAPSULE | Refills: 3
Start: 2022-09-20

## 2022-09-20 RX ORDER — METOPROLOL SUCCINATE 25 MG/1
25 TABLET, EXTENDED RELEASE ORAL 2 TIMES DAILY
Qty: 60 TABLET | Refills: 5 | Status: SHIPPED | OUTPATIENT
Start: 2022-09-20

## 2022-09-20 RX ORDER — WARFARIN SODIUM 2.5 MG/1
TABLET ORAL
Qty: 60 TABLET | Refills: 1 | Status: SHIPPED | OUTPATIENT
Start: 2022-09-20

## 2022-09-20 RX ORDER — ENOXAPARIN SODIUM 100 MG/ML
1 INJECTION SUBCUTANEOUS 2 TIMES DAILY
Qty: 14 ML | Refills: 0 | Status: SHIPPED | OUTPATIENT
Start: 2022-09-20 | End: 2022-09-26 | Stop reason: SDUPTHER

## 2022-09-20 RX ORDER — LEVOTHYROXINE SODIUM 0.05 MG/1
50 TABLET ORAL DAILY
Qty: 30 TABLET | Refills: 3 | Status: SHIPPED | OUTPATIENT
Start: 2022-09-21 | End: 2022-10-28

## 2022-09-20 RX ORDER — ENOXAPARIN SODIUM 100 MG/ML
1 INJECTION SUBCUTANEOUS 2 TIMES DAILY
Status: DISCONTINUED | OUTPATIENT
Start: 2022-09-20 | End: 2022-09-20 | Stop reason: HOSPADM

## 2022-09-20 RX ORDER — ASPIRIN 81 MG/1
81 TABLET, CHEWABLE ORAL DAILY
Qty: 30 TABLET | Refills: 3 | Status: SHIPPED | OUTPATIENT
Start: 2022-09-20

## 2022-09-20 RX ORDER — AMIODARONE HYDROCHLORIDE 400 MG/1
400 TABLET ORAL DAILY
Qty: 30 TABLET | Refills: 1 | Status: SHIPPED | OUTPATIENT
Start: 2022-09-21 | End: 2022-10-28

## 2022-09-20 RX ADMIN — HEPARIN SODIUM 14 UNITS/KG/HR: 10000 INJECTION, SOLUTION INTRAVENOUS at 06:06

## 2022-09-20 RX ADMIN — LEVOTHYROXINE SODIUM 50 MCG: 0.05 TABLET ORAL at 06:00

## 2022-09-20 RX ADMIN — VITAMIN D, TAB 1000IU (100/BT) 1000 UNITS: 25 TAB at 09:50

## 2022-09-20 RX ADMIN — ENOXAPARIN SODIUM 90 MG: 100 INJECTION SUBCUTANEOUS at 09:50

## 2022-09-20 RX ADMIN — ASPIRIN 81 MG: 81 TABLET ORAL at 09:49

## 2022-09-20 RX ADMIN — AMIODARONE HYDROCHLORIDE 400 MG: 200 TABLET ORAL at 09:49

## 2022-09-20 RX ADMIN — CEFTRIAXONE 1000 MG: 1 INJECTION, POWDER, FOR SOLUTION INTRAMUSCULAR; INTRAVENOUS at 09:50

## 2022-09-20 RX ADMIN — METOPROLOL SUCCINATE 25 MG: 25 TABLET, EXTENDED RELEASE ORAL at 09:50

## 2022-09-20 RX ADMIN — MEXILETINE HYDROCHLORIDE 150 MG: 150 CAPSULE ORAL at 09:50

## 2022-09-20 NOTE — DISCHARGE INSTRUCTIONS
Lovenox teaching. Home health will follow up with patient at home. Continue to use walker as needed and take all medications as instructed.

## 2022-09-20 NOTE — DISCHARGE SUMMARY
Hospitalist Discharge Summary   Admit Date:  9/15/2022  8:56 AM   DC Note date: 2022  Name:  Rylan Haskins   Age:  58 y.o. Sex:  male  :  1960   MRN:  [id number]   Room:  [facility]  PCP:  Melody Castillo DO    Presenting Complaint: Altered Mental Status     Initial Admission Diagnosis: Acute cerebrovascular accident (CVA) ([address] [I63.9]  Cerebrovascular accident (CVA), unspecified mechanism ([address] [I63.9]     Problem List for this Hospitalization (present on admission):    Principal Problem:    Cerebrovascular accident (CVA) due to embolism ([address]  Active Problems:    Chronic systolic congestive heart failure ([address]    ICD (implantable cardioverter-defibrillator) in place    S/P CABG x 3    Legally blind    Subclinical hypothyroidism    Coronary artery disease involving autologous vein coronary bypass graft without angina pectoris    Left ventricular thrombus    Statin intolerance    History of hydronephrosis  Resolved Problems:    Ventricular tachycardia [facility])      Hospital Course:  Rylan Haskins is a 58 y.o. male with medical history of coronary artery disease with history of CABG in May 2022, history of intracardiac thrombus, history of V. tach status post ICD, chronic systolic congestive heart failure, legally blind, statin intolerance and on disability. Patient lives alone, can take care of his day-to-day activity at baseline. Patient has expressive aphasia with neologism, cannot give a good review of systems secondary to expressive aphasia. He is able to respond to verbal commands. CT head showed findings in the left frontal and temporal lobes concerning for acute MCA territory infarction. Patient admitted for acute CVA. CTA head and neck showed left M1 segment of the MCA occlusion. Cannot get MRI due to ICD. Neurology consulted, suspect embolic in etiology. He was started on heparin gtt and warfarin resumed.   He has not been on eliquis or xarelto due to long term cost, though he does have insurance. He will be transitioned to lovenox for 1 week and warfarin regimen adjusted. Home health to monitor INR and pt will follow up with PCP in 1 week. Continue ASA and warfarin. Intolerant to statins    Pt has a stent in place. Discussed with urology and they are leery of any procedures at the current time. They plan to follow up in 1 month to discuss stent removal timing then. When he does get stent removed; he will need to be bridged perioperatively with loveno, and then warfarin resumed. Re: subclinical hypothyroidism Cardiology feels it is not due to amiodarone. Recommend starting levothyroxine. Continue levothyroxine dose decreased to 50 mcg. Recheck in 6 weeks    Got rocephin for suspected UTI here. Cont omnicef as outpatient. Disposition: home health  Diet: ADULT DIET; Easy to Chew  Code Status: Full Code    Follow Ups:   Contact information for follow-up providers     Mitesh Randall, DO Follow up in 1 week(s). Specialty: Family Medicine  Why: routine follow up, possibly adjust warfarin based on INR values. Contact information:  [address] [address] [phone]                   Contact information for after-discharge care     Discharge [address] [place] [address] . Service: Home Health Services  Why: Home health nursing and speech therapy services. A home health   representative will contact you to schedule the initial home visit. Contact information:  Alana Kiana  [phone]                           Time spent in patient discharge and coordination 35 minutes. Follow up labs/diagnostics (ultimately defer to outpatient provider):  CBC, INR, CMP    Plan was discussed with pt, daughter, CM, IDT. All questions answered. Patient was stable at time of discharge. Instructions given to call a physician or return if any concerns.     Current Discharge Medication List        START taking these medications    Details   levothyroxine (SYNTHROID) 50 MCG tablet Take 1 tablet by mouth Daily  Qty: 30 tablet, Refills: 3      enoxaparin (LOVENOX) 100 MG/ML Inject 0.9 mLs into the skin 2 times daily for 7 days  Qty: 14 mL, Refills: 0      cefdinir (OMNICEF) 300 MG capsule Take 1 capsule by mouth 2 times daily for 3 days  Qty: 6 capsule, Refills: 0           CONTINUE these medications which have CHANGED    Details   amiodarone (PACERONE) 400 MG tablet Take 1 tablet by mouth daily  Qty: 30 tablet, Refills: 1      aspirin 81 MG chewable tablet Take 1 tablet by mouth daily  Qty: 30 tablet, Refills: 3      metoprolol succinate (TOPROL XL) 25 MG extended release tablet Take 1 tablet by mouth in the morning and at bedtime  Qty: 60 tablet, Refills: 5      mexiletine (MEXITIL) 150 MG capsule Take 1 capsule by mouth in the morning and at bedtime  Qty: 90 capsule, Refills: 3      warfarin (COUMADIN) 2.5 MG tablet Take 2.5mg nightly MWF and 5mg nightly on other days. Qty: 60 tablet, Refills: 1           CONTINUE these medications which have NOT CHANGED    Details   nitroGLYCERIN (NITROSTAT) 0.4 MG SL tablet Place 0.4 mg under the tongue every 5 minutes as needed for Chest pain up to max of 3 total doses. If no relief after 1 dose, call 911.       QUEtiapine (SEROQUEL) 100 MG tablet TAKE 1 TABLET BY MOUTH EVERY NIGHT  Qty: 90 tablet, Refills: 3      hyoscyamine (LEVSIN/SL) 125 MCG sublingual tablet Place 1 tablet under the tongue every 4 hours as needed for Cramping  Qty: 90 tablet, Refills: 3      Vitamin D (CHOLECALCIFEROL) 25 MCG (1000 UT) TABS tablet Take 1,000 Units by mouth daily      Multiple Vitamins-Minerals (THERAPEUTIC MULTIVITAMIN-MINERALS) tablet Take 1 tablet by mouth daily      acetaminophen (TYLENOL) 325 mg tablet Take 2 tablets by mouth every 6 hours as needed for Pain  Qty: 120 tablet, Refills: 3      Cetirizine HCl 10 MG CAPS Take by mouth as needed             Procedures done this admission:  * No surgery found *    Consults this admission:  IP CONSULT TO NEUROLOGY  IP CONSULT TO STROKE COORDINATOR  IP CONSULT TO UROLOGY  IP WOUND CARE NURSE CONSULT TO EVAL  IP CONSULT TO CASE MANAGEMENT  IP CONSULT TO CARDIOLOGY  IP CONSULT HOME HEALTH  IP CONSULT TO PHARMACY  IP CONSULT TO UROLOGY    Echocardiogram results:  09/15/22    TRANSTHORACIC ECHOCARDIOGRAM (TTE) COMPLETE (CONTRAST/BUBBLE/3D PRN) 09/15/2022  2:59 PM (Final)    Interpretation Summary  Formatting of this result is different from the original.      Left Ventricle: Severely reduced left ventricular systolic function with a visually estimated EF of 35 - 40%. Left ventricle size is normal. Normal wall thickness. Akinesis and aneurysmal dilation of the apex extending to the mid anteroseptal walls. Grade I diastolic dysfunction with normal LAP. No mass present. 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 Left Atrium: Left atrium is mildly dilated. Interatrial Septum: Agitated saline study was negative with and without provocation. Contrast used: Definity.    -Sinus rhythm noted during the study. -LV apical aneurysm noted but no evidence of LV apical thrombus. -Negative bubble study for intracardiac shunt. Signed by: Jesse Mitchell MD on 9/15/2022  2:59 PM      Diagnostic Imaging/Tests:   CTA HEAD NECK W WO CONTRAST    Result Date: 9/15/2022  1. Occlusion of the distal left M1 segment of the left MCA, consistent with acute ischemia. Associated low-density in the left MCA territory. 2.  Moderate stenosis at the origin of the right vertebral artery. CT HEAD WO CONTRAST    Result Date: 9/15/2022  Findings in the left frontal and temporal lobes concerning for acute MCA territory infarction. Although not highly suspected, a mass is difficult to exclude entirely. Recommend brain MRI without and with IV contrast.     XR CHEST PORTABLE    Result Date: 9/10/2022  Chronic change versus mild infiltrate is seen at the left base.   Resolution of the previously noted left pleural effusion. XR CHEST 1 VIEW    Result Date: 9/15/2022  1. Stable mild cardiomegaly. No significant evolving acute changes are seen. Only worsening aeration is seen of the lungs which may be related to the patient's respiratory effort. This report was made using voice transcription. Despite my best efforts to avoid any, transcription errors may persist. If there is any question about the accuracy of the report or need for clarification, then please call [phone], or text me through perfectserv for clarification or correction. Labs: Results:       BMP, Mg, Phos Recent Labs     09/18/22  0602 09/19/22  0240 09/20/22  0401    138 138   K 3.9 3.9 4.1    106 105   CO2 28 29 28   ANIONGAP 5 3* 5   BUN 12 14 16   CREATININE 1.30 1.30 1.40   LABGLOM 59* 59* 55*   GFRAA >60 >60 >60   CALCIUM 9.0 8.8 9.2   GLUCOSE 104* 91 87      CBC Recent Labs     09/18/22  0602 09/19/22  0240 09/20/22  0401   WBC 8.1 9.2 10.6   RBC 4.42 4.61 4.75   HGB 11.2* 11.6* 12.0*   HCT 37.5* 39.2* 41.0*   MCV 84.8 85.0 86.3   MCH 25.3* 25.2* 25.3*   MCHC 29.9* 29.6* 29.3*   RDW 19.0* 19.0* 19.5*    339 323   MPV 9.6 9.6 9.6   NRBC 0.00 0.00 0.00   SEGS 55 61 67   LYMPHOPCT 27 22 18   EOSRELPCT 5 5 5   MONOPCT 11 10 10   BASOPCT 1 1 1   IMMGRAN 1 0 1   SEGSABS 4.5 5.7 7.1   LYMPHSABS 2.2 2.0 1.9   EOSABS 0.4 0.5 0.5   MONOSABS 0.9 0.9 1.0   BASOSABS 0.1 0.1 0.1   ABSIMMGRAN 0.0 0.0 0.1      LFT No results for input(s): BILITOT, BILIDIR, ALKPHOS, AST, ALT, PROT, LABALBU, GLOB in the last 72 hours.    Cardiac  Lab Results   Component Value Date/Time    NTPROBNP 2,173 07/23/2022 07:39 PM    TROPHS 7.1 09/15/2022 11:13 AM    TROPHS 5.9 09/15/2022 09:12 AM    TROPHS 9.0 09/10/2022 04:41 AM      Coags Lab Results   Component Value Date/Time    PROTIME 14.8 09/20/2022 04:01 AM    PROTIME 14.4 09/19/2022 02:40 AM    PROTIME 15.2 09/18/2022 06:02 AM    INR 1.1 09/20/2022 04:01 AM    INR 1.1 09/19/2022 02:40 AM    INR 1.2 09/18/2022 06:02 AM    APTT 33.7 09/15/2022 03:44 PM    APTT 37.6 07/30/2022 08:19 AM    APTT 43.0 07/25/2022 04:41 AM    APTT 115.3 10/09/2021 01:55 PM    APTT  10/09/2021 11:51 AM     CORRECTION TO MEDICAL RECORD-DISREGARD THESE TEST RESULTS    APTT 104.4 10/09/2021 10:00 AM      A1c Lab Results   Component Value Date/Time    LABA1C 5.1 09/16/2022 04:55 AM    LABA1C 5.5 05/28/2022 05:14 AM    LABA1C 5.8 06/14/2021 10:23 AM     09/16/2022 04:55 AM     05/28/2022 05:14 AM     06/14/2021 10:23 AM      Lipids Lab Results   Component Value Date/Time    CHOL 201 09/16/2022 04:55 AM    LDLCALC 138.4 09/16/2022 04:55 AM    LABVLDL 26.6 09/16/2022 04:55 AM    HDL 36 09/16/2022 04:55 AM    CHOLHDLRATIO 5.6 09/16/2022 04:55 AM    TRIG 133 09/16/2022 04:55 AM      Thyroid  Lab Results   Component Value Date/Time    TSHELE 12.70 09/17/2022 04:55 AM    SJL4XOC 9.550 09/07/2022 11:13 AM        Most Recent UA Lab Results   Component Value Date/Time    COLORU DARK YELLOW 09/14/2022 02:05 PM    APPEARANCE CLOUDY 09/14/2022 02:05 PM    SPECGRAV 1.020 09/14/2022 02:05 PM    LABPH 5.5 09/14/2022 02:05 PM    PROTEINU 300 09/14/2022 02:05 PM    GLUCOSEU Negative 09/14/2022 02:05 PM    KETUA TRACE 09/14/2022 02:05 PM    BILIRUBINUR Negative 09/14/2022 02:05 PM    BLOODU LARGE 09/14/2022 02:05 PM    UROBILINOGEN 1.0 09/14/2022 02:05 PM    NITRU Negative 09/14/2022 02:05 PM    LEUKOCYTESUR LARGE 09/14/2022 02:05 PM    WBCUA  09/14/2022 02:05 PM    RBCUA >100 09/14/2022 02:05 PM    EPITHUA 0-3 09/14/2022 02:05 PM    BACTERIA TRACE 09/14/2022 02:05 PM    LABCAST 0-3 09/14/2022 02:05 PM    MUCUS 1+ 06/02/2022 04:06 PM        No results for input(s): CULTURE in the last 720 hours.     All Labs from Last 24 Hrs:  Recent Results (from the past 24 hour(s))   Protime-INR    Collection Time: 09/20/22  4:01 AM   Result Value Ref Range    Protime 14.8 (H) 12.6 - 14.5 sec    INR 1.1     CBC with Auto Differential    Collection Time: 09/20/22  4:01 AM   Result Value Ref Range    WBC 10.6 4.3 - 11.1 K/uL    RBC 4.75 4.23 - 5.6 M/uL    Hemoglobin 12.0 (L) 13.6 - 17.2 g/dL    Hematocrit 41.0 (L) 41.1 - 50.3 %    MCV 86.3 79.6 - 97.8 FL    MCH 25.3 (L) 26.1 - 32.9 PG    MCHC 29.3 (L) 31.4 - 35.0 g/dL    RDW 19.5 (H) 11.9 - 14.6 %    Platelets 709 017 - 063 K/uL    MPV 9.6 9.4 - 12.3 FL    nRBC 0.00 0.0 - 0.2 K/uL    Differential Type AUTOMATED      Seg Neutrophils 67 43 - 78 %    Lymphocytes 18 13 - 44 %    Monocytes 10 4.0 - 12.0 %    Eosinophils % 5 0.5 - 7.8 %    Basophils 1 0.0 - 2.0 %    Immature Granulocytes 1 0.0 - 5.0 %    Segs Absolute 7.1 1.7 - 8.2 K/UL    Absolute Lymph # 1.9 0.5 - 4.6 K/UL    Absolute Mono # 1.0 0.1 - 1.3 K/UL    Absolute Eos # 0.5 0.0 - 0.8 K/UL    Basophils Absolute 0.1 0.0 - 0.2 K/UL    Absolute Immature Granulocyte 0.1 0.0 - 0.5 K/UL   Basic Metabolic Panel w/ Reflex to MG    Collection Time: 09/20/22  4:01 AM   Result Value Ref Range    Sodium 138 138 - 145 mmol/L    Potassium 4.1 3.5 - 5.1 mmol/L    Chloride 105 101 - 110 mmol/L    CO2 28 21 - 32 mmol/L    Anion Gap 5 4 - 13 mmol/L    Glucose 87 65 - 100 mg/dL    BUN 16 8 - 23 MG/DL    Creatinine 1.40 0.8 - 1.5 MG/DL    GFR African American >60 >60 ml/min/1.73m2    GFR Non- 55 (L) >60 ml/min/1.73m2    Calcium 9.2 8.3 - 10.4 MG/DL   POCT Glucose    Collection Time: 09/20/22  6:13 AM   Result Value Ref Range    POC Glucose 90 65 - 100 mg/dL    Performed by: Chelsea        Allergies   Allergen Reactions    Penicillins Swelling     Face swelling    Atorvastatin Other (See Comments)     itching    Evolocumab Other (See Comments)     Itching    Rosuvastatin Other (See Comments)     itching     Immunization History   Administered Date(s) Administered    PPD Test 05/31/2022    Pneumococcal Polysaccharide ([id number]) 06/12/2018    Tdap (Boostrix, Adacel) 06/07/2017       Recent Vital Data:  Patient Vitals for the past 24 hrs:   Temp Pulse Resp BP SpO2   09/20/22 1200 98.2 °F (36.8 °C) 60 20 100/68 95 %   09/20/22 0800 97.3 °F (36.3 °C) 62 20 119/76 93 %   09/20/22 0400 98.8 °F (37.1 °C) 59 18 108/72 92 %   09/20/22 0000 98.5 °F (36.9 °C) 60 16 98/66 93 %   09/19/22 2000 99 °F (37.2 °C) 60 18 104/74 94 %   09/19/22 1616 -- -- -- -- 92 %   09/19/22 1600 98.6 °F (37 °C) 60 18 115/77 --       Oxygen Therapy  SpO2: 95 %  O2 Device: None (Room air)    Estimated body mass index is 30.27 kg/m² as calculated from the following:    Height as of 9/14/22: 5' 9\" (1.753 m). Weight as of this encounter: 205 lb (93 kg). Intake/Output Summary (Last 24 hours) at 9/20/2022 1210  Last data filed at 9/20/2022 0745  Gross per 24 hour   Intake 700 ml   Output 525 ml   Net 175 ml         Physical Exam:    General:    Well nourished. No overt distress  Head:  Normocephalic, atraumatic  Eyes:  Sclerae appear normal.  Pupils equally round. HENT:  Nares appear normal, no drainage. Moist mucous membranes  Neck:  No restricted ROM. Trachea midline  CV:   RRR. No JVD  Lungs:   CTAB. Respirations even, unlabored  Abdomen:   nondistended. Extremities: Warm and dry. No cyanosis or clubbing. No edema. Skin:     No rashes. Normal coloration  Neuro:  CN II-XII grossly intact. Psych:  Normal mood and affect. Signed:  Ronit Rodriguez MD    Part of this note may have been written by using a voice dictation software. The note has been proof read but may still contain some grammatical/other typographical errors.

## 2022-09-20 NOTE — PROGRESS NOTES
PHYSICAL THERAPY: Daily Note AM   (Link to Caseload Tracking: PT Visit Days : 3  Time In/Out PT Charge Capture  Rehab Caseload Tracker  Orders    Rylan Haskins is a 58 y.o. male   PRIMARY DIAGNOSIS: Cerebrovascular accident (CVA) due to embolism ([facility]  Acute cerebrovascular accident (CVA) ([facility] [I63.9]  Cerebrovascular accident (CVA), unspecified mechanism ([facility] [I63.9]       Inpatient: Payor: Marilou Salamanca / Plan: PHYSICIANS IMMEDIATE CARE DUAL COMPLETE / Product Type: *No Product type* /     ASSESSMENT:     REHAB RECOMMENDATIONS:   Recommendation to date pending progress:  Setting:  No further skilled therapy after discharge from hospital    Equipment:    To Be Determined     ASSESSMENT:  Mr. Nikolas Whipple was supine in bed with visitor present and willing to work with PT. SBA supine>sit and CGA gait in hallway with no AD. He was a little slower today and demonstrates B LE ER with gait. Pt did not want to get to chair this morning and was transferred back to bed with SBA. Good progress, will continue to follow.       SUBJECTIVE:   Mr. Nikolas Whipple states, [place] Im ok\"     Social/Functional Lives With: Alone  Type of Home: [employer] Financial Help From: Family, Friend(s), Neighbor  ADL Assistance: Independent  Homemaking Assistance: Independent  Ambulation Assistance: Independent  Transfer Assistance: Independent  Active : No  Patient's  Info: family and neighbors assist with transportation since pt is legally blind  Occupation: Retired  OBJECTIVE:     PAIN: VITALS / O2: PRECAUTION / Hattie Sours / [place]:   Pre Treatment:          Post Treatment: 0 Vitals        Oxygen    Purewick    RESTRICTIONS/PRECAUTIONS:  Restrictions/Precautions  Restrictions/Precautions: Fall Risk  Restrictions/Precautions: Fall Risk     MOBILITY: I Mod I S SBA CGA Min Mod Max Total  NT x2 Comments:   Bed Mobility    Rolling [] [] [] [x] [] [] [] [] [] [] []    Supine to Sit [] [] [] [x] [] [] [] [] [] [] []    Scooting [] [] [] [x] [] [] [] [] [] [] [] Sit to Supine [] [] [] [] [] [] [] [] [] [x] []    Transfers    Sit to Stand [] [] [] [x] [x] [] [] [] [] [] []    Bed to Chair [] [] [] [] [] [] [] [] [] [] []    Stand to Sit [] [] [] [x] [x] [] [] [] [] [] []     [] [] [] [] [] [] [] [] [] [] []    I=Independent, Mod I=Modified Independent, S=Supervision, SBA=Standby Assistance, CGA=Contact Guard Assistance,   Min=Minimal Assistance, Mod=Moderate Assistance, Max=Maximal Assistance, Total=Total Assistance, NT=Not Tested    BALANCE: Good Fair+ Fair Fair- Poor NT Comments   Sitting Static [x] [] [] [] [] []    Sitting Dynamic [x] [] [] [] [] []              Standing Static [x] [] [] [] [] []    Standing Dynamic [] [x] [] [] [] []      GAIT: I Mod I S SBA CGA Min Mod Max Total  NT x2 Comments:   Level of Assistance [] [] [] [x] [x] [] [] [] [] [] []    Distance 400 feet    DME None    Gait Quality Decreased sapna  and B hip ER    Weightbearing Status      Stairs      I=Independent, Mod I=Modified Independent, S=Supervision, SBA=Standby Assistance, CGA=Contact Guard Assistance,   Min=Minimal Assistance, Mod=Moderate Assistance, Max=Maximal Assistance, Total=Total Assistance, NT=Not Tested    PLAN:   ACUTE PHYSICAL THERAPY GOALS:   (Developed with and agreed upon by patient and/or caregiver.)     (1.) Ruth Parson will ambulate with INDEPENDENT for 750 feet with the least restrictive device within 7 treatment day(s). (2.) Ruth Parson will perform standing static and dynamic balance activities x 15 minutes with INDEPENDENT to improve safety within 7 treatment day(s). (3.) Ruth Parson will ascend and descend 12 stairs using unilateral hand rail(s) with STAND BY ASSIST to improve functional mobility and safety within 7 treatment day(s).     FREQUENCY AND DURATION: 3 times/week for duration of hospital stay or until stated goals are met, whichever comes first.    TREATMENT:   TREATMENT:   Gait Training (16 Minutes): Gait training for 400 feet utilizing [employer]. Patient required Verbal cueing to improve Activity Pacing and Gait Mechanics.      TREATMENT GRID:  N/A    AFTER TREATMENT PRECAUTIONS: Bed, Bed/Chair Locked, Call light within reach, Needs within reach, RN notified, and Visitors at bedside    INTERDISCIPLINARY COLLABORATION:  RN/ PCT    EDUCATION:      TIME IN/OUT:  Time In: 1021  Time Out: [address]  Minutes: [address], PT

## 2022-09-20 NOTE — PROGRESS NOTES
[facility] CARDIOLOGY PROGRESS NOTE           9/20/2022 9:52 AM    Admit Date: 9/15/2022      Subjective:   Patient without events. INR 1.1. On Lovenox. Still with expressive aphasia. ROS:  Cardiovascular:  As noted above    Objective:      Vitals:    09/19/22 2000 09/20/22 0000 09/20/22 0400 09/20/22 0800   BP: 104/74 98/66 108/72 119/76   Pulse: 60 60 59 62   Resp: 18 16 18 20   Temp: 99 °F (37.2 °C) 98.5 °F (36.9 °C) 98.8 °F (37.1 °C) 97.3 °F (36.3 °C)   TempSrc: Oral Oral Oral Oral   SpO2: 94% 93% 92% 93%   Weight:           Physical Exam:  General-No Acute Distress  Neck- supple, no JVD  CV- regular rate and rhythm no MRG  Lung- clear bilaterally  Abd- soft, nontender, nondistended  Ext- no edema bilaterally. Skin- warm and dry      Data Review:   Recent Labs     09/20/22  0401 09/19/22  0240 09/18/22  0602   WBC 10.6 9.2 8.1   HGB 12.0* 11.6* 11.2*   HCT 41.0* 39.2* 37.5*   MCV 86.3 85.0 84.8    339 321         Recent Labs     09/20/22  0401 09/17/22  0455 09/15/22  0912      < > 141   K 4.1   < > 4.0      < > 110   CO2 28   < > 27   BUN 16   < > 14   CREATININE 1.40   < > 1.40   GLUCOSE 87   < > 127*   CALCIUM 9.2   < > 9.0   PROT  --   --  6.6   LABALBU  --   --  3.0*   BILITOT  --   --  0.2   ALKPHOS  --   --  71   AST  --   --  19   ALT  --   --  14   LABGLOM 55*   < > 55*   GFRAA >60   < > >60   GLOB  --   --  3.6*    < > = values in this interval not displayed. No results for input(s): CKTOTAL, CKMB, CKMBINDEX, DDIMER, TROPONINI in the last 720 hours. Assessment/Plan:     Active Hospital Problems    Chronic systolic congestive heart failure (HCC)  ContinueToprol 25 mg a day. Monitor volume status daily. ICD (implantable cardioverter-defibrillator) in place  Normal function      *Cerebrovascular accident (CVA) ([address]  [place] embolic. Continue Lovenox until INR therapeutic.        Subclinical hypothyroidism  Defer management to primary team. S/P CABG x 3  No angina. On ASA. Ventricular tachycardia (HCC)  Continue amiodarone and Mexilitine.                Pino Kirby MD

## 2022-09-20 NOTE — PROGRESS NOTES
Discharge instructions given to brother and pt daughter who was on the phone listening. Christine with pharmacy was also present and did Lovenox teaching. Pt brother verbalized understanding. Pt currently up ambulating in the room. HH has been notified per of pt discharge per Soraya Chong CM. All other questions have been answered. No other needs at this time.

## 2022-09-20 NOTE — PROGRESS NOTES
Warfarin dosing per pharmacist    Robert Cutler is a 58 y.o. male. Weight: 205 lb (93 kg)    Indication:  Hx of ventricular thrombus    Goal INR:  2 to 3    Home dose:  1.25 mg Tues,Thurs,Sat, 2.5 mg all other days of the week   (total weekly dose 13.75 mg)    Risk factors or significant drug interactions:  amiodarone (PTA med)    Other anticoagulants:  Enoxaparin bridge    Daily Monitoring  Date  INR     Warfarin dose HGB              Notes  9/18  1.2  2.5 mg  11.2    9/19  1.1  5 mg  11.6  9/20  1.1  5 mg  12.0      Pharmacy consulted to dose warfarin in patient with history of Ventricular thrombus who presented with subtherapeutic INR of 1.3. 8 Ariela Max clinic notes reviewed to determine outpatient warfarin regimen. INR today 1.1. Will give 5 mg this evening. Daily INR for now.      Thank you,  Brigette Hill, [facility]

## 2022-09-20 NOTE — CARE COORDINATION
Pt is medically cleared for dc to home today with [place] RN and SLP services through [facility]. DC summary faxed to [facility] to alert them to pt's dc today. No other dc needs identified or reported. CM remains available to assist as needed. 09/17/22 9380   Service Assessment   Patient Orientation Alert and Oriented   Cognition Alert   History Provided By Patient; Child/Family;Medical Record   Primary Caregiver Family   Support Systems Parent; Children;Family Members;Friends/Neighbors   Patient's Healthcare Decision Maker is: Named in [address] [place] [address]   PCP Verified by CM Yes   Last Visit to PCP Within last 3 months   Prior Functional Level Independent in ADLs/IADLs   Current Functional Level Independent in ADLs/IADLs   Can patient return to prior living arrangement Yes   Ability to make needs known: Fair   Family able to assist with home care needs: Yes   Would you like for me to discuss the discharge plan with any other family members/significant others, and if so, who? Yes  (Cheryl/swapna)   Financial Resources Medicare   Social/Functional History   Lives With Alone   Type of [address] [place] [address] Help From Family;Friend(s); Neighbor   ADL Assistance Independent   Homemaking Assistance Independent   Ambulation Assistance Independent   Transfer Assistance Independent   Active  No   Patient's  Info family and neighbors assist with transportation since pt is legally blind   Occupation Retired   Discharge Planning   Type of [address] Prior To Admission None   [address]   DME Ordered? No   Potential Assistance Purchasing Medications No   Type of Home Care Services Skilled Therapy;Nursing Services   Patient expects to be discharged to: House   History of falls?  0   Services At/After Discharge   Transition of Care Consult (CM Consult) Home Health   Internal Home Health No   Reason Outside Agency Chosen Script used patient chose alternate agency   Services At/After Discharge SLP;Nursing services; [address] Resource Information Provided? No   Mode of Transport at Discharge Other (see comment)  (family)   Confirm Follow Up Transport Family  (family or neighbors)   Condition of Participation: Discharge Planning   The Plan for Transition of Care is related to the following treatment goals: Home health nursing and SLP to improve pt's disease management and aphasia s/p CVA   The Patient and/or Patient Representative was provided with a Choice of Provider? Patient;Patient Representative   Name of the Patient Representative who was provided with the Choice of Provider and agrees with the Discharge Plan? Cheryl/swapna   The Patient and/Or Patient Representative agree with the Discharge Plan? Yes   Freedom of Choice list was provided with basic dialogue that supports the patient's individualized plan of care/goals, treatment preferences, and shares the quality data associated with the providers?   Yes

## 2022-09-21 ENCOUNTER — CARE COORDINATION (OUTPATIENT)
Dept: CARE COORDINATION | Facility: CLINIC | Age: 62
End: 2022-09-21

## 2022-09-22 ENCOUNTER — HOSPITAL ENCOUNTER (OUTPATIENT)
Dept: CARDIAC REHAB | Age: 62
Setting detail: RECURRING SERIES
End: 2022-09-22
Payer: MEDICARE

## 2022-09-22 ENCOUNTER — CARE COORDINATION (OUTPATIENT)
Dept: CARE COORDINATION | Facility: CLINIC | Age: 62
End: 2022-09-22

## 2022-09-22 NOTE — CARE COORDINATION
MENDOZA notified by [facility] RN that the pt is a non-admit for [place] services. He is not considered homebound since he is attending outpatient Cardiac Rehab. She confirmed that the pt/family are comfortable with administering the Lovenox and pt has an appointment scheduled to check his PT/INR. Pt does still need speech therapy. Referral faxed to [facility]. MENDOZA notified pt's brother of this information.

## 2022-09-22 NOTE — CARE COORDINATION
Wm [address] Transitions Initial Follow Up Call    Call within 2 business days of discharge: Yes    Patient: Daljit Catherine Patient : 1960   MRN: [id number]  Reason for Admission: CVA  Discharge Date: 22 RARS: Readmission Risk Score: 25.8      Last Discharge [facility]       Date Complaint Diagnosis Description Type Department Provider    9/15/22 Altered Mental Status Cerebrovascular accident (CVA), unspecified mechanism [facility]) ED to Hosp-Admission (Discharged) (ADMITTED) [id number] Berna Gordillo MD; Marques Lloyd. .. Spoke with: Manisha Forrester, patient brother. Patient diagnosed with expressive aphasia post CVA. Patient able to give permission to speak with his brother. Transitions of Care Initial Call    Was this an external facility discharge? No Discharge Facility: [facility]    Challenges to be reviewed by the provider   Additional needs identified to be addressed with provider: No  none             Method of communication with provider : none    Advance Care Planning:   Does patient have an Advance Directive: reviewed and current. LPN Care Coordinator contacted the family by telephone to perform post hospital discharge assessment. Verified name and  with family as identifiers. Provided introduction to self, and explanation of the LPN CC role. LPN CC reviewed discharge instructions, medical action plan and red flags with family who verbalized understanding. Family given an opportunity to ask questions and does not have any further questions or concerns at this time. Were discharge instructions available to patient? Yes. Reviewed appropriate site of care based on symptoms and resources available to patient including: PCP  Specialist  Home health  When to call [address] The family agrees to contact the PCP office for questions related to their healthcare.      Medication reconciliation was performed with family, who verbalizes understanding of administration of home medications. Advised obtaining a 90-day supply of all daily and as-needed medications. Was patient discharged with a pulse oximeter? no    LPN CC provided contact information. Plan for follow-up call in 5-7 days based on severity of symptoms and risk factors. Plan for next call:  Discuss HH, speech language therapy. Has patient returned to Cardiac rehab? Discuss current needs. Non-face-to-face services provided:  Obtained and reviewed discharge summary and/or continuity of care documents   Goals Addressed                      This Visit's Progress      Improve speech post CVA (pt-stated)         Patient will work with [facility] to improve speech post CVA. Returns to baseline activity level. Patient will continue activities as tolerated to regain strength post CVA.                  Care Transitions 24 Hour Call    Schedule Follow Up Appointment with PCP: Completed  Do you have a copy of your discharge instructions?: Yes  Do you have all of your prescriptions and are they filled?: Yes  Have you been contacted by a [employer] [address]?: Yes  Have you scheduled your follow up appointment?: Yes  How are you going to get to your appointment?: Car - family or friend to transport  [address]: [address] you have support at home?: Alone  Do you feel like you have everything you need to keep you well at home?: Yes  Are you an active caregiver in your home?: No  Care Transitions Interventions    Pharmacist: Completed       Other Services: Completed (Comment: Home Health SLP Referral)   Cardiac Rehab: Completed               Follow Up  Future Appointments   Date Time Provider Ernestina Willis   9/26/2022  2:00 PM [facility] DEVICE 39 UCDG GVL AMB   9/27/2022 10:00 AM Hazel Morales RN [facility]   9/27/2022 11:00 AM [facility] PT UCDG GVL AMB   9/29/2022 10:00 AM Hazel Morales RN [facility]   10/4/2022 10:00 AM [facility]   10/5/2022  9:45 AM Leonardo Montoya, DO GF GVL AMB   10/6/2022 10:00 AM [facility]ver [facility]   10/11/2022 10:00 AM Donovan Culver RN [facility]   10/13/2022 10:00 AM Donovan Culver RN [facility]   10/18/2022 10:00 AM Donovan Culver RN [facility]   10/20/2022 10:00 AM Donovan Culver RN [facility]   10/25/2022 10:00 AM Donovan Culver RN [facility]   10/27/2022  9:15 AM Yousif Arredondo MD DG GVL AMB   10/27/2022 10:00 AM Donovan Culver RN [facility]   11/8/2022  2:45 PM Hector Rush MD DG GVL AMB   12/15/2022  9:30 AM [facility] DEVICE 39 UCDG GVL AMB   6/30/2023 10:15 AM Leonardo Montoya, DO GFM GVL AMB       Jelly Cruz LPN

## 2022-09-23 ENCOUNTER — TELEPHONE (OUTPATIENT)
Dept: FAMILY MEDICINE CLINIC | Facility: CLINIC | Age: 62
End: 2022-09-23

## 2022-09-23 NOTE — TELEPHONE ENCOUNTER
Received message from Kaitlynn Kelly with [facility], stated patient was d/c from hospital with home health orders, was seen in cardio rehab as out patient, therefore can not have speech therapy as home therapy, she is requesting Dr Judie Alejandro send referral for patient to receive out patient speech therapy   Return call # [phone]

## 2022-09-26 ENCOUNTER — ANTI-COAG VISIT (OUTPATIENT)
Dept: CARDIOLOGY CLINIC | Age: 62
End: 2022-09-26
Payer: MEDICARE

## 2022-09-26 DIAGNOSIS — I47.20 VENTRICULAR TACHYCARDIA: ICD-10-CM

## 2022-09-26 DIAGNOSIS — I51.3 LEFT VENTRICULAR THROMBUS: Primary | ICD-10-CM

## 2022-09-26 DIAGNOSIS — I51.3 INTRACARDIAC THROMBUS: ICD-10-CM

## 2022-09-26 LAB
POC INR: 1.3
PROTHROMBIN TIME, POC: ABNORMAL

## 2022-09-26 PROCEDURE — 93793 ANTICOAG MGMT PT WARFARIN: CPT | Performed by: INTERNAL MEDICINE

## 2022-09-26 RX ORDER — ENOXAPARIN SODIUM 100 MG/ML
1 INJECTION SUBCUTANEOUS 2 TIMES DAILY
Qty: 3 EACH | Refills: 0 | Status: CANCELLED | OUTPATIENT
Start: 2022-09-26 | End: 2022-09-28

## 2022-09-26 RX ORDER — ENOXAPARIN SODIUM 100 MG/ML
INJECTION SUBCUTANEOUS
Qty: 3 EACH | Refills: 0 | Status: SHIPPED | OUTPATIENT
Start: 2022-09-26 | End: 2022-10-19 | Stop reason: SDUPTHER

## 2022-09-26 NOTE — PROGRESS NOTES
09/20/2022 discharge summary:    \"Patient admitted for acute CVA. CTA head and neck showed left M1 segment of the MCA occlusion. Cannot get MRI due to ICD. Neurology consulted, suspect embolic in etiology. He was started on heparin gtt and warfarin resumed. He has not been on eliquis or xarelto due to long term cost, though he does have insurance. He will be transitioned to lovenox for 1 week and warfarin regimen adjusted. Home health to monitor INR and pt will follow up with PCP in 1 week. Continue ASA and warfarin. Intolerant to statins. \"    Tablet strength and weekly dosing schedule confirmed today. Patient knows of no reason for subtherapeutic INR result. Patient appears very confused and could not tell me how he has been taking his warfarin since discharge. Patient to continue Lovenox along with warfarin and be rechecked in three days. He has three syringe at home, has taken on this morning. Rx sent for three more syringes.

## 2022-09-27 ENCOUNTER — APPOINTMENT (OUTPATIENT)
Dept: CARDIAC REHAB | Age: 62
End: 2022-09-27
Payer: MEDICARE

## 2022-09-28 ENCOUNTER — TELEPHONE (OUTPATIENT)
Dept: UROLOGY | Age: 62
End: 2022-09-28

## 2022-09-28 ENCOUNTER — CARE COORDINATION (OUTPATIENT)
Dept: CARE COORDINATION | Facility: CLINIC | Age: 62
End: 2022-09-28

## 2022-09-28 NOTE — TELEPHONE ENCOUNTER
The patient's son is calling stating surgery was cancelled last week due to the patient having a stroke. He would like to know the next step. Ok to reschedule? Need to see him?

## 2022-09-28 NOTE — CARE COORDINATION
[address] Care Transitions Follow Up Call    N Care Coordinator contacted the patient and family by telephone to follow up after admission on 09/15/22. Verified name and  with patient and family as identifiers. Patient: Gricelda Lucas  Patient : 1960   MRN: [id number]  Reason for Admission: CVA  Discharge Date: 22 RARS: Readmission Risk Score: 25.8      Needs to be reviewed by the provider   Additional needs identified to be addressed with provider: No  none             Method of communication with provider: none. Patient and his brother, Maribel Ramirez are concerned regarding scheduling outpatient speech therapy since patient was disqualified from [facility]. Anival Davis reports speaking with  who stated referral was sent but he has not heard from anyone regarding scheduling. [facility] who verified referral was received but sent to [address]. [facility] Notified Mr. James Hernandez that Juan Carlos Kumar would be calling him today to schedule. Addressed changes since last contact:   Discussed patient disqualification from [facility] services and referral to OP speech therapy. Discussed follow-up appointments. If no appointment was previously scheduled, appointment scheduling offered: Contacted [facility] outpatient therapy on patient behalf. Juan Carlos Kumar to contact patient today regarding scheduling. Is follow up appointment scheduled within 7 days of discharge?  No.    Follow Up  Future Appointments   Date Time Provider Ernestina Willis   2022 10:00 AM Tiffanie Brown RN [facility]   2022  2:00 PM [facility] PT GIGI GVL AMB   10/4/2022 10:00 AM Tiffanie Brown RN [facility]   10/5/2022  9:45 AM DO MAXIMO Diaz GVL AMB   10/6/2022 10:00 AM Tiffanie Brown RN [facility]   10/11/2022 10:00 AM Tiffanie Brown RN [facility]   10/13/2022 10:00 AM Tiffanie Brown RN [facility] O   10/18/2022 10:00 AM Hector Meredith RN [facility]   10/20/2022 10:00 [facility]   10/25/2022 10:00 AM [facility]   10/27/2022  9:15 AM Nicholas Yao MD [facility] GVL AMB   10/27/2022 10:00 AM [facility]   11/8/2022  2:45 PM Nikki Jauregui MD [facility] GVL AMB   12/15/2022  9:30 AM [facility] DEVICE 39 UCDG GVL AMB   6/30/2023 10:15 AM Laisha Hawk DO [facility] GVL AMB      Goals Addressed                      This Visit's Progress      Improve speech post CVA (pt-stated)   On track      Patient will work with outpatient speech therapy to improve speech. Returns to baseline activity level. On track      Patient will continue activities as tolerated to regain strength post CVA. LPN Care Coordinator reviewed discharge instructions with patient and family and discussed any barriers to care and/or understanding of plan of care after discharge. Discussed appropriate site of care based on symptoms and resources available to patient including: PCP  When to call 911. The patient and family agrees to contact the PCP office for questions related to their healthcare. Advance Care Planning:   reviewed and current. Patients top risk factors for readmission:  CVA  Interventions to address risk factors: Obtained and reviewed discharge summary and/or continuity of care documents and referral to outpatient speech therapy confirmed. Care Transitions Subsequent and Final Call    Subsequent and Final Calls  Are you currently active with any services?: Home Health  Care Transitions Interventions  Cardiac Rehab: Completed      Other Interventions:             LPN Care Coordinator provided contact information for future needs. Plan for follow-up call in 5-7 days based on severity of symptoms and risk factors.   Plan for next call:  Discuss outpatient speech therapy progress, Current needs.    [place] LPN

## 2022-09-29 ENCOUNTER — HOSPITAL ENCOUNTER (OUTPATIENT)
Dept: CARDIAC REHAB | Age: 62
Setting detail: RECURRING SERIES
End: 2022-09-29
Payer: MEDICARE

## 2022-09-29 ENCOUNTER — ANTI-COAG VISIT (OUTPATIENT)
Dept: CARDIOLOGY CLINIC | Age: 62
End: 2022-09-29
Payer: MEDICARE

## 2022-09-29 ENCOUNTER — TELEPHONE (OUTPATIENT)
Dept: UROLOGY | Age: 62
End: 2022-09-29

## 2022-09-29 ENCOUNTER — TELEPHONE (OUTPATIENT)
Dept: FAMILY MEDICINE CLINIC | Facility: CLINIC | Age: 62
End: 2022-09-29

## 2022-09-29 DIAGNOSIS — I51.3 LEFT VENTRICULAR THROMBUS: Primary | ICD-10-CM

## 2022-09-29 DIAGNOSIS — I51.3 LEFT VENTRICULAR THROMBUS: ICD-10-CM

## 2022-09-29 DIAGNOSIS — I47.20 VENTRICULAR TACHYCARDIA: ICD-10-CM

## 2022-09-29 DIAGNOSIS — I51.3 INTRACARDIAC THROMBUS: ICD-10-CM

## 2022-09-29 LAB
POC INR: 1.6
PROTHROMBIN TIME, POC: ABNORMAL

## 2022-09-29 PROCEDURE — 85610 PROTHROMBIN TIME: CPT | Performed by: INTERNAL MEDICINE

## 2022-09-29 PROCEDURE — 93793 ANTICOAG MGMT PT WARFARIN: CPT | Performed by: INTERNAL MEDICINE

## 2022-09-29 NOTE — PATIENT INSTRUCTIONS
Reminder: Please contact the Coumadin Clinic at [phone] when you have medication changes. Examples, new medications, antibiotics, discontinued medications, new supplements, missed doses of warfarin or if you took extra doses of warfarin. This also includes OTC medications. Notifying us helps reduce the possibility of high and low INR's. In addition, if warfarin needs to be held for any procedures, please have surgeon or physician's office contact us before holding anticoagulant. Thanks, [facility].

## 2022-09-29 NOTE — TELEPHONE ENCOUNTER
Soy Villanueva , patients brother, called, spoke with Soy Villanueva, stated patient is scheduled to see Dr Kael Inman 10-5-2022 for hospital follow up from stroke, stated patients speech and cognitive reasoning has been affected, Soy Villanueva wanted to confirm this appointment was necessary, appt was confirmed, patient will keep appt

## 2022-09-29 NOTE — TELEPHONE ENCOUNTER
Cardiac Pre-operative Assessment      Physician or Practice Requesting Clearance: Dr Flor Funk: Ayad Patel Phone Number: [phone]    Fax Number: [phone]    Date of Surgery/Procedure: tbd /end of oct    Type of Surgery or Procedure: cysto, ureteroscopy, retrograde     Patient needs cardiac and medication clearance    Medications to hold coumadin     Will remain on asa 81 mg    # Days pre-procedure to hold 5 days prior     Restart medication __asap post op __       Needs both cardiac clearance and medicaion hold. Please send response back to me.

## 2022-10-03 NOTE — TELEPHONE ENCOUNTER
Arnold Fay called again in regards to scheduling. Advised that we are working on scheduling and will let him know as soon as we are ready to schedule.

## 2022-10-03 NOTE — TELEPHONE ENCOUNTER
\"Patient is high risk with cardiac and recent CVA. If absolutely needed he would have to do lovenox bridge. \"  Rex Pinto MD

## 2022-10-04 ENCOUNTER — OFFICE VISIT (OUTPATIENT)
Dept: FAMILY MEDICINE CLINIC | Facility: CLINIC | Age: 62
End: 2022-10-04
Payer: MEDICARE

## 2022-10-04 ENCOUNTER — ANTI-COAG VISIT (OUTPATIENT)
Dept: CARDIOLOGY CLINIC | Age: 62
End: 2022-10-04
Payer: MEDICARE

## 2022-10-04 VITALS
OXYGEN SATURATION: 98 % | WEIGHT: 199.4 LBS | DIASTOLIC BLOOD PRESSURE: 72 MMHG | HEIGHT: 69 IN | HEART RATE: 65 BPM | BODY MASS INDEX: 29.53 KG/M2 | SYSTOLIC BLOOD PRESSURE: 110 MMHG

## 2022-10-04 DIAGNOSIS — I50.22 CHRONIC SYSTOLIC CONGESTIVE HEART FAILURE (HCC): Primary | Chronic | ICD-10-CM

## 2022-10-04 DIAGNOSIS — I51.3 LEFT VENTRICULAR THROMBUS: ICD-10-CM

## 2022-10-04 DIAGNOSIS — I25.810 CORONARY ARTERY DISEASE INVOLVING AUTOLOGOUS VEIN CORONARY BYPASS GRAFT WITHOUT ANGINA PECTORIS: Chronic | ICD-10-CM

## 2022-10-04 DIAGNOSIS — I47.20 VENTRICULAR TACHYCARDIA: ICD-10-CM

## 2022-10-04 DIAGNOSIS — I51.3 LEFT VENTRICULAR THROMBUS: Primary | ICD-10-CM

## 2022-10-04 DIAGNOSIS — I63.412 CEREBROVASCULAR ACCIDENT (CVA) DUE TO EMBOLISM OF LEFT MIDDLE CEREBRAL ARTERY (HCC): ICD-10-CM

## 2022-10-04 DIAGNOSIS — I51.3 INTRACARDIAC THROMBUS: ICD-10-CM

## 2022-10-04 PROBLEM — R06.2 WHEEZING: Status: ACTIVE | Noted: 2021-10-06

## 2022-10-04 LAB
POC INR: 1.6
PROTHROMBIN TIME, POC: ABNORMAL

## 2022-10-04 PROCEDURE — 99214 OFFICE O/P EST MOD 30 MIN: CPT | Performed by: FAMILY MEDICINE

## 2022-10-04 PROCEDURE — 93793 ANTICOAG MGMT PT WARFARIN: CPT | Performed by: INTERNAL MEDICINE

## 2022-10-04 PROCEDURE — 85610 PROTHROMBIN TIME: CPT | Performed by: INTERNAL MEDICINE

## 2022-10-04 RX ORDER — AMIODARONE HYDROCHLORIDE 200 MG/1
1 TABLET ORAL 2 TIMES DAILY
COMMUNITY
Start: 2022-07-22

## 2022-10-04 ASSESSMENT — PATIENT HEALTH QUESTIONNAIRE - PHQ9
SUM OF ALL RESPONSES TO PHQ QUESTIONS 1-9: 0
SUM OF ALL RESPONSES TO PHQ9 QUESTIONS 1 & 2: 0
1. LITTLE INTEREST OR PLEASURE IN DOING THINGS: 0
2. FEELING DOWN, DEPRESSED OR HOPELESS: 0
SUM OF ALL RESPONSES TO PHQ QUESTIONS 1-9: 0

## 2022-10-04 NOTE — PROGRESS NOTES
PROGRESS NOTE    SUBJECTIVE:   Hershal Lesch is a 58 y.o. male seen for a follow up visit regarding   Chief Complaint   Patient presents with    Follow-up     Follow up from ED visit from 9/15/22-9/20/22 for CVA due to embolism. Having some speech issues. Going to Rehab        HPI:  Patient comes in today for follow-up. He was discharged from the hospital nearly 2 weeks ago after having a stroke which primarily has affected his speech initially demonstrating significant expressive aphasia and neologism this has clearly improved. He has no motor deficits in his upper or lower extremities. He started speech therapy this week at the hospital.  He is attended today with his brother who endorses that he is getting along well at home from a activities of daily living standpoint, primary deficit remains speech which does seem to be improving. I have not seen any swallowing study results. Patient's chart is reviewed, has a long cardiac history, has left ventricular thrombus, is on anticoagulation, managed by his cardiology office. Patient has a history of chronic systolic congestive heart failure with an EF around 30%. Presently compensated.        Reviewed and updated this visit by provider:  Tobacco  Allergies  Meds  Problems  Med Hx  Surg Hx  Fam Hx           Review of Systems (reported by brother)  General: Overall feels well, denies fever    OBJECTIVE:  Vitals:    10/04/22 1557   BP: 110/72   Site: Left Upper Arm   Cuff Size: Large Adult   Pulse: 65   SpO2: 98%   Weight: 199 lb 6.4 oz (90.4 kg)   Height: 5' 9\" (1.753 m)        Physical Exam   General: He is alert, demonstrating some difficulty with speech, answers with simple, single words  HEENT: No oropharyngeal lesions, pupils equal and reactive  Neck: No JVD with patient sitting up, no thyromegaly or thyroid nodularity, no carotid bruit noted  Lungs: CTAB  CVS: Presently regular rhythm and rate, no murmur appreciated, no S3 or S4 presently  Neuro: Face is symmetric, patient demonstrates significant speech disorder, seems able to understand and answer questions, usually yes or no answers. There are no focal motor deficits, no focal cerebellar deficits  Medical problems and test results were reviewed with the patient today.      Recent Results (from the past 672 hour(s))   CBC with Auto Differential    Collection Time: 09/07/22 11:13 AM   Result Value Ref Range    WBC 9.5 4.3 - 11.1 K/uL    RBC 4.38 4.23 - 5.6 M/uL    Hemoglobin 10.9 (L) 13.6 - 17.2 g/dL    Hematocrit 38.6 (L) 41.1 - 50.3 %    MCV 88.1 79.6 - 97.8 FL    MCH 24.9 (L) 26.1 - 32.9 PG    MCHC 28.2 (L) 31.4 - 35.0 g/dL    RDW 18.8 (H) 11.9 - 14.6 %    Platelets 070 361 - 578 K/uL    MPV 10.1 9.4 - 12.3 FL    nRBC 0.00 0.0 - 0.2 K/uL    Differential Type AUTOMATED      Seg Neutrophils 66 43 - 78 %    Lymphocytes 17 13 - 44 %    Monocytes 12 4.0 - 12.0 %    Eosinophils % 4 0.5 - 7.8 %    Basophils 1 0.0 - 2.0 %    Immature Granulocytes 0 0.0 - 5.0 %    Segs Absolute 6.2 1.7 - 8.2 K/UL    Absolute Lymph # 1.6 0.5 - 4.6 K/UL    Absolute Mono # 1.1 0.1 - 1.3 K/UL    Absolute Eos # 0.3 0.0 - 0.8 K/UL    Basophils Absolute 0.1 0.0 - 0.2 K/UL    Absolute Immature Granulocyte 0.0 0.0 - 0.5 K/UL   PSA, Diagnostic    Collection Time: 09/07/22 11:13 AM   Result Value Ref Range    PSA 0.8 <4.0 ng/mL   TSH    Collection Time: 09/07/22 11:13 AM   Result Value Ref Range    TSH, 3RD GENERATION 9.550 (H) 0.358 - 3.740 uIU/mL   T4    Collection Time: 09/07/22 11:13 AM   Result Value Ref Range    T4, Total 10.3 4.8 - 13.9 ug/dL   HIV 1/2 Ag/Ab, 4TH Generation,W Rflx Confirm    Collection Time: 09/07/22 11:13 AM   Result Value Ref Range    HIV 1/2 Interp NONREACTIVE NONREACTIVE      HIV 1/2 Result Comment SEE NOTE     EKG 12 Lead    Collection Time: 09/10/22  1:40 AM   Result Value Ref Range    Ventricular Rate 96 BPM    Atrial Rate 96 BPM    P-R Interval 168 ms    QRS Duration 110 ms    Q-T Interval 428 ms    QTc Calculation (Bazett) 540 ms    P Axis 57 degrees    R Axis 56 degrees    T Axis 77 degrees    Diagnosis       Atrial-sensed ventricular-paced rhythm with occasional Premature ventricular   complexes     CBC    Collection Time: 09/10/22  1:50 AM   Result Value Ref Range    WBC 8.8 4.3 - 11.1 K/uL    RBC 4.23 4.23 - 5.6 M/uL    Hemoglobin 10.6 (L) 13.6 - 17.2 g/dL    Hematocrit 36.2 (L) 41.1 - 50.3 %    MCV 85.6 79.6 - 97.8 FL    MCH 25.1 (L) 26.1 - 32.9 PG    MCHC 29.3 (L) 31.4 - 35.0 g/dL    RDW 18.8 (H) 11.9 - 14.6 %    Platelets 753 036 - 090 K/uL    MPV 10.3 9.4 - 12.3 FL    nRBC 0.00 0.0 - 0.2 K/uL   Comprehensive Metabolic Panel    Collection Time: 09/10/22  1:50 AM   Result Value Ref Range    Sodium 142 136 - 145 mmol/L    Potassium 4.4 3.5 - 5.1 mmol/L    Chloride 109 101 - 110 mmol/L    CO2 26 21 - 32 mmol/L    Anion Gap 7 4 - 13 mmol/L    Glucose 114 (H) 65 - 100 mg/dL    BUN 13 8 - 23 MG/DL    Creatinine 1.40 0.8 - 1.5 MG/DL    GFR African American >60 >60 ml/min/1.73m2    GFR Non- 55 (L) >60 ml/min/1.73m2    Calcium 8.3 8.3 - 10.4 MG/DL    Total Bilirubin 0.3 0.2 - 1.1 MG/DL    ALT 14 12 - 65 U/L    AST 53 (H) 15 - 37 U/L    Alk Phosphatase 88 50 - 136 U/L    Total Protein 6.7 6.3 - 8.2 g/dL    Albumin 2.8 (L) 3.2 - 4.6 g/dL    Globulin 3.9 (H) 2.3 - 3.5 g/dL    Albumin/Globulin Ratio 0.7 (L) 1.2 - 3.5     Troponin    Collection Time: 09/10/22  1:50 AM   Result Value Ref Range    Troponin, High Sensitivity 6.3 0 - 14 pg/mL   Magnesium    Collection Time: 09/10/22  1:50 AM   Result Value Ref Range    Magnesium 2.5 (H) 1.8 - 2.4 mg/dL   Troponin    Collection Time: 09/10/22  4:41 AM   Result Value Ref Range    Troponin, High Sensitivity 9.0 0 - 14 pg/mL   Urinalysis    Collection Time: 09/14/22  2:05 PM   Result Value Ref Range    Color, UA DARK YELLOW      Appearance CLOUDY      Specific Gravity, UA 1.020 1.001 - 1.023      pH, Urine 5.5 5.0 - 9.0      Protein,  (A) NEG mg/dL Glucose, UA Negative mg/dL    Ketones, Urine TRACE (A) NEG mg/dL    Bilirubin Urine Negative NEG      Blood, Urine LARGE (A) NEG      Urobilinogen, Urine 1.0 0.2 - 1.0 EU/dL    Nitrite, Urine Negative NEG      Leukocyte Esterase, Urine LARGE (A) NEG      WBC, UA  0 /hpf    RBC, UA >100 0 /hpf    Epithelial Cells UA 0-3 0 /hpf    BACTERIA, URINE TRACE 0 /hpf    Casts 0-3 0 /lpf    Crystals MARKED 0 /LPF    OTHER OBSERVATIONS RESULTS VERIFIED MANUALLY     CBC with Auto Differential    Collection Time: 09/15/22  9:12 AM   Result Value Ref Range    WBC 9.8 4.3 - 11.1 K/uL    RBC 4.56 4.23 - 5.6 M/uL    Hemoglobin 11.4 (L) 13.6 - 17.2 g/dL    Hematocrit 39.6 (L) 41.1 - 50.3 %    MCV 86.8 79.6 - 97.8 FL    MCH 25.0 (L) 26.1 - 32.9 PG    MCHC 28.8 (L) 31.4 - 35.0 g/dL    RDW 18.8 (H) 11.9 - 14.6 %    Platelets 934 938 - 083 K/uL    MPV 9.6 9.4 - 12.3 FL    nRBC 0.00 0.0 - 0.2 K/uL    Differential Type AUTOMATED      Seg Neutrophils 71 43 - 78 %    Lymphocytes 17 13 - 44 %    Monocytes 7 4.0 - 12.0 %    Eosinophils % 3 0.5 - 7.8 %    Basophils 1 0.0 - 2.0 %    Immature Granulocytes 0 0.0 - 5.0 %    Segs Absolute 7.0 1.7 - 8.2 K/UL    Absolute Lymph # 1.7 0.5 - 4.6 K/UL    Absolute Mono # 0.7 0.1 - 1.3 K/UL    Absolute Eos # 0.3 0.0 - 0.8 K/UL    Basophils Absolute 0.1 0.0 - 0.2 K/UL    Absolute Immature Granulocyte 0.0 0.0 - 0.5 K/UL   Comprehensive Metabolic Panel    Collection Time: 09/15/22  9:12 AM   Result Value Ref Range    Sodium 141 138 - 145 mmol/L    Potassium 4.0 3.5 - 5.1 mmol/L    Chloride 110 101 - 110 mmol/L    CO2 27 21 - 32 mmol/L    Anion Gap 4 4 - 13 mmol/L    Glucose 127 (H) 65 - 100 mg/dL    BUN 14 8 - 23 MG/DL    Creatinine 1.40 0.8 - 1.5 MG/DL    GFR African American >60 >60 ml/min/1.73m2    GFR Non- 55 (L) >60 ml/min/1.73m2    Calcium 9.0 8.3 - 10.4 MG/DL    Total Bilirubin 0.2 0.2 - 1.1 MG/DL    ALT 14 12 - 65 U/L    AST 19 15 - 37 U/L    Alk Phosphatase 71 50 - 136 U/L Total Protein 6.6 6.3 - 8.2 g/dL    Albumin 3.0 (L) 3.2 - 4.6 g/dL    Globulin 3.6 (H) 2.3 - 3.5 g/dL    Albumin/Globulin Ratio 0.8 (L) 1.2 - 3.5     Protime-INR    Collection Time: 09/15/22  9:12 AM   Result Value Ref Range    Protime 16.8 (H) 12.6 - 14.5 sec    INR 1.3     Magnesium    Collection Time: 09/15/22  9:12 AM   Result Value Ref Range    Magnesium 2.4 1.8 - 2.4 mg/dL   Phosphorus    Collection Time: 09/15/22  9:12 AM   Result Value Ref Range    Phosphorus 2.6 2.3 - 3.7 MG/DL   Troponin    Collection Time: 09/15/22  9:12 AM   Result Value Ref Range    Troponin, High Sensitivity 5.9 0 - 14 pg/mL   POCT Glucose    Collection Time: 09/15/22  9:16 AM   Result Value Ref Range    POC Glucose 123 (H) 65 - 100 mg/dL    Performed by: Christina    Troponin    Collection Time: 09/15/22 11:13 AM   Result Value Ref Range    Troponin, High Sensitivity 7.1 0 - 14 pg/mL   Transthoracic echocardiogram (TTE) complete with contrast, bubble, strain, and 3D PRN    Collection Time: 09/15/22  2:20 PM   Result Value Ref Range    Fractional Shortening 2D 23 28 - 44 %    LV RWT Ratio 0.34     LV Mass 2D 174.5 88 - 224 g    MV E/A 0.50     E/E' Ratio (Averaged) 6.75     E/E' Lateral 4.50     E/E' Septal 9.00     AV Velocity Ratio 0.82     LVOT:AV VTI Index 0.88     Est. RA Pressure 3 mmHg    RVSP 25 mmHg    LV EDV A2C 141 mL    LV EDV A4C 154 mL    LV ESV A2C 99 mL    LV ESV A4C 106 mL    IVSd 0.9 0.6 - 1.0 cm    LVIDd 5.3 4.2 - 5.9 cm    LVIDs 4.1 cm    LVOT Diameter 2.3 cm    LVOT Mean Gradient 1 mmHg    LVOT VTI 16.8 cm    LVOT Peak Velocity 0.9 m/s    LVOT Peak Gradient 3 mmHg    LVPWd 0.9 0.6 - 1.0 cm    LV E' Lateral Velocity 10 cm/s    LV E' Septal Velocity 5 cm/s    LV Ejection Fraction A2C 30 %    LV Ejection Fraction A4C 31 %    EF BP 30 (A) 55 - 100 %    LVOT Area 4.2 cm2    LVOT SV 69.8 ml    LA Minor Axis 5.1 cm    LA Major [place] 5.6 cm    LA Area 2C 18.4 cm2    LA Area 4C 16.9 cm2    LA Volume BP 48 18 - 58 mL    AV Mean Velocity 0.7 m/s    AV Mean Gradient 2 mmHg    AV VTI 19.2 cm    AV Peak Velocity 1.1 m/s    AV Peak Gradient 4 mmHg    AV Area by VTI 3.6 cm2    AV Area by Peak Velocity 3.6 cm2    Aortic Root 3.4 cm    Ascending Aorta 3.1 cm    IVC Proxmal 1.5 cm    MV E Wave Deceleration Time 168.0 ms    MV A Velocity 0.90 m/s    MV E Velocity 0.45 m/s    TAPSE 1.6 (A) 1.7 cm    TR Max Velocity 2.35 m/s    TR Peak Gradient 22 mmHg   APTT    Collection Time: 09/15/22  3:44 PM   Result Value Ref Range    PTT 33.7 24.1 - 35.1 SEC   Anti-Xa, Unfractionated Heparin    Collection Time: 09/15/22  9:48 PM   Result Value Ref Range    Anti-XA Unfrac Heparin <0.10 (L) 0.3 - 0.7 IU/mL   CBC    Collection Time: 09/16/22  4:55 AM   Result Value Ref Range    WBC 9.5 4.3 - 11.1 K/uL    RBC 4.64 4.23 - 5.6 M/uL    Hemoglobin 11.3 (L) 13.6 - 17.2 g/dL    Hematocrit 39.6 (L) 41.1 - 50.3 %    MCV 85.3 79.6 - 97.8 FL    MCH 24.4 (L) 26.1 - 32.9 PG    MCHC 28.5 (L) 31.4 - 35.0 g/dL    RDW 18.8 (H) 11.9 - 14.6 %    Platelets 031 821 - 495 K/uL    MPV 10.1 9.4 - 12.3 FL    nRBC 0.00 0.0 - 0.2 K/uL   Hemoglobin A1c    Collection Time: 09/16/22  4:55 AM   Result Value Ref Range    Hemoglobin A1C 5.1 4.8 - 5.6 %    eAG 100 mg/dL   Lipid panel - fasting    Collection Time: 09/16/22  4:55 AM   Result Value Ref Range    Cholesterol, Total 201 (H) <200 MG/DL    Triglycerides 133 35 - 150 MG/DL    HDL 36 (L) 40 - 60 MG/DL    LDL Calculated 138.4 (H) <100 MG/DL    VLDL Cholesterol Calculated 26.6 (H) 6.0 - 23.0 MG/DL    Chol/HDL Ratio 5.6     Protime-INR    Collection Time: 09/16/22  4:55 AM   Result Value Ref Range    Protime 15.8 (H) 12.6 - 14.5 sec    INR 1.2     Anti-Xa, Unfractionated Heparin    Collection Time: 09/16/22  8:30 AM   Result Value Ref Range    Anti-XA Unfrac Heparin 0.20 (L) 0.3 - 0.7 IU/mL   Anti-Xa, Unfractionated Heparin    Collection Time: 09/16/22  3:59 PM   Result Value Ref Range    Anti-XA Unfrac Heparin 0.38 0.3 - 0.7 IU/mL   Anti-Xa, Unfractionated Heparin    Collection Time: 09/16/22 11:03 PM   Result Value Ref Range    Anti-XA Unfrac Heparin 0.49 0.3 - 0.7 IU/mL   Protime-INR    Collection Time: 09/17/22  4:55 AM   Result Value Ref Range    Protime 15.3 (H) 12.6 - 14.5 sec    INR 1.2     CBC with Auto Differential    Collection Time: 09/17/22  4:55 AM   Result Value Ref Range    WBC 8.7 4.3 - 11.1 K/uL    RBC 4.56 4.23 - 5.6 M/uL    Hemoglobin 11.3 (L) 13.6 - 17.2 g/dL    Hematocrit 38.6 (L) 41.1 - 50.3 %    MCV 84.6 79.6 - 97.8 FL    MCH 24.8 (L) 26.1 - 32.9 PG    MCHC 29.3 (L) 31.4 - 35.0 g/dL    RDW 18.9 (H) 11.9 - 14.6 %    Platelets 469 917 - 159 K/uL    MPV 9.8 9.4 - 12.3 FL    nRBC 0.00 0.0 - 0.2 K/uL    Differential Type AUTOMATED      Seg Neutrophils 56 43 - 78 %    Lymphocytes 30 13 - 44 %    Monocytes 9 4.0 - 12.0 %    Eosinophils % 4 0.5 - 7.8 %    Basophils 1 0.0 - 2.0 %    Immature Granulocytes 1 0.0 - 5.0 %    Segs Absolute 4.8 1.7 - 8.2 K/UL    Absolute Lymph # 2.6 0.5 - 4.6 K/UL    Absolute Mono # 0.8 0.1 - 1.3 K/UL    Absolute Eos # 0.3 0.0 - 0.8 K/UL    Basophils Absolute 0.1 0.0 - 0.2 K/UL    Absolute Immature Granulocyte 0.0 0.0 - 0.5 K/UL   Basic Metabolic Panel w/ Reflex to MG    Collection Time: 09/17/22  4:55 AM   Result Value Ref Range    Sodium 140 138 - 145 mmol/L    Potassium 3.8 3.5 - 5.1 mmol/L    Chloride 106 101 - 110 mmol/L    CO2 29 21 - 32 mmol/L    Anion Gap 5 4 - 13 mmol/L    Glucose 90 65 - 100 mg/dL    BUN 11 8 - 23 MG/DL    Creatinine 1.20 0.8 - 1.5 MG/DL    GFR African American >60 >60 ml/min/1.73m2    GFR Non- >60 >60 ml/min/1.73m2    Calcium 9.2 8.3 - 10.4 MG/DL   TSH with Reflex    Collection Time: 09/17/22  4:55 AM   Result Value Ref Range    TSH w Free Thyroid if Abnormal 12.70 (H) 0.358 - 3.740 UIU/ML   T4, Free    Collection Time: 09/17/22  4:55 AM   Result Value Ref Range    T4 Free 1.0 0.78 - 1.46 NG/DL   Anti-Xa, Unfractionated Heparin    Collection Time: 09/17/22 10:42 PM   Result Value Ref Range    Anti-XA Unfrac Heparin <0.10 (L) 0.3 - 0.7 IU/mL   CBC with Auto Differential    Collection Time: 09/18/22  6:02 AM   Result Value Ref Range    WBC 8.1 4.3 - 11.1 K/uL    RBC 4.42 4.23 - 5.6 M/uL    Hemoglobin 11.2 (L) 13.6 - 17.2 g/dL    Hematocrit 37.5 (L) 41.1 - 50.3 %    MCV 84.8 79.6 - 97.8 FL    MCH 25.3 (L) 26.1 - 32.9 PG    MCHC 29.9 (L) 31.4 - 35.0 g/dL    RDW 19.0 (H) 11.9 - 14.6 %    Platelets 970 529 - 951 K/uL    MPV 9.6 9.4 - 12.3 FL    nRBC 0.00 0.0 - 0.2 K/uL    Differential Type AUTOMATED      Seg Neutrophils 55 43 - 78 %    Lymphocytes 27 13 - 44 %    Monocytes 11 4.0 - 12.0 %    Eosinophils % 5 0.5 - 7.8 %    Basophils 1 0.0 - 2.0 %    Immature Granulocytes 1 0.0 - 5.0 %    Segs Absolute 4.5 1.7 - 8.2 K/UL    Absolute Lymph # 2.2 0.5 - 4.6 K/UL    Absolute Mono # 0.9 0.1 - 1.3 K/UL    Absolute Eos # 0.4 0.0 - 0.8 K/UL    Basophils Absolute 0.1 0.0 - 0.2 K/UL    Absolute Immature Granulocyte 0.0 0.0 - 0.5 K/UL   Basic Metabolic Panel w/ Reflex to MG    Collection Time: 09/18/22  6:02 AM   Result Value Ref Range    Sodium 139 138 - 145 mmol/L    Potassium 3.9 3.5 - 5.1 mmol/L    Chloride 106 101 - 110 mmol/L    CO2 28 21 - 32 mmol/L    Anion Gap 5 4 - 13 mmol/L    Glucose 104 (H) 65 - 100 mg/dL    BUN 12 8 - 23 MG/DL    Creatinine 1.30 0.8 - 1.5 MG/DL    GFR African American >60 >60 ml/min/1.73m2    GFR Non- 59 (L) >60 ml/min/1.73m2    Calcium 9.0 8.3 - 10.4 MG/DL   Anti-Xa, Unfractionated Heparin    Collection Time: 09/18/22  6:02 AM   Result Value Ref Range    Anti-XA Unfrac Heparin 0.64 0.3 - 0.7 IU/mL   Protime-INR    Collection Time: 09/18/22  6:02 AM   Result Value Ref Range    Protime 15.2 (H) 12.6 - 14.5 sec    INR 1.2     Anti-Xa, Unfractionated Heparin    Collection Time: 09/18/22 12:34 PM   Result Value Ref Range    Anti-XA Unfrac Heparin 0.71 (H) 0.3 - 0.7 IU/mL   Anti-Xa, Unfractionated Heparin    Collection Time: 09/18/22 7:00 PM   Result Value Ref Range    Anti-XA Unfrac Heparin 0.55 0.3 - 0.7 IU/mL   Anti-Xa, Unfractionated Heparin    Collection Time: 09/19/22  1:25 AM   Result Value Ref Range    Anti-XA Unfrac Heparin 0.49 0.3 - 0.7 IU/mL   CBC with Auto Differential    Collection Time: 09/19/22  2:40 AM   Result Value Ref Range    WBC 9.2 4.3 - 11.1 K/uL    RBC 4.61 4.23 - 5.6 M/uL    Hemoglobin 11.6 (L) 13.6 - 17.2 g/dL    Hematocrit 39.2 (L) 41.1 - 50.3 %    MCV 85.0 79.6 - 97.8 FL    MCH 25.2 (L) 26.1 - 32.9 PG    MCHC 29.6 (L) 31.4 - 35.0 g/dL    RDW 19.0 (H) 11.9 - 14.6 %    Platelets 356 880 - 464 K/uL    MPV 9.6 9.4 - 12.3 FL    nRBC 0.00 0.0 - 0.2 K/uL    Differential Type AUTOMATED      Seg Neutrophils 61 43 - 78 %    Lymphocytes 22 13 - 44 %    Monocytes 10 4.0 - 12.0 %    Eosinophils % 5 0.5 - 7.8 %    Basophils 1 0.0 - 2.0 %    Immature Granulocytes 0 0.0 - 5.0 %    Segs Absolute 5.7 1.7 - 8.2 K/UL    Absolute Lymph # 2.0 0.5 - 4.6 K/UL    Absolute Mono # 0.9 0.1 - 1.3 K/UL    Absolute Eos # 0.5 0.0 - 0.8 K/UL    Basophils Absolute 0.1 0.0 - 0.2 K/UL    Absolute Immature Granulocyte 0.0 0.0 - 0.5 K/UL   Basic Metabolic Panel w/ Reflex to MG    Collection Time: 09/19/22  2:40 AM   Result Value Ref Range    Sodium 138 136 - 145 mmol/L    Potassium 3.9 3.5 - 5.1 mmol/L    Chloride 106 101 - 110 mmol/L    CO2 29 21 - 32 mmol/L    Anion Gap 3 (L) 4 - 13 mmol/L    Glucose 91 65 - 100 mg/dL    BUN 14 8 - 23 MG/DL    Creatinine 1.30 0.8 - 1.5 MG/DL    GFR African American >60 >60 ml/min/1.73m2    GFR Non- 59 (L) >60 ml/min/1.73m2    Calcium 8.8 8.3 - 10.4 MG/DL   Protime-INR    Collection Time: 09/19/22  2:40 AM   Result Value Ref Range    Protime 14.4 12.6 - 14.5 sec    INR 1.1     Anti-Xa, Unfractionated Heparin    Collection Time: 09/19/22  9:26 AM   Result Value Ref Range    Anti-XA Unfrac Heparin 0.46 0.3 - 0.7 IU/mL   Protime-INR    Collection Time: 09/20/22  4:01 AM   Result Value Ref Range    Protime 14. 8 (H) 12.6 - 14.5 sec    INR 1.1     CBC with Auto Differential    Collection Time: 09/20/22  4:01 AM   Result Value Ref Range    WBC 10.6 4.3 - 11.1 K/uL    RBC 4.75 4.23 - 5.6 M/uL    Hemoglobin 12.0 (L) 13.6 - 17.2 g/dL    Hematocrit 41.0 (L) 41.1 - 50.3 %    MCV 86.3 79.6 - 97.8 FL    MCH 25.3 (L) 26.1 - 32.9 PG    MCHC 29.3 (L) 31.4 - 35.0 g/dL    RDW 19.5 (H) 11.9 - 14.6 %    Platelets 737 929 - 597 K/uL    MPV 9.6 9.4 - 12.3 FL    nRBC 0.00 0.0 - 0.2 K/uL    Differential Type AUTOMATED      Seg Neutrophils 67 43 - 78 %    Lymphocytes 18 13 - 44 %    Monocytes 10 4.0 - 12.0 %    Eosinophils % 5 0.5 - 7.8 %    Basophils 1 0.0 - 2.0 %    Immature Granulocytes 1 0.0 - 5.0 %    Segs Absolute 7.1 1.7 - 8.2 K/UL    Absolute Lymph # 1.9 0.5 - 4.6 K/UL    Absolute Mono # 1.0 0.1 - 1.3 K/UL    Absolute Eos # 0.5 0.0 - 0.8 K/UL    Basophils Absolute 0.1 0.0 - 0.2 K/UL    Absolute Immature Granulocyte 0.1 0.0 - 0.5 K/UL   Basic Metabolic Panel w/ Reflex to MG    Collection Time: 09/20/22  4:01 AM   Result Value Ref Range    Sodium 138 138 - 145 mmol/L    Potassium 4.1 3.5 - 5.1 mmol/L    Chloride 105 101 - 110 mmol/L    CO2 28 21 - 32 mmol/L    Anion Gap 5 4 - 13 mmol/L    Glucose 87 65 - 100 mg/dL    BUN 16 8 - 23 MG/DL    Creatinine 1.40 0.8 - 1.5 MG/DL    GFR African American >60 >60 ml/min/1.73m2    GFR Non- 55 (L) >60 ml/min/1.73m2    Calcium 9.2 8.3 - 10.4 MG/DL   POCT Glucose    Collection Time: 09/20/22  6:13 AM   Result Value Ref Range    POC Glucose 90 65 - 100 mg/dL    Performed by: Chelsea    PT/INR (Resulted at [facility]/in-office AND billed by [facility])    Collection Time: 09/26/22  2:09 PM   Result Value Ref Range    Prothrombin time, POC      POC INR 1.3    PT/INR (Resulted at [facility]/in-office AND billed by [facility])    Collection Time: 09/29/22  1:55 PM   Result Value Ref Range    Prothrombin time, POC      POC INR 1.6    PT/INR (Resulted at [facility]/in-office AND billed by [facility])    Collection Time: 10/04/22 10:57 AM   Result Value Ref Range    Prothrombin time, POC      POC INR 1.6        Results for orders placed or performed in visit on 10/04/22   PT/INR (Resulted at [facility]/in-office AND billed by [facility])   Result Value Ref Range    Prothrombin time, POC      POC INR 1.6         ASSESSMENT and PLAN    1. Cerebrovascular accident (CVA) due to embolism of middle cerebral artery, unspecified blood vessel laterality ([address]  2. Chronic systolic congestive heart failure (HCC)  Overview:  Unable to tolerate lisinopril due to hypotension. 1.  Echo (1/26/05):  EF 40% with anterior and apical severe hypokinesis to   akinesis. 2.  Echo (4/5/11):  EF 25-30%. Mid anterior/anteroseptal/apical akinesis. Large protruding apical thrombus. Mild LA dilatation. No valvular   regurgitation/stenosis. 3.  Echo (8/2/11):  EF 30-35%. Anterior/apical/anteroseptal akinesis. Resolution of apical thrombus. 4.  Echo (3/28/18):  EF 30-35%. Anteroapical akinesis. No significant   valve disease. 5. Echo (5/26/22):  EF 25-30%. Apical AK. AS and AL HK. MIld LAE. No significant valve disease. 3. Coronary artery disease involving autologous vein coronary bypass graft without angina pectoris  Overview:  1. Anterior STEMI 1/25/05 (Late presentation)  a. Cath: LAD: 50% prox. 100% mid. D1: 95% prox. Ramus: 95% prox. Circ: Luminal irregularity. RCA: Luminal irregularity. b.  PCI:  T-stent of LAD/D1 with 2.25 X 18 Minivision in D1 and 2.25 X 23   Cypher in LAD. PCI of ramus with 3.0 X 18 mm Cypher. 2.  Exercise Cardiolite (8/18/11):  Large fixed anterior, septal and   apical defect. No ischemia. EF 33%. 3.  Lexiscan Cardiolite (8/17/20):  Large fixed defect in mid/distal   anterior and distal inferior wall. EF 27%. No reversible ischemia. 4.  CABG (5/31/2022): LIMA to LAD, SVG to PDA, SVG to intermediate    4. Left ventricular thrombus  Overview:   Follow INR with home monitor  Echo 4/5/11:  Large protruding apical thrombus. Resolved with   anticoagulation. Indefinite anticoagulation planned. Return in about 3 months (around 1/4/2023) for f/u stroke.        Anitra Lopez MD

## 2022-10-04 NOTE — PATIENT INSTRUCTIONS
Reminder: Please contact the [facility] at [phone] when you have medication changes. Examples, new medications, antibiotics, discontinued medications, new supplements, missed doses of warfarin or if you took extra doses of warfarin. This also includes OTC medications. Notifying us helps reduce the possibility of high and low INR's. In addition, if warfarin needs to be held for any procedures, please have surgeon or physician's office contact us before holding anticoagulant. Thanks, [facility].

## 2022-10-04 NOTE — PROGRESS NOTES
Tablet strength and weekly dosing schedule confirmed today. One 2.5 mg tablet every day until seen on 10/11/2022.

## 2022-10-05 ENCOUNTER — HOSPITAL ENCOUNTER (OUTPATIENT)
Dept: PHYSICAL THERAPY | Age: 62
Setting detail: RECURRING SERIES
Discharge: HOME | End: 2022-10-08
Payer: MEDICARE

## 2022-10-05 ENCOUNTER — CARE COORDINATION (OUTPATIENT)
Dept: CARE COORDINATION | Facility: CLINIC | Age: 62
End: 2022-10-05

## 2022-10-05 PROCEDURE — 92523 SPEECH SOUND LANG COMPREHEN: CPT

## 2022-10-05 NOTE — THERAPY EVALUATION
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 \"CT head showed findings in the left frontal and temporal lobes concerning for acute MCA territory infarction. \"  Patient Stated Goal(s):   To be able to communicate    Past Medical History/Comorbidities:   Mr. Gregg Jensen  has a past medical history of Acute hypoxemic respiratory failure due to COVID-19 [facility]), Anemia, BPH (benign prostatic hyperplasia), CAD (coronary artery disease), CHF (congestive heart failure) ([address] Chronic systolic heart failure ([address] Coronary atherosclerosis of native coronary vessel, CVA (cerebral vascular accident) ([address] H/O transesophageal echocardiography (VALERY) for monitoring, History of blood transfusion, Hyperlipidemia, Hypoxemia requiring supplemental oxygen, ICD (implantable cardioverter-defibrillator) in place, IGT (impaired glucose tolerance), Other ill-defined conditions(799.89), Other ill-defined conditions(799.89), Pneumonia due to COVID-19 virus, Primary insomnia, Psychiatric disorder, Sepsis, unspecified, Thromboembolus ([address] and Thrombus. Mr. Marcus Marin  has a past surgical history that includes Colonoscopy (12/2010); pacemaker; heent; Cardiac catheterization; pr cardiac surg procedure unlist; Cardiac defibrillator placement (2011); Cardiac procedure (N/A, 05/27/2022); Coronary artery bypass graft (N/A, 05/31/2022); transesophageal echocardiogram (N/A, 05/31/2022); and Cystoscopy (Right, 07/29/2022). Current Communication Status:  Communication Observation: Aphasia  Follows Directions: Follows some simple directions given model. Inconsistent with answering yes/no questions  Able to write legibly - name, contact information, simple sentences. Previous Speech Therapy:Seen for initial evaluation and 1x treatment during acute care hospitalization d/t global aphasia s/p CVA. [facility] and at baseline. Recommended continuing with easy to chew, thin liquids, d/t dentition. Prior Level of Functioning:  Disability as patient is legally blind. Prior to this, patient worked at [employer]. General: Patient accompanied by his brother Markus Bui who has been assisting patient since hospitalization. Brother provided much of patient's history. Patient was a pleasure to work with today. OBJECTIVE    Auditory Comprehension  Comprehension: Exceptions  Simple yes/no questions: Moderate (Mild to moderate)  Moderate yes/no questions: Severe  One Step Commands: Moderate  Two Step Commands: Severe     Expression  Primary Mode of Expression: Verbal (Minimal verbal output; Attempts at expressive language with single words and simple phrases; however, consists of paraphasias and frequently not appropriate to context.  Patient is aware of errors and will shake his head no after expression.)  Verbal Expression: Exceptions to functional limits; responses unintelligible at times; low volume, paraphasias, hesitant, limited output  Repetition: Moderate   Automatic Speech: Mild  Confrontation naming:  Severe; 0/10, increased to 4/10 when presented with phrase completion/semantic cues     Western Aphasia Battery- Bedside Assessment    Spontaneous Speech : Content 1/10   Spontaneous Speech: Fluency 3/10   Auditory Verbal Comprehension 6/10   Sequential Commands 0/10   Repetition 1.5/10   Object Naming 0/10 (able to name 4 objects with cues)   Reading 4/10   Writing 6/10   Of note, patient unable to name objects when asked \"what is this\"; however, able to name objects on 4 occasions when given phrase completion/semantic cue \"You throw a _(ball)\"     Bedside Aphasia Score: Aphasia Score 19.167 - severe    Bedside Aphasia Criteria: Based on results of assessment, patient's language impairments are most consistent with Broca's aphasia. Based on scoring, borderline to global aphasia. ASSESSMENT    Initial Assessment:    Communication Diagnosis: Aphasia with severe expressive and receptive language deficits     Patient s/p CVA. Per chart, \"CT head showed findings in the left frontal and temporal lobes concerning for acute MCA territory infarction. \" Patient with occasional spontaneous expressive language with single words and simple phrases; however, frequently not relative to context. Responses to yes/no questions are inconsistent. Patient demonstrates ability to imitate 1-step commands when given a visual and verbal model. Verbal expression is significantly limited and non-functional. Able to complete automatic speech tasks, such as counting; demonstrates emerging skill with phrase completion to aid expressive language. Patient is currently unable to effectively communicate wants and needs. Patient requires speech therapy services to address current difficulties. Problem List: (Impacting functional limitations):    CVA  Expressive language  Receptive language  Therapy Prognosis:   Good  RECOMMENDATIONS   Recommendations:    Yes. Speech therapy services are clinically indicated at this time to address aphasia. PLAN    Interventions Planned (Treatment may consist of any combination of the following):    Expressive language, Receptive language, repetition, automatic speech, basic commands in context, contextual Y/N questions, responsive naming tasks, word discrimination tasks  Goals: (Goals have been discussed and agreed upon with patient.)  Short-Term Functional Goals: Time Frame: 8 weeks  Patient will answer simple yes/no questions with 90% accuracy given minimal cueing. Patient will follow 1 step commands with 80% accuracy given min-mod cueing. Patient will identify objects given verbal descriptions with >80% accuracy given min cues. Patient will complete automatic naming tasks with 90% accuracy given minimal cueing. Patient will complete basic phrase completion with 70% accuracy given moderate cueing. Patient will name common objects with 70% accuracy given moderate cueing. Discharge Goals: Time Frame: 12 weeks  Patient will increase receptive/expressive language skills to communicate basic wants/needs across environments. Outcome Measure:   SPOKEN LANGUAGE COMPREHENSION: Level 2: With consistent, maximal cues, the individual is able to follow simple directions, respond to simple yes/no questions in context, and respond to simple words or phrases related to personal needs. SPOKEN LANGUAGE EXPRESSION: Level 2:  The individual attempts to speak, although few attempts are accurate or appropriate. The communication partner must assume responsibility for structuring the communication exchange, and with consistent and maximal cueing, the individual can only occasionally produce automatic and/or imitative words and phrases that are rarely meaningful in context.   Initial: 2 (Date: 10/5/22)    Medical Necessity/Other Comments:   Patient is expected to demonstrate progress in expressive communication and receptive ability to decrease assistance required communication, increase independence with activities of daily living, and increase communication with family/caregivers. Skilled intervention continues to be required due to onset of CVA. Total Duration:  Time In: 1325  Time Out: 1406  Minutes: 41    Regarding Tennille Coker's therapy, I certify that the treatment plan above will be carried out by a therapist or under their direction.   Thank you for this referral,  Denzi Ann, SLP     Referring Physician Signature: Patti Patel MD _______________________________ Date _____________

## 2022-10-05 NOTE — CARE COORDINATION
[address] Care Transitions Follow Up Call    N Care Coordinator contacted the family by telephone to follow up after admission on 9/15/22. Verified name and  with family as identifiers. Patient: Katheleen Hamman  Patient : 1960   MRN: [id number]  Reason for Admission: CVA  Discharge Date: 22 RARS: Readmission Risk Score: 25.8      Needs to be reviewed by the provider   Additional needs identified to be addressed with provider: No  none             Method of communication with provider: none. Patient post CVA with aphasia. Spoke with patient's brother, Renee Horn with patient's permission. Mr. Juancho Mora reports patient had f/u with PCP on 10/5/22 and is doing well. Outpatient speech therapy to begin on 10/6/22. No other concerns voiced at this time. Addressed changes since last contact:  none  Discussed follow-up appointments. If no appointment was previously scheduled, appointment scheduling offered: n/a. Is follow up appointment scheduled within 7 days of discharge?  No.    Follow Up  Future Appointments   Date Time Provider Ernestina Willis   10/5/2022  1:45 PM Priscilla Carpio [facility]   10/6/2022 10:00 AM Chio Sweet RN [facility]   10/11/2022 10:00 AM CYNTHIA Farmer [facility]   10/11/2022 11:00 AM [facility] AMB   10/13/2022 10:00 AM CYNTHIA Farmer [facility]   10/18/2022 10:00 AM Chio Sweet RN [facility]   10/20/2022 10:00 AM Chio Sweet RN [facility]   10/25/2022 10:00 AM Chio Sweet RN [facility]   10/27/2022  9:15 AM Avila Bustamante MD [facility] AMB   10/27/2022 10:00 AM Chio Sweet RN [facility]   2022  2:45 PM Floresita Hendrickson MD [facility] AMB   12/15/2022  9:30 AM [facility] DEVICE [facility] AMB   2023 10:45 AM Barrera Durham, DO [facility] AMB   2023 10:15 AM Barrera Durham DO [facility] AMB         LPN Care Coordinator reviewed discharge instructions, medical action plan, and red flags with family and discussed any barriers to care and/or understanding of plan of care after discharge. Discussed appropriate site of care based on symptoms and resources available to patient including: PCP  Specialist  When to call [address] The family agrees to contact the PCP office for questions related to their healthcare. Advance Care Planning:   reviewed and current. Patients top risk factors for readmission:  s/p CVA due to embolism  Interventions to address risk factors: Obtained and reviewed discharge summary and/or continuity of care documents    Offered patient enrollment in the Remote Patient Monitoring (RPM) program for in-home monitoring: NA.     Care Transitions Subsequent and Final Call    Subsequent and Final Calls  Do you have any ongoing symptoms?: Yes  Onset of Patient-reported symptoms: Other  Patient-reported symptoms: Other  Interventions for patient-reported symptoms: Other  Have your medications changed?: No  Do you have any questions related to your medications?: No  Do you currently have any active services?: Yes  Are you currently active with any services?: Outpatient/Community Services  Do you have any needs or concerns that I can assist you with?: No  Identified Barriers: None  Care Transitions Interventions  Cardiac Rehab: Completed      Other Interventions:            Goals Addressed                      This Visit's Progress      Improve speech post CVA (pt-stated)   On track      Patient will work with outpatient speech therapy to improve speech. Returns to baseline activity level. On track      Patient will continue activities as tolerated to regain strength post CVA. LPN Care Coordinator provided contact information for future needs. Plan for follow-up call in 7-10 days based on severity of symptoms and risk factors.   Plan for next call:  current needs, therapy progress    Kaitlynn Bingham LPN

## 2022-10-07 NOTE — TELEPHONE ENCOUNTER
Cystoscopy, Right Diagnostic Ureteroscopy, Right Retrograde Pyelogram, Possible biopsies, stent exchange vs. removal

## 2022-10-10 ENCOUNTER — CARE COORDINATION (OUTPATIENT)
Dept: CARE COORDINATION | Facility: CLINIC | Age: 62
End: 2022-10-10

## 2022-10-10 ENCOUNTER — HOSPITAL ENCOUNTER (OUTPATIENT)
Dept: PHYSICAL THERAPY | Age: 62
Setting detail: RECURRING SERIES
Discharge: HOME | End: 2022-10-13
Payer: MEDICARE

## 2022-10-10 PROCEDURE — 92507 TX SP LANG VOICE COMM INDIV: CPT

## 2022-10-10 NOTE — CARE COORDINATION
Received call from patients brotherMuriel Senters asking if patient could go downtown to have PT/INR drawn downtown tomorrow rather than drive to [facility]. HOSP DEL [facility] who states order is in and no issue having it drawn downtown. Contacted downtown lab and verified order with them. Contacted Dr. El Brooks office again to cancel blood draw at their office and informed them he would have it drawn downtown. Patient's brother informed and given address for outpatient lab at [address] [place] [address]

## 2022-10-10 NOTE — PROGRESS NOTES
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 Patient does not appear in pain. Interventions Planned: (Treatment may consist of any combination of the following)  Expressive language, Receptive language, repetition, automatic speech, basic commands in context, contextual Y/N questions, responsive naming tasks, word discrimination tasks  GOALS: (Goals have been discussed and agreed upon with patient.)  Short-Term Functional Goals: Time Frame: 8 weeks  Patient will answer simple yes/no questions with 90% accuracy given minimal cueing. Patient will follow 1 step commands with 80% accuracy given min-mod cueing. Patient will identify objects given verbal descriptions with >80% accuracy given min cues. Patient will complete automatic naming tasks with 90% accuracy given minimal cueing. Patient will complete basic phrase completion with 70% accuracy given moderate cueing. Patient will name common objects with 70% accuracy given moderate cueing.   Discharge Goals: Time Frame: 12 weeks  Patient will increase receptive/expressive language skills to communicate basic wants/needs across environments. Updated Objective Findings:  None Today  Treatment   Expressive/receptive language activities:  Matching words given written F:4 90% accuracy  Matching words to pictures 80% accuracy independently  Answering yes/no questions presented 70% accuracy with written stimuli  Identified objects described (object function) given visual F:4 70% accuracy   Phrase completion x2 (was not helpful when given phonemic cues),  increased to 60% when presented with written F:3  Opposites: 30% independently, increased to 60% when presented with written F:3  Read aloud at word level with 75% accuracy, decreased accuracy at sentence level    Treatment/Session Summary:  Patient hesitant to attempt verbal language with expressive language tasks, but had improved success when provided with written supports. Patient's expressive language remains to be significantly limited and spontaneous speech often is not appropriate to context. Communication/Consultation:  None today  Recommendations/Intent for next treatment session: Next visit will focus on expressive and receptive language.     Total Duration:  Time In: 1517  Time Out: 705 [facility]  Minutes: [address] [facility]    Visit # Therapist initials Date Arrived NS/ Cx < 24 hr >24 hr Cx Visit Comments   1 BC 10/5 X   Initial Assessment Only Today   2 BC 10/10 X                                                                                                                                                        Abbreviations: NS = No Show; CX = cancelled

## 2022-10-11 ENCOUNTER — CARE COORDINATION (OUTPATIENT)
Dept: CARE COORDINATION | Facility: CLINIC | Age: 62
End: 2022-10-11

## 2022-10-11 DIAGNOSIS — I51.3 INTRACARDIAC THROMBUS: ICD-10-CM

## 2022-10-11 DIAGNOSIS — I51.3 LEFT VENTRICULAR THROMBUS: ICD-10-CM

## 2022-10-11 DIAGNOSIS — I47.20 VENTRICULAR TACHYCARDIA: ICD-10-CM

## 2022-10-11 LAB
INR PPP: 1.7
PROTHROMBIN TIME: 20.8 SEC (ref 12.6–14.5)

## 2022-10-11 NOTE — CARE COORDINATION
Call from patients's brother, Maksim Irwin regarding lab order for PT/INR that was verified by this nurse on 10/10/22. Contacted outpatient registration to let them know the order was sent in again today by [facility]. The order was found and labs drawn.

## 2022-10-14 ENCOUNTER — CARE COORDINATION (OUTPATIENT)
Dept: CARE COORDINATION | Facility: CLINIC | Age: 62
End: 2022-10-14

## 2022-10-14 NOTE — CARE COORDINATION
[facility] Care Transitions Follow Up Call    LPN Care Coordinator contacted the family by telephone to follow up after admission on 9/15/22. Verified name and  with family as identifiers. Patient: Jamal Galeano  Patient : 1960   MRN: [id number]  Reason for Admission: CVA  Discharge Date: 22 RARS: Readmission Risk Score: 25.8      Needs to be reviewed by the provider   Additional needs identified to be addressed with provider: No  none             Method of communication with provider: none. Spoke with patients brother, Miri Qureshi who reports patient doing well and continues outpatient ST at [facility] outpatient therapy. Mr. Lea Jones is not with patient at this time and is currently at his [employer] assisting with a . No concerns voiced at this time. Will f/u next week. Appointments scheduled appropriately as noted below.     Addressed changes since last contact:  none  Follow Up  Future Appointments   Date Time Provider Ernestina Willsi   10/17/2022  2:30 PM Maciej Brice, [address] [facility]   10/18/2022 10:00 AM [facility]   10/19/2022  2:30 PM ALISE Rinaldi [facility]   10/20/2022 10:00 AM CYNTHIA RoachOCPRMICHELLE O   10/21/2022  2:30 PM [facility]   10/24/2022  2:30 PM [facility]   10/25/2022 10:00 AM Andres Heaton RN [facility] O   10/27/2022  9:15 AM Vannessa Castro MD [facility] AMB   10/27/2022 10:00 AM Andres Heaton RN [facility]   2022  2:45 PM MD GIGI Paredes [facility] AMB   12/15/2022  9:30 AM [facility] DEVICE 39 [facility] AMB   2023 10:45 AM Isaiah Bernabe DO [facility] AMB   2023 10:15 AM Isaiah Bernabe DO [facility] AMB         LPN Care Coordinator reviewed discharge instructions, medical action plan, and red flags with patient and family and discussed any barriers to care and/or understanding of plan of care after discharge. Discussed appropriate site of care based on symptoms and resources available to patient including: PCP  Specialist  [facility]  When to call [address] The family agrees to contact the PCP office for questions related to their healthcare. Advance Care Planning:   reviewed and current. Patients top risk factors for readmission:  s/p CVA due to embolism  Interventions to address risk factors  Interventions to address risk factors: Obtained and reviewed discharge summary and/or continuity of care documents    Offered patient enrollment in the Remote Patient Monitoring (RPM) program for in-home monitoring: NA.     Care Transitions Subsequent and Final Call    Subsequent and Final Calls  Have your medications changed?: No  Do you have any questions related to your medications?: No  Do you currently have any active services?: Yes  Are you currently active with any services?: Outpatient/Community Services  Do you have any needs or concerns that I can assist you with?: No  Care Transitions Interventions  Cardiac Rehab: Completed      Other Interventions:             LPN Care Coordinator provided contact information for future needs. Plan for follow-up call in 5-7 days based on severity of symptoms and risk factors. Plan for next call: Therapy progress, chart review, current needs.     Neela Crawford LPN

## 2022-10-16 DIAGNOSIS — Z95.810 PRESENCE OF AUTOMATIC CARDIOVERTER/DEFIBRILLATOR (AICD): Primary | ICD-10-CM

## 2022-10-17 ENCOUNTER — HOSPITAL ENCOUNTER (OUTPATIENT)
Dept: PHYSICAL THERAPY | Age: 62
Setting detail: RECURRING SERIES
Discharge: HOME | End: 2022-10-20
Payer: MEDICARE

## 2022-10-17 PROCEDURE — 92507 TX SP LANG VOICE COMM INDIV: CPT

## 2022-10-17 NOTE — PROGRESS NOTES
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 Pain:  Patient does not c/o pain. Patient does not appear in pain. Interventions Planned: (Treatment may consist of any combination of the following)  Expressive language, Receptive language, repetition, automatic speech, basic commands in context, contextual Y/N questions, responsive naming tasks, word discrimination tasks  GOALS: (Goals have been discussed and agreed upon with patient.)  Short-Term Functional Goals: Time Frame: 8 weeks  Patient will answer simple yes/no questions with 90% accuracy given minimal cueing. Patient will follow 1 step commands with 80% accuracy given min-mod cueing. Patient will identify objects given verbal descriptions with >80% accuracy given min cues. Patient will complete automatic naming tasks with 90% accuracy given minimal cueing.   Patient will complete basic phrase completion with 70% accuracy given moderate cueing. Patient will name common objects with 70% accuracy given moderate cueing. Discharge Goals: Time Frame: 12 weeks  Patient will increase receptive/expressive language skills to communicate basic wants/needs across environments. Updated Objective Findings:  None Today  Treatment   Expressive/receptive language activities: Answering yes/no questions presented 70% accuracy with written stimuli  Identified objects described (object function) given visual F:4 70% accuracy   Phrase completion x1 (was not helpful when given phonemic cues),  increased to 70% when presented with written F:3  Opposites: 30% independently, increased to 60% when presented with written F:3  Read aloud at word level with 75% accuracy, decreased accuracy at sentence level  Stating personal information 1/3 correct (stated name, unable to state birthday, age)  Stating children/grandchildrens' names 0x independently, increased x1 with written cue    Automatic speech tasks:  Independently counting 1-10 when given initial cue of \"1,2\"   Days of the week named 6/7 days when given first day  Months of the year independently named January to April; read aloud 12/12 months when given written stimuli  Pledge of allegiance able to recite portions of it with mod verbal cues    Treatment/Session Summary:  Patient hesitant to attempt verbal language with expressive language tasks, but had improved success when provided with written supports. Occasional spontaneous speech; Patient stating \"I can't do that\" with expressive language tasks. Patient's expressive language remains to be significantly limited and spontaneous speech often is not appropriate to context. Communication/Consultation:  None today  Recommendations/Intent for next treatment session: Next visit will focus on expressive and receptive language.     Total Duration:  Time In: 1430  Time Out: 1  Minutes: [address], SLP    Visit # Therapist initials Date Arrived NS/ Cx < 24 hr >24 hr Cx Visit Comments   1 BC 10/5 X   Initial Assessment Only Today   2 BC 10/10 X      3 BC 10/17 X                                                                                                                                               Abbreviations: NS = No Show; CX = cancelled

## 2022-10-18 NOTE — TELEPHONE ENCOUNTER
Surgery 11/3, assessment 10/28. Please send in Lovenox prescription. The patient has done the injections before and is aware how to do it. Please send back to me when done.

## 2022-10-18 NOTE — PROGRESS NOTES
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 Patient does not appear in pain. Interventions Planned: (Treatment may consist of any combination of the following)  Expressive language, Receptive language, repetition, automatic speech, basic commands in context, contextual Y/N questions, responsive naming tasks, word discrimination tasks  GOALS: (Goals have been discussed and agreed upon with patient.)  Short-Term Functional Goals: Time Frame: 8 weeks  Patient will answer simple yes/no questions with 90% accuracy given minimal cueing. Patient will follow 1 step commands with 80% accuracy given min-mod cueing. Patient will identify objects given verbal descriptions with >80% accuracy given min cues. Patient will complete automatic naming tasks with 90% accuracy given minimal cueing. Patient will complete basic phrase completion with 70% accuracy given moderate cueing.   Patient will name common objects with 70% accuracy given moderate cueing. Discharge Goals: Time Frame: 12 weeks  Patient will increase receptive/expressive language skills to communicate basic wants/needs across environments. Updated Objective Findings:  None Today  Treatment   Expressive/receptive language activities: Answering yes/no questions presented 50% accuracy with written stimuli  Phrase completion x1 (was not helpful when given phonemic cues),  increased to 50% when presented with written F:3    Introduced melodic intonation therapy:   Step 2: Hummed in unison with 100% accuracy for 20/20 phrases  Step 3: hummed with cues faded out with 100% accuracy for 20/20 phrases  Step 4: immediate repetition for 16/20 phrases  Step 5: response to question for 14/20 phrases    Automatic speech tasks:  Independently counting 1-20   Days of the week named 1/7 days - said them in unison 7/7, could repeat 7/7 days with second repetition  Months of the year independently named 0x following initial cue of \"January, February\"; stated in unison January-June, then independently continued with 4 more correct months;   Pledge of allegiance able to recite ~75% of it independently    Treatment/Session Summary:  Patient with good success using melodic intonation therapy today. Will continue to address in sessions. Provided picture matching written word as home exercise program.  Communication/Consultation:  None today  Recommendations/Intent for next treatment session: Next visit will focus on expressive and receptive language.     Total Duration:  Time In: 1415  Time Out: 1500  Minutes: [address], SLP    Visit # Therapist initials Date Arrived NS/ Cx < 24 hr >24 hr Cx Visit Comments   1 BC 10/5 X   Initial Assessment Only Today   2 BC 10/10 X      3 BC 10/17 X      4 BC 10/19 X                                                                                                                                      Abbreviations: NS = No Show; CX = cancelled

## 2022-10-19 ENCOUNTER — HOSPITAL ENCOUNTER (OUTPATIENT)
Dept: PHYSICAL THERAPY | Age: 62
Setting detail: RECURRING SERIES
Discharge: HOME | End: 2022-10-22
Payer: MEDICARE

## 2022-10-19 PROCEDURE — 92507 TX SP LANG VOICE COMM INDIV: CPT

## 2022-10-19 RX ORDER — ENOXAPARIN SODIUM 100 MG/ML
INJECTION SUBCUTANEOUS
Qty: 10 EACH | Refills: 1 | Status: SHIPPED | OUTPATIENT
Start: 2022-10-19

## 2022-10-19 NOTE — TELEPHONE ENCOUNTER
Requested Prescriptions     Signed Prescriptions Disp Refills    enoxaparin (LOVENOX) 100 MG/ML 10 each 1     Sig: Inject 90 mg Twice Daily As Directed     Authorizing Provider: Virginia Barker     Ordering User: Alyse Kapadia

## 2022-10-19 NOTE — TELEPHONE ENCOUNTER
\"1 mg/kg SQ every 12 hours to start 48 hours after holding Coumadin. Restart Lovenox and Coumadin when safe from a surgical perspective. Check INR 4 days after starting Coumadin. \"  Thierry Osborne MD

## 2022-10-20 DIAGNOSIS — I51.3 INTRACARDIAC THROMBUS: ICD-10-CM

## 2022-10-20 DIAGNOSIS — I47.20 VENTRICULAR TACHYCARDIA: ICD-10-CM

## 2022-10-20 DIAGNOSIS — I51.3 LEFT VENTRICULAR THROMBUS: ICD-10-CM

## 2022-10-20 LAB
INR PPP: 2.2
PROTHROMBIN TIME: 24.7 SEC (ref 12.6–14.5)

## 2022-10-21 ENCOUNTER — HOSPITAL ENCOUNTER (OUTPATIENT)
Dept: PHYSICAL THERAPY | Age: 62
Setting detail: RECURRING SERIES
Discharge: HOME | End: 2022-10-24
Payer: MEDICARE

## 2022-10-21 ENCOUNTER — CARE COORDINATION (OUTPATIENT)
Dept: CARE COORDINATION | Facility: CLINIC | Age: 62
End: 2022-10-21

## 2022-10-21 PROCEDURE — 92507 TX SP LANG VOICE COMM INDIV: CPT

## 2022-10-21 NOTE — CARE COORDINATION
[facility] Care Transitions Follow Up Call    LPN Care Coordinator contacted the family by telephone to follow up after admission on 9/15/22. Verified name and  with family as identifiers. Patient: Ethan Cruz  Patient : 1960   MRN: [id number]  Reason for Admission: CVA  Discharge Date: 22 RARS: Readmission Risk Score: 25.8      Needs to be reviewed by the provider   Additional needs identified to be addressed with provider: No  none             Method of communication with provider: none. Spoke with patient's brother, Hali Vines who reports patient is doing well and continues with outpatient speech therapy. Patient cystoscopy scheduled on 11/3/22 with Dr. Barrington Rush. Mr. Sravanthi Gilliam reports no questions or concerns at this time. Addressed changes since last contact:  none  Discussed follow-up appointments. If no appointment was previously scheduled, appointment scheduling offered: appointments scheduled as noted below. Follow Up  Future Appointments   Date Time Provider Ernestina Willis   10/21/2022  2:30 PM Evaristo Rowell [facility]   10/24/2022  2:30 PM L[facility]   10/25/2022 10:00 AM Agapito Matthews RN [facility]   10/27/2022  9:15 AM MD GIGI Sol [facility]   10/27/2022 10:00 AM Agapito Matthews RN [facility]   10/28/2022 11:15 AM [facility] ASSESSMENT RM 04 SFDORPA [facility]   2022  2:45 PM MD GIGI Mills [facility] AMB   12/15/2022  9:30 AM [facility] DEVICE 39 UCDG [facility] AMB   2023 10:45 AM Sharyle Freund, DO [facility] AMB   2023 10:15 AM Sharyle Freund, DO [facility] AMB         LPN Care Coordinator reviewed discharge instructions, medical action plan, and red flags with family and discussed any barriers to care and/or understanding of plan of care after discharge.  Discussed appropriate site of care based on symptoms and resources available to patient including: PCP  Specialist  When to call [address] The family agrees to contact the PCP office for questions related to their healthcare. Advance Care Planning:   reviewed and current. Patients top risk factors for readmission:  s/p CVA  Interventions to address risk factors: Obtained and reviewed discharge summary and/or continuity of care documents    Offered patient enrollment in the Remote Patient Monitoring (RPM) program for in-home monitoring: NA.   Goals Addressed                      This Visit's Progress      COMPLETED: Improve speech post CVA (pt-stated)   On track      Patient will work with outpatient speech therapy to improve speech. COMPLETED: Returns to baseline activity level. On track      Patient will continue activities as tolerated to regain strength post CVA. Care Transitions Subsequent and Final Call    Schedule Follow Up Appointment with PCP: Completed  Subsequent and Final Calls  Do you have any ongoing symptoms?: No  Have your medications changed?: No  Do you have any questions related to your medications?: No  Do you currently have any active services?: Yes  Are you currently active with any services?: Outpatient/Community Services  Do you have any needs or concerns that I can assist you with?: No  Care Transitions Interventions    Pharmacist: Completed       Other Services: Completed (Comment: Outpatient therapy referral confirmed today with [facility] for speech therapy)   Cardiac Rehab: Completed      Other Interventions:             LPN Care Coordinator provided contact information for future needs. No further follow-up call indicated based on severity of symptoms and risk factors.       Jose Rafael Craven LPN

## 2022-10-21 NOTE — PROGRESS NOTES
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 Patient does not appear in pain. Interventions Planned: (Treatment may consist of any combination of the following)  Expressive language, Receptive language, repetition, automatic speech, basic commands in context, contextual Y/N questions, responsive naming tasks, word discrimination tasks  GOALS: (Goals have been discussed and agreed upon with patient.)  Short-Term Functional Goals: Time Frame: 8 weeks  Patient will answer simple yes/no questions with 90% accuracy given minimal cueing. Patient will follow 1 step commands with 80% accuracy given min-mod cueing. Patient will identify objects given verbal descriptions with >80% accuracy given min cues. Patient will complete automatic naming tasks with 90% accuracy given minimal cueing. Patient will complete basic phrase completion with 70% accuracy given moderate cueing.   Patient will name common objects with 70% accuracy given moderate cueing. Discharge Goals: Time Frame: 12 weeks  Patient will increase receptive/expressive language skills to communicate basic wants/needs across environments. Updated Objective Findings:  None Today  Treatment   Expressive/receptive language activities: Answering yes/no questions presented 60% accuracy with written stimuli    Introduced melodic intonation therapy:   Step 2: Hummed in unison with 100% accuracy for 22/23 phrases  Step 3: hummed with cues faded out with 100% accuracy for 21/23 phrases  Step 4: immediate repetition for 18/23 phrases  Step 5: response to question for 18/23 phrases    Automatic speech tasks:  Independently counting 1-20   Days of the week named 1/7 days - said them in unison 7/7, could repeat 7/7 days with second repetition  Months of the year independently named 0x following initial cue of \"January, February\"; stated in unison January-June, then independently continued with 4 more correct months;   Pledge of allegiance able to recite ~75% of it independently    Treatment/Session Summary:  Patient with good success using melodic intonation therapy today. Will continue to address in sessions. Provided picture matching written word as home exercise program.  Communication/Consultation:  None today  Recommendations/Intent for next treatment session: Next visit will focus on expressive and receptive language.     Total Duration:  Time In: 1417  Time Out: 1500  Minutes: [facility], [facility]    Visit # Therapist initials Date Arrived NS/ Cx < 24 hr >24 hr Cx Visit Comments   1 BC 10/5 X   Initial Assessment Only Today   2 BC 10/10 X      3 BC 10/17 X      4 BC 10/19 X      5 BC 10/21 X                                                                                                                             Abbreviations: NS = No Show; CX = cancelled

## 2022-10-24 ENCOUNTER — HOSPITAL ENCOUNTER (OUTPATIENT)
Dept: PHYSICAL THERAPY | Age: 62
Setting detail: RECURRING SERIES
Discharge: HOME | End: 2022-10-27
Payer: MEDICARE

## 2022-10-24 PROCEDURE — 92507 TX SP LANG VOICE COMM INDIV: CPT

## 2022-10-24 NOTE — PROGRESS NOTES
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 Pain:  Patient does not c/o pain. Patient does not appear in pain. Interventions Planned: (Treatment may consist of any combination of the following)  Expressive language, Receptive language, repetition, automatic speech, basic commands in context, contextual Y/N questions, responsive naming tasks, word discrimination tasks  GOALS: (Goals have been discussed and agreed upon with patient.)  Short-Term Functional Goals: Time Frame: 8 weeks  Patient will answer simple yes/no questions with 90% accuracy given minimal cueing. Patient will follow 1 step commands with 80% accuracy given min-mod cueing. Patient will identify objects given verbal descriptions with >80% accuracy given min cues. Patient will complete automatic naming tasks with 90% accuracy given minimal cueing. Patient will complete basic phrase completion with 70% accuracy given moderate cueing.   Patient will name common objects with 70% accuracy given moderate

## 2022-10-25 ENCOUNTER — HOSPITAL ENCOUNTER (OUTPATIENT)
Dept: CARDIAC REHAB | Age: 62
Setting detail: RECURRING SERIES
Discharge: HOME | End: 2022-10-28

## 2022-10-25 ASSESSMENT — EXERCISE STRESS TEST: PEAK_BP: 118/80

## 2022-10-25 ASSESSMENT — LIFESTYLE VARIABLES
CIGARETTES_PER_DAY: 1PPD
SMOKELESS_TOBACCO: NO

## 2022-10-25 ASSESSMENT — EJECTION FRACTION: EF_VALUE: 25

## 2022-10-28 ENCOUNTER — HOSPITAL ENCOUNTER (OUTPATIENT)
Dept: PREADMISSION TESTING | Age: 62
Discharge: HOME | End: 2022-10-31
Payer: MEDICARE

## 2022-10-28 VITALS
WEIGHT: 198 LBS | TEMPERATURE: 97.7 F | SYSTOLIC BLOOD PRESSURE: 131 MMHG | BODY MASS INDEX: 28.35 KG/M2 | HEIGHT: 70 IN | OXYGEN SATURATION: 98 % | DIASTOLIC BLOOD PRESSURE: 67 MMHG | HEART RATE: 67 BPM | RESPIRATION RATE: 16 BRPM

## 2022-10-28 LAB
ANION GAP SERPL CALC-SCNC: 3 MMOL/L (ref 2–11)
APPEARANCE UR: ABNORMAL
BACTERIA URNS QL MICRO: NEGATIVE /HPF
BILIRUB UR QL: NEGATIVE
BUN SERPL-MCNC: 11 MG/DL (ref 8–23)
CALCIUM SERPL-MCNC: 8.8 MG/DL (ref 8.3–10.4)
CASTS URNS QL MICRO: ABNORMAL /LPF
CHLORIDE SERPL-SCNC: 108 MMOL/L (ref 101–110)
CO2 SERPL-SCNC: 30 MMOL/L (ref 21–32)
COLOR UR: ABNORMAL
CREAT SERPL-MCNC: 1.4 MG/DL (ref 0.8–1.5)
EPI CELLS #/AREA URNS HPF: ABNORMAL /HPF
ERYTHROCYTE [DISTWIDTH] IN BLOOD BY AUTOMATED COUNT: 18.2 % (ref 11.9–14.6)
GLUCOSE SERPL-MCNC: 105 MG/DL (ref 65–100)
GLUCOSE UR STRIP.AUTO-MCNC: NEGATIVE MG/DL
HCT VFR BLD AUTO: 42.4 % (ref 41.1–50.3)
HGB BLD-MCNC: 12.7 G/DL (ref 13.6–17.2)
HGB UR QL STRIP: ABNORMAL
KETONES UR QL STRIP.AUTO: NEGATIVE MG/DL
LEUKOCYTE ESTERASE UR QL STRIP.AUTO: ABNORMAL
MCH RBC QN AUTO: 24.8 PG (ref 26.1–32.9)
MCHC RBC AUTO-ENTMCNC: 30 G/DL (ref 31.4–35)
MCV RBC AUTO: 82.7 FL (ref 82–102)
NITRITE UR QL STRIP.AUTO: NEGATIVE
NRBC # BLD: 0 K/UL (ref 0–0.2)
PH UR STRIP: 6 [PH] (ref 5–9)
PLATELET # BLD AUTO: 279 K/UL (ref 150–450)
PMV BLD AUTO: 10.3 FL (ref 9.4–12.3)
POTASSIUM SERPL-SCNC: 3.8 MMOL/L (ref 3.5–5.1)
PROT UR STRIP-MCNC: 100 MG/DL
RBC # BLD AUTO: 5.13 M/UL (ref 4.23–5.6)
RBC #/AREA URNS HPF: >100 /HPF
SODIUM SERPL-SCNC: 141 MMOL/L (ref 133–143)
SP GR UR REFRACTOMETRY: 1.01 (ref 1–1.02)
UROBILINOGEN UR QL STRIP.AUTO: 0.2 EU/DL (ref 0.2–1)
WBC # BLD AUTO: 7.3 K/UL (ref 4.3–11.1)
WBC URNS QL MICRO: ABNORMAL /HPF

## 2022-10-28 PROCEDURE — 85027 COMPLETE CBC AUTOMATED: CPT

## 2022-10-28 PROCEDURE — 80048 BASIC METABOLIC PNL TOTAL CA: CPT

## 2022-10-28 PROCEDURE — 87086 URINE CULTURE/COLONY COUNT: CPT

## 2022-10-28 PROCEDURE — 81001 URINALYSIS AUTO W/SCOPE: CPT

## 2022-10-28 RX ORDER — LEVOTHYROXINE SODIUM 0.05 MG/1
50 TABLET ORAL DAILY
COMMUNITY

## 2022-10-28 NOTE — PROGRESS NOTES
All labs reviewed and routed to the surgeon's office. Marcela for Dr. Kandace Ramirez notified of UA results. Urine culture pending. Chart flagged for CN to follow up.

## 2022-10-28 NOTE — PROGRESS NOTES
Patient and pt's brother Rach Norton verified name and     Order for consent found in EHR and matches case posting; patient verified. Type 1B surgery, walk-in assessment complete. Labs per surgeon: CBC, BMP, UA, T/S s/h; results pending  Labs per anesthesia protocol: PT/INR s/h dos;   EK/15/2022    Patient is has prominent receptive aphasia and needs help from his brother with any communication needs. Chart in box for anesthesia review Echo, cardiac clearance, wafarin hold and Lovenox bridge and if Rep for ICD is needed dos. Chart flagged for CN to follow up. Per pt and brother, they understand Lovenox bridge instructions. Hospital approved surgical skin cleanser and instructions given per hospital policy. Patient provided with and instructed on educational handouts including Guide to Surgery, Pain Management, Hand Hygiene, Blood Transfusion Education, and [facility] Brochure. Patient answered medical/surgical history questions at their best of ability. All prior to admission medications documented in [facility]. Original medication prescription bottle NOT visualized during patient appointment. Patient instructed to hold all vitamins 7 days prior to surgery and NSAIDS 5 days prior to surgery, patient verbalized understanding. Patient teach back successful and patient demonstrates knowledge of instructions. PLEASE CONTINUE TAKING ALL PRESCRIPTION MEDICATIONS UP TO THE DAY OF SURGERY UNLESS OTHERWISE DIRECTED BELOW. DISCONTINUE all vitamins and supplements 7 days prior to surgery. DISCONTINUE Non-Steriodal Anti-Inflammatory (NSAIDS) such as Advil and Aleve 5 days prior to surgery. Home Medications to take  the day of surgery      Please call [phone], have all bottles of medications available to read off with a nurse. Medication instructions will be given for upcoming surgery.             Home Medications   to Hold     Warfarin-follow instructions as provided by the surgeon's office. Follow instructions provided by the surgeons office for Lovenox injections. Comments       On the day before surgery please take Acetaminophen 1000mg in the morning and then again before bed. You may substitute for Tylenol 650 mg. Hibiclens shower the night before surgery and the morning of surgery. Please do not bring home medications with you on the day of surgery unless otherwise directed by your nurse. If you are instructed to bring home medications, please give them to your nurse as they will be administered by the nursing staff. If you have any questions, please call [facility] Ariela Gudino [phone] or [facility] [phone]. A copy of this note was provided to the patient for reference.

## 2022-10-28 NOTE — PROGRESS NOTES
Received call back from patient's brother Graylon Nageotte, states had an issue getting pt's medication bottles and wasn't able to verify medications with this RN. Called pt's preferred pharmacy, obtained pt's current medication list. Called pt's brother back and instructions given for medications pt is to take dos (Nitroglycerin if needed, Amiodarone, Mexiletine, Metoprolol and Levothyroxine.) Verbalized understanding.

## 2022-10-28 NOTE — PROGRESS NOTES
Latest Reference Range & Units 10/28/22 12:10   Sodium 133 - 143 mmol/L 141   Potassium 3.5 - 5.1 mmol/L 3.8   Chloride 101 - 110 mmol/L 108   CO2 21 - 32 mmol/L 30   BUN,BUNPL 8 - 23 MG/DL 11   Creatinine 0.8 - 1.5 MG/DL 1.40   Anion Gap 2 - 11 mmol/L 3   Est, Glom Filt Rate >60 ml/min/1.73m2 57 (L)   Glucose, Random 65 - 100 mg/dL 105 (H)   CALCIUM, SERUM, 281536 8.3 - 10.4 MG/DL 8.8   WBC 4.3 - 11.1 K/uL 7.3   RBC 4.23 - 5.6 M/uL 5.13   Hemoglobin Quant 13.6 - 17.2 g/dL 12.7 (L)   Hematocrit 41.1 - 50.3 % 42.4   MCV 82.0 - 102.0 FL 82.7   MCH 26.1 - 32.9 PG 24.8 (L)   MCHC 31.4 - 35.0 g/dL 30.0 (L)   MPV 9.4 - 12.3 FL 10.3   RDW 11.9 - 14.6 % 18.2 (H)   Platelet Count 580 - 450 K/uL 279   Nucleated Red Blood Cells 0.0 - 0.2 K/uL 0.00   Color, UA -   YELLOW/STRAW   Glucose, UA mg/dL Negative   Bilirubin, Urine NEG   Negative   Ketones, Urine NEG mg/dL Negative   Specific Gravity, UA 1.001 - 1.023   1.009   Blood, Urine NEG   LARGE ! Protein, UA NEG mg/dL 100 ! Urobilinogen, Urine 0.2 - 1.0 EU/dL 0.2   Nitrite, Urine NEG   Negative   Leukocyte Esterase, Urine NEG   LARGE !    Appearance -   CLOUDY   pH, Urine 5.0 - 9.0   6.0   Casts 0 /lpf 0-2   WBC, UA 0 /hpf 20-50   RBC, UA 0 /hpf >100 (H)   Epithelial Cells, UA 0 /hpf 0-5   Bacteria, UA 0 /hpf Negative   (L): Data is abnormally low  (H): Data is abnormally high  !: Data is abnormal

## 2022-10-31 LAB
BACTERIA SPEC CULT: NORMAL
SERVICE CMNT-IMP: NORMAL

## 2022-10-31 NOTE — PROGRESS NOTES
Culture, Urine  Order: [id number]  Status: Preliminary result    Visible to patient: No (not released)    Next appt: 11/08/2022 at 02:45 PM in Cardiology Jesenia Eli MD)    Specimen Information: Urine, clean catch   0 Result Notes  Component Ref Range & Units 10/28/22 1210 7/29/22 1916 7/24/22 0812 7/24/22 0754 7/24/22 0237 7/23/22 2212 7/23/22 2200   Special Requests   NO SPECIAL REQUESTS P[1]  NO SPECIAL REQUESTS  RIGHT   HAND  LEFT   Antecubital  LEFT   ARM  RIGHT   Antecubital  NO SPECIAL REQUESTS    Culture   10,000 to 50,000 COLONIES/mL MIXED SKIN DELFINA ISOLATED P[2]  NO GROWTH 2 DAYS  NO GROWTH 5 DAYS  NO GROWTH 5 DAYS  NO GROWTH 5 DAYS  NO GROWTH 5 DAYS  NO GROWTH 2 [address], [place]   [address] [facility], [place] [address] [facility], [place] [address] [facility], [place] [address] [facility], [place] [address] [facility], [place] [address] [facility], [place]              Specimen Collected: 10/28/22 12:10 EDT Last Resulted: 10/30/22 10:11 EDT

## 2022-10-31 NOTE — PROGRESS NOTES
Dr. Anthony Ruano reviewed chart. New order received \"rep DOS. \" Cher Boogie rep paged. Ok to proceed.

## 2022-11-02 RX ORDER — OXYCODONE HYDROCHLORIDE 5 MG/1
5 TABLET ORAL
Status: CANCELLED | OUTPATIENT
Start: 2022-11-02 | End: 2022-11-03

## 2022-11-02 RX ORDER — HYDROMORPHONE HYDROCHLORIDE 2 MG/ML
0.5 INJECTION, SOLUTION INTRAMUSCULAR; INTRAVENOUS; SUBCUTANEOUS EVERY 5 MIN PRN
Status: CANCELLED | OUTPATIENT
Start: 2022-11-02

## 2022-11-02 RX ORDER — PROCHLORPERAZINE EDISYLATE 5 MG/ML
5 INJECTION INTRAMUSCULAR; INTRAVENOUS
Status: CANCELLED | OUTPATIENT
Start: 2022-11-02 | End: 2022-11-03

## 2022-11-02 NOTE — PERIOP NOTE
Directly informed patient and or family member of pre op arrival time 12 on 11/3/22. All questions answered. Pre op instructions reviewed. Left contact information for any additional questions or needs.

## 2022-11-03 ENCOUNTER — ANESTHESIA EVENT (OUTPATIENT)
Dept: SURGERY | Age: 62
End: 2022-11-03

## 2022-11-03 ENCOUNTER — ANESTHESIA (OUTPATIENT)
Dept: SURGERY | Age: 62
End: 2022-11-03

## 2022-11-03 ENCOUNTER — HOSPITAL ENCOUNTER (OUTPATIENT)
Age: 62
Setting detail: OUTPATIENT SURGERY
Discharge: HOME | End: 2022-11-03
Attending: UROLOGY | Admitting: UROLOGY
Payer: MEDICARE

## 2022-11-03 VITALS
HEART RATE: 90 BPM | RESPIRATION RATE: 18 BRPM | HEIGHT: 70 IN | TEMPERATURE: 99.2 F | WEIGHT: 199 LBS | OXYGEN SATURATION: 97 % | DIASTOLIC BLOOD PRESSURE: 77 MMHG | SYSTOLIC BLOOD PRESSURE: 163 MMHG | BODY MASS INDEX: 28.49 KG/M2

## 2022-11-03 LAB
INR BLD: 1.2 (ref 0.9–1.2)
PT BLD: 13.9 SECS (ref 9.6–11.6)

## 2022-11-03 PROCEDURE — 85610 PROTHROMBIN TIME: CPT

## 2022-11-03 PROCEDURE — 2580000003 HC RX 258: Performed by: ANESTHESIOLOGY

## 2022-11-03 RX ORDER — FENTANYL CITRATE 50 UG/ML
100 INJECTION, SOLUTION INTRAMUSCULAR; INTRAVENOUS
Status: DISCONTINUED | OUTPATIENT
Start: 2022-11-03 | End: 2022-11-03 | Stop reason: HOSPADM

## 2022-11-03 RX ORDER — MIDAZOLAM HYDROCHLORIDE 2 MG/2ML
2 INJECTION, SOLUTION INTRAMUSCULAR; INTRAVENOUS
Status: DISCONTINUED | OUTPATIENT
Start: 2022-11-03 | End: 2022-11-03 | Stop reason: HOSPADM

## 2022-11-03 RX ORDER — LIDOCAINE HYDROCHLORIDE 10 MG/ML
1 INJECTION, SOLUTION INFILTRATION; PERINEURAL
Status: DISCONTINUED | OUTPATIENT
Start: 2022-11-03 | End: 2022-11-03 | Stop reason: HOSPADM

## 2022-11-03 RX ORDER — SODIUM CHLORIDE, SODIUM LACTATE, POTASSIUM CHLORIDE, CALCIUM CHLORIDE 600; 310; 30; 20 MG/100ML; MG/100ML; MG/100ML; MG/100ML
100 INJECTION, SOLUTION INTRAVENOUS CONTINUOUS
Status: DISCONTINUED | OUTPATIENT
Start: 2022-11-03 | End: 2022-11-03 | Stop reason: HOSPADM

## 2022-11-03 RX ADMIN — SODIUM CHLORIDE, POTASSIUM CHLORIDE, SODIUM LACTATE AND CALCIUM CHLORIDE 100 ML/HR: 600; 310; 30; 20 INJECTION, SOLUTION INTRAVENOUS at 15:17

## 2022-11-03 ASSESSMENT — PAIN - FUNCTIONAL ASSESSMENT: PAIN_FUNCTIONAL_ASSESSMENT: 0-10

## 2022-11-03 ASSESSMENT — LIFESTYLE VARIABLES: SMOKING_STATUS: 1

## 2022-11-03 ASSESSMENT — COPD QUESTIONNAIRES: CAT_SEVERITY: MILD

## 2022-11-03 NOTE — ANESTHESIA PRE PROCEDURE
Department of Anesthesiology  Preprocedure Note       Name:  Blanca Mera   Age:  58 y.o.  :  1960                                          MRN:  [id number]         Date:  11/3/2022      Surgeon: Ava Espinoza):  Lake Mccloud MD    Procedure: Procedure(s):  CYSTOSCOPY, RIGHT DIAGNOSTIC URETEROSCOPY, RIGHT RETROGRADE PYELOGRAM, POSSIBLE BIOPSIES, STENT EXCHANGE VS REMOVAL Patient has an ICD    Medications prior to admission:   Prior to Admission medications    Medication Sig Start Date End Date Taking? Authorizing Provider   levothyroxine (SYNTHROID) 50 MCG tablet Take 50 mcg by mouth Daily    Historical Provider, MD   enoxaparin (LOVENOX) 100 MG/ML Inject 90 mg Twice Daily As Directed 10/19/22   Suresh Diaz DO   amiodarone (CORDARONE) 200 MG tablet Take 1 tablet by mouth in the morning and at bedtime 22   Historical Provider, MD   aspirin 81 MG chewable tablet Take 1 tablet by mouth daily  Patient taking differently: Take 81 mg by mouth at bedtime 22   Rafael Chester MD   metoprolol succinate (TOPROL XL) 25 MG extended release tablet Take 1 tablet by mouth in the morning and at bedtime 22   Rafael Chester MD   mexiletine (MEXITIL) 150 MG capsule Take 1 capsule by mouth in the morning and at bedtime 22   Rafael Chester MD   warfarin (COUMADIN) 2.5 MG tablet Take 2.5mg nightly MWF and 5mg nightly on other days. Patient taking differently: Take 2.5 mg by mouth daily 22   Rafael Chester MD   nitroGLYCERIN (NITROSTAT) 0.4 MG SL tablet Place 0.4 mg under the tongue every 5 minutes as needed for Chest pain up to max of 3 total doses. If no relief after 1 dose, call 911.   Patient not taking: Reported on 11/3/2022    Historical Provider, MD   QUEtiapine (SEROQUEL) 100 MG tablet TAKE 1 TABLET BY MOUTH EVERY NIGHT 22   Dolph Dose, DO   Multiple Vitamins-Minerals (THERAPEUTIC MULTIVITAMIN-MINERALS) tablet Take 1 tablet by mouth daily Historical Provider, MD   acetaminophen (TYLENOL) 325 mg tablet Take 2 tablets by mouth every 6 hours as needed for Pain 6/6/22   LEISA Jalloh   Cetirizine HCl 10 MG CAPS Take by mouth as needed    Ar Automatic Reconciliation       Current medications:    No current facility-administered medications for this visit. No current outpatient medications on file. Facility-Administered Medications Ordered in Other Visits   Medication Dose Route Frequency Provider Last Rate Last Admin    ceFAZolin (ANCEF) 2000 mg in sterile water 20 mL IV syringe  2,000 mg IntraVENous On Call to [address], MD        lidocaine 1 % injection 1 mL  1 mL IntraDERmal Once PRN Dimitry Jackson MD        fentaNYL (SUBLIMAZE) injection 100 mcg  100 mcg IntraVENous Once PRN Dimitry Jackson MD        lactated ringers infusion  100 mL/hr IntraVENous Continuous Dimitry Jackson  mL/hr at 11/03/22 1517 100 mL/hr at 11/03/22 1517    midazolam PF (VERSED) injection 2 mg  2 mg IntraVENous Once PRN Dimitry Jackson MD           Allergies: Allergies   Allergen Reactions    Penicillins Swelling     Face swelling    Atorvastatin Other (See Comments)     itching    Evolocumab Other (See Comments)     Itching    Rosuvastatin Other (See Comments)     itching       Problem List:    Patient Active Problem List   Diagnosis Code    Overweight (BMI 25.0-29. 9) E66.3    Coronary artery disease involving autologous vein coronary bypass graft without angina pectoris I25.810    Benign prostatic hyperplasia with weak urinary stream N40.1, R39.12    History of 2019 novel coronavirus disease (COVID-19) Z86.16    Numbness of left foot R20.0    Primary insomnia F51.01    Chronic systolic congestive heart failure (HCC) I50.22    ICD (implantable cardioverter-defibrillator) in place Z95.810    Seasonal allergic rhinitis due to pollen J30.1    Stopped smoking with greater than 20 pack year history Z87.891    Refused influenza vaccine Z28.21    Chronic eczematous otitis externa of both ears H60.8X3    High risk medication use Z79.899    Dyslipidemia E78.5    Left ventricular thrombus I51.3    COVID-19 vaccination refused Z28.21    Iron deficiency anemia due to chronic blood loss D50.0    Chronic fatigue R53.82    History of tobacco abuse Z87.891    Statin intolerance Z78.9    S/P CABG x 3 Z95.1    History of hydronephrosis Z87.448    Ureteral obstruction, right N13.5    Gross hematuria R31.0    Right kidney mass N28.89    Long term current use of amiodarone Z79.899    Elevated TSH R79.89    Cerebrovascular accident (CVA) due to embolism (HCC) I63.9    Legally blind H54.8    Subclinical hypothyroidism E03.8    Wheezing R06.2       Past Medical History:        Diagnosis Date    Acute hypoxemic respiratory failure due to COVID-19 ([address] 10/09/2021    Anemia     BPH (benign prostatic hyperplasia) 2019    CAD (coronary artery disease)     heart attack 2005; heart stents    CHF (congestive heart failure) ([address] 09/27/2011    pt denies    Chronic systolic heart failure ([address] 10/02/2015    Coronary atherosclerosis of native coronary vessel 10/02/2015    CABG 5/2022    CVA (cerebral vascular accident) ([address] 09/15/2022    prominent receptive aphasia with neologism    H/O transesophageal echocardiography (VALERY) for monitoring 06/18/2022    LVEF 25-30%, now 35-40%    History of blood transfusion     Hyperlipidemia 10/02/2015    Hypertension     Hypoxemia requiring supplemental oxygen 10/06/2021    ICD (implantable cardioverter-defibrillator) in place 09/26/2011    dual chamber [employer] Last discharge 6/17/22    IGT (impaired glucose tolerance) 12/03/2017    Other ill-defined conditions(799.89)      blind left eye    Other ill-defined conditions(799.89)     cardiomyopathy    Pneumonia due to COVID-19 virus 11/13/2021    Resolved    Primary insomnia 06/07/2017    Psychiatric disorder depression     Right kidney mass     Sepsis, unspecified 2011    Thromboembolus ([address]     thrombus in heart     Thrombus 10/02/2015    Left ventricular thrombus       Past Surgical History:        Procedure Laterality Date    CARDIAC CATHETERIZATION      5 stents total    CARDIAC DEFIBRILLATOR PLACEMENT      [employer], ICD    CARDIAC PROCEDURE N/A 2022    LEFT HEART CATH / CORONARY ANGIOGRAPHY performed by Lisa Osman MD at [address] [place] [address] CATH LAB    COLONOSCOPY  2010    CORONARY ARTERY BYPASS GRAFT N/A 2022    CORONARY ARTERY BYPASS GRAFT (CABG X 3), [place] ; ENDOSCOPIC VEIN HARVEST, LEFT GREATER SAPHENOUS VEIN performed by Areli Aguayo MD at [facility] Right 2022    CYSTOSCOPY,RIGHT URETEROSCOPY,RIGHT RETROGRADE PYELOGRAM performed by Gonzales Montanez MD at [facility] MAIN OR    HEENT      oral surgery    DE CARDIAC SURG PROCEDURE UNLIST       1 stent     TRANSESOPHAGEAL ECHOCARDIOGRAM N/A 2022    TRANSESOPHAGEAL ECHOCARDIOGRAM performed by Areli Aguayo MD at [facility] MAIN OR       Social History:    Social History     Tobacco Use    Smoking status: Former     Packs/day: 1.00     Types: Cigarettes     Start date: 1978     Quit date: 2022     Years since quittin.4    Smokeless tobacco: Never   Substance Use Topics    Alcohol use: No                                Counseling given: Not Answered      Vital Signs (Current): There were no vitals filed for this visit.                                            BP Readings from Last 3 Encounters:   22 (!) 163/77   10/28/22 131/67   10/04/22 110/72       NPO Status:                                                                                 BMI:   Wt Readings from Last 3 Encounters:   22 199 lb (90.3 kg)   10/28/22 198 lb (89.8 kg)   10/04/22 199 lb 6.4 oz (90.4 kg)     There is no height or weight on file to calculate BMI.    CBC:   Lab Results   Component Value Date/Time WBC 7.3 10/28/2022 12:10 PM    RBC 5.13 10/28/2022 12:10 PM    HGB 12.7 10/28/2022 12:10 PM    HCT 42.4 10/28/2022 12:10 PM    MCV 82.7 10/28/2022 12:10 PM    RDW 18.2 10/28/2022 12:10 PM     10/28/2022 12:10 PM       CMP:   Lab Results   Component Value Date/Time     10/28/2022 12:10 PM    K 3.8 10/28/2022 12:10 PM     10/28/2022 12:10 PM    CO2 30 10/28/2022 12:10 PM    BUN 11 10/28/2022 12:10 PM    CREATININE 1.40 10/28/2022 12:10 PM    GFRAA >60 09/20/2022 04:01 AM    AGRATIO 0.6 10/15/2021 07:37 AM    LABGLOM 57 10/28/2022 12:10 PM    GLUCOSE 105 10/28/2022 12:10 PM    PROT 6.6 09/15/2022 09:12 AM    CALCIUM 8.8 10/28/2022 12:10 PM    BILITOT 0.2 09/15/2022 09:12 AM    ALKPHOS 71 09/15/2022 09:12 AM    ALKPHOS 52 10/15/2021 07:37 AM    AST 19 09/15/2022 09:12 AM    ALT 14 09/15/2022 09:12 AM       POC Tests: No results for input(s): POCGLU, POCNA, POCK, POCCL, POCBUN, POCHEMO, POCHCT in the last 72 hours.     Coags:   Lab Results   Component Value Date/Time    PROTIME 13.9 11/03/2022 03:17 PM    PROTIME 24.7 10/20/2022 10:50 AM    INR 1.2 11/03/2022 03:17 PM    INR 2.2 10/20/2022 10:50 AM    APTT 33.7 09/15/2022 03:44 PM    APTT 115.3 10/09/2021 01:55 PM       HCG (If Applicable): No results found for: PREGTESTUR, PREGSERUM, HCG, HCGQUANT     ABGs: No results found for: PHART, PO2ART, KPX5YYB, WDE9NDI, BEART, W7LODGTC     Type & Screen (If Applicable):  No results found for: LABABO, LABRH    Drug/Infectious Status (If Applicable):  No results found for: HIV, HEPCAB    COVID-19 Screening (If Applicable):   Lab Results   Component Value Date/Time    COVID19 Not detected 07/24/2022 07:26 AM           Anesthesia Evaluation    Airway: Mallampati: II  TM distance: >3 FB   Neck ROM: full  Mouth opening: > = 3 FB   Dental: normal exam   (+) edentulous      Pulmonary:normal exam  breath sounds clear to auscultation  (+) COPD: mild,  current smoker                           Cardiovascular:  Exercise tolerance: good (>4 METS), Ambulates unassisted  (+) hypertension (pt denies):, pacemaker (2011): AICD, past MI (2005):, CAD:, CABG/stent (CABG 5/22, stents 2005):, dysrhythmias (VT in May- shocked by AICD. ):, CHF:,         Rhythm: regular  Rate: normal                 ROS comment: Echo 9/22:    Left Ventricle: Severely reduced left ventricular systolic function with a visually estimated EF of 35 - 40%. Left ventricle size is normal. Normal wall thickness. Akinesis and aneurysmal dilation of the apex extending to the mid anteroseptal walls. Grade I diastolic dysfunction with normal LAP. No mass present. 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   Left Atrium: Left atrium is mildly dilated.   Interatrial Septum: Agitated saline study was negative with and without provocation.   Contrast used: Definity.     -Sinus rhythm noted during the study. -LV apical aneurysm noted but no evidence of LV apical thrombus. -Negative bubble study for intracardiac shunt.                   Neuro/Psych:   (+) CVA (9/15/22, mild aphasia):,              ROS comment: neuropathy GI/Hepatic/Renal:   (+) renal disease (kidney mass):,           Endo/Other:    (+) hypothyroidism, blood dyscrasia: anemia:., .                 Abdominal:             Vascular: Other Findings:             Anesthesia Plan      general     ASA 4     (Tight BP control given recent CVA. Goal MAP >85. High risk nature of anesthesia explained to pt given recent CVA. Pt has remained on ASA. Pt is not 3 months out from CVA, will confirm that this is emergent or urgent.  )  Induction: intravenous. continuous noninvasive hemodynamic monitor    Anesthetic plan and risks discussed with patient and sibling.                         Yareli Antunez MD   11/3/2022

## 2022-11-08 ENCOUNTER — OFFICE VISIT (OUTPATIENT)
Dept: CARDIOLOGY CLINIC | Age: 62
End: 2022-11-08
Payer: MEDICARE

## 2022-11-08 VITALS
HEART RATE: 75 BPM | BODY MASS INDEX: 26.48 KG/M2 | HEIGHT: 70 IN | SYSTOLIC BLOOD PRESSURE: 118 MMHG | WEIGHT: 185 LBS | DIASTOLIC BLOOD PRESSURE: 80 MMHG

## 2022-11-08 DIAGNOSIS — I25.810 CORONARY ARTERY DISEASE INVOLVING AUTOLOGOUS VEIN CORONARY BYPASS GRAFT WITHOUT ANGINA PECTORIS: Primary | ICD-10-CM

## 2022-11-08 PROCEDURE — 99214 OFFICE O/P EST MOD 30 MIN: CPT | Performed by: INTERNAL MEDICINE

## 2022-11-08 PROCEDURE — 93000 ELECTROCARDIOGRAM COMPLETE: CPT | Performed by: INTERNAL MEDICINE

## 2022-11-08 ASSESSMENT — ENCOUNTER SYMPTOMS
RESPIRATORY NEGATIVE: 1
ALLERGIC/IMMUNOLOGIC NEGATIVE: 1
EYES NEGATIVE: 1
GASTROINTESTINAL NEGATIVE: 1

## 2022-11-11 ENCOUNTER — TELEPHONE (OUTPATIENT)
Dept: UROLOGY | Age: 62
End: 2022-11-11

## 2022-11-11 NOTE — TELEPHONE ENCOUNTER
----- Message from Letty Amaral MD sent at 11/3/2022  5:06 PM EDT -----  Regarding: SUrgery REschedule  Marcela,    The above patient's surgery was canceled tonight by anesthesia. Dr. Ebony Smith does not want surgery done until he is at least 3 months out from stroke. I\"m not sure how this was not caught when he went to [facility]. Please reschedule surgery for December 2022 and will need to see PAT again for clearance beforehand. IF he has additional questions/wants to meet with me again first, that is fine too and you can schedule him for pre-op H&P appointment. Thanks!   Betsy

## 2022-11-15 NOTE — PROGRESS NOTES
Servando Daniella  : 1960  Primary: [address] 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 Pain:  Patient does not c/o pain. Patient does not appear in pain. Interventions Planned: (Treatment may consist of any combination of the following)  Expressive language, Receptive language, repetition, automatic speech, basic commands in context, contextual Y/N questions, responsive naming tasks, word discrimination tasks  GOALS: (Goals have been discussed and agreed upon with patient.)  Short-Term Functional Goals: Time Frame: 8 weeks  Patient will answer simple yes/no questions with 90% accuracy given minimal cueing. Patient will follow 1 step commands with 80% accuracy given min-mod cueing. Patient will identify objects given verbal descriptions with >80% accuracy given min cues. Patient will complete automatic naming tasks with 90% accuracy given minimal cueing. Patient will complete basic phrase completion with 70% accuracy given moderate cueing.   Patient will name common objects with 70% accuracy given moderate cueing. Discharge Goals: Time Frame: 12 weeks  Patient will increase receptive/expressive language skills to communicate basic wants/needs across environments. Updated Objective Findings:  None Today  Treatment   Expressive/receptive language activities: Answering yes/no questions presented 60-65% accuracy with written stimuli  Match picture to object function: 100% accuracy    Introduced melodic intonation therapy:   Step 2: Hummed in unison with 100% accuracy for 26/26 phrases  Step 3: hummed with cues faded out with for 26/26 phrases  Step 4: immediate repetition for 24/26 phrases  Step 5: response to question for 22/2 phrases    Automatic speech tasks:  Independently counting 1-20   Days of the week named 7/7 days independently  Months of the year independently named 10x following initial cue of \"January\"  Pledge of allegiance able to recite ~90% with no cues    Phrase completion: 7/10x independently    Treatment/Session Summary:  Good improvement with phrase completion today! Increased occurrence of spontaneous speech in conversation today; some responses remain inappropriate to context  Communication/Consultation:  None today  Recommendations/Intent for next treatment session: Next visit will focus on expressive and receptive language.     Total Duration:  Time In: 0800  Time Out: 0840  Minutes: Mildred Connors, SLP    Visit # Therapist initials Date Arrived NS/ Cx < 24 hr >24 hr Cx Visit Comments   1 BC 10/5 X   Initial Assessment Only Today   2 BC 10/10 X      3 BC 10/17 X      4 BC 10/19 X      5 BC 10/21 X      6 BC 10/24 X       BC 11/9  X  No show   7 BC 11/16 X      8 BC 11/21                                                                                          Abbreviations: NS = No Show; CX = cancelled

## 2022-11-16 ENCOUNTER — HOSPITAL ENCOUNTER (OUTPATIENT)
Dept: PHYSICAL THERAPY | Age: 62
Setting detail: RECURRING SERIES
Discharge: HOME | End: 2022-11-19
Payer: MEDICARE

## 2022-11-16 PROCEDURE — 92507 TX SP LANG VOICE COMM INDIV: CPT

## 2022-11-21 ENCOUNTER — HOSPITAL ENCOUNTER (OUTPATIENT)
Dept: PHYSICAL THERAPY | Age: 62
Setting detail: RECURRING SERIES
End: 2022-11-21
Payer: MEDICARE

## 2022-11-21 NOTE — PROGRESS NOTES
Codi Grady  : 1960  Primary: [address] Medicare Advantage  Secondary:  [address] [address]  Phone: [phone]  Fax: [phone]       OUTPATIENT THERAPY:OP NOTE TYPE: Progress Report 2022               Episode  Appt Desk           Codi Grady cancelled his appointment for today due to  \"not having a good day\" . Will plan to follow up next during next appointment.   Thank you,  ALISE Herrera    Future Appointments   Date Time Provider Ernestina Willis   2022  1:45 PM Nohemi Crandall [facility]   2022  9:45 AM DO MAXIMO Sanchez [facility] AM[facility]   2022  1:45 PM ALISE Herrera [facility]   2022  1:45 PM ALISE Herrera [facility]   2022  1:45 PM ALISE Herrera [facility]   12/15/2022  9:30 AM [facility] DEVICE [facility] AMB   2023 10:45 AM Yovana Thompson DO [facility]   2023 11:00 AM MD GIGI Ochoa [facility] AMB

## 2022-11-23 ENCOUNTER — CLINICAL DOCUMENTATION (OUTPATIENT)
Dept: CARDIOLOGY CLINIC | Age: 62
End: 2022-11-23

## 2022-11-23 NOTE — PROGRESS NOTES
Certified Final INR Warning letter mailed today. Multiple attempts made to reach the patient and his brother.

## 2022-11-29 NOTE — PROGRESS NOTES
Geovany Shippenville  : 1960  Primary: [address] 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 \"  Pain:  Patient does not c/o pain. Patient does not appear in pain. Interventions Planned: (Treatment may consist of any combination of the following)  Expressive language, Receptive language, repetition, automatic speech, basic commands in context, contextual Y/N questions, responsive naming tasks, word discrimination tasks  GOALS: (Goals have been discussed and agreed upon with patient.)  Short-Term Functional Goals: Time Frame: 8 weeks  Patient will answer simple yes/no questions with 90% accuracy given minimal cueing. Patient will follow 1 step commands with 80% accuracy given min-mod cueing. Patient will identify objects given verbal descriptions with >80% accuracy given min cues. Patient will complete automatic naming tasks with 90% accuracy given minimal cueing. Patient will complete basic phrase completion with 70% accuracy given moderate cueing.   Patient will name common objects with 70% accuracy given moderate cueing. Discharge Goals: Time Frame: 12 weeks  Patient will increase receptive/expressive language skills to communicate basic wants/needs across environments. Updated Objective Findings:  None Today  Treatment   Expressive/receptive language activities: Answering yes/no questions presented 70% accuracy   Phrase completion: 10/15x with mod phonemic cues, semantic cues  Opposites: 9/15x with mod cues  Participated in conversational topics with appropriate responses 60% of the time (increased awareness and increased corrections)    Introduced melodic intonation therapy:   Step 2: Hummed in unison with 100% accuracy for 26/26 phrases  Step 3: hummed with cues faded out with for 26/26 phrases  Step 4: immediate repetition for 24/26 phrases  Step 5: response to question for 22/2 phrases    Automatic speech tasks:  Independently counting 1-20   Days of the week named 7/7 days independently  Months of the year independently named 10x following initial cue of \"January\"  Pledge of allegiance able to recite ~90% with no cues    Treatment/Session Summary:  Increased occurrence of spontaneous speech in conversation today; some responses remain inappropriate to context. Decreased awareness of errors remain; however, improving. Communication/Consultation:  None today  Recommendations/Intent for next treatment session: Next visit will focus on expressive and receptive language.     Total Duration:  Time In: 1340  Time Out: [address]  Minutes: Mildred 91, SLP    Visit # Therapist initials Date Arrived NS/ Cx < 24 hr >24 hr Cx Visit Comments   1 BC 10/5 X   Initial Assessment Only Today   2 BC 10/10 X      3 BC 10/17 X      4 BC 10/19 X      5 BC 10/21 X      6 BC 10/24 X       BC 11/9  X  No show   7 BC 11/16 X      8 BC 11/21  X  Pt not well    BC 11/23-28   X SLP out   9 BC 11/30 X      10 BC 12/2 Abbreviations: NS = No Show; CX = cancelled

## 2022-11-30 ENCOUNTER — HOSPITAL ENCOUNTER (OUTPATIENT)
Dept: PHYSICAL THERAPY | Age: 62
Setting detail: RECURRING SERIES
Discharge: HOME | End: 2022-12-03
Payer: MEDICARE

## 2022-11-30 PROCEDURE — 92507 TX SP LANG VOICE COMM INDIV: CPT

## 2022-12-01 NOTE — PROGRESS NOTES
accuracy given moderate cueing. Discharge Goals: Time Frame: 12 weeks  Patient will increase receptive/expressive language skills to communicate basic wants/needs across environments. Updated Objective Findings:  None Today  Treatment   Expressive/receptive language activities: Answering yes/no questions presented 70% accuracy   Phrase completion: 10/15x with mod phonemic cues, semantic cues  Opposites: 10/15x with mod cues  Following directions (1 step): 60% mod cues  Participated in conversational topics with appropriate responses 40% of the time (intermittent awareness and corrections)    Automatic speech tasks:  Independently counting 1-20   Days of the week named 7/7 days independently  Months of the year independently named 11x following initial cue of \"January\"  Pledge of allegiance able to recite ~80% with no cues    Treatment/Session Summary:  Some frustration with communication tasks at times, stating \"I can't do it\", occasionally continued attempts with encouragement. Communication/Consultation:  None today  Recommendations/Intent for next treatment session: Next visit will focus on expressive and receptive language.     Total Duration:  Time In: 1345  Time Out: 1425  Minutes: Mildred Connors, SLP    Visit # Therapist initials Date Arrived NS/ Cx < 24 hr >24 hr Cx Visit Comments   1 BC 10/5 X   Initial Assessment Only Today   2 BC 10/10 X      3 BC 10/17 X      4 BC 10/19 X      5 BC 10/21 X      6 BC 10/24 X       BC 11/9  X  No show   7 BC 11/16 X      8 BC 11/21  X  Pt not well    BC 11/23-28   X SLP out   9 BC 11/30 X      10 BC 12/2 X       BC 12/5   X SLP out   11 BC 12/7                                             Abbreviations: NS = No Show; CX = cancelled

## 2022-12-02 ENCOUNTER — HOSPITAL ENCOUNTER (OUTPATIENT)
Dept: PHYSICAL THERAPY | Age: 62
Setting detail: RECURRING SERIES
Discharge: HOME | End: 2022-12-05
Payer: MEDICARE

## 2022-12-02 PROCEDURE — 92507 TX SP LANG VOICE COMM INDIV: CPT

## 2022-12-06 NOTE — PROGRESS NOTES
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 When asked if he was cleared to drive by any provider and patient stated no. Patient recommended to wait and discuss with provider before continuing to drive. Pain:  Patient does not c/o pain. Patient does not appear in pain. Interventions Planned: (Treatment may consist of any combination of the following)  Expressive language, Receptive language, repetition, automatic speech, basic commands in context, contextual Y/N questions, responsive naming tasks, word discrimination tasks  GOALS: (Goals have been discussed and agreed upon with patient.)  Short-Term Functional Goals: Time Frame: 8 weeks  Patient will answer simple yes/no questions with 90% accuracy given minimal cueing. Patient will follow 1 step commands with 80% accuracy given min-mod cueing. Patient will identify objects given verbal descriptions with >80% accuracy given min cues.   Patient will complete automatic naming tasks with 90% accuracy given minimal cueing. Patient will complete basic phrase completion with 70% accuracy given moderate cueing. Patient will name common objects with 70% accuracy given moderate cueing. Discharge Goals: Time Frame: 12 weeks  Patient will increase receptive/expressive language skills to communicate basic wants/needs across environments. Updated Objective Findings:  None Today  Treatment   Expressive/receptive language activities: Answering yes/no questions presented 70% accuracy mod cues  Phrase completion: 6/15x with mod phonemic cues, semantic cues  Following directions (1 step \"open your eyes\"): 7/12 max cues  Participated in conversational topics with appropriate responses 40% of the time (intermittent awareness and corrections)    Automatic speech tasks:  Independently counting 1-20   Days of the week named 7/7 days independently  Months of the year independently named 10x following initial cue of \"January\"  Pledge of allegiance able to recite ~70% with mod cues    Treatment/Session Summary:  Some frustration with communication tasks at times, stating \"I can't do it\", occasionally continued attempts with encouragement. Reiterated how much difficulty patient had with following directions and how much skills it requires to drive and how it would likely not be safe for himself and others to drive at this time. Communication/Consultation:  None today  Recommendations/Intent for next treatment session: Next visit will focus on expressive and receptive language.     Total Duration:  Time In: 1340  Time Out: 1423  Minutes: 1140 [facility], SLP    Visit # Therapist initials Date Arrived NS/ Cx < 24 hr >24 hr Cx Visit Comments   1 BC 10/5 X   Initial Assessment Only Today   2 BC 10/10 X      3 BC 10/17 X      4 BC 10/19 X      5 BC 10/21 X      6 BC 10/24 X       BC 11/9  X  No show   7 BC 11/16 X      8 BC 11/21  X  Pt not well    BC 11/23-28   X SLP out   9 BC 11/30 X      10 BC 12/2 X       BC 12/5   X SLP out 11 BC 12/7 X                                            Abbreviations: NS = No Show; CX = cancelled

## 2022-12-07 ENCOUNTER — HOSPITAL ENCOUNTER (OUTPATIENT)
Dept: PHYSICAL THERAPY | Age: 62
Setting detail: RECURRING SERIES
Discharge: HOME | End: 2022-12-10
Payer: MEDICARE

## 2022-12-07 PROCEDURE — 92507 TX SP LANG VOICE COMM INDIV: CPT

## 2022-12-09 NOTE — PROGRESS NOTES
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 Brother accompanied patient to session. Brother stated patient is not supposed to drive, even prior to CVA, due to being legally blind. He asks patient to not drive. Pain:  Patient does not c/o pain. Patient does not appear in pain. Interventions Planned: (Treatment may consist of any combination of the following)  Expressive language, Receptive language, repetition, automatic speech, basic commands in context, contextual Y/N questions, responsive naming tasks, word discrimination tasks  GOALS: (Goals have been discussed and agreed upon with patient.)  Short-Term Functional Goals: Time Frame: 8 weeks  Patient will answer simple yes/no questions with 90% accuracy given minimal cueing. Patient will follow 1 step commands with 80% accuracy given min-mod cueing. Patient will identify objects given verbal descriptions with >80% accuracy given min cues.   Patient will complete automatic naming tasks with 90% accuracy given minimal 12/2 X       BC 12/5   X SLP out   11 BC 12/7 X      12 BC 12/12       13 BC 12/14       14 BC 12/16       15 BC 12/19       16 BC 12/21                                                                                 Abbreviations: NS = No Show; CX = cancelled

## 2022-12-12 ENCOUNTER — HOSPITAL ENCOUNTER (OUTPATIENT)
Dept: PHYSICAL THERAPY | Age: 62
Setting detail: RECURRING SERIES
Discharge: HOME | End: 2022-12-15
Payer: MEDICARE

## 2022-12-12 PROCEDURE — 92507 TX SP LANG VOICE COMM INDIV: CPT

## 2022-12-13 NOTE — PROGRESS NOTES
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 Pain:  Patient does not c/o pain. Patient does not appear in pain. Interventions Planned: (Treatment may consist of any combination of the following)  Expressive language, Receptive language, repetition, automatic speech, basic commands in context, contextual Y/N questions, responsive naming tasks, word discrimination tasks  GOALS: (Goals have been discussed and agreed upon with patient.)  Short-Term Functional Goals: Time Frame: 8 weeks  Patient will answer simple yes/no questions with 90% accuracy given minimal cueing. Patient will follow 1 step commands with 80% accuracy given min-mod cueing. Patient will identify objects given verbal descriptions with >80% accuracy given min cues. Patient will complete automatic naming tasks with 90% accuracy given minimal cueing. Patient will complete basic phrase completion with 70% accuracy given moderate cueing.   Patient will name common objects with 70% accuracy given moderate cueing. Discharge Goals: Time Frame: 12 weeks  Patient will increase receptive/expressive language skills to communicate basic wants/needs across environments. Updated Objective Findings:  None Today  Treatment   Expressive/receptive language activities: Answering yes/no questions presented 70% accuracy mod cues  Phrase completion: 8/15x with mod phonemic cues, semantic cues  Following directions (1 step \"open your eyes\"): 8/14 max cues  Participated in conversational topics with appropriate responses 70% of the time related to family members/close neighbors; conversational topics about appointments/upcoming events with appropriate responses to context 50-60% of the time (intermittent awareness and corrections)  Verbalized family members' names, pet's name, doctor's office names with min cues - independently stated 4 names (used strategy occasionally spelled to aid in the word finding    Automatic speech tasks:  Independently counting 1-20   Days of the week named 7/7 days independently  Months of the year independently named 10/12 correctly independently  Pledge of allegiance able to recite ~75% with mod cues    Treatment/Session Summary:  Independently named with increased occurrence. Home exercise program of completing food list for groceries/restaurants  Communication/Consultation:  None today  Recommendations/Intent for next treatment session: Next visit will focus on expressive and receptive language.     Total Duration:  Time In: 1345  Time Out: 1425  Minutes: Mildred Connors, SLP    Visit # Therapist initials Date Arrived NS/ Cx < 24 hr >24 hr Cx Visit Comments   1 BC 10/5 X   Initial Assessment Only Today   2 BC 10/10 X      3 BC 10/17 X      4 BC 10/19 X      5 BC 10/21 X      6 BC 10/24 X       BC 11/9  X  No show   7 BC 11/16 X      8 BC 11/21  X  Pt not well    BC 11/23-28   X SLP out   9 BC 11/30 X      10 BC 12/2 X       BC 12/5   X SLP out   11 BC 12/7 X      12 BC 12/12 X      13 BC 12/14 X      14 BC 12/16       15 BC 12/19       16 BC 12/21        BC 12/26-30   X SLP out   17 BC 1/4/23                                                               Abbreviations: NS = No Show; CX = cancelled

## 2022-12-14 ENCOUNTER — HOSPITAL ENCOUNTER (OUTPATIENT)
Dept: PHYSICAL THERAPY | Age: 62
Setting detail: RECURRING SERIES
Discharge: HOME | End: 2022-12-17
Payer: MEDICARE

## 2022-12-14 PROCEDURE — 92507 TX SP LANG VOICE COMM INDIV: CPT

## 2022-12-15 NOTE — PROGRESS NOTES
Christine Danielle  : 1960  Primary: 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 Pain:  Patient does not c/o pain. Patient does not appear in pain. Interventions Planned: (Treatment may consist of any combination of the following)  Expressive language, Receptive language, repetition, automatic speech, basic commands in context, contextual Y/N questions, responsive naming tasks, word discrimination tasks  GOALS: (Goals have been discussed and agreed upon with patient.)  Short-Term Functional Goals: Time Frame: 8 weeks  Patient will answer simple yes/no questions with 90% accuracy given minimal cueing. Patient will follow 1 step commands with 80% accuracy given min-mod cueing. Patient will identify objects given verbal descriptions with >80% accuracy given min cues. Patient will complete automatic naming tasks with 90% accuracy given minimal cueing. Patient will complete basic phrase completion with 70% accuracy given moderate cueing.   Patient will name common objects with 70% accuracy given moderate 17 BC 1/4/23                                                               Abbreviations: NS = No Show; CX = cancelled

## 2022-12-16 ENCOUNTER — HOSPITAL ENCOUNTER (OUTPATIENT)
Dept: PHYSICAL THERAPY | Age: 62
Setting detail: RECURRING SERIES
Discharge: HOME | End: 2022-12-19
Payer: MEDICARE

## 2022-12-16 PROCEDURE — 92507 TX SP LANG VOICE COMM INDIV: CPT

## 2022-12-19 ENCOUNTER — HOSPITAL ENCOUNTER (OUTPATIENT)
Dept: PHYSICAL THERAPY | Age: 62
Setting detail: RECURRING SERIES
Discharge: HOME | End: 2022-12-22
Payer: MEDICARE

## 2022-12-19 PROCEDURE — 92507 TX SP LANG VOICE COMM INDIV: CPT

## 2022-12-20 ENCOUNTER — CLINICAL DOCUMENTATION (OUTPATIENT)
Dept: CARDIOLOGY CLINIC | Age: 62
End: 2022-12-20

## 2022-12-20 NOTE — PROGRESS NOTES
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 Pain:  Patient does not c/o pain. Patient does not appear in pain. Interventions Planned: (Treatment may consist of any combination of the following)  Expressive language, Receptive language, repetition, automatic speech, basic commands in context, contextual Y/N questions, responsive naming tasks, word discrimination tasks  GOALS: (Goals have been discussed and agreed upon with patient.)  Short-Term Functional Goals: Time Frame: 8 weeks  Patient will answer simple yes/no questions with 90% accuracy given minimal cueing. Patient will follow 1 step commands with 80% accuracy given min-mod cueing. Patient will identify objects given verbal descriptions with >80% accuracy given min cues. Patient will complete automatic naming tasks with 90% accuracy given minimal cueing. Patient will complete basic phrase completion with 70% accuracy given moderate cueing.   Patient will name common objects with 70% accuracy given moderate cueing. Discharge Goals: Time Frame: 12 weeks  Patient will increase receptive/expressive language skills to communicate basic wants/needs across environments. Updated Objective Findings:  None Today  Treatment   Expressive/receptive language activities: Answering yes/no questions presented written 90% accuracy mod cues  Phrase completion: 12/15x with mod phonemic cues, semantic cues  Participated in conversational topics with appropriate responses 75% of the time; conversational topics about appointments/upcoming events with appropriate responses to context 70% of the time (intermittent awareness and corrections)  Verbalized family members' names, pet's name, common places with min cues - stated 10 names with mod cues (used strategy occasionally spelled to aid in word finding)  Confrontational naming (food items, stores):   Mock ordering off menu: 25% accuracy mod cues    Automatic speech tasks:  Independently counting 1-20   Days of the week named 7/7 days independently  Months of the year independently named 12/12 correctly independently  Pledge of allegiance able to recite ~90% with mod cues    Treatment/Session Summary:  Patient returned yes/no HEP from previous session. Occasional frustration with \"I can't do it. \"  Communication/Consultation:  None today  Recommendations/Intent for next treatment session: Next visit will focus on expressive and receptive language.     Total Duration:  Time In: 0930  Time Out: 1015  Minutes: [address], SLP    Visit # Therapist initials Date Arrived NS/ Cx < 24 hr >24 hr Cx Visit Comments   1 BC 10/5 X   Initial Assessment Only Today   2 BC 10/10 X      3 BC 10/17 X      4 BC 10/19 X      5 BC 10/21 X      6 BC 10/24 X       BC 11/9  X  No show   7 BC 11/16 X      8 BC 11/21  X  Pt not well    BC 11/23-28   X SLP out   9 BC 11/30 X      10 BC 12/2 X       BC 12/5   X SLP out   11 BC 12/7 X      12 BC 12/12 X      13 BC 12/14 X      14 BC 12/16 X      15 BC 12/19 X      16 BC 12/21 X       BC 12/26-30   X SLP out   17 BC 1/4/23       18 BC 1/9       19 BC 1/11                                             Abbreviations: NS = No Show; CX = cancelled

## 2022-12-21 ENCOUNTER — HOSPITAL ENCOUNTER (OUTPATIENT)
Dept: PHYSICAL THERAPY | Age: 62
Setting detail: RECURRING SERIES
Discharge: HOME | End: 2022-12-24
Payer: MEDICARE

## 2022-12-21 PROCEDURE — 92507 TX SP LANG VOICE COMM INDIV: CPT

## 2023-01-04 ENCOUNTER — HOSPITAL ENCOUNTER (OUTPATIENT)
Dept: PHYSICAL THERAPY | Age: 63
Setting detail: RECURRING SERIES
Discharge: HOME | End: 2023-01-07
Payer: MEDICARE

## 2023-01-04 PROCEDURE — 92507 TX SP LANG VOICE COMM INDIV: CPT

## 2023-01-04 NOTE — THERAPY RECERTIFICATION
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 \"CT head showed findings in the left frontal and temporal lobes concerning for acute MCA territory infarction. \"  Patient Stated Goal(s):   To be able to communicate    Past Medical History/Comorbidities:    [facility]  has a past medical history of Acute hypoxemic respiratory failure due to COVID-19 [facility]), Anemia, BPH (benign prostatic hyperplasia), CAD (coronary artery disease), CHF (congestive heart failure) ([address] Chronic systolic heart failure ([address] Coronary atherosclerosis of native coronary vessel, CVA (cerebral vascular accident) ([facility] [address] H/O transesophageal echocardiography (VALERY) for monitoring, History of blood transfusion, Hyperlipidemia, Hypertension, Hypoxemia requiring supplemental oxygen, ICD (implantable cardioverter-defibrillator) in place, IGT (impaired glucose tolerance), Other ill-defined conditions(799.89), Other ill-defined conditions(799.89), Pneumonia due to COVID-19 virus, Primary insomnia, Psychiatric disorder, Right kidney mass, Sepsis, unspecified, Thromboembolus ([address] and Thrombus. Mr. Azam Chavez  has a past surgical history that includes Colonoscopy (12/2010); heent; Cardiac catheterization; pr unlisted procedure cardiac surgery; Cardiac defibrillator placement (2011); Cardiac procedure (N/A, 05/27/2022); Coronary artery bypass graft (N/A, 05/31/2022); transesophageal echocardiogram (N/A, 05/31/2022); and Cystoscopy (Right, 07/29/2022). Current Communication Status:  Communication Observation: Aphasia  Follows Directions: Follows some simple directions given model. Inconsistent with answering yes/no questions  Able to write legibly - name, contact information, simple sentences. Previous Speech Therapy:Seen for initial evaluation and 1x treatment during acute care hospitalization d/t global aphasia s/p CVA. Swallow function appeared [facility] and at baseline. Recommended continuing with easy to chew, thin liquids, d/t dentition. Prior Level of Functioning:  Disability as patient is legally blind. Prior to this, patient worked at [employer]. Lives independently with assistance from brother for getting groceries, etc. Does not drive. OBJECTIVE     Expression   Primary Mode of Expression: Verbal; Expressive language is often telegraphic but more fluent speech with some grammatical organization, paraphasias; occasional prepositional phrases; marked word-finding difficulty, and verbalizations are intermittently not appropriate to context. Patient's awareness of errors is intermittent.   Verbal Expression: Exceptions to functional limits; responses unintelligible at times; paraphasias, hesitant  Confrontational Naming: Severe  Automatic Speech: Mild      Western Aphasia Battery Assessment    Spontaneous Speech : Content 6/10   Spontaneous Speech: Fluency 4/10   Auditory Verbal Comprehension  Yes/No questions  Auditory Word Recognition  Sequential Commands Total: 172/200  57/60  58/60  57/80   Repetition 64/100   Object Naming 17/60   Word Fluency 6/20   Sentence Completion 8/10   Responsive Speech 4/10     Aphasia Score: 57 - moderate    ASSESSMENT    Re-certification (91/01/7422): Patient has attended 17 speech therapy sessions, including initial evaluation. Patient improved aphasia score from 19.167 (very severe) to 57 (moderate). Patient presents with moderate expressive aphasia and receptive language deficits. Expressive language is often telegraphic but more fluent speech with some grammatical organization, paraphasias; occasional prepositional phrases; marked word-finding difficulty, and verbalizations are intermittently not appropriate to context. Patient's awareness of errors is intermittent. Patient requires direct intervention, as he is unable to effectively communicate wants and needs with family and caregivers. Initial Assessment (10/05/2022):    Communication Diagnosis: Aphasia with severe expressive and receptive language deficits     Patient s/p CVA. Per chart, \"CT head showed findings in the left frontal and temporal lobes concerning for acute MCA territory infarction. \" Patient with occasional spontaneous expressive language with single words and simple phrases; however, frequently not relative to context. Responses to yes/no questions are inconsistent. Patient demonstrates ability to imitate 1-step commands when given a visual and verbal model. Verbal expression is significantly limited and non-functional. Able to complete automatic speech tasks, such as counting; demonstrates emerging skill with phrase completion to aid expressive language. Patient is currently unable to effectively communicate wants and needs. Patient requires speech therapy services to address current difficulties.       Problem List: (Impacting functional limitations):    CVA  Expressive language  Receptive language  Therapy Prognosis: Good  RECOMMENDATIONS   Recommendations:    Yes. Speech therapy services are clinically indicated at this time to address aphasia. PLAN    Interventions Planned (Treatment may consist of any combination of the following):    Expressive language, Receptive language, repetition, automatic speech, basic commands in context, contextual Y/N questions, responsive naming tasks, word discrimination tasks  Goals: (Goals have been discussed and agreed upon with patient.)  Short-Term Functional Goals: Time Frame: 8 weeks  Patient will answer  moderate yes/no questions with 90% accuracy given minimal cueing. (Goal updated 1/4/23)  Patient will follow 2-3 step commands with 80% accuracy given min-mod cueing. (Goal updated 1/4/23)  Patient will identify objects given verbal descriptions with >80% accuracy given min cues. MET 1/4/23  Patient will complete automatic naming tasks with 90% accuracy given minimal cueing. (Goal ongoing 1/4/23)  Patient will complete basic phrase completion with 70% accuracy given moderate cueing. (Goal ongoing 1/4/23)  Patient will name common objects with 70% accuracy given moderate cueing. (Goal ongoing 1/4/23)  Patient will verbalize items given verbal descriptions (object function, semantic feature analysis) with >80% accuracy with mod cues. New goal added 1/4/23  Patient will read and display comprehension at sentence level with 90% accuracy without assistance. New goal added 1/4/23  Discharge Goals: Time Frame: 12 weeks  Patient will increase receptive/expressive language skills to communicate basic wants/needs across environments. Outcome Measure:   SPOKEN LANGUAGE COMPREHENSION: Level 3:  The individual usually responds accurately to simple yes/no questions. The individual is able to follow simple directions out of context, although moderate cueing is consistently needed. Accurate comprehension of more complex directions/messages is infrequent.   Initial: 2 (Date: 10/5/22)  Most Recent: 3 (Date: 1/4/23)  SPOKEN LANGUAGE EXPRESSION: Level 4:  The individual is successfully able to initiate communication using spoken language in simple, structures conversations in routine daily activities with familiar communication partners. The individual usually requires moderate cueing, but is able to demonstrate use of simple sentences, (i.e., semantics, syntax, and morphology) and rarely uses complex sentences/messages. Initial: 2 (Date: 10/5/22)  Most Recent: 4 (Date: 1/4/23)    Medical Necessity/Other Comments:   Patient is expected to demonstrate progress in expressive communication and receptive ability to decrease assistance required communication, increase independence with activities of daily living, and increase communication with family/caregivers. Skilled intervention continues to be required due to onset of CVA. Total Duration:  Time In: 1335  Time Out: 5927  Minutes: [address] [place] [address]'s therapy, I certify that the treatment plan above will be carried out by a therapist or under their direction.   Thank you for this referral,  Trice Harvey, SLP     Referring Physician Signature: Joe Layton MD _______________________________ Date _____________

## 2023-01-04 NOTE — PROGRESS NOTES
Yulia Trejo  : 1960  Primary: 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 Recertification completed today. Pain:  Patient does not c/o pain. Patient does not appear in pain. Interventions Planned: (Treatment may consist of any combination of the following)  Expressive language, Receptive language, repetition, automatic speech, basic commands in context, contextual Y/N questions, responsive naming tasks, word discrimination tasks  GOALS: (Goals have been discussed and agreed upon with patient.)  Short-Term Functional Goals: Time Frame: 8 weeks  Patient will answer  moderate yes/no questions with 90% accuracy given minimal cueing. (Goal updated 23)  Patient will follow 2-3 step commands with 80% accuracy given min-mod cueing. (Goal updated 23)  Patient will identify objects given verbal descriptions with >80% accuracy given min cues. MET 23  Patient will complete automatic naming tasks with 90% accuracy given minimal cueing.  (Goal ongoing 23)  Patient will complete basic phrase completion with 70% accuracy given moderate cueing. (Goal ongoing 1/4/23)  Patient will name common objects with 70% accuracy given moderate cueing. (Goal ongoing 1/4/23)  Patient will verbalize items given verbal descriptions (object function, semantic feature analysis) with >80% accuracy with mod cues. New goal added 1/4/23  Patient will read and display comprehension at sentence level with 90% accuracy without assistance. New goal added 1/4/23  Discharge Goals: Time Frame: 12 weeks  Patient will increase receptive/expressive language skills to communicate basic wants/needs across environments. Updated Objective Findings:  See evaluation note from today  Treatment   Expressive/receptive language activities: Answering yes/no questions presented written 90% accuracy mod cues  Phrase completion: 8/12x with mod phonemic cues, semantic cues  Participated in conversational topics with appropriate responses 60% of the time;   Naming objects with mod-max cues 10/30x    Remainder of session completed with assessment measures. Treatment/Session Summary:    Communication/Consultation:  Re-certification sent to referring provider  Recommendations/Intent for next treatment session: Next visit will focus on expressive and receptive language.     Total Duration:  Time In: 1335  Time Out: 1420  Minutes: [address], SLP    Visit # Therapist initials Date Arrived NS/ Cx < 24 hr >24 hr Cx Visit Comments   1 BC 10/5 X   Initial Assessment Only Today   2 BC 10/10 X      3 BC 10/17 X      4 BC 10/19 X      5 BC 10/21 X      6 BC 10/24 X       BC 11/9  X  No show   7 BC 11/16 X      8 BC 11/21  X  Pt not well    BC 11/23-28   X SLP out   9 BC 11/30 X      10 BC 12/2 X       BC 12/5   X SLP out   11 BC 12/7 X      12 BC 12/12 X      13 BC 12/14 X      14 BC 12/16 X      15 BC 12/19 X      16 BC 12/21 X       BC 12/26-30   X SLP out   17 BC 1/4/23 X      18 BC 1/9       19 BC 1/11                                             Abbreviations: NS = No Show; CX = cancelled

## 2023-01-05 ENCOUNTER — OFFICE VISIT (OUTPATIENT)
Dept: FAMILY MEDICINE CLINIC | Facility: CLINIC | Age: 63
End: 2023-01-05
Payer: MEDICARE

## 2023-01-05 VITALS
BODY MASS INDEX: 27.85 KG/M2 | HEART RATE: 79 BPM | WEIGHT: 188 LBS | DIASTOLIC BLOOD PRESSURE: 68 MMHG | HEIGHT: 69 IN | SYSTOLIC BLOOD PRESSURE: 130 MMHG | OXYGEN SATURATION: 99 %

## 2023-01-05 DIAGNOSIS — I50.22 CHRONIC SYSTOLIC CONGESTIVE HEART FAILURE (HCC): Chronic | ICD-10-CM

## 2023-01-05 DIAGNOSIS — Z79.899 HIGH RISK MEDICATION USE: ICD-10-CM

## 2023-01-05 DIAGNOSIS — I25.810 CORONARY ARTERY DISEASE INVOLVING AUTOLOGOUS VEIN CORONARY BYPASS GRAFT WITHOUT ANGINA PECTORIS: Chronic | ICD-10-CM

## 2023-01-05 DIAGNOSIS — D50.0 IRON DEFICIENCY ANEMIA DUE TO CHRONIC BLOOD LOSS: ICD-10-CM

## 2023-01-05 DIAGNOSIS — Z12.11 SCREEN FOR COLON CANCER: ICD-10-CM

## 2023-01-05 DIAGNOSIS — Z53.20 COLONOSCOPY REFUSED: ICD-10-CM

## 2023-01-05 DIAGNOSIS — E03.8 SUBCLINICAL HYPOTHYROIDISM: Chronic | ICD-10-CM

## 2023-01-05 DIAGNOSIS — E66.3 OVERWEIGHT (BMI 25.0-29.9): Chronic | ICD-10-CM

## 2023-01-05 DIAGNOSIS — Z95.810 ICD (IMPLANTABLE CARDIOVERTER-DEFIBRILLATOR) IN PLACE: Chronic | ICD-10-CM

## 2023-01-05 DIAGNOSIS — Z79.899 LONG TERM CURRENT USE OF AMIODARONE: Chronic | ICD-10-CM

## 2023-01-05 DIAGNOSIS — R47.01 EXPRESSIVE APHASIA: ICD-10-CM

## 2023-01-05 DIAGNOSIS — Z28.21 REFUSED INFLUENZA VACCINE: ICD-10-CM

## 2023-01-05 DIAGNOSIS — N18.31 STAGE 3A CHRONIC KIDNEY DISEASE (HCC): ICD-10-CM

## 2023-01-05 DIAGNOSIS — R79.89 ELEVATED TSH: Chronic | ICD-10-CM

## 2023-01-05 DIAGNOSIS — I63.412 CEREBROVASCULAR ACCIDENT (CVA) DUE TO EMBOLISM OF LEFT MIDDLE CEREBRAL ARTERY (HCC): Primary | Chronic | ICD-10-CM

## 2023-01-05 LAB
ANION GAP SERPL CALC-SCNC: 2 MMOL/L (ref 2–11)
BASOPHILS # BLD: 0.1 K/UL (ref 0–0.2)
BASOPHILS NFR BLD: 1 % (ref 0–2)
BUN SERPL-MCNC: 14 MG/DL (ref 8–23)
CALCIUM SERPL-MCNC: 8.9 MG/DL (ref 8.3–10.4)
CHLORIDE SERPL-SCNC: 109 MMOL/L (ref 101–110)
CO2 SERPL-SCNC: 29 MMOL/L (ref 21–32)
CREAT SERPL-MCNC: 1.4 MG/DL (ref 0.8–1.5)
DIFFERENTIAL METHOD BLD: ABNORMAL
EOSINOPHIL # BLD: 0.3 K/UL (ref 0–0.8)
EOSINOPHIL NFR BLD: 3 % (ref 0.5–7.8)
ERYTHROCYTE [DISTWIDTH] IN BLOOD BY AUTOMATED COUNT: 18.8 % (ref 11.9–14.6)
GLUCOSE SERPL-MCNC: 100 MG/DL (ref 65–100)
HCT VFR BLD AUTO: 45.3 % (ref 41.1–50.3)
HGB BLD-MCNC: 13.7 G/DL (ref 13.6–17.2)
IMM GRANULOCYTES # BLD AUTO: 0.1 K/UL (ref 0–0.5)
IMM GRANULOCYTES NFR BLD AUTO: 1 % (ref 0–5)
LYMPHOCYTES # BLD: 2.1 K/UL (ref 0.5–4.6)
LYMPHOCYTES NFR BLD: 22 % (ref 13–44)
MCH RBC QN AUTO: 25.4 PG (ref 26.1–32.9)
MCHC RBC AUTO-ENTMCNC: 30.2 G/DL (ref 31.4–35)
MCV RBC AUTO: 84 FL (ref 82–102)
MONOCYTES # BLD: 0.9 K/UL (ref 0.1–1.3)
MONOCYTES NFR BLD: 10 % (ref 4–12)
NEUTS SEG # BLD: 5.9 K/UL (ref 1.7–8.2)
NEUTS SEG NFR BLD: 64 % (ref 43–78)
NRBC # BLD: 0 K/UL (ref 0–0.2)
PLATELET # BLD AUTO: 258 K/UL (ref 150–450)
PMV BLD AUTO: 11 FL (ref 9.4–12.3)
POTASSIUM SERPL-SCNC: 4.8 MMOL/L (ref 3.5–5.1)
RBC # BLD AUTO: 5.39 M/UL (ref 4.23–5.6)
SODIUM SERPL-SCNC: 140 MMOL/L (ref 133–143)
T4 FREE SERPL-MCNC: 1 NG/DL (ref 0.78–1.46)
TSH W FREE THYROID IF ABNORMAL: 4.61 UIU/ML (ref 0.36–3.74)
WBC # BLD AUTO: 9.2 K/UL (ref 4.3–11.1)

## 2023-01-05 PROCEDURE — 3017F COLORECTAL CA SCREEN DOC REV: CPT | Performed by: FAMILY MEDICINE

## 2023-01-05 PROCEDURE — G8484 FLU IMMUNIZE NO ADMIN: HCPCS | Performed by: FAMILY MEDICINE

## 2023-01-05 PROCEDURE — 99215 OFFICE O/P EST HI 40 MIN: CPT | Performed by: FAMILY MEDICINE

## 2023-01-05 PROCEDURE — 1036F TOBACCO NON-USER: CPT | Performed by: FAMILY MEDICINE

## 2023-01-05 PROCEDURE — G8428 CUR MEDS NOT DOCUMENT: HCPCS | Performed by: FAMILY MEDICINE

## 2023-01-05 PROCEDURE — G8417 CALC BMI ABV UP PARAM F/U: HCPCS | Performed by: FAMILY MEDICINE

## 2023-01-05 ASSESSMENT — PATIENT HEALTH QUESTIONNAIRE - PHQ9
SUM OF ALL RESPONSES TO PHQ QUESTIONS 1-9: 0
SUM OF ALL RESPONSES TO PHQ9 QUESTIONS 1 & 2: 0
SUM OF ALL RESPONSES TO PHQ QUESTIONS 1-9: 0
1. LITTLE INTEREST OR PLEASURE IN DOING THINGS: 0
2. FEELING DOWN, DEPRESSED OR HOPELESS: 0

## 2023-01-05 NOTE — PROGRESS NOTES
Lor Norris DO                Diplomate of the [employer] of [facility] [place]         [phone]    Ly Begum is a 58 y.o. male who was seen on 1/5/2023 for   Chief Complaint   Patient presents with    Hypertension       Assessment & Plan     Diagnosis Orders   1. Cerebrovascular accident (CVA) due to embolism of left middle cerebral artery [facility])      Reviewed hospital record. 2. Expressive aphasia      Makes communication difficult. Will suggest to family member joint. 3. Iron deficiency anemia due to chronic blood loss  CBC with Auto Differential    CBC with Auto Differential    Recheck CBC today      4. Stage 3a chronic kidney disease ([facility] [address]  Basic Metabolic Panel    Basic Metabolic Panel    Recheck renal function today      5. Chronic systolic congestive heart failure (HCC)      Stable. Follows with cardiology      6. Coronary artery disease involving autologous vein coronary bypass graft without angina pectoris      Asymptomatic      7. ICD (implantable cardioverter-defibrillator) in place        8. Overweight (BMI 25.0-29. 9)      Worsening. Low carb diet advised/reviewed. Information given. 9. Subclinical hypothyroidism  TSH with Reflex    TSH with Reflex    T4, Free    Recheck TSH and T4, especially with amiodarone. 10. Elevated TSH  TSH with Reflex    TSH with Reflex    T4, Free    Recheck TSH and T4, especially with amiodarone. 11. Long term current use of amiodarone  TSH with Reflex    TSH with Reflex    T4, Free    Monitor TSH at least annually      12. Screen for colon cancer  Cologuard (Fecal DNA Colorectal Cancer Screening)    Declines colonoscopy. We will send Cologuard test      13. Colonoscopy refused        14. High risk medication use  TSH with Reflex    TSH with Reflex      15.  Refused influenza vaccine            Follow-up and Dispositions    Return in about 1 year (around 2024) for NO HAYDER, LABS TODAY. RECENT LABS/TESTS TO REVIEW and DISCUSS  Hospitalist Discharge Summary   Admit Date:     9/15/2022  8:56 AM   DC Note date: 2022  Name:              Genie Spangler   Age:                 58 y.o. Sex:                 male  :               1960   MRN:               [id number]   Room:              [facility]  PCP:                Meli Childs DO     Presenting Complaint: Altered Mental Status     Initial Admission Diagnosis: Acute cerebrovascular accident (CVA) ([address] [I63.9]  Cerebrovascular accident (CVA), unspecified mechanism ([address] [I63.9]      Problem List for this Hospitalization (present on admission):     Principal Problem:    Cerebrovascular accident (CVA) due to embolism ([address]  Active Problems:    Chronic systolic congestive heart failure ([address]    ICD (implantable cardioverter-defibrillator) in place    S/P CABG x 3    Legally blind    Subclinical hypothyroidism    Coronary artery disease involving autologous vein coronary bypass graft without angina pectoris    Left ventricular thrombus    Statin intolerance    History of hydronephrosis  Resolved Problems:    Ventricular tachycardia [facility])        Hospital Course:  Genie Spangler is a 58 y.o. male with medical history of coronary artery disease with history of CABG in May 2022, history of intracardiac thrombus, history of V. tach status post ICD, chronic systolic congestive heart failure, legally blind, statin intolerance and on disability. Patient lives alone, can take care of his day-to-day activity at baseline. Patient has expressive aphasia with neologism, cannot give a good review of systems secondary to expressive aphasia. He is able to respond to verbal commands. CT head showed findings in the left frontal and temporal lobes concerning for acute MCA territory infarction. Patient admitted for acute CVA. CTA head and neck showed left M1 segment of the MCA occlusion.    Cannot get MRI due to ICD. Neurology consulted, suspect embolic in etiology. He was started on heparin gtt and warfarin resumed. He has not been on eliquis or xarelto due to long term cost, though he does have insurance. He will be transitioned to lovenox for 1 week and warfarin regimen adjusted. Home health to monitor INR and pt will follow up with PCP in 1 week. Continue ASA and warfarin.   Intolerant to statins      Results for orders placed or performed in visit on 76/63/06   Basic Metabolic Panel   Result Value Ref Range    Sodium 140 133 - 143 mmol/L    Potassium 4.8 3.5 - 5.1 mmol/L    Chloride 109 101 - 110 mmol/L    CO2 29 21 - 32 mmol/L    Anion Gap 2 2 - 11 mmol/L    Glucose 100 65 - 100 mg/dL    BUN 14 8 - 23 MG/DL    Creatinine 1.40 0.8 - 1.5 MG/DL    Est, Glom Filt Rate 57 (L) >60 ml/min/1.73m2    Calcium 8.9 8.3 - 10.4 MG/DL   TSH with Reflex   Result Value Ref Range    TSH w Free Thyroid if Abnormal 4.61 (H) 0.358 - 3.740 UIU/ML   CBC with Auto Differential   Result Value Ref Range    WBC 9.2 4.3 - 11.1 K/uL    RBC 5.39 4.23 - 5.6 M/uL    Hemoglobin 13.7 13.6 - 17.2 g/dL    Hematocrit 45.3 41.1 - 50.3 %    MCV 84.0 82 - 102 FL    MCH 25.4 (L) 26.1 - 32.9 PG    MCHC 30.2 (L) 31.4 - 35.0 g/dL    RDW 18.8 (H) 11.9 - 14.6 %    Platelets 007 915 - 693 K/uL    MPV 11.0 9.4 - 12.3 FL    nRBC 0.00 0.0 - 0.2 K/uL    Differential Type AUTOMATED      Seg Neutrophils 64 43 - 78 %    Lymphocytes 22 13 - 44 %    Monocytes 10 4.0 - 12.0 %    Eosinophils % 3 0.5 - 7.8 %    Basophils 1 0.0 - 2.0 %    Immature Granulocytes 1 0.0 - 5.0 %    Segs Absolute 5.9 1.7 - 8.2 K/UL    Absolute Lymph # 2.1 0.5 - 4.6 K/UL    Absolute Mono # 0.9 0.1 - 1.3 K/UL    Absolute Eos # 0.3 0.0 - 0.8 K/UL    Basophils Absolute 0.1 0.0 - 0.2 K/UL    Absolute Immature Granulocyte 0.1 0.0 - 0.5 K/UL   T4, Free   Result Value Ref Range    T4 Free 1.0 0.78 - 1.46 NG/DL       PHQ-9  1/5/2023 10/4/2022 6/24/2022   Little interest or pleasure in doing things 0 0 0   Little interest or pleasure in doing things - - -   Feeling down, depressed, or hopeless 0 0 0   Trouble falling or staying asleep, or sleeping too much - - 0   Feeling tired or having little energy - - 3   Poor appetite or overeating - - 0   Feeling bad about yourself - or that you are a failure or have let yourself or your family down - - 0   Trouble concentrating on things, such as reading the newspaper or watching television - - 2   Moving or speaking so slowly that other people could have noticed. Or the opposite - being so fidgety or restless that you have been moving around a lot more than usual - - 2   Thoughts that you would be better off dead, or of hurting yourself in some way - - 0   PHQ-2 Score 0 0 0   Total Score PHQ 2 - - -   PHQ-9 Total Score 0 0 7   If you checked off any problems, how difficult have these problems made it for you to do your work, take care of things at home, or get along with other people? - - 0     Subjective    HPI:     This is a 60-year-old male patient that I am seeing for the first time in at least 18 months. He apparently had a stroke in September and he is in today unaccompanied. He says he was driven here by his brother. The patient seems to have an expressive aphasia. He understands my questions but has difficulty communicating. Sometimes his speech makes no sense at all. It is difficult to get an answer to many of my questions. I reviewed his hospital course I noted several laboratory abnormalities and I will repeat those test today to determine if they are significant. One such abnormality is his TSH. This is significant and that the patient is taking amiodarone. He has had elevated TSH in the past and was officially designated as subclinical hypothyroidism. I will recheck TSH and T4 today and consider thyroid replacement. In light of his anemia, recheck CBC. Recheck renal function as his filtration rate was lower.     The patient is due for his annual wellness visit but we are unable to perform that today because of our recent change in electronic medical records. We will schedule follow-up for this in the near future. Reviewed and updated this visit by provider:           Review of Systems   Constitutional:  Negative for fatigue and fever. HENT: Negative. Eyes: Negative. Respiratory:  Negative for cough, chest tightness, shortness of breath and wheezing. Cardiovascular:  Negative for chest pain and leg swelling. Gastrointestinal:  Negative for abdominal pain, constipation, diarrhea, nausea and vomiting. Endocrine: Negative. Genitourinary:  Negative for difficulty urinating, dysuria and frequency. Musculoskeletal:  Negative for arthralgias, back pain and neck pain. Skin:  Negative for rash. Allergic/Immunologic: Negative. Neurological:  Positive for speech difficulty (expressive aphasia?). Negative for dizziness and light-headedness. Psychiatric/Behavioral:  Negative for dysphoric mood. The patient is not nervous/anxious. All other systems reviewed and are negative. Objective    /68 (Site: Left Upper Arm, Position: Sitting, Cuff Size: Medium Adult)   Pulse 79   Ht 5' 9\" (1.753 m)   Wt 188 lb (85.3 kg)   SpO2 99%   BMI 27.76 kg/m²     Physical Exam  Vitals reviewed. Constitutional:       General: He is not in acute distress. Appearance: Normal appearance. HENT:      Head: Normocephalic and atraumatic. Right Ear: Tympanic membrane, ear canal and external ear normal.      Left Ear: Tympanic membrane, ear canal and external ear normal.      Mouth/Throat:      Mouth: Mucous membranes are moist.   Eyes:      Extraocular Movements: Extraocular movements intact. Conjunctiva/sclera: Conjunctivae normal.      Pupils: Pupils are equal, round, and reactive to light. Neck:      Vascular: No carotid bruit. Cardiovascular:      Rate and Rhythm: Normal rate and regular rhythm. Heart sounds: Normal heart sounds. Pulmonary:      Effort: Pulmonary effort is normal.      Breath sounds: Normal breath sounds. Abdominal:      General: Bowel sounds are normal.      Palpations: Abdomen is soft. Musculoskeletal:      Cervical back: Neck supple. No rigidity or tenderness. Lymphadenopathy:      Cervical: No cervical adenopathy. Skin:     General: Skin is warm and dry. Neurological:      General: No focal deficit present. Mental Status: He is alert and oriented to person, place, and time. Cranial Nerves: Cranial nerves 2-12 are intact. Sensory: Sensation is intact. Motor: Motor function is intact. Comments: Speech is non-sensical   Psychiatric:         Mood and Affect: Mood normal.         Behavior: Behavior normal.         Thought Content: Thought content normal.         Judgment: Judgment normal.       On this date 01/05/2023 I have spent 40 minutes reviewing previous notes, lab/imaging results and face to face with the patient discussing the diagnoses and importance of compliance with the treatment plan, as well as documenting on the day of the visit.         Nai Wheat DO  [facility]

## 2023-01-06 PROBLEM — Z79.899 LONG TERM CURRENT USE OF AMIODARONE: Chronic | Status: ACTIVE | Noted: 2022-09-07

## 2023-01-06 PROBLEM — Z53.20 COLONOSCOPY REFUSED: Status: ACTIVE | Noted: 2023-01-06

## 2023-01-06 PROBLEM — R79.89 ELEVATED TSH: Chronic | Status: ACTIVE | Noted: 2022-09-07

## 2023-01-06 PROBLEM — I63.9 CEREBROVASCULAR ACCIDENT (CVA) DUE TO EMBOLISM (HCC): Chronic | Status: ACTIVE | Noted: 2022-09-15

## 2023-01-06 PROBLEM — R47.01 EXPRESSIVE APHASIA: Status: ACTIVE | Noted: 2023-01-06

## 2023-01-06 ASSESSMENT — ENCOUNTER SYMPTOMS
ALLERGIC/IMMUNOLOGIC NEGATIVE: 1
ABDOMINAL PAIN: 0
EYES NEGATIVE: 1
VOMITING: 0
CHEST TIGHTNESS: 0
NAUSEA: 0
BACK PAIN: 0
CONSTIPATION: 0
DIARRHEA: 0
WHEEZING: 0
SHORTNESS OF BREATH: 0
COUGH: 0

## 2023-01-09 ENCOUNTER — HOSPITAL ENCOUNTER (OUTPATIENT)
Dept: PHYSICAL THERAPY | Age: 63
Setting detail: RECURRING SERIES
Discharge: HOME | End: 2023-01-12
Payer: MEDICARE

## 2023-01-09 PROCEDURE — 92507 TX SP LANG VOICE COMM INDIV: CPT

## 2023-01-10 NOTE — PROGRESS NOTES
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 States he can \"understand some but not all. \"  Pain:  Patient does not c/o pain. Patient does not appear in pain. Interventions Planned: (Treatment may consist of any combination of the following)  Expressive language, Receptive language, repetition, automatic speech, basic commands in context, contextual Y/N questions, responsive naming tasks, word discrimination tasks  GOALS: (Goals have been discussed and agreed upon with patient.)  Short-Term Functional Goals: Time Frame: 8 weeks  Patient will answer  moderate yes/no questions with 90% accuracy given minimal cueing. (Goal updated 23)  Patient will follow 2-3 step commands with 80% accuracy given min-mod cueing. (Goal updated 23)  Patient will identify objects given verbal descriptions with >80% accuracy given min cues. MET 23  Patient will complete automatic naming tasks with 90% accuracy given minimal cueing.  (Goal ongoing 1/4/23)  Patient will complete basic phrase completion with 70% accuracy given moderate cueing. (Goal ongoing 1/4/23)  Patient will name common objects with 70% accuracy given moderate cueing. (Goal ongoing 1/4/23)  Patient will verbalize items given verbal descriptions (object function, semantic feature analysis) with >80% accuracy with mod cues. New goal added 1/4/23  Patient will read and display comprehension at sentence level with 90% accuracy without assistance. New goal added 1/4/23  Discharge Goals: Time Frame: 12 weeks  Patient will increase receptive/expressive language skills to communicate basic wants/needs across environments. Updated Objective Findings:  None Today  Treatment   Expressive/receptive language activities: Answering yes/no questions presented written 90% accuracy mod cues  Phrase completion: 10/14x with mod phonemic cues, semantic cues  Participated in conversational topics with appropriate responses 70% of the time mod assistance  Naming objects with mod-max cues 20/30x mod cues (semantic cues)  Reading 1-2 sentences: 80% accuracy with min cues; increased in errors when longer than 2 sentences    Automatic speech tasks:  Months of the year independently named 10/12 correctly min cues  Pledge of allegiance able to recite ~80% with min cues    Treatment/Session Summary:    Communication/Consultation:  None today  Recommendations/Intent for next treatment session: Next visit will focus on expressive and receptive language.     Total Duration:  Time In: 1345  Time Out: 1425  Minutes: Mildred Connors, SLP    Visit # Therapist initials Date Arrived NS/ Cx < 24 hr >24 hr Cx Visit Comments   1 BC 10/5 X   Initial Assessment Only Today   2 BC 10/10 X      3 BC 10/17 X      4 BC 10/19 X      5 BC 10/21 X      6 BC 10/24 X       BC 11/9  X  No show   7 BC 11/16 X      8 BC 11/21  X  Pt not well    BC 11/23-28   X SLP out   9 BC 11/30 X      10 BC 12/2 X       BC 12/5   X SLP out 11 BC 12/7 X      12 BC 12/12 X      13 BC 12/14 X      14 BC 12/16 X      15 BC 12/19 X      16 BC 12/21 X       BC 12/26-30   X SLP out   17 BC 9/1/77 X   Recertification   18 BC 1/9 X      19 BC 1/11 X                                            Abbreviations: NS = No Show; CX = cancelled

## 2023-01-11 ENCOUNTER — HOSPITAL ENCOUNTER (OUTPATIENT)
Dept: PHYSICAL THERAPY | Age: 63
Setting detail: RECURRING SERIES
Discharge: HOME | End: 2023-01-14
Payer: MEDICARE

## 2023-01-11 ENCOUNTER — TELEPHONE (OUTPATIENT)
Dept: PHARMACY | Facility: CLINIC | Age: 63
End: 2023-01-11

## 2023-01-11 PROCEDURE — 92507 TX SP LANG VOICE COMM INDIV: CPT

## 2023-01-11 NOTE — TELEPHONE ENCOUNTER
Mansi Zamudio DO: per below, appt with you 1/12/23  - chart reviewed due to insurer-identified gap: CAD but no statin Rx  - per chart unable to tolerate statins (myalgia) and can be excluded from metric if certain TL/CVT-70 code done at provider visit    If appropriate, please consider adding CMS allowable diagnosis within your 1/12/23 visit encounter:   \"myalgia\" (ICD-10 M79.10)  this will complete/close this insurer-identified gap for this calendar year    Thank you,  Kervin Restrepo, PharmD, [address] [facility], toll free: [phone], option 2    ==============================================================    POPULATION [facility]ARM[facility]Y: STATIN THERAPY REVIEW  Identified statin use in persons with cardiovascular disease care gap per Hugh. ASSESSMENT:  Patient has been identified as having a diagnosis for clinical ASCVD or event (e.g., inpatient hospitalization for MI, CABG, PCI or other revascularization procedures) in the measurement year and not currently filling a moderate or high intensity statin. Patients included in this care gap are males age 18-72 and females age 43-69. Per chart review, patient has documented allergy/intolerance to statins, which qualifies the patient for an exclusion from this care gap.  Per allergy list - itching with statins; however previous documentation in [facility] also indicates myalgia with statins    Per 12/21/2020 cardiology visit documentation of allergy list:  Allergies   Allergen Reactions    Pcn [Penicillins] Swelling       Face swelling    Crestor [Rosuvastatin] Myalgia       itching    Lipitor [Atorvastatin] Myalgia       itching    Lisinopril Unknown (comments)    Repatha Pushtronex [Evolocumab] Myalgia       Itching     Allergies   Allergen Reactions    Penicillins Swelling     Face swelling    Atorvastatin Other (See Comments)     itching    Evolocumab Other (See Comments) Itching    Rosuvastatin Other (See Comments)     itching        Lab Results   Component Value Date    CHOL 201 (H) 09/16/2022    TRIG 133 09/16/2022    HDL 36 (L) 09/16/2022    LDLCALC 138.4 (H) 09/16/2022     ALT   Date Value Ref Range Status   09/15/2022 14 12 - 65 U/L Final     AST   Date Value Ref Range Status   09/15/2022 19 15 - 37 U/L Final       The ASCVD Risk score (Troy DK, et al., 2019) failed to calculate for the following reasons:     The patient has a prior MI or stroke diagnosis     PLAN:  - suggest dx at visit to exclude patient from [facility] metric    Future Appointments   Date Time Provider Ernestina Willis   1/12/2023  3:45 PM Sagar Gilliam DO [facility] AMB   3/16/2023  9:30 AM [facility] DEVICE 39 [facility] AM[facility]   5/11/2023 11:00 AM MD GIGI Frankel [facility] AMB   6/30/2023 10:15 AM Sagar Gilliam DO [facility] AM[facility]   1/8/2024  9:45 AM Sagar Gilliam DO [facility] AMB       Marc Osborne, PharmD, [address] [facility], toll free: [phone], option 2

## 2023-01-12 ENCOUNTER — OFFICE VISIT (OUTPATIENT)
Dept: FAMILY MEDICINE CLINIC | Facility: CLINIC | Age: 63
End: 2023-01-12
Payer: MEDICARE

## 2023-01-12 VITALS
HEIGHT: 69 IN | WEIGHT: 187 LBS | DIASTOLIC BLOOD PRESSURE: 68 MMHG | HEART RATE: 73 BPM | OXYGEN SATURATION: 96 % | BODY MASS INDEX: 27.7 KG/M2 | SYSTOLIC BLOOD PRESSURE: 130 MMHG

## 2023-01-12 DIAGNOSIS — E78.5 DYSLIPIDEMIA: Primary | Chronic | ICD-10-CM

## 2023-01-12 DIAGNOSIS — Z95.810 ICD (IMPLANTABLE CARDIOVERTER-DEFIBRILLATOR) IN PLACE: Chronic | ICD-10-CM

## 2023-01-12 DIAGNOSIS — Z78.9 STATIN INTOLERANCE: ICD-10-CM

## 2023-01-12 DIAGNOSIS — I50.22 CHRONIC SYSTOLIC CONGESTIVE HEART FAILURE (HCC): Chronic | ICD-10-CM

## 2023-01-12 DIAGNOSIS — R47.01 EXPRESSIVE APHASIA: Chronic | ICD-10-CM

## 2023-01-12 DIAGNOSIS — Z79.899 LONG TERM CURRENT USE OF AMIODARONE: ICD-10-CM

## 2023-01-12 DIAGNOSIS — E03.2 HYPOTHYROIDISM DUE TO MEDICATION: ICD-10-CM

## 2023-01-12 DIAGNOSIS — I63.412 CEREBROVASCULAR ACCIDENT (CVA) DUE TO EMBOLISM OF LEFT MIDDLE CEREBRAL ARTERY (HCC): Chronic | ICD-10-CM

## 2023-01-12 DIAGNOSIS — I25.810 CORONARY ARTERY DISEASE INVOLVING AUTOLOGOUS VEIN CORONARY BYPASS GRAFT WITHOUT ANGINA PECTORIS: Chronic | ICD-10-CM

## 2023-01-12 PROCEDURE — 99214 OFFICE O/P EST MOD 30 MIN: CPT | Performed by: FAMILY MEDICINE

## 2023-01-12 PROCEDURE — G8484 FLU IMMUNIZE NO ADMIN: HCPCS | Performed by: FAMILY MEDICINE

## 2023-01-12 PROCEDURE — 3017F COLORECTAL CA SCREEN DOC REV: CPT | Performed by: FAMILY MEDICINE

## 2023-01-12 PROCEDURE — G8428 CUR MEDS NOT DOCUMENT: HCPCS | Performed by: FAMILY MEDICINE

## 2023-01-12 PROCEDURE — G8417 CALC BMI ABV UP PARAM F/U: HCPCS | Performed by: FAMILY MEDICINE

## 2023-01-12 PROCEDURE — 1036F TOBACCO NON-USER: CPT | Performed by: FAMILY MEDICINE

## 2023-01-18 ENCOUNTER — TELEPHONE (OUTPATIENT)
Dept: UROLOGY | Age: 63
End: 2023-01-18

## 2023-01-18 NOTE — PROGRESS NOTES
Cystoscopy, Right Diagnostic Ureteroscopy, Right Retrograde Pyelogram, Possible biopsies, stent exchange vs. Removal      Clearance sent to cardiology to hold blood thinner and assessment has been made for 1/26    Please put in orders

## 2023-01-18 NOTE — TELEPHONE ENCOUNTER
Cardiac Pre-operative Assessment      Physician or Practice Requesting Clearance:   Dr Shaka Garcia: Fortino Barrios Phone Number: [phone]    Fax Number: [phone]    Date of Surgery/Procedure: 2/3/23    Type of Surgery or Procedure: cysto,ureterscopy,retrogrades, possible biopsies     Patient needs cardiac or medication clearance    Medications to hold Coumadin    # Days pre-procedure to hold 5 days     Restart medication ____    Please send response back in this phone encounter

## 2023-01-26 ENCOUNTER — ANESTHESIA EVENT (OUTPATIENT)
Dept: SURGERY | Age: 63
End: 2023-01-26
Payer: MEDICARE

## 2023-01-26 ENCOUNTER — HOSPITAL ENCOUNTER (OUTPATIENT)
Dept: PREADMISSION TESTING | Age: 63
Discharge: HOME | End: 2023-01-29

## 2023-01-26 VITALS
OXYGEN SATURATION: 98 % | HEART RATE: 57 BPM | TEMPERATURE: 97.7 F | DIASTOLIC BLOOD PRESSURE: 85 MMHG | HEIGHT: 69 IN | WEIGHT: 187.8 LBS | RESPIRATION RATE: 18 BRPM | BODY MASS INDEX: 27.81 KG/M2 | SYSTOLIC BLOOD PRESSURE: 134 MMHG

## 2023-01-26 NOTE — PROGRESS NOTES
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 States he has surgery Thursday. Discussed visits approved from insurance. Pain:  Patient does not c/o pain. Patient does not appear in pain. Interventions Planned: (Treatment may consist of any combination of the following)  Expressive language, Receptive language, repetition, automatic speech, basic commands in context, contextual Y/N questions, responsive naming tasks, word discrimination tasks  GOALS: (Goals have been discussed and agreed upon with patient.)  Short-Term Functional Goals: Time Frame: 8 weeks  Patient will answer  moderate yes/no questions with 90% accuracy given minimal cueing. (Goal updated 23)  Patient will follow 2-3 step commands with 80% accuracy given min-mod cueing. (Goal updated 23)  Patient will identify objects given verbal descriptions with >80% accuracy given min cues. MET 23  Patient will complete automatic naming tasks with 90% accuracy given minimal cueing.  (Goal ongoing 1/4/23)  Patient will complete basic phrase completion with 70% accuracy given moderate cueing. (Goal ongoing 1/4/23)  Patient will name common objects with 70% accuracy given moderate cueing. (Goal ongoing 1/4/23)  Patient will verbalize items given verbal descriptions (object function, semantic feature analysis) with >80% accuracy with mod cues. New goal added 1/4/23  Patient will read and display comprehension at sentence level with 90% accuracy without assistance. New goal added 1/4/23  Discharge Goals: Time Frame: 12 weeks  Patient will increase receptive/expressive language skills to communicate basic wants/needs across environments. Updated Objective Findings:  None Today  Treatment   Expressive/receptive language activities: Answering yes/no questions presented written 90% accuracy mod cues  Phrase completion (closed set): 9/15x with mod phonemic cues, semantic cues; (open set): 2/5x (ex: take a _)  Participated in conversational topics with appropriate responses 50% of the time mod assistance  Naming objects with mod-max cues 5/10x mod cues (semantic cues) - when stating he knew what it was but couldn't name it, he was unable to use strategies of circumlocution or describing the word he was thinking of   Following 2 step directions: 25% accuracy    Automatic speech tasks:  Days of the week: 100% independently  Months of the year independently named 12/12 correctly min cues  Pledge of allegiance able to recite ~90% with min cues    Treatment/Session Summary:  Patient with some frustration with conversation today, decreased awareness with errors with cues. Patient with mild decrease in performance since last session, d/t scheduling/awaiting insurance visits. Communication/Consultation:  None today  Recommendations/Intent for next treatment session: Next visit will focus on expressive and receptive language.     Total Duration:  Time In: 0915  Time Out: 1000  Minutes: 48 Richie Beck SLP    Visit # Therapist initials Date Arrived NS/ Cx < 24 hr >24 hr Cx Visit Comments   1 BC 10/5 X   Initial Assessment Only Today   2 BC 10/10 X      3 BC 10/17 X      4 BC 10/19 X      5 BC 10/21 X      6 BC 10/24 X       BC 11/9  X  No show   7 BC 11/16 X      8 BC 11/21  X  Pt not well    BC 11/23-28   X SLP out   9 BC 11/30 X      10 BC 12/2 X       BC 12/5   X SLP out   11 BC 12/7 X      12 BC 12/12 X      13 BC 12/14 X      14 BC 12/16 X      15 BC 12/19 X      16 BC 12/21 X       BC 12/26-30   X SLP out   17 BC 5/8/44 X   Recertification   18 BC 1/9 X      19 BC 1/11 X       BC 1/16   X Awaiting insurance auth visits   20 [facility] 1/27 X   3 visits approved   21 BC 1/30       22 BC 2/01         Abbreviations: NS = No Show; CX = cancelled

## 2023-01-26 NOTE — PROGRESS NOTES
Patient verified name and     Order for consent not found in EHR and matches case posting; patient verified. Type 1B surgery, walk in assessment complete. Labs per surgeon: none;   Labs per anesthesia protocol: none;   [id number]  Coumadin hold  found in Media from Dr Langston Holter 23 . Pt with Hx CABG 2022, CVA 2022. Defib check 10/16/22. Echo 9/15/22 EF 35-40%. Chart placed for Anesthesia Review for recent Hx CABG , EF and CVA, is Defib rep requested DOS? Hospital approved surgical skin cleanser and instructions given per hospital policy. Patient provided with and instructed on educational handouts including Guide to Surgery, Pain Management, Hand Hygiene, Blood Transfusion Education, and [facility] Brochure. Patient answered medical/surgical history questions at their best of ability. All prior to admission medications documented in [facility]. Original medication prescription bottle not  visualized during patient appointment. Patient instructed to hold all vitamins 7 days prior to surgery and NSAIDS 5 days prior to surgery, patient verbalized understanding. Patient teach back successful and patient demonstrates knowledge of instructions.

## 2023-01-26 NOTE — PROGRESS NOTES
PLEASE CONTINUE TAKING ALL PRESCRIPTION MEDICATIONS UP TO THE DAY OF SURGERY UNLESS OTHERWISE DIRECTED BELOW. DISCONTINUE all vitamins and supplements 7 days prior to surgery. DISCONTINUE Non-Steriodal Anti-Inflammatory (NSAIDS) such as Advil and Aleve 5 days prior to surgery. Home Medications to take  the day of surgery               Home Medications   to Hold   Warfarin/ Coumadin last dose 1/27/23        Comments      On the day before surgery please take Acetaminophen 1000mg in the morning and then again before bed. You may substitute for Tylenol 650 mg. Please do not bring home medications with you on the day of surgery unless otherwise directed by your nurse. If you are instructed to bring home medications, please give them to your nurse as they will be administered by the nursing staff. If you have any questions, please call [facility] [phone] or [address] [place] [address] [phone]. A copy of this note was provided to the patient for reference.

## 2023-01-27 ENCOUNTER — HOSPITAL ENCOUNTER (OUTPATIENT)
Dept: PHYSICAL THERAPY | Age: 63
Setting detail: RECURRING SERIES
Discharge: HOME | End: 2023-01-30
Payer: MEDICARE

## 2023-01-27 PROCEDURE — 92507 TX SP LANG VOICE COMM INDIV: CPT

## 2023-01-30 ENCOUNTER — HOSPITAL ENCOUNTER (OUTPATIENT)
Dept: PHYSICAL THERAPY | Age: 63
Setting detail: RECURRING SERIES
Discharge: HOME | End: 2023-02-02
Payer: MEDICARE

## 2023-01-30 PROCEDURE — 92507 TX SP LANG VOICE COMM INDIV: CPT

## 2023-01-30 NOTE — PROGRESS NOTES
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 Pain:  Patient does not c/o pain. Patient does not appear in pain. Interventions Planned: (Treatment may consist of any combination of the following)  Expressive language, Receptive language, repetition, automatic speech, basic commands in context, contextual Y/N questions, responsive naming tasks, word discrimination tasks  GOALS: (Goals have been discussed and agreed upon with patient.)  Short-Term Functional Goals: Time Frame: 8 weeks  Patient will answer  moderate yes/no questions with 90% accuracy given minimal cueing. (Goal updated 23)  Patient will follow 2-3 step commands with 80% accuracy given min-mod cueing. (Goal updated 23)  Patient will identify objects given verbal descriptions with >80% accuracy given min cues. MET 23  Patient will complete automatic naming tasks with 90% accuracy given minimal cueing. (Goal ongoing 23)  Patient will complete basic phrase completion with 70% accuracy given moderate cueing. (Goal ongoing 1/4/23)  Patient will name common objects with 70% accuracy given moderate cueing. (Goal ongoing 1/4/23)  Patient will verbalize items given verbal descriptions (object function, semantic feature analysis) with >80% accuracy with mod cues. New goal added 1/4/23  Patient will read and display comprehension at sentence level with 90% accuracy without assistance. New goal added 1/4/23  Discharge Goals: Time Frame: 12 weeks  Patient will increase receptive/expressive language skills to communicate basic wants/needs across environments. Updated Objective Findings:  None Today  Treatment   Expressive/receptive language activities: Answering yes/no questions presented written 80% accuracy mod cues  Phrase completion (closed set): 9/12x with mod phonemic cues, semantic cues; (open set): 3/5x (ex: take a _)  Participated in conversational topics with appropriate responses 60% of the time mod assistance  Naming objects with mod-max cues 7/10x mod cues (semantic cues) - when stating he knew what it was but couldn't name it, he was unable to use strategies of circumlocution or describing the word he was thinking of   Following 2 step directions: 40% accuracy mod-max    Automatic speech tasks:  Days of the week: 100% independently  Months of the year independently named 12/12 correctly min cues  Pledge of allegiance able to recite ~100% with min cues    Treatment/Session Summary:  Patient with some frustration with conversation today, decreased awareness with errors with cues. Communication/Consultation:  None today  Recommendations/Intent for next treatment session: Next visit will focus on expressive and receptive language.     Total Duration:  Time In: 1515  Time Out: 2802  Minutes: Mildred Connors SLP    Visit # Therapist initials Date Arrived NS/ Cx < 24 hr >24 hr Cx Visit Comments   1 BC 10/5 X   Initial Assessment Only Today   2 BC 10/10 X      3 BC 10/17 X      4 BC 10/19 X      5 BC 10/21 X 6 BC 10/24 X       BC 11/9  X  No show   7 BC 11/16 X      8 BC 11/21  X  Pt not well    BC 11/23-28   X SLP out   9 BC 11/30 X      10 BC 12/2 X       BC 12/5   X SLP out   11 BC 12/7 X      12 BC 12/12 X      13 BC 12/14 X      14 BC 12/16 X      15 BC 12/19 X      16 BC 12/21 X       BC 12/26-30   X SLP out   17 BC 8/8/54 X   Recertification   18 BC 1/9 X      19 BC 1/11 X       BC 1/16   X Awaiting insurance auth visits   20 [facility] 1/27 X   3 visits approved   21 BC 1/30 X      22 BC 2/01         Abbreviations: NS = No Show; CX = cancelled

## 2023-01-31 PROBLEM — T46.6X5A MYALGIA DUE TO STATIN: Chronic | Status: RESOLVED | Noted: 2023-01-31 | Resolved: 2023-01-31

## 2023-01-31 PROBLEM — E03.2 HYPOTHYROIDISM DUE TO MEDICATION: Status: ACTIVE | Noted: 2023-01-31

## 2023-01-31 PROBLEM — E03.2 HYPOTHYROIDISM DUE TO MEDICATION: Chronic | Status: ACTIVE | Noted: 2023-01-31

## 2023-01-31 PROBLEM — R47.01 EXPRESSIVE APHASIA: Chronic | Status: ACTIVE | Noted: 2023-01-06

## 2023-01-31 PROBLEM — M79.10 MYALGIA DUE TO STATIN: Chronic | Status: RESOLVED | Noted: 2023-01-31 | Resolved: 2023-01-31

## 2023-01-31 PROBLEM — M79.10 MYALGIA DUE TO STATIN: Status: ACTIVE | Noted: 2023-01-31

## 2023-01-31 PROBLEM — Z95.811 PRESENCE OF HEART ASSIST DEVICE (HCC): Status: RESOLVED | Noted: 2023-01-31 | Resolved: 2023-01-31

## 2023-01-31 PROBLEM — T46.6X5A MYALGIA DUE TO STATIN: Chronic | Status: ACTIVE | Noted: 2023-01-31

## 2023-01-31 PROBLEM — M79.10 MYALGIA DUE TO STATIN: Chronic | Status: ACTIVE | Noted: 2023-01-31

## 2023-01-31 PROBLEM — Z95.811 PRESENCE OF HEART ASSIST DEVICE (HCC): Status: ACTIVE | Noted: 2023-01-31

## 2023-01-31 PROBLEM — T46.6X5A MYALGIA DUE TO STATIN: Status: ACTIVE | Noted: 2023-01-31

## 2023-01-31 ASSESSMENT — ENCOUNTER SYMPTOMS
CONSTIPATION: 0
WHEEZING: 0
VOMITING: 0
NAUSEA: 0
ALLERGIC/IMMUNOLOGIC NEGATIVE: 1
COUGH: 0
BACK PAIN: 0
EYES NEGATIVE: 1
SHORTNESS OF BREATH: 0
DIARRHEA: 0
CHEST TIGHTNESS: 0
ABDOMINAL PAIN: 0

## 2023-01-31 NOTE — PROGRESS NOTES
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 Pain:  Patient does not c/o pain. Patient does not appear in pain. Interventions Planned: (Treatment may consist of any combination of the following)  Expressive language, Receptive language, repetition, automatic speech, basic commands in context, contextual Y/N questions, responsive naming tasks, word discrimination tasks  GOALS: (Goals have been discussed and agreed upon with patient.)  Short-Term Functional Goals: Time Frame: 8 weeks  Patient will answer  moderate yes/no questions with 90% accuracy given minimal cueing. (Goal updated 23)  Patient will follow 2-3 step commands with 80% accuracy given min-mod cueing. (Goal updated 23)  Patient will identify objects given verbal descriptions with >80% accuracy given min cues. MET 23  Patient will complete automatic naming tasks with 90% accuracy given minimal cueing.  (Goal ongoing 23)  Patient will complete basic phrase completion with 70% accuracy given moderate cueing. (Goal ongoing 1/4/23)  Patient will name common objects with 70% accuracy given moderate cueing. (Goal ongoing 1/4/23)  Patient will verbalize items given verbal descriptions (object function, semantic feature analysis) with >80% accuracy with mod cues. New goal added 1/4/23  Patient will read and display comprehension at sentence level with 90% accuracy without assistance. New goal added 1/4/23  Discharge Goals: Time Frame: 12 weeks  Patient will increase receptive/expressive language skills to communicate basic wants/needs across environments. Updated Objective Findings:  None Today  Treatment   Expressive/receptive language activities: Answering yes/no questions presented written 80% accuracy mod cues  Phrase completion (closed set): 8/12x with mod phonemic cues, semantic cues; (open set): 3/5x (ex: take a _)  Participated in conversational topics with appropriate responses 60% of the time mod assistance  Naming objects with mod-max cues 16/20x mod-max cues (semantic cues, phonemic cues, \"cut with s___\", tactile cues to hold the object) - when stating he knew what it was but couldn't name it, he was unable to use strategies of circumlocution or describing the word he was thinking of - could use gestures to show the appropriate action of the objet  Following 2 step directions: 40% accuracy mod-max    Automatic speech tasks:  Days of the week: 100% independently  Months of the year independently named 12/12 correctly min cues  Pledge of allegiance able to recite ~100% with min cues    Treatment/Session Summary:  Patient with some frustration with conversation today, decreased awareness with errors with cues. Patient has significant difficulty with word finding as well as using compensatory strategies to aid in expressive language. He continues to benefit from mod-max assistance with language. Patient continues to require speech therapy services.  He is unable to effectively and consistently communicate wants and needs with family/caregivers and in environmental settings due to receptive and expressive language deficits. Communication/Consultation:  None today  Recommendations/Intent for next treatment session: Next visit will focus on expressive and receptive language.     Total Duration:  Time In: 1345  Time Out: 1425  Minutes: Mildred Connors, SLP    Visit # Therapist initials Date Arrived NS/ Cx < 24 hr >24 hr Cx Visit Comments   1 BC 10/5 X   Initial Assessment Only Today   2 BC 10/10 X      3 BC 10/17 X      4 BC 10/19 X      5 BC 10/21 X      6 BC 10/24 X       BC 11/9  X  No show   7 BC 11/16 X      8 BC 11/21  X  Pt not well    BC 11/23-28   X SLP out   9 BC 11/30 X      10 BC 12/2 X       BC 12/5   X SLP out   11 BC 12/7 X      12 BC 12/12 X      13 BC 12/14 X      14 BC 12/16 X      15 BC 12/19 X      16 BC 12/21 X       BC 12/26-30   X SLP out   17 BC 0/4/88 X   Recertification   18 BC 1/9 X      19 BC 1/11 X       BC 1/16   X Awaiting insurance auth visits   20 [facility] 1/27 X   3 visits approved   21 BC 1/30 X      22 BC 2/01 X        Abbreviations: NS = No Show; CX = cancelled

## 2023-02-01 ENCOUNTER — HOSPITAL ENCOUNTER (OUTPATIENT)
Dept: PHYSICAL THERAPY | Age: 63
Setting detail: RECURRING SERIES
Discharge: HOME | End: 2023-02-04
Payer: MEDICARE

## 2023-02-01 PROCEDURE — 92507 TX SP LANG VOICE COMM INDIV: CPT

## 2023-02-01 NOTE — PATIENT INSTRUCTIONS
^^^^^    Low Carbohydrate/Reduced Fat Diet (25/40 Rule)    With insulin resistance (BUT ALSO FOR ANYONE TRYING TO LOSE WEIGHT), carbohydrates become a source of problems as carbohydrates break down with digestion into glucose (sugar) molecules. After years of following patients in the clinic and monitoring thousands of patients home  glucose monitor reports in combination with advanced lipid  evaluation, I have come to the conclusion that carbohydrates ingestion greater than 25 grams in a four hour period leads to the liver manufacturing large quantities of bad fats and bad inflammatory factors for approximately four days. It is vital that every patient eat <25 grams of carbohydrates every four hours strictly and consistently! Bread and fruit are almost deal-breakers when you attempt to stay under 25 grams of carbohydrates every four hours. It seems strange to say this, but it is better to avoid bread and fruit altogether if you have any form of insulin resistance. I suggest that insulin resistant patients avoid pasta and rice as well due to the amount of carbohydrates contained within pasta and rice. It is difficult to stay under  25 grams of carbohydrates when rice and pasta are included in the meal. Some dieticians/clinicians suggest that whole wheat pasta and brown rice are acceptable options due to the complex carbohydrates contained within these foods, but I see problems with this strategy and suggest that you avoid all pasta and all rice intake. Low carbohydrates diets emphasize proteins as proteins are your friend!   To succeed at our low carbohydrate/reduced fat diet you will be eating mostly, meat, vegetables, soup, and salad. Most vegetables are great for this diet with the exception of potatoes, corn, carrots, and beets.   Some of the other vegetables contain a modest amount of carbohydrates but, in my experience, most of the other vegetables are well tolerated when reasonable portions are eaten. Your carbohydrate allowance is strictly <25 grams in a four hour period. If you skip a meal, you cannot eat 50 grams of carbohydrates in the next feeding time period! There is no credit system for your carbohydrate reduced diet! If you miss eating a meal, you simply loose the 25 gram carbohydrate allotment that you could have eaten. If you eat all 25 grams of the carbohydrates that you are allowed at a mealtime, you cannot eat a 12 gram carbohydrate snack in 2 hours. If you want to snack between meals, you have to lessen your carbohydrate intake at mealtimes so that the total carbohydrates for the meal and snack do not exceed 25 grams total in a four hour time period. Regarding fat intake, you want to be careful to avoid saturated fats, often obtained from red meat, eggs, and fried foods. If the meat doesnt swim underwater or fly, it is probably bad for you! You can cook your meats almost anyway imaginable except do not hamilton your food! Baking, broiling, grilling, rotisserie, roasting, or microwaving your meats all will be fine.    SO... LIMIT BREAD, FRUIT, PASTA, RICE, POTATOES, AND CORN! Adding >40 minutes of nonstop, aerobic exercise daily to the above low carbohydrate/reduced fat diet will help remove the endothelial inflammatory factors and help you maximize your overall cardiovascular health status! The 25/40 rule is the summation of what every insulin resistant patient needs to have deeply embedded in their mindset! The 25 represents the absolute 4 hour limit of carbohydrate intake while the 40 represents the minimum amount of nonstop, aerobic exercise necessary to obtain removal of endothelial inflammation. Following the 25/40 rule is the key to successful lifestyle changes that facilitate a healthy cardiovascular system! Are you DRINKING any calories? If you are, they are adding weight and not even filling you up.  Cutting those carbs out will cause you to lose weight without even trying!!! Think about SWEET TEA (killing Osmin Tolliver), JUICES (just concentrated fruits without fiber), SODA (I hate to say it but DIET SODA would certainly be better), MILK (God designed it for making young calves into BIG cows fast. You don't need it. Try almond, coconut, rice, soy milk or combinations. But make sure they are UNSWEETENED. BEER and WINE (self explanatory). An \"occasional\" one of these is probably not an issue. But NOT daily. So...DO NOT DRINK:  SWEET TEA, SUGAR SODA, JUICES, MILK, BEER OR WINE! Want more good ideas?   Watch the documentary called:  \"[place] [employer]\"on [employer] (or other online streaming platform)

## 2023-02-01 NOTE — PERIOP NOTE
Telephone call to [phone]-J/F Daisy Calderón, brother who advised he was bringing Yusuf Moreira, patient. Advised him of new arrival time now of 0530am for scheduled procedure on 02/02/2023 and he states he will have him here.

## 2023-02-01 NOTE — PROGRESS NOTES
Jasvir Vyas DO                Diplomate of the [employer] of [facility] [place]         [phone]    Servando Brewer is a 58 y.o. male who was seen on 1/12/2023 for   Chief Complaint   Patient presents with    Results       Assessment & Plan     Diagnosis Orders   1. Dyslipidemia      Low fate diet advised. Intolerant of statins      2. Coronary artery disease involving autologous vein coronary bypass graft without angina pectoris      Asymptomatic      3. Statin intolerance      Myalgia      4. Long term current use of amiodarone  TSH      5. Hypothyroidism due to medication  TSH    Check T4 and start levothyroxin if indicated. Repeat TSH in 6 weeks if so      6. Cerebrovascular accident (CVA) due to embolism of left middle cerebral artery ([address]        7. Expressive aphasia        8. Chronic systolic congestive heart failure (HCC)      Stable. Follows with cardiology      9. ICD (implantable cardioverter-defibrillator) in place            Follow-up and Dispositions    Return in about 6 weeks (around 2/23/2023), or if symptoms worsen or fail to improve, for LAB ONLY (TSH) in 6 weeks, PREV SCHED APPT. RECENT LABS/TESTS TO REVIEW and DISCUSS    No results found for this visit on 01/12/23. PHQ-9  1/5/2023 10/4/2022 6/24/2022   Little interest or pleasure in doing things 0 0 0   Little interest or pleasure in doing things - - -   Feeling down, depressed, or hopeless 0 0 0   Trouble falling or staying asleep, or sleeping too much - - 0   Feeling tired or having little energy - - 3   Poor appetite or overeating - - 0   Feeling bad about yourself - or that you are a failure or have let yourself or your family down - - 0   Trouble concentrating on things, such as reading the newspaper or watching television - - 2   Moving or speaking so slowly that other people could have noticed.  Or the opposite - being so fidgety or restless that you have been moving around a lot more than usual - - 2   Thoughts that you would be better off dead, or of hurting yourself in some way - - 0   PHQ-2 Score 0 0 0   Total Score PHQ 2 - - -   PHQ-9 Total Score 0 0 7   If you checked off any problems, how difficult have these problems made it for you to do your work, take care of things at home, or get along with other people? - - 0     Component      Latest Ref Rng & Units 1/5/2023 9/17/2022 6/28/2022          11:07 AM  4:55 AM 10:50 AM   TSH w Free Thyroid if Abnormal      0.358 - 3.740 UIU/ML 4.61 (H) 12.70 (H) 6.54 (H)     Component      Latest Ref Rng & Units 1/5/2023 9/17/2022 6/28/2022          11:07 AM  4:55 AM 10:50 AM   T4 Free      0.78 - 1.46 NG/DL 1.0 1.0 1.1     Component      Latest Ref Rng & Units 1/5/2023 10/28/2022 9/20/2022          11:07 AM 12:10 PM  4:01 AM   WBC      4.3 - 11.1 K/uL 9.2 7.3 10.6   RBC      4.23 - 5.6 M/uL 5.39 5.13 4.75   Hemoglobin Quant      13.6 - 17.2 g/dL 13.7 12.7 (L) 12.0 (L)   Hematocrit      41.1 - 50.3 % 45.3 42.4 41.0 (L)   MCV      82 - 102 FL 84.0 82.7 86.3   MCH      26.1 - 32.9 PG 25.4 (L) 24.8 (L) 25.3 (L)   MCHC      31.4 - 35.0 g/dL 30.2 (L) 30.0 (L) 29.3 (L)   RDW      11.9 - 14.6 % 18.8 (H) 18.2 (H) 19.5 (H)   Platelet Count      432 - 450 K/uL 258 279 323     Component      Latest Ref Rng & Units 1/5/2023 10/28/2022 9/20/2022          11:07 AM 12:10 PM  4:01 AM   Sodium      133 - 143 mmol/L 140 141 138   Potassium      3.5 - 5.1 mmol/L 4.8 3.8 4.1   Chloride      101 - 110 mmol/L 109 108 105   CO2      21 - 32 mmol/L 29 30 28   Anion Gap      2 - 11 mmol/L 2 3 5   Glucose, Random      65 - 100 mg/dL 100 105 (H) 87   BUN,BUNPL      8 - 23 MG/DL 14 11 16   Creatinine      0.8 - 1.5 MG/DL 1.40 1.40 1.40   GFR African American      >60 ml/min/1.73m2   >60   EGFR IF NonAfrican American      >60 ml/min/1.73m2      CALCIUM, SERUM, [id number]      8.3 - 10.4 MG/DL 8.9 8.8 9.2   Bilirubin      0.2 - 1.1 MG/DL      ALT      12 - 65 U/L      AST      15 - 37 U/L      Alk Phos      50 - 136 U/L      Total Protein      6.3 - 8.2 g/dL      Albumin      3.2 - 4.6 g/dL      Globulin      2.3 - 3.5 g/dL      Albumin/Globulin Ratio      1.2 - 3.5        GFR Non-African American      >60 ml/min/1.73m2   55 (L)   Alk Phosphatase      50 - 136 U/L      ALBUMIN/GLOBULIN RATIO      1.2 - 3.5        Bun/Cre Ratio      10 - 24 NA      Est, Glom Filt Rate      >60 ml/min/1.73m2 57 (L) 57 (L)      Subjective    HPI:     This is a 55-year-old male patient here today for annual follow-up and his first visit with me after hospitalization that included implantation of a cardiac defibrillator. TSH still elevated although better than previous values. We will check T4 and treat with thyroid medication if indicated. This is likely related to amiodarone. The patient is due for his annual wellness visit but we did not have that visit today. Instead we will schedule follow-up for it to be done VIRTUALLY in the near future. Reviewed and updated this visit by provider:           Review of Systems   Constitutional:  Negative for fatigue and fever. HENT: Negative. Eyes: Negative. Respiratory:  Negative for cough, chest tightness, shortness of breath and wheezing. Cardiovascular:  Negative for chest pain and leg swelling. Gastrointestinal:  Negative for abdominal pain, constipation, diarrhea, nausea and vomiting. Endocrine: Negative. Genitourinary:  Negative for difficulty urinating, dysuria and frequency. Musculoskeletal:  Negative for arthralgias, back pain and neck pain. Skin:  Negative for rash. Allergic/Immunologic: Negative. Neurological:  Negative for dizziness and light-headedness. Psychiatric/Behavioral:  Negative for dysphoric mood. The patient is not nervous/anxious. All other systems reviewed and are negative.     Objective    /68 (Site: Left Upper Arm, Position: Sitting, Cuff Size: Medium Adult)   Pulse 73   Ht 5' 9\" (1.753 m)   Wt 187 lb (84.8 kg)   SpO2 96%   BMI 27.62 kg/m²     Physical Exam  Vitals reviewed.   Constitutional:       General: He is not in acute distress.     Appearance: Normal appearance.   HENT:      Head: Normocephalic and atraumatic.      Right Ear: Tympanic membrane, ear canal and external ear normal.      Left Ear: Tympanic membrane, ear canal and external ear normal.      Mouth/Throat:      Mouth: Mucous membranes are moist.   Eyes:      Extraocular Movements: Extraocular movements intact.      Conjunctiva/sclera: Conjunctivae normal.      Pupils: Pupils are equal, round, and reactive to light.   Neck:      Vascular: No carotid bruit.   Cardiovascular:      Rate and Rhythm: Normal rate and regular rhythm.      Heart sounds: Normal heart sounds.   Pulmonary:      Effort: Pulmonary effort is normal.      Breath sounds: Normal breath sounds.   Abdominal:      General: Bowel sounds are normal.      Palpations: Abdomen is soft.   Musculoskeletal:      Cervical back: Neck supple. No rigidity or tenderness.   Lymphadenopathy:      Cervical: No cervical adenopathy.   Skin:     General: Skin is warm and dry.   Neurological:      General: No focal deficit present.      Mental Status: He is alert and oriented to person, place, and time.   Psychiatric:         Mood and Affect: Mood normal.         Behavior: Behavior normal.         Thought Content: Thought content normal.         Judgment: Judgment normal.     On this date 01/12/2023 I have spent 35 minutes reviewing previous notes, lab/imaging results and face to face with the patient discussing the diagnoses and importance of compliance with the treatment plan, as well as documenting on the day of the visit.        Kyler Tapia,   [facility] [place]

## 2023-02-02 ENCOUNTER — HOSPITAL ENCOUNTER (OUTPATIENT)
Age: 63
Setting detail: OUTPATIENT SURGERY
Discharge: HOME | End: 2023-02-02
Attending: UROLOGY | Admitting: UROLOGY
Payer: MEDICARE

## 2023-02-02 ENCOUNTER — ANESTHESIA (OUTPATIENT)
Dept: SURGERY | Age: 63
End: 2023-02-02
Payer: MEDICARE

## 2023-02-02 VITALS
SYSTOLIC BLOOD PRESSURE: 114 MMHG | WEIGHT: 187.25 LBS | OXYGEN SATURATION: 96 % | DIASTOLIC BLOOD PRESSURE: 66 MMHG | HEIGHT: 69 IN | RESPIRATION RATE: 17 BRPM | HEART RATE: 60 BPM | TEMPERATURE: 98.1 F | BODY MASS INDEX: 27.74 KG/M2

## 2023-02-02 DIAGNOSIS — N28.89 RIGHT KIDNEY MASS: Primary | ICD-10-CM

## 2023-02-02 DIAGNOSIS — R31.0 GROSS HEMATURIA: ICD-10-CM

## 2023-02-02 LAB
APPEARANCE UR: ABNORMAL
BACTERIA URNS QL MICRO: ABNORMAL /HPF
BILIRUB UR QL: NEGATIVE
CASTS URNS QL MICRO: ABNORMAL /LPF
COLOR UR: ABNORMAL
EPI CELLS #/AREA URNS HPF: ABNORMAL /HPF
GLUCOSE UR STRIP.AUTO-MCNC: NEGATIVE MG/DL
HGB UR QL STRIP: ABNORMAL
INR BLD: 1.2 (ref 0.9–1.2)
KETONES UR QL STRIP.AUTO: NEGATIVE MG/DL
LEUKOCYTE ESTERASE UR QL STRIP.AUTO: ABNORMAL
NITRITE UR QL STRIP.AUTO: NEGATIVE
PH UR STRIP: 5.5 (ref 5–9)
PROT UR STRIP-MCNC: 100 MG/DL
PT BLD: 14.2 SECS (ref 9.6–11.6)
RBC #/AREA URNS HPF: >100 /HPF
SP GR UR REFRACTOMETRY: 1.01 (ref 1–1.02)
UROBILINOGEN UR QL STRIP.AUTO: 0.2 EU/DL (ref 0.2–1)
WBC URNS QL MICRO: >100 /HPF

## 2023-02-02 PROCEDURE — C1769 GUIDE WIRE: HCPCS | Performed by: UROLOGY

## 2023-02-02 PROCEDURE — 3700000000 HC ANESTHESIA ATTENDED CARE: Performed by: UROLOGY

## 2023-02-02 PROCEDURE — 6360000002 HC RX W HCPCS: Performed by: NURSE ANESTHETIST, CERTIFIED REGISTERED

## 2023-02-02 PROCEDURE — 2580000003 HC RX 258: Performed by: ANESTHESIOLOGY

## 2023-02-02 PROCEDURE — 6360000002 HC RX W HCPCS: Performed by: UROLOGY

## 2023-02-02 PROCEDURE — 7100000010 HC PHASE II RECOVERY - FIRST 15 MIN: Performed by: UROLOGY

## 2023-02-02 PROCEDURE — 99999 PR OFFICE/OUTPT VISIT,PROCEDURE ONLY: CPT | Performed by: UROLOGY

## 2023-02-02 PROCEDURE — 7100000000 HC PACU RECOVERY - FIRST 15 MIN: Performed by: UROLOGY

## 2023-02-02 PROCEDURE — 81001 URINALYSIS AUTO W/SCOPE: CPT

## 2023-02-02 PROCEDURE — 3700000001 HC ADD 15 MINUTES (ANESTHESIA): Performed by: UROLOGY

## 2023-02-02 PROCEDURE — C2617 STENT, NON-COR, TEM W/O DEL: HCPCS | Performed by: UROLOGY

## 2023-02-02 PROCEDURE — 88112 CYTOPATH CELL ENHANCE TECH: CPT

## 2023-02-02 PROCEDURE — C1894 INTRO/SHEATH, NON-LASER: HCPCS | Performed by: UROLOGY

## 2023-02-02 PROCEDURE — 7100000001 HC PACU RECOVERY - ADDTL 15 MIN: Performed by: UROLOGY

## 2023-02-02 PROCEDURE — 6360000004 HC RX CONTRAST MEDICATION: Performed by: UROLOGY

## 2023-02-02 PROCEDURE — 85610 PROTHROMBIN TIME: CPT

## 2023-02-02 PROCEDURE — 3600000004 HC SURGERY LEVEL 4 BASE: Performed by: UROLOGY

## 2023-02-02 PROCEDURE — 6370000000 HC RX 637 (ALT 250 FOR IP): Performed by: ANESTHESIOLOGY

## 2023-02-02 PROCEDURE — 2580000003 HC RX 258: Performed by: NURSE ANESTHETIST, CERTIFIED REGISTERED

## 2023-02-02 PROCEDURE — 3600000014 HC SURGERY LEVEL 4 ADDTL 15MIN: Performed by: UROLOGY

## 2023-02-02 PROCEDURE — 2500000003 HC RX 250 WO HCPCS: Performed by: NURSE ANESTHETIST, CERTIFIED REGISTERED

## 2023-02-02 PROCEDURE — 88305 TISSUE EXAM BY PATHOLOGIST: CPT

## 2023-02-02 PROCEDURE — 2709999900 HC NON-CHARGEABLE SUPPLY: Performed by: UROLOGY

## 2023-02-02 DEVICE — URETERAL STENT
Type: IMPLANTABLE DEVICE | Site: URETER | Status: FUNCTIONAL
Brand: PERCUFLEX™ PLUS

## 2023-02-02 RX ORDER — SODIUM CHLORIDE 9 MG/ML
INJECTION, SOLUTION INTRAVENOUS PRN
Status: DISCONTINUED | OUTPATIENT
Start: 2023-02-02 | End: 2023-02-02 | Stop reason: HOSPADM

## 2023-02-02 RX ORDER — HYDROCODONE BITARTRATE AND ACETAMINOPHEN 5; 325 MG/1; MG/1
1 TABLET ORAL EVERY 4 HOURS PRN
Qty: 18 TABLET | Refills: 0 | Status: SHIPPED | OUTPATIENT
Start: 2023-02-02 | End: 2023-02-05

## 2023-02-02 RX ORDER — NITROFURANTOIN 25; 75 MG/1; MG/1
100 CAPSULE ORAL 2 TIMES DAILY
Qty: 10 CAPSULE | Refills: 0 | Status: SHIPPED | OUTPATIENT
Start: 2023-02-02 | End: 2023-02-07

## 2023-02-02 RX ORDER — MIDAZOLAM HYDROCHLORIDE 2 MG/2ML
2 INJECTION, SOLUTION INTRAMUSCULAR; INTRAVENOUS
Status: DISCONTINUED | OUTPATIENT
Start: 2023-02-02 | End: 2023-02-02 | Stop reason: HOSPADM

## 2023-02-02 RX ORDER — ONDANSETRON 2 MG/ML
4 INJECTION INTRAMUSCULAR; INTRAVENOUS
Status: DISCONTINUED | OUTPATIENT
Start: 2023-02-02 | End: 2023-02-02 | Stop reason: HOSPADM

## 2023-02-02 RX ORDER — FENTANYL CITRATE 50 UG/ML
INJECTION, SOLUTION INTRAMUSCULAR; INTRAVENOUS PRN
Status: DISCONTINUED | OUTPATIENT
Start: 2023-02-02 | End: 2023-02-02 | Stop reason: SDUPTHER

## 2023-02-02 RX ORDER — ROCURONIUM BROMIDE 10 MG/ML
INJECTION, SOLUTION INTRAVENOUS PRN
Status: DISCONTINUED | OUTPATIENT
Start: 2023-02-02 | End: 2023-02-02 | Stop reason: SDUPTHER

## 2023-02-02 RX ORDER — LIDOCAINE HYDROCHLORIDE 20 MG/ML
INJECTION, SOLUTION EPIDURAL; INFILTRATION; INTRACAUDAL; PERINEURAL PRN
Status: DISCONTINUED | OUTPATIENT
Start: 2023-02-02 | End: 2023-02-02 | Stop reason: SDUPTHER

## 2023-02-02 RX ORDER — SODIUM CHLORIDE 0.9 % (FLUSH) 0.9 %
5-40 SYRINGE (ML) INJECTION PRN
Status: DISCONTINUED | OUTPATIENT
Start: 2023-02-02 | End: 2023-02-02 | Stop reason: HOSPADM

## 2023-02-02 RX ORDER — HYDROMORPHONE HCL 110MG/55ML
0.5 PATIENT CONTROLLED ANALGESIA SYRINGE INTRAVENOUS EVERY 5 MIN PRN
Status: DISCONTINUED | OUTPATIENT
Start: 2023-02-02 | End: 2023-02-02 | Stop reason: HOSPADM

## 2023-02-02 RX ORDER — DEXAMETHASONE SODIUM PHOSPHATE 4 MG/ML
INJECTION, SOLUTION INTRA-ARTICULAR; INTRALESIONAL; INTRAMUSCULAR; INTRAVENOUS; SOFT TISSUE PRN
Status: DISCONTINUED | OUTPATIENT
Start: 2023-02-02 | End: 2023-02-02 | Stop reason: SDUPTHER

## 2023-02-02 RX ORDER — SODIUM CHLORIDE, SODIUM LACTATE, POTASSIUM CHLORIDE, CALCIUM CHLORIDE 600; 310; 30; 20 MG/100ML; MG/100ML; MG/100ML; MG/100ML
INJECTION, SOLUTION INTRAVENOUS CONTINUOUS
Status: DISCONTINUED | OUTPATIENT
Start: 2023-02-02 | End: 2023-02-02 | Stop reason: HOSPADM

## 2023-02-02 RX ORDER — NEOSTIGMINE METHYLSULFATE 1 MG/ML
INJECTION, SOLUTION INTRAVENOUS PRN
Status: DISCONTINUED | OUTPATIENT
Start: 2023-02-02 | End: 2023-02-02 | Stop reason: SDUPTHER

## 2023-02-02 RX ORDER — FENTANYL CITRATE 50 UG/ML
100 INJECTION, SOLUTION INTRAMUSCULAR; INTRAVENOUS
Status: DISCONTINUED | OUTPATIENT
Start: 2023-02-02 | End: 2023-02-02 | Stop reason: HOSPADM

## 2023-02-02 RX ORDER — EPHEDRINE SULFATE/0.9% NACL/PF 50 MG/5 ML
SYRINGE (ML) INTRAVENOUS PRN
Status: DISCONTINUED | OUTPATIENT
Start: 2023-02-02 | End: 2023-02-02 | Stop reason: SDUPTHER

## 2023-02-02 RX ORDER — PROPOFOL 10 MG/ML
INJECTION, EMULSION INTRAVENOUS PRN
Status: DISCONTINUED | OUTPATIENT
Start: 2023-02-02 | End: 2023-02-02 | Stop reason: SDUPTHER

## 2023-02-02 RX ORDER — DIPHENHYDRAMINE HYDROCHLORIDE 50 MG/ML
12.5 INJECTION INTRAMUSCULAR; INTRAVENOUS
Status: DISCONTINUED | OUTPATIENT
Start: 2023-02-02 | End: 2023-02-02 | Stop reason: HOSPADM

## 2023-02-02 RX ORDER — ACETAMINOPHEN 500 MG
1000 TABLET ORAL ONCE
Status: COMPLETED | OUTPATIENT
Start: 2023-02-02 | End: 2023-02-02

## 2023-02-02 RX ORDER — SODIUM CHLORIDE 0.9 % (FLUSH) 0.9 %
5-40 SYRINGE (ML) INJECTION EVERY 12 HOURS SCHEDULED
Status: DISCONTINUED | OUTPATIENT
Start: 2023-02-02 | End: 2023-02-02 | Stop reason: HOSPADM

## 2023-02-02 RX ORDER — HYOSCYAMINE SULFATE 0.12 MG/1
1 TABLET SUBLINGUAL EVERY 4 HOURS PRN
Qty: 30 EACH | Refills: 1 | Status: SHIPPED | OUTPATIENT
Start: 2023-02-02

## 2023-02-02 RX ORDER — GLYCOPYRROLATE 0.2 MG/ML
INJECTION INTRAMUSCULAR; INTRAVENOUS PRN
Status: DISCONTINUED | OUTPATIENT
Start: 2023-02-02 | End: 2023-02-02 | Stop reason: SDUPTHER

## 2023-02-02 RX ORDER — OXYCODONE HYDROCHLORIDE 5 MG/1
5 TABLET ORAL
Status: DISCONTINUED | OUTPATIENT
Start: 2023-02-02 | End: 2023-02-02 | Stop reason: HOSPADM

## 2023-02-02 RX ADMIN — ROCURONIUM BROMIDE 35 MG: 50 INJECTION INTRAVENOUS at 08:13

## 2023-02-02 RX ADMIN — Medication 5 MG: at 08:13

## 2023-02-02 RX ADMIN — PHENYLEPHRINE HYDROCHLORIDE 100 MCG: 10 INJECTION INTRAVENOUS at 09:07

## 2023-02-02 RX ADMIN — Medication 5 MG: at 09:21

## 2023-02-02 RX ADMIN — GLYCOPYRROLATE 0.4 MG: 0.2 INJECTION INTRAMUSCULAR; INTRAVENOUS at 09:35

## 2023-02-02 RX ADMIN — DEXAMETHASONE SODIUM PHOSPHATE 4 MG: 4 INJECTION, SOLUTION INTRAMUSCULAR; INTRAVENOUS at 08:59

## 2023-02-02 RX ADMIN — PHENYLEPHRINE HYDROCHLORIDE 100 MCG: 10 INJECTION INTRAVENOUS at 08:56

## 2023-02-02 RX ADMIN — Medication 5 MG: at 08:56

## 2023-02-02 RX ADMIN — PROPOFOL 100 MG: 10 INJECTION, EMULSION INTRAVENOUS at 08:13

## 2023-02-02 RX ADMIN — SODIUM CHLORIDE, SODIUM LACTATE, POTASSIUM CHLORIDE, AND CALCIUM CHLORIDE: 600; 310; 30; 20 INJECTION, SOLUTION INTRAVENOUS at 09:48

## 2023-02-02 RX ADMIN — ROCURONIUM BROMIDE 5 MG: 50 INJECTION INTRAVENOUS at 08:47

## 2023-02-02 RX ADMIN — ACETAMINOPHEN 1000 MG: 500 TABLET, FILM COATED ORAL at 06:28

## 2023-02-02 RX ADMIN — Medication 2000 MG: at 08:19

## 2023-02-02 RX ADMIN — PHENYLEPHRINE HYDROCHLORIDE 100 MCG: 10 INJECTION INTRAVENOUS at 08:13

## 2023-02-02 RX ADMIN — Medication 5 MG: at 08:22

## 2023-02-02 RX ADMIN — Medication 5 MG: at 09:07

## 2023-02-02 RX ADMIN — Medication 5 MG: at 08:29

## 2023-02-02 RX ADMIN — LIDOCAINE HYDROCHLORIDE 60 MG: 20 INJECTION, SOLUTION EPIDURAL; INFILTRATION; INTRACAUDAL; PERINEURAL at 08:13

## 2023-02-02 RX ADMIN — SODIUM CHLORIDE, SODIUM LACTATE, POTASSIUM CHLORIDE, AND CALCIUM CHLORIDE: 600; 310; 30; 20 INJECTION, SOLUTION INTRAVENOUS at 06:28

## 2023-02-02 RX ADMIN — Medication 3 MG: at 09:35

## 2023-02-02 RX ADMIN — PHENYLEPHRINE HYDROCHLORIDE 100 MCG: 10 INJECTION INTRAVENOUS at 08:29

## 2023-02-02 RX ADMIN — DEXAMETHASONE SODIUM PHOSPHATE 4 MG: 4 INJECTION, SOLUTION INTRA-ARTICULAR; INTRALESIONAL; INTRAMUSCULAR; INTRAVENOUS; SOFT TISSUE at 08:59

## 2023-02-02 RX ADMIN — PHENYLEPHRINE HYDROCHLORIDE 100 MCG: 10 INJECTION INTRAVENOUS at 08:22

## 2023-02-02 RX ADMIN — FENTANYL CITRATE 100 MCG: 50 INJECTION, SOLUTION INTRAMUSCULAR; INTRAVENOUS at 08:13

## 2023-02-02 ASSESSMENT — PAIN - FUNCTIONAL ASSESSMENT
PAIN_FUNCTIONAL_ASSESSMENT: NONE - DENIES PAIN
PAIN_FUNCTIONAL_ASSESSMENT: 0-10

## 2023-02-02 ASSESSMENT — PAIN DESCRIPTION - DESCRIPTORS: DESCRIPTORS: ACHING

## 2023-02-02 NOTE — PERIOP NOTE
Discharge instructions given to Jennifer Lilly, patient's brother. They verbalized understanding and was given opportunity for questions.

## 2023-02-02 NOTE — BRIEF OP NOTE
Brief Postoperative Note      Patient: Raza Souza  YOB: 1960  MRN: [id number]    Date of Procedure: 2/2/2023    Pre-Op Diagnosis: Right kidney mass [N28.89]    Post-Op Diagnosis: Same       Procedure(s):  CYSTOSCOPY, RIGHT DIAGNOSITC URETEROSCOPY, RIGHT RETROGRADE PYELOGRAM, RENAL PELVIS BIOPSIES, STENT EXCAHNGE, Intra-operative Interpretation of Fluoroscopy < 1 hour    Surgeon(s):  Vivi Ayon MD    Assistant:  * No surgical staff found *    Anesthesia: General    Estimated Blood Loss (mL): Minimal    Complications: None    Specimens:   ID Type Source Tests Collected by Time Destination   A : Right renal pelvis urine Urine Urine, Cystoscopic CYTOLOGY, NON-GYN Vivi Ayon MD 2/2/2023 0855    B : RIGHT RENAL PELVIS BRUSH BIOPSIES Tissue Kidney SURGICAL PATHOLOGY Vivi Ayon MD 2/2/2023 0103    C : RIGHT RENAL PELVIS CUP BIOPSIES Tissue Kidney SURGICAL PATHOLOGY Vivi Ayon MD 2/2/2023 6984        Implants:  Implant Name Type Inv. Item Serial No.  Lot No. LRB No. Used Action   STENT URET 6FR L26CM HYDR+ PGTL [employer] LO - [id number]  [facility] 6FR L26CM HYDR+ PGTL TAPR TIP GRAD BLDR MRK LO  [employer] UROLOGY-WD [id number] Right 1 Implanted         Drains: * No LDAs found *    Findings: Poor visualization complicating right diagnostic ureteroscopy. Blood clot in R renal pelvis without obvious mass / tumor in R renal pelvis. R renal pelvis cup and brush biopsies taken, Trilobe BPH amenable to TURP but not urolift. Intra-operative Interpretation of Retrograde Pyelogram:   Right Retrograde Pyelogram:   Normal R retrograde pyelogram without obvious filling defect, extravasation of contrast or other abnormality.      Electronically signed by Vivi Ayon MD on 2/2/2023 at 9:57 AM

## 2023-02-02 NOTE — ANESTHESIA PRE PROCEDURE
Department of Anesthesiology  Preprocedure Note       Name:  Soha Olvera   Age:  58 y.o.  :  1960                                          MRN:  [id number]         Date:  2023      Surgeon: Tomer Varma):  Endy Dash MD    Procedure: Procedure(s):  CYSTOSCOPY, RIGHT DIAGNOSITC URETEROSCOPY, RIGHT RETROGRADE PYELOGRAM, POSSIBLE BIOPSIES, STENT [address]  URETEROSCOPY    Medications prior to admission:   Prior to Admission medications    Medication Sig Start Date End Date Taking? Authorizing Provider   amiodarone (CORDARONE) 200 MG tablet Take 1 tablet by mouth nightly 22   Historical Provider, MD   aspirin 81 MG chewable tablet Take 1 tablet by mouth daily  Patient taking differently: Take 81 mg by mouth at bedtime 22   Bee Dawn MD   warfarin (COUMADIN) 2.5 MG tablet Take 2.5mg nightly MWF and 5mg nightly on other days. Patient taking differently: Take 2.5 mg by mouth daily 22   Bee Dawn MD   nitroGLYCERIN (NITROSTAT) 0.4 MG SL tablet Place 0.4 mg under the tongue every 5 minutes as needed for Chest pain up to max of 3 total doses. If no relief after 1 dose, call 911.   Patient not taking: Reported on 2023    Historical Provider, MD   QUEtiapine (SEROQUEL) 100 MG tablet TAKE 1 TABLET BY MOUTH EVERY NIGHT 22   Carolina Medellin DO   acetaminophen (TYLENOL) 325 mg tablet Take 2 tablets by mouth every 6 hours as needed for Pain 22   LEISA Kennedy       Current medications:    Current Facility-Administered Medications   Medication Dose Route Frequency Provider Last Rate Last Admin    ceFAZolin (ANCEF) 2000 mg in sterile water 20 mL IV syringe  2,000 mg IntraVENous On Call to Rachelle Wang MD        fentaNYL (SUBLIMAZE) injection 100 mcg  100 mcg IntraVENous Once PRN Denise Garibay MD        lactated ringers IV soln infusion   IntraVENous Continuous Denise Garibay  mL/hr at 23 6740 New Bag at 02/02/23 7562    sodium chloride flush 0.9 % injection 5-40 mL  5-40 mL IntraVENous 2 times per day Letty Wade MD        sodium chloride flush 0.9 % injection 5-40 mL  5-40 mL IntraVENous PRN Letty Wade MD        0.9 % sodium chloride infusion   IntraVENous PRN Letty Wade MD        midazolam PF (VERSED) injection 2 mg  2 mg IntraVENous Once PRN Letty Wade MD           Allergies: Allergies   Allergen Reactions    Penicillins Swelling     Face swelling    Atorvastatin Other (See Comments)     itching    Evolocumab Other (See Comments)     Itching    Rosuvastatin Other (See Comments)     itching       Problem List:    Patient Active Problem List   Diagnosis Code    Overweight (BMI 25.0-29. 9) E66.3    Coronary artery disease involving autologous vein coronary bypass graft without angina pectoris I25.810    Benign prostatic hyperplasia with weak urinary stream N40.1, R39.12    History of 2019 novel coronavirus disease (COVID-19) Z86.16    Numbness of left foot R20.0    Primary insomnia F51.01    Chronic systolic congestive heart failure (HCC) I50.22    ICD (implantable cardioverter-defibrillator) in place Z95.810    Seasonal allergic rhinitis due to pollen J30.1    Stopped smoking with greater than 20 pack year history Z87.891    Refused influenza vaccine Z28.21    Chronic eczematous otitis externa of both ears H60.8X3    High risk medication use Z79.899    Dyslipidemia E78.5    Left ventricular thrombus I51.3    COVID-19 vaccination refused Z28.21    Iron deficiency anemia due to chronic blood loss D50.0    Chronic fatigue R53.82    History of tobacco abuse Z87.891    Statin intolerance Z78.9    S/P CABG x 3 Z95.1    History of hydronephrosis Z87.448    Ureteral obstruction, right N13.5    Gross hematuria R31.0    Right kidney mass N28.89    Long term current use of amiodarone Z79.899    Elevated TSH R79.89    Cerebrovascular accident (CVA) due to embolism ([facility] [address] I63.9    Legally blind H54.8    Wheezing R06.2    Expressive aphasia R47.01    Colonoscopy refused Z53.20    Hypothyroidism due to medication E03.2       Past Medical History:        Diagnosis Date    Acute hypoxemic respiratory failure due to COVID-19 ([address] 10/09/2021    Anemia     BPH (benign prostatic hyperplasia) 2019    CAD (coronary artery disease)     heart attack 2005; heart stents    CHF (congestive heart failure) ([address] 09/27/2011    pt denies    Chronic systolic heart failure ([address] 10/02/2015    Coronary atherosclerosis of native coronary vessel 10/02/2015    CABG 5/2022    CVA (cerebral vascular accident) ([facility] [address] 09/15/2022    prominent receptive aphasia with neologism    H/O transesophageal echocardiography (VALERY) for monitoring 06/18/2022    LVEF 25-30%, now 35-40%    History of blood transfusion     Hyperlipidemia 10/02/2015    Hypertension     Hypoxemia requiring supplemental oxygen 10/06/2021    ICD (implantable cardioverter-defibrillator) in place 09/26/2011    dual chamber [employer] Last discharge 6/17/22    IGT (impaired glucose tolerance) 12/03/2017    Other ill-defined conditions(799.89)      blind left eye    Other ill-defined conditions(799.89)     cardiomyopathy    Pneumonia due to COVID-19 virus 11/13/2021    Resolved    Primary insomnia 06/07/2017    Psychiatric disorder     depression     Right kidney mass     Sepsis, unspecified 04/05/2011    Thromboembolus ([address]     thrombus in heart     Thrombus 10/02/2015    Left ventricular thrombus       Past Surgical History:        Procedure Laterality Date    CARDIAC CATHETERIZATION      5 stents total    CARDIAC DEFIBRILLATOR PLACEMENT  2011    [employer], ICD    CARDIAC PROCEDURE N/A 05/27/2022    LEFT HEART CATH / CORONARY ANGIOGRAPHY performed by Keshav Hall MD at [address] [place] [address] CATH LAB    COLONOSCOPY  12/2010    CORONARY ARTERY BYPASS GRAFT N/A 05/31/2022    CORONARY ARTERY BYPASS GRAFT (CABG X 3), [place] ; ENDOSCOPIC VEIN HARVEST, LEFT GREATER SAPHENOUS VEIN performed by Brea Eldridge MD at [address] Right 2022    CYSTOSCOPY,RIGHT URETEROSCOPY,RIGHT RETROGRADE PYELOGRAM performed by Manuel Olvera MD at [facility] MAIN OR    HEENT      oral surgery    NV UNLISTED PROCEDURE CARDIAC SURGERY       1 stent     TRANSESOPHAGEAL ECHOCARDIOGRAM N/A 2022    TRANSESOPHAGEAL ECHOCARDIOGRAM performed by Brea Eldridge MD at [facility] MAIN OR       Social History:    Social History     Tobacco Use    Smoking status: Former     Packs/day: 1.00     Types: Cigarettes     Start date: 1978     Quit date: 2022     Years since quittin.6    Smokeless tobacco: Never   Substance Use Topics    Alcohol use: No                                Counseling given: Not Answered      Vital Signs (Current):   Vitals:    23 0607   BP: 133/75   Pulse: 66   Resp: 16   Temp: 97.7 °F (36.5 °C)   TempSrc: Oral   SpO2: 97%   Weight: 187 lb 4 oz (84.9 kg)   Height: 5' 9\" (1.753 m)                                              BP Readings from Last 3 Encounters:   23 133/75   23 134/85   23 130/68       NPO Status: Time of last liquid consumption: 0000                        Time of last solid consumption: 0000                        Date of last liquid consumption: 23                        Date of last solid food consumption: 23    BMI:   Wt Readings from Last 3 Encounters:   23 187 lb 4 oz (84.9 kg)   23 187 lb 12.8 oz (85.2 kg)   23 187 lb (84.8 kg)     Body mass index is 27.65 kg/m².     CBC:   Lab Results   Component Value Date/Time    WBC 9.2 2023 11:07 AM    RBC 5.39 2023 11:07 AM    HGB 13.7 2023 11:07 AM    HCT 45.3 2023 11:07 AM    MCV 84.0 2023 11:07 AM    RDW 18.8 2023 11:07 AM     2023 11:07 AM       CMP:   Lab Results   Component Value Date/Time     2023 11:07 AM    K 4.8 01/05/2023 11:07 AM     01/05/2023 11:07 AM    CO2 29 01/05/2023 11:07 AM    BUN 14 01/05/2023 11:07 AM    CREATININE 1.40 01/05/2023 11:07 AM    GFRAA >60 09/20/2022 04:01 AM    AGRATIO 0.6 10/15/2021 07:37 AM    LABGLOM 57 01/05/2023 11:07 AM    GLUCOSE 100 01/05/2023 11:07 AM    PROT 6.6 09/15/2022 09:12 AM    CALCIUM 8.9 01/05/2023 11:07 AM    BILITOT 0.2 09/15/2022 09:12 AM    ALKPHOS 71 09/15/2022 09:12 AM    ALKPHOS 52 10/15/2021 07:37 AM    AST 19 09/15/2022 09:12 AM    ALT 14 09/15/2022 09:12 AM       POC Tests: No results for input(s): POCGLU, POCNA, POCK, POCCL, POCBUN, POCHEMO, POCHCT in the last 72 hours. Coags:   Lab Results   Component Value Date/Time    PROTIME 14.2 02/02/2023 06:26 AM    PROTIME 24.7 10/20/2022 10:50 AM    INR 1.2 02/02/2023 06:26 AM    INR 2.2 10/20/2022 10:50 AM    APTT 33.7 09/15/2022 03:44 PM    APTT 115.3 10/09/2021 01:55 PM       HCG (If Applicable): No results found for: PREGTESTUR, PREGSERUM, HCG, HCGQUANT     ABGs: No results found for: PHART, PO2ART, BKK3BSZ, RBM8JZN, BEART, D6USJBQM     Type & Screen (If Applicable):  No results found for: LABABO, LABRH    Drug/Infectious Status (If Applicable):  No results found for: HIV, HEPCAB    COVID-19 Screening (If Applicable):   Lab Results   Component Value Date/Time    COVID19 Not detected 07/24/2022 07:26 AM           Anesthesia Evaluation  Patient summary reviewed and Nursing notes reviewed  Airway: Mallampati: II  TM distance: >3 FB   Neck ROM: full  Mouth opening: > = 3 FB   Dental: normal exam         Pulmonary:Negative Pulmonary ROS and normal exam  breath sounds clear to auscultation                             Cardiovascular:  Exercise tolerance: good (>4 METS),   (+) hypertension:, CAD:, CABG/stent:, CHF:,         Rhythm: regular  Rate: normal                 ROS comment: EF 35-40% recently improved.  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                 Abdominal:             Vascular: negative vascular ROS. Other Findings:           Anesthesia Plan      general     ASA 4       Induction: intravenous. Anesthetic plan and risks discussed with patient.                         Elpidio Li MD   2/2/2023

## 2023-02-02 NOTE — H&P
[address] Urology H&P Note                                           02/02/23     Patient: Blanca Mera  MRN: [id number]    Admission Date:  2/2/2023, 0  Admission Diagnosis: Right kidney mass [N28.89]  Reason for Consult: Right Renal Pelvis Mass    ASSESSMENT: 58 y.o. male with a R renal pelvis mass who presents for evaluation. PLAN:  -To OR for cystoscopy, right diagnostic ureteroscopy, possible biopsies, right retrograde pyelogram, stent removal vs. Exchange  -Consented  -Antibiotic on call to OR  -NPO for procedure.       __________________________________________________________________________________    HPI:     Blanca Mera is a 58 y.o. male with possible R renal pelvis mass who presents for evaluation today. Has had R ureter stent placed in 8/2022 and has had surgery to evaluate possible mass delayed for various medical reasons until now. Today, he has been off blood thinner X 5 days. Denies any concerns. . Ready to proceed.      Past Medical History:  Past Medical History:   Diagnosis Date    Acute hypoxemic respiratory failure due to COVID-19 ([address] 10/09/2021    Anemia     BPH (benign prostatic hyperplasia) 2019    CAD (coronary artery disease)     heart attack 2005; heart stents    CHF (congestive heart failure) ([address] 09/27/2011    pt denies    Chronic systolic heart failure ([address] 10/02/2015    Coronary atherosclerosis of native coronary vessel 10/02/2015    CABG 5/2022    CVA (cerebral vascular accident) ([address] 09/15/2022    prominent receptive aphasia with neologism    H/O transesophageal echocardiography (VALERY) for monitoring 06/18/2022    LVEF 25-30%, now 35-40%    History of blood transfusion     Hyperlipidemia 10/02/2015    Hypertension     Hypoxemia requiring supplemental oxygen 10/06/2021    ICD (implantable cardioverter-defibrillator) in place 09/26/2011    dual chamber [employer] Last discharge 6/17/22    IGT (impaired glucose tolerance) 12/03/2017    Other ill-defined conditions(799.89)      blind left eye    Other ill-defined conditions(799.89)     cardiomyopathy    Pneumonia due to COVID-19 virus 11/13/2021    Resolved    Primary insomnia 06/07/2017    Psychiatric disorder     depression     Right kidney mass     Sepsis, unspecified 04/05/2011    Thromboembolus (HCC)     thrombus in heart     Thrombus 10/02/2015    Left ventricular thrombus       Past Surgical History:  Past Surgical History:   Procedure Laterality Date    CARDIAC CATHETERIZATION      5 stents total    CARDIAC DEFIBRILLATOR PLACEMENT  2011    [employer], ICD    CARDIAC PROCEDURE N/A 05/27/2022    LEFT HEART CATH / CORONARY ANGIOGRAPHY performed by Osiel Lantigua MD at [facility] CARDIAC CATH LAB    COLONOSCOPY  12/2010    CORONARY ARTERY BYPASS GRAFT N/A 05/31/2022    CORONARY ARTERY BYPASS GRAFT (CABG X 3), [place] ; ENDOSCOPIC VEIN HARVEST, LEFT GREATER SAPHENOUS VEIN performed by Gaunako Aguilar MD at [facility] MAIN OR    CYSTOSCOPY Right 07/29/2022    CYSTOSCOPY,RIGHT URETEROSCOPY,RIGHT RETROGRADE PYELOGRAM performed by Diony Meyer MD at [facility] MAIN OR    HEENT      oral surgery    SC UNLISTED PROCEDURE CARDIAC SURGERY       1 stent 2005    TRANSESOPHAGEAL ECHOCARDIOGRAM N/A 05/31/2022    TRANSESOPHAGEAL ECHOCARDIOGRAM performed by Guanako Aguilar MD at [facility] MAIN OR       Medications:  No current facility-administered medications on file prior to encounter.     Current Outpatient Medications on File Prior to Encounter   Medication Sig Dispense Refill    amiodarone (CORDARONE) 200 MG tablet Take 1 tablet by mouth nightly      aspirin 81 MG chewable tablet Take 1 tablet by mouth daily (Patient taking differently: Take 81 mg by mouth at bedtime) 30 tablet 3    warfarin (COUMADIN) 2.5 MG tablet Take 2.5mg nightly MWF and 5mg nightly on other days. (Patient taking differently: Take 2.5 mg by mouth daily) 60 tablet 1    nitroGLYCERIN (NITROSTAT) 0.4 MG SL tablet Place 0.4 mg  under the tongue every 5 minutes as needed for Chest pain up to max of 3 total doses. If no relief after 1 dose, call 911. (Patient not taking: Reported on 2023)      QUEtiapine (SEROQUEL) 100 MG tablet TAKE 1 TABLET BY MOUTH EVERY NIGHT 90 tablet 3    acetaminophen (TYLENOL) 325 mg tablet Take 2 tablets by mouth every 6 hours as needed for Pain 120 tablet 3       Allergies:   Allergies   Allergen Reactions    Penicillins Swelling     Face swelling    Atorvastatin Other (See Comments)     itching    Evolocumab Other (See Comments)     Itching    Rosuvastatin Other (See Comments)     itching       Social History:  Social History     Socioeconomic History    Marital status:      Spouse name: Not on file    Number of children: Not on file    Years of education: Not on file    Highest education level: Not on file   Occupational History    Not on file   Tobacco Use    Smoking status: Former     Packs/day: 1.00     Types: Cigarettes     Start date: 1978     Quit date: 2022     Years since quittin.6    Smokeless tobacco: Never   Vaping Use    Vaping Use: Never used   Substance and Sexual Activity    Alcohol use: No    Drug use: Never     Types: Prescription    Sexual activity: Not Currently   Other Topics Concern    Not on file   Social History Narrative    Not on file     Social Determinants of Health     Financial Resource Strain: Not on file   Food Insecurity: Not on file   Transportation Needs: Not on file   Physical Activity: Not on file   Stress: Not on file   Social Connections: Not on file   Intimate Partner Violence: Not on file   Housing Stability: Not on file       Family History:  Family History   Problem Relation Age of Onset    Heart Disease Mother     Diabetes Paternal Grandfather     Cancer Paternal Grandmother     Heart Disease Brother     Diabetes Maternal Grandmother     Bleeding Prob Father     Diabetes Mother     Heart Disease Paternal Grandfather     Heart Attack Mother 67 mi       ROS:  Review of Systems - General ROS: negative for - chills, fatigue, or fever    Vitals:  Vitals:    02/02/23 0607   BP: 133/75   Pulse: 66   Resp: 16   Temp: 97.7 °F (36.5 °C)   SpO2: 97%       Intake/Output:  No intake or output data in the 24 hours ending 02/02/23 0708     Physical Exam:   /75   Pulse 66   Temp 97.7 °F (36.5 °C) (Oral)   Resp 16   Ht 5' 9\" (1.753 m)   Wt 187 lb 4 oz (84.9 kg)   SpO2 97%   BMI 27.65 kg/m²      GENERAL: No acute distress, Awake, Alert, Oriented X 3,  CARDIAC: regular rate and rhythm  CHEST AND LUNG: Easy work of breathing,  ABDOMEN: soft, non tender, non-distended,     Lab/Radiology/Other Diagnostic Tests:  Recent Labs     02/02/23  0626   INR 1.2         Diony Cuevas M.D.     [facility]  [place], [address]  Phone: [phone]  Fax: [phone]

## 2023-02-02 NOTE — ANESTHESIA PROCEDURE NOTES
Airway  Date/Time: 2/2/2023 8:15 AM  Urgency: elective    Airway not difficult    General Information and Staff    Patient location during procedure: OR  Anesthesiologist: Lexii Peters MD  Resident/CRNA: LORENZA Chu - CRNA  Performed: resident/CRNA     Indications and Patient Condition  Indications for airway management: anesthesia and airway protection  Spontaneous Ventilation: absent  Sedation level: deep  Preoxygenated: yes  Patient position: sniffing  Mask difficulty assessment: vent by bag mask + OA or adjuvant +/- NMBA    Final Airway Details  Final airway type: endotracheal airway      Successful airway: ETT  Cuffed: yes   Successful intubation technique: direct laryngoscopy  Endotracheal tube insertion site: oral  Blade: Rosa M  Blade size: #4  ETT size (mm): 8.0  Cormack-Lehane Classification: grade I - full view of glottis  Placement verified by: chest auscultation and capnometry   Measured from: gums  ETT to gums (cm): 21  Number of attempts at approach: 1  Ventilation between attempts: bag mask  Number of other approaches attempted: 0    no

## 2023-02-02 NOTE — DISCHARGE INSTRUCTIONS
Ureteral Stent Placement: What to Expect at [address] [place] [address]  A ureteral (say \"you-REE-ter-ul\") stent is a thin, hollow tube that is placed in the ureter to help urine pass from the kidney into the bladder. Ureters are the tubes that connect the kidneys to the bladder. You may have a small amount of blood in your urine for 1 to 3 days after the procedure. While the stent is in place, you may have to urinate more often, feel a sudden need to urinate, or feel like you cannot completely empty your bladder. You may feel some pain when you urinate or do strenuous activity. You also may notice a small amount of blood in your urine after strenuous activities. These side effects usually do not prevent people from doing their normal daily activities. You may have a string attached to your stent. Do not to pull the string unless the doctor tells you to. The doctor will use the string to pull out the stent when you no longer need it. After the procedure, urine may flow better from your kidneys to your bladder. A ureteral stent may be left in place for several days or for as long as several months. Your doctor will take it out when you no longer need it. Cystoscopy: What to Expect at [address]  A cystoscopy is a procedure that lets a doctor look inside of the bladder and the urethra. The urethra is the tube that carries urine from the bladder to outside the body. The doctor uses a thin, lighted tube called a cystoscope to look for kidney or bladder stones, tumors, bleeding, or infection. After the cystoscopy, your urethra may be sore at first, and it may burn when you urinate for the first few days after the procedure. You may feel the need to urinate more often, and your urine may be pink. These symptoms should get better in 1 or 2 days. You will probably be able to go back to work or most of your usual activities in 1 or 2 days. How can you care for yourself at home? Activity  Rest when you feel tired. Getting enough sleep will help you recover. Try to walk each day. Start by walking a little more than you did the day before. Bit by bit, increase the amount you walk. Walking boosts blood flow and helps prevent pneumonia and constipation. Avoid strenuous activities, such as bicycle riding, jogging, weight lifting, or aerobic exercise, until your doctor says it is okay. Ask your doctor when you can drive again. Most people are able to return to work within 1 or 2 days after the procedure. You may shower and take baths as usual.  Ask your doctor when it is okay for you to have sex. Diet  You can eat your normal diet. If your stomach is upset, try bland, low-fat foods like plain rice, broiled chicken, toast, and yogurt. Drink plenty of fluids (unless your doctor tells you not to). Medicines  Take pain medicines exactly as directed. If the doctor gave you a prescription medicine for pain, take it as prescribed. If you are not taking a prescription pain medicine, ask your doctor if you can take an over-the-counter medicine. If you think your pain medicine is making you sick to your stomach: Take your medicine after meals (unless your doctor has told you not to). Ask your doctor for a different pain medicine. If your doctor prescribed antibiotics, take them as directed. Do not stop taking them just because you feel better. You need to take the full course of antibiotics. Medication Interaction:  During your procedure you potentially received a medication or medications which may reduce the effectiveness of oral contraceptives. Please consider other forms of contraception for 1 month following your procedure if you are currently using oral contraceptives as your primary form of birth control. In addition to this, we recommend continuing your oral contraceptive as prescribed, unless otherwise instructed by your physician, during this time. Follow-up care is a key part of your treatment and safety.  Be sure to make and go to all appointments, and call your doctor if you are having problems. It's also a good idea to know your test results and keep a list of the medicines you take. When should you call for help? Call 911 anytime you think you may need emergency care. For example, call if:  You passed out (lost consciousness). You have severe trouble breathing. You have sudden chest pain and shortness of breath, or you cough up blood. You have severe belly pain. Call your doctor now or seek immediate medical care if:  You are sick to your stomach or cannot keep fluids down. Your urine is still red or you see blood clots after you have urinated several times. You have trouble passing urine or stool, especially if you have pain or swelling in your lower belly. You have signs of a blood clot, such as:  Pain in your calf, back of the knee, thigh, or groin. Redness and swelling in your leg or groin. You develop a fever or severe chills. You have pain in your back just below your rib cage. This is called flank pain. Watch closely for changes in your health, and be sure to contact your doctor if:  A burning feeling is normal for a day or two after the test, but call if it does not get better. You have a frequent urge to urinate but can pass only small amounts of urine. It is normal for the urine to have a pinkish color for a few days after the test, but call if it does not get better or if your urine is cloudy, smells bad, or has pus in it.     After general anesthesia or intravenous sedation, for 24 hours or while taking prescription Narcotics:  Limit your activities  A responsible adult needs to be with you for the next 24 hours  Do not drive and operate hazardous machinery  Do not make important personal or business decisions  Do not drink alcoholic beverages  If you have not urinated within 8 hours after discharge, and you are experiencing discomfort from urinary retention, please go to the nearest ED.  If you have sleep apnea and have a CPAP machine, please use it for all naps and sleeping. Please use caution when taking narcotics and any of your home medications that may cause drowsiness. *  Please give a list of your current medications to your Primary Care Provider. *  Please update this list whenever your medications are discontinued, doses are      changed, or new medications (including over-the-counter products) are added. *  Please carry medication information at all times in case of emergency situations. These are general instructions for a healthy lifestyle:  No smoking/ No tobacco products/ Avoid exposure to second hand smoke  Surgeon General's Warning:  Quitting smoking now greatly reduces serious risk to your health. Obesity, smoking, and sedentary lifestyle greatly increases your risk for illness  A healthy diet, regular physical exercise & weight monitoring are important for maintaining a healthy lifestyle    You may be retaining fluid if you have a history of heart failure or if you experience any of the following symptoms:  Weight gain of 3 pounds or more overnight or 5 pounds in a week, increased swelling in our hands or feet or shortness of breath while lying flat in bed. Please call your doctor as soon as you notice any of these symptoms; do not wait until your next office visit. Please hold your coumadin and resume once your urine returns to normal color.

## 2023-02-03 ENCOUNTER — HOSPITAL ENCOUNTER (OUTPATIENT)
Dept: GENERAL RADIOLOGY | Age: 63
Discharge: HOME | End: 2023-02-06

## 2023-02-03 NOTE — ANESTHESIA POSTPROCEDURE EVALUATION
Department of Anesthesiology  Postprocedure Note    Patient: Jason Richards  MRN: [id number]  YOB: 1960  Date of evaluation: 2/2/2023      Procedure Summary     Date: 02/02/23 Room / Location: [facility] MAIN OR 01 CYSTO / [facility] MAIN OR    Anesthesia Start: 4590 Anesthesia Stop: 5433    Procedure: CYSTOSCOPY, RIGHT DIAGNOSITC URETEROSCOPY, RIGHT RETROGRADE PYELOGRAM, RENAL PELVIS BIOPSIES, STENT EXCAHNGE (Right: Urethra) Diagnosis:       Right kidney mass      (Right kidney mass [N28.89])    Providers: Carlos Manuel Ziegler MD Responsible Provider: Letty Wade MD    Anesthesia Type: General ASA Status: 4          Anesthesia Type: General    Susie Phase I: Susie Score: 9    Susie Phase II: Susie Score: 10      Anesthesia Post Evaluation    Patient location during evaluation: PACU  Patient participation: complete - patient participated  Level of consciousness: awake  Airway patency: patent  Nausea & Vomiting: no nausea  Complications: no  Cardiovascular status: blood pressure returned to baseline and hemodynamically stable  Respiratory status: acceptable  Hydration status: stable  Multimodal analgesia pain management approach

## 2023-02-03 NOTE — OP NOTE
[facility]  OPERATIVE REPORT    Name:  Anupama Miranda  MR#:  [id number]  :  1960  ACCOUNT #:  [de-identified]  DATE OF SERVICE:  2023    PREOPERATIVE DIAGNOSIS:  Right renal pelvis mass. POSTOPERATIVE DIAGNOSIS:  Right renal pelvis mass. PROCEDURES PERFORMED:  Cystoscopy, right diagnostic ureteroscopy, right retrograde pyelogram, right renal pelvis biopsies, right ureter stent exchange, intraoperative interpretation of fluoroscopy less than 1 hour. SURGEON:  Antonieta Warner MD    ASSISTANT:  None. ANESTHESIA:  General.    COMPLICATIONS:  None. SPECIMENS REMOVED:  Right renal pelvis, urine for cytology, right renal pelvis brush biopsies and right renal pelvis cup biopsies. IMPLANTS:  6 x 26 double-J right ureter stent with strings. ESTIMATED BLOOD LOSS:  Minimal.    DRAINS:  None. FINDINGS:  Poor visualization due to hematuria complicating the right diagnostic ureteroscopy, blood clot in the right renal pelvis noted without obvious mass or tumor in the right renal pelvis, right renal pelvis cup and brush biopsies were taken and trilobar BPH amenable to a TURP, but not UroLift. INTRAOPERATIVE INTERPRETATION OF RETROGRADE PYELOGRAM:  Right retrograde pyelogram interpretation:  Normal right retrograde pyelogram without obvious filling defect or extravasation of contrast or other abnormality. INDICATIONS FOR OPERATIVE PROCEDURE:  The patient is a 31-year-old gentleman with multiple medical comorbidities, who was found to have gross hematuria while admitted to the hospital.  He had a CT scan showing a right hydroureter and hydronephrosis back in 2022 with no obvious obstruction and a high density material in the right renal pelvis concerning for tumor versus clot. He was counseled on management options and wanted to undergo a right diagnostic ureteroscopy with possible biopsies in the OR.   This was attempted in 2022, but unfortunately could not be completed as his ureter was too narrow and the scope could not pass into the ureter to achieve the biopsy. He therefore had an ureteral stent placed and this has been in place since 07/2022. His case has been canceled and rescheduled multiple times due to various medical comorbidities and a recent stroke complicating his surgery course. He finally has been cleared for surgery today and wanted to proceed with his diagnostic ureteroscopy. DESCRIPTION OF OPERATIVE PROCEDURE:  After informed consent was obtained, the correct patient was identified in preoperative holding area, he was taken back to the operating suite, placed on the table in the supine position. Time out was performed confirming correct patient and planned procedure. He received 2 g IV Ancef prior to the smooth induction of general endotracheal anesthesia. He was moved into dorsal lithotomy position, prepped and draped in the usual sterile fashion. I began the case by inserting a 22-Grenadian rigid cystoscope with a 30-degree lens into the urethra and advanced into the patient's bladder. Pancystoscopy was performed which showed +3 bladder trabeculations, multiple bladder diverticula, trilobar BPH, but no obvious bladder tumor. He had a non-encrusted stent extruding from the right ureteral orifice. I placed a wire alongside the stent and advanced under fluoroscopic guidance into the right renal pelvis. Once the wire was in position, I then backloaded the wire off the scope and I reinserted the scope with my stent grasper. I grasped the end the stent and I removed the stent completely intact leaving the wire in place. At this point, I then switched to pressure bag and my semi-rigid ureteroscope. I inserted the semi-rigid ureteroscope through the urethra into the bladder and then followed the wire up the patient's right ureter. The right ureter was completely clear without any evidence of ureteral tumor.   I then injected a 50:50 mixture of 10 mL contrast and water to perform a retrograde pyelogram on the right. This was completely normal without filling defect, hydronephrosis or extravasation of contrast.  I then placed a second sensor wire through the ureteroscope into the right renal pelvis, I removed the semi-rigid ureteroscope and then loaded the 11 x 13 access sheath over the second wire and I advanced under fluoroscopic guidance into the proximal right ureter. I then removed the inner sheath and working wire leaving the outer wire in place as a guide. I switched to my flexible ureteroscope and I inserted this into the patient's right renal pelvis. I performed pyeloscopy, it was very difficult to visualize the patient's right renal pelvis due to hematuria. There was a blood clot noted in the right renal pelvis, but no obvious mass. I did suction out 10 mL of urine from the renal pelvis on the right and sent it off for a urine cytology. I then carefully inspected each and every calyces, I did not note any obvious tumor, but the renal pelvis did appear inflamed and bled easily. I therefore decided to perform cup and brush biopsies of this area and sent these off for analysis with pathology. After this was done, I then carefully backed my scope down the ureter and inspected all the way out. There was no evidence of any ureteral tumor. The scope and access sheath were removed. I then loaded the sensor wire onto the rigid cystoscope and passed a new 6 x 26 double-J right ureter stent with strings over the wire under fluoroscopic guidance into the right renal pelvis. Once the stent was in position, I pulled the wire noting a good curl in the right renal pelvis as well as the bladder. I then drained the bladder completely, left strings extruding from the meatus. The patient was then awoken from anesthesia, transferred to the PACU in stable condition. He tolerated the procedure well. There were no complications.   All counts were correct at end of the procedure.         Karina Conroy MD      [id number]  D:  02/02/2023 10:10  T:  02/02/2023 19:52  JOB #:  [id number]

## 2023-02-03 NOTE — RESULT ENCOUNTER NOTE
Mr. Coker, your biopsies showed NO cancer in the kidney.  This is great news!  Please follow up next week for stent removal as scheduled.     Best Regards,  Dr. Meyer

## 2023-02-06 ENCOUNTER — OFFICE VISIT (OUTPATIENT)
Dept: UROLOGY | Age: 63
End: 2023-02-06
Payer: MEDICARE

## 2023-02-06 DIAGNOSIS — R31.0 GROSS HEMATURIA: ICD-10-CM

## 2023-02-06 DIAGNOSIS — N28.89 KIDNEY MASS: Primary | ICD-10-CM

## 2023-02-06 LAB
BILIRUBIN, URINE, POC: NEGATIVE
BLOOD URINE, POC: NORMAL
GLUCOSE URINE, POC: NEGATIVE
KETONES, URINE, POC: NEGATIVE
LEUKOCYTE ESTERASE, URINE, POC: NORMAL
NITRITE, URINE, POC: NEGATIVE
PH, URINE, POC: 5.5 (ref 4.6–8)
PROTEIN,URINE, POC: NORMAL
SPECIFIC GRAVITY, URINE, POC: 1.02 (ref 1–1.03)
URINALYSIS CLARITY, POC: NORMAL
URINALYSIS COLOR, POC: NORMAL
UROBILINOGEN, POC: 0.2

## 2023-02-06 PROCEDURE — G8417 CALC BMI ABV UP PARAM F/U: HCPCS | Performed by: NURSE PRACTITIONER

## 2023-02-06 PROCEDURE — 1036F TOBACCO NON-USER: CPT | Performed by: NURSE PRACTITIONER

## 2023-02-06 PROCEDURE — 99214 OFFICE O/P EST MOD 30 MIN: CPT | Performed by: NURSE PRACTITIONER

## 2023-02-06 PROCEDURE — G8484 FLU IMMUNIZE NO ADMIN: HCPCS | Performed by: NURSE PRACTITIONER

## 2023-02-06 PROCEDURE — 81003 URINALYSIS AUTO W/O SCOPE: CPT | Performed by: NURSE PRACTITIONER

## 2023-02-06 PROCEDURE — G8427 DOCREV CUR MEDS BY ELIG CLIN: HCPCS | Performed by: NURSE PRACTITIONER

## 2023-02-06 PROCEDURE — 3017F COLORECTAL CA SCREEN DOC REV: CPT | Performed by: NURSE PRACTITIONER

## 2023-02-06 NOTE — PROGRESS NOTES
[facility]  [address]   [address]  [facility], [address] [facility]  [phone]    Cem Cavazos  : 1960    Chief Complaint   Patient presents with    Follow-up     Stent removal          HPI     Cem Cavazos is a 58 y.o. 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 Ureteroscopy was attempted 22 but had to be aborted as ureter was too narrow to accommodate ureteroscope. Stent was left in place to dilate ureter. Repeat CT hematuria 8/15/22 showed mass vs clot. S/P cystoscopy, right diagnostic ureteroscopy, right retrograde pyelogram, right renal pelvis biopsies, right ureter stent exchange, 23. Path and cytology were benign. Here today for stent removal. Having normal irritative stent sx. No fever/chills, n/v, or diff urinating.        Past Medical History:   Diagnosis Date    Acute hypoxemic respiratory failure due to COVID-19 ([address] 10/09/2021    Anemia     BPH (benign prostatic hyperplasia) 2019    CAD (coronary artery disease)     heart attack 2005; heart stents    CHF (congestive heart failure) ([address] 2011    pt denies    Chronic systolic heart failure ([address] 10/02/2015    Coronary atherosclerosis of native coronary vessel 10/02/2015    CABG 2022    CVA (cerebral vascular accident) ([address] 09/15/2022    prominent receptive aphasia with neologism    H/O transesophageal echocardiography (VALERY) for monitoring 2022    LVEF 25-30%, now 35-40%    History of blood transfusion     Hyperlipidemia 10/02/2015    Hypertension     Hypoxemia requiring supplemental oxygen 10/06/2021    ICD (implantable cardioverter-defibrillator) in place 2011    dual chamber [employer] Last discharge 22    IGT (impaired glucose tolerance) 2017    Other ill-defined conditions(799.89)      blind left eye    Other ill-defined conditions(799.89)     cardiomyopathy    Pneumonia due to COVID-19 virus 11/13/2021    Resolved    Primary insomnia 06/07/2017    Psychiatric disorder     depression     Right kidney mass     Sepsis, unspecified 04/05/2011    Thromboembolus ([address]     thrombus in heart     Thrombus 10/02/2015    Left ventricular thrombus     Past Surgical History:   Procedure Laterality Date    CARDIAC CATHETERIZATION      5 stents total    CARDIAC DEFIBRILLATOR PLACEMENT  2011    [employer], ICD    CARDIAC PROCEDURE N/A 05/27/2022    LEFT HEART CATH / CORONARY ANGIOGRAPHY performed by Gerry Rosenbaum MD at [facility] CATH LAB    COLONOSCOPY  12/2010    CORONARY ARTERY BYPASS GRAFT N/A 05/31/2022    CORONARY ARTERY BYPASS GRAFT (CABG X 3), LIMA ; ENDOSCOPIC VEIN HARVEST, LEFT GREATER SAPHENOUS VEIN performed by Anna Peters MD at [facility] 07/29/2022    CYSTOSCOPY,RIGHT URETEROSCOPY,RIGHT RETROGRADE PYELOGRAM performed by Trula Bloch, MD at [facility] 2/2/2023    CYSTOSCOPY, RIGHT DIAGNOSITC URETEROSCOPY, RIGHT RETROGRADE PYELOGRAM, RENAL [address], STENT EXCAHNGE performed by Trula Bloch, MD at [address]       oral surgery    MS UNLISTED PROCEDURE CARDIAC SURGERY       1 stent 2005    TRANSESOPHAGEAL ECHOCARDIOGRAM N/A 05/31/2022    TRANSESOPHAGEAL ECHOCARDIOGRAM performed by Anna Peters MD at [facility] MAIN OR     Current Outpatient Medications   Medication Sig Dispense Refill    Hyoscyamine Sulfate SL (LEVSIN/SL) 0.125 MG SUBL Place 1 tablet under the tongue every 4 hours as needed (bladder spasms) 30 each 1    nitrofurantoin, macrocrystal-monohydrate, (MACROBID) 100 MG capsule Take 1 capsule by mouth 2 times daily for 5 days 10 capsule 0    amiodarone (CORDARONE) 200 MG tablet Take 1 tablet by mouth nightly      aspirin 81 MG chewable tablet Take 1 tablet by mouth daily (Patient taking differently: Take 81 mg by mouth at bedtime) 30 tablet 3 nitroGLYCERIN (NITROSTAT) 0.4 MG SL tablet Place 0.4 mg under the tongue every 5 minutes as needed for Chest pain up to max of 3 total doses. If no relief after 1 dose, call 911. QUEtiapine (SEROQUEL) 100 MG tablet TAKE 1 TABLET BY MOUTH EVERY NIGHT 90 tablet 3    acetaminophen (TYLENOL) 325 mg tablet Take 2 tablets by mouth every 6 hours as needed for Pain 120 tablet 3     No current facility-administered medications for this visit.      Allergies   Allergen Reactions    Penicillins Swelling     Face swelling    Atorvastatin Other (See Comments)     itching    Evolocumab Other (See Comments)     Itching    Rosuvastatin Other (See Comments)     itching     Social History     Socioeconomic History    Marital status:      Spouse name: Not on file    Number of children: Not on file    Years of education: Not on file    Highest education level: Not on file   Occupational History    Not on file   Tobacco Use    Smoking status: Former     Packs/day: 1.00     Types: Cigarettes     Start date: 1978     Quit date: 2022     Years since quittin.7    Smokeless tobacco: Never   Vaping Use    Vaping Use: Never used   Substance and Sexual Activity    Alcohol use: No    Drug use: Never     Types: Prescription    Sexual activity: Not Currently   Other Topics Concern    Not on file   Social History Narrative    Not on file     Social Determinants of Health     Financial Resource Strain: Not on file   Food Insecurity: Not on file   Transportation Needs: Not on file   Physical Activity: Not on file   Stress: Not on file   Social Connections: Not on file   Intimate Partner Violence: Not on file   Housing Stability: Not on file     Family History   Problem Relation Age of Onset    Heart Disease Mother     Diabetes Paternal Grandfather     Cancer Paternal Grandmother     Heart Disease Brother     Diabetes Maternal Grandmother     Bleeding Prob Father     Diabetes Mother     Heart Disease Paternal Grandfather Heart Attack Mother 67        mi       ROS    Urinalysis  UA - Dipstick  Results for orders placed or performed in visit on 02/06/23   AMB POC URINALYSIS DIP STICK AUTO W/O MICRO   Result Value Ref Range    Color, Urine, POC      Clarity, Urine, POC      Glucose, Urine, POC Negative Negative    Bilirubin, Urine, POC Negative Negative    Ketones, Urine, POC Negative Negative    Specific Gravity, Urine, POC 1.020 1.001 - 1.035    Blood, Urine, POC Large Negative    pH, Urine, POC 5.5 4.6 - 8.0    Protein, Urine, POC Trace Negative    Urobilinogen, POC 0.2     Nitrate, Urine, POC Negative Negative    Leukocyte Esterase, Urine, POC Small Negative       UA - Micro  WBC - 0  RBC - 20-30  Bacteria - 0  Epith - 0      PHYSICAL EXAM    General appearance - well appearing and in no distress  Mental status - alert, oriented to person, place, and time  Neck - supple, no significant adenopathy  Chest/Lung-  Quiet, even and easy respiratory effort without use of accessory muscles  Skin - normal coloration and turgor, no rashes      Assessment and Plan    ICD-10-CM    1. Kidney mass  N28.89 AMB POC URINALYSIS DIP STICK AUTO W/O MICRO      2. Gross hematuria  R31.0           Gross hematuria/renal mass- urine w hematuria but no infection. Advised this is normal d/t stent and surgery. Patient placed in supine position. Stent removed intact and without difficulty. Patient tolerated well. Home care reviewed. HOLD on any antibiotic tx today or other therapy. RTO in 2-3 months for follow up. Advised to call sooner if sx worsen. Alejandro Tate, FNP, APRN - CNP  Dr. Jesscia Skaggs is supervising physician today and he approves plan of care.

## 2023-02-23 ENCOUNTER — NURSE ONLY (OUTPATIENT)
Dept: FAMILY MEDICINE CLINIC | Facility: CLINIC | Age: 63
End: 2023-02-23

## 2023-02-23 DIAGNOSIS — E03.2 HYPOTHYROIDISM DUE TO MEDICATION: ICD-10-CM

## 2023-02-23 DIAGNOSIS — Z79.899 LONG TERM CURRENT USE OF AMIODARONE: ICD-10-CM

## 2023-02-24 LAB — TSH, 3RD GENERATION: 5.65 UIU/ML (ref 0.36–3.74)

## 2023-04-20 ENCOUNTER — APPOINTMENT (OUTPATIENT)
Dept: GENERAL RADIOLOGY | Age: 63
DRG: 064 | End: 2023-04-20
Payer: MEDICARE

## 2023-04-20 ENCOUNTER — HOSPITAL ENCOUNTER (INPATIENT)
Age: 63
LOS: 6 days | Discharge: NURSING, REHAB OR LONG-TERM CARE | DRG: 064 | End: 2023-04-27
Attending: EMERGENCY MEDICINE | Admitting: FAMILY MEDICINE
Payer: MEDICARE

## 2023-04-20 ENCOUNTER — APPOINTMENT (OUTPATIENT)
Dept: CT IMAGING | Age: 63
DRG: 064 | End: 2023-04-20
Payer: MEDICARE

## 2023-04-20 DIAGNOSIS — I63.412 CEREBROVASCULAR ACCIDENT (CVA) DUE TO EMBOLISM OF LEFT MIDDLE CEREBRAL ARTERY (HCC): Chronic | ICD-10-CM

## 2023-04-20 DIAGNOSIS — I63.512 ACUTE ISCHEMIC LEFT MCA STROKE (HCC): Primary | ICD-10-CM

## 2023-04-20 PROCEDURE — 83605 ASSAY OF LACTIC ACID: CPT

## 2023-04-20 PROCEDURE — 85025 COMPLETE CBC W/AUTO DIFF WBC: CPT

## 2023-04-20 PROCEDURE — 80053 COMPREHEN METABOLIC PANEL: CPT

## 2023-04-20 PROCEDURE — 2580000003 HC RX 258: Performed by: EMERGENCY MEDICINE

## 2023-04-20 PROCEDURE — 6360000004 HC RX CONTRAST MEDICATION: Performed by: EMERGENCY MEDICINE

## 2023-04-20 PROCEDURE — 84484 ASSAY OF TROPONIN QUANT: CPT

## 2023-04-20 PROCEDURE — 85610 PROTHROMBIN TIME: CPT

## 2023-04-20 PROCEDURE — 71045 X-RAY EXAM CHEST 1 VIEW: CPT

## 2023-04-20 PROCEDURE — 70498 CT ANGIOGRAPHY NECK: CPT

## 2023-04-20 PROCEDURE — 70450 CT HEAD/BRAIN W/O DYE: CPT

## 2023-04-20 PROCEDURE — 4A03X5D MEASUREMENT OF ARTERIAL FLOW, INTRACRANIAL, EXTERNAL APPROACH: ICD-10-PCS | Performed by: STUDENT IN AN ORGANIZED HEALTH CARE EDUCATION/TRAINING PROGRAM

## 2023-04-20 PROCEDURE — 99285 EMERGENCY DEPT VISIT HI MDM: CPT

## 2023-04-20 PROCEDURE — 82962 GLUCOSE BLOOD TEST: CPT

## 2023-04-20 PROCEDURE — 96365 THER/PROPH/DIAG IV INF INIT: CPT

## 2023-04-20 PROCEDURE — 93005 ELECTROCARDIOGRAM TRACING: CPT | Performed by: EMERGENCY MEDICINE

## 2023-04-20 PROCEDURE — 0042T CT BRAIN PERFUSION: CPT

## 2023-04-20 RX ORDER — SODIUM CHLORIDE 0.9 % (FLUSH) 0.9 %
10 SYRINGE (ML) INJECTION
Status: COMPLETED | OUTPATIENT
Start: 2023-04-20 | End: 2023-04-20

## 2023-04-20 RX ORDER — 0.9 % SODIUM CHLORIDE 0.9 %
100 INTRAVENOUS SOLUTION INTRAVENOUS ONCE
Status: COMPLETED | OUTPATIENT
Start: 2023-04-20 | End: 2023-04-21

## 2023-04-20 RX ADMIN — SODIUM CHLORIDE 100 ML: 9 INJECTION, SOLUTION INTRAVENOUS at 23:35

## 2023-04-20 RX ADMIN — SODIUM CHLORIDE, PRESERVATIVE FREE 10 ML: 5 INJECTION INTRAVENOUS at 23:35

## 2023-04-20 RX ADMIN — IOPAMIDOL 100 ML: 755 INJECTION, SOLUTION INTRAVENOUS at 23:35

## 2023-04-20 ASSESSMENT — PAIN - FUNCTIONAL ASSESSMENT: PAIN_FUNCTIONAL_ASSESSMENT: 0-10

## 2023-04-21 ENCOUNTER — APPOINTMENT (OUTPATIENT)
Dept: GENERAL RADIOLOGY | Age: 63
DRG: 064 | End: 2023-04-21
Payer: MEDICARE

## 2023-04-21 ENCOUNTER — APPOINTMENT (OUTPATIENT)
Dept: NON INVASIVE DIAGNOSTICS | Age: 63
DRG: 064 | End: 2023-04-21
Payer: MEDICARE

## 2023-04-21 PROBLEM — I63.512 ACUTE ISCHEMIC LEFT MCA STROKE (HCC): Status: ACTIVE | Noted: 2023-04-21

## 2023-04-21 LAB
ALBUMIN SERPL-MCNC: 3.1 G/DL (ref 3.2–4.6)
ALBUMIN/GLOB SERPL: 1 (ref 0.4–1.6)
ALP SERPL-CCNC: 73 U/L (ref 50–136)
ALT SERPL-CCNC: 14 U/L (ref 12–65)
ANION GAP SERPL CALC-SCNC: 3 MMOL/L (ref 2–11)
ANION GAP SERPL CALC-SCNC: 5 MMOL/L (ref 2–11)
APPEARANCE UR: CLEAR
AST SERPL-CCNC: 36 U/L (ref 15–37)
BACTERIA URNS QL MICRO: NEGATIVE /HPF
BASOPHILS # BLD: 0.1 K/UL (ref 0–0.2)
BASOPHILS NFR BLD: 0 % (ref 0–2)
BILIRUB SERPL-MCNC: 0.5 MG/DL (ref 0.2–1.1)
BILIRUB UR QL: NEGATIVE
BUN SERPL-MCNC: 15 MG/DL (ref 8–23)
BUN SERPL-MCNC: 15 MG/DL (ref 8–23)
CALCIUM SERPL-MCNC: 8.8 MG/DL (ref 8.3–10.4)
CALCIUM SERPL-MCNC: 8.8 MG/DL (ref 8.3–10.4)
CASTS URNS QL MICRO: ABNORMAL /LPF
CHLORIDE SERPL-SCNC: 107 MMOL/L (ref 101–110)
CHLORIDE SERPL-SCNC: 109 MMOL/L (ref 101–110)
CHOLEST SERPL-MCNC: 244 MG/DL
CO2 SERPL-SCNC: 25 MMOL/L (ref 21–32)
CO2 SERPL-SCNC: 25 MMOL/L (ref 21–32)
COLOR UR: ABNORMAL
CREAT SERPL-MCNC: 1.2 MG/DL (ref 0.8–1.5)
CREAT SERPL-MCNC: 1.3 MG/DL (ref 0.8–1.5)
DIFFERENTIAL METHOD BLD: ABNORMAL
ECHO AO ASC DIAM: 3.3 CM
ECHO AO ASCENDING AORTA INDEX: 1.66 CM/M2
ECHO AO ROOT DIAM: 3.9 CM
ECHO AO ROOT INDEX: 1.96 CM/M2
ECHO AV AREA PEAK VELOCITY: 3.5 CM2
ECHO AV AREA VTI: 3.5 CM2
ECHO AV AREA/BSA PEAK VELOCITY: 1.8 CM2/M2
ECHO AV AREA/BSA VTI: 1.8 CM2/M2
ECHO AV MEAN GRADIENT: 3 MMHG
ECHO AV MEAN VELOCITY: 0.8 M/S
ECHO AV PEAK GRADIENT: 6 MMHG
ECHO AV PEAK VELOCITY: 1.2 M/S
ECHO AV VELOCITY RATIO: 0.75
ECHO AV VTI: 25.2 CM
ECHO BSA: 2.01 M2
ECHO IVC PROX: 2 CM
ECHO LA AREA 2C: 17.7 CM2
ECHO LA AREA 4C: 21.3 CM2
ECHO LA DIAMETER INDEX: 1.96 CM/M2
ECHO LA DIAMETER: 3.9 CM
ECHO LA MAJOR AXIS: 6 CM
ECHO LA MINOR AXIS: 5.6 CM
ECHO LA TO AORTIC ROOT RATIO: 1
ECHO LA VOL 2C: 47 ML (ref 18–58)
ECHO LA VOL 4C: 62 ML (ref 18–58)
ECHO LA VOL BP: 55 ML (ref 18–58)
ECHO LA VOL/BSA BIPLANE: 28 ML/M2 (ref 16–34)
ECHO LA VOLUME INDEX A2C: 24 ML/M2 (ref 16–34)
ECHO LA VOLUME INDEX A4C: 31 ML/M2 (ref 16–34)
ECHO LV E' LATERAL VELOCITY: 6 CM/S
ECHO LV E' SEPTAL VELOCITY: 8 CM/S
ECHO LV EDV A2C: 143 ML
ECHO LV EDV A4C: 163 ML
ECHO LV EDV INDEX A4C: 82 ML/M2
ECHO LV EDV NDEX A2C: 72 ML/M2
ECHO LV EJECTION FRACTION A2C: 34 %
ECHO LV EJECTION FRACTION A4C: 34 %
ECHO LV EJECTION FRACTION BIPLANE: 34 % (ref 55–100)
ECHO LV ESV A2C: 95 ML
ECHO LV ESV A4C: 108 ML
ECHO LV ESV INDEX A2C: 48 ML/M2
ECHO LV ESV INDEX A4C: 54 ML/M2
ECHO LV FRACTIONAL SHORTENING: 22 % (ref 28–44)
ECHO LV INTERNAL DIMENSION DIASTOLE INDEX: 2.71 CM/M2
ECHO LV INTERNAL DIMENSION DIASTOLIC: 5.4 CM (ref 4.2–5.9)
ECHO LV INTERNAL DIMENSION SYSTOLIC INDEX: 2.11 CM/M2
ECHO LV INTERNAL DIMENSION SYSTOLIC: 4.2 CM
ECHO LV IVSD: 1 CM (ref 0.6–1)
ECHO LV MASS 2D: 193.3 G (ref 88–224)
ECHO LV MASS INDEX 2D: 97.1 G/M2 (ref 49–115)
ECHO LV POSTERIOR WALL DIASTOLIC: 0.9 CM (ref 0.6–1)
ECHO LV RELATIVE WALL THICKNESS RATIO: 0.33
ECHO LVOT AREA: 4.5 CM2
ECHO LVOT AV VTI INDEX: 0.77
ECHO LVOT DIAM: 2.4 CM
ECHO LVOT MEAN GRADIENT: 2 MMHG
ECHO LVOT PEAK GRADIENT: 3 MMHG
ECHO LVOT PEAK VELOCITY: 0.9 M/S
ECHO LVOT STROKE VOLUME INDEX: 44.1 ML/M2
ECHO LVOT SV: 87.7 ML
ECHO LVOT VTI: 19.4 CM
ECHO MV A VELOCITY: 0.76 M/S
ECHO MV E DECELERATION TIME (DT): 309 MS
ECHO MV E VELOCITY: 0.57 M/S
ECHO MV E/A RATIO: 0.75
ECHO MV E/E' LATERAL: 9.5
ECHO MV E/E' RATIO (AVERAGED): 8.31
ECHO MV E/E' SEPTAL: 7.13
ECHO PV ACCELERATION TIME (AT): 151 MS
ECHO PV MAX VELOCITY: 1 M/S
ECHO PV PEAK GRADIENT: 4 MMHG
ECHO RV BASAL DIMENSION: 3.4 CM
ECHO RV FREE WALL PEAK S': 9 CM/S
ECHO RV TAPSE: 1.9 CM (ref 1.7–?)
ECHO TV REGURGITANT MAX VELOCITY: 2.1 M/S
ECHO TV REGURGITANT PEAK GRADIENT: 18 MMHG
EKG ATRIAL RATE: 78 BPM
EKG DIAGNOSIS: NORMAL
EKG P AXIS: 62 DEGREES
EKG P-R INTERVAL: 197 MS
EKG Q-T INTERVAL: 442 MS
EKG QRS DURATION: 108 MS
EKG QTC CALCULATION (BAZETT): 501 MS
EKG R AXIS: 60 DEGREES
EKG T AXIS: 97 DEGREES
EKG VENTRICULAR RATE: 77 BPM
EOSINOPHIL # BLD: 0 K/UL (ref 0–0.8)
EOSINOPHIL NFR BLD: 0 % (ref 0.5–7.8)
EPI CELLS #/AREA URNS HPF: ABNORMAL /HPF
ERYTHROCYTE [DISTWIDTH] IN BLOOD BY AUTOMATED COUNT: 15.9 % (ref 11.9–14.6)
ERYTHROCYTE [DISTWIDTH] IN BLOOD BY AUTOMATED COUNT: 15.9 % (ref 11.9–14.6)
EST. AVERAGE GLUCOSE BLD GHB EST-MCNC: 108 MG/DL
GLOBULIN SER CALC-MCNC: 3.1 G/DL (ref 2.8–4.5)
GLUCOSE BLD STRIP.AUTO-MCNC: 84 MG/DL (ref 65–100)
GLUCOSE SERPL-MCNC: 78 MG/DL (ref 65–100)
GLUCOSE SERPL-MCNC: 83 MG/DL (ref 65–100)
GLUCOSE UR STRIP.AUTO-MCNC: NEGATIVE MG/DL
HBA1C MFR BLD: 5.4 % (ref 4.8–5.6)
HCT VFR BLD AUTO: 41.4 % (ref 41.1–50.3)
HCT VFR BLD AUTO: 44.5 % (ref 41.1–50.3)
HDLC SERPL-MCNC: 39 MG/DL (ref 40–60)
HDLC SERPL: 6.3
HGB BLD-MCNC: 13.2 G/DL (ref 13.6–17.2)
HGB BLD-MCNC: 14.1 G/DL (ref 13.6–17.2)
HGB UR QL STRIP: ABNORMAL
IMM GRANULOCYTES # BLD AUTO: 0.1 K/UL (ref 0–0.5)
IMM GRANULOCYTES NFR BLD AUTO: 0 % (ref 0–5)
INR PPP: 1
KETONES UR QL STRIP.AUTO: 15 MG/DL
LACTATE SERPL-SCNC: 1.3 MMOL/L (ref 0.4–2)
LACTATE SERPL-SCNC: 2.1 MMOL/L (ref 0.4–2)
LDLC SERPL CALC-MCNC: 176.6 MG/DL
LEUKOCYTE ESTERASE UR QL STRIP.AUTO: NEGATIVE
LYMPHOCYTES # BLD: 1.8 K/UL (ref 0.5–4.6)
LYMPHOCYTES NFR BLD: 11 % (ref 13–44)
MCH RBC QN AUTO: 27.2 PG (ref 26.1–32.9)
MCH RBC QN AUTO: 27.4 PG (ref 26.1–32.9)
MCHC RBC AUTO-ENTMCNC: 31.7 G/DL (ref 31.4–35)
MCHC RBC AUTO-ENTMCNC: 31.9 G/DL (ref 31.4–35)
MCV RBC AUTO: 85.4 FL (ref 82–102)
MCV RBC AUTO: 86.4 FL (ref 82–102)
MONOCYTES # BLD: 1.6 K/UL (ref 0.1–1.3)
MONOCYTES NFR BLD: 10 % (ref 4–12)
MUCOUS THREADS URNS QL MICRO: 0 /LPF
NEUTS SEG # BLD: 12.8 K/UL (ref 1.7–8.2)
NEUTS SEG NFR BLD: 78 % (ref 43–78)
NITRITE UR QL STRIP.AUTO: NEGATIVE
NRBC # BLD: 0 K/UL (ref 0–0.2)
NRBC # BLD: 0 K/UL (ref 0–0.2)
PH UR STRIP: 5.5 (ref 5–9)
PLATELET # BLD AUTO: 275 K/UL (ref 150–450)
PLATELET # BLD AUTO: 291 K/UL (ref 150–450)
PMV BLD AUTO: 10.5 FL (ref 9.4–12.3)
PMV BLD AUTO: 10.9 FL (ref 9.4–12.3)
POTASSIUM SERPL-SCNC: 4.3 MMOL/L (ref 3.5–5.1)
POTASSIUM SERPL-SCNC: 4.3 MMOL/L (ref 3.5–5.1)
PROT SERPL-MCNC: 6.2 G/DL (ref 6.3–8.2)
PROT UR STRIP-MCNC: ABNORMAL MG/DL
PROTHROMBIN TIME: 13.7 SEC (ref 12.6–14.3)
RBC # BLD AUTO: 4.85 M/UL (ref 4.23–5.6)
RBC # BLD AUTO: 5.15 M/UL (ref 4.23–5.6)
RBC #/AREA URNS HPF: ABNORMAL /HPF
SERVICE CMNT-IMP: NORMAL
SODIUM SERPL-SCNC: 135 MMOL/L (ref 133–143)
SODIUM SERPL-SCNC: 139 MMOL/L (ref 133–143)
SP GR UR REFRACTOMETRY: >1.035 (ref 1–1.02)
TRIGL SERPL-MCNC: 142 MG/DL (ref 35–150)
TROPONIN I SERPL HS-MCNC: 9 PG/ML (ref 0–57)
URINE CULTURE IF INDICATED: ABNORMAL
UROBILINOGEN UR QL STRIP.AUTO: 1 EU/DL (ref 0.2–1)
VLDLC SERPL CALC-MCNC: 28.4 MG/DL (ref 6–23)
WBC # BLD AUTO: 15.1 K/UL (ref 4.3–11.1)
WBC # BLD AUTO: 16.5 K/UL (ref 4.3–11.1)
WBC URNS QL MICRO: ABNORMAL /HPF

## 2023-04-21 PROCEDURE — 92611 MOTION FLUOROSCOPY/SWALLOW: CPT

## 2023-04-21 PROCEDURE — 81001 URINALYSIS AUTO W/SCOPE: CPT

## 2023-04-21 PROCEDURE — 83605 ASSAY OF LACTIC ACID: CPT

## 2023-04-21 PROCEDURE — 87086 URINE CULTURE/COLONY COUNT: CPT

## 2023-04-21 PROCEDURE — 6370000000 HC RX 637 (ALT 250 FOR IP): Performed by: EMERGENCY MEDICINE

## 2023-04-21 PROCEDURE — 6370000000 HC RX 637 (ALT 250 FOR IP): Performed by: FAMILY MEDICINE

## 2023-04-21 PROCEDURE — 97112 NEUROMUSCULAR REEDUCATION: CPT

## 2023-04-21 PROCEDURE — 97530 THERAPEUTIC ACTIVITIES: CPT

## 2023-04-21 PROCEDURE — 85027 COMPLETE CBC AUTOMATED: CPT

## 2023-04-21 PROCEDURE — 36415 COLL VENOUS BLD VENIPUNCTURE: CPT

## 2023-04-21 PROCEDURE — 97162 PT EVAL MOD COMPLEX 30 MIN: CPT

## 2023-04-21 PROCEDURE — 99221 1ST HOSP IP/OBS SF/LOW 40: CPT | Performed by: INTERNAL MEDICINE

## 2023-04-21 PROCEDURE — 80048 BASIC METABOLIC PNL TOTAL CA: CPT

## 2023-04-21 PROCEDURE — 93306 TTE W/DOPPLER COMPLETE: CPT | Performed by: INTERNAL MEDICINE

## 2023-04-21 PROCEDURE — 97166 OT EVAL MOD COMPLEX 45 MIN: CPT

## 2023-04-21 PROCEDURE — 1100000003 HC PRIVATE W/ TELEMETRY

## 2023-04-21 PROCEDURE — 87040 BLOOD CULTURE FOR BACTERIA: CPT

## 2023-04-21 PROCEDURE — 6370000000 HC RX 637 (ALT 250 FOR IP): Performed by: INTERNAL MEDICINE

## 2023-04-21 PROCEDURE — 92610 EVALUATE SWALLOWING FUNCTION: CPT

## 2023-04-21 PROCEDURE — 74230 X-RAY XM SWLNG FUNCJ C+: CPT

## 2023-04-21 PROCEDURE — 80061 LIPID PANEL: CPT

## 2023-04-21 PROCEDURE — 2500000003 HC RX 250 WO HCPCS: Performed by: INTERNAL MEDICINE

## 2023-04-21 PROCEDURE — 97535 SELF CARE MNGMENT TRAINING: CPT

## 2023-04-21 PROCEDURE — C8929 TTE W OR WO FOL WCON,DOPPLER: HCPCS

## 2023-04-21 PROCEDURE — 6360000002 HC RX W HCPCS: Performed by: EMERGENCY MEDICINE

## 2023-04-21 PROCEDURE — 2580000003 HC RX 258: Performed by: EMERGENCY MEDICINE

## 2023-04-21 PROCEDURE — 6360000004 HC RX CONTRAST MEDICATION: Performed by: INTERNAL MEDICINE

## 2023-04-21 PROCEDURE — 2580000003 HC RX 258: Performed by: INTERNAL MEDICINE

## 2023-04-21 PROCEDURE — 83036 HEMOGLOBIN GLYCOSYLATED A1C: CPT

## 2023-04-21 RX ORDER — AMIODARONE HYDROCHLORIDE 200 MG/1
200 TABLET ORAL
Status: DISCONTINUED | OUTPATIENT
Start: 2023-04-21 | End: 2023-04-27 | Stop reason: HOSPADM

## 2023-04-21 RX ORDER — VALSARTAN 80 MG/1
80 TABLET ORAL DAILY
Status: DISCONTINUED | OUTPATIENT
Start: 2023-04-22 | End: 2023-04-27 | Stop reason: HOSPADM

## 2023-04-21 RX ORDER — METOPROLOL SUCCINATE 25 MG/1
25 TABLET, EXTENDED RELEASE ORAL DAILY
Status: DISCONTINUED | OUTPATIENT
Start: 2023-04-22 | End: 2023-04-27 | Stop reason: HOSPADM

## 2023-04-21 RX ORDER — LABETALOL HYDROCHLORIDE 5 MG/ML
10 INJECTION, SOLUTION INTRAVENOUS EVERY 10 MIN PRN
Status: DISCONTINUED | OUTPATIENT
Start: 2023-04-21 | End: 2023-04-27 | Stop reason: HOSPADM

## 2023-04-21 RX ORDER — POLYETHYLENE GLYCOL 3350 17 G/17G
17 POWDER, FOR SOLUTION ORAL DAILY PRN
Status: DISCONTINUED | OUTPATIENT
Start: 2023-04-21 | End: 2023-04-27 | Stop reason: HOSPADM

## 2023-04-21 RX ORDER — ASPIRIN 81 MG/1
81 TABLET, CHEWABLE ORAL DAILY
Status: DISCONTINUED | OUTPATIENT
Start: 2023-04-21 | End: 2023-04-27 | Stop reason: HOSPADM

## 2023-04-21 RX ORDER — ONDANSETRON 2 MG/ML
4 INJECTION INTRAMUSCULAR; INTRAVENOUS EVERY 6 HOURS PRN
Status: DISCONTINUED | OUTPATIENT
Start: 2023-04-21 | End: 2023-04-27 | Stop reason: HOSPADM

## 2023-04-21 RX ORDER — QUETIAPINE FUMARATE 25 MG/1
100 TABLET, FILM COATED ORAL NIGHTLY
Status: DISCONTINUED | OUTPATIENT
Start: 2023-04-21 | End: 2023-04-27 | Stop reason: HOSPADM

## 2023-04-21 RX ORDER — ASPIRIN 300 MG/1
300 SUPPOSITORY RECTAL
Status: COMPLETED | OUTPATIENT
Start: 2023-04-21 | End: 2023-04-21

## 2023-04-21 RX ORDER — CLOPIDOGREL BISULFATE 75 MG/1
75 TABLET ORAL DAILY
Status: DISCONTINUED | OUTPATIENT
Start: 2023-04-21 | End: 2023-04-26

## 2023-04-21 RX ORDER — ONDANSETRON 8 MG/1
4 TABLET, ORALLY DISINTEGRATING ORAL EVERY 8 HOURS PRN
Status: DISCONTINUED | OUTPATIENT
Start: 2023-04-21 | End: 2023-04-27 | Stop reason: HOSPADM

## 2023-04-21 RX ORDER — EZETIMIBE 10 MG/1
10 TABLET ORAL NIGHTLY
Status: DISCONTINUED | OUTPATIENT
Start: 2023-04-21 | End: 2023-04-27 | Stop reason: HOSPADM

## 2023-04-21 RX ADMIN — ASPIRIN 300 MG: 300 SUPPOSITORY RECTAL at 00:35

## 2023-04-21 RX ADMIN — QUETIAPINE FUMARATE 100 MG: 25 TABLET ORAL at 20:58

## 2023-04-21 RX ADMIN — TUBERCULIN PURIFIED PROTEIN DERIVATIVE 5 UNITS: 5 INJECTION, SOLUTION INTRADERMAL at 17:00

## 2023-04-21 RX ADMIN — BARIUM SULFATE 15 ML: 0.81 POWDER, FOR SUSPENSION ORAL at 13:43

## 2023-04-21 RX ADMIN — BARIUM SULFATE 15 ML: 400 PASTE ORAL at 13:43

## 2023-04-21 RX ADMIN — CEFTRIAXONE SODIUM 2000 MG: 2 INJECTION, POWDER, FOR SOLUTION INTRAMUSCULAR; INTRAVENOUS at 00:09

## 2023-04-21 RX ADMIN — AMIODARONE HYDROCHLORIDE 200 MG: 200 TABLET ORAL at 20:58

## 2023-04-21 RX ADMIN — BARIUM SULFATE 45 ML: 400 SUSPENSION ORAL at 13:43

## 2023-04-21 RX ADMIN — SODIUM CHLORIDE, PRESERVATIVE FREE 0.45 ML: 5 INJECTION INTRAVENOUS at 09:38

## 2023-04-21 RX ADMIN — EZETIMIBE 10 MG: 10 TABLET ORAL at 20:58

## 2023-04-22 PROBLEM — R50.9 FEVER: Status: ACTIVE | Noted: 2023-04-22

## 2023-04-22 LAB
ANION GAP SERPL CALC-SCNC: 4 MMOL/L (ref 2–11)
APPEARANCE UR: CLEAR
BACTERIA URNS QL MICRO: 0 /HPF
BILIRUB UR QL: NEGATIVE
BUN SERPL-MCNC: 17 MG/DL (ref 8–23)
CALCIUM SERPL-MCNC: 8.7 MG/DL (ref 8.3–10.4)
CASTS URNS QL MICRO: 0 /LPF
CHLORIDE SERPL-SCNC: 110 MMOL/L (ref 101–110)
CO2 SERPL-SCNC: 24 MMOL/L (ref 21–32)
COLOR UR: ABNORMAL
CREAT SERPL-MCNC: 1.2 MG/DL (ref 0.8–1.5)
CRYSTALS URNS QL MICRO: 0 /LPF
EPI CELLS #/AREA URNS HPF: 0 /HPF
ERYTHROCYTE [DISTWIDTH] IN BLOOD BY AUTOMATED COUNT: 15.9 % (ref 11.9–14.6)
GLUCOSE SERPL-MCNC: 80 MG/DL (ref 65–100)
GLUCOSE UR STRIP.AUTO-MCNC: NEGATIVE MG/DL
HCT VFR BLD AUTO: 42.9 % (ref 41.1–50.3)
HGB BLD-MCNC: 13.5 G/DL (ref 13.6–17.2)
HGB UR QL STRIP: ABNORMAL
KETONES UR QL STRIP.AUTO: ABNORMAL MG/DL
LEUKOCYTE ESTERASE UR QL STRIP.AUTO: ABNORMAL
MCH RBC QN AUTO: 26.9 PG (ref 26.1–32.9)
MCHC RBC AUTO-ENTMCNC: 31.5 G/DL (ref 31.4–35)
MCV RBC AUTO: 85.5 FL (ref 82–102)
MM INDURATION, POC: 0 MM (ref 0–5)
MUCOUS THREADS URNS QL MICRO: 0 /LPF
NITRITE UR QL STRIP.AUTO: NEGATIVE
NRBC # BLD: 0 K/UL (ref 0–0.2)
OTHER OBSERVATIONS: ABNORMAL
PH UR STRIP: 7 (ref 5–9)
PLATELET # BLD AUTO: 254 K/UL (ref 150–450)
PMV BLD AUTO: 10.6 FL (ref 9.4–12.3)
POTASSIUM SERPL-SCNC: 3.8 MMOL/L (ref 3.5–5.1)
PPD, POC: NEGATIVE
PROT UR STRIP-MCNC: ABNORMAL MG/DL
RBC # BLD AUTO: 5.02 M/UL (ref 4.23–5.6)
RBC #/AREA URNS HPF: ABNORMAL /HPF
SODIUM SERPL-SCNC: 138 MMOL/L (ref 133–143)
SP GR UR REFRACTOMETRY: 1.02 (ref 1–1.02)
URINE CULTURE IF INDICATED: ABNORMAL
UROBILINOGEN UR QL STRIP.AUTO: 1 EU/DL (ref 0.2–1)
WBC # BLD AUTO: 10.8 K/UL (ref 4.3–11.1)
WBC URNS QL MICRO: 0 /HPF

## 2023-04-22 PROCEDURE — 99232 SBSQ HOSP IP/OBS MODERATE 35: CPT | Performed by: INTERNAL MEDICINE

## 2023-04-22 PROCEDURE — 85027 COMPLETE CBC AUTOMATED: CPT

## 2023-04-22 PROCEDURE — 81001 URINALYSIS AUTO W/SCOPE: CPT

## 2023-04-22 PROCEDURE — 6370000000 HC RX 637 (ALT 250 FOR IP): Performed by: INTERNAL MEDICINE

## 2023-04-22 PROCEDURE — 87086 URINE CULTURE/COLONY COUNT: CPT

## 2023-04-22 PROCEDURE — 36415 COLL VENOUS BLD VENIPUNCTURE: CPT

## 2023-04-22 PROCEDURE — 6370000000 HC RX 637 (ALT 250 FOR IP): Performed by: FAMILY MEDICINE

## 2023-04-22 PROCEDURE — 6370000000 HC RX 637 (ALT 250 FOR IP): Performed by: PHYSICIAN ASSISTANT

## 2023-04-22 PROCEDURE — 80048 BASIC METABOLIC PNL TOTAL CA: CPT

## 2023-04-22 PROCEDURE — 87040 BLOOD CULTURE FOR BACTERIA: CPT

## 2023-04-22 PROCEDURE — 6360000002 HC RX W HCPCS: Performed by: INTERNAL MEDICINE

## 2023-04-22 PROCEDURE — 1100000003 HC PRIVATE W/ TELEMETRY

## 2023-04-22 PROCEDURE — 2580000003 HC RX 258: Performed by: INTERNAL MEDICINE

## 2023-04-22 RX ORDER — ACETAMINOPHEN 325 MG/1
650 TABLET ORAL EVERY 4 HOURS PRN
Status: DISCONTINUED | OUTPATIENT
Start: 2023-04-22 | End: 2023-04-27 | Stop reason: HOSPADM

## 2023-04-22 RX ADMIN — ASPIRIN 81 MG 81 MG: 81 TABLET ORAL at 09:17

## 2023-04-22 RX ADMIN — METOPROLOL SUCCINATE 25 MG: 25 TABLET, EXTENDED RELEASE ORAL at 09:17

## 2023-04-22 RX ADMIN — CEFEPIME 2000 MG: 2 INJECTION, POWDER, FOR SOLUTION INTRAVENOUS at 16:55

## 2023-04-22 RX ADMIN — QUETIAPINE FUMARATE 100 MG: 25 TABLET ORAL at 20:07

## 2023-04-22 RX ADMIN — AMIODARONE HYDROCHLORIDE 200 MG: 200 TABLET ORAL at 20:07

## 2023-04-22 RX ADMIN — VALSARTAN 80 MG: 80 TABLET ORAL at 09:17

## 2023-04-22 RX ADMIN — ACETAMINOPHEN 650 MG: 325 TABLET ORAL at 11:31

## 2023-04-22 RX ADMIN — ACETAMINOPHEN 650 MG: 325 TABLET ORAL at 20:07

## 2023-04-22 RX ADMIN — CLOPIDOGREL BISULFATE 75 MG: 75 TABLET ORAL at 09:17

## 2023-04-22 RX ADMIN — EZETIMIBE 10 MG: 10 TABLET ORAL at 20:07

## 2023-04-22 RX ADMIN — CEFEPIME 2000 MG: 2 INJECTION, POWDER, FOR SOLUTION INTRAVENOUS at 23:38

## 2023-04-23 ENCOUNTER — APPOINTMENT (OUTPATIENT)
Dept: GENERAL RADIOLOGY | Age: 63
DRG: 064 | End: 2023-04-23
Payer: MEDICARE

## 2023-04-23 ENCOUNTER — APPOINTMENT (OUTPATIENT)
Dept: CT IMAGING | Age: 63
DRG: 064 | End: 2023-04-23
Payer: MEDICARE

## 2023-04-23 LAB
ANION GAP SERPL CALC-SCNC: 2 MMOL/L (ref 2–11)
BACTERIA SPEC CULT: NORMAL
BUN SERPL-MCNC: 17 MG/DL (ref 8–23)
CALCIUM SERPL-MCNC: 8.3 MG/DL (ref 8.3–10.4)
CHLORIDE SERPL-SCNC: 112 MMOL/L (ref 101–110)
CO2 SERPL-SCNC: 25 MMOL/L (ref 21–32)
CREAT SERPL-MCNC: 1.1 MG/DL (ref 0.8–1.5)
ERYTHROCYTE [DISTWIDTH] IN BLOOD BY AUTOMATED COUNT: 15.6 % (ref 11.9–14.6)
GLUCOSE SERPL-MCNC: 94 MG/DL (ref 65–100)
HCT VFR BLD AUTO: 43.8 % (ref 41.1–50.3)
HGB BLD-MCNC: 13.6 G/DL (ref 13.6–17.2)
MCH RBC QN AUTO: 27.1 PG (ref 26.1–32.9)
MCHC RBC AUTO-ENTMCNC: 31.1 G/DL (ref 31.4–35)
MCV RBC AUTO: 87.3 FL (ref 82–102)
MM INDURATION, POC: 0 MM (ref 0–5)
NRBC # BLD: 0 K/UL (ref 0–0.2)
PLATELET # BLD AUTO: 249 K/UL (ref 150–450)
PMV BLD AUTO: 10.5 FL (ref 9.4–12.3)
POTASSIUM SERPL-SCNC: 3.9 MMOL/L (ref 3.5–5.1)
PPD, POC: NEGATIVE
RBC # BLD AUTO: 5.02 M/UL (ref 4.23–5.6)
SERVICE CMNT-IMP: NORMAL
SODIUM SERPL-SCNC: 139 MMOL/L (ref 133–143)
WBC # BLD AUTO: 10.1 K/UL (ref 4.3–11.1)

## 2023-04-23 PROCEDURE — 80048 BASIC METABOLIC PNL TOTAL CA: CPT

## 2023-04-23 PROCEDURE — 2580000003 HC RX 258: Performed by: INTERNAL MEDICINE

## 2023-04-23 PROCEDURE — 36415 COLL VENOUS BLD VENIPUNCTURE: CPT

## 2023-04-23 PROCEDURE — 70450 CT HEAD/BRAIN W/O DYE: CPT

## 2023-04-23 PROCEDURE — 6370000000 HC RX 637 (ALT 250 FOR IP): Performed by: FAMILY MEDICINE

## 2023-04-23 PROCEDURE — 1100000003 HC PRIVATE W/ TELEMETRY

## 2023-04-23 PROCEDURE — 6370000000 HC RX 637 (ALT 250 FOR IP): Performed by: PHYSICIAN ASSISTANT

## 2023-04-23 PROCEDURE — 6370000000 HC RX 637 (ALT 250 FOR IP): Performed by: INTERNAL MEDICINE

## 2023-04-23 PROCEDURE — 6360000002 HC RX W HCPCS: Performed by: INTERNAL MEDICINE

## 2023-04-23 PROCEDURE — 71045 X-RAY EXAM CHEST 1 VIEW: CPT

## 2023-04-23 PROCEDURE — 85027 COMPLETE CBC AUTOMATED: CPT

## 2023-04-23 RX ADMIN — VALSARTAN 80 MG: 80 TABLET ORAL at 09:01

## 2023-04-23 RX ADMIN — QUETIAPINE FUMARATE 100 MG: 25 TABLET ORAL at 20:54

## 2023-04-23 RX ADMIN — APIXABAN 5 MG: 5 TABLET, FILM COATED ORAL at 14:44

## 2023-04-23 RX ADMIN — APIXABAN 5 MG: 5 TABLET, FILM COATED ORAL at 20:54

## 2023-04-23 RX ADMIN — CEFEPIME 2000 MG: 2 INJECTION, POWDER, FOR SOLUTION INTRAVENOUS at 09:03

## 2023-04-23 RX ADMIN — AMIODARONE HYDROCHLORIDE 200 MG: 200 TABLET ORAL at 20:54

## 2023-04-23 RX ADMIN — CLOPIDOGREL BISULFATE 75 MG: 75 TABLET ORAL at 09:02

## 2023-04-23 RX ADMIN — ASPIRIN 81 MG 81 MG: 81 TABLET ORAL at 09:01

## 2023-04-23 RX ADMIN — METOPROLOL SUCCINATE 25 MG: 25 TABLET, EXTENDED RELEASE ORAL at 09:02

## 2023-04-23 RX ADMIN — CEFEPIME 2000 MG: 2 INJECTION, POWDER, FOR SOLUTION INTRAVENOUS at 17:08

## 2023-04-23 RX ADMIN — EZETIMIBE 10 MG: 10 TABLET ORAL at 20:54

## 2023-04-24 LAB
ANION GAP SERPL CALC-SCNC: 1 MMOL/L (ref 2–11)
BASOPHILS # BLD: 0.1 K/UL (ref 0–0.2)
BASOPHILS NFR BLD: 1 % (ref 0–2)
BUN SERPL-MCNC: 17 MG/DL (ref 8–23)
CALCIUM SERPL-MCNC: 8.4 MG/DL (ref 8.3–10.4)
CHLORIDE SERPL-SCNC: 111 MMOL/L (ref 101–110)
CO2 SERPL-SCNC: 25 MMOL/L (ref 21–32)
CREAT SERPL-MCNC: 1.1 MG/DL (ref 0.8–1.5)
DIFFERENTIAL METHOD BLD: ABNORMAL
EOSINOPHIL # BLD: 0.3 K/UL (ref 0–0.8)
EOSINOPHIL NFR BLD: 3 % (ref 0.5–7.8)
ERYTHROCYTE [DISTWIDTH] IN BLOOD BY AUTOMATED COUNT: 15.4 % (ref 11.9–14.6)
GLUCOSE SERPL-MCNC: 94 MG/DL (ref 65–100)
HCT VFR BLD AUTO: 43.6 % (ref 41.1–50.3)
HGB BLD-MCNC: 13.5 G/DL (ref 13.6–17.2)
IMM GRANULOCYTES # BLD AUTO: 0 K/UL (ref 0–0.5)
IMM GRANULOCYTES NFR BLD AUTO: 0 % (ref 0–5)
LYMPHOCYTES # BLD: 2.2 K/UL (ref 0.5–4.6)
LYMPHOCYTES NFR BLD: 22 % (ref 13–44)
MCH RBC QN AUTO: 26.9 PG (ref 26.1–32.9)
MCHC RBC AUTO-ENTMCNC: 31 G/DL (ref 31.4–35)
MCV RBC AUTO: 87 FL (ref 82–102)
MM INDURATION, POC: 0 MM (ref 0–5)
MONOCYTES # BLD: 1.1 K/UL (ref 0.1–1.3)
MONOCYTES NFR BLD: 12 % (ref 4–12)
NEUTS SEG # BLD: 6.1 K/UL (ref 1.7–8.2)
NEUTS SEG NFR BLD: 62 % (ref 43–78)
NRBC # BLD: 0 K/UL (ref 0–0.2)
PLATELET # BLD AUTO: 250 K/UL (ref 150–450)
PMV BLD AUTO: 10.8 FL (ref 9.4–12.3)
POTASSIUM SERPL-SCNC: 4 MMOL/L (ref 3.5–5.1)
PPD, POC: NEGATIVE
RBC # BLD AUTO: 5.01 M/UL (ref 4.23–5.6)
SODIUM SERPL-SCNC: 137 MMOL/L (ref 133–143)
WBC # BLD AUTO: 9.8 K/UL (ref 4.3–11.1)

## 2023-04-24 PROCEDURE — 92526 ORAL FUNCTION THERAPY: CPT

## 2023-04-24 PROCEDURE — 6360000002 HC RX W HCPCS: Performed by: INTERNAL MEDICINE

## 2023-04-24 PROCEDURE — 2580000003 HC RX 258: Performed by: INTERNAL MEDICINE

## 2023-04-24 PROCEDURE — 36415 COLL VENOUS BLD VENIPUNCTURE: CPT

## 2023-04-24 PROCEDURE — 1100000000 HC RM PRIVATE

## 2023-04-24 PROCEDURE — 6370000000 HC RX 637 (ALT 250 FOR IP): Performed by: PHYSICIAN ASSISTANT

## 2023-04-24 PROCEDURE — 6370000000 HC RX 637 (ALT 250 FOR IP): Performed by: INTERNAL MEDICINE

## 2023-04-24 PROCEDURE — 85025 COMPLETE CBC W/AUTO DIFF WBC: CPT

## 2023-04-24 PROCEDURE — 97530 THERAPEUTIC ACTIVITIES: CPT

## 2023-04-24 PROCEDURE — 99232 SBSQ HOSP IP/OBS MODERATE 35: CPT | Performed by: NURSE PRACTITIONER

## 2023-04-24 PROCEDURE — 6370000000 HC RX 637 (ALT 250 FOR IP): Performed by: FAMILY MEDICINE

## 2023-04-24 PROCEDURE — 80048 BASIC METABOLIC PNL TOTAL CA: CPT

## 2023-04-24 RX ADMIN — VALSARTAN 80 MG: 80 TABLET ORAL at 08:35

## 2023-04-24 RX ADMIN — METOPROLOL SUCCINATE 25 MG: 25 TABLET, EXTENDED RELEASE ORAL at 08:36

## 2023-04-24 RX ADMIN — CEFEPIME 2000 MG: 2 INJECTION, POWDER, FOR SOLUTION INTRAVENOUS at 00:19

## 2023-04-24 RX ADMIN — EZETIMIBE 10 MG: 10 TABLET ORAL at 20:41

## 2023-04-24 RX ADMIN — QUETIAPINE FUMARATE 100 MG: 25 TABLET ORAL at 20:41

## 2023-04-24 RX ADMIN — CEFEPIME 2000 MG: 2 INJECTION, POWDER, FOR SOLUTION INTRAVENOUS at 16:45

## 2023-04-24 RX ADMIN — APIXABAN 5 MG: 5 TABLET, FILM COATED ORAL at 08:36

## 2023-04-24 RX ADMIN — ACETAMINOPHEN 650 MG: 325 TABLET ORAL at 17:32

## 2023-04-24 RX ADMIN — ASPIRIN 81 MG 81 MG: 81 TABLET ORAL at 08:35

## 2023-04-24 RX ADMIN — AMIODARONE HYDROCHLORIDE 200 MG: 200 TABLET ORAL at 20:41

## 2023-04-24 RX ADMIN — APIXABAN 5 MG: 5 TABLET, FILM COATED ORAL at 20:41

## 2023-04-24 RX ADMIN — CEFEPIME 2000 MG: 2 INJECTION, POWDER, FOR SOLUTION INTRAVENOUS at 08:36

## 2023-04-24 RX ADMIN — CLOPIDOGREL BISULFATE 75 MG: 75 TABLET ORAL at 08:35

## 2023-04-24 NOTE — CARE COORDINATION
LMSW received update that [facility] is unable to take pt for rehab. Referral placed to [facility]. Liaison to follow up with pt/family and if bed offered/accepted they will start pt insurance precert for rehab placement.

## 2023-04-25 LAB
ANION GAP SERPL CALC-SCNC: 5 MMOL/L (ref 2–11)
BACTERIA SPEC CULT: NORMAL
BASOPHILS # BLD: 0.1 K/UL (ref 0–0.2)
BASOPHILS NFR BLD: 1 % (ref 0–2)
BUN SERPL-MCNC: 20 MG/DL (ref 8–23)
CALCIUM SERPL-MCNC: 8.7 MG/DL (ref 8.3–10.4)
CHLORIDE SERPL-SCNC: 110 MMOL/L (ref 101–110)
CO2 SERPL-SCNC: 27 MMOL/L (ref 21–32)
CREAT SERPL-MCNC: 1.3 MG/DL (ref 0.8–1.5)
DIFFERENTIAL METHOD BLD: ABNORMAL
EOSINOPHIL # BLD: 0.3 K/UL (ref 0–0.8)
EOSINOPHIL NFR BLD: 3 % (ref 0.5–7.8)
ERYTHROCYTE [DISTWIDTH] IN BLOOD BY AUTOMATED COUNT: 15.4 % (ref 11.9–14.6)
GLUCOSE SERPL-MCNC: 93 MG/DL (ref 65–100)
HCT VFR BLD AUTO: 44.7 % (ref 41.1–50.3)
HGB BLD-MCNC: 14 G/DL (ref 13.6–17.2)
IMM GRANULOCYTES # BLD AUTO: 0 K/UL (ref 0–0.5)
IMM GRANULOCYTES NFR BLD AUTO: 0 % (ref 0–5)
LYMPHOCYTES # BLD: 1.9 K/UL (ref 0.5–4.6)
LYMPHOCYTES NFR BLD: 20 % (ref 13–44)
MCH RBC QN AUTO: 27.3 PG (ref 26.1–32.9)
MCHC RBC AUTO-ENTMCNC: 31.3 G/DL (ref 31.4–35)
MCV RBC AUTO: 87.3 FL (ref 82–102)
MONOCYTES # BLD: 1.2 K/UL (ref 0.1–1.3)
MONOCYTES NFR BLD: 12 % (ref 4–12)
NEUTS SEG # BLD: 5.9 K/UL (ref 1.7–8.2)
NEUTS SEG NFR BLD: 64 % (ref 43–78)
NRBC # BLD: 0 K/UL (ref 0–0.2)
PLATELET # BLD AUTO: 252 K/UL (ref 150–450)
PMV BLD AUTO: 11 FL (ref 9.4–12.3)
POTASSIUM SERPL-SCNC: 4.2 MMOL/L (ref 3.5–5.1)
RBC # BLD AUTO: 5.12 M/UL (ref 4.23–5.6)
SERVICE CMNT-IMP: NORMAL
SODIUM SERPL-SCNC: 142 MMOL/L (ref 133–143)
WBC # BLD AUTO: 9.4 K/UL (ref 4.3–11.1)

## 2023-04-25 PROCEDURE — 6370000000 HC RX 637 (ALT 250 FOR IP): Performed by: INTERNAL MEDICINE

## 2023-04-25 PROCEDURE — 6370000000 HC RX 637 (ALT 250 FOR IP): Performed by: PHYSICIAN ASSISTANT

## 2023-04-25 PROCEDURE — 97535 SELF CARE MNGMENT TRAINING: CPT

## 2023-04-25 PROCEDURE — 92526 ORAL FUNCTION THERAPY: CPT

## 2023-04-25 PROCEDURE — 36415 COLL VENOUS BLD VENIPUNCTURE: CPT

## 2023-04-25 PROCEDURE — 85025 COMPLETE CBC W/AUTO DIFF WBC: CPT

## 2023-04-25 PROCEDURE — 2580000003 HC RX 258: Performed by: INTERNAL MEDICINE

## 2023-04-25 PROCEDURE — 97112 NEUROMUSCULAR REEDUCATION: CPT

## 2023-04-25 PROCEDURE — 92523 SPEECH SOUND LANG COMPREHEN: CPT

## 2023-04-25 PROCEDURE — 80048 BASIC METABOLIC PNL TOTAL CA: CPT

## 2023-04-25 PROCEDURE — 6360000002 HC RX W HCPCS: Performed by: INTERNAL MEDICINE

## 2023-04-25 PROCEDURE — 1100000000 HC RM PRIVATE

## 2023-04-25 PROCEDURE — 97530 THERAPEUTIC ACTIVITIES: CPT

## 2023-04-25 PROCEDURE — 6370000000 HC RX 637 (ALT 250 FOR IP): Performed by: FAMILY MEDICINE

## 2023-04-25 RX ORDER — SODIUM CHLORIDE 9 MG/ML
INJECTION, SOLUTION INTRAVENOUS CONTINUOUS
Status: DISCONTINUED | OUTPATIENT
Start: 2023-04-25 | End: 2023-04-26

## 2023-04-25 RX ADMIN — APIXABAN 5 MG: 5 TABLET, FILM COATED ORAL at 09:12

## 2023-04-25 RX ADMIN — CEFEPIME 2000 MG: 2 INJECTION, POWDER, FOR SOLUTION INTRAVENOUS at 00:15

## 2023-04-25 RX ADMIN — ASPIRIN 81 MG 81 MG: 81 TABLET ORAL at 09:12

## 2023-04-25 RX ADMIN — SODIUM CHLORIDE: 9 INJECTION, SOLUTION INTRAVENOUS at 10:29

## 2023-04-25 RX ADMIN — APIXABAN 5 MG: 5 TABLET, FILM COATED ORAL at 21:22

## 2023-04-25 RX ADMIN — CEFEPIME 2000 MG: 2 INJECTION, POWDER, FOR SOLUTION INTRAVENOUS at 09:11

## 2023-04-25 RX ADMIN — POLYETHYLENE GLYCOL 3350 17 G: 17 POWDER, FOR SOLUTION ORAL at 18:55

## 2023-04-25 RX ADMIN — AMIODARONE HYDROCHLORIDE 200 MG: 200 TABLET ORAL at 21:22

## 2023-04-25 RX ADMIN — EZETIMIBE 10 MG: 10 TABLET ORAL at 21:22

## 2023-04-25 RX ADMIN — VALSARTAN 80 MG: 80 TABLET ORAL at 09:12

## 2023-04-25 RX ADMIN — QUETIAPINE FUMARATE 100 MG: 25 TABLET ORAL at 21:22

## 2023-04-25 RX ADMIN — ACETAMINOPHEN 650 MG: 325 TABLET ORAL at 21:22

## 2023-04-25 RX ADMIN — METOPROLOL SUCCINATE 25 MG: 25 TABLET, EXTENDED RELEASE ORAL at 09:12

## 2023-04-26 LAB
ANION GAP SERPL CALC-SCNC: 4 MMOL/L (ref 2–11)
BACTERIA SPEC CULT: NORMAL
BACTERIA SPEC CULT: NORMAL
BASOPHILS # BLD: 0.1 K/UL (ref 0–0.2)
BASOPHILS NFR BLD: 1 % (ref 0–2)
BUN SERPL-MCNC: 16 MG/DL (ref 8–23)
CALCIUM SERPL-MCNC: 8.4 MG/DL (ref 8.3–10.4)
CHLORIDE SERPL-SCNC: 110 MMOL/L (ref 101–110)
CO2 SERPL-SCNC: 26 MMOL/L (ref 21–32)
CREAT SERPL-MCNC: 1.1 MG/DL (ref 0.8–1.5)
DIFFERENTIAL METHOD BLD: ABNORMAL
EOSINOPHIL # BLD: 0.3 K/UL (ref 0–0.8)
EOSINOPHIL NFR BLD: 4 % (ref 0.5–7.8)
ERYTHROCYTE [DISTWIDTH] IN BLOOD BY AUTOMATED COUNT: 15.3 % (ref 11.9–14.6)
GLUCOSE SERPL-MCNC: 90 MG/DL (ref 65–100)
HCT VFR BLD AUTO: 42.9 % (ref 41.1–50.3)
HGB BLD-MCNC: 13.1 G/DL (ref 13.6–17.2)
IMM GRANULOCYTES # BLD AUTO: 0 K/UL (ref 0–0.5)
IMM GRANULOCYTES NFR BLD AUTO: 1 % (ref 0–5)
LYMPHOCYTES # BLD: 2 K/UL (ref 0.5–4.6)
LYMPHOCYTES NFR BLD: 25 % (ref 13–44)
MCH RBC QN AUTO: 27 PG (ref 26.1–32.9)
MCHC RBC AUTO-ENTMCNC: 30.5 G/DL (ref 31.4–35)
MCV RBC AUTO: 88.3 FL (ref 82–102)
MONOCYTES # BLD: 0.9 K/UL (ref 0.1–1.3)
MONOCYTES NFR BLD: 12 % (ref 4–12)
NEUTS SEG # BLD: 4.5 K/UL (ref 1.7–8.2)
NEUTS SEG NFR BLD: 57 % (ref 43–78)
NRBC # BLD: 0 K/UL (ref 0–0.2)
PLATELET # BLD AUTO: 238 K/UL (ref 150–450)
PMV BLD AUTO: 10.7 FL (ref 9.4–12.3)
POTASSIUM SERPL-SCNC: 4.4 MMOL/L (ref 3.5–5.1)
RBC # BLD AUTO: 4.86 M/UL (ref 4.23–5.6)
SERVICE CMNT-IMP: NORMAL
SERVICE CMNT-IMP: NORMAL
SODIUM SERPL-SCNC: 140 MMOL/L (ref 133–143)
WBC # BLD AUTO: 7.8 K/UL (ref 4.3–11.1)

## 2023-04-26 PROCEDURE — 6370000000 HC RX 637 (ALT 250 FOR IP): Performed by: INTERNAL MEDICINE

## 2023-04-26 PROCEDURE — 85025 COMPLETE CBC W/AUTO DIFF WBC: CPT

## 2023-04-26 PROCEDURE — 6370000000 HC RX 637 (ALT 250 FOR IP): Performed by: FAMILY MEDICINE

## 2023-04-26 PROCEDURE — 80048 BASIC METABOLIC PNL TOTAL CA: CPT

## 2023-04-26 PROCEDURE — 36415 COLL VENOUS BLD VENIPUNCTURE: CPT

## 2023-04-26 PROCEDURE — 2580000003 HC RX 258: Performed by: INTERNAL MEDICINE

## 2023-04-26 PROCEDURE — 1100000000 HC RM PRIVATE

## 2023-04-26 RX ADMIN — ASPIRIN 81 MG 81 MG: 81 TABLET ORAL at 09:44

## 2023-04-26 RX ADMIN — SODIUM CHLORIDE: 9 INJECTION, SOLUTION INTRAVENOUS at 00:03

## 2023-04-26 RX ADMIN — APIXABAN 5 MG: 5 TABLET, FILM COATED ORAL at 21:09

## 2023-04-26 RX ADMIN — QUETIAPINE FUMARATE 100 MG: 25 TABLET ORAL at 21:09

## 2023-04-26 RX ADMIN — ACETAMINOPHEN 650 MG: 325 TABLET ORAL at 21:09

## 2023-04-26 RX ADMIN — APIXABAN 5 MG: 5 TABLET, FILM COATED ORAL at 09:40

## 2023-04-26 RX ADMIN — EZETIMIBE 10 MG: 10 TABLET ORAL at 21:09

## 2023-04-26 RX ADMIN — AMIODARONE HYDROCHLORIDE 200 MG: 200 TABLET ORAL at 21:09

## 2023-04-26 NOTE — CARE COORDINATION
Dispo update:  Ms. Alessandro Jeffries of [facility] acute inpatient rehab said that insurance has now approved Mr. Gaye Fishman for their program.  However, physician wants to keep him until tomorrow due to a fever today. Updated Ms. Alessandro Jeffries at [facility], Mr. Gaye Fishman in room 603, and his brother Mr. Llamas Senters at [phone].

## 2023-04-27 VITALS
DIASTOLIC BLOOD PRESSURE: 75 MMHG | WEIGHT: 190.92 LBS | BODY MASS INDEX: 28.28 KG/M2 | HEIGHT: 69 IN | HEART RATE: 74 BPM | RESPIRATION RATE: 18 BRPM | TEMPERATURE: 99 F | SYSTOLIC BLOOD PRESSURE: 114 MMHG | OXYGEN SATURATION: 95 %

## 2023-04-27 LAB
ANION GAP SERPL CALC-SCNC: 2 MMOL/L (ref 2–11)
BACTERIA SPEC CULT: NORMAL
BACTERIA SPEC CULT: NORMAL
BASOPHILS # BLD: 0.1 K/UL (ref 0–0.2)
BASOPHILS NFR BLD: 1 % (ref 0–2)
BUN SERPL-MCNC: 17 MG/DL (ref 8–23)
CALCIUM SERPL-MCNC: 8.8 MG/DL (ref 8.3–10.4)
CHLORIDE SERPL-SCNC: 106 MMOL/L (ref 101–110)
CO2 SERPL-SCNC: 28 MMOL/L (ref 21–32)
CREAT SERPL-MCNC: 1.1 MG/DL (ref 0.8–1.5)
DIFFERENTIAL METHOD BLD: ABNORMAL
EOSINOPHIL # BLD: 0.3 K/UL (ref 0–0.8)
EOSINOPHIL NFR BLD: 3 % (ref 0.5–7.8)
ERYTHROCYTE [DISTWIDTH] IN BLOOD BY AUTOMATED COUNT: 15.1 % (ref 11.9–14.6)
GLUCOSE SERPL-MCNC: 89 MG/DL (ref 65–100)
HCT VFR BLD AUTO: 42.9 % (ref 41.1–50.3)
HGB BLD-MCNC: 13.6 G/DL (ref 13.6–17.2)
IMM GRANULOCYTES # BLD AUTO: 0 K/UL (ref 0–0.5)
IMM GRANULOCYTES NFR BLD AUTO: 0 % (ref 0–5)
LYMPHOCYTES # BLD: 2.4 K/UL (ref 0.5–4.6)
LYMPHOCYTES NFR BLD: 26 % (ref 13–44)
MCH RBC QN AUTO: 27.1 PG (ref 26.1–32.9)
MCHC RBC AUTO-ENTMCNC: 31.7 G/DL (ref 31.4–35)
MCV RBC AUTO: 85.5 FL (ref 82–102)
MONOCYTES # BLD: 1.2 K/UL (ref 0.1–1.3)
MONOCYTES NFR BLD: 13 % (ref 4–12)
NEUTS SEG # BLD: 5.1 K/UL (ref 1.7–8.2)
NEUTS SEG NFR BLD: 57 % (ref 43–78)
NRBC # BLD: 0 K/UL (ref 0–0.2)
PLATELET # BLD AUTO: 259 K/UL (ref 150–450)
PMV BLD AUTO: 10.9 FL (ref 9.4–12.3)
POTASSIUM SERPL-SCNC: 4.1 MMOL/L (ref 3.5–5.1)
RBC # BLD AUTO: 5.02 M/UL (ref 4.23–5.6)
SERVICE CMNT-IMP: NORMAL
SERVICE CMNT-IMP: NORMAL
SODIUM SERPL-SCNC: 136 MMOL/L (ref 133–143)
WBC # BLD AUTO: 9 K/UL (ref 4.3–11.1)

## 2023-04-27 PROCEDURE — 80048 BASIC METABOLIC PNL TOTAL CA: CPT

## 2023-04-27 PROCEDURE — 6370000000 HC RX 637 (ALT 250 FOR IP): Performed by: INTERNAL MEDICINE

## 2023-04-27 PROCEDURE — 6370000000 HC RX 637 (ALT 250 FOR IP): Performed by: FAMILY MEDICINE

## 2023-04-27 PROCEDURE — 85025 COMPLETE CBC W/AUTO DIFF WBC: CPT

## 2023-04-27 PROCEDURE — 36415 COLL VENOUS BLD VENIPUNCTURE: CPT

## 2023-04-27 RX ORDER — EZETIMIBE 10 MG/1
10 TABLET ORAL NIGHTLY
Qty: 30 TABLET | Refills: 0
Start: 2023-04-27 | End: 2023-05-27

## 2023-04-27 RX ORDER — METOPROLOL SUCCINATE 25 MG/1
25 TABLET, EXTENDED RELEASE ORAL DAILY
Qty: 30 TABLET | Refills: 0
Start: 2023-04-27 | End: 2023-05-27

## 2023-04-27 RX ORDER — VALSARTAN 80 MG/1
80 TABLET ORAL DAILY
Qty: 30 TABLET | Refills: 0
Start: 2023-04-27 | End: 2023-05-27

## 2023-04-27 RX ORDER — QUETIAPINE FUMARATE 100 MG/1
100 TABLET, FILM COATED ORAL NIGHTLY
Qty: 30 TABLET | Refills: 0 | Status: SHIPPED | OUTPATIENT
Start: 2023-04-27 | End: 2023-05-27

## 2023-04-27 RX ADMIN — ASPIRIN 81 MG 81 MG: 81 TABLET ORAL at 09:44

## 2023-04-27 RX ADMIN — APIXABAN 5 MG: 5 TABLET, FILM COATED ORAL at 09:44

## 2023-04-27 NOTE — PROGRESS NOTES
ACUTE OCCUPATIONAL THERAPY GOALS:   (Developed with and agreed upon by patient and/or caregiver.)  1. Patient will complete grooming tasks with minimal assistance and adaptive devices as needed. 2. Patient will complete upper body dressing and bathing with minimal assistance and adaptive equipment as needed. 3. Patient will complete bed mobility with moderate assistance with appropriate awareness of positioning of the R UE.    4. Patient will demonstrate ability to sit edge of bed unsupported at midline with SBA to improve sitting balance for ADL. 5. Patient will attend to R side for 100% of treatment session with minimal verbal/tactile/visual cues from therapist.   6. Patient will complete functional transfers to chair/BSC/WC with moderate assistance and adaptive equipment as needed. 7. Patient will tolerate midline standing edge of bed with moderate assistance x 2 to improve standing for ADL. Timeframe: 7 visits     OCCUPATIONAL THERAPY: Daily Note AM   OT Visit Days: 2   Time In/Out  OT Charge Capture  Rehab Caseload Tracker  OT Orders    Candyce Moritz is a 61 y.o. male   PRIMARY DIAGNOSIS: Acute ischemic left MCA stroke ([facility] [address]  Acute ischemic left MCA stroke ([facility] [address] [I63.512]       Inpatient: Payor: Son Clark / Plan: [place] / Product Type: *No Product type* /     ASSESSMENT:     REHAB RECOMMENDATIONS: CURRENT LEVEL OF FUNCTION:  (Most Recently Demonstrated)   Recommendation to date pending progress:  Setting:  Inpatient Rehab Facility    Equipment:    To Be Determined Bathing:  Not Tested  Dressing:  Not Tested  Feeding/Grooming:  Maximal Assist  Toileting:  Not Tested  Functional Mobility:  Moderate Assist x2     ASSESSMENT:  Mr. Ale Lopez is doing fair today. Pt presents supine upon arrival. Pt required min A for bed mobility today. Pt demonstrates fair (+) sitting balance at edge of bed. Pt was able to stand a few times with min A x2.  Transferred over to chair with mod
ACUTE PHYSICAL THERAPY GOALS:   (Developed with and agreed upon by patient and/or caregiver.)  ST. Patient will perform bed mobility with MODERATE ASSISTANCE within 3 days. 2. Patient will demonstrate FAIR STATIC SITTING balance with midline orientation within 3 day(s). 3. Patient will transfer sit to stand with MAXIMAL ASSISTANCE x2 within 3 days. 4. Patient will transfer bed to chair with MAXIMAL  ASSISTANCE [id number] 3 days. 5. Patient will tolerate 15+ minutes of therapeutic activity/exercise and/or neuromuscular re-education while maintaining stable vitals to improve functional strength and activity tolerance within 3 days. LT. Patient will perform bed mobility with MINIMAL ASSISTANCE within 7 days. 2. Patient will demonstrate GOOD DYNAMIC SITTING balance with midline orientation within 7 day(s). 3. Patient will transfer sit to stand with MODERATE ASSISTANCE within 7 days. 4. Patient will transfer bed to chair with MODERATE ASSISTANCE within 7 days. 5. Patient will ambulate 10ft with MODERATE ASSISTANCE within 7 days. 5. Patient will tolerate 25+ minutes of therapeutic activity/exercise and/or neuromuscular re-education while maintaining stable vitals to improve functional strength and activity tolerance within 7 days. PHYSICAL THERAPY: Daily Note PM   (Link to Caseload Tracking: PT Visit Days : 2  Time In/Out PT Charge Capture  Rehab Caseload Tracker  Orders    Mariah Deluca is a 61 y.o. male   PRIMARY DIAGNOSIS: Acute ischemic left MCA stroke ([address]  Acute ischemic left MCA stroke ([facility] [address] [I63.512]       Inpatient: Payor: Robbi Shadow / Plan: [place] / Product Type: *No Product type* /     ASSESSMENT:     REHAB RECOMMENDATIONS:   Recommendation to date pending progress:  Setting:  Inpatient Rehab Facility    Equipment:    To Be Determined     ASSESSMENT:  Mr. REED \[facility] [place] [facility]\"" was supine in bed on arrival and agreeable to PT.  Supine to sit on edge of bed with mod/max A
Hospitalist Progress Note   Admit Date:  2023 10:47 PM   Name:  Callum Valentin   Age:  61 y.o. Sex:  male  :  1960   MRN:  [id number]   Room:  603/01    Presenting/Chief Complaint: Fatigue     Reason(s) for Admission: Acute ischemic left MCA stroke [facility]) [facility] Course:     Copied from admission history and physical HPI:    Callum Valentin is a 61 y.o. male with medical history of prior CVA with residual R sided deficits, sCHF, CAD, ICD who presented to ED with confusion. Patient cannot give accurate history, as his story seems to change. He lives alone. Last known well seems to be Tuesday (). Family had not heard from him since Tuesday. His neighbor went to check on him yesterday (Thursday), and found him basically obtunded. EMS was called. He has an NIH of 16, with facial droop, R sided weakness, aphasia, dysarthria, confusion. Upon ER evaluation, after his symptoms became more clear, CODE S was called. CBC is elevated at 16.5. IV rocephin given, urine pending. CMP pending. CT head/CTA head/neck shows:  1. No acute intracranial hemorrhage. 2.  Chronic left MCA territory infarction, with superimposed acute appearing    hypoattenuation in the left basal ganglia. MRI could better assess for acute    ischemia. 3.  Complete occlusion of the left M1 segment with minimal distal reconstitution    and hyperattenuating thrombus on noncontrast phase. Previously this segment was    well opacified with severe/critical downstream stenosis. Findings support new    acute occlusion. 4.  Perfusion imaging showing infarct core of 40 mL with tissue at risk of 115    mL for mismatch ratio of 2.9. Findings may be favorable for thrombectomy and    neuro-interventional consultation is recommended. 5.  The remaining major arteries of the head and neck are patent. 6.  Fluid level in the right maxillary sinus. Consider acute sinusitis.
Hospitalist Progress Note   Admit Date:  2023 10:47 PM   Name:  Servando Brewer   Age:  61 y.o. Sex:  male  :  1960   MRN:  [id number]   Room:  603/01    Presenting/Chief Complaint: Fatigue     Reason(s) for Admission: Acute ischemic left MCA stroke [facility]) [facility] Course:     Copied from admission history and physical HPI:    Servando Brewer is a 61 y.o. male with medical history of prior CVA with residual R sided deficits, sCHF, CAD, ICD who presented to ED with confusion. Patient cannot give accurate history, as his story seems to change. He lives alone. Last known well seems to be Tuesday (). Family had not heard from him since Tuesday. His neighbor went to check on him yesterday (Thursday), and found him basically obtunded. EMS was called. He has an NIH of 16, with facial droop, R sided weakness, aphasia, dysarthria, confusion. Upon ER evaluation, after his symptoms became more clear, CODE S was called. CBC is elevated at 16.5. IV rocephin given, urine pending. CMP pending. CT head/CTA head/neck shows:  1. No acute intracranial hemorrhage. 2.  Chronic left MCA territory infarction, with superimposed acute appearing    hypoattenuation in the left basal ganglia. MRI could better assess for acute    ischemia. 3.  Complete occlusion of the left M1 segment with minimal distal reconstitution    and hyperattenuating thrombus on noncontrast phase. Previously this segment was    well opacified with severe/critical downstream stenosis. Findings support new    acute occlusion. 4.  Perfusion imaging showing infarct core of 40 mL with tissue at risk of 115    mL for mismatch ratio of 2.9. Findings may be favorable for thrombectomy and    neuro-interventional consultation is recommended. 5.  The remaining major arteries of the head and neck are patent. 6.  Fluid level in the right maxillary sinus. Consider acute sinusitis.
Hourly rounds complete this shift, no new complaints at this time,  bed in low, locked position, call light and bedside table within reach,  all needs met. Report to day shift nurse.
Hourly rounds complete this shift, no new complaints at this time, : bed in low, locked position, call light and bedside table within reach,  all needs met. Report to day shift nurse.
Hourly rounds complete this shift, no new complaints at this time, : bed in low, locked position, call light and bedside table within reach,  all needs met. Will continue to monitor Report to day shift nurse.
LTG: Patient will maintain adequate hydration/nutrition with optimum safety and efficiency of swallowing function with PO intake without overt signs or symptoms of aspiration for the highest appropriate diet level. STG:  Patient will safely ingest diet trials during therapeutic feedings with SLP without overt signs or symptoms of respiratory compromise in efforts to advance diet. PROGRESSING 23  Patient will consume  puree diet and mildly thick liquids by small sips only without overt s/sx of airway compromise ADDED 23 EDITED 23  Patient will complete a Modified Barium Swallow study to fully assess physiology and anatomy of the swallow and determine safest appropriate diet and/or rehabilitation strategies. SPEECH LANGUAGE PATHOLOGY: DYSPHAGIA  Daily Note #1    NAME: Shearon Rinne  : 1960  MRN: [id number]    ADMISSION DATE: 2023  PRIMARY DIAGNOSIS: Acute ischemic left MCA stroke ([facility] [address]  Acute ischemic left MCA stroke ([facility] [address] [I63.512]    ICD-10: Treatment Diagnosis: R13.12 Dysphagia, Oropharyngeal Phase    RECOMMENDATIONS   Diet:  Diet Solids Recommendation: NPO  Liquid Consistency Recommendation: Mildly Thick (Nectar)    Medications: Crushed in puree as able     Recommendations: Dysphagia treatment     Compensatory Swallowing Strategies: Alternate solids and liquids;Assist feed;Upright as possible for all oral intake;Eat/Feed slowly; Small bites/sips     Therapeutic Intervention:Patient/Family education; Therapeutic PO trials with SLP     Patient continues to require skilled intervention:  Yes. Recommend ongoing speech therapy services during this hospitalization and next level of care        ASSESSMENT    Dysphagia Diagnosis: Mild to moderate oral stage dysphagia;Mild to moderate pharyngeal stage dysphagia  Dysphagia Impression : Patient tolerating puree textures and mildly thick liquids without s/sx of airway compromise.  He does need occasional reminders to reduce rate when
Neurology Daily Progress Note     Assessment:     61 y.o. male with medical history of prior CVA with residual R sided deficits, CHF, CAD, ICD who presented to ED with confusion, and was found to have an acute on chronic L MCA infarct after [facility] was done with acute on chronic occlusion of L M1. The patient has a mural thrombus. Plan:     Continue Eliquis per cardiology    Continue aspirin if there is cardiac indication     Continue Zetia. Patient has multiple statin allergies. STAT head CT for clinical worsening     Normotensive BP management- avoid hypotension and fluctuations in the setting of M1 occlusion. PT/OT/ST    Patient can follow up with neurology after d/c     Subjective: Interval history:    Patient unchanged clinically. Reports right sided weakness is worse from baseline. Speech is about the same.  TTE shows a mural thrombus         Past Medical History:   Diagnosis Date    Acute hypoxemic respiratory failure due to COVID-19 ([address] 10/09/2021    Anemia     BPH (benign prostatic hyperplasia) 2019    CAD (coronary artery disease)     heart attack 2005; heart stents    CHF (congestive heart failure) ([address] 09/27/2011    pt denies    Chronic systolic heart failure ([address] 10/02/2015    Coronary atherosclerosis of native coronary vessel 10/02/2015    CABG 5/2022    CVA (cerebral vascular accident) ([address] 09/15/2022    prominent receptive aphasia with neologism    H/O transesophageal echocardiography (VALERY) for monitoring 06/18/2022    LVEF 25-30%, now 35-40%    History of blood transfusion     Hyperlipidemia 10/02/2015    Hypertension     Hypoxemia requiring supplemental oxygen 10/06/2021    ICD (implantable cardioverter-defibrillator) in place 09/26/2011    dual chamber [employer] Last discharge 6/17/22    IGT (impaired glucose tolerance) 12/03/2017    Other ill-defined conditions(799.89)      blind left eye    Other ill-defined conditions(799.89)     cardiomyopathy    Pneumonia due to
Patient resting in bed. No changes to NIH score. Hourly rounds performed this shift. Bed lowered and locked. Call light within reach. All needs met at this time. Bedside shift report will be given to oncoming nurse.
Patient resting quietly. No changes to NIH score. Hourly rounds performed this shift. Bed lowered and locked. Call light within reach. All needs met at this time. Bedside shift report given to oncoming nurse.
Physician Progress Note      PATIENT:               Nikos Bill  CSN #:                  [id number]  :                       1960  ADMIT DATE:       2023 10:47 PM  [address] [place] DATE:        2023 12:55 PM  RESPONDING  PROVIDER #:        Meagan Gilbert MD          QUERY TEXT:    Patient admitted with acute ischemic left MCA stroke, noted to have atrial   fibrillation and is maintained on eliquis. If possible, please document in   progress notes and discharge summary if you are evaluating and/or treating any   of the following: The medical record reflects the following:  Risk Factors:  61 y.o. male with medical history of prior CVA with residual R   sided deficits, sCHF, CAD, ICD who presented to ED with confusion. Clinical Indicators: Documented A fib in discharge summary, CHF, HTN, CVA  Treatment: Eliquis    Thank you,  Justa London RN, BSN, CDI  [email or web address]  . Options provided:  -- Secondary hypercoagulable state in a patient with atrial fibrillation  -- Other - I will add my own diagnosis  -- Disagree - Not applicable / Not valid  -- Disagree - Clinically unable to determine / Unknown  -- Refer to Clinical Documentation Reviewer    PROVIDER RESPONSE TEXT:    This patient has secondary hypercoagulable state in a patient with atrial   fibrillation.     Query created by: Karolynn Najjar on 2023 11:13 AM      Electronically signed by:  Meagan Gilbert MD 2023 2:07 PM
Physician Progress Note      PATIENT:               Nikos Bill  CSN #:                  [id number]  :                       1960  ADMIT DATE:       2023 10:47 PM  [address] [place] DATE:  RESPONDING  PROVIDER #:        Jesica Velez DO          QUERY TEXT:    Pt admitted with acute ischemic left MCA stroke. Pt noted to have elevated   WBC, temp, LA. If possible, please document in the progress notes and   discharge summary if you are evaluating and /or treating any of the following: The medical record reflects the following:  Risk Factors:  61 y.o. male with medical history of prior CVA with residual R   sided deficits, sCHF, CAD, ICD who presented to ED with confusion. Clinical Indicators:  WBC 16.5, LA 1.3   WBC 15.1, LA 2.1,   Temp 101.1 & 101.8 oral  Per Barium swallow study: Dysphagia noted with silent aspiration of thin   liquids   CXR: No evidence of an acute pleural or pulmonary parenchymal   abnormality.  CXR: No radiographic evidence of pneumonia or pulmonary edema. Treatment: Cefepime (aspiration pneumonia)    Thank you,  Richie Morocho RN, BSN, [facility]  [email or web address]  .   Options provided:  -- Sepsis, present on admission  -- Sepsis, developed following admission  -- Aspiration pneumonia without Sepsis  -- Sepsis and aspiration pneumonia ruled out  -- Other - I will add my own diagnosis  -- Disagree - Not applicable / Not valid  -- Disagree - Clinically unable to determine / Unknown  -- Refer to Clinical Documentation Reviewer    PROVIDER RESPONSE TEXT:    Suspected aspiration pneumonitis as documented in progress note 23    Query created by: Karolynn Najjar on 2023 1:47 PM      Electronically signed by:  Jesica Velez DO 2023 2:55 PM
Pt discharged to [facility] inpatient Rehab. Hourly rounds completed. Peripheral I.V removed without complications. Pt denies needs at this time. Attempted to call report to [facility] but spoke with Nabeel and she said staff at [facility] will call back to get report. Floor phone number provided.
Pt is stable with bed in lowest position, wheels locked, and call light within reach. Hourly rounds completed. VSS. IV is patent and dressing is clean, dry, and intact. Report to be given to day shift nurse.
Pt resting in bed. Pt's bed low and locked. Call light within pt's reach. Pt denies needs at this time.  Bedside shift report given to night shift RN
Received joel back from [facility]. Spoke with and gave report to [address] at [facility]. Opportunity for questions provided. All needs met at this time.
SPEECH PATHOLOGY NOTE:    Attempted to see patient for therapy; however, patient getting bed bath. Will follow up at later time/date.       Dave Abrams MS, CCC-SLP
verbal cues. Sit to stand several times with min A x2. He transferred over to chair with mod A x2. Worked on dynamic sitting balance with close SBA. Pt gets frustrated with not being able to express himself. Pt left up in chair with needs in [facility].       SUBJECTIVE:   Mr. Marya Butt states, \"Henrry\"     Social/Functional Lives With: Alone  Type of Home: House  Home Layout: One level  Home Access:  (2-3 steep MILAN)  Bathroom Shower/Tub: Walk-in shower  ADL Assistance: Independent  Ambulation Assistance: Independent  Transfer Assistance: Independent  OBJECTIVE:     PAIN: Kim Hummer / O2: Andree Genia / Marilin Hurtadon / Niurka Lexx:   Pre Treatment:  none         Post Treatment: none Vitals        Oxygen    IV and Primafit    RESTRICTIONS/PRECAUTIONS:        MOBILITY: I Mod I S SBA CGA Min Mod Max Total  NT x2 Comments:   Bed Mobility    Rolling [] [] [] [] [] [] [] [] [] [] []    Supine to Sit [] [] [] [] [] [x] [] [] [] [] []    Scooting [] [] [] [] [] [] [x] [] [] [] []    Sit to Supine [] [] [] [] [] [] [] [] [] [] []    Transfers    Sit to Stand [] [] [] [] [] [x] [] [] [] [] [x]    Bed to Chair [] [] [] [] [] [] [x] [] [] [] [x]    Stand to Sit [] [] [] [] [] [x] [x] [] [] [] [x]     [] [] [] [] [] [] [] [] [] [] []    I=Independent, Mod I=Modified Independent, S=Supervision, SBA=Standby Assistance, CGA=Contact Guard Assistance,   Min=Minimal Assistance, Mod=Moderate Assistance, Max=Maximal Assistance, Total=Total Assistance, NT=Not Tested    BALANCE: Good Fair+ Fair Fair- Poor NT Comments   Sitting Static [] [] [x] [] [] []    Sitting Dynamic [] [] [] [x] [] []              Standing Static [] [] [] [] [x] []    Standing Dynamic [] [] [] [] [x] []      GAIT: I Mod I S SBA CGA Min Mod Max Total  NT x2 Comments:   Level of Assistance [] [] [] [] [] [] [] [] [] [] []    Distance      DME Rolling Walker    Gait Quality N/A    Weightbearing Status      Stairs      I=Independent, Mod I=Modified Independent, S=Supervision, SBA=Standby Assistance,
upgrade to minced and moist consistencies only at this time due to impaired ability to masticate more chewable textures. Recommend supervision with meals to ensure appropriate rate/quantity. Patient was able to follow very basic directions with a direct model provided for lingual exercises. Tongue is flaccid. Only slight resistance felt when protruding his tongue straight and to the left. Significantly decreased ROM and strength to the right side. Completed internal/external exercises to each side x5. Tongue base exercises completed x10 with fair accuracy. Recommend upgrade to minced and moist consistencies and continuing mildly thick (nectar)liquids. Medications crushed in puree. Supervision for rate/quantity control. Patient with hx of left CVA in Sept 2022 with OP tx for aphasia initiated. Patient presents with moderate-severe aphasia with both receptive and expressive language deficits. Answered basic to intermediate level yes/no questions with 80% accuracy. Followed simple one step directions with 65% accuracy. Significant difficulty with word finding with patient naming familiar objects 2/10; increased to 5/10 with additional sentence completion and phoneme cue provided. Patient presented with paraphasic errors during naming tasks. Automatic speech tasks completed with 60% accuracy and moderate cues provided. Stating opposites with gestural cues provided by ST with 70% accuracy. Patient was able to read basic biographical information 4/5. Patient continues to require speech therapy services. He is unable to effectively and consistently communicate wants and needs with family/caregivers and in environmental settings due to receptive and expressive language deficits. GENERAL   Sitting up in the bed    Pain:         No pain reported    OBJECTIVE   Tolerated minced and moist/mildly thick liquids without overt signs/sx of aspiration. Verbal cues to reduce rate of intake.   Completed
PORTABLE   Final Result      1. No radiographic evidence of pneumonia or pulmonary edema. 2. No significant change. FL MODIFIED BARIUM SWALLOW W VIDEO   Final Result   1. Aspiration with thin liquids by cup. Please see full report from speech   language pathologist.         XR CHEST PORTABLE   Final Result   Impression:   No evidence of an acute pleural or pulmonary parenchymal abnormality. Thanh Walsh M.D.    4/20/2023 11:57:00 PM      CT Brain Perfusion   Final Result   1. No acute intracranial hemorrhage. 2.  Chronic left MCA territory infarction, with superimposed acute appearing    hypoattenuation in the left basal ganglia. MRI could better assess for acute    ischemia. 3.  Complete occlusion of the left M1 segment with minimal distal reconstitution    and hyperattenuating thrombus on noncontrast phase. Previously this segment was    well opacified with severe/critical downstream stenosis. Findings support new    acute occlusion. 4.  Perfusion imaging showing infarct core of 40 mL with tissue at risk of 115    mL for mismatch ratio of 2.9. Findings may be favorable for thrombectomy and    neuro-interventional consultation is recommended. 5.  The remaining major arteries of the head and neck are patent. 6.  Fluid level in the right maxillary sinus. Consider acute sinusitis. These results were communicated to Glenroy Flores at 9:45 PM (head CT) and 9:55    PM (CTA) on the date of exam, [place]. Katie De La Rosa M.D.    4/21/2023 12:10:00 AM      CT HEAD WO CONTRAST   Final Result   1. No acute intracranial hemorrhage. 2.  Chronic left MCA territory infarction, with superimposed acute appearing    hypoattenuation in the left basal ganglia. MRI could better assess for acute    ischemia. 3.  Complete occlusion of the left M1 segment with minimal distal reconstitution    and hyperattenuating thrombus on noncontrast phase.  Previously

## 2023-04-27 NOTE — CARE COORDINATION
Discharge note:  [facility] transport arranged for 1 pm (next available) for transfer to [facility] acute inpatient rehabilitation on [facility], room 135, report [phone]. This plan is ok with Mr. Huizar Kye in room 603, his brother Mr. Muriel Layton (via cell phone), and with his RN.

## 2023-04-27 NOTE — DISCHARGE SUMMARY
Hospitalist Discharge Summary   Admit Date:  2023 10:47 PM   DC Note date: 2023  Name:  Effie Quinones   Age:  61 y.o. Sex:  male  :  1960   MRN:  [id number]   Room:  [facility]/  PCP:  Landy Dowling DO    Presenting Complaint: Fatigue     Initial Admission Diagnosis: Acute ischemic left MCA stroke [facility]) [I63.512]     Problem List for this Hospitalization (present on admission):    Principal Problem:    Acute ischemic left MCA stroke ([facility] [address]  Active Problems:    Chronic systolic congestive heart failure ([facility] [address]    ICD (implantable cardioverter-defibrillator) in place    S/P CABG x 3    Legally blind    Hypothyroidism due to medication    Dyslipidemia    Statin intolerance    Fever  Resolved Problems:    * No resolved hospital problems. *      Hospital Course:     is a 61 y.o. male with medical history of prior CVA with residual R sided deficits, sCHF, CAD, ICD who presented to ED with confusion. Patient cannot give accurate history, as his story seems to change. He lives alone. Last known well seems to be Tuesday (). Family had not heard from him since Tuesday. His neighbor went to check on him yesterday (Thursday), and found him basically obtunded. EMS was called. He has an NIH of 16, with facial droop, R sided weakness, aphasia, dysarthria, confusion. Upon ER evaluation, after his symptoms became more clear, CODE S was called. CBC is elevated at 16.5. IV rocephin given, urine pending. CMP pending. CT head/CTA head/neck shows:  1. No acute intracranial hemorrhage. 2.  Chronic left MCA territory infarction, with superimposed acute appearing    hypoattenuation in the left basal ganglia. MRI could better assess for acute    ischemia. 3.  Complete occlusion of the left M1 segment with minimal distal reconstitution    and hyperattenuating thrombus on noncontrast phase. Previously this segment was    well opacified with severe/critical downstream stenosis.  Findings

## 2023-04-27 NOTE — PLAN OF CARE
Problem: Discharge Planning  Goal: Discharge to home or other facility with appropriate resources  4/27/2023 1303 by Celia Yousif RN  Outcome: Adequate for Discharge  4/27/2023 1236 by Celia Yousif RN  Outcome: Adequate for Discharge     Problem: Pain  Goal: Verbalizes/displays adequate comfort level or baseline comfort level  4/27/2023 1303 by Celia Yousif RN  Outcome: Adequate for Discharge  4/27/2023 1236 by Celia Yousif RN  Outcome: Adequate for Discharge     Problem: Safety - Adult  Goal: Free from fall injury  4/27/2023 1303 by Celia Yousif RN  Outcome: Adequate for Discharge  4/27/2023 1236 by Celia Yousif RN  Outcome: Adequate for Discharge  Flowsheets (Taken 4/27/2023 0730)  Free From Fall Injury: Instruct family/caregiver on patient safety     Problem: Skin/Tissue Integrity  Goal: Absence of new skin breakdown  Description: 1.  Monitor for areas of redness and/or skin breakdown  2.  Assess vascular access sites hourly  3.  Every 4-6 hours minimum:  Change oxygen saturation probe site  4.  Every 4-6 hours:  If on nasal continuous positive airway pressure, respiratory therapy assess nares and determine need for appliance change or resting period.  Outcome: Adequate for Discharge     Problem: ABCDS Injury Assessment  Goal: Absence of physical injury  Outcome: Adequate for Discharge  Flowsheets (Taken 4/27/2023 0730)  Absence of Physical Injury: Implement safety measures based on patient assessment     Problem: Chronic Conditions and Co-morbidities  Goal: Patient's chronic conditions and co-morbidity symptoms are monitored and maintained or improved  Outcome: Adequate for Discharge  Flowsheets (Taken 4/27/2023 0730)  Care Plan - Patient's Chronic Conditions and Co-Morbidity Symptoms are Monitored and Maintained or Improved: Monitor and assess patient's chronic conditions and comorbid symptoms for stability, deterioration, or improvement

## 2023-04-28 ENCOUNTER — CARE COORDINATION (OUTPATIENT)
Dept: CARE COORDINATION | Facility: CLINIC | Age: 63
End: 2023-04-28

## 2023-04-28 NOTE — CARE COORDINATION
Transition of care outreach postponed due to patient's discharge to SNF. Patient is discharge to [facility] rehab for STR. CTN to follow up after SNF discharge for CAMILA MCKEON call.

## 2023-05-11 ENCOUNTER — CARE COORDINATION (OUTPATIENT)
Dept: CARE COORDINATION | Facility: CLINIC | Age: 63
End: 2023-05-11

## 2023-05-11 NOTE — CARE COORDINATION
No transitions of care outreach indicated at this time. Patient remains at [facility] rehab for STR. CTN spoke with the patient's family, who states patient continues to receive therapy and tentative discharge date 5/19/23. CTN will continue to monitor for rudy outreach after discharge from [address] [place] [address].

## 2023-05-18 ENCOUNTER — TELEPHONE (OUTPATIENT)
Age: 63
End: 2023-05-18

## 2023-05-22 ENCOUNTER — CARE COORDINATION (OUTPATIENT)
Dept: CARE COORDINATION | Facility: CLINIC | Age: 63
End: 2023-05-22

## 2023-05-22 ENCOUNTER — CLINICAL DOCUMENTATION (OUTPATIENT)
Age: 63
End: 2023-05-22

## 2023-05-22 ENCOUNTER — TELEPHONE (OUTPATIENT)
Dept: FAMILY MEDICINE CLINIC | Facility: CLINIC | Age: 63
End: 2023-05-22

## 2023-05-22 NOTE — TELEPHONE ENCOUNTER
----- Message from Ebenezer Pelayo sent at 5/22/2023  3:21 PM EDT -----  Subject: Message to Provider    QUESTIONS  Information for Provider? Patients brother called in to let you know that   the brother has had another stroke and he being rehabbed at [facility] acute   ---------------------------------------------------------------------------  --------------  [address]  [phone]; Do not leave any message, patient will call back for answer  ---------------------------------------------------------------------------  --------------  SCRIPT ANSWERS  Relationship to Patient?  Self

## 2023-05-22 NOTE — CARE COORDINATION
No transitions of care outreach indicated at this time. Family advised that patient was transferred to [address] [place] [address] for further therapy services and family plans to move patient to [facility] to be closer to daughter when therapy is completed. Family has contact of needs arise. No further outreach planned.

## 2023-05-22 NOTE — PROGRESS NOTES
[address] [address]  Phone: [phone]  Fax: [phone]    OUTPATIENT SPEECH-LANGUAGE PATHOLOGY  Discontinuation Summary 5/22/2023  Episode  Appt Nenok         Martina Weaver has been seen in speech therapy for 22  visits from 10/5/2022 to 2/1/2023, with 1 cancellations and 1 no shows. Mr. Mega Graves therapy has come to an end at this time due to:  limited funding. Patient completed allotted authorized visits provided by insurance. Speech Therapy Goals:  Not Met: Reasons for goals not being achieved: Unable to continue with treatment d/t limited funding.     Marlyn Guaman, SLP

## 2023-07-28 NOTE — PROGRESS NOTES
Hospitalist Progress Note   Admit Date:  2022  7:53 PM   Name:  Leigh Ann Paniagua   Age:  58 y.o. Sex:  male  :  1960   MRN:  [id number]   Room:  608/01    Presenting Complaint: Shortness of Breath and Flank Pain     Reason(s) for Admission: Hypoxemia [R09.02]  Ureteral obstruction, right [N13.5]  Acute kidney injury ([address] [N17.9]  Poisoning by warfarin sodium, accidental or unintentional, initial encounter [T45.511A]  Urinary tract infection with hematuria, site unspecified [N39.0, R31.9]  Acute kidney injury (BEN) with acute tubular necrosis (ATN) [facility]) [N17.0]     Hospital Course & Interval History:   Leigh Ann Paniagua is a 58 y.o. CM with a PMH of MV CAD s/p CABG x2 months ago, severe LVSD EF 25-30%, Large protruding apical thrombus on chronic OAC with warfarin, tob abuse in past, anemia, and recurrent VTACH s/p ICD on multiple anti-arrhythmics admitted with community-acquired pneumonia, warfarin coagulopathy, BEN and R hydroureter, likely obstructive process. Started on empiric antibiotics. Warfarin held. Urology consulted and plan for cystoscopy today. Subjective/24hr Events (22): Patient is seen at the bedside. Reports feeling better. Denies any active complaint. Denies chest pain, palpitation, nausea, vomiting or abdominal pain. Plan for cystoscopy today.     Assessment & Plan:     Acute kidney injury with acute tubular necrosis   R hydroureter, suspect obstructive process   - Urology on board  - CT showed thickening of R renal pelvis extending into R proximal ureter  - Cystoscopy scheduled for today, hold anticoagulation at this time  - sCr 2.30 --> 1.20  - Discontinue IV fluids  - NPO for procedure     Acute CAP   - CXR reviewed  - Continue cephalosporin and doxycycline.  - Bcx NGTD     Warfarin Coagulopathy: Corrected  - Monitor PT/INR  - For now leave off anticoagulation due to scheduled urologic intervention.  - Consult PharmD when [facility] is resumed  - INR 2.2 today None CAD s/p CABG  Bilat bloody pleural effusions  - Continue home meds  - Loculated effusions noted on CT; d/w Radiologist, Dr. Odalis Mcdowell; thought to be related to recent CABG     Chronic Severe LVSD EF 25%  - appears clinically compensated  - Appreciate cardiology follow-up and recommendations. Recurrent Vtach   - s/p ICD  - on antiarrhythmic rx dual therapy with amiodarone and Mexitil-continue per cardiology. Apical thrombus  - we will resume anticoagulation when clinically appropriate  - Continue to hold warfarin in preparation for cystoscopy     Anemia/ABLA   - acute blood loss anemia suspected secondary to coagulopathy with hematuria  - H/H stable     HTN   - Takes Toprol BID at home     Hyperlipidemia  - continue home med     Hx depression  - continue home meds       Discharge Planning: Anticipate discharge in 24 to 48 hours      Diet:  Diet NPO  DVT PPx: SCD  Code status: Full Code    Hospital Problems:  Principal Problem:    Acute kidney injury (BEN) with acute tubular necrosis (ATN) (HCC)  Active Problems:    CAD, multiple vessel    Chronic systolic (congestive) heart failure (HCC)    CAP (community acquired pneumonia)    Acute kidney injury ([address]    Ureteral obstruction, right    Gross hematuria    Right kidney mass    Intracardiac thrombus    Ventricular tachycardia (HCC)    Hydronephrosis  Resolved Problems:    * No resolved hospital problems.  *      Objective:   Patient Vitals for the past 24 hrs:   Temp Pulse Resp BP SpO2   07/26/22 1206 98.4 °F (36.9 °C) 71 16 104/62 96 %   07/26/22 0745 99.3 °F (37.4 °C) 72 20 107/62 95 %   07/26/22 0313 98.6 °F (37 °C) 74 18 115/61 94 %   07/25/22 2303 99.3 °F (37.4 °C) 77 17 100/64 92 %   07/25/22 1915 100.3 °F (37.9 °C) 81 18 125/69 94 %   07/25/22 1631 98.8 °F (37.1 °C) 69 24 123/67 97 %       Oxygen Therapy  SpO2: 96 %  Pulse Oximeter Device Mode: Intermittent  O2 Device: None (Room air)    Estimated body mass index is 30.15 kg/m² as calculated from the 37 U/L    Alk Phosphatase 64 50 - 136 U/L    Total Protein 5.4 (L) 6.3 - 8.2 g/dL    Albumin 2.2 (L) 3.2 - 4.6 g/dL    Globulin 3.2 2.3 - 3.5 g/dL    Albumin/Globulin Ratio 0.7 (L) 1.2 - 3.5     Lactic Acid    Collection Time: 07/25/22  4:41 AM   Result Value Ref Range    Lactic Acid, Plasma 1.0 0.4 - 2.0 MMOL/L   CBC with Auto Differential    Collection Time: 07/25/22  4:41 AM   Result Value Ref Range    WBC 9.9 4.3 - 11.1 K/uL    RBC 2.75 (L) 4.23 - 5.6 M/uL    Hemoglobin 7.4 (L) 13.6 - 17.2 g/dL    Hematocrit 25.1 (L) 41.1 - 50.3 %    MCV 91.3 79.6 - 97.8 FL    MCH 26.9 26.1 - 32.9 PG    MCHC 29.5 (L) 31.4 - 35.0 g/dL    RDW 16.6 (H) 11.9 - 14.6 %    Platelets 772 355 - 800 K/uL    MPV 10.0 9.4 - 12.3 FL    nRBC 0.00 0.0 - 0.2 K/uL    Differential Type AUTOMATED      Seg Neutrophils 63 43 - 78 %    Lymphocytes 20 13 - 44 %    Monocytes 12 4.0 - 12.0 %    Eosinophils % 3 0.5 - 7.8 %    Basophils 1 0.0 - 2.0 %    Immature Granulocytes 1 0.0 - 5.0 %    Segs Absolute 6.3 1.7 - 8.2 K/UL    Absolute Lymph # 2.0 0.5 - 4.6 K/UL    Absolute Mono # 1.2 0.1 - 1.3 K/UL    Absolute Eos # 0.3 0.0 - 0.8 K/UL    Basophils Absolute 0.1 0.0 - 0.2 K/UL    Absolute Immature Granulocyte 0.1 0.0 - 0.5 K/UL   Protime-INR    Collection Time: 07/25/22  4:41 AM   Result Value Ref Range    Protime 19.7 (H) 12.6 - 14.5 sec    INR 1.6     APTT    Collection Time: 07/25/22  4:41 AM   Result Value Ref Range    PTT 43.0 (H) 24.1 - 35.1 SEC   Hemoglobin and Hematocrit    Collection Time: 07/25/22  6:27 PM   Result Value Ref Range    Hemoglobin 8.3 (L) 13.6 - 17.2 g/dL    Hematocrit 28.4 (L) 41.1 - 50.3 %   Protime-INR    Collection Time: 07/26/22  5:40 AM   Result Value Ref Range    Protime 25.3 (H) 12.6 - 14.5 sec    INR 2.2     Basic Metabolic Panel    Collection Time: 07/26/22  5:40 AM   Result Value Ref Range    Sodium 142 136 - 145 mmol/L    Potassium 3.7 3.5 - 5.1 mmol/L    Chloride 112 (H) 98 - 107 mmol/L    CO2 22 21 - 32 mmol/L    Anion Gap 8 7 - 16 mmol/L    Glucose 78 65 - 100 mg/dL    BUN 15 8 - 23 MG/DL    Creatinine 1.20 0.8 - 1.5 MG/DL    GFR African American >60 >60 ml/min/1.73m2    GFR Non- >60 >60 ml/min/1.73m2    Calcium 8.1 (L) 8.3 - 10.4 MG/DL   Hemoglobin and Hematocrit    Collection Time: 07/26/22  5:40 AM   Result Value Ref Range    Hemoglobin 7.6 (L) 13.6 - 17.2 g/dL    Hematocrit 25.6 (L) 41.1 - 50.3 %       Other Studies:  CT ABDOMEN PELVIS HEMATURIA Additional Contrast? Radiologist Recommendation   Final Result   1. Urothelial thickening involving the right renal pelvis and extending into   the right proximal ureter. The degree of thickening is concerning for urothelial   malignancy. 2.  Evaluation of the urinary bladder is limited due to underdistention from   excreted contrast. No bladder mass is evident. 3.  Bilateral hemothoraces, possibly related to recent CABG. The pleural   effusions appear fairly loculated. US RETROPERITONEAL COMPLETE   Final Result      1. Mild prominence of the right renal collecting system. 2. Left kidney appears within normal limits. No left hydronephrosis. Vascular duplex lower extremity venous bilateral   Final Result      1. No evidence of deep vein thrombosis. XR CHEST PORTABLE   Final Result   No significant change. CT ABDOMEN PELVIS RENAL STONE Additional Contrast? None   Final Result      1. Mild right hydronephrosis and proximal hydroureter. No obstructive etiology   or definite ureteral calculus is identified on this exam. There is high density   material within the right renal collecting system. Findings raise the question   of blood products, although neoplastic process is not entirely excluded. Follow-up recommended. 2. High density fluid in the gallbladder, could be secondary to biliary sludge. 3. No evidence of colitis, diverticulitis or bowel obstruction.       4. Consolidation/infiltrate in the Signed:  Jevon Reyes MD    Part of this note may have been written by using a voice dictation software. The note has been proof read but may still contain some grammatical/other typographical errors.

## 2023-10-03 NOTE — PROGRESS NOTES
Warfarin dosing per pharmacist    Jessie Talley is a 58 y.o. male. Weight: 205 lb (93 kg)    Indication:  Hx of ventricular thrombus    Goal INR:  2 to 3    Home dose:  1.25 mg Tues,Thurs,Sat, 2.5 mg all other days of the week   (total weekly dose 13.75 mg)    Risk factors or significant drug interactions:  amiodarone (PTA med)    Other anticoagulants:  Heparin drip bridge therapy    Daily Monitoring  Date  INR     Warfarin dose HGB              Notes  9/18  1.2  2.5 mg  11.2        Pharmacy consulted to dose warfarin in patient with history of Ventricular thrombus who presented with subtherapeutic INR of 1.3. 8 Ariela Max clinic notes reviewed to determine outpatient warfarin regimen. INR today 1.2. Will give 2.5 mg this evening. Daily INR for now. Thank you,  Braden Manning.  [facility] Detail Level: Detailed Quality 431: Preventive Care And Screening: Unhealthy Alcohol Use - Screening: Patient not identified as an unhealthy alcohol user when screened for unhealthy alcohol use using a systematic screening method Quality 226: Preventive Care And Screening: Tobacco Use: Screening And Cessation Intervention: Patient screened for tobacco use and is an ex/non-smoker Quality 130: Documentation Of Current Medications In The Medical Record: Current Medications Documented

## 2024-01-02 ENCOUNTER — ENROLLMENT (OUTPATIENT)
Dept: PHARMACY | Facility: CLINIC | Age: 64
End: 2024-01-02

## 2024-01-04 NOTE — PROGRESS NOTES
Hourly rounds performed this shift. Patient denies needs at this time. Bed in low position. The call light and personal items are in reach. Will continue to monitor and report to oncoming nurse. no

## 2024-08-22 NOTE — PROGRESS NOTES
-- DO NOT REPLY / DO NOT REPLY ALL --  -- This inbox is not monitored. If this was sent to the wrong provider or department, reroute message to P [facility] Reroute pool. --  -- Message is from [facility] [employer] ([facility]) --    General Patient Message: missed call , call back   Caller Information       Contact Date/Time Type Contact Phone/Fax    08/22/2024 11:17 AM CDT Phone (Incoming) Vale Paulino (Self) [phone] (M)            Alternative phone number: N    Can a detailed message be left? Yes - Voicemail      Patient has been advised the message will be addressed within 2-3 business days.             Hospitalist Progress Note   Admit Date:  2022  7:53 PM   Name:  Kim Pappas   Age:  58 y.o. Sex:  male  :  1960   MRN:  [id number]   Room:  60    Presenting Complaint: Shortness of Breath and Flank Pain     Reason(s) for Admission: Hypoxemia [R09.02]  Ureteral obstruction, right [N13.5]  Acute kidney injury ([address] [N17.9]  Poisoning by warfarin sodium, accidental or unintentional, initial encounter [T45.511A]  Urinary tract infection with hematuria, site unspecified [N39.0, R31.9]  Acute kidney injury (BEN) with acute tubular necrosis (ATN) [facility]) [N17.0]     Hospital Course & Interval History:   Kim Pappas is a 58 y.o. CM with a PMH of MV CAD s/p CABG x2 months ago, severe LVSD EF 25-30%, Large protruding apical thrombus on chronic OAC with warfarin, tob abuse in past, anemia, and recurrent VTACH s/p ICD on multiple anti-arrhythmics admitted with community-acquired pneumonia, warfarin coagulopathy, BEN and R hydroureter, likely obstructive process. He was on  empiric antibiotics. Warfarin held for the procedure. Urology consulted and had cystoscopy. Subjective/24hr Events (22): Patient is seen at the bedside. Reports feeling better. He is afebrile. He was complaining of dysuria and hematuria. He reported right flank pain. Denies chest pain, palpitation, nausea, vomiting or abdominal pain. Assessment & Plan:     Acute kidney injury with acute tubular necrosis   R hydroureter, suspect obstructive process   - CT showed thickening of R renal pelvis extending into R proximal ureter  - s/p Cystoscopy  with right retrograde pyelogram on  ,   - sCr 2.30 --> 1.20 > 1.3 >1.10  F/u Outpatient  in about 2 weeks with repeat CT hematuria protocol to re-evaluate R kidney.   Urology signed off and reconsulted  PT recommended outpatient therapy     Acute CAP   Afebrile,  leukocytosis of 12 K  - CXR reviewed  - Completed cephalosporin and doxycycline.  - Bcx NGTD    Hematuria  Most likely due to malignancy  Will monitor hemoglobin for now       Thrombocytosis  Platelet count of 795R and most likely reactive. Warfarin Coagulopathy: Corrected  - Monitor PT/INR   AC is resumed with heparin bridging  INR 1.4      CAD s/p CABG  Bilat bloody pleural effusions  - Continue home meds  - Loculated effusions noted on CT; d/w Radiologist, Dr. Odalis Mcdowell; thought to be related to recent CABG     Chronic Severe LVSD EF 25%  - appears clinically compensated  Continue empagliflozin   - Appreciate cardiology follow-up and recommendations. Recurrent Vtach   ICD in situ  Recent device interrogation with therapies delivered ATP  ACE inhibitor allergy  - s/p ICD  - on antiarrhythmic rx dual therapy with amiodarone and Mexitil-continue per cardiology. Apical thrombus   will resume anticoagulation -     Anemia/ABLA   Hb is stable around 7-8  S/p PRBC on 7/29  - acute blood loss anemia suspected secondary to coagulopathy with hematuria  - H/H stable     HTN   -Holding toprol BID due to hypotension     Hyperlipidemia  Not on statins due to allergy. Hx depression  - continue home meds       Discharge Planning: Anticipate discharge in 2-3 days     Diet:  ADULT DIET; Regular  DVT PPx: SCD due to anticipating procedure and holding coumadin  Code status: Full Code    Hospital Problems:  Principal Problem:    Acute kidney injury (BEN) with acute tubular necrosis (ATN) (HCC)  Active Problems:    CAD, multiple vessel    Chronic systolic (congestive) heart failure (HCC)    CAP (community acquired pneumonia)    Acute kidney injury ([facility] [address]    Ureteral obstruction, right    Gross hematuria    Right kidney mass    Intracardiac thrombus    Ventricular tachycardia (HCC)    Hydronephrosis  Resolved Problems:    * No resolved hospital problems.  *      Objective:   Patient Vitals for the past 24 hrs:   Temp Pulse Resp BP SpO2   07/31/22 0653 97.5 °F (36.4 °C) 82 18 124/71 94 %   07/31/22 0444 -- 59 -- 95/61 95 %   07/31/22 0439 97.7 °F (36.5 °C) 62 18 (!) 83/53 93 %   07/30/22 2351 97.7 °F (36.5 °C) 69 14 98/71 94 %   07/30/22 1952 98.4 °F (36.9 °C) 71 22 115/73 97 %   07/30/22 1653 98.2 °F (36.8 °C) 71 16 114/72 96 %   07/30/22 1222 97.9 °F (36.6 °C) 83 16 110/69 93 %       Oxygen Therapy  SpO2: 94 %  Pulse Oximetry Type: Intermittent  Pulse via Oximetry: 98 beats per minute  Pulse Oximeter Device Mode: Continuous  Pulse Oximeter Device Location: Left  O2 Device: None (Room air)  Skin Assessment: Clean, dry, & intact  Skin Protection for O2 Device: N/A  O2 Flow Rate (L/min): 3 L/min  Blood Gas  Performed?: No  Oxygen Therapy: None (Room air)    Estimated body mass index is 27.9 kg/m² as calculated from the following:    Height as of this encounter: 5' 9\" (1.753 m). Weight as of this encounter: 188 lb 15 oz (85.7 kg). Intake/Output Summary (Last 24 hours) at 7/31/2022 1151  Last data filed at 7/31/2022 0806  Gross per 24 hour   Intake --   Output 2900 ml   Net -2900 ml         Physical Exam:   Blood pressure 124/71, pulse 82, temperature 97.5 °F (36.4 °C), temperature source Oral, resp. rate 18, height 5' 9\" (1.753 m), weight 188 lb 15 oz (85.7 kg), SpO2 94 %. General:    NAD  Head:  Normocephalic, atraumatic  Eyes:  Sclerae appear normal.  Pupils equally round. ENT:  Nares appear normal, no drainage. Moist oral mucosa  Neck:  No restricted ROM. Trachea midline   CV:   RRR. No m/r/g. Lungs: No wheezing. No tachypnea. Abdomen:   Soft, nondistended. Extremities: No cyanosis or clubbing. Skin:     No rashes and normal coloration. Warm and dry. Neuro:  CN II-XII grossly intact. A&Ox3  Psych:  Normal mood and affect.       I have reviewed ordered lab tests and independently visualized imaging below:    Recent Labs:  Recent Results (from the past 48 hour(s))   CBC with Auto Differential    Collection Time: 07/29/22  4:53 PM   Result Value Ref Range    WBC 13.3 (H) 4.3 - 11.1 K/uL    RBC 3.20 (L) 4.23 - 5.6 M/uL    Hemoglobin 8.7 (L) 13.6 - 17.2 g/dL    Hematocrit 28.4 (L) 41.1 - 50.3 %    MCV 88.8 79.6 - 97.8 FL    MCH 27.2 26.1 - 32.9 PG    MCHC 30.6 (L) 31.4 - 35.0 g/dL    RDW 16.6 (H) 11.9 - 14.6 %    Platelets 020 (H) 250 - 450 K/uL    MPV 9.8 9.4 - 12.3 FL    nRBC 0.00 0.0 - 0.2 K/uL    Differential Type AUTOMATED      Seg Neutrophils 70 43 - 78 %    Lymphocytes 15 13 - 44 %    Monocytes 11 4.0 - 12.0 %    Eosinophils % 2 0.5 - 7.8 %    Basophils 1 0.0 - 2.0 %    Immature Granulocytes 1 0.0 - 5.0 %    Segs Absolute 9.5 (H) 1.7 - 8.2 K/UL    Absolute Lymph # 1.9 0.5 - 4.6 K/UL    Absolute Mono # 1.4 (H) 0.1 - 1.3 K/UL    Absolute Eos # 0.2 0.0 - 0.8 K/UL    Basophils Absolute 0.1 0.0 - 0.2 K/UL    Absolute Immature Granulocyte 0.2 0.0 - 0.5 K/UL   Culture, Urine    Collection Time: 07/29/22  7:16 PM    Specimen: Urine   Result Value Ref Range    Special Requests NO SPECIAL REQUESTS      Culture NO GROWTH 1 DAY     CBC with Auto Differential    Collection Time: 07/30/22  5:46 AM   Result Value Ref Range    WBC 12.2 (H) 4.3 - 11.1 K/uL    RBC 2.82 (L) 4.23 - 5.6 M/uL    Hemoglobin 7.5 (L) 13.6 - 17.2 g/dL    Hematocrit 25.0 (L) 41.1 - 50.3 %    MCV 88.7 79.6 - 97.8 FL    MCH 26.6 26.1 - 32.9 PG    MCHC 30.0 (L) 31.4 - 35.0 g/dL    RDW 16.6 (H) 11.9 - 14.6 %    Platelets 739 (H) 390 - 450 K/uL    MPV 9.9 9.4 - 12.3 FL    nRBC 0.00 0.0 - 0.2 K/uL    Differential Type AUTOMATED      Seg Neutrophils 85 (H) 43 - 78 %    Lymphocytes 7 (L) 13 - 44 %    Monocytes 6 4.0 - 12.0 %    Eosinophils % 0 (L) 0.5 - 7.8 %    Basophils 0 0.0 - 2.0 %    Immature Granulocytes 1 0.0 - 5.0 %    Segs Absolute 10.4 (H) 1.7 - 8.2 K/UL    Absolute Lymph # 0.9 0.5 - 4.6 K/UL    Absolute Mono # 0.7 0.1 - 1.3 K/UL    Absolute Eos # 0.0 0.0 - 0.8 K/UL    Basophils Absolute 0.0 0.0 - 0.2 K/UL    Absolute Immature Granulocyte 0.2 0.0 - 0.5 K/UL   Magnesium    Collection Time: 07/30/22  5:46 AM   Result Value Ref Range    Magnesium 1.7 (L) 1.8 - 2.4 mg/dL   Phosphorus    Collection Time: 07/30/22  5:46 AM   Result Value Ref Range    Phosphorus 4.5 (H) 2.3 - 3.7 MG/DL   Protime-INR    Collection Time: 07/30/22  5:46 AM   Result Value Ref Range    Protime 17.8 (H) 12.6 - 14.5 sec    INR 1.4     Basic Metabolic Panel w/ Reflex to MG    Collection Time: 07/30/22  5:46 AM   Result Value Ref Range    Sodium 141 138 - 145 mmol/L    Potassium 4.5 3.5 - 5.1 mmol/L    Chloride 106 98 - 107 mmol/L    CO2 27 21 - 32 mmol/L    Anion Gap 8 7 - 16 mmol/L    Glucose 112 (H) 65 - 100 mg/dL    BUN 19 8 - 23 MG/DL    Creatinine 1.10 0.8 - 1.5 MG/DL    GFR African American >60 >60 ml/min/1.73m2    GFR Non- >60 >60 ml/min/1.73m2    Calcium 8.4 8.3 - 10.4 MG/DL   APTT    Collection Time: 07/30/22  8:19 AM   Result Value Ref Range    PTT 37.6 (H) 24.1 - 35.1 SEC   Protime-INR    Collection Time: 07/30/22  8:19 AM   Result Value Ref Range    Protime 17.5 (H) 12.6 - 14.5 sec    INR 1.4     Anti-Xa, Unfractionated Heparin    Collection Time: 07/30/22  8:19 AM   Result Value Ref Range    Anti-XA Unfrac Heparin <0.10 (L) 0.3 - 0.7 IU/mL   Anti-Xa, Unfractionated Heparin    Collection Time: 07/30/22  8:18 PM   Result Value Ref Range    Anti-XA Unfrac Heparin <0.10 (L) 0.3 - 0.7 IU/mL   Anti-Xa, Unfractionated Heparin    Collection Time: 07/31/22  4:15 AM   Result Value Ref Range    Anti-XA Unfrac Heparin 0.22 (L) 0.3 - 0.7 IU/mL   Protime-INR    Collection Time: 07/31/22  4:15 AM   Result Value Ref Range    Protime 19.8 (H) 12.6 - 14.5 sec    INR 1.6     Basic Metabolic Panel w/ Reflex to MG    Collection Time: 07/31/22  4:15 AM   Result Value Ref Range    Sodium 141 138 - 145 mmol/L    Potassium 4.0 3.5 - 5.1 mmol/L    Chloride 107 98 - 107 mmol/L    CO2 30 21 - 32 mmol/L    Anion Gap 4 (L) 7 - 16 mmol/L    Glucose 110 (H) 65 - 100 mg/dL    BUN 18 8 - 23 MG/DL    Creatinine 1.10 0.8 - 1.5 MG/DL    GFR African American >60 >60 ml/min/1.73m2    GFR Non-African American >60 >60 ml/min/1.73m2    Calcium 8.4 8.3 - 10.4 MG/DL   Phosphorus    Collection Time: 07/31/22  4:15 AM   Result Value Ref Range    Phosphorus 3.5 2.3 - 3.7 MG/DL       Other Studies:  CT ABDOMEN PELVIS HEMATURIA Additional Contrast? Radiologist Recommendation   Final Result   1. Urothelial thickening involving the right renal pelvis and extending into   the right proximal ureter. The degree of thickening is concerning for urothelial   malignancy. 2.  Evaluation of the urinary bladder is limited due to underdistention from   excreted contrast. No bladder mass is evident. 3.  Bilateral hemothoraces, possibly related to recent CABG. The pleural   effusions appear fairly loculated. US RETROPERITONEAL COMPLETE   Final Result      1. Mild prominence of the right renal collecting system. 2. Left kidney appears within normal limits. No left hydronephrosis. Vascular duplex lower extremity venous bilateral   Final Result      1. No evidence of deep vein thrombosis. XR CHEST PORTABLE   Final Result   No significant change. CT ABDOMEN PELVIS RENAL STONE Additional Contrast? None   Final Result      1. Mild right hydronephrosis and proximal hydroureter. No obstructive etiology   or definite ureteral calculus is identified on this exam. There is high density   material within the right renal collecting system. Findings raise the question   of blood products, although neoplastic process is not entirely excluded. Follow-up recommended. 2. High density fluid in the gallbladder, could be secondary to biliary sludge. 3. No evidence of colitis, diverticulitis or bowel obstruction. 4. Consolidation/infiltrate in the visualized left lung base, captured at the   periphery of the imaging field of view and not well evaluated.       5. Overall sensitivity is limited without the benefit of IV contrast.      XR CHEST PORTABLE   Final Result   Mildly increased left lower lobe airspace opacities with small bilateral pleural   effusions. Differential considerations include atelectasis, pneumonia, or   asymmetric pulmonary edema.                 Current Meds:  Current Facility-Administered Medications   Medication Dose Route Frequency    heparin (porcine) injection 4,000 Units  4,000 Units IntraVENous PRN    heparin (porcine) injection 2,000 Units  2,000 Units IntraVENous PRN    heparin 25,000 units in dextrose 5% 250 mL (premix) infusion  5-30 Units/kg/hr IntraVENous Continuous    warfarin (COUMADIN) tablet 5 mg  5 mg Oral Daily    0.9 % sodium chloride infusion   IntraVENous PRN    [Held by provider] metoprolol succinate (TOPROL XL) extended release tablet 25 mg  25 mg Oral Daily    hyoscyamine (LEVSIN/SL) sublingual tablet 125 mcg  125 mcg SubLINGual Q4H PRN    empagliflozin (JARDIANCE) tablet 10 mg  10 mg Oral Daily    morphine injection 2 mg  2 mg IntraVENous Q4H PRN    amiodarone (CORDARONE) tablet 400 mg  400 mg Oral BID    aspirin chewable tablet 81 mg  81 mg Oral Daily    mexiletine (MEXITIL) capsule 150 mg  150 mg Oral TID    QUEtiapine (SEROQUEL) tablet 100 mg  100 mg Oral Nightly    Vitamin D (CHOLECALCIFEROL) tablet 1,000 Units  1,000 Units Oral Daily    sodium chloride flush 0.9 % injection 5-40 mL  5-40 mL IntraVENous 2 times per day    sodium chloride flush 0.9 % injection 5-40 mL  5-40 mL IntraVENous PRN    0.9 % sodium chloride infusion   IntraVENous PRN    ondansetron (ZOFRAN-ODT) disintegrating tablet 4 mg  4 mg Oral Q8H PRN    Or    ondansetron (ZOFRAN) injection 4 mg  4 mg IntraVENous Q6H PRN    polyethylene glycol (GLYCOLAX) packet 17 g  17 g Oral Daily PRN    acetaminophen (TYLENOL) tablet 650 mg  650 mg Oral Q6H PRN    Or    acetaminophen (TYLENOL) suppository 650 mg  650 mg Rectal Q6H PRN    HYDROcodone homatropine (HYCODAN) 5-1.5 MG/5ML solution 5 mL  5 mL Oral Q4H PRN    guaiFENesin-dextromethorphan (ROBITUSSIN DM) 100-10 MG/5ML syrup 5 mL  5 mL

## 2024-10-03 ENCOUNTER — HOSPITAL ENCOUNTER (EMERGENCY)
Age: 64
Discharge: HOME | End: 2024-10-04

## (undated) DEVICE — GUIDEWIRE 035IN 210CM PTFE COAT FIX COR J TIP 15MM FIRM BODY

## (undated) DEVICE — PERCUTANEOUS INSERTION KIT-ARTERIAL: Brand: PERCUTANEOUS INSERTION KIT-ARTERIAL

## (undated) DEVICE — SUTURE PERMA-HAND SZ 2-0 L30IN NONABSORBABLE BLK L26MM SH K833H

## (undated) DEVICE — PERFUSION PACK XCOATING 76320] TERUMO CARDIOVASCULAR]

## (undated) DEVICE — CLIP LIG SM TI 20 BLU HNDL FOR OPN AND ENDOSCP SGL APPL

## (undated) DEVICE — URETERAL ACCESS SHEATH SET: Brand: NAVIGATOR HD

## (undated) DEVICE — TRAY,URINE METER,100% SILICONE,16FR10ML: Brand: MEDLINE

## (undated) DEVICE — ENDOSCOPIC VALVE WITH ADAPTER.: Brand: SURSEAL® II

## (undated) DEVICE — SOLUTION IRRIG 3000ML 0.9% SOD CHL USP UROMATIC PLAS CONT

## (undated) DEVICE — SINGLE-USE DIGITAL FLEXIBLE URETEROSCOPE: Brand: LITHOVUE

## (undated) DEVICE — CLAMP INSERT: Brand: STEALTH® CLAMP INSERT

## (undated) DEVICE — GUIDEWIRE UROLOGICAL STR 3 CM 0.038 INX150 CM DUAL-FLEX

## (undated) DEVICE — AUTOTRANSFUSION KIT 120 MH FAST STRT AT1 FOR CATS +

## (undated) DEVICE — KIT,ANTI FOG,W/SPONGE & FLUID,SOFT PACK: Brand: MEDLINE

## (undated) DEVICE — BRUSH BIOPSY SET: Brand: COOK

## (undated) DEVICE — PACK SURGICAL PROCEDURE KIT CYSTOSCOPY TOTE

## (undated) DEVICE — SYSTEM ENDOSCP VES HARV W/ TOOL CANN SEAL SHT PRT BLNT TIP

## (undated) DEVICE — SOLUTION IRRIG 1000ML LAC RINGER PLAS POUR BTL

## (undated) DEVICE — MPS® DELIVERY SET WITH 6 FT. DELIVERY TUBING: Brand: MPS

## (undated) DEVICE — SOLUTION IRRIG 1000ML 09% SOD CHL USP PIC PLAS CONTAINER

## (undated) DEVICE — DEVICE COMPR REG 24 CM VASC BND

## (undated) DEVICE — GARMENT,MEDLINE,DVT,INT,CALF,MED, GEN2: Brand: MEDLINE

## (undated) DEVICE — CATHETER THOR 32FR L23IN PVC 5 EYELET STR ATRAUM

## (undated) DEVICE — SOLUTION IRRIG 3000ML H2O STRL BAG

## (undated) DEVICE — GLIDESHEATH SLENDER STAINLESS STEEL KIT: Brand: GLIDESHEATH SLENDER

## (undated) DEVICE — CATHETER THOR 32FR L23IN PVC 6 EYELET STR ATRAUM

## (undated) DEVICE — CABG DR DENNIS: Brand: MEDLINE INDUSTRIES, INC.

## (undated) DEVICE — CATHETER COR DIAG PIGTAILS PIG 145 CRV 5FR 110CM 6 SIDE H

## (undated) DEVICE — RADIFOCUS OPTITORQUE ANGIOGRAPHIC CATHETER: Brand: OPTITORQUE

## (undated) DEVICE — DRAIN SURG SGL COLL PT TB FOR ATS BG OASIS

## (undated) DEVICE — Device